# Patient Record
Sex: MALE | Race: WHITE | Employment: OTHER | URBAN - METROPOLITAN AREA
[De-identification: names, ages, dates, MRNs, and addresses within clinical notes are randomized per-mention and may not be internally consistent; named-entity substitution may affect disease eponyms.]

---

## 2017-02-16 ENCOUNTER — DOCUMENTATION ONLY (OUTPATIENT)
Dept: SLEEP MEDICINE | Age: 67
End: 2017-02-16

## 2017-02-16 ENCOUNTER — OFFICE VISIT (OUTPATIENT)
Dept: SLEEP MEDICINE | Age: 67
End: 2017-02-16

## 2017-02-16 VITALS
HEIGHT: 71 IN | HEART RATE: 92 BPM | DIASTOLIC BLOOD PRESSURE: 80 MMHG | SYSTOLIC BLOOD PRESSURE: 124 MMHG | WEIGHT: 315 LBS | OXYGEN SATURATION: 98 % | BODY MASS INDEX: 44.1 KG/M2

## 2017-02-16 DIAGNOSIS — I10 ESSENTIAL HYPERTENSION: ICD-10-CM

## 2017-02-16 DIAGNOSIS — G47.33 OSA (OBSTRUCTIVE SLEEP APNEA): Primary | ICD-10-CM

## 2017-02-16 RX ORDER — CEFUROXIME AXETIL 250 MG/1
TABLET ORAL
Refills: 0 | COMMUNITY
Start: 2016-11-15 | End: 2017-04-25 | Stop reason: ALTCHOICE

## 2017-02-16 RX ORDER — FINASTERIDE 5 MG/1
TABLET, FILM COATED ORAL
Refills: 6 | COMMUNITY
Start: 2017-01-27 | End: 2018-05-02

## 2017-02-16 NOTE — PROGRESS NOTES
217 [facility], [address]  Tel.  [phone]  Fax. 100 [facility] 60  [place], [address] [facility]  Tel.  [phone]  Fax. [phone] [address]   Tel.  [phone]  Fax. [phone]     S>Christiano Sexton is a 73 y.o. male seen for a positive airway pressure follow-up. He reports no problems using the device. He is 100% compliant over the past 30 days. The following problems are identified:    Drowsiness no Problems exhaling no   Snoring no Forget to put on no   Mask Comfortable yes Can't fall asleep no   Dry Mouth no Mask falls off no   Air Leaking no Frequent awakenings no         He admits that his sleep has improved. No Known Allergies    He has a current medication list which includes the following prescription(s): cefuroxime, finasteride, xarelto, tamsulosin, lisinopril-hydrochlorothiazide, fluoxetine, and methylprednisolone. .      He  has a past medical history of Arrhythmia; Depression (4/24/2010); Diabetes ([address] Dyslipidemia, goal LDL below 100 (6/14/2011); HTN (hypertension) (4/24/2010); Impotence (4/24/2010); Morbid obesity ([address] (5/17/2010); VALENTE (obstructive sleep apnea) (4/24/2010); and Restless legs (4/15/2015). Dane Sleepiness Score: 8   and Modified F.O.S.Q. Score Total / 2: 19   which reflect improved sleep quality over therapy time.     O>    Visit Vitals    /80    Pulse 92    Ht 5' 11\" (1.803 m)    Wt 340 lb (154.2 kg)    SpO2 98%    BMI 47.42 kg/m2         General:   Not in acute distress   Eyes:  Anicteric sclerae, no obvious strabismus   Nose:  No obvious nasal septum deviation    Oropharynx:   Class 4 oropharyngeal outlet, thick tongue base, uvula not seen due to low-lying soft palate, narrow tonsilo-pharyngeal pilars   Tonsils:   tonsils are not visualized due to low-lying soft palate   Neck:   midline trachea   Chest/Lungs:  Equal lung expansion, clear on auscultation    CVS: Normal rate, regular rhythm; no JVD   Skin:  Warm to touch; no obvious rashes   Neuro:  No focal deficits ; no obvious tremor    Psych:  Normal affect,  normal countenance;           A>    ICD-10-CM ICD-9-CM    1. VALENTE (obstructive sleep apnea) G47.33 327.23 AMB SUPPLY ORDER   2. BMI 45.0-49.9, adult (HCC) Z68.42 V85.42    3. Essential hypertension I10 401.9      AHI = 65.3. On Bi - Level : See media for download    Compliant:      yes    Therapeutic Response:  Positive    P>    * We have recommended a dedicated weight loss through appropriate diet and an exercise regiment as significant weight reduction has been shown to reduce severity of obstructive sleep apnea. * Follow-up Disposition:  Return in about 1 year (around 2/16/2018), or if symptoms worsen or fail to improve. * He was asked to contact our office for any problems regarding PAP therapy. * Counseling was provided regarding the importance of regular PAP use and on proper sleep hygiene and safe driving. * Re-enforced proper and regular cleaning for the device. Thank you for allowing us to participate in your patient's medical care. Krystal Mahoney MD, FAASM  Diplomate American Board of Sleep Medicine  Diplomate in Sleep Medicine - ABP  Electronically signed.

## 2017-02-17 ENCOUNTER — DOCUMENTATION ONLY (OUTPATIENT)
Dept: SLEEP MEDICINE | Age: 67
End: 2017-02-17

## 2017-02-17 NOTE — PROGRESS NOTES
Patient called to inform us that he was told that [facility] would be out of network for his insurance. So, faxed the order to [facility], provided patient with DME phone number.

## 2017-02-22 ENCOUNTER — OFFICE VISIT (OUTPATIENT)
Dept: CARDIOLOGY CLINIC | Age: 67
End: 2017-02-22

## 2017-02-22 VITALS
OXYGEN SATURATION: 98 % | RESPIRATION RATE: 16 BRPM | BODY MASS INDEX: 44.1 KG/M2 | WEIGHT: 315 LBS | SYSTOLIC BLOOD PRESSURE: 130 MMHG | HEART RATE: 90 BPM | DIASTOLIC BLOOD PRESSURE: 78 MMHG | HEIGHT: 71 IN

## 2017-02-22 DIAGNOSIS — I48.20 CHRONIC ATRIAL FIBRILLATION (HCC): Primary | ICD-10-CM

## 2017-02-22 DIAGNOSIS — I10 ESSENTIAL HYPERTENSION: ICD-10-CM

## 2017-02-22 DIAGNOSIS — E78.5 DYSLIPIDEMIA, GOAL LDL BELOW 100: ICD-10-CM

## 2017-02-22 DIAGNOSIS — E66.01 MORBID OBESITY DUE TO EXCESS CALORIES (HCC): ICD-10-CM

## 2017-02-22 DIAGNOSIS — G47.33 OSA (OBSTRUCTIVE SLEEP APNEA): ICD-10-CM

## 2017-02-22 NOTE — MR AVS SNAPSHOT
Visit Information Date & Time Provider Department Dept. Phone Encounter #  
 2/22/2017  1:00 PM Jaja Light MD [facility] [place] [phone] [id number] Follow-up Instructions Return in about 1 year (around 2/22/2018). Your Appointments 4/25/2017  9:00 AM  
ROUTINE CARE with Leslye Joy MD  
Formerly [facility] [address]) Appt Note: 6 month f/u  
 [place] [address] [facility] [address]  
[address]  
  
    
 9/28/2017  9:00 AM  
Any with Vanessa Mayer MD  
[address] [place] [address] ([address]) Appt Note: yrly cpap follow up [address] [phone]  
[phone]  
  
   
 [address] [place] [phone]  
  
    
 11/7/2017  9:15 AM  
PHYSICAL with Leslye Joy MD  
Formerly [facility] [address]) Appt Note: [address] [facility] [address]  
[address]  
  
    
 2/16/2018 11:00 AM  
Any with Vanessa Mayer MD  
[address] [place] [address] ([address]) Appt Note: yrly follow up, modem pt  
 [address] [phone]  
[phone] Upcoming Health Maintenance Date Due Hepatitis C Screening 1950 DTaP/Tdap/Td series (1 - Tdap) 8/12/1972 ZOSTER VACCINE AGE 60> 8/12/2011 EYE EXAM RETINAL OR DILATED Q1 6/19/2014 FOOT EXAM Q1 4/15/2016 HEMOGLOBIN A1C Q6M 4/15/2016 MICROALBUMIN Q1 4/15/2016 GLAUCOMA SCREENING Q2Y 8/12/2016 Pneumococcal 65+ Low/Medium Risk (1 of 2 - PCV13) 8/12/2016 MEDICARE YEARLY EXAM 8/12/2016 LIPID PANEL Q1 10/15/2016 COLONOSCOPY 10/6/2017 Allergies as of 2/22/2017  Review Complete On: 2/22/2017 By: Jaja Light MD  
 No Known Allergies Current Immunizations  Reviewed on 10/25/2016 Name Date Influenza High Dose Vaccine PF 10/25/2016 Influenza Vaccine (Quad) PF 10/15/2015, 9/25/2014 Pneumococcal Polysaccharide (PPSV-23) 1/13/2014 Not reviewed this visit You Were Diagnosed With   
  
 Codes Comments Chronic atrial fibrillation (HCC)    -  Primary ICD-10-CM: I86.5 ICD-9-CM: 427.31 Dyslipidemia, goal LDL below 100     ICD-10-CM: E78.5 ICD-9-CM: 272.4 Morbid obesity due to excess calories (HCC)     ICD-10-CM: E66.01 
ICD-9-CM: 278.01 Essential hypertension     ICD-10-CM: I10 
ICD-9-CM: 401.9 VALENTE (obstructive sleep apnea)     ICD-10-CM: G47.33 
ICD-9-CM: 327.23 Vitals BP  
  
  
  
  
  
 130/78 (BP 1 Location: Left arm, BP Patient Position: Sitting) Vitals History BMI and BSA Data Body Mass Index Body Surface Area  
 48.12 kg/m 2 2.8 m 2 Preferred Pharmacy Pharmacy Name Phone [id number] Bonita Blackmon, [address] [place] [phone] Your Updated Medication List  
  
   
This list is accurate as of: 2/22/17  1:24 PM.  Always use your most recent med list.  
  
  
  
  
 cefUROXime 250 mg tablet Commonly known as:  CEFTIN  
TAKE ONE TABLET BY MOUTH TWO TIMES A DAY. finasteride 5 mg tablet Commonly known as:  PROSCAR  
TAKE 1 TABLET BY MOUTH DAILY FLUoxetine 20 mg capsule Commonly known as:  PROzac Take 1 capsule by mouth  daily  
  
 lisinopril-hydroCHLOROthiazide 20-25 mg per tablet Commonly known as:  Rocio Galarza Take 1 tablet by mouth  daily  
  
 methylPREDNISolone 4 mg tablet Commonly known as:  Bosderrick Pippins As directed  
  
 rivaroxaban 20 mg Tab tablet Commonly known as:  [place] TAKE 1 TABLET BY MOUTH IN THE MORNING WITH BREAKFAST  
  
 tamsulosin 0.4 mg capsule Commonly known as:  FLOMAX TAKE 1 CAPSULE BY MOUTH EVERY EVENING Follow-up Instructions Return in about 1 year (around 2/22/2018). Introducing [facility]! Dear Amna Coleman: Thank you for requesting a Revert account. Our records indicate that you already have an active Revert account. You can access your account anytime at [email or web address]. [email or web address] Did you know that you can access your hospital and ER discharge instructions at any time in Revert? You can also review all of your test results from your hospital stay or ER visit. Additional Information If you have questions, please visit the Frequently Asked Questions section of the Revert website at [email or web address] Remember, Revert is NOT to be used for urgent needs. For medical emergencies, dial 911. Now available from your iPhone and Android! Please provide this summary of care documentation to your next provider. Your primary care clinician is listed as Gabino Jackson. If you have any questions after today's visit, please call [phone].

## 2017-02-22 NOTE — PROGRESS NOTES
HISTORY OF PRESENT ILLNESS  Ronold Buerger is a 73 y.o. male     SUMMARY:   Problem List  Date Reviewed: 2/22/2017          Codes Class Noted    Urinary retention due to benign prostatic hyperplasia ICD-10-CM: N28.89, R33.8  ICD-9-CM: 600.01, 788.29  10/25/2016        Diabetes mellitus type 2, controlled ([facility] ICD-10-CM: E11.9  ICD-9-CM: 250.00  10/15/2015        Restless legs ICD-10-CM: G25.81  ICD-9-CM: 333.94  4/15/2015        VALENTE (obstructive sleep apnea) ICD-10-CM: G47.33  ICD-9-CM: 327.23  6/19/2012    Overview Signed 6/19/2012 10:57 AM by Michael Toro MD     Bi-Level: 19/15 cmH2O. Dyslipidemia, goal LDL below 100 ICD-10-CM: E78.5  ICD-9-CM: 272.4  6/14/2011        A-fib ([facility] ICD-10-CM: I48.91  ICD-9-CM: 427.31  2/7/2011    Overview Addendum 2/9/2011  3:42 PM by Pieter Ballard NP     Assymptomatic, noted on ekg 2/11; found when had sleep study. Saw cardiologist 2/8/11.              Diabetes ([facility] ICD-10-CM: E11.9  ICD-9-CM: 250.00  1/28/2011    Overview Addendum 2/9/2011  3:37 PM by Pieter Ballard NP     Recent diagnosis; just started diabetes education  HgA1C 6.5%             Morbid obesity ([facility] ICD-10-CM: E66.01  ICD-9-CM: 278.01  5/17/2010    Overview Signed 2/9/2011  3:35 PM by Pieter Ballard NP     OBESE 20 + YEARS; HIGH WEIGHT 362 LBS PAST MONTH; LOW WEIGHT 190 LBS 30 YEARS AGO             VALENTE (obstructive sleep apnea) ICD-10-CM: G47.33  ICD-9-CM: 327.23  4/24/2010        Impotence ICD-10-CM: N52.9  ICD-9-CM: 607.84  4/24/2010        HTN (hypertension) ICD-10-CM: I10  ICD-9-CM: 401.9  4/24/2010    Overview Signed 4/24/2010  9:31 AM by Stevan Farley     borderline             Depression ICD-10-CM: F32.9  ICD-9-CM: 683  4/24/2010              Current Outpatient Prescriptions on File Prior to Visit   Medication Sig    rivaroxaban (XARELTO) 20 mg tab tablet TAKE 1 TABLET BY MOUTH IN THE MORNING WITH BREAKFAST    cefUROXime (CEFTIN) 250 mg tablet TAKE ONE TABLET BY MOUTH TWO TIMES A DAY.  finasteride (PROSCAR) 5 mg tablet TAKE 1 TABLET BY MOUTH DAILY    tamsulosin (FLOMAX) 0.4 mg capsule TAKE 1 CAPSULE BY MOUTH EVERY EVENING    methylPREDNISolone (MEDROL DOSEPACK) 4 mg tablet As directed    lisinopril-hydrochlorothiazide (PRINZIDE, ZESTORETIC) 20-25 mg per tablet Take 1 tablet by mouth  daily    FLUoxetine (PROZAC) 20 mg capsule Take 1 capsule by mouth  daily     No current facility-administered medications on file prior to visit. CARDIOLOGY STUDIES TO DATE:  02/10 echocardiogram showed lvh but was otherwise normal.    02/10 stress cardiolyte had mild EKG changes without chest pain and a questionable fixed inferior defect with an ejection fraction of 58%      Chief Complaint   Patient presents with    Irregular Heart Beat     HPI :  Mr. Abdirahman Rogers is doing well. He is active and walking some, though not exercising as much as he should. In spite of this he has lost a few pounds since we last met. No problem with his medications, and he is faithfully wearing his CPAP. He wants to know if he can take Viagra from a cardiac perspective. CARDIAC ROS:   negative for chest pain, dyspnea, palpitations, syncope, orthopnea, paroxysmal nocturnal dyspnea, exertional chest pressure/discomfort, claudication, lower extremity edema    Family History   Problem Relation Age of Onset    Cancer Father     Cancer Paternal Grandfather     Diabetes Sister     Diabetes Brother        Past Medical History:   Diagnosis Date    Arrhythmia     Depression 4/24/2010    Diabetes ([address]     Dyslipidemia, goal LDL below 100 6/14/2011    HTN (hypertension) 4/24/2010    Impotence 4/24/2010    Morbid obesity ([address] 5/17/2010    VALENTE (obstructive sleep apnea) 4/24/2010    Restless legs 4/15/2015       GENERAL ROS:  A comprehensive review of systems was negative except for that written in the HPI.     Visit Vitals    /78 (BP 1 Location: Left arm, BP Patient Position: Sitting)    Pulse 90    Resp 16    Ht 5' 11\" (1.803 m)    Wt 345 lb (156.5 kg)    SpO2 98%    BMI 48.12 kg/m2       Wt Readings from Last 3 Encounters:   02/22/17 345 lb (156.5 kg)   02/16/17 340 lb (154.2 kg)   10/25/16 334 lb (151.5 kg)            BP Readings from Last 3 Encounters:   02/22/17 130/78   02/16/17 124/80   10/25/16 130/88       PHYSICAL EXAM  General appearance: alert, cooperative, no distress, appears stated age  Neck: supple, symmetrical, trachea midline, no adenopathy, no carotid bruit and no JVD  Lungs: clear to auscultation bilaterally  Heart: irregularly irregular rhythm, S1, S2 normal, no S3 or S4  Extremities: extremities normal, atraumatic, no cyanosis or edema    Lab Results   Component Value Date/Time    Cholesterol, total 167 10/15/2015 11:58 AM    Cholesterol, total 168 09/25/2014 11:09 AM    Cholesterol, total 159 01/14/2014 08:54 AM    Cholesterol, total 153 03/20/2013 09:07 AM    Cholesterol, total 152 01/24/2011 02:28 AM    HDL Cholesterol 32 10/15/2015 11:58 AM    HDL Cholesterol 33 09/25/2014 11:09 AM    HDL Cholesterol 30 01/14/2014 08:54 AM    HDL Cholesterol 32 03/20/2013 09:07 AM    HDL Cholesterol 20 01/24/2011 02:28 AM    LDL, calculated 89 10/15/2015 11:58 AM    LDL, calculated 92 09/25/2014 11:09 AM    LDL, calculated 82 01/14/2014 08:54 AM    LDL, calculated 88 03/20/2013 09:07 AM    LDL, calculated  01/24/2011 02:28 AM      Comment:      Triglyceride result indicated is too high for an accurate LDL  cholesterol estimation. Triglyceride 228 10/15/2015 11:58 AM    Triglyceride 214 09/25/2014 11:09 AM    Triglyceride 237 01/14/2014 08:54 AM    Triglyceride 163 03/20/2013 09:07 AM    Triglyceride 562 01/24/2011 02:28 AM     ASSESSMENT  Mr. Makayla Sanchez is stable, asymptomatic and well-compensated from a cardiac standpoint on a good medical regimen and needs no cardiac testing at this time. I see no cardiac reason that he cannot take Viagra.       current treatment plan is effective, no change in therapy  lab results and schedule of future lab studies reviewed with patient  reviewed diet, exercise and weight control    Encounter Diagnoses   Name Primary?  Chronic atrial fibrillation (HCC) Yes    Dyslipidemia, goal LDL below 100     Morbid obesity due to excess calories (HCC)     Essential hypertension     VALENTE (obstructive sleep apnea)      No orders of the defined types were placed in this encounter. Follow-up Disposition:  Return in about 1 year (around 2/22/2018).     Seth Green MD  2/22/2017

## 2017-02-27 ENCOUNTER — PATIENT MESSAGE (OUTPATIENT)
Dept: INTERNAL MEDICINE CLINIC | Age: 67
End: 2017-02-27

## 2017-02-27 RX ORDER — SILDENAFIL 100 MG/1
100 TABLET, FILM COATED ORAL AS NEEDED
Qty: 36 TAB | Refills: 1 | Status: SHIPPED | OUTPATIENT
Start: 2017-02-27 | End: 2017-08-17

## 2017-03-30 ENCOUNTER — TELEPHONE (OUTPATIENT)
Dept: SLEEP MEDICINE | Age: 67
End: 2017-03-30

## 2017-03-30 DIAGNOSIS — G47.33 OSA (OBSTRUCTIVE SLEEP APNEA): Primary | ICD-10-CM

## 2017-03-30 NOTE — TELEPHONE ENCOUNTER
Orders Placed This Encounter    AMB SUPPLY ORDER     Diagnosis: Sleep Apnea ICD-10 Code (G47.30); ICD-9 Code (780.57). Positive Airway Pressure Therapy: Duration of need: 99 months. ResMed VPAP Auto (VAuto Mode):   Maximum IPAP: 25 cmH2O; Minimum EPAP: 9 cmH2O; PS: 6 cmH2O. Ramp Time: 30 Minutes. CPAP mask -  As fitted during titration OR patient preference, headgear, tubing, and filter;  heated humidifier; wireless modem. Remote monitoring enrollment. Charles Higginbotham MD, FAASM; NPI: [id number]  [facility]  Electronically signed.  Date: 03-30-17

## 2017-03-30 NOTE — TELEPHONE ENCOUNTER
Patient needs an order for new machine sent to [facility]. Patient has to turn in machine with [facility] due to his insurance no longer participating with [facility].

## 2017-03-31 ENCOUNTER — DOCUMENTATION ONLY (OUTPATIENT)
Dept: SLEEP MEDICINE | Age: 67
End: 2017-03-31

## 2017-04-25 ENCOUNTER — OFFICE VISIT (OUTPATIENT)
Dept: INTERNAL MEDICINE CLINIC | Age: 67
End: 2017-04-25

## 2017-04-25 VITALS
RESPIRATION RATE: 18 BRPM | BODY MASS INDEX: 44.1 KG/M2 | HEART RATE: 60 BPM | DIASTOLIC BLOOD PRESSURE: 78 MMHG | OXYGEN SATURATION: 98 % | WEIGHT: 315 LBS | SYSTOLIC BLOOD PRESSURE: 126 MMHG | HEIGHT: 71 IN

## 2017-04-25 DIAGNOSIS — Z23 ENCOUNTER FOR IMMUNIZATION: ICD-10-CM

## 2017-04-25 DIAGNOSIS — Z12.11 COLON CANCER SCREENING: Primary | ICD-10-CM

## 2017-04-25 DIAGNOSIS — F34.1 DYSTHYMIA: ICD-10-CM

## 2017-04-25 DIAGNOSIS — N52.01 ERECTILE DYSFUNCTION DUE TO ARTERIAL INSUFFICIENCY: ICD-10-CM

## 2017-04-25 DIAGNOSIS — E11.9 TYPE 2 DIABETES MELLITUS WITHOUT COMPLICATION, WITHOUT LONG-TERM CURRENT USE OF INSULIN (HCC): ICD-10-CM

## 2017-04-25 DIAGNOSIS — I48.20 CHRONIC ATRIAL FIBRILLATION (HCC): ICD-10-CM

## 2017-04-25 DIAGNOSIS — Z00.00 ROUTINE GENERAL MEDICAL EXAMINATION AT A HEALTH CARE FACILITY: ICD-10-CM

## 2017-04-25 DIAGNOSIS — E66.01 MORBID OBESITY DUE TO EXCESS CALORIES (HCC): ICD-10-CM

## 2017-04-25 DIAGNOSIS — E78.5 DYSLIPIDEMIA, GOAL LDL BELOW 100: ICD-10-CM

## 2017-04-25 DIAGNOSIS — Z13.39 SCREENING FOR ALCOHOLISM: ICD-10-CM

## 2017-04-25 PROBLEM — N40.1 BENIGN NON-NODULAR PROSTATIC HYPERPLASIA WITH LOWER URINARY TRACT SYMPTOMS: Status: ACTIVE | Noted: 2017-04-25

## 2017-04-25 RX ORDER — FLUOXETINE HYDROCHLORIDE 20 MG/1
CAPSULE ORAL
Qty: 90 CAP | Refills: 0
Start: 2017-04-25 | End: 2017-06-26

## 2017-04-25 NOTE — PROGRESS NOTES
This is an Initial Medicare Annual Wellness Exam (AWV) (Performed 12 months after IPPE or effective date of Medicare Part B enrollment, Once in a lifetime)    I have reviewed the patient's medical history in detail and updated the computerized patient record. History     Past Medical History:   Diagnosis Date    Arrhythmia     Depression 4/24/2010    [facility]     Dyslipidemia, goal LDL below 100 6/14/2011    HTN (hypertension) 4/24/2010    Impotence 4/24/2010    Morbid obesity ([facility] 5/17/2010    VALENTE (obstructive sleep apnea) 4/24/2010    Restless legs 4/15/2015      Past Surgical History:   Procedure Laterality Date    ENDOSCOPY, COLON, DIAGNOSTIC  2007    HX ORTHOPAEDIC      bilat knees  left hip     Current Outpatient Prescriptions   Medication Sig Dispense Refill    FLUoxetine (PROZAC) 20 mg capsule Every other day for 4 week then stop 90 Cap 0    rivaroxaban (XARELTO) 20 mg tab tablet TAKE 1 TABLET BY MOUTH IN THE MORNING WITH BREAKFAST 90 Tab 1    sildenafil citrate (VIAGRA) 100 mg tablet Take 1 Tab by mouth as needed.  36 Tab 1    lisinopril-hydrochlorothiazide (PRINZIDE, ZESTORETIC) 20-25 mg per tablet Take 1 tablet by mouth  daily 90 Tab 1    finasteride (PROSCAR) 5 mg tablet TAKE 1 TABLET BY MOUTH DAILY  6    tamsulosin (FLOMAX) 0.4 mg capsule TAKE 1 CAPSULE BY MOUTH EVERY EVENING  6     No Known Allergies  Family History   Problem Relation Age of Onset    Cancer Father     Cancer Paternal Grandfather     Diabetes Sister     Diabetes Brother      Social History   Substance Use Topics    Smoking status: Former Smoker     Quit date: 5/17/1990    Smokeless tobacco: Never Used    Alcohol use 1.0 oz/week     2 Standard drinks or equivalent per week      Comment: 2-3 drinks per week     Patient Active Problem List   Diagnosis Code    VALENTE (obstructive sleep apnea) G47.33    Erectile dysfunction due to arterial insufficiency N52.01    HTN (hypertension) I10    Depression F32.9  Morbid obesity (HCC) E66.01    Diabetes ([address] E11.9    A-fib ([facility]) I48.91    Dyslipidemia, goal LDL below 100 E78.5    VALENTE (obstructive sleep apnea) G47.33    Restless legs G25.81    Diabetes mellitus type 2, controlled ([facility]) E11.9    Urinary retention due to benign prostatic hyperplasia N40.1, R33.8    Benign non-nodular prostatic hyperplasia with lower urinary tract symptoms N40.1     Patient Active Problem List    Diagnosis    Benign non-nodular prostatic hyperplasia with lower urinary tract symptoms     Retention had catheter 3 months      Urinary retention due to benign prostatic hyperplasia    Diabetes mellitus type 2, controlled ([facility])    Restless legs    VALENTE (obstructive sleep apnea)     Bi-Level: 19/15 cmH2O.  Dyslipidemia, goal LDL below 100    A-fib ([address]     Assymptomatic, noted on ekg 2/11; found when had sleep study. Saw cardiologist 2/8/11.  Diabetes ([address]     Recent diagnosis; just started diabetes education  HgA1C 6.5%      Morbid obesity ([facility])     OBESE 20 + YEARS; HIGH WEIGHT 362 LBS PAST MONTH; LOW WEIGHT 190 LBS 30 YEARS AGO      VALENTE (obstructive sleep apnea)    Erectile dysfunction due to arterial insufficiency    HTN (hypertension)     borderline      Depression     Retention resolved  Not checking glucose or BP  Ed worse  ?? proscar causing  Mood great wants to try off ssri    Cardiovascular ROS: no chest pain or dyspnea on exertion  Neurological ROS: no TIA or stroke symptoms  General ROS: negative for - chills, fatigue, fever, malaise, night sweats or sleep disturbance  Endocrine ROS: negative for - hair pattern changes, hot flashes, malaise/lethargy, palpitations, polydipsia/polyuria, temperature intolerance or unexpected weight changes      Depression Risk Factor Screening:   No flowsheet data found. Alcohol Risk Factor Screening: On any occasion during the past 3 months, have you had more than 4 drinks containing alcohol?   No    Do you average more than 14 drinks per week? No    Functional Ability and Level of Safety:     Hearing Loss   none    Activities of Daily Living   Self-care. Requires assistance with: no ADLs    Fall Risk     Fall Risk Assessment, last 12 mths 10/3/2016   Able to walk? Yes   Fall in past 12 months? No     Abuse Screen   Patient is not abused    Review of Systems   Pertinent items are noted in HPI. Physical Examination     No exam data present    Evaluation of Cognitive Function:  Mood/affect:  happy  Appearance: age appropriate and well dressed  Family member/caregiver input: no    Visit Vitals    /78    Pulse 60    Resp 18    Ht 5' 11\" (1.803 m)    Wt 342 lb (155.1 kg)    SpO2 98%    BMI 47.7 kg/m2     General appearance: alert, cooperative, no distress, appears stated age  Neck: supple, symmetrical, trachea midline, no adenopathy, thyroid: not enlarged, symmetric, no tenderness/mass/nodules, no carotid bruit and no JVD  Lungs: clear to auscultation bilaterally  Heart: irregularly irregular rhythm  Abdomen: soft, non-tender. Bowel sounds normal. No masses,  no organomegaly  Extremities: extremities normal, atraumatic, no cyanosis or edema    Patient Care Team:  Ayana Lang MD as PCP - Erica Syed MD as Physician (Sleep Medicine)  Jamal Cotto MD (Cardiology)    Advice/Referrals/Counseling   Education and counseling provided:  Are appropriate based on today's review and evaluation  End-of-Life planning (with patient's consent)  Pneumococcal Vaccine  Influenza Vaccine  Prostate cancer screening tests (PSA, covered annually)  Colorectal cancer screening tests      Assessment/Plan   1. Routine general medical examination at a health care facility  As above    2. Screening for alcoholism  none    3. Encounter for immunization    - pneumococcal 13 angel conj dip (PREVNAR 13, PF,) 0.5 mL syrg injection; 0.5 mL by IntraMUSCular route once for 1 dose.   Dispense: 0.5 mL; Refill: 0  - varicella zoster vacine live (ZOSTAVAX) 19,400 unit/0.65 mL susr injection; 1 Vial by SubCUTAneous route once for 1 dose. Dispense: 0.65 mL; Refill: 0    4. Colon cancer screening  Not up to date  - REFERRAL TO GASTROENTEROLOGY    5. Type 2 diabetes mellitus without complication, without long-term current use of insulin (HCC)  Lab Results   Component Value Date/Time    Hemoglobin A1c 6.3 10/15/2015 11:58 AM    Hemoglobin A1c (POC) 5.7 01/13/2014 02:18 PM     Review diet exercise  - CBC WITH AUTOMATED DIFF  - LIPID PANEL  - METABOLIC PANEL, COMPREHENSIVE  - MAGNESIUM  - HEMOGLOBIN A1C W/O EAG  - MICROALBUMIN, UR, RAND W/ MICROALBUMIN/CREA RATIO    6. Dysthymia  Can wean prozac    7. Dyslipidemia, goal LDL below 100  stable    8. Erectile dysfunction due to arterial insufficiency  Try   viagra    9. Chronic atrial fibrillation (HCC)  On anticoagulant    10. Morbid obesity due to excess calories ([address]  Diet reviewed    Wt Readings from Last 3 Encounters:   04/25/17 342 lb (155.1 kg)   02/22/17 345 lb (156.5 kg)   02/16/17 340 lb (154.2 kg)       .

## 2017-04-25 NOTE — MR AVS SNAPSHOT
Visit Information Date & Time Provider Department Dept. Phone Encounter #  
 4/25/2017  9:00 AM Lashonda Elizondo MD Formerly [facility] [phone] [id number] Your Appointments 9/28/2017  9:00 AM  
Any with Deirdre March MD  
[address] [facility] ([address]) Appt Note: yrly cpap follow up [address] [phone]  
[phone]  
  
   
 [address] [place] [id number]  
  
    
 11/7/2017  9:15 AM  
PHYSICAL with Lashonda Elizondo MD  
[facility]ly [facility] [address]) Appt Note: [address] [place] [address] [address] [facility] [address]  
[phone]  
  
   
 [address]  
  
    
 2/16/2018 11:00 AM  
Any with Deirdre March MD  
[address] [place] [address] ([address]) Appt Note: yrly follow up, modem pt  
 [address] [phone]  
[phone]  
  
    
 2/21/2018  1:40 PM  
ESTABLISHED PATIENT with Silva Perez MD  
[facility] ([address]) Appt Note: one year follow up  
 [address] [facility] [address]  
[address] [address] Upcoming Health Maintenance Date Due Hepatitis C Screening 1950 DTaP/Tdap/Td series (1 - Tdap) 8/12/1972 ZOSTER VACCINE AGE 60> 8/12/2011 EYE EXAM RETINAL OR DILATED Q1 6/19/2014 FOOT EXAM Q1 4/15/2016 HEMOGLOBIN A1C Q6M 4/15/2016 MICROALBUMIN Q1 4/15/2016 GLAUCOMA SCREENING Q2Y 8/12/2016 Pneumococcal 65+ Low/Medium Risk (1 of 2 - PCV13) 8/12/2016 MEDICARE YEARLY EXAM 8/12/2016 LIPID PANEL Q1 10/15/2016 COLONOSCOPY 10/6/2017 Allergies as of 4/25/2017  Review Complete On: 4/25/2017 By: Lashonda Elizondo MD  
 No Known Allergies Current Immunizations  Reviewed on 10/25/2016 Name Date Influenza High Dose Vaccine PF 10/25/2016 Influenza Vaccine (Quad) PF 10/15/2015, 9/25/2014 Pneumococcal Polysaccharide (PPSV-23) 1/13/2014 Not reviewed this visit You Were Diagnosed With   
  
 Codes Comments Colon cancer screening    -  Primary ICD-10-CM: Z12.11 ICD-9-CM: V76.51 Routine general medical examination at a health care facility     ICD-10-CM: Z00.00 ICD-9-CM: V70.0 Screening for alcoholism     ICD-10-CM: Z13.89 ICD-9-CM: V79.1 Encounter for immunization     ICD-10-CM: H36 ICD-9-CM: V03.89 Type 2 diabetes mellitus without complication, without long-term current use of insulin (HCC)     ICD-10-CM: E11.9 ICD-9-CM: 250.00 Dysthymia     ICD-10-CM: F34.1 ICD-9-CM: 300.4 Dyslipidemia, goal LDL below 100     ICD-10-CM: E78.5 ICD-9-CM: 272.4 Erectile dysfunction due to arterial insufficiency     ICD-10-CM: N52.01 
ICD-9-CM: 607.84 Vitals BP Pulse Resp Height(growth percentile) Weight(growth percentile) SpO2  
 126/78 60 18 5' 11\" (1.803 m) 342 lb (155.1 kg) 98% BMI Smoking Status 47.7 kg/m2 Former Smoker Vitals History BMI and BSA Data Body Mass Index Body Surface Area 47.7 kg/m 2 2.79 m 2 Preferred Pharmacy Pharmacy Name Phone 305 [facility], [address] [place] [address] [address] [facility] Your Updated Medication List  
  
   
This list is accurate as of: 4/25/17  9:39 AM.  Always use your most recent med list.  
  
  
  
  
 finasteride 5 mg tablet Commonly known as:  PROSCAR  
TAKE 1 TABLET BY MOUTH DAILY FLUoxetine 20 mg capsule Commonly known as:  PROzac Every other day for 4 week then stop  
  
 lisinopril-hydroCHLOROthiazide 20-25 mg per tablet Commonly known as:  Lauraine Lass Take 1 tablet by mouth  daily  
  
 pneumococcal 13 angel conj dip 0.5 mL Syrg injection Commonly known as:  PREVNAR 13 (PF)  
 0.5 mL by IntraMUSCular route once for 1 dose. rivaroxaban 20 mg Tab tablet Commonly known as:  Veda Darter TAKE 1 TABLET BY MOUTH IN THE MORNING WITH BREAKFAST  
  
 sildenafil citrate 100 mg tablet Commonly known as:  VIAGRA Take 1 Tab by mouth as needed. tamsulosin 0.4 mg capsule Commonly known as:  FLOMAX TAKE 1 CAPSULE BY MOUTH EVERY EVENING  
  
 varicella zoster vacine live 19,400 unit/0.65 mL Susr injection Commonly known as:  ZOSTAVAX  
1 Vial by SubCUTAneous route once for 1 dose. Prescriptions Printed Refills  
 pneumococcal 13 angel conj dip (PREVNAR 13, PF,) 0.5 mL syrg injection 0 Si.5 mL by IntraMUSCular route once for 1 dose. Class: Print Route: IntraMUSCular  
 varicella zoster vacine live (ZOSTAVAX) 19,400 unit/0.65 mL susr injection 0 Si Vial by SubCUTAneous route once for 1 dose. Class: Print Route: SubCUTAneous We Performed the Following CBC WITH AUTOMATED DIFF [78903 CPT(R)] HEMOGLOBIN A1C W/O EAG [08658 CPT(R)] LIPID PANEL [27028 CPT(R)] MAGNESIUM [id number] CPT(R)] METABOLIC PANEL, COMPREHENSIVE [77642 CPT(R)] MICROALBUMIN, UR, RAND W/ MICROALBUMIN/CREA RATIO X576373 CPT(R)] REFERRAL TO GASTROENTEROLOGY [[id number]] Referral Information Referral ID Referred By Referred To  
  
 [id number] JUAN LUIS, [address] [facility]   
   [address] [address], [address] [facility] Visits Status Start Date End Date 1 New Request 17 If your referral has a status of pending review or denied, additional information will be sent to support the outcome of this decision. Introducing \[facility] [place] [facility]\"" & HEALTH SERVICES! Dear Yariel Cost: Thank you for requesting a Loyalis account. Our records indicate that you already have an active Loyalis account. You can access your account anytime at [email or web address] Did you know that you can access your hospital and ER discharge instructions at any time in Inquirly? You can also review all of your test results from your hospital stay or ER visit. Additional Information If you have questions, please visit the Frequently Asked Questions section of the Inquirly website at [email or web address] Remember, Inquirly is NOT to be used for urgent needs. For medical emergencies, dial 911. Now available from your iPhone and Android! Please provide this summary of care documentation to your next provider. Your primary care clinician is listed as Sohail Blair. If you have any questions after today's visit, please call [phone].

## 2017-04-26 LAB
ALBUMIN SERPL-MCNC: 4.4 G/DL (ref 3.6–4.8)
ALBUMIN/CREAT UR: <6.8 MG/G CREAT (ref 0–30)
ALBUMIN/GLOB SERPL: 1.6 {RATIO} (ref 1.2–2.2)
ALP SERPL-CCNC: 77 IU/L (ref 39–117)
ALT SERPL-CCNC: 20 IU/L (ref 0–44)
AST SERPL-CCNC: 16 IU/L (ref 0–40)
BASOPHILS # BLD AUTO: 0 X10E3/UL (ref 0–0.2)
BASOPHILS NFR BLD AUTO: 0 %
BILIRUB SERPL-MCNC: 0.6 MG/DL (ref 0–1.2)
BUN SERPL-MCNC: 15 MG/DL (ref 8–27)
BUN/CREAT SERPL: 20 (ref 10–24)
CALCIUM SERPL-MCNC: 9.4 MG/DL (ref 8.6–10.2)
CHLORIDE SERPL-SCNC: 97 MMOL/L (ref 96–106)
CHOLEST SERPL-MCNC: 168 MG/DL (ref 100–199)
CO2 SERPL-SCNC: 23 MMOL/L (ref 18–29)
CREAT SERPL-MCNC: 0.75 MG/DL (ref 0.76–1.27)
CREAT UR-MCNC: 43.8 MG/DL
EOSINOPHIL # BLD AUTO: 0.4 X10E3/UL (ref 0–0.4)
EOSINOPHIL NFR BLD AUTO: 5 %
ERYTHROCYTE [DISTWIDTH] IN BLOOD BY AUTOMATED COUNT: 13.8 % (ref 12.3–15.4)
GLOBULIN SER CALC-MCNC: 2.8 G/DL (ref 1.5–4.5)
GLUCOSE SERPL-MCNC: 84 MG/DL (ref 65–99)
HBA1C MFR BLD: 6.3 % (ref 4.8–5.6)
HCT VFR BLD AUTO: 45.2 % (ref 37.5–51)
HDLC SERPL-MCNC: 37 MG/DL
HGB BLD-MCNC: 14.7 G/DL (ref 12.6–17.7)
IMM GRANULOCYTES # BLD: 0 X10E3/UL (ref 0–0.1)
IMM GRANULOCYTES NFR BLD: 0 %
INTERPRETATION, 910389: NORMAL
LDLC SERPL CALC-MCNC: 92 MG/DL (ref 0–99)
LYMPHOCYTES # BLD AUTO: 1.9 X10E3/UL (ref 0.7–3.1)
LYMPHOCYTES NFR BLD AUTO: 27 %
Lab: NORMAL
MAGNESIUM SERPL-MCNC: 1.9 MG/DL (ref 1.6–2.3)
MCH RBC QN AUTO: 29.4 PG (ref 26.6–33)
MCHC RBC AUTO-ENTMCNC: 32.5 G/DL (ref 31.5–35.7)
MCV RBC AUTO: 90 FL (ref 79–97)
MICROALBUMIN UR-MCNC: <3 UG/ML
MONOCYTES # BLD AUTO: 0.7 X10E3/UL (ref 0.1–0.9)
MONOCYTES NFR BLD AUTO: 10 %
NEUTROPHILS # BLD AUTO: 4.1 X10E3/UL (ref 1.4–7)
NEUTROPHILS NFR BLD AUTO: 58 %
PLATELET # BLD AUTO: 229 X10E3/UL (ref 150–379)
POTASSIUM SERPL-SCNC: 4.5 MMOL/L (ref 3.5–5.2)
PROT SERPL-MCNC: 7.2 G/DL (ref 6–8.5)
RBC # BLD AUTO: 5 X10E6/UL (ref 4.14–5.8)
SODIUM SERPL-SCNC: 137 MMOL/L (ref 134–144)
TRIGL SERPL-MCNC: 193 MG/DL (ref 0–149)
VLDLC SERPL CALC-MCNC: 39 MG/DL (ref 5–40)
WBC # BLD AUTO: 7.1 X10E3/UL (ref 3.4–10.8)

## 2017-06-07 RX ORDER — LISINOPRIL AND HYDROCHLOROTHIAZIDE 20; 25 MG/1; MG/1
TABLET ORAL
Qty: 90 TAB | Refills: 1 | Status: SHIPPED | OUTPATIENT
Start: 2017-06-07 | End: 2018-03-01 | Stop reason: SDUPTHER

## 2017-06-08 ENCOUNTER — TELEPHONE (OUTPATIENT)
Dept: INTERNAL MEDICINE CLINIC | Age: 67
End: 2017-06-08

## 2017-06-08 NOTE — TELEPHONE ENCOUNTER
Pt states has colonscopy scheduled with [facility] and need notes fax to [phone].  Best contact number [phone].              From answering service

## 2017-06-16 ENCOUNTER — TELEPHONE (OUTPATIENT)
Dept: INTERNAL MEDICINE CLINIC | Age: 67
End: 2017-06-16

## 2017-06-16 NOTE — TELEPHONE ENCOUNTER
Writer left voicemail for patient informing him to stop blood thinner 4 days prior to procedure. Writer faxed information to Dr Ayala Age office to fax number provided.

## 2017-06-16 NOTE — TELEPHONE ENCOUNTER
Tim Grove from Dr. Avelino Colvin office called and would like info on when patient can stop blood thinner to have a colonoscopy done, she would like it faxed to [phone]    Call back #[phone]

## 2017-06-21 ENCOUNTER — TELEPHONE (OUTPATIENT)
Dept: INTERNAL MEDICINE CLINIC | Age: 67
End: 2017-06-21

## 2017-06-21 NOTE — TELEPHONE ENCOUNTER
Dora Grimm says they have not received clearance instructions for patient colonscopy.  .Please fax to [phone]

## 2017-06-27 ENCOUNTER — ANESTHESIA EVENT (OUTPATIENT)
Dept: ENDOSCOPY | Age: 67
End: 2017-06-27
Payer: MEDICARE

## 2017-06-27 ENCOUNTER — HOSPITAL ENCOUNTER (OUTPATIENT)
Age: 67
Setting detail: OUTPATIENT SURGERY
Discharge: HOME | End: 2017-06-27
Attending: INTERNAL MEDICINE | Admitting: INTERNAL MEDICINE
Payer: MEDICARE

## 2017-06-27 ENCOUNTER — ANESTHESIA (OUTPATIENT)
Dept: ENDOSCOPY | Age: 67
End: 2017-06-27
Payer: MEDICARE

## 2017-06-27 VITALS
SYSTOLIC BLOOD PRESSURE: 142 MMHG | OXYGEN SATURATION: 97 % | HEART RATE: 87 BPM | BODY MASS INDEX: 44.1 KG/M2 | DIASTOLIC BLOOD PRESSURE: 87 MMHG | TEMPERATURE: 97.3 F | RESPIRATION RATE: 13 BRPM | WEIGHT: 315 LBS | HEIGHT: 71 IN

## 2017-06-27 LAB
GLUCOSE BLD STRIP.AUTO-MCNC: 122 MG/DL (ref 65–100)
GLUCOSE BLD STRIP.AUTO-MCNC: 122 MG/DL (ref 65–100)
SERVICE CMNT-IMP: ABNORMAL
SERVICE CMNT-IMP: ABNORMAL

## 2017-06-27 PROCEDURE — 76060000031 HC ANESTHESIA FIRST 0.5 HR: Performed by: INTERNAL MEDICINE

## 2017-06-27 PROCEDURE — 74011250636 HC RX REV CODE- 250/636

## 2017-06-27 PROCEDURE — 77030009426 HC FCPS BIOP ENDOSC BSC -B: Performed by: INTERNAL MEDICINE

## 2017-06-27 PROCEDURE — 88305 TISSUE EXAM BY PATHOLOGIST: CPT | Performed by: INTERNAL MEDICINE

## 2017-06-27 PROCEDURE — 77030027957 HC TBNG IRR ENDOGTR BUSS -B: Performed by: INTERNAL MEDICINE

## 2017-06-27 PROCEDURE — 82962 GLUCOSE BLOOD TEST: CPT

## 2017-06-27 PROCEDURE — 74011000250 HC RX REV CODE- 250

## 2017-06-27 PROCEDURE — 76040000019: Performed by: INTERNAL MEDICINE

## 2017-06-27 RX ORDER — SODIUM CHLORIDE 0.9 % (FLUSH) 0.9 %
5-10 SYRINGE (ML) INJECTION EVERY 8 HOURS
Status: DISCONTINUED | OUTPATIENT
Start: 2017-06-27 | End: 2017-06-27 | Stop reason: HOSPADM

## 2017-06-27 RX ORDER — MIDAZOLAM HYDROCHLORIDE 1 MG/ML
.25-5 INJECTION, SOLUTION INTRAMUSCULAR; INTRAVENOUS
Status: DISCONTINUED | OUTPATIENT
Start: 2017-06-27 | End: 2017-06-27 | Stop reason: HOSPADM

## 2017-06-27 RX ORDER — SODIUM CHLORIDE 0.9 % (FLUSH) 0.9 %
5-10 SYRINGE (ML) INJECTION AS NEEDED
Status: DISCONTINUED | OUTPATIENT
Start: 2017-06-27 | End: 2017-06-27 | Stop reason: HOSPADM

## 2017-06-27 RX ORDER — EPINEPHRINE 0.1 MG/ML
1 INJECTION INTRACARDIAC; INTRAVENOUS
Status: DISCONTINUED | OUTPATIENT
Start: 2017-06-27 | End: 2017-06-27 | Stop reason: HOSPADM

## 2017-06-27 RX ORDER — SODIUM CHLORIDE 9 MG/ML
INJECTION, SOLUTION INTRAVENOUS
Status: DISCONTINUED | OUTPATIENT
Start: 2017-06-27 | End: 2017-06-27 | Stop reason: HOSPADM

## 2017-06-27 RX ORDER — SODIUM CHLORIDE 9 MG/ML
150 INJECTION, SOLUTION INTRAVENOUS CONTINUOUS
Status: DISCONTINUED | OUTPATIENT
Start: 2017-06-27 | End: 2017-06-27 | Stop reason: HOSPADM

## 2017-06-27 RX ORDER — ATROPINE SULFATE 0.1 MG/ML
0.5 INJECTION INTRAVENOUS
Status: DISCONTINUED | OUTPATIENT
Start: 2017-06-27 | End: 2017-06-27 | Stop reason: HOSPADM

## 2017-06-27 RX ORDER — LIDOCAINE HYDROCHLORIDE 20 MG/ML
INJECTION, SOLUTION EPIDURAL; INFILTRATION; INTRACAUDAL; PERINEURAL AS NEEDED
Status: DISCONTINUED | OUTPATIENT
Start: 2017-06-27 | End: 2017-06-27 | Stop reason: HOSPADM

## 2017-06-27 RX ORDER — PROPOFOL 10 MG/ML
INJECTION, EMULSION INTRAVENOUS AS NEEDED
Status: DISCONTINUED | OUTPATIENT
Start: 2017-06-27 | End: 2017-06-27 | Stop reason: HOSPADM

## 2017-06-27 RX ORDER — DEXTROMETHORPHAN/PSEUDOEPHED 2.5-7.5/.8
1.2 DROPS ORAL
Status: DISCONTINUED | OUTPATIENT
Start: 2017-06-27 | End: 2017-06-27 | Stop reason: HOSPADM

## 2017-06-27 RX ADMIN — PROPOFOL 30 MG: 10 INJECTION, EMULSION INTRAVENOUS at 09:35

## 2017-06-27 RX ADMIN — PROPOFOL 50 MG: 10 INJECTION, EMULSION INTRAVENOUS at 09:26

## 2017-06-27 RX ADMIN — PROPOFOL 50 MG: 10 INJECTION, EMULSION INTRAVENOUS at 09:24

## 2017-06-27 RX ADMIN — LIDOCAINE HYDROCHLORIDE 60 MG: 20 INJECTION, SOLUTION EPIDURAL; INFILTRATION; INTRACAUDAL; PERINEURAL at 09:23

## 2017-06-27 RX ADMIN — PROPOFOL 30 MG: 10 INJECTION, EMULSION INTRAVENOUS at 09:33

## 2017-06-27 RX ADMIN — SODIUM CHLORIDE: 9 INJECTION, SOLUTION INTRAVENOUS at 09:21

## 2017-06-27 RX ADMIN — PROPOFOL 30 MG: 10 INJECTION, EMULSION INTRAVENOUS at 09:31

## 2017-06-27 RX ADMIN — PROPOFOL 100 MG: 10 INJECTION, EMULSION INTRAVENOUS at 09:23

## 2017-06-27 RX ADMIN — PROPOFOL 30 MG: 10 INJECTION, EMULSION INTRAVENOUS at 09:28

## 2017-06-27 NOTE — ROUTINE PROCESS
Gera Brown  1950  [id number]    Situation:  Verbal report received from: RN Sandra Evangelista  Procedure: Procedure(s):  COLONOSCOPY  ENDOSCOPIC POLYPECTOMY    Background:    Preoperative diagnosis: SCREENING   Postoperative diagnosis: 1.- Colon Polyp  2.- Diverticulosis  3.- Internal Hemorrhoids    :  Dr. Jared Ribera  Assistant(s): Endoscopy Technician-1: Ryley Trujillo  Endoscopy RN-1: Tasha Ortiz RN    Specimens:   ID Type Source Tests Collected by Time Destination   1 : Rectal Polyp Preservative   Cole Batres MD 6/27/2017 0448 Pathology     H. Pylori  no    Assessment:  Intra-procedure medications       Anesthesia gave intra-procedure sedation and medications, see anesthesia flow sheet yes    Intravenous fluids:  250  NS @ KVO     Vital signs stable yes    Abdominal assessment: round and soft yes    Recommendation:  Discharge patient per MD order yes.   Return to floor no  Family or Friend : friend  Permission to share finding with family or friend yes

## 2017-06-27 NOTE — H&P
[address], [address] [place] [address]          Pre-procedure History and Physical       NAME:  Vy Castillo   :   1950   MRN:   [id number]     CHIEF COMPLAINT/HPI: See procedure note    PMH:  Past Medical History:   Diagnosis Date    Arrhythmia     atrial fib    Depression 2010    Diabetes ([facility] [address]     diet control for pre-diabetes    Dyslipidemia, goal LDL below 100 2011    HTN (hypertension) 2010    Impotence 2010    Morbid obesity ([facility] [address] 2010    VALENTE (obstructive sleep apnea) 2010    CPAP    Restless legs 4/15/2015       PSH:  Past Surgical History:   Procedure Laterality Date    ENDOSCOPY, COLON, DIAGNOSTIC      HX APPENDECTOMY      HX ORTHOPAEDIC      bilat TKR     HX ORTHOPAEDIC      left THR       Allergies:  No Known Allergies    Home Medications:  Prior to Admission Medications   Prescriptions Last Dose Informant Patient Reported? Taking?   finasteride (PROSCAR) 5 mg tablet 2017 at Unknown time  Yes Yes   Sig: TAKE 1 TABLET BY MOUTH DAILY   lisinopril-hydroCHLOROthiazide (PRINZIDE, ZESTORETIC) 20-25 mg per tablet 2017 at Unknown time  No Yes   Sig: Take 1 tablet by mouth  daily   rivaroxaban (XARELTO) 20 mg tab tablet 2017 at Unknown time  No No   Sig: TAKE 1 TABLET BY MOUTH IN THE MORNING WITH BREAKFAST   sildenafil citrate (VIAGRA) 100 mg tablet 2017 at Unknown time  No Yes   Sig: Take 1 Tab by mouth as needed.    tamsulosin (FLOMAX) 0.4 mg capsule 2017 at Unknown time  Yes Yes   Sig: TAKE 1 CAPSULE BY MOUTH EVERY EVENING      Facility-Administered Medications: None       Hospital Medications:  Current Facility-Administered Medications   Medication Dose Route Frequency    0.9% sodium chloride infusion  150 mL/hr IntraVENous CONTINUOUS    sodium chloride (NS) flush 5-10 mL  5-10 mL IntraVENous Q8H    sodium chloride (NS) flush 5-10 mL  5-10 mL IntraVENous PRN    midazolam (VERSED) injection 0.25-5 mg  0.25-5 mg IntraVENous Multiple    simethicone (MYLICON) 22KQ/8.4XN oral drops 80 mg  1.2 mL Oral Multiple    atropine injection 0.5 mg  0.5 mg IntraVENous ONCE PRN    EPINEPHrine (ADRENALIN) 0.1 mg/mL syringe 1 mg  1 mg Endoscopically ONCE PRN       Family History:  Family History   Problem Relation Age of Onset    Cancer Father      leukemia    Cancer Paternal Grandfather     Diabetes Sister     Diabetes Brother     Cancer Maternal Aunt     Cancer Maternal Uncle        Social History:  Social History   Substance Use Topics    Smoking status: Former Smoker     Quit date: 5/17/1990    Smokeless tobacco: Never Used    Alcohol use 1.0 oz/week     2 Standard drinks or equivalent per week      Comment: 2-3 drinks per week         PHYSICAL EXAM PRIOR TO SEDATION:  General: Alert, in no acute distress    Lungs:            CTA bilaterally  Heart:  Normal S1, S2    Abdomen: Soft, Non distended, Non tender. Normoactive bowel sounds. Assessment:   Stable for sedation administration.   Date of last colonoscopy: 10 yrs, Polyps  No    Plan:   · Endoscopic procedure with sedation     Signed By: Sissy Garcia MD     6/27/2017  9:21 AM

## 2017-06-27 NOTE — ANESTHESIA PREPROCEDURE EVALUATION
Anesthetic History   No history of anesthetic complications            Review of Systems / Medical History  Patient summary reviewed, nursing notes reviewed and pertinent labs reviewed    Pulmonary  Within defined limits                 Neuro/Psych   Within defined limits           Cardiovascular    Hypertension: well controlled                   GI/Hepatic/Renal  Within defined limits              Endo/Other    Diabetes: well controlled, type 2    Morbid obesity     Other Findings              Physical Exam    Airway  Mallampati: II  TM Distance: > 6 cm  Neck ROM: normal range of motion   Mouth opening: Normal     Cardiovascular  Regular rate and rhythm,  S1 and S2 normal,  no murmur, click, rub, or gallop             Dental  No notable dental hx       Pulmonary  Breath sounds clear to auscultation               Abdominal  GI exam deferred       Other Findings            Anesthetic Plan    ASA: 3  Anesthesia type: MAC          Induction: Intravenous  Anesthetic plan and risks discussed with: Patient

## 2017-06-27 NOTE — IP AVS SNAPSHOT
[address] 
[phone] Patient: Robin Bermudez MRN: [id number] UWT:5/81/7378 You are allergic to the following No active allergies Recent Documentation Height Weight BMI Smoking Status 1.803 m 149.2 kg 45.89 kg/m2 Former Smoker Emergency Contacts Name Discharge Info Relation Home Work Mobile Aminah Baptiste CAREGIVER [3] Spouse [3] [phone] About your hospitalization You were admitted on:  June 27, 2017 You last received care in the:  [facility] ENDOSCOPY You were discharged on:  June 27, 2017 Unit phone number:  [phone] Why you were hospitalized Your primary diagnosis was:  Not on File Providers Seen During Your Hospitalizations Provider Role Specialty Primary office phone Hammad Weber MD Attending Provider Gastroenterology [phone] Your Primary Care Physician (PCP) Primary Care Physician Office Phone Office Fax silvano [place] [phone] Follow-up Information None Your Appointments Monday July 24, 2017  9:15 AM EDT PHYSICAL with Sergio Antonio MD  
[facility] [address] [facility]  
[phone] Current Discharge Medication List  
  
CONTINUE these medications which have NOT CHANGED Dose & Instructions Dispensing Information Comments Morning Noon Evening Bedtime  
 finasteride 5 mg tablet Commonly known as:  PROSCAR Your last dose was: Your next dose is: TAKE 1 TABLET BY MOUTH DAILY Refills:  6  
     
   
   
   
  
 lisinopril-hydroCHLOROthiazide 20-25 mg per tablet Commonly known as:  Wonda Cone Your last dose was: Your next dose is: Take 1 tablet by mouth  daily Quantity:  90 Tab Refills:  1  
     
   
   
   
  
 rivaroxaban 20 mg Tab tablet Commonly known as:  Gracia Kalani Your last dose was: Your next dose is: TAKE 1 TABLET BY MOUTH IN THE MORNING WITH BREAKFAST Quantity:  90 Tab Refills:  1  
     
   
   
   
  
 sildenafil citrate 100 mg tablet Commonly known as:  VIAGRA Your last dose was: Your next dose is:    
   
   
 Dose:  100 mg Take 1 Tab by mouth as needed. Quantity:  36 Tab Refills:  1  
     
   
   
   
  
 tamsulosin 0.4 mg capsule Commonly known as:  FLOMAX Your last dose was: Your next dose is: TAKE 1 CAPSULE BY MOUTH EVERY EVENING Refills:  6 Discharge Instructions [address] Nancy Trivedi. Teena Yao M.D. 
[address] [facility], [facility] [address] 
[phone] COLONOSCOPY DISCHARGE INSTRUCTIONS Luzmaria Reyna 
[id number] 1950 DISCOMFORT: 
Redness at IV site- apply warm compress to area; if redness or soreness persist- contact your physician There may be a slight amount of blood passed from the rectum Gaseous discomfort- walking, belching will help relieve any discomfort You may not operate a vehicle for 12 hours You may not engage in an occupation involving machinery or appliances for the  rest of today You may not drink alcoholic beverages for at least 12 hours Avoid making any critical decisions for at least 24 hours DIET: 
 You may resume your normal diet, but some patients find that heavy or large  meals may lead to indigestion or vomiting. I suggest a light meal as first food  intake. ACTIVITY: 
You may resume your normal daily activities. It is recommended that you spend the remainder of the day resting - avoid any strenuous activity. CALL BIA Dumont ANY SIGN OF: Increasing pain, nausea, vomiting Abdominal distension (swelling) Significant bleeding (oral or rectal) Fever Pain in chest area Shortness of breath Additional Instructions: 
 Call Dr. Ca Zhao if any questions or problems at [phone] You should receive the biopsy results by phone or mail within 3 weeks, if not, call  my office for the results Should have a repeat colonoscopy in 5-10 years based on pathology. Colon with one small polyp removed, diverticulosis, and internal hemorrhoids. Resume Xarelto tomorrow. Discharge Orders None Introducing [facility] & HEALTH SERVICES! Dear Elder Divers: Thank you for requesting a Overtone account. Our records indicate that you already have an active Overtone account. You can access your account anytime at [email or web address] Did you know that you can access your hospital and ER discharge instructions at any time in Overtone? You can also review all of your test results from your hospital stay or ER visit. Additional Information If you have questions, please visit the Frequently Asked Questions section of the Overtone website at [email or web address] Remember, Overtone is NOT to be used for urgent needs. For medical emergencies, dial 911. Now available from your iPhone and Android! General Information Please provide this summary of care documentation to your next provider. Patient Signature:  ____________________________________________________________ Date:  ____________________________________________________________  
  
Beatrice Burn Provider Signature:  ____________________________________________________________ Date:  ____________________________________________________________

## 2017-06-27 NOTE — ANESTHESIA POSTPROCEDURE EVALUATION
Post-Anesthesia Evaluation and Assessment    Patient: Felicitas Kruse MRN: [id number]  SSN: [id number]    YOB: 1950  Age: 73 y.o. Sex: male       Cardiovascular Function/Vital Signs  Visit Vitals    /87    Pulse 87    Temp 36.3 °C (97.3 °F)    Resp 13    Ht 5' 11\" (1.803 m)    Wt 149.2 kg (329 lb)    SpO2 97%    BMI 45.89 kg/m2       Patient is status post MAC anesthesia for Procedure(s):  COLONOSCOPY  ENDOSCOPIC POLYPECTOMY. Nausea/Vomiting: None    Postoperative hydration reviewed and adequate. Pain:  Pain Scale 1: Visual (06/27/17 1005)  Pain Intensity 1: 0 (06/27/17 1005)   Managed    Neurological Status: At baseline    Mental Status and Level of Consciousness: Arousable    Pulmonary Status:   O2 Device: Room air (06/27/17 1005)   Adequate oxygenation and airway patent    Complications related to anesthesia: None    Post-anesthesia assessment completed.  No concerns    Signed By: Eligio Woody MD     June 27, 2017

## 2017-06-27 NOTE — PROGRESS NOTES

## 2017-06-27 NOTE — PROCEDURES
[facility] [address] [facility] 05, 085 [facility]  [phone]               Colonoscopy Procedure Note    NAME: Noa Leigh  :  1950  MRN:  [id number]    Indications:   Screening colonoscopy     : Zeeshan Foote MD    Referring Provider:  Dylon Sanchez MD    Medicines:  MAC anesthesia      Procedure Details:  After informed consent was obtained with all risks and benefits of the procedure explained and preprocedure exam completed, the patient was placed in the left lateral decubitus position. Universal protocol for patient identification was performed and documented in the nursing notes. Throughout the procedure, the patient's blood pressure was monitored at least every five minutes; pulse, and oxygen saturations were monitored continuously. All vital signs were documented in the nursing notes. A digital rectal exam was performed and was normal.  The Olympus videocolonoscope  was inserted in the rectum and carefully advanced to the cecum, which was identified by the ileocecal valve and appendiceal orifice. The colonoscope was slowly withdrawn with careful evaluation between folds. Retroflexion in the rectum was performed; findings and interventions are described below. Procedure start time, extent reached time/cecum time, and procedure end time are documented in the nursing notes. The quality of preparation was good.        Findings:   Rectum: Grade moderate internal hemorrhoid(s); diminutive polyp s/p jumbo cold forceps polypectomy  Sigmoid:     - Diverticulosis  Descending Colon: normal  Transverse Colon: normal  Ascending Colon: normal  Cecum: normal    Interventions:    1 complete polypectomy were performed using cold biopsy forceps and the polyps were  retrieved    Specimens:     ID Type Source Tests Collected by Time Destination   1 : Rectal Polyp Preservative   Zeeshan Foote MD 2017 0836 Pathology       EBL: None.    Complications:   No immediate complications     Impression:  -See post-procedure diagnoses. Recommendations:   -If adenoma is present, repeat colonoscopy in 5 years. If < 10 years, reason:  above average risk patient    -Resume Xarelto tomorrow    Signed by:  Marv Peterson MD          6/27/2017  9:41 AM

## 2017-06-27 NOTE — DISCHARGE INSTRUCTIONS
[facility] [address] Ayala M.D.  [address]  [phone]          COLONOSCOPY 901 [facility]  [id number]  1950    DISCOMFORT:  Redness at IV site- apply warm compress to area; if redness or soreness persist- contact your physician  There may be a slight amount of blood passed from the rectum  Gaseous discomfort- walking, belching will help relieve any discomfort  You may not operate a vehicle for 12 hours  You may not engage in an occupation involving machinery or appliances for the  rest of today  You may not drink alcoholic beverages for at least 12 hours  Avoid making any critical decisions for at least 24 hours    DIET:   You may resume your normal diet, but some patients find that heavy or large  meals may lead to indigestion or vomiting. I suggest a light meal as first food  intake. ACTIVITY:  You may resume your normal daily activities. It is recommended that you spend the remainder of the day resting - avoid any strenuous activity. CALL M.DGriffin IF ANY SIGN OF:   Increasing pain, nausea, vomiting  Abdominal distension (swelling)  Significant bleeding (oral or rectal)  Fever   Pain in chest area  Shortness of breath    Additional Instructions:   Call Dr. Tutu Ayala if any questions or problems at [phone]   You should receive the biopsy results by phone or mail within 3 weeks, if not, call  my office for the results      Should have a repeat colonoscopy in 5-10 years based on pathology. Colon with one small polyp removed, diverticulosis, and internal hemorrhoids. Resume Xarelto tomorrow.

## 2017-08-17 ENCOUNTER — HOSPITAL ENCOUNTER (EMERGENCY)
Age: 67
Discharge: HOME | End: 2017-08-17
Attending: EMERGENCY MEDICINE | Admitting: EMERGENCY MEDICINE
Payer: MEDICARE

## 2017-08-17 ENCOUNTER — APPOINTMENT (OUTPATIENT)
Dept: CT IMAGING | Age: 67
End: 2017-08-17
Attending: EMERGENCY MEDICINE
Payer: MEDICARE

## 2017-08-17 VITALS
OXYGEN SATURATION: 93 % | HEART RATE: 76 BPM | RESPIRATION RATE: 17 BRPM | DIASTOLIC BLOOD PRESSURE: 85 MMHG | HEIGHT: 72 IN | TEMPERATURE: 98 F | BODY MASS INDEX: 42.66 KG/M2 | WEIGHT: 315 LBS | SYSTOLIC BLOOD PRESSURE: 142 MMHG

## 2017-08-17 DIAGNOSIS — T14.90XA TRAUMA: Primary | ICD-10-CM

## 2017-08-17 LAB
ALBUMIN SERPL-MCNC: 4 G/DL (ref 3.5–5)
ALBUMIN/GLOB SERPL: 1 {RATIO} (ref 1.1–2.2)
ALP SERPL-CCNC: 79 U/L (ref 45–117)
ALT SERPL-CCNC: 43 U/L (ref 12–78)
ANION GAP SERPL CALC-SCNC: 9 MMOL/L (ref 5–15)
APTT PPP: 30.2 SEC (ref 22.1–32.5)
AST SERPL-CCNC: 39 U/L (ref 15–37)
ATRIAL RATE: 326 BPM
BASOPHILS # BLD: 0 K/UL (ref 0–0.1)
BASOPHILS NFR BLD: 0 % (ref 0–1)
BILIRUB SERPL-MCNC: 0.7 MG/DL (ref 0.2–1)
BUN SERPL-MCNC: 20 MG/DL (ref 6–20)
BUN/CREAT SERPL: 18 (ref 12–20)
CALCIUM SERPL-MCNC: 9.5 MG/DL (ref 8.5–10.1)
CALCULATED R AXIS, ECG10: 29 DEGREES
CALCULATED T AXIS, ECG11: -17 DEGREES
CHLORIDE SERPL-SCNC: 99 MMOL/L (ref 97–108)
CO2 SERPL-SCNC: 26 MMOL/L (ref 21–32)
CREAT SERPL-MCNC: 1.09 MG/DL (ref 0.7–1.3)
DIAGNOSIS, 93000: NORMAL
EOSINOPHIL # BLD: 0.2 K/UL (ref 0–0.4)
EOSINOPHIL NFR BLD: 1 % (ref 0–7)
ERYTHROCYTE [DISTWIDTH] IN BLOOD BY AUTOMATED COUNT: 13.4 % (ref 11.5–14.5)
GLOBULIN SER CALC-MCNC: 4.1 G/DL (ref 2–4)
GLUCOSE SERPL-MCNC: 165 MG/DL (ref 65–100)
HCT VFR BLD AUTO: 43.7 % (ref 36.6–50.3)
HGB BLD-MCNC: 15 G/DL (ref 12.1–17)
INR PPP: 1.2 (ref 0.9–1.1)
LYMPHOCYTES # BLD: 1.8 K/UL (ref 0.8–3.5)
LYMPHOCYTES NFR BLD: 12 % (ref 12–49)
MCH RBC QN AUTO: 30.2 PG (ref 26–34)
MCHC RBC AUTO-ENTMCNC: 34.3 G/DL (ref 30–36.5)
MCV RBC AUTO: 88.1 FL (ref 80–99)
MONOCYTES # BLD: 0.7 K/UL (ref 0–1)
MONOCYTES NFR BLD: 5 % (ref 5–13)
NEUTS SEG # BLD: 12.2 K/UL (ref 1.8–8)
NEUTS SEG NFR BLD: 82 % (ref 32–75)
PLATELET # BLD AUTO: 230 K/UL (ref 150–400)
POTASSIUM SERPL-SCNC: 3.8 MMOL/L (ref 3.5–5.1)
PROT SERPL-MCNC: 8.1 G/DL (ref 6.4–8.2)
PROTHROMBIN TIME: 12.3 SEC (ref 9–11.1)
Q-T INTERVAL, ECG07: 372 MS
QRS DURATION, ECG06: 82 MS
QTC CALCULATION (BEZET), ECG08: 409 MS
RBC # BLD AUTO: 4.96 M/UL (ref 4.1–5.7)
SODIUM SERPL-SCNC: 134 MMOL/L (ref 136–145)
THERAPEUTIC RANGE,PTTT: NORMAL SECS (ref 58–77)
VENTRICULAR RATE, ECG03: 73 BPM
WBC # BLD AUTO: 15 K/UL (ref 4.1–11.1)

## 2017-08-17 PROCEDURE — 96374 THER/PROPH/DIAG INJ IV PUSH: CPT

## 2017-08-17 PROCEDURE — 85730 THROMBOPLASTIN TIME PARTIAL: CPT | Performed by: EMERGENCY MEDICINE

## 2017-08-17 PROCEDURE — 90715 TDAP VACCINE 7 YRS/> IM: CPT | Performed by: EMERGENCY MEDICINE

## 2017-08-17 PROCEDURE — 96376 TX/PRO/DX INJ SAME DRUG ADON: CPT

## 2017-08-17 PROCEDURE — 70450 CT HEAD/BRAIN W/O DYE: CPT

## 2017-08-17 PROCEDURE — 85025 COMPLETE CBC W/AUTO DIFF WBC: CPT | Performed by: EMERGENCY MEDICINE

## 2017-08-17 PROCEDURE — 80053 COMPREHEN METABOLIC PANEL: CPT | Performed by: EMERGENCY MEDICINE

## 2017-08-17 PROCEDURE — 93005 ELECTROCARDIOGRAM TRACING: CPT

## 2017-08-17 PROCEDURE — 36415 COLL VENOUS BLD VENIPUNCTURE: CPT | Performed by: EMERGENCY MEDICINE

## 2017-08-17 PROCEDURE — 90471 IMMUNIZATION ADMIN: CPT

## 2017-08-17 PROCEDURE — 85610 PROTHROMBIN TIME: CPT | Performed by: EMERGENCY MEDICINE

## 2017-08-17 PROCEDURE — 74011250636 HC RX REV CODE- 250/636: Performed by: EMERGENCY MEDICINE

## 2017-08-17 PROCEDURE — 99285 EMERGENCY DEPT VISIT HI MDM: CPT

## 2017-08-17 PROCEDURE — 71275 CT ANGIOGRAPHY CHEST: CPT

## 2017-08-17 PROCEDURE — 72125 CT NECK SPINE W/O DYE: CPT

## 2017-08-17 PROCEDURE — 74011636320 HC RX REV CODE- 636/320

## 2017-08-17 PROCEDURE — 74174 CTA ABD&PLVS W/CONTRAST: CPT

## 2017-08-17 RX ORDER — SODIUM CHLORIDE 0.9 % (FLUSH) 0.9 %
10 SYRINGE (ML) INJECTION
Status: DISCONTINUED | OUTPATIENT
Start: 2017-08-17 | End: 2017-08-17 | Stop reason: HOSPADM

## 2017-08-17 RX ORDER — CYCLOBENZAPRINE HCL 10 MG
10 TABLET ORAL
Qty: 21 TAB | Refills: 0 | Status: SHIPPED | OUTPATIENT
Start: 2017-08-17 | End: 2017-08-24

## 2017-08-17 RX ORDER — FENTANYL CITRATE 50 UG/ML
100 INJECTION, SOLUTION INTRAMUSCULAR; INTRAVENOUS ONCE
Status: COMPLETED | OUTPATIENT
Start: 2017-08-17 | End: 2017-08-17

## 2017-08-17 RX ORDER — FENTANYL CITRATE 50 UG/ML
50 INJECTION, SOLUTION INTRAMUSCULAR; INTRAVENOUS ONCE
Status: COMPLETED | OUTPATIENT
Start: 2017-08-17 | End: 2017-08-17

## 2017-08-17 RX ADMIN — TETANUS TOXOID, REDUCED DIPHTHERIA TOXOID AND ACELLULAR PERTUSSIS VACCINE, ADSORBED 0.5 ML: 5; 2.5; 8; 8; 2.5 SUSPENSION INTRAMUSCULAR at 14:13

## 2017-08-17 RX ADMIN — FENTANYL CITRATE 50 MCG: 50 INJECTION, SOLUTION INTRAMUSCULAR; INTRAVENOUS at 13:29

## 2017-08-17 RX ADMIN — FENTANYL CITRATE 100 MCG: 50 INJECTION, SOLUTION INTRAMUSCULAR; INTRAVENOUS at 14:13

## 2017-08-17 NOTE — ED PROVIDER NOTES
HPI Comments: 78 y.o. male with past medical history significant for HTN, depression, a-fib, VALENTE, and morbid obesity who presents from home with chief complaint of L-shoulder pain. Pt states he was on his zero turn riding lawnmower and drove too close to the edge of a hill. Pt states he rolled down the hill and the lawnmower rolled on top of him about 2.5 hours ago (1030). Pt states he took a shower and then came to the ED. Pt reports having associated L-shoulder pain and L-sided mid back pain. Pt states he is able to walk but with some difficulty. Pt has a history of a-fib and he regularly takes Xarelto. Pt has a history of bilateral knee replacements. Pt states he is unsure when he received his last tetanus shot. Pt denies having SOB. There are no other acute medical concerns at this time. Social hx: No tobacco, Yes alcohol (occasional)    PCP: Rufino Diego MD    Note written by Panfilo White. Nehemiah Kang, as dictated by Heike Somers MD 1:10 PM    The history is provided by the patient.         Past Medical History:   Diagnosis Date    Arrhythmia     atrial fib    Depression 4/24/2010    Diabetes ([address]     diet control for pre-diabetes    Dyslipidemia, goal LDL below 100 6/14/2011    HTN (hypertension) 4/24/2010    Impotence 4/24/2010    Morbid obesity ([address] 5/17/2010    VALENTE (obstructive sleep apnea) 4/24/2010    CPAP    Restless legs 4/15/2015       Past Surgical History:   Procedure Laterality Date    COLONOSCOPY N/A 6/27/2017    COLONOSCOPY performed by Preeti Abreu MD at [facility]. [facility], [address], DIAGNOSTIC  2007    HX APPENDECTOMY      HX ORTHOPAEDIC  2000    bilat TKR     HX ORTHOPAEDIC  2010    left THR         Family History:   Problem Relation Age of Onset    Cancer Father      leukemia    Cancer Paternal Grandfather     Diabetes Sister     Diabetes Brother     Cancer Maternal Aunt     Cancer Maternal Uncle        Social History     Social History    Marital status: OTHER     Spouse name: N/A    Number of children: N/A    Years of education: N/A     Occupational History    Not on file. Social History Main Topics    Smoking status: Former Smoker     Quit date: 5/17/1990    Smokeless tobacco: Never Used    Alcohol use 1.0 oz/week     2 Standard drinks or equivalent per week      Comment: 2-3 drinks per week    Drug use: No    Sexual activity: Not Currently     Other Topics Concern    Not on file     Social History Narrative         ALLERGIES: Review of patient's allergies indicates no known allergies. Review of Systems   Constitutional: Negative for activity change, chills and fever. HENT: Negative for nosebleeds, sore throat, trouble swallowing and voice change. Eyes: Negative for visual disturbance. Respiratory: Negative for shortness of breath. Cardiovascular: Negative for chest pain and palpitations. Gastrointestinal: Negative for abdominal pain, constipation, diarrhea and nausea. Genitourinary: Negative for difficulty urinating, dysuria, hematuria and urgency. Musculoskeletal: Positive for arthralgias (L-shoulder) and back pain (L-sided). Negative for neck pain and neck stiffness. Skin: Negative for color change. Allergic/Immunologic: Negative for immunocompromised state. Neurological: Negative for dizziness, seizures, syncope, weakness, light-headedness, numbness and headaches. Psychiatric/Behavioral: Negative for behavioral problems, confusion, hallucinations, self-injury and suicidal ideas. All other systems reviewed and are negative. Vitals:    08/17/17 1254   BP: 134/86   Pulse: 73   Resp: 20   Temp: 97.7 °F (36.5 °C)   SpO2: 99%   Weight: 153.5 kg (338 lb 6 oz)   Height: 6' (1.829 m)            Physical Exam   Constitutional: He is oriented to person, place, and time. He appears well-developed and well-nourished. No distress. HENT:   Head: Normocephalic and atraumatic.    Eyes: Pupils are equal, round, and reactive to light. Neck: Normal range of motion. Neck supple. Cardiovascular: Normal rate and normal heart sounds. An irregularly irregular rhythm present. Exam reveals no gallop and no friction rub. No murmur heard. Pulmonary/Chest: Effort normal and breath sounds normal. No respiratory distress. He has no wheezes. Abdominal: Soft. Bowel sounds are normal. He exhibits no distension. There is no tenderness. There is no rebound and no guarding. Musculoskeletal: Normal range of motion. L-sided paraspinal tenderness to upper lumbar/low thoracic region. L-scapular tenderness to palpation. Neurological: He is alert and oriented to person, place, and time. Skin: Skin is warm. No rash noted. He is not diaphoretic. Small superficial 3 cm laceration to anterior aspect of R-ankle. Psychiatric: He has a normal mood and affect. His behavior is normal. Judgment and thought content normal.   Nursing note and vitals reviewed. Note written by Adia Saleem. Carrie Prince, as dictated by Marcelo Kelly MD 1:10 PM       [facility]  ED Course   This is a 52-year-old male past medical history hypertension, hyperlipidemia, diabetes, A. fib, on anticoagulation, presenting with complaints of back pain, following a traumatic accident. He states approximately 3 hours prior to arrival, he rolled his running lawnmower downhill. He endorses being trapped under the lumbar for a short time. He states he was ambulatory on scene, took a shower, changed his clothes, and had a friend drive him to the emergency department. Upon arrival, the patient is awake, alert, with complaints of left paraspinal, and left scapular pain, no C-spine tenderness to palpation, primary exam significant for right ankle laceration. No other obvious trauma on inspection, abdomen soft nontender, clear breath sounds auscultation.   Despite relatively benign physical exam, we'll obtain CT of the head, C-spine, chest abdomen and pelvis, and sitting of anticoagulation for occult bleed, and make a disposition based on the patient's diagnostics and response to therapy. Procedures    ED EKG interpretation:  Rhythm: atrial fibrillation; and irregular. Rate (approx.): 73 bpm; No ST/T wave changes. Note written by Julissa Naik, as dictated by Ophelia Edwards MD 1:16 PM    PROGRESS NOTE:  3:55 PM  Negative imaging and Pt's pain has improved. Plan to discharge home.

## 2017-08-17 NOTE — PROGRESS NOTES
Admission Medication Reconciliation:    Information obtained from: patient, RxQuery, chart review    Significant PMH/Disease States:   Past Medical History:   Diagnosis Date    Arrhythmia     atrial fib    Depression 4/24/2010    [facility]     diet control for pre-diabetes    Dyslipidemia, goal LDL below 100 6/14/2011    HTN (hypertension) 4/24/2010    Impotence 4/24/2010    Morbid obesity ([facility] 5/17/2010    VALENTE (obstructive sleep apnea) 4/24/2010    CPAP    Restless legs 4/15/2015       Chief Complaint for this Admission:    Chief Complaint   Patient presents with    Multiple Trauma        Allergies:  Review of patient's allergies indicates no known allergies. Prior to Admission Medications:   Prior to Admission Medications   Prescriptions Last Dose Informant Patient Reported? Taking?   finasteride (PROSCAR) 5 mg tablet 8/17/2017 at AM  Yes Yes   Sig: TAKE 1 TABLET BY MOUTH DAILY   lisinopril-hydroCHLOROthiazide (PRINZIDE, ZESTORETIC) 20-25 mg per tablet 8/17/2017 at AM  No Yes   Sig: Take 1 tablet by mouth  daily   rivaroxaban (XARELTO) 20 mg tab tablet 8/17/2017 at AM  No Yes   Sig: TAKE 1 TABLET BY MOUTH IN THE MORNING WITH BREAKFAST   tamsulosin (FLOMAX) 0.4 mg capsule 8/17/2017 at AM  Yes Yes   Sig: TAKE 1 CAPSULE BY MOUTH EVERY MORNING      Facility-Administered Medications: None         Comments/Recommendations: All medications/allergies have been reviewed and updated; last medication administration times reviewed and recorded. Patient is a good historian and appears to be compliant with medications. Changes made to Prior to Admission (PTA) Medication List:   ?   Medications Added:   - None   ? Medications Changed:   - None   ? Medications Removed:   - sildenafil 100 mg PRN    Thank you for allowing pharmacy to participate in the coordination of this patient's care. If you have any other questions, please contact the medication reconciliation pharmacist at [phone].        Viktor Latif Hector HUTTON

## 2017-08-17 NOTE — DISCHARGE INSTRUCTIONS
We hope that we have addressed all of your medical concerns. The examination and treatment you received in the Emergency Department were for an emergent problem and were not intended as complete care. It is important that you follow up with your healthcare provider(s) for ongoing care. If your symptoms worsen or do not improve as expected, and you are unable to reach your usual health care provider(s), you should return to the Emergency Department. Today's healthcare is undergoing tremendous change, and patient satisfaction surveys are one of the many tools to assess the quality of medical care. You may receive a survey from the [employer] regarding your experience in the Emergency Department. I hope that your experience has been completely positive, particularly the medical care that I provided. As such, please participate in the survey; anything less than excellent does not meet my expectations or intentions. [facility] and [facility] participate in nationally recognized quality of care measures. If your blood pressure is greater than 120/80, as reported below, we urge that you seek medical care to address the potential of high blood pressure, commonly known as hypertension. Hypertension can be hereditary or can be caused by certain medical conditions, pain, stress, or \"white coat syndrome. \"       Please make an appointment with your health care provider(s) for follow up of your Emergency Department visit.        VITALS:   Patient Vitals for the past 8 hrs:   Temp Pulse Resp BP SpO2   08/17/17 1545 - 84 20 128/74 96 %   08/17/17 1530 - 80 17 126/71 95 %   08/17/17 1515 - 78 17 - 95 %   08/17/17 1430 - 78 17 119/66 92 %   08/17/17 1400 - 74 17 99/52 95 %   08/17/17 1345 - 73 16 112/68 94 %   08/17/17 1330 - 78 19 109/62 94 %   08/17/17 1315 - 74 18 106/64 96 %   08/17/17 1254 97.7 °F (36.5 °C) 73 20 134/86 99 %          Thank you for allowing us to provide you with medical care today. We realize that you have many choices for your emergency care needs. Please choose us in the future for any continued health care needs. Jonathan Hankins [address] [facility].   Office: [phone]            Recent Results (from the past 24 hour(s))   CBC WITH AUTOMATED DIFF    Collection Time: 08/17/17  1:08 PM   Result Value Ref Range    WBC 15.0 (H) 4.1 - 11.1 K/uL    RBC 4.96 4.10 - 5.70 M/uL    HGB 15.0 12.1 - 17.0 g/dL    HCT 43.7 36.6 - 50.3 %    MCV 88.1 80.0 - 99.0 FL    MCH 30.2 26.0 - 34.0 PG    MCHC 34.3 30.0 - 36.5 g/dL    RDW 13.4 11.5 - 14.5 %    PLATELET 086 286 - 811 K/uL    NEUTROPHILS 82 (H) 32 - 75 %    LYMPHOCYTES 12 12 - 49 %    MONOCYTES 5 5 - 13 %    EOSINOPHILS 1 0 - 7 %    BASOPHILS 0 0 - 1 %    ABS. NEUTROPHILS 12.2 (H) 1.8 - 8.0 K/UL    ABS. LYMPHOCYTES 1.8 0.8 - 3.5 K/UL    ABS. MONOCYTES 0.7 0.0 - 1.0 K/UL    ABS. EOSINOPHILS 0.2 0.0 - 0.4 K/UL    ABS. BASOPHILS 0.0 0.0 - 0.1 K/UL   METABOLIC PANEL, COMPREHENSIVE    Collection Time: 08/17/17  1:08 PM   Result Value Ref Range    Sodium 134 (L) 136 - 145 mmol/L    Potassium 3.8 3.5 - 5.1 mmol/L    Chloride 99 97 - 108 mmol/L    CO2 26 21 - 32 mmol/L    Anion gap 9 5 - 15 mmol/L    Glucose 165 (H) 65 - 100 mg/dL    BUN 20 6 - 20 MG/DL    Creatinine 1.09 0.70 - 1.30 MG/DL    BUN/Creatinine ratio 18 12 - 20      GFR est AA >60 >60 ml/min/1.73m2    GFR est non-AA >60 >60 ml/min/1.73m2    Calcium 9.5 8.5 - 10.1 MG/DL    Bilirubin, total 0.7 0.2 - 1.0 MG/DL    ALT (SGPT) 43 12 - 78 U/L    AST (SGOT) 39 (H) 15 - 37 U/L    Alk.  phosphatase 79 45 - 117 U/L    Protein, total 8.1 6.4 - 8.2 g/dL    Albumin 4.0 3.5 - 5.0 g/dL    Globulin 4.1 (H) 2.0 - 4.0 g/dL    A-G Ratio 1.0 (L) 1.1 - 2.2     PROTHROMBIN TIME + INR    Collection Time: 08/17/17  1:08 PM   Result Value Ref Range    INR 1.2 (H) 0.9 - 1.1      Prothrombin time 12.3 (H) 9.0 - 11.1 sec PTT    Collection Time: 08/17/17  1:08 PM   Result Value Ref Range    aPTT 30.2 22.1 - 32.5 sec    aPTT, therapeutic range     58.0 - 77.0 SECS   EKG, 12 LEAD, INITIAL    Collection Time: 08/17/17  1:09 PM   Result Value Ref Range    Ventricular Rate 73 BPM    Atrial Rate 326 BPM    QRS Duration 82 ms    Q-T Interval 372 ms    QTC Calculation (Bezet) 409 ms    Calculated R Axis 29 degrees    Calculated T Axis -17 degrees    Diagnosis       Atrial fibrillation  When compared with ECG of 12-JAN-2005 10:57,  Previous ECG has undetermined rhythm, needs review  Inverted T waves have replaced nonspecific T wave abnormality in Inferior   leads         Ct Head Wo Cont    Result Date: 8/17/2017  EXAM:  CT HEAD WITHOUT CONTRAST INDICATION: Trauma after mowing grass on a 0 to a lawnmower which rolled down a hill and on top of him. . COMPARISON: 5/20/2012. CONTRAST: None. TECHNIQUE: Unenhanced CT of the head was performed using 5 mm images. Brain and bone windows were generated. Sagittal and coronal reformations were generated. CT dose reduction was achieved through use of a standardized protocol tailored for this examination and automatic exposure control for dose modulation. CT dose reduction was achieved through use of a standardized protocol tailored for this examination and automatic exposure control for dose modulation. FINDINGS: The ventricles and sulci are normal in size, shape and configuration and midline. There is no significant white matter disease. There is no intracranial hemorrhage. There is no extra-axial collection, mass, mass effect or midline shift. The basilar cisterns are open. No acute infarct is identified. The bone windows demonstrate no abnormalities. The visualized portions of the paranasal sinuses and mastoid air cells are clear. IMPRESSION: Normal unenhanced CT examination of the head. Cta Chest W Or W Wo Cont    Result Date: 8/17/2017  Clinical indication: Trauma.  CT of the chest abdomen and pelvis obtained after intravenous injection 100 cc of Isovue-370, review of raw data and MIP reconstructions, comparison to thousand 15. CT dose reduction was achieved through the use of a standardized protocol tailored for this examination and automatic exposure control for dose modulation . Marshall Diehl Thoracic and abdominal aorta major thoracic and abdominal vessels appear unremarkable. There is no shift or pneumothorax. No pulmonary emboli. No parenchymal mass pericardial or pleural effusion. No definite acute fracture seen. Liver and spleen are normal in size with hepatic steatosis. Kidneys show no obstruction. Calcification of the origin of the renal artery without definite stenosis seen. Pancreas and gallbladder appear unremarkable. There is no free air or free fluid. Spine does not show any acute findings or. There is minimal stenosis at the origin of the SMA. There is sigmoid diverticulosis. The bladder is midline and unfilled. impression: No acute findings. Ct Spine Cerv Wo Cont    Result Date: 8/17/2017  EXAM:  CT CERVICAL SPINE WITHOUT CONTRAST INDICATION: Neck pain after trauma while riding on a zero turn  lawnmower which rolled down a hill and over him. . COMPARISON: None. TECHNIQUE: Multislice helical CT of the cervical spine was performed in the axial plane and sagittal and coronal reformations were generated. CT dose reduction was achieved through use of a standardized protocol tailored for this examination and automatic exposure control for dose modulation. Adaptive statistical iterative reconstruction (ASIR) was utilized for this examination. FINDINGS: The alignment of the cervical spine is normal. There are degenerative changes at C1-C2 articulation and degenerative disc changes at C4-C5, C5-C6 and C6-C7 with anterior osteophytes. The vertebral body heights and disc spaces are preserved. There is no fracture or subluxation.  The prevertebral soft tissues are normal. The odontoid process is intact. The visualized portions of the lung apices are clear. IMPRESSION: Mild cervical spondylosis. No fracture or acute abnormality. Cta Abdomen Pelv W Cont    Result Date: 8/17/2017  Clinical indication: Trauma. CT of the chest abdomen and pelvis obtained after intravenous injection 100 cc of Isovue-370, review of raw data and MIP reconstructions, comparison to thousand 15. CT dose reduction was achieved through the use of a standardized protocol tailored for this examination and automatic exposure control for dose modulation . Lavada Saucer Thoracic and abdominal aorta major thoracic and abdominal vessels appear unremarkable. There is no shift or pneumothorax. No pulmonary emboli. No parenchymal mass pericardial or pleural effusion. No definite acute fracture seen. Liver and spleen are normal in size with hepatic steatosis. Kidneys show no obstruction. Calcification of the origin of the renal artery without definite stenosis seen. Pancreas and gallbladder appear unremarkable. There is no free air or free fluid. Spine does not show any acute findings or. There is minimal stenosis at the origin of the SMA. There is sigmoid diverticulosis. The bladder is midline and unfilled. impression: No acute findings.

## 2017-08-17 NOTE — ED TRIAGE NOTES
Pt states that he was mowing his grass on a zero turn lawnmower and he and the mower rolled down a hill and the mower rolled over onto him. Pt states that his left shoulder and back are hurting. Pt states that he did not hit his head and his neck has no pain, pt states that the mower was on his torso. Pt is on blood thinners.

## 2017-08-18 RX ORDER — RIVAROXABAN 20 MG/1
TABLET, FILM COATED ORAL
Qty: 90 TAB | Refills: 1 | Status: SHIPPED | OUTPATIENT
Start: 2017-08-18 | End: 2018-02-09 | Stop reason: SDUPTHER

## 2017-10-19 ENCOUNTER — OFFICE VISIT (OUTPATIENT)
Dept: INTERNAL MEDICINE CLINIC | Age: 67
End: 2017-10-19

## 2017-10-19 VITALS
DIASTOLIC BLOOD PRESSURE: 76 MMHG | OXYGEN SATURATION: 98 % | BODY MASS INDEX: 42.66 KG/M2 | HEART RATE: 88 BPM | RESPIRATION RATE: 16 BRPM | WEIGHT: 315 LBS | SYSTOLIC BLOOD PRESSURE: 126 MMHG | HEIGHT: 72 IN

## 2017-10-19 DIAGNOSIS — Z11.59 ENCOUNTER FOR HEPATITIS C SCREENING TEST FOR LOW RISK PATIENT: ICD-10-CM

## 2017-10-19 DIAGNOSIS — E11.9 CONTROLLED TYPE 2 DIABETES MELLITUS WITHOUT COMPLICATION, WITHOUT LONG-TERM CURRENT USE OF INSULIN (HCC): ICD-10-CM

## 2017-10-19 DIAGNOSIS — Z23 ENCOUNTER FOR IMMUNIZATION: Primary | ICD-10-CM

## 2017-10-19 DIAGNOSIS — E66.01 MORBID OBESITY (HCC): ICD-10-CM

## 2017-10-19 NOTE — PROGRESS NOTES
Coordination of Care Questions    1. Have you been to the ER, urgent care clinic since your last visit? No       Hospitalized since your last visit? No    2. Have you seen or consulted any other health care providers outside of the [address] [place] [address] since your last visit? Include any pap smears or colon screening.  No

## 2017-10-19 NOTE — PROGRESS NOTES
Chief Complaint   Patient presents with    Follow-up     6 mon      Brent Oleary is a 78 y.o. male with the following Problems and Medications. Patient Active Problem List   Diagnosis Code    VALENTE (obstructive sleep apnea) G47.33    Erectile dysfunction due to arterial insufficiency N52.01    HTN (hypertension) I10    Depression F32.9    Morbid obesity (HCC) E66.01    Diabetes ([facility] E11.9    A-fib ([facility] I48.91    Dyslipidemia, goal LDL below 100 E78.5    VALENTE (obstructive sleep apnea) G47.33    Restless legs G25.81    Diabetes mellitus type 2, controlled (HCC) E11.9    Urinary retention due to benign prostatic hyperplasia N40.1, R33.8    Benign non-nodular prostatic hyperplasia with lower urinary tract symptoms N40.1     Current Outpatient Prescriptions   Medication Sig Dispense Refill    pneumococcal 13 angel conj dip (PREVNAR-13) 0.5 mL syrg injection 0.5 mL by IntraMUSCular route once for 1 dose. 0.5 mL 0    XARELTO 20 mg tab tablet TAKE 1 TABLET BY MOUTH IN THE MORNING WITH BREAKFAST 90 Tab 1    lisinopril-hydroCHLOROthiazide (PRINZIDE, ZESTORETIC) 20-25 mg per tablet Take 1 tablet by mouth  daily 90 Tab 1    finasteride (PROSCAR) 5 mg tablet TAKE 1 TABLET BY MOUTH DAILY  6    tamsulosin (FLOMAX) 0.4 mg capsule TAKE 1 CAPSULE BY MOUTH EVERY MORNING  6     viagra not helpful any longer  Will; ask  about  Prostaglandin injec tions        Cardiovascular ROS: no chest pain or dyspnea on exertion  Neurological ROS: no TIA or stroke symptoms  General ROS: negative for - chills, fatigue, fever, malaise, night sweats or sleep disturbance  Endocrine ROS: negative for - hair pattern changes, hot flashes, malaise/lethargy, palpitations, polydipsia/polyuria, temperature intolerance or unexpected weight changes  no apparent distress  .   Vitals:    10/19/17 1429   BP: 126/76   Pulse: 88   Resp: 16   SpO2: 98%   Weight: 334 lb (151.5 kg)   Height: 6' (1.829 m)     irreg irreg rhythm controlled rate  Chest clear  No edema      Diagnoses and all orders for this visit:    1. Encounter for immunization  -     Influenza virus vaccine (QUADRIVALENT PRES FREE SYRINGE) IM ([id number])    2. Controlled type 2 diabetes mellitus without complication, without long-term current use of insulin (HCC)  -     METABOLIC PANEL, COMPREHENSIVE  -     CBC WITH AUTOMATED DIFF  -     HEMOGLOBIN A1C W/O EAG  -     MICROALBUMIN, UR, RAND W/ MICROALBUMIN/CREA RATIO    3. Morbid obesity ([facility]    4. Encounter for hepatitis C screening test for low risk patient  -     HEPATITIS C AB    Other orders  -     pneumococcal 13 angel conj dip (PREVNAR-13) 0.5 mL syrg injection; 0.5 mL by IntraMUSCular route once for 1 dose.

## 2017-10-20 LAB
ALBUMIN SERPL-MCNC: 4 G/DL (ref 3.6–4.8)
ALBUMIN/CREAT UR: ABNORMAL MG/G CREAT (ref 0–30)
ALBUMIN/GLOB SERPL: 1.3 {RATIO} (ref 1.2–2.2)
ALP SERPL-CCNC: 104 IU/L (ref 39–117)
ALT SERPL-CCNC: 24 IU/L (ref 0–44)
AST SERPL-CCNC: 22 IU/L (ref 0–40)
BASOPHILS # BLD AUTO: 0 X10E3/UL (ref 0–0.2)
BASOPHILS NFR BLD AUTO: 0 %
BILIRUB SERPL-MCNC: 0.5 MG/DL (ref 0–1.2)
BUN SERPL-MCNC: 13 MG/DL (ref 8–27)
BUN/CREAT SERPL: 16 (ref 10–24)
CALCIUM SERPL-MCNC: 9.6 MG/DL (ref 8.6–10.2)
CHLORIDE SERPL-SCNC: 97 MMOL/L (ref 96–106)
CO2 SERPL-SCNC: 24 MMOL/L (ref 18–29)
CREAT SERPL-MCNC: 0.8 MG/DL (ref 0.76–1.27)
CREAT UR-MCNC: 75.4 MG/DL
EOSINOPHIL # BLD AUTO: 0.3 X10E3/UL (ref 0–0.4)
EOSINOPHIL NFR BLD AUTO: 4 %
ERYTHROCYTE [DISTWIDTH] IN BLOOD BY AUTOMATED COUNT: 14 % (ref 12.3–15.4)
GFR SERPLBLD CREATININE-BSD FMLA CKD-EPI: 108 ML/MIN/1.73
GFR SERPLBLD CREATININE-BSD FMLA CKD-EPI: 93 ML/MIN/1.73
GLOBULIN SER CALC-MCNC: 3 G/DL (ref 1.5–4.5)
GLUCOSE SERPL-MCNC: 101 MG/DL (ref 65–99)
HBA1C MFR BLD: 6 % (ref 4.8–5.6)
HCT VFR BLD AUTO: 41 % (ref 37.5–51)
HCV AB S/CO SERPL IA: <0.1 S/CO RATIO (ref 0–0.9)
HGB BLD-MCNC: 13.9 G/DL (ref 12.6–17.7)
IMM GRANULOCYTES # BLD: 0 X10E3/UL (ref 0–0.1)
IMM GRANULOCYTES NFR BLD: 0 %
LYMPHOCYTES # BLD AUTO: 2 X10E3/UL (ref 0.7–3.1)
LYMPHOCYTES NFR BLD AUTO: 26 %
MCH RBC QN AUTO: 29.8 PG (ref 26.6–33)
MCHC RBC AUTO-ENTMCNC: 33.9 G/DL (ref 31.5–35.7)
MCV RBC AUTO: 88 FL (ref 79–97)
MICROALBUMIN UR-MCNC: <3 UG/ML
MONOCYTES # BLD AUTO: 0.8 X10E3/UL (ref 0.1–0.9)
MONOCYTES NFR BLD AUTO: 11 %
NEUTROPHILS # BLD AUTO: 4.6 X10E3/UL (ref 1.4–7)
NEUTROPHILS NFR BLD AUTO: 59 %
PLATELET # BLD AUTO: 223 X10E3/UL (ref 150–379)
POTASSIUM SERPL-SCNC: 4.2 MMOL/L (ref 3.5–5.2)
PROT SERPL-MCNC: 7 G/DL (ref 6–8.5)
RBC # BLD AUTO: 4.66 X10E6/UL (ref 4.14–5.8)
SODIUM SERPL-SCNC: 138 MMOL/L (ref 134–144)
WBC # BLD AUTO: 7.7 X10E3/UL (ref 3.4–10.8)

## 2017-12-21 ENCOUNTER — TELEPHONE (OUTPATIENT)
Dept: CARDIOLOGY CLINIC | Age: 67
End: 2017-12-21

## 2017-12-21 NOTE — TELEPHONE ENCOUNTER
Dr Elina Escalera,    Mr Duncan Gunderson is having a urolift(prostate). Not scheduled yet. Dr Elina Escalera needs cardiac clearance and clearance to stop the xarelto 20mg for 2-3 days prior to the procedure and 2-3 days after the procedure. How many days prior and after?       Thanks Klaudia Jones

## 2017-12-21 NOTE — TELEPHONE ENCOUNTER
Needs a release for a medical procedure with his urology office (Dr. Tobi anderson [place] [phone])  Phone [phone]  SK

## 2017-12-28 NOTE — TELEPHONE ENCOUNTER
Gayla Avery  Male, 78 y.o., 1950  Weight:   334 lb (151.5 kg)  Phone:   H:[phone]  PCP:   Manoj Rausch MD  MRN:   [id number]  MyChart: Active  Next Appt (With Me)  None  Next Appt (Any Provider)  02/16/2018    Surgical Clearance            Hosea Hughes MD   You Yesterday (7:51 AM)                 Ok for procedure, hold xarelto for 2 days prior to day of procedure, resume 2 days after procedure if bleeding risk acceptable post surgery (Routing comment)                         You routed conversation to Hosea Hughes MD 7 days ago                       You 7 days ago                 Dr Dixie Ram,     Mr Jose Wade is having a urolift(prostate). Not scheduled yet. Dr Dixie Ram needs cardiac clearance and clearance to stop the xarelto 20mg for 2-3 days prior to the procedure and 2-3 days after the procedure. How many days prior and after?       [facility]                  Documentation                          You  Rakan Kitchen. Desiree 7 days ago                        Tiarra Rosales routed conversation to Dl Gavin RN 7 days ago                       Rakan Kitchen.  Desiree [phone]  Tiarra Rosales 7 days ago                       Tiarra Rosales 7 days ago                 Needs a release for a medical procedure with his urology office (Dr. Padmini Romo [phone])  Phone [phone]  SK                   Documentation

## 2018-01-03 ENCOUNTER — TELEPHONE (OUTPATIENT)
Dept: CARDIOLOGY CLINIC | Age: 68
End: 2018-01-03

## 2018-01-03 NOTE — TELEPHONE ENCOUNTER
Pt called and said that his Urologist did not receive the fax and gave a different fax number than what was mentioned in the previous note. Please fax the clearance to [phone] and attention to Dr. Tacos Watson. Thanks!   Shorty Beckford

## 2018-02-16 ENCOUNTER — OFFICE VISIT (OUTPATIENT)
Dept: SLEEP MEDICINE | Age: 68
End: 2018-02-16

## 2018-02-16 VITALS
HEIGHT: 72 IN | HEART RATE: 89 BPM | BODY MASS INDEX: 42.66 KG/M2 | SYSTOLIC BLOOD PRESSURE: 106 MMHG | OXYGEN SATURATION: 95 % | WEIGHT: 315 LBS | DIASTOLIC BLOOD PRESSURE: 72 MMHG

## 2018-02-16 DIAGNOSIS — I10 ESSENTIAL HYPERTENSION: ICD-10-CM

## 2018-02-16 DIAGNOSIS — I48.20 CHRONIC ATRIAL FIBRILLATION (HCC): ICD-10-CM

## 2018-02-16 DIAGNOSIS — Z86.59 H/O: DEPRESSION: ICD-10-CM

## 2018-02-16 DIAGNOSIS — G47.33 OSA (OBSTRUCTIVE SLEEP APNEA): Primary | ICD-10-CM

## 2018-02-16 RX ORDER — CYCLOBENZAPRINE HCL 10 MG
TABLET ORAL
COMMUNITY
Start: 2017-08-17 | End: 2018-05-02 | Stop reason: ALTCHOICE

## 2018-02-16 NOTE — PROGRESS NOTES
[address] [facility], [address]  Tel.  [phone]  Fax. 100 [facility] 60  [place], [address] [facility]  Tel.  [phone]  Fax. [phone] 46 [facility] Criselda Mustafa   Tel.  [phone]  Fax. [phone]     S>Christiano Osullivan is a 78 y.o. male seen for a positive airway pressure follow-up. He reports no problems using the device. He is 99% compliant over the past 90 days. The following problems are identified:    Drowsiness no Problems exhaling no   Snoring no Forget to put on no   Mask Comfortable yes Can't fall asleep no   Dry Mouth no Mask falls off no   Air Leaking no Frequent awakenings no         He admits that his sleep has improved. No Known Allergies    He has a current medication list which includes the following prescription(s): cyclobenzaprine, rivaroxaban, lisinopril-hydrochlorothiazide, finasteride, and tamsulosin. Senia Todd He  has a past medical history of Arrhythmia; Depression (4/24/2010); Diabetes ([facility] [address] Dyslipidemia, goal LDL below 100 (6/14/2011); HTN (hypertension) (4/24/2010); Impotence (4/24/2010); Morbid obesity ([address] (5/17/2010); VALENTE (obstructive sleep apnea) (4/24/2010); and Restless legs (4/15/2015). [place] Sleepiness Score: 1   and Modified F.O.S.Q. Score Total / 2: 19.5   which reflect improved sleep quality over therapy time.     O>    Visit Vitals    /72    Pulse 89    Ht 6' (1.829 m)    Wt 326 lb (147.9 kg)    SpO2 95%    BMI 44.21 kg/m2         General:   Not in acute distress   Eyes:  Anicteric sclerae, no obvious strabismus   Nose:  No obvious nasal septum deviation    Oropharynx:   Class 4 oropharyngeal outlet, thick tongue base, uvula not seen due to low-lying soft palate, narrow tonsilo-pharyngeal pilars   Tonsils:   tonsils are not visualized due to low-lying soft palate   Neck:   midline trachea   Chest/Lungs:  Equal lung expansion, clear on auscultation    CVS:  Irregular rhythm; no JVD Skin:  Warm to touch; no obvious rashes   Neuro:  No focal deficits ; no obvious tremor    Psych:  Normal affect,  normal countenance;           A>    ICD-10-CM ICD-9-CM    1. VALENTE (obstructive sleep apnea) G47.33 327.23 AMB SUPPLY ORDER   2. Chronic atrial fibrillation (HCC) I48.2 427.31    3. Essential hypertension I10 401.9    4. H/O: depression Z86.59 V11.8      AHI = 65.3. On Bi - Level : See media for download    Compliant:      yes    Therapeutic Response:  Positive    P>    * We have recommended a dedicated weight loss through appropriate diet and an exercise regiment as significant weight reduction has been shown to reduce severity of obstructive sleep apnea. * Follow-up Disposition:  Return in about 1 year (around 2/16/2019), or if symptoms worsen or fail to improve. * He was asked to contact our office for any problems regarding PAP therapy. * Counseling was provided regarding the importance of regular PAP use and on proper sleep hygiene and safe driving. * Re-enforced proper and regular cleaning for the device. Thank you for allowing us to participate in your patient's medical care. Fabricio Hines MD, FAASM  Electronically signed.  02/16/18

## 2018-02-16 NOTE — PATIENT INSTRUCTIONS
[address] [place]. [place], [address]  Tel.  [phone]  Fax. 100 [facility] 60  [place], 200 S [facility]  Tel.  [phone]  Fax. [phone] [facility] Criselda Mustafa  Tel.  [phone]  Fax. [phone]     Learning About CPAP for Sleep Apnea  What is CPAP? CPAP is a small machine that you use at home every night while you sleep. It increases air pressure in your throat to keep your airway open. When you have sleep apnea, this can help you sleep better so you feel much better. CPAP stands for \"continuous positive airway pressure. \"  The CPAP machine will have one of the following:  · A mask that covers your nose and mouth  · Prongs that fit into your nose  · A mask that covers your nose only, the most common type. This type is called NCPAP. The N stands for \"nasal.\"  Why is it done? CPAP is usually the best treatment for obstructive sleep apnea. It is the first treatment choice and the most widely used. Your doctor may suggest CPAP if you have:  · Moderate to severe sleep apnea. · Sleep apnea and coronary artery disease (CAD) or heart failure. How does it help? · CPAP can help you have more normal sleep, so you feel less sleepy and more alert during the daytime. · CPAP may help keep heart failure or other heart problems from getting worse. · NCPAP may help lower your blood pressure. · If you use CPAP, your bed partner may also sleep better because you are not snoring or restless. What are the side effects? Some people who use CPAP have:  · A dry or stuffy nose and a sore throat. · Irritated skin on the face. · Sore eyes. · Bloating. If you have any of these problems, work with your doctor to fix them. Here are some things you can try:  · Be sure the mask or nasal prongs fit well. · See if your doctor can adjust the pressure of your CPAP. · If your nose is dry, try a humidifier.   · If your nose is runny or stuffy, try decongestant medicine or a steroid nasal spray. If these things do not help, you might try a different type of machine. Some machines have air pressure that adjusts on its own. Others have air pressures that are different when you breathe in than when you breathe out. This may reduce discomfort caused by too much pressure in your nose. Where can you learn more? Go to deltaDNA.be  Enter Adriano Hove in the search box to learn more about \"Learning About CPAP for Sleep Apnea. \"   © 2185-5674 Healthwise, Incorporated. Care instructions adapted under license by Carmelita Fothergill (which disclaims liability or warranty for this information). This care instruction is for use with your licensed healthcare professional. If you have questions about a medical condition or this instruction, always ask your healthcare professional. [address] any warranty or liability for your use of this information. Content Version: 7.7.00679; Last Revised: January 11, 2010  PROPER SLEEP HYGIENE    What to avoid  · Do not have drinks with caffeine, such as coffee or black tea, for 8 hours before bed. · Do not smoke or use other types of tobacco near bedtime. Nicotine is a stimulant and can keep you awake. · Avoid drinking alcohol late in the evening, because it can cause you to wake in the middle of the night. · Do not eat a big meal close to bedtime. If you are hungry, eat a light snack. · Do not drink a lot of water close to bedtime, because the need to urinate may wake you up during the night. · Do not read or watch TV in bed. Use the bed only for sleeping and sexual activity. What to try  · Go to bed at the same time every night, and wake up at the same time every morning. Do not take naps during the day. · Keep your bedroom quiet, dark, and cool. · Get regular exercise, but not within 3 to 4 hours of your bedtime. .  · Sleep on a comfortable pillow and mattress.   · If watching the clock makes you anxious, turn it facing away from you so you cannot see the time. · If you worry when you lie down, start a worry book. Well before bedtime, write down your worries, and then set the book and your concerns aside. · Try meditation or other relaxation techniques before you go to bed. · If you cannot fall asleep, get up and go to another room until you feel sleepy. Do something relaxing. Repeat your bedtime routine before you go to bed again. · Make your house quiet and calm about an hour before bedtime. Turn down the lights, turn off the TV, log off the computer, and turn down the volume on music. This can help you relax after a busy day. Drowsy Driving: The [facility] 54 cites drowsiness as a causing factor in more than 530,358 police reported crashes annually, resulting in 76,000 injuries and 1,500 deaths. Other surveys suggest 55% of people polled have driven while drowsy in the past year, 23% had fallen asleep but not crashed, 3% crashed, and 2% had and accident due to drowsy driving. Who is at risk? Young Drivers: One study of drowsy driving accidents states that 55% of the drivers were under 25 years. Of those, 75% were male. Shift Workers and Travelers: People who work overnight or travel across time zones frequently are at higher risk of experiencing Circadian Rhythm Disorders. They are trying to work and function when their body is programed to sleep. Sleep Deprived: Lack of sleep has a serious impact on your ability to pay attention or focus on a task. Consistently getting less than the average of 8 hours your body needs creates partial or cumulative sleep deprivation. Untreated Sleep Disorders: Sleep Apnea, Narcolepsy, R.L.S., and other sleep disorders (untreated) prevent a person from getting enough restful sleep. This leads to excessive daytime sleepiness and increases the risk for drowsy driving accidents by up to 7 times.   Medications / Alcohol: Even over the counter medications can cause drowsiness. Medications that impair a drivers attention should have a warning label. Alcohol naturally makes you sleepy and on its own can cause accidents. Combined with excessive drowsiness its effects are amplified. Signs of Drowsy Driving:   * You don't remember driving the last few miles   * You may drift out of your natalee   * You are unable to focus and your thoughts wander   * You may yawn more often than normal   * You have difficulty keeping your eyes open / nodding off   * Missing traffic signs, speeding, or tailgating  Prevention-   Good sleep hygiene, lifestyle and behavioral choices have the most impact on drowsy driving. There is no substitute for sleep and the average person requires 8 hours nightly. If you find yourself driving drowsy, stop and sleep. Consider the sleep hygiene tips provided during your visit as well. Medication Refill Policy: Refills for all medications require 1 week advance notice. Please have your pharmacy fax a refill request. We are unable to fax, or call in \"controled substance\" medications and you will need to pick these prescriptions up from our office. Linguee Activation    Thank you for requesting access to Linguee. Please follow the instructions below to securely access and download your online medical record. Linguee allows you to send messages to your doctor, view your test results, renew your prescriptions, schedule appointments, and more. How Do I Sign Up? 1. In your internet browser, go to [email or web address]. 2. Click on the First Time User? Click Here link in the Sign In box. You will see the New Member Sign Up page. 3. Enter your Linguee Access Code exactly as it appears below. You will not need to use this code after youve completed the sign-up process. If you do not sign up before the expiration date, you must request a new code. Linguee Access Code:  Activation code not generated  Current Yuantiku Status: Active (This is the date your Yuantiku access code will )    4. Enter the last four digits of your Social Security Number (xxxx) and Date of Birth (mm/dd/yyyy) as indicated and click Submit. You will be taken to the next sign-up page. 5. Create a Prosperity Catalystt ID. This will be your Prosperity Catalystt login ID and cannot be changed, so think of one that is secure and easy to remember. 6. Create a Yuantiku password. You can change your password at any time. 7. Enter your Password Reset Question and Answer. This can be used at a later time if you forget your password. 8. Enter your e-mail address. You will receive e-mail notification when new information is available in [address] 9. Click Sign Up. You can now view and download portions of your medical record. 10. Click the Download Summary menu link to download a portable copy of your medical information. Additional Information    If you have questions, please call [phone]. Remember, Yuantiku is NOT to be used for urgent needs. For medical emergencies, dial 911.

## 2018-02-21 ENCOUNTER — DOCUMENTATION ONLY (OUTPATIENT)
Dept: SLEEP MEDICINE | Age: 68
End: 2018-02-21

## 2018-02-26 ENCOUNTER — OFFICE VISIT (OUTPATIENT)
Dept: CARDIOLOGY CLINIC | Age: 68
End: 2018-02-26

## 2018-02-26 VITALS
HEART RATE: 64 BPM | DIASTOLIC BLOOD PRESSURE: 80 MMHG | BODY MASS INDEX: 42.66 KG/M2 | HEIGHT: 72 IN | OXYGEN SATURATION: 97 % | WEIGHT: 315 LBS | RESPIRATION RATE: 20 BRPM | SYSTOLIC BLOOD PRESSURE: 120 MMHG

## 2018-02-26 DIAGNOSIS — I10 ESSENTIAL HYPERTENSION: ICD-10-CM

## 2018-02-26 DIAGNOSIS — I48.20 CHRONIC ATRIAL FIBRILLATION (HCC): Primary | ICD-10-CM

## 2018-02-26 DIAGNOSIS — E78.5 DYSLIPIDEMIA, GOAL LDL BELOW 100: ICD-10-CM

## 2018-02-26 RX ORDER — IBUPROFEN 200 MG
600 TABLET ORAL
COMMUNITY
End: 2018-05-02

## 2018-02-26 NOTE — MR AVS SNAPSHOT
[address] [facility] [address] 
[phone] Patient: Izabela Eubanks MRN:  [id number] Visit Information Date & Time Provider Department Dept. Phone Encounter #  
 2/26/2018 11:40 AM Gopi Camacho MD [facility] Andressa Mari [phone] [id number] Follow-up Instructions Return in about 1 year (around 2/26/2019). Your Appointments 4/19/2018  9:00 AM  
ROUTINE CARE with Kari Colon MD  
[facility]) Appt Note: [address] [facility]  
[phone]  
  
   
 [address]  
  
    
 2/15/2019 11:20 AM  
Any with Shawn Shipley MD  
[address] [place] ([facility]) Appt Note: yearly follow up [address] [facility] [address] [facility]  
  
   
 [address] [id number] Upcoming Health Maintenance Date Due ZOSTER VACCINE AGE 60> 6/12/2011 EYE EXAM RETINAL OR DILATED Q1 6/19/2014 FOOT EXAM Q1 4/15/2016 GLAUCOMA SCREENING Q2Y 8/12/2016 HEMOGLOBIN A1C Q6M 4/19/2018 LIPID PANEL Q1 4/25/2018 MEDICARE YEARLY EXAM 4/26/2018 MICROALBUMIN Q1 10/19/2018 Pneumococcal 65+ Low/Medium Risk (2 of 2 - PPSV23) 1/13/2019 COLONOSCOPY 6/27/2022 DTaP/Tdap/Td series (2 - Td) 8/17/2027 Allergies as of 2/26/2018  Review Complete On: 2/26/2018 By: Gopi Camacho MD  
 No Known Allergies Current Immunizations  Reviewed on 11/2/2017 Name Date Influenza High Dose Vaccine PF 10/25/2016 Influenza Vaccine (Quad) PF 10/19/2017, 10/15/2015, 9/25/2014 Pneumococcal Conjugate (PCV-13) 10/30/2017 Pneumococcal Polysaccharide (PPSV-23) 1/13/2014 Tdap 8/17/2017  2:13 PM  
  
 Not reviewed this visit You Were Diagnosed With   
  
 Codes Comments Chronic atrial fibrillation (HCC)    -  Primary ICD-10-CM: A40.9 ICD-9-CM: 427.31 Dyslipidemia, goal LDL below 100     ICD-10-CM: E78.5 ICD-9-CM: 272.4 Essential hypertension     ICD-10-CM: I10 
ICD-9-CM: 401.9 Vitals BP Pulse Resp Height(growth percentile) Weight(growth percentile) SpO2  
 120/80 (BP 1 Location: Left arm, BP Patient Position: Sitting) 64 20 6' (1.829 m) 329 lb 3.2 oz (149.3 kg) 97% BMI Smoking Status 44.65 kg/m2 Former Smoker BMI and BSA Data Body Mass Index Body Surface Area 44.65 kg/m 2 2.75 m 2 Preferred Pharmacy Pharmacy Name Phone 100 [facility] [phone] Your Updated Medication List  
  
   
This list is accurate as of 2/26/18 12:13 PM.  Always use your most recent med list.  
  
  
  
  
 cyclobenzaprine 10 mg tablet Commonly known as:  FLEXERIL  
TAKE 1 TABLET BY MOUTH THREE TIMES DAILY AS NEEDED FOR MUSCLE SPASMS FOR UP TO 7 DAYS  
  
 finasteride 5 mg tablet Commonly known as:  PROSCAR  
TAKE 1 TABLET BY MOUTH DAILY  
  
 ibuprofen 200 mg tablet Commonly known as:  MOTRIN Take 600 mg by mouth every six (6) hours as needed for Pain. lisinopril-hydroCHLOROthiazide 20-25 mg per tablet Commonly known as:  Kiersten Saint Take 1 tablet by mouth  daily OTHER  
2 tsp prn for marni horse. rivaroxaban 20 mg Tab tablet Commonly known as:  Estrada Gens TAKE 1 TABLET BY MOUTH IN THE MORNING WITH BREAKFAST  
  
 tamsulosin 0.4 mg capsule Commonly known as:  FLOMAX TAKE 1 CAPSULE BY MOUTH EVERY MORNING Prescriptions Sent to Pharmacy Refills  
 rivaroxaban (XARELTO) 20 mg tab tablet 2 Sig: TAKE 1 TABLET BY MOUTH IN THE MORNING WITH BREAKFAST Class: Normal  
 Pharmacy: [address], [address] [place] [address] #: [phone] Follow-up Instructions Return in about 1 year (around 2/26/2019). Introducing [facility]! Dear Bucky Leonard: Thank you for requesting a TapClicks account. Our records indicate that you already have an active TapClicks account. You can access your account anytime at [email or web address] [email or web address] Did you know that you can access your hospital and ER discharge instructions at any time in TapClicks? You can also review all of your test results from your hospital stay or ER visit. Additional Information If you have questions, please visit the Frequently Asked Questions section of the TapClicks website at [email or web address] Remember, TapClicks is NOT to be used for urgent needs. For medical emergencies, dial 911. Now available from your iPhone and Android! Please provide this summary of care documentation to your next provider. Your primary care clinician is listed as Michaela Oliveira. If you have any questions after today's visit, please call [phone].

## 2018-02-26 NOTE — PROGRESS NOTES
HISTORY OF PRESENT ILLNESS  Criss Field is a 78 y.o. male     SUMMARY:   Problem List  Date Reviewed: 2/26/2018          Codes Class Noted    Benign non-nodular prostatic hyperplasia with lower urinary tract symptoms ICD-10-CM: N40.1  ICD-9-CM: 600.91  4/25/2017    Overview Signed 4/25/2017  9:10 AM by Romi Chahal MD     Retention had catheter 3 months             Urinary retention due to benign prostatic hyperplasia ICD-10-CM: N40.1, R33.8  ICD-9-CM: 600.91, 788.20  10/25/2016        Diabetes mellitus type 2, controlled ([email or web address] ICD-10-CM: E11.9  ICD-9-CM: 250.00  10/15/2015        Restless legs ICD-10-CM: G25.81  ICD-9-CM: 333.94  4/15/2015        VALENTE (obstructive sleep apnea) ICD-10-CM: G47.33  ICD-9-CM: 327.23  6/19/2012    Overview Signed 6/19/2012 10:57 AM by Johanna White MD     Bi-Level: 19/15 cmH2O. Dyslipidemia, goal LDL below 100 ICD-10-CM: E78.5  ICD-9-CM: 272.4  6/14/2011        A-fib ([facility] [[email or web address] ICD-10-CM: I48.91  ICD-9-CM: 427.31  2/7/2011    Overview Addendum 2/9/2011  3:42 PM by Cliff Poe NP     Assymptomatic, noted on ekg 2/11; found when had sleep study. Saw cardiologist 2/8/11.              Diabetes ([facility] [[email or web address] ICD-10-CM: E11.9  ICD-9-CM: 250.00  1/28/2011    Overview Addendum 2/9/2011  3:37 PM by Cliff Poe NP     Recent diagnosis; just started diabetes education  HgA1C 6.5%             Morbid obesity ([email or web address] ICD-10-CM: E66.01  ICD-9-CM: 278.01  5/17/2010    Overview Signed 2/9/2011  3:35 PM by Cliff Poe NP     OBESE 20 + YEARS; HIGH WEIGHT 362 LBS PAST MONTH; LOW WEIGHT 190 LBS 30 YEARS AGO             VALENTE (obstructive sleep apnea) ICD-10-CM: G47.33  ICD-9-CM: 327.23  4/24/2010        Erectile dysfunction due to arterial insufficiency ICD-10-CM: N52.01  ICD-9-CM: 607.84  4/24/2010        HTN (hypertension) ICD-10-CM: I10  ICD-9-CM: 401.9  4/24/2010    Overview Signed 4/24/2010  9:31 AM by Patrick Paez     borderline             Depression ICD-10-CM: F32.9  ICD-9-CM: 311  4/24/2010              Current Outpatient Prescriptions on File Prior to Visit   Medication Sig    cyclobenzaprine (FLEXERIL) 10 mg tablet TAKE 1 TABLET BY MOUTH THREE TIMES DAILY AS NEEDED FOR MUSCLE SPASMS FOR UP TO 7 DAYS    rivaroxaban (XARELTO) 20 mg tab tablet TAKE 1 TABLET BY MOUTH IN THE MORNING WITH BREAKFAST    lisinopril-hydroCHLOROthiazide (PRINZIDE, ZESTORETIC) 20-25 mg per tablet Take 1 tablet by mouth  daily    finasteride (PROSCAR) 5 mg tablet TAKE 1 TABLET BY MOUTH DAILY    tamsulosin (FLOMAX) 0.4 mg capsule TAKE 1 CAPSULE BY MOUTH EVERY MORNING     No current facility-administered medications on file prior to visit. CARDIOLOGY STUDIES TO DATE:  02/10 echocardiogram showed lvh but was otherwise normal.    02/10 stress cardiolyte had mild EKG changes without chest pain and a questionable fixed inferior defect with an ejection fraction of 58%        Chief Complaint   Patient presents with    Hypertension     yearly follow up. Denies chest pain/swelling. Occasional shortness of breath. Rare dizziness.  Irregular Heart Beat     HPI :  Mr. Haleigh Ferreira is doing well. He is going to have a UroLift procedure to help with his prostate symptoms. No worrisome cardiac symptoms and no problems with his medications. He has still not lost any significant amount of weight.         CARDIAC ROS:   negative for chest pain, dyspnea, palpitations, syncope, orthopnea, paroxysmal nocturnal dyspnea, exertional chest pressure/discomfort, claudication, lower extremity edema    Family History   Problem Relation Age of Onset    Cancer Father      leukemia    Cancer Paternal Grandfather     Diabetes Sister     Diabetes Brother     Cancer Maternal Aunt     Cancer Maternal Uncle        Past Medical History:   Diagnosis Date    Arrhythmia     atrial fib    Depression 4/24/2010    Diabetes ([facility] [address]     diet control for pre-diabetes    Dyslipidemia, goal LDL below 100 6/14/2011    HTN (hypertension) 4/24/2010    Impotence 4/24/2010    Morbid obesity ([address] 5/17/2010    AVLENTE (obstructive sleep apnea) 4/24/2010    CPAP    Restless legs 4/15/2015       GENERAL ROS:  A comprehensive review of systems was negative except for that written in the HPI.     Visit Vitals    /80 (BP 1 Location: Left arm, BP Patient Position: Sitting)    Pulse 64  Comment: irregular    Resp 20    Ht 6' (1.829 m)    Wt 329 lb 3.2 oz (149.3 kg)    SpO2 97%    BMI 44.65 kg/m2       Wt Readings from Last 3 Encounters:   02/26/18 329 lb 3.2 oz (149.3 kg)   02/16/18 326 lb (147.9 kg)   10/19/17 334 lb (151.5 kg)            BP Readings from Last 3 Encounters:   02/26/18 120/80   02/16/18 106/72   10/19/17 126/76       PHYSICAL EXAM  General appearance: alert, cooperative, no distress, appears stated age  Neck: supple, symmetrical, trachea midline, no adenopathy, no carotid bruit and no JVD  Lungs: clear to auscultation bilaterally  Heart: irregularly irregular rhythm, S1, S2 normal, no S3 or S4  Extremities: extremities normal, atraumatic, no cyanosis or edema    Lab Results   Component Value Date/Time    Cholesterol, total 168 04/25/2017 09:47 AM    Cholesterol, total 167 10/15/2015 11:58 AM    Cholesterol, total 168 09/25/2014 11:09 AM    Cholesterol, total 159 01/14/2014 08:54 AM    Cholesterol, total 153 03/20/2013 09:07 AM    HDL Cholesterol 37 (L) 04/25/2017 09:47 AM    HDL Cholesterol 32 (L) 10/15/2015 11:58 AM    HDL Cholesterol 33 (L) 09/25/2014 11:09 AM    HDL Cholesterol 30 (L) 01/14/2014 08:54 AM    HDL Cholesterol 32 (L) 03/20/2013 09:07 AM    LDL, calculated 92 04/25/2017 09:47 AM    LDL, calculated 89 10/15/2015 11:58 AM    LDL, calculated 92 09/25/2014 11:09 AM    LDL, calculated 82 01/14/2014 08:54 AM    LDL, calculated 88 03/20/2013 09:07 AM    Triglyceride 193 (H) 04/25/2017 09:47 AM    Triglyceride 228 (H) 10/15/2015 11:58 AM    Triglyceride 214 (H) 09/25/2014 11:09 AM    Triglyceride 237 (H) 01/14/2014 08:54 AM    Triglyceride 163 (H) 03/20/2013 09:07 AM     ASSESSMENT  Mr. Emily Stovall is stable and asymptomatic at this point, controlled atrial fibrillation and should be fine for surgery without special precautions or further cardiac testing. current treatment plan is effective, no change in therapy  lab results and schedule of future lab studies reviewed with patient  reviewed diet, exercise and weight control    Encounter Diagnoses   Name Primary?  Paroxysmal atrial fibrillation (HCC) Yes    Dyslipidemia, goal LDL below 100     Essential hypertension      Orders Placed This Encounter    ibuprofen (MOTRIN) 200 mg tablet    OTHER       Follow-up Disposition:  Return in about 1 year (around 2/26/2019).     David Puentes MD  2/26/2018

## 2018-03-01 RX ORDER — LISINOPRIL AND HYDROCHLOROTHIAZIDE 20; 25 MG/1; MG/1
TABLET ORAL
Qty: 90 TAB | Refills: 1 | Status: SHIPPED | OUTPATIENT
Start: 2018-03-01 | End: 2018-10-06 | Stop reason: SDUPTHER

## 2018-03-05 ENCOUNTER — TELEPHONE (OUTPATIENT)
Dept: CARDIOLOGY CLINIC | Age: 68
End: 2018-03-05

## 2018-03-05 DIAGNOSIS — I48.20 CHRONIC ATRIAL FIBRILLATION (HCC): ICD-10-CM

## 2018-03-05 DIAGNOSIS — E78.5 DYSLIPIDEMIA, GOAL LDL BELOW 100: ICD-10-CM

## 2018-03-05 DIAGNOSIS — I10 ESSENTIAL HYPERTENSION: ICD-10-CM

## 2018-03-05 NOTE — TELEPHONE ENCOUNTER
Requested Prescriptions     Signed Prescriptions Disp Refills    rivaroxaban (XARELTO) 20 mg tab tablet 90 Tab 2     Sig: TAKE 1 TABLET BY MOUTH IN THE MORNING WITH BREAKFAST     Authorizing Provider: Verenice Miles     Ordering User: Crissy Shows    Per Dr. Allie Diallo verbal orders

## 2018-03-05 NOTE — TELEPHONE ENCOUNTER
Pt calling because he is out of his Xerelto 20 mg and would like to know if he can have a prescription sent to the pharmacy or if he can  samples until [facility] send his refill. Please give him a call back @ [phone]. Thanks!   Meghna Mai

## 2018-03-05 NOTE — TELEPHONE ENCOUNTER
Requested Prescriptions     Signed Prescriptions Disp Refills    rivaroxaban (XARELTO) 20 mg tab tablet 14 Tab 0     Sig: TAKE 1 TABLET BY MOUTH IN THE MORNING WITH BREAKFAST     Authorizing Provider: Tyesha Walker     Ordering User: Janet Alvares    Per Dr. Yevgeniy Cole verbal orders       Called patient. Verified patient's identity with two identifiers. Notified patient 14 days worth of samples being left for him to . Patient verbalizes understanding and denies further questions or concerns.

## 2018-04-27 NOTE — PATIENT INSTRUCTIONS
[address] [facility] [place]. [place], [address]  Tel.  [phone]  Fax. [address] [facility] 60  [place], [address] [facility]  Tel.  [phone]  Fax. [phone] [address]  Tel.  [phone]  Fax. [phone]     Learning About CPAP for Sleep Apnea  What is CPAP? CPAP is a small machine that you use at home every night while you sleep. It increases air pressure in your throat to keep your airway open. When you have sleep apnea, this can help you sleep better so you feel much better. CPAP stands for \"continuous positive airway pressure. \"  The CPAP machine will have one of the following:  · A mask that covers your nose and mouth  · Prongs that fit into your nose  · A mask that covers your nose only, the most common type. This type is called NCPAP. The N stands for \"nasal.\"  Why is it done? CPAP is usually the best treatment for obstructive sleep apnea. It is the first treatment choice and the most widely used. Your doctor may suggest CPAP if you have:  · Moderate to severe sleep apnea. · Sleep apnea and coronary artery disease (CAD) or heart failure. How does it help? · CPAP can help you have more normal sleep, so you feel less sleepy and more alert during the daytime. · CPAP may help keep heart failure or other heart problems from getting worse. · NCPAP may help lower your blood pressure. · If you use CPAP, your bed partner may also sleep better because you are not snoring or restless. What are the side effects? Some people who use CPAP have:  · A dry or stuffy nose and a sore throat. · Irritated skin on the face. · Sore eyes. · Bloating. If you have any of these problems, work with your doctor to fix them. Here are some things you can try:  · Be sure the mask or nasal prongs fit well. · See if your doctor can adjust the pressure of your CPAP. · If your nose is dry, try a humidifier.   · If your nose is runny or stuffy, try decongestant medicine or a steroid nasal spray. If these things do not help, you might try a different type of machine. Some machines have air pressure that adjusts on its own. Others have air pressures that are different when you breathe in than when you breathe out. This may reduce discomfort caused by too much pressure in your nose. Where can you learn more? Go to [employer]be  Enter Elizabeth Tate in the search box to learn more about \"Learning About CPAP for Sleep Apnea. \"   © 6878-2713 Healthwise, Incorporated. Care instructions adapted under license by [employer] (which disclaims liability or warranty for this information). This care instruction is for use with your licensed healthcare professional. If you have questions about a medical condition or this instruction, always ask your healthcare professional. [address] any warranty or liability for your use of this information. Content Version: 3.1.91173; Last Revised: January 11, 2010  PROPER SLEEP HYGIENE    What to avoid  · Do not have drinks with caffeine, such as coffee or black tea, for 8 hours before bed. · Do not smoke or use other types of tobacco near bedtime. Nicotine is a stimulant and can keep you awake. · Avoid drinking alcohol late in the evening, because it can cause you to wake in the middle of the night. · Do not eat a big meal close to bedtime. If you are hungry, eat a light snack. · Do not drink a lot of water close to bedtime, because the need to urinate may wake you up during the night. · Do not read or watch TV in bed. Use the bed only for sleeping and sexual activity. What to try  · Go to bed at the same time every night, and wake up at the same time every morning. Do not take naps during the day. · Keep your bedroom quiet, dark, and cool. · Get regular exercise, but not within 3 to 4 hours of your bedtime. .  · Sleep on a comfortable pillow and mattress.   · If watching the clock makes you anxious, turn it facing away from you so you cannot see the time. · If you worry when you lie down, start a worry book. Well before bedtime, write down your worries, and then set the book and your concerns aside. · Try meditation or other relaxation techniques before you go to bed. · If you cannot fall asleep, get up and go to another room until you feel sleepy. Do something relaxing. Repeat your bedtime routine before you go to bed again. · Make your house quiet and calm about an hour before bedtime. Turn down the lights, turn off the TV, log off the computer, and turn down the volume on music. This can help you relax after a busy day. Drowsy Driving: The [employer] cites drowsiness as a causing factor in more than 233,997 police reported crashes annually, resulting in 76,000 injuries and 1,500 deaths. Other surveys suggest 55% of people polled have driven while drowsy in the past year, 23% had fallen asleep but not crashed, 3% crashed, and 2% had and accident due to drowsy driving. Who is at risk? Young Drivers: One study of drowsy driving accidents states that 55% of the drivers were under 25 years. Of those, 75% were male. Shift Workers and Travelers: People who work overnight or travel across time zones frequently are at higher risk of experiencing Circadian Rhythm Disorders. They are trying to work and function when their body is programed to sleep. Sleep Deprived: Lack of sleep has a serious impact on your ability to pay attention or focus on a task. Consistently getting less than the average of 8 hours your body needs creates partial or cumulative sleep deprivation. Untreated Sleep Disorders: Sleep Apnea, Narcolepsy, R.L.S., and other sleep disorders (untreated) prevent a person from getting enough restful sleep. This leads to excessive daytime sleepiness and increases the risk for drowsy driving accidents by up to 7 times.   Medications / Alcohol: Even over the counter medications can cause drowsiness. Medications that impair a drivers attention should have a warning label. Alcohol naturally makes you sleepy and on its own can cause accidents. Combined with excessive drowsiness its effects are amplified. Signs of Drowsy Driving:   * You don't remember driving the last few miles   * You may drift out of your natalee   * You are unable to focus and your thoughts wander   * You may yawn more often than normal   * You have difficulty keeping your eyes open / nodding off   * Missing traffic signs, speeding, or tailgating  Prevention-   Good sleep hygiene, lifestyle and behavioral choices have the most impact on drowsy driving. There is no substitute for sleep and the average person requires 8 hours nightly. If you find yourself driving drowsy, stop and sleep. Consider the sleep hygiene tips provided during your visit as well. Medication Refill Policy: Refills for all medications require 1 week advance notice. Please have your pharmacy fax a refill request. We are unable to fax, or call in \"controled substance\" medications and you will need to pick these prescriptions up from our office. [employer] Activation    Thank you for requesting access to [employer]. Please follow the instructions below to securely access and download your online medical record. [employer] allows you to send messages to your doctor, view your test results, renew your prescriptions, schedule appointments, and more. How Do I Sign Up? 1. In your internet browser, go to [email or web address] 2. Click on the First Time User? Click Here link in the Sign In box. You will see the New Member Sign Up page. 3. Enter your [employer] Access Code exactly as it appears below. You will not need to use this code after youve completed the sign-up process. If you do not sign up before the expiration date, you must request a new code. [employer] Access Code:  Activation code not generated  Current ReaMetrix Status: Active (This is the date your ReaMet[employer] access code will )    4. Enter the last four digits of your Social Security Number (xxxx) and Date of Birth (mm/dd/yyyy) as indicated and click Submit. You will be taken to the next sign-up page. 5. Create a Stor[employer] ID. This will be your Re[employer]Metrix login ID and cannot be changed, so think of one that is secure and easy to remember. 6. Create a Re[employer] password. You can change your password at any time. 7. Enter your Password Reset Question and Answer. This can be used at a later time if you forget your password. 8. Enter your e-mail address. You will receive e-mail notification when new information is available in [address]. 9. Click Sign Up. You can now view and download portions of your medical record. 10. Click the Download Summary menu link to download a portable copy of your medical information. Additional Information    If you have questions, please call [phone]. Remember, Re[employer] is NOT to be used for urgent needs. For medical emergencies, dial 911. (3) no apparent problem

## 2018-05-02 ENCOUNTER — OFFICE VISIT (OUTPATIENT)
Dept: INTERNAL MEDICINE CLINIC | Age: 68
End: 2018-05-02

## 2018-05-02 VITALS
WEIGHT: 315 LBS | RESPIRATION RATE: 16 BRPM | HEART RATE: 92 BPM | BODY MASS INDEX: 42.66 KG/M2 | OXYGEN SATURATION: 97 % | HEIGHT: 72 IN | SYSTOLIC BLOOD PRESSURE: 118 MMHG | TEMPERATURE: 98.8 F | DIASTOLIC BLOOD PRESSURE: 80 MMHG

## 2018-05-02 DIAGNOSIS — I48.20 CHRONIC ATRIAL FIBRILLATION (HCC): ICD-10-CM

## 2018-05-02 DIAGNOSIS — E11.9 DIABETES MELLITUS TYPE 2, DIET-CONTROLLED (HCC): ICD-10-CM

## 2018-05-02 DIAGNOSIS — I10 ESSENTIAL HYPERTENSION: ICD-10-CM

## 2018-05-02 DIAGNOSIS — I48.0 PAROXYSMAL ATRIAL FIBRILLATION (HCC): Primary | ICD-10-CM

## 2018-05-02 DIAGNOSIS — Z00.00 MEDICARE ANNUAL WELLNESS VISIT, SUBSEQUENT: ICD-10-CM

## 2018-05-02 DIAGNOSIS — Z71.89 ACP (ADVANCE CARE PLANNING): ICD-10-CM

## 2018-05-02 NOTE — MR AVS SNAPSHOT
[address] [place] [address] [address] 
[phone] Patient: Jerrilyn Essex MRN:  [id number] Visit Information Date & Time Provider Department Dept. Phone Encounter #  
 5/2/2018  3:30 PM Roxy Mejía, [address] [phone] [id number] Your Appointments 11/5/2018 11:15 AM  
ROUTINE CARE with Roxy Mejía MD  
[facility] Appt Note: R F/U  
 [place] [address]  
[phone]  
  
   
 [address]  
  
    
 2/15/2019 11:20 AM  
Any with Les Cherry MD  
[address] [facility] [place] ([facility]) Appt Note: yearly follow up [address] [id number]  
[phone]  
  
   
 [address] [facility] [address] [place] [address]  
  
    
 2/28/2019 11:40 AM  
ESTABLISHED PATIENT with Jarrod Yanes MD  
[facility] Appt Note: 1 yr f/u per Dr. Berta Levine [address]  [address] [place] [address]  
[address] [address] Upcoming Health Maintenance Date Due ZOSTER VACCINE AGE 60> 6/12/2011 EYE EXAM RETINAL OR DILATED Q1 6/19/2014 FOOT EXAM Q1 4/15/2016 GLAUCOMA SCREENING Q2Y 8/12/2016 HEMOGLOBIN A1C Q6M 4/19/2018 LIPID PANEL Q1 4/25/2018 MEDICARE YEARLY EXAM 4/26/2018 Influenza Age 5 to Adult 8/1/2018 MICROALBUMIN Q1 10/19/2018 Pneumococcal 65+ Low/Medium Risk (2 of 2 - PPSV23) 1/13/2019 COLONOSCOPY 6/27/2022 DTaP/Tdap/Td series (2 - Td) 8/17/2027 Allergies as of 5/2/2018  Review Complete On: 5/2/2018 By: Hugo Patterson LPN No Known Allergies Current Immunizations  Reviewed on 5/2/2018 Name Date Influenza High Dose Vaccine PF 10/25/2016 Influenza Vaccine (Quad) PF 10/19/2017, 10/15/2015, 2014 Pneumococcal Conjugate (PCV-13) 10/30/2017 Pneumococcal Polysaccharide (PPSV-23) 2014 Tdap 2017  2:13 PM  
  
 Reviewed by Silvino Thomas MD on 2018 at  3:44 PM  
You Were Diagnosed With   
  
 Codes Comments Paroxysmal atrial fibrillation (HCC)    -  Primary ICD-10-CM: I48.0 ICD-9-CM: 427.31 Diabetes mellitus type 2, diet-controlled ([facility] [address] [address]     ICD-10-CM: E11.9 ICD-9-CM: 250.00 Medicare annual wellness visit, subsequent     ICD-10-CM: Z00.00 ICD-9-CM: V70.0 Vitals BP Pulse Temp Resp Height(growth percentile) Weight(growth percentile) 118/80 92 98.8 °F (37.1 °C) (Oral) 16 6' (1.829 m) 324 lb (147 kg) SpO2 BMI Smoking Status 97% 43.94 kg/m2 Former Smoker Vitals History BMI and BSA Data Body Mass Index Body Surface Area 43.94 kg/m 2 2.73 m 2 Preferred Pharmacy Pharmacy Name Phone [address] [facility], [address] [place] [address] [address] [facility] [address] Your Updated Medication List  
  
   
This list is accurate as of 18  3:55 PM.  Always use your most recent med list.  
  
  
  
  
 lisinopril-hydroCHLOROthiazide 20-25 mg per tablet Commonly known as:  PRINZIDE, ZESTORETIC  
TAKE 1 TABLET BY MOUTH  DAILY  
  
 rivaroxaban 20 mg Tab tablet Commonly known as:  Felicie Haney TAKE 1 TABLET BY MOUTH IN THE MORNING WITH BREAKFAST  
  
 varicella-zoster recombinant (PF) 50 mcg/0.5 mL Susr injection Commonly known as:  SHINGRIX (PF)  
0.5 mL by IntraMUSCular route once for 1 dose. Indications: PREVENTION OF HERPES ZOSTER Prescriptions Printed Refills  
 varicella-zoster recombinant, PF, (SHINGRIX, PF,) 50 mcg/0.5 mL susr injection 1 Si.5 mL by IntraMUSCular route once for 1 dose. Indications: PREVENTION OF HERPES ZOSTER Class: Print Route: IntraMUSCular We Performed the Following CBC WITH AUTOMATED DIFF [61042 CPT(R)] HEMOGLOBIN A1C W/O EAG [45551 CPT(R)] LIPID PANEL [81778 CPT(R)] METABOLIC PANEL, COMPREHENSIVE [74819 CPT(R)] MICROALBUMIN, UR, RAND W/ MICROALB/CREAT RATIO [id number] CPT(R)] Patient Instructions Medicare Wellness Visit, Male The best way to live healthy is to have a healthy lifestyle by eating a well-balanced diet, exercising regularly, limiting alcohol and stopping smoking. Regular physical exams and screening tests are another way to keep healthy. Preventive exams provided by your health care provider can find health problems before they become diseases or illnesses. Preventive services including immunizations, screening tests, monitoring and exams can help you take care of your own health. All people over age 72 should have a pneumovax  and and a prevnar shot to prevent pneumonia. These are once in a lifetime unless you and your provider decide differently. All people over 65 should have a yearly flu shot and a tetanus vaccine every 10 years. Screening for diabetes mellitus with a blood sugar test should be done every year. Glaucoma is a disease of the eye due to increased ocular pressure that can lead to blindness and it should be done every year by an eye professional. 
 
Cardiovascular screening tests that check for elevated lipids (fatty part of blood) which can lead to heart disease and strokes should be done every 5 years. Colorectal screening that evaluates for blood or polyps in your colon should be done yearly as a stool test or every five years as a flexible sigmoidoscope or every 10 years as a colonoscopy up to age 76. Men up to age 76 may need a screening blood test for prostate cancer at certain intervals, depending on their personal and family history. This decision is between the patient and his provider.  
 
If you have been a smoker or had family history of abdominal aortic aneurysms, you and your provider may decide to schedule an ultrasound test of your aorta. Hepatitis C screening is also recommended for anyone born between [birth year] through [address]. A shingles vaccine is also recommended once in a lifetime after age 61. Your Medicare Wellness Exam is recommended annually. Here is a list of your current Health Maintenance items with a due date: 
Health Maintenance Due Topic Date Due  Shingles Vaccine  06/12/2011 [facility] Eye Exam  06/19/2014 [facility] Diabetic Foot Care  04/15/2016  Glaucoma Screening   08/12/2016  Hemoglobin A1C    04/19/2018  Cholesterol Test   04/25/2018 [facility] Annual Well Visit  04/26/2018 Introducing [facility]! Dear Helen Sotelo: Thank you for requesting a [employer] account. Our records indicate that you already have an active [employer] account. You can access your account anytime at [email or web address] Did you know that you can access your hospital and ER discharge instructions at any time in [employer]? You can also review all of your test results from your hospital stay or ER visit. Additional Information If you have questions, please visit the Frequently Asked Questions section of the [employer] website at [email or web address] Remember, [employer] is NOT to be used for urgent needs. For medical emergencies, dial 911. Now available from your iPhone and Android! Please provide this summary of care documentation to your next provider. Your primary care clinician is listed as Kojo Moscoso. If you have any questions after today's visit, please call [phone].

## 2018-05-02 NOTE — PROGRESS NOTES
`  Chief Complaint   Patient presents with    Diabetes    Hypertension     Past Surgical History:   Procedure Laterality Date    COLONOSCOPY N/A 6/27/2017    COLONOSCOPY performed by Cesar Ferrera MD at [facility]. [address], COLON, DIAGNOSTIC  2007    HX APPENDECTOMY      HX ORTHOPAEDIC  2000    bilat TKR     HX ORTHOPAEDIC  2010    left THR    HX UROLOGICAL  03/2018    urolift     Penile implant next week           This is the Subsequent Medicare Annual Wellness Exam, performed 12 months or more after the Initial AWV or the last Subsequent AWV    I have reviewed the patient's medical history in detail and updated the computerized patient record. History     Past Medical History:   Diagnosis Date    Arrhythmia     atrial fib    Depression 4/24/2010    Diabetes ([facility]     diet control for pre-diabetes    Dyslipidemia, goal LDL below 100 6/14/2011    HTN (hypertension) 4/24/2010    Impotence 4/24/2010    Morbid obesity ([facility] 5/17/2010    VALENTE (obstructive sleep apnea) 4/24/2010    CPAP    Restless legs 4/15/2015      Past Surgical History:   Procedure Laterality Date    COLONOSCOPY N/A 6/27/2017    COLONOSCOPY performed by Cesar Ferrera MD at [facility]. [address], COLON, DIAGNOSTIC  2007    HX APPENDECTOMY      HX ORTHOPAEDIC  2000    bilat TKR     HX ORTHOPAEDIC  2010    left THR    HX UROLOGICAL  03/2018    urolift     Current Outpatient Prescriptions   Medication Sig Dispense Refill    varicella-zoster recombinant, PF, (SHINGRIX, PF,) 50 mcg/0.5 mL susr injection 0.5 mL by IntraMUSCular route once for 1 dose.  Indications: PREVENTION OF HERPES ZOSTER 0.5 mL 1    rivaroxaban (XARELTO) 20 mg tab tablet TAKE 1 TABLET BY MOUTH IN THE MORNING WITH BREAKFAST 14 Tab 0    lisinopril-hydroCHLOROthiazide (PRINZIDE, ZESTORETIC) 20-25 mg per tablet TAKE 1 TABLET BY MOUTH  DAILY 90 Tab 1     No Known Allergies  Family History   Problem Relation Age of Onset    Cancer Father leukemia    Cancer Paternal Grandfather     Diabetes Sister     Diabetes Brother     Cancer Maternal Aunt     Cancer Maternal Uncle      Social History   Substance Use Topics    Smoking status: Former Smoker     Quit date: 5/17/1990    Smokeless tobacco: Never Used    Alcohol use 1.0 oz/week     2 Standard drinks or equivalent per week      Comment: 2-3 drinks per week     Patient Active Problem List   Diagnosis Code    VALENTE (obstructive sleep apnea) G47.33    Erectile dysfunction due to arterial insufficiency N52.01    HTN (hypertension) I10    Depression F32.9    Morbid obesity (HCC) E66.01    Diabetes ([facility] E11.9    A-fib ([facility] I48.91    Dyslipidemia, goal LDL below 100 E78.5    VALENTE (obstructive sleep apnea) G47.33    Restless legs G25.81    Diabetes mellitus type 2, controlled (HCC) E11.9    Urinary retention due to benign prostatic hyperplasia N40.1, R33.8    Benign non-nodular prostatic hyperplasia with lower urinary tract symptoms N40.1    Diabetes mellitus type 2, diet-controlled ([facility]) E11.9       Depression Risk Factor Screening:   No flowsheet data found. Alcohol Risk Factor Screening: You do not drink alcohol or very rarely. Functional Ability and Level of Safety:   Hearing Loss  Hearing is good. Activities of Daily Living  The home contains: no safety equipment. Patient does total self care    Fall Risk  Fall Risk Assessment, last 12 mths 5/2/2018   Able to walk? Yes   Fall in past 12 months?  No       Abuse Screen  Patient is not abused    Cognitive Screening   Evaluation of Cognitive Function:  Has your family/caregiver stated any concerns about your memory: no  Normal    Patient Care Team   Patient Care Team:  Lebron Callas, MD as PCP - Bria Saldana MD as Physician (Sleep Medicine)  Justin Servin MD (Cardiology)    Assessment/Plan   Education and counseling provided:  Are appropriate based on today's review and evaluation    Diagnoses and all orders for this visit:    1. Paroxysmal atrial fibrillation (HCC)    2. Diabetes mellitus type 2, diet-controlled ([facility]  -     CBC WITH AUTOMATED DIFF  -     LIPID PANEL  -     METABOLIC PANEL, COMPREHENSIVE  -     MICROALBUMIN, UR, RAND W/ MICROALB/CREAT RATIO  -     HEMOGLOBIN A1C W/O EAG    3. Medicare annual wellness visit, subsequent    Other orders  -     varicella-zoster recombinant, PF, (SHINGRIX, PF,) 50 mcg/0.5 mL susr injection; 0.5 mL by IntraMUSCular route once for 1 dose. Indications: PREVENTION OF HERPES ZOSTER        Health Maintenance Due   Topic Date Due    ZOSTER VACCINE AGE 60>  06/12/2011    EYE EXAM RETINAL OR DILATED Q1  06/19/2014    FOOT EXAM Q1  04/15/2016    GLAUCOMA SCREENING Q2Y  08/12/2016    HEMOGLOBIN A1C Q6M  04/19/2018    LIPID PANEL Q1  04/25/2018    MEDICARE YEARLY EXAM  04/26/2018     Hypertension ROS: taking medications as instructed, no medication side effects noted, no TIA's, no chest pain on exertion, no dyspnea on exertion, no swelling of ankles. New concerns: had lift procedure for prostate doing great for penile implant next month. Hypertension Exam: BP noted to be well controlled today in office, CVS exam  - irregularly irregular rhythm with rate 70s. Lab review: no lab studies available for review at time of visit. Assessment:  Hypertension stable, improved. Plan: current treatment plan is effective, no change in therapy  lab results and schedule of future lab studies reviewed with patient  repeat labs ordered prior to next appointment. Diabetic ROS - diabetic diet compliance: compliant most of the time, home glucose monitoring: is performed sporadically, fasting values range 110. New concerns: weight loss. Diabetic exam: chest clear. Lab review: orders written for new lab studies as appropriate; see orders. Assessment: Diabetes Mellitus: stable. Plan: See orders for this visit as documented in the electronic medical record.  Diabetic issues reviewed with him: diabetic diet discussed in detail, written exchange diet given. 1. Paroxysmal atrial fibrillation (HCC)  sytable    2. Diabetes mellitus type 2, diet-controlled ([facility]  Hopefully controlled  - CBC WITH AUTOMATED DIFF  - LIPID PANEL  - METABOLIC PANEL, COMPREHENSIVE  - MICROALBUMIN, UR, RAND W/ MICROALB/CREAT RATIO  - HEMOGLOBIN A1C W/O EAG    3. Medicare annual wellness visit, subsequent  Weight loss desired    4. Chronic atrial fibrillation (HCC)  stable    5. Essential hypertension  controlled    6. ACP (advance care planning)  Advance Care Planning (ACP) Provider Conversation Snapshot    Date of ACP Conversation: 05/02/18  Persons included in Conversation:  patient  Length of ACP Conversation in minutes:  <16 minutes (Non-Billable)    Authorized Decision Maker (if patient is incapable of making informed decisions): This person is:   Healthcare Agent/Medical Power of  under Advance Directive          For Patients with Decision Making Capacity: \"In these circumstances, what matters most to you? \"  Care focused more on comfort or quality of life.     On file acp

## 2018-05-02 NOTE — PATIENT INSTRUCTIONS

## 2018-05-02 NOTE — PROGRESS NOTES
Coordination of Care Questions    1. Have you been to the ER, urgent care clinic since your last visit? No       Hospitalized since your last visit? No    2. Have you seen or consulted any other health care providers outside of the [address] [place] [address] since your last visit? Include any pap smears or colon screening.  No

## 2018-05-03 LAB
ALBUMIN SERPL-MCNC: 4.2 G/DL (ref 3.6–4.8)
ALBUMIN/CREAT UR: 3.9 MG/G CREAT (ref 0–30)
ALBUMIN/GLOB SERPL: 1.4 {RATIO} (ref 1.2–2.2)
ALP SERPL-CCNC: 93 IU/L (ref 39–117)
ALT SERPL-CCNC: 20 IU/L (ref 0–44)
AST SERPL-CCNC: 20 IU/L (ref 0–40)
BASOPHILS # BLD AUTO: 0 X10E3/UL (ref 0–0.2)
BASOPHILS NFR BLD AUTO: 0 %
BILIRUB SERPL-MCNC: 0.3 MG/DL (ref 0–1.2)
BUN SERPL-MCNC: 18 MG/DL (ref 8–27)
BUN/CREAT SERPL: 20 (ref 10–24)
CALCIUM SERPL-MCNC: 9.7 MG/DL (ref 8.6–10.2)
CHLORIDE SERPL-SCNC: 102 MMOL/L (ref 96–106)
CHOLEST SERPL-MCNC: 173 MG/DL (ref 100–199)
CO2 SERPL-SCNC: 21 MMOL/L (ref 18–29)
CREAT SERPL-MCNC: 0.89 MG/DL (ref 0.76–1.27)
CREAT UR-MCNC: 146.1 MG/DL
EOSINOPHIL # BLD AUTO: 0.3 X10E3/UL (ref 0–0.4)
EOSINOPHIL NFR BLD AUTO: 3 %
ERYTHROCYTE [DISTWIDTH] IN BLOOD BY AUTOMATED COUNT: 14 % (ref 12.3–15.4)
GFR SERPLBLD CREATININE-BSD FMLA CKD-EPI: 103 ML/MIN/1.73
GFR SERPLBLD CREATININE-BSD FMLA CKD-EPI: 89 ML/MIN/1.73
GLOBULIN SER CALC-MCNC: 3.1 G/DL (ref 1.5–4.5)
GLUCOSE SERPL-MCNC: 106 MG/DL (ref 65–99)
HBA1C MFR BLD: 6.2 % (ref 4.8–5.6)
HCT VFR BLD AUTO: 43.5 % (ref 37.5–51)
HDLC SERPL-MCNC: 37 MG/DL
HGB BLD-MCNC: 14.7 G/DL (ref 13–17.7)
IMM GRANULOCYTES # BLD: 0.1 X10E3/UL (ref 0–0.1)
IMM GRANULOCYTES NFR BLD: 1 %
INTERPRETATION, 910389: NORMAL
LDLC SERPL CALC-MCNC: 71 MG/DL (ref 0–99)
LYMPHOCYTES # BLD AUTO: 2.5 X10E3/UL (ref 0.7–3.1)
LYMPHOCYTES NFR BLD AUTO: 27 %
Lab: NORMAL
MCH RBC QN AUTO: 29.2 PG (ref 26.6–33)
MCHC RBC AUTO-ENTMCNC: 33.8 G/DL (ref 31.5–35.7)
MCV RBC AUTO: 86 FL (ref 79–97)
MICROALBUMIN UR-MCNC: 5.7 UG/ML
MONOCYTES # BLD AUTO: 0.6 X10E3/UL (ref 0.1–0.9)
MONOCYTES NFR BLD AUTO: 6 %
NEUTROPHILS # BLD AUTO: 5.8 X10E3/UL (ref 1.4–7)
NEUTROPHILS NFR BLD AUTO: 63 %
PLATELET # BLD AUTO: 258 X10E3/UL (ref 150–379)
POTASSIUM SERPL-SCNC: 4.1 MMOL/L (ref 3.5–5.2)
PROT SERPL-MCNC: 7.3 G/DL (ref 6–8.5)
RBC # BLD AUTO: 5.04 X10E6/UL (ref 4.14–5.8)
SODIUM SERPL-SCNC: 140 MMOL/L (ref 134–144)
TRIGL SERPL-MCNC: 325 MG/DL (ref 0–149)
VLDLC SERPL CALC-MCNC: 65 MG/DL (ref 5–40)
WBC # BLD AUTO: 9.3 X10E3/UL (ref 3.4–10.8)

## 2018-05-04 ENCOUNTER — TELEPHONE (OUTPATIENT)
Dept: CARDIOLOGY CLINIC | Age: 68
End: 2018-05-04

## 2018-05-04 NOTE — TELEPHONE ENCOUNTER
Christiane Mireles called back. I told her Dr. Maryse Contreras last note included clearing him for UroLift procedure. Christiane Mireles said that procedure has been done. Now they need a clearance saying patient is okay for a penile prosthesis procedure. She said we can fax it Monday. Fax # [phone].

## 2018-05-04 NOTE — TELEPHONE ENCOUNTER
Left message on Kayla's voicemail stating I was returning call from Dr. Kamryn Pearce office. Dr. Gem Hernandez did mention this in his last office note and patient is okay for surgery.

## 2018-05-04 NOTE — TELEPHONE ENCOUNTER
[phone],,,HEAVEN from [facility] called to confirm that Dr. Antionette Garcia authorize them going thru with Penile Prosphesis Procedure and is aware. Please advise.   Thanks

## 2018-09-27 ENCOUNTER — DOCUMENTATION ONLY (OUTPATIENT)
Dept: FAMILY MEDICINE CLINIC | Age: 68
End: 2018-09-27

## 2018-10-07 RX ORDER — LISINOPRIL AND HYDROCHLOROTHIAZIDE 20; 25 MG/1; MG/1
TABLET ORAL
Qty: 90 TAB | Refills: 0 | Status: SHIPPED | OUTPATIENT
Start: 2018-10-07 | End: 2018-12-26 | Stop reason: SDUPTHER

## 2018-11-05 ENCOUNTER — OFFICE VISIT (OUTPATIENT)
Dept: INTERNAL MEDICINE CLINIC | Age: 68
End: 2018-11-05

## 2018-11-05 VITALS
HEART RATE: 75 BPM | RESPIRATION RATE: 20 BRPM | BODY MASS INDEX: 42.66 KG/M2 | HEIGHT: 72 IN | OXYGEN SATURATION: 97 % | SYSTOLIC BLOOD PRESSURE: 126 MMHG | WEIGHT: 315 LBS | DIASTOLIC BLOOD PRESSURE: 72 MMHG | TEMPERATURE: 97.8 F

## 2018-11-05 DIAGNOSIS — E11.9 TYPE 2 DIABETES MELLITUS WITHOUT COMPLICATION, WITHOUT LONG-TERM CURRENT USE OF INSULIN (HCC): ICD-10-CM

## 2018-11-05 DIAGNOSIS — G47.33 OSA (OBSTRUCTIVE SLEEP APNEA): ICD-10-CM

## 2018-11-05 DIAGNOSIS — E11.9 DIABETES MELLITUS TYPE 2, DIET-CONTROLLED (HCC): ICD-10-CM

## 2018-11-05 DIAGNOSIS — I10 ESSENTIAL HYPERTENSION: Primary | ICD-10-CM

## 2018-11-05 NOTE — PROGRESS NOTES
`  Chief Complaint   Patient presents with    Diabetes     no concerns    Hypertension     no concerns     Past Surgical History:   Procedure Laterality Date    ENDOSCOPY, COLON, DIAGNOSTIC      HX APPENDECTOMY      HX ORTHOPAEDIC      bilat TKR     HX ORTHOPAEDIC      left THR    HX UROLOGICAL  2018    urolift     Penile implant  Done not all that good  Misses meds  No diet   takes xarelto with food most of the time  Had flu shot        No Known Allergies  Family History   Problem Relation Age of Onset    Cancer Father         leukemia    Cancer Paternal Grandfather     Diabetes Sister     Diabetes Brother     Cancer Maternal Aunt     Cancer Maternal Uncle      Social History     Tobacco Use    Smoking status: Former Smoker     Last attempt to quit: 1990     Years since quittin.4    Smokeless tobacco: Never Used   Substance Use Topics    Alcohol use: Yes     Alcohol/week: 1.0 oz     Types: 2 Standard drinks or equivalent per week     Comment: 2-3 drinks per week     Patient Active Problem List   Diagnosis Code    VALENTE (obstructive sleep apnea) G47.33    Erectile dysfunction due to arterial insufficiency N52.01    HTN (hypertension) I10    Depression F32.9    Morbid obesity (HCC) E66.01    Diabetes ([facility] [address] E11.9    A-fib ([facility] [facility] I48.91    Dyslipidemia, goal LDL below 100 E78.5    VALENTE (obstructive sleep apnea) G47.33    Restless legs G25.81    Diabetes mellitus type 2, controlled ([address] E11.9    Urinary retention due to benign prostatic hyperplasia N40.1, R33.8    Benign non-nodular prostatic hyperplasia with lower urinary tract symptoms N40.1    Diabetes mellitus type 2, diet-controlled (HCC) E11.9       Depression Risk Factor Screening:     PHQ over the last two weeks 2018   Little interest or pleasure in doing things Not at all   Feeling down, depressed, irritable, or hopeless Not at all   Total Score PHQ 2 0     Alcohol Risk Factor Screening:    You do not drink alcohol or very rarely. Functional Ability and Level of Safety:   Hearing Loss  Hearing is good. Activities of Daily Living  The home contains: no safety equipment. Patient does total self care    Fall Risk  Fall Risk Assessment, last 12 mths 11/5/2018   Able to walk? Yes   Fall in past 12 months? No       Abuse Screen  Patient is not abused    Cognitive Screening   Evaluation of Cognitive Function:  Has your family/caregiver stated any concerns about your memory: no  Normal    Assessment/Plan   Education and counseling provided:  Are appropriate based on today's review and evaluation        Health Maintenance Due   Topic Date Due    Shingrix Vaccine Age 50> (1 of 2) 08/12/2001    EYE EXAM RETINAL OR DILATED Q1  06/19/2014    FOOT EXAM Q1  04/15/2016    GLAUCOMA SCREENING Q2Y  08/12/2016    HEMOGLOBIN A1C Q6M  11/02/2018     Hypertension ROS: taking medications as instructed, no medication side effects noted, no TIA's, no chest pain on exertion, no dyspnea on exertion, no swelling of ankles. New concerns: had lift procedure for prostate doing great   Off the proscar  Hypertension Exam: BP noted to be well controlled today in office,    irreg irreg rhythm controlled rate    Vitals:    11/05/18 1105 11/05/18 1127   BP: 126/80 126/72   Pulse: 75    Resp: 20    Temp: 97.8 °F (36.6 °C)    TempSrc: Oral    SpO2: 97%    Weight: 333 lb (151 kg)    Height: 6' (1.829 m)       CVS exam  - irregularly irregular rhythm with rate 70s. Lab review: no lab studies available for review at time of visit. Assessment:  Hypertension stable, improved. Plan: current treatment plan is effective, no change in therapy  lab results and schedule of future lab studies reviewed with patient  repeat labs ordered prior to next appointment. Diabetic ROS - diabetic diet compliance: compliant most of the time, home glucose monitoring: is performed sporadically, fasting values range 110. New concerns: weight loss. discussed  Diabetic exam: chest clear. Lab review: orders written for new lab studies as appropriate; see orders. Assessment: Diabetes Mellitus: stable. Plan: See orders for this visit as documented in the electronic medical record. Diabetic issues reviewed with him: diabetic diet discussed in detail, written exchange diet given. 1. Paroxysmal atrial fibrillation (HCC)  stable    2. Diabetes mellitus type 2, diet-controlled ([facility] [address]  Hopefully controlled  - CBC WITH AUTOMATED DIFF  - LIPID PANEL  - METABOLIC PANEL, COMPREHENSIVE  - MICROALBUMIN, UR, RAND W/ MICROALB/CREAT RATIO  - HEMOGLOBIN A1C W/O EAG    3. Medicare annual wellness visit, subsequent  Weight loss desired    4. Chronic atrial fibrillation (HCC)  stable    5.  Essential hypertension  controlled

## 2019-01-17 ENCOUNTER — OFFICE VISIT (OUTPATIENT)
Dept: CARDIOLOGY CLINIC | Age: 69
End: 2019-01-17

## 2019-01-17 VITALS
HEIGHT: 72 IN | BODY MASS INDEX: 42.66 KG/M2 | RESPIRATION RATE: 16 BRPM | SYSTOLIC BLOOD PRESSURE: 108 MMHG | OXYGEN SATURATION: 97 % | HEART RATE: 83 BPM | WEIGHT: 315 LBS | DIASTOLIC BLOOD PRESSURE: 74 MMHG

## 2019-01-17 DIAGNOSIS — I48.20 CHRONIC ATRIAL FIBRILLATION (HCC): ICD-10-CM

## 2019-01-17 DIAGNOSIS — I10 ESSENTIAL HYPERTENSION: Primary | ICD-10-CM

## 2019-01-17 DIAGNOSIS — E11.9 CONTROLLED TYPE 2 DIABETES MELLITUS WITHOUT COMPLICATION, WITHOUT LONG-TERM CURRENT USE OF INSULIN (HCC): ICD-10-CM

## 2019-01-17 DIAGNOSIS — G47.33 OSA (OBSTRUCTIVE SLEEP APNEA): ICD-10-CM

## 2019-01-17 NOTE — PROGRESS NOTES
HISTORY OF PRESENT ILLNESS  Michelle Peterson is a 80 y.o. male     SUMMARY:   Problem List  Date Reviewed: 1/16/2019          Codes Class Noted    Diabetes mellitus type 2, diet-controlled ([facility] ICD-10-CM: E11.9  ICD-9-CM: 250.00  5/2/2018        Benign non-nodular prostatic hyperplasia with lower urinary tract symptoms ICD-10-CM: N40.1  ICD-9-CM: 600.91  4/25/2017    Overview Signed 4/25/2017  9:10 AM by Baron Ap MD     Retention had catheter 3 months             Urinary retention due to benign prostatic hyperplasia ICD-10-CM: N40.1, R33.8  ICD-9-CM: 600.91, 788.20  10/25/2016        Diabetes mellitus type 2, controlled ([facility] [address] ICD-10-CM: E11.9  ICD-9-CM: 250.00  10/15/2015        Restless legs ICD-10-CM: G25.81  ICD-9-CM: 333.94  4/15/2015        VALENTE (obstructive sleep apnea) ICD-10-CM: A95.87  ICD-9-CM: 327.23  6/19/2012    Overview Signed 6/19/2012 10:57 AM by Mariel Quiroz MD     Bi-Level: 19/15 cmH2O. Dyslipidemia, goal LDL below 100 ICD-10-CM: E78.5  ICD-9-CM: 272.4  6/14/2011        A-fib ([facility] [address] ICD-10-CM: I48.91  ICD-9-CM: 427.31  2/7/2011    Overview Addendum 2/9/2011  3:42 PM by Alexa Villegas NP     Assymptomatic, noted on ekg 2/11; found when had sleep study. Saw cardiologist 2/8/11.              Diabetes ([facility] [address] ICD-10-CM: E11.9  ICD-9-CM: 250.00  1/28/2011    Overview Addendum 2/9/2011  3:37 PM by Alexa Villegas NP     Recent diagnosis; just started diabetes education  HgA1C 6.5%             Morbid obesity ([facility] ICD-10-CM: E66.01  ICD-9-CM: 278.01  5/17/2010    Overview Signed 2/9/2011  3:35 PM by Alexa Villegas NP     OBESE 20 + YEARS; HIGH WEIGHT 362 LBS PAST MONTH; LOW WEIGHT 190 LBS 30 YEARS AGO             VALENTE (obstructive sleep apnea) ICD-10-CM: G47.33  ICD-9-CM: 327.23  4/24/2010        Erectile dysfunction due to arterial insufficiency ICD-10-CM: N52.01  ICD-9-CM: 607.84  4/24/2010        HTN (hypertension) ICD-10-CM: I10  ICD-9-CM: 401.9  4/24/2010    Overview Signed 4/24/2010  9:31 AM by Howard Cox     borderline             Depression ICD-10-CM: F32.9  ICD-9-CM: 717  4/24/2010              Current Outpatient Medications on File Prior to Visit   Medication Sig    lisinopril-hydroCHLOROthiazide (PRINZIDE, ZESTORETIC) 20-25 mg per tablet TAKE 1 TABLET BY MOUTH  DAILY    rivaroxaban (XARELTO) 20 mg tab tablet TAKE 1 TABLET BY MOUTH IN THE MORNING WITH BREAKFAST     No current facility-administered medications on file prior to visit. CARDIOLOGY STUDIES TO DATE:  02/10 echocardiogram showed lvh but was otherwise normal.    02/10 stress cardiolyte had mild EKG changes without chest pain and a questionable fixed inferior defect with an ejection fraction of 58%    Chief Complaint   Patient presents with    Irregular Heart Beat     HPI :  Mr. Delilah Hess is doing well. He is active, but not exercising and has not lost any substantial amount of weight. He is completely asymptomatic from a cardiac standpoint. EKG today shows atrial fibrillation with PVC and controlled ventricular response. He has to have some sort of penile implant surgery in the near future and that is one of the reasons he is in here today.           CARDIAC ROS:   negative for chest pain, dyspnea, palpitations, syncope, orthopnea, paroxysmal nocturnal dyspnea, exertional chest pressure/discomfort, claudication, lower extremity edema    Family History   Problem Relation Age of Onset    Cancer Father         leukemia    Cancer Paternal Grandfather     Diabetes Sister     Diabetes Brother     Cancer Maternal Aunt     Cancer Maternal Uncle        Past Medical History:   Diagnosis Date    Arrhythmia     atrial fib    Depression 4/24/2010    Diabetes ([address]     diet control for pre-diabetes    Dyslipidemia, goal LDL below 100 6/14/2011    HTN (hypertension) 4/24/2010    Impotence 4/24/2010    Morbid obesity ([address] 5/17/2010    VALENTE (obstructive sleep apnea) 4/24/2010    CPAP    Restless legs 4/15/2015       GENERAL ROS:  A comprehensive review of systems was negative except for that written in the HPI.     Visit Vitals  /74 (BP 1 Location: Left arm, BP Patient Position: Sitting)   Pulse 83   Resp 16   Ht 6' (1.829 m)   Wt 319 lb 3.2 oz (144.8 kg)   SpO2 97%   BMI 43.29 kg/m²       Wt Readings from Last 3 Encounters:   01/17/19 319 lb 3.2 oz (144.8 kg)   11/05/18 333 lb (151 kg)   05/02/18 324 lb (147 kg)            BP Readings from Last 3 Encounters:   01/17/19 108/74   11/05/18 126/72   05/02/18 118/80       PHYSICAL EXAM  General appearance: alert, cooperative, no distress, appears stated age  Neurologic: Alert and oriented X 3  Neck: supple, symmetrical, trachea midline, no adenopathy, no carotid bruit and no JVD  Lungs: clear to auscultation bilaterally  Heart: irregularly irregular rhythm, S1, S2 normal, no S3 or S4  Extremities: extremities normal, atraumatic, no cyanosis or edema    Lab Results   Component Value Date/Time    Cholesterol, total 173 05/02/2018 04:09 PM    Cholesterol, total 168 04/25/2017 09:47 AM    Cholesterol, total 167 10/15/2015 11:58 AM    Cholesterol, total 168 09/25/2014 11:09 AM    Cholesterol, total 159 01/14/2014 08:54 AM    HDL Cholesterol 37 (L) 05/02/2018 04:09 PM    HDL Cholesterol 37 (L) 04/25/2017 09:47 AM    HDL Cholesterol 32 (L) 10/15/2015 11:58 AM    HDL Cholesterol 33 (L) 09/25/2014 11:09 AM    HDL Cholesterol 30 (L) 01/14/2014 08:54 AM    LDL, calculated 71 05/02/2018 04:09 PM    LDL, calculated 92 04/25/2017 09:47 AM    LDL, calculated 89 10/15/2015 11:58 AM    LDL, calculated 92 09/25/2014 11:09 AM    LDL, calculated 82 01/14/2014 08:54 AM    Triglyceride 325 (H) 05/02/2018 04:09 PM    Triglyceride 193 (H) 04/25/2017 09:47 AM    Triglyceride 228 (H) 10/15/2015 11:58 AM    Triglyceride 214 (H) 09/25/2014 11:09 AM    Triglyceride 237 (H) 01/14/2014 08:54 AM     ASSESSMENT  Mr. Shilo Bowers is stable and asymptomatic from a cardiac standpoint on a good medical regimen and needs no cardiac testing at this time. He should be fine for urologic surgery without special precautions or further cardiac testing. He will hold his Xarelto for two days prior to the day of the procedure. current treatment plan is effective, no change in therapy  lab results and schedule of future lab studies reviewed with patient  reviewed diet, exercise and weight control    Encounter Diagnoses   Name Primary?  Essential hypertension Yes    Controlled type 2 diabetes mellitus without complication, without long-term current use of insulin (HCC)     Chronic atrial fibrillation (HCC)     VALENTE (obstructive sleep apnea)      Orders Placed This Encounter    AMB POC EKG ROUTINE W/ 12 LEADS, INTER & REP       Follow-up Disposition:  Return in about 6 months (around 7/17/2019).     Hong Hilliard MD  1/17/2019

## 2019-02-25 ENCOUNTER — DOCUMENTATION ONLY (OUTPATIENT)
Dept: FAMILY MEDICINE CLINIC | Age: 69
End: 2019-02-25

## 2019-02-28 ENCOUNTER — HOSPITAL ENCOUNTER (OUTPATIENT)
Dept: PET IMAGING | Age: 69
Discharge: HOME | End: 2019-02-28
Attending: INTERNAL MEDICINE
Payer: MEDICARE

## 2019-02-28 VITALS — BODY MASS INDEX: 44.1 KG/M2 | WEIGHT: 315 LBS | HEIGHT: 71 IN

## 2019-02-28 DIAGNOSIS — R93.89 ABNORMAL CHEST CT: ICD-10-CM

## 2019-02-28 PROCEDURE — A9552 F18 FDG: HCPCS

## 2019-02-28 RX ORDER — SODIUM CHLORIDE 0.9 % (FLUSH) 0.9 %
10 SYRINGE (ML) INJECTION
Status: COMPLETED | OUTPATIENT
Start: 2019-02-28 | End: 2019-02-28

## 2019-02-28 RX ADMIN — Medication 10 ML: at 07:31

## 2019-03-03 DIAGNOSIS — I48.20 CHRONIC ATRIAL FIBRILLATION (HCC): ICD-10-CM

## 2019-03-03 DIAGNOSIS — I10 ESSENTIAL HYPERTENSION: ICD-10-CM

## 2019-03-03 DIAGNOSIS — E78.5 DYSLIPIDEMIA, GOAL LDL BELOW 100: ICD-10-CM

## 2019-03-04 NOTE — TELEPHONE ENCOUNTER
Requested Prescriptions     Signed Prescriptions Disp Refills    rivaroxaban (XARELTO) 20 mg tab tablet 90 Tab 1     Sig: TAKE 1 TABLET BY MOUTH IN  THE MORNING WITH BREAKFAST     Authorizing Provider: Stewart Davis     Ordering User: Nagi Dailey     Verbal order per Dr. Sophie Ramirez.       Future Appointments   Date Time Provider Doc Hernandez   5/7/2019 10:15 AM Kitty Goodwin MD [facility]

## 2019-04-29 ENCOUNTER — HOSPITAL ENCOUNTER (OUTPATIENT)
Dept: CT IMAGING | Age: 69
Discharge: HOME | End: 2019-04-29
Attending: INTERNAL MEDICINE
Payer: MEDICARE

## 2019-04-29 DIAGNOSIS — R93.89 ABNORMAL CHEST CT: ICD-10-CM

## 2019-04-29 PROCEDURE — 71250 CT THORAX DX C-: CPT

## 2019-05-13 ENCOUNTER — OFFICE VISIT (OUTPATIENT)
Dept: INTERNAL MEDICINE CLINIC | Age: 69
End: 2019-05-13

## 2019-05-13 VITALS
SYSTOLIC BLOOD PRESSURE: 120 MMHG | TEMPERATURE: 97.4 F | BODY MASS INDEX: 43.12 KG/M2 | DIASTOLIC BLOOD PRESSURE: 80 MMHG | HEART RATE: 84 BPM | HEIGHT: 71 IN | WEIGHT: 308 LBS | OXYGEN SATURATION: 98 %

## 2019-05-13 DIAGNOSIS — I10 ESSENTIAL HYPERTENSION: Primary | ICD-10-CM

## 2019-05-13 DIAGNOSIS — E11.9 TYPE 2 DIABETES MELLITUS WITHOUT COMPLICATION, WITHOUT LONG-TERM CURRENT USE OF INSULIN (HCC): ICD-10-CM

## 2019-05-13 DIAGNOSIS — E11.9 DIABETES MELLITUS TYPE 2, DIET-CONTROLLED (HCC): ICD-10-CM

## 2019-05-13 NOTE — PROGRESS NOTES
Chief Complaint   Patient presents with    Follow-up     Visit Vitals  /89   Pulse 84   Temp 97.4 °F (36.3 °C) (Oral)   Ht 5' 11\" (1.803 m)   Wt 308 lb (139.7 kg)   SpO2 98%   BMI 42.96 kg/m²     1. Have you been to the ER, urgent care clinic since your last visit? Hospitalized since your last visit? no    2. Have you seen or consulted any other health care providers outside of the [address] [place] [address] since your last visit? Include any pap smears or colon screening.  no

## 2019-05-13 NOTE — PROGRESS NOTES
`  Chief Complaint   Patient presents with    Follow-up     Past Surgical History:   Procedure Laterality Date    COLONOSCOPY N/A 2017    COLONOSCOPY performed by Cesar Wilkes MD at [facility]. [address], COLON, DIAGNOSTIC      HX APPENDECTOMY      HX ORTHOPAEDIC      bilat TKR     HX ORTHOPAEDIC      left THR    HX UROLOGICAL  2018    urolift     Penile implant next week           Past Medical History:   Diagnosis Date    Arrhythmia     atrial fib    Depression 2010    Diabetes ([facility]     diet control for pre-diabetes    Dyslipidemia, goal LDL below 100 2011    HTN (hypertension) 2010    Impotence 2010    Morbid obesity ([address] 2010    VALENTE (obstructive sleep apnea) 2010    CPAP    Restless legs 4/15/2015      Past Surgical History:   Procedure Laterality Date    COLONOSCOPY N/A 2017    COLONOSCOPY performed by Cesar Wilkes MD at [facility]. [address], COLON, DIAGNOSTIC      HX APPENDECTOMY      HX ORTHOPAEDIC      bilat TKR     HX ORTHOPAEDIC      left THR    HX UROLOGICAL  2018    urolift     Current Outpatient Medications   Medication Sig Dispense Refill    rivaroxaban (XARELTO) 20 mg tab tablet TAKE 1 TABLET BY MOUTH IN  THE MORNING WITH BREAKFAST 90 Tab 1    lisinopril-hydroCHLOROthiazide (PRINZIDE, ZESTORETIC) 20-25 mg per tablet TAKE 1 TABLET BY MOUTH  DAILY 90 Tab 1     No Known Allergies  Family History   Problem Relation Age of Onset    Cancer Father         leukemia    Cancer Paternal Grandfather     Diabetes Sister     Diabetes Brother     Cancer Maternal Aunt     Cancer Maternal Uncle      Social History     Tobacco Use    Smoking status: Former Smoker     Last attempt to quit: 1990     Years since quittin.0    Smokeless tobacco: Never Used   Substance Use Topics    Alcohol use:  Yes     Alcohol/week: 1.0 oz     Types: 2 Standard drinks or equivalent per week     Comment: 2-3 drinks per week     Patient Active Problem List   Diagnosis Code    VALENTE (obstructive sleep apnea) G47.33    Erectile dysfunction due to arterial insufficiency N52.01    HTN (hypertension) I10    Depression F32.9    Morbid obesity (HCC) E66.01    Diabetes ([facility] E11.9    A-fib ([facility] I48.91    Dyslipidemia, goal LDL below 100 E78.5    VALENTE (obstructive sleep apnea) G47.33    Restless legs G25.81    Diabetes mellitus type 2, controlled ([facility]) E11.9    Urinary retention due to benign prostatic hyperplasia N40.1, R33.8    Benign non-nodular prostatic hyperplasia with lower urinary tract symptoms N40.1    Diabetes mellitus type 2, diet-controlled ([facility]) E11.9       Depression Risk Factor Screening:     3 most recent PHQ Screens 11/5/2018   Little interest or pleasure in doing things Not at all   Feeling down, depressed, irritable, or hopeless Not at all   Total Score PHQ 2 0     Functional Ability and Level of Safety:   Hearing Loss  Hearing is reduced    Activities of Daily Living  The home contains: no safety equipment. Patient does total self care    Fall Risk  Fall Risk Assessment, last 12 mths 11/5/2018   Able to walk? Yes   Fall in past 12 months?  No       Past Surgical History:   Procedure Laterality Date    COLONOSCOPY N/A 6/27/2017    COLONOSCOPY performed by Carmen Mtz MD at [facility]. [address], COLON, DIAGNOSTIC  2007    HX APPENDECTOMY      HX ORTHOPAEDIC  2000    bilat TKR     HX ORTHOPAEDIC  2010    left THR    HX UROLOGICAL  03/2018    urolift    HX UROLOGICAL  03/2019    prosthesis         Patient Care Team   Patient Care Team:  Concepcion Rodriguez MD as PCP - Анна Campos MD as Physician (Sleep Medicine)  Dino Alicia MD (Cardiology)  Germain Solo MD as Physician (Urology)    Assessment/Plan   Education and counseling provided:  Are appropriate based on today's review and evaluation        Health Maintenance Due   Topic Date Due    Shingrix Vaccine Age 50> (1 of 2) 08/12/2001    EYE EXAM RETINAL OR DILATED  06/19/2015    FOOT EXAM Q1  04/15/2016    GLAUCOMA SCREENING Q2Y  08/12/2016    HEMOGLOBIN A1C Q6M  11/02/2018    Pneumococcal 65+ years (2 of 2 - PPSV23) 01/13/2019    MICROALBUMIN Q1  05/02/2019    LIPID PANEL Q1  05/02/2019    MEDICARE YEARLY EXAM  05/03/2019     Hypertension ROS: taking medications as instructed, no medication side effects noted, no TIA's, no chest pain on exertion, no dyspnea on exertion, no swelling of ankles. New concerns: had lift procedure for prostate doing great   Hypertension Exam: BP noted to be well controlled today in office, CVS exam  - irregularly irregular rhythm with rate 70s. Lab review: no lab studies available for review at time of visit. Assessment:  Hypertension stable, improved. Plan: current treatment plan is effective, no change in therapy  lab results and schedule of future lab studies reviewed with patient  repeat labs ordered prior to next appointment. Diabetic ROS - diabetic diet compliance: compliant most of the time, home glucose monitoring: is performed sporadically, fasting values range 110. New concerns: weight loss. Diabetic exam: chest clear. Lab review: orders written for new lab studies as appropriate; see orders. Assessment: Diabetes Mellitus: stable. Plan: See orders for this visit as documented in the electronic medical record. Diabetic issues reviewed with him: diabetic diet discussed in detail, written exchange diet given.     Vitals:    05/13/19 0810 05/13/19 0820   BP: 138/89 120/80   Pulse: 84    Temp: 97.4 °F (36.3 °C)    TempSrc: Oral    SpO2: 98%    Weight: 308 lb (139.7 kg)    Height: 5' 11\" (1.803 m)      irreg irreg rhythm controlled rate  Wt Readings from Last 3 Encounters:   05/13/19 308 lb (139.7 kg)   02/28/19 315 lb (142.9 kg)   01/17/19 319 lb 3.2 oz (144.8 kg)     Not checking home glucose  Weight loss noted      Diagnoses and all orders for this visit: 1. Essential hypertension  -     CBC WITH AUTOMATED DIFF  -     LIPID PANEL  -     METABOLIC PANEL, COMPREHENSIVE    2. Type 2 diabetes mellitus without complication, without long-term current use of insulin (HCC)  -     HEMOGLOBIN A1C W/O EAG    3.  Diabetes mellitus type 2, diet-controlled (HCC)  -     HEMOGLOBIN A1C W/O EAG      Stable status on multiple high risk medications for multiple comorbidities, will not change any of the present treatment plansle  xarelto has been good med with no bleeding

## 2019-05-14 LAB
ALBUMIN SERPL-MCNC: 4.3 G/DL (ref 3.6–4.8)
ALBUMIN/GLOB SERPL: 1.4 {RATIO} (ref 1.2–2.2)
ALP SERPL-CCNC: 96 IU/L (ref 39–117)
ALT SERPL-CCNC: 15 IU/L (ref 0–44)
AST SERPL-CCNC: 16 IU/L (ref 0–40)
BASOPHILS # BLD AUTO: 0 X10E3/UL (ref 0–0.2)
BASOPHILS NFR BLD AUTO: 1 %
BILIRUB SERPL-MCNC: 0.4 MG/DL (ref 0–1.2)
BUN SERPL-MCNC: 19 MG/DL (ref 8–27)
BUN/CREAT SERPL: 23 (ref 10–24)
CALCIUM SERPL-MCNC: 9.4 MG/DL (ref 8.6–10.2)
CHLORIDE SERPL-SCNC: 103 MMOL/L (ref 96–106)
CHOLEST SERPL-MCNC: 151 MG/DL (ref 100–199)
CO2 SERPL-SCNC: 23 MMOL/L (ref 20–29)
CREAT SERPL-MCNC: 0.84 MG/DL (ref 0.76–1.27)
EOSINOPHIL # BLD AUTO: 0.2 X10E3/UL (ref 0–0.4)
EOSINOPHIL NFR BLD AUTO: 4 %
ERYTHROCYTE [DISTWIDTH] IN BLOOD BY AUTOMATED COUNT: 14 % (ref 12.3–15.4)
GLOBULIN SER CALC-MCNC: 3 G/DL (ref 1.5–4.5)
GLUCOSE SERPL-MCNC: 102 MG/DL (ref 65–99)
HBA1C MFR BLD: 6.1 % (ref 4.8–5.6)
HCT VFR BLD AUTO: 41.1 % (ref 37.5–51)
HDLC SERPL-MCNC: 34 MG/DL
HGB BLD-MCNC: 13.4 G/DL (ref 13–17.7)
IMM GRANULOCYTES # BLD AUTO: 0 X10E3/UL (ref 0–0.1)
IMM GRANULOCYTES NFR BLD AUTO: 0 %
INTERPRETATION, 910389: NORMAL
LDLC SERPL CALC-MCNC: 89 MG/DL (ref 0–99)
LYMPHOCYTES # BLD AUTO: 1.5 X10E3/UL (ref 0.7–3.1)
LYMPHOCYTES NFR BLD AUTO: 24 %
Lab: NORMAL
MCH RBC QN AUTO: 29.1 PG (ref 26.6–33)
MCHC RBC AUTO-ENTMCNC: 32.6 G/DL (ref 31.5–35.7)
MCV RBC AUTO: 89 FL (ref 79–97)
MONOCYTES # BLD AUTO: 0.6 X10E3/UL (ref 0.1–0.9)
MONOCYTES NFR BLD AUTO: 11 %
NEUTROPHILS # BLD AUTO: 3.7 X10E3/UL (ref 1.4–7)
NEUTROPHILS NFR BLD AUTO: 60 %
PLATELET # BLD AUTO: 269 X10E3/UL (ref 150–379)
POTASSIUM SERPL-SCNC: 4.7 MMOL/L (ref 3.5–5.2)
PROT SERPL-MCNC: 7.3 G/DL (ref 6–8.5)
RBC # BLD AUTO: 4.61 X10E6/UL (ref 4.14–5.8)
SODIUM SERPL-SCNC: 139 MMOL/L (ref 134–144)
TRIGL SERPL-MCNC: 140 MG/DL (ref 0–149)
VLDLC SERPL CALC-MCNC: 28 MG/DL (ref 5–40)
WBC # BLD AUTO: 6.1 X10E3/UL (ref 3.4–10.8)

## 2019-06-02 RX ORDER — LISINOPRIL AND HYDROCHLOROTHIAZIDE 20; 25 MG/1; MG/1
TABLET ORAL
Qty: 90 TAB | Refills: 1 | Status: SHIPPED | OUTPATIENT
Start: 2019-06-02 | End: 2019-10-20 | Stop reason: SDUPTHER

## 2019-07-24 ENCOUNTER — OFFICE VISIT (OUTPATIENT)
Dept: INTERNAL MEDICINE CLINIC | Age: 69
End: 2019-07-24

## 2019-07-24 VITALS
BODY MASS INDEX: 43.96 KG/M2 | WEIGHT: 314 LBS | SYSTOLIC BLOOD PRESSURE: 127 MMHG | HEART RATE: 94 BPM | RESPIRATION RATE: 18 BRPM | HEIGHT: 71 IN | OXYGEN SATURATION: 98 % | DIASTOLIC BLOOD PRESSURE: 76 MMHG | TEMPERATURE: 96.1 F

## 2019-07-24 DIAGNOSIS — H62.40 OTITIS EXTERNA IN MYCOSES: Primary | ICD-10-CM

## 2019-07-24 DIAGNOSIS — E66.01 MORBID OBESITY (HCC): ICD-10-CM

## 2019-07-24 DIAGNOSIS — B36.9 OTITIS EXTERNA IN MYCOSES: Primary | ICD-10-CM

## 2019-07-24 RX ORDER — CLOTRIMAZOLE 1 G/ML
SOLUTION TOPICAL
Qty: 30 ML | Refills: 1 | Status: SHIPPED | OUTPATIENT
Start: 2019-07-24 | End: 2021-01-28 | Stop reason: ALTCHOICE

## 2019-07-24 NOTE — PROGRESS NOTES
1. Have you been to the ER, urgent care clinic since your last visit? Hospitalized since your last visit? No    2. Have you seen or consulted any other health care providers outside of the [address] [place] [address] since your last visit? Include any pap smears or colon screening.  No

## 2019-07-24 NOTE — PROGRESS NOTES
Chief Complaint   Patient presents with    Ear Pain     right     Has reduced hearing [place] [facility] [place] noted right abnormal  With  No wax  But white crusty lesions denies pain no uri    Vitals:    07/24/19 1343   BP: 127/76   Pulse: 94   Resp: 18   Temp: 96.1 °F (35.6 °C)   TempSrc: Oral   SpO2: 98%   Weight: 314 lb (142.4 kg)   Height: 5' 11\" (1.803 m)     Ears - left ear normal, right external canal inflamed, white plaque in right canal      Diagnoses and all orders for this visit:    1. Otitis externa in mycoses    2. Morbid obesity ([address]    Other orders  -     clotrimazole (LOTRIMIN) 1 % external solution; 2 drops right ear canal BID for 10 days      .

## 2019-10-23 RX ORDER — LISINOPRIL AND HYDROCHLOROTHIAZIDE 20; 25 MG/1; MG/1
TABLET ORAL
Qty: 90 TAB | Refills: 1 | Status: SHIPPED | OUTPATIENT
Start: 2019-10-23 | End: 2021-01-28 | Stop reason: SDUPTHER

## 2019-11-03 DIAGNOSIS — I48.20 CHRONIC ATRIAL FIBRILLATION (HCC): ICD-10-CM

## 2019-11-03 DIAGNOSIS — I10 ESSENTIAL HYPERTENSION: ICD-10-CM

## 2019-11-03 DIAGNOSIS — E78.5 DYSLIPIDEMIA, GOAL LDL BELOW 100: ICD-10-CM

## 2019-11-04 NOTE — TELEPHONE ENCOUNTER
Requested Prescriptions     Signed Prescriptions Disp Refills    rivaroxaban (XARELTO) 20 mg tab tablet 90 Tab 0     Sig: TAKE 1 TABLET BY MOUTH IN  THE MORNING WITH BREAKFAST.  PATIENT NEEDS TO MAKE AN OFFICE APPOINTMENT BEFORE ANY FURTHER REFILLS ARE GIVEN     Authorizing Provider: Abiola Bailey     Ordering User: Akiko Salmon    Per Dr. Neo Rizvi verbal orders

## 2020-01-05 DIAGNOSIS — I48.20 CHRONIC ATRIAL FIBRILLATION (HCC): ICD-10-CM

## 2020-01-05 DIAGNOSIS — E78.5 DYSLIPIDEMIA, GOAL LDL BELOW 100: ICD-10-CM

## 2020-01-05 DIAGNOSIS — I10 ESSENTIAL HYPERTENSION: ICD-10-CM

## 2020-01-13 ENCOUNTER — OFFICE VISIT (OUTPATIENT)
Dept: INTERNAL MEDICINE CLINIC | Age: 70
End: 2020-01-13

## 2020-01-13 VITALS
SYSTOLIC BLOOD PRESSURE: 128 MMHG | BODY MASS INDEX: 44.1 KG/M2 | OXYGEN SATURATION: 99 % | HEIGHT: 71 IN | DIASTOLIC BLOOD PRESSURE: 82 MMHG | TEMPERATURE: 96.6 F | RESPIRATION RATE: 18 BRPM | WEIGHT: 315 LBS | HEART RATE: 80 BPM

## 2020-01-13 DIAGNOSIS — I48.11 LONGSTANDING PERSISTENT ATRIAL FIBRILLATION (HCC): ICD-10-CM

## 2020-01-13 DIAGNOSIS — I10 ESSENTIAL HYPERTENSION: Primary | ICD-10-CM

## 2020-01-13 DIAGNOSIS — E11.9 TYPE 2 DIABETES MELLITUS WITHOUT COMPLICATION, WITHOUT LONG-TERM CURRENT USE OF INSULIN (HCC): ICD-10-CM

## 2020-01-13 DIAGNOSIS — M10.271 ACUTE DRUG-INDUCED GOUT OF RIGHT FOOT: ICD-10-CM

## 2020-01-13 RX ORDER — COLCHICINE 0.6 MG/1
0.6 TABLET ORAL 2 TIMES DAILY
Qty: 60 TAB | Refills: 0 | Status: SHIPPED | OUTPATIENT
Start: 2020-01-13 | End: 2020-04-14

## 2020-01-13 RX ORDER — COLCHICINE 0.6 MG/1
0.6 TABLET ORAL DAILY
Qty: 60 TAB | Refills: 0 | Status: SHIPPED | OUTPATIENT
Start: 2020-01-13 | End: 2020-01-13

## 2020-01-13 NOTE — PROGRESS NOTES
1. Have you been to the ER, urgent care clinic since your last visit? Hospitalized since your last visit? No    2. Have you seen or consulted any other health care providers outside of the [address] [place] [address] since your last visit? Include any pap smears or colon screening.  No

## 2020-01-14 NOTE — PROGRESS NOTES
Chief Complaint   Patient presents with    Erectile Dysfunction    Hypertension    Irregular Heart Beat    Diabetes    Sleep Apnea    Depression    Cholesterol Problem    Gout     current attack in R foot     Subjective: This is a 77year old gentleman with a-fib, history of obesity, dyslipidemia. He says he has been having attacks of gout for a number of months with painful episodes that are atraumatic, mostly in right foot. Lately he has had pain in his Achilles and pain in the bottom of his foot. He sometimes takes Aleve or Motrin, despite taking Xarelto. He has had no issues related to a-fib. He has had no pain or swelling in his hands or other joints. Physical Examination:  His BP was noted. S1, S2 was irregular. Lungs clear. His right foot was with a little bit of tenderness along the medial edge. There is no MTP tenderness. No uric acid crystals noted and no tophi. Plan:  He has maybe episodes of gout, possibly related to his Lisinopril/HCTZ. Lab work on present meds is in order. A trial of Colchicine 0.6 b.i.d. and then once a day. He will follow up with me in a month. If he is hyperuricemic and having episodes, consider discontinuing the Hydrochlorothiazide. Vitals:    01/13/20 1626   BP: 128/82   Pulse: 80   Resp: 18   Temp: 96.6 °F (35.9 °C)   TempSrc: Oral   SpO2: 99%   Weight: 326 lb (147.9 kg)   Height: 5' 11\" (1.803 m)     1. Essential hypertension  controlled  - CBC WITH AUTOMATED DIFF; Future  - LIPID PANEL; Future  - METABOLIC PANEL, COMPREHENSIVE; Future    2. Longstanding persistent atrial fibrillation      3. Acute drug-induced gout of right foot  possible  - URIC ACID; Future  - colchicine (COLCRYS) 0.6 mg tablet; Take 1 Tab by mouth two (2) times a day. Dispense: 60 Tab; Refill: 0    4.  Type 2 diabetes mellitus without complication, without long-term current use of insulin [facility])  Not checking labs  Lab Results   Component Value Date/Time    Hemoglobin A1c 6.1 (H) 05/13/2019 08:46 AM    Hemoglobin A1c (POC) 5.7 01/13/2014 02:18 PM       - HEMOGLOBIN A1C WITH EAG;  Future

## 2020-02-07 ENCOUNTER — HOSPITAL ENCOUNTER (OUTPATIENT)
Dept: LAB | Age: 70
Discharge: HOME | End: 2020-02-07

## 2020-02-07 LAB
ALBUMIN SERPL-MCNC: 4 G/DL (ref 3.5–5)
ALBUMIN/GLOB SERPL: 1.1 {RATIO} (ref 1.1–2.2)
ALP SERPL-CCNC: 102 U/L (ref 45–117)
ALT SERPL-CCNC: 39 U/L (ref 12–78)
ANION GAP SERPL CALC-SCNC: 6 MMOL/L (ref 5–15)
AST SERPL-CCNC: 24 U/L (ref 15–37)
BASOPHILS # BLD: 0 K/UL (ref 0–0.1)
BASOPHILS NFR BLD: 1 % (ref 0–1)
BILIRUB SERPL-MCNC: 0.6 MG/DL (ref 0.2–1)
BUN SERPL-MCNC: 24 MG/DL (ref 6–20)
BUN/CREAT SERPL: 23 (ref 12–20)
CALCIUM SERPL-MCNC: 9.2 MG/DL (ref 8.5–10.1)
CHLORIDE SERPL-SCNC: 102 MMOL/L (ref 97–108)
CHOLEST SERPL-MCNC: 178 MG/DL
CO2 SERPL-SCNC: 25 MMOL/L (ref 21–32)
CREAT SERPL-MCNC: 1.03 MG/DL (ref 0.7–1.3)
DIFFERENTIAL METHOD BLD: NORMAL
EOSINOPHIL # BLD: 0.3 K/UL (ref 0–0.4)
EOSINOPHIL NFR BLD: 4 % (ref 0–7)
ERYTHROCYTE [DISTWIDTH] IN BLOOD BY AUTOMATED COUNT: 13.1 % (ref 11.5–14.5)
EST. AVERAGE GLUCOSE BLD GHB EST-MCNC: 134 MG/DL
GLOBULIN SER CALC-MCNC: 3.7 G/DL (ref 2–4)
GLUCOSE SERPL-MCNC: 113 MG/DL (ref 65–100)
HBA1C MFR BLD: 6.3 % (ref 4–5.6)
HCT VFR BLD AUTO: 46.2 % (ref 36.6–50.3)
HDLC SERPL-MCNC: 44 MG/DL
HDLC SERPL: 4 {RATIO} (ref 0–5)
HGB BLD-MCNC: 14.9 G/DL (ref 12.1–17)
IMM GRANULOCYTES # BLD AUTO: 0 K/UL (ref 0–0.04)
IMM GRANULOCYTES NFR BLD AUTO: 0 % (ref 0–0.5)
LDLC SERPL CALC-MCNC: 107 MG/DL (ref 0–100)
LIPID PROFILE,FLP: ABNORMAL
LYMPHOCYTES # BLD: 1.9 K/UL (ref 0.8–3.5)
LYMPHOCYTES NFR BLD: 30 % (ref 12–49)
MCH RBC QN AUTO: 28.8 PG (ref 26–34)
MCHC RBC AUTO-ENTMCNC: 32.3 G/DL (ref 30–36.5)
MCV RBC AUTO: 89.2 FL (ref 80–99)
MONOCYTES # BLD: 0.6 K/UL (ref 0–1)
MONOCYTES NFR BLD: 9 % (ref 5–13)
NEUTS SEG # BLD: 3.6 K/UL (ref 1.8–8)
NEUTS SEG NFR BLD: 56 % (ref 32–75)
NRBC # BLD: 0 K/UL (ref 0–0.01)
NRBC BLD-RTO: 0 PER 100 WBC
PLATELET # BLD AUTO: 242 K/UL (ref 150–400)
PMV BLD AUTO: 11.2 FL (ref 8.9–12.9)
POTASSIUM SERPL-SCNC: 4.6 MMOL/L (ref 3.5–5.1)
PROT SERPL-MCNC: 7.7 G/DL (ref 6.4–8.2)
RBC # BLD AUTO: 5.18 M/UL (ref 4.1–5.7)
SODIUM SERPL-SCNC: 133 MMOL/L (ref 136–145)
TRIGL SERPL-MCNC: 135 MG/DL (ref ?–150)
URATE SERPL-MCNC: 8.6 MG/DL (ref 3.5–7.2)
VLDLC SERPL CALC-MCNC: 27 MG/DL
WBC # BLD AUTO: 6.4 K/UL (ref 4.1–11.1)

## 2020-02-24 ENCOUNTER — OFFICE VISIT (OUTPATIENT)
Dept: INTERNAL MEDICINE CLINIC | Age: 70
End: 2020-02-24

## 2020-02-24 VITALS
TEMPERATURE: 96.7 F | RESPIRATION RATE: 18 BRPM | BODY MASS INDEX: 44.1 KG/M2 | DIASTOLIC BLOOD PRESSURE: 78 MMHG | WEIGHT: 315 LBS | SYSTOLIC BLOOD PRESSURE: 131 MMHG | OXYGEN SATURATION: 98 % | HEIGHT: 71 IN | HEART RATE: 85 BPM

## 2020-02-24 DIAGNOSIS — E66.01 MORBID OBESITY (HCC): ICD-10-CM

## 2020-02-24 DIAGNOSIS — Z00.00 MEDICARE ANNUAL WELLNESS VISIT, SUBSEQUENT: ICD-10-CM

## 2020-02-24 DIAGNOSIS — I48.0 PAROXYSMAL ATRIAL FIBRILLATION (HCC): ICD-10-CM

## 2020-02-24 DIAGNOSIS — E79.0 HYPERURICEMIA: Primary | ICD-10-CM

## 2020-02-24 DIAGNOSIS — Z13.39 SCREENING FOR ALCOHOLISM: ICD-10-CM

## 2020-02-24 NOTE — PROGRESS NOTES
This is the Subsequent Medicare Annual Wellness Exam, performed 12 months or more after the Initial AWV or the last Subsequent AWV    I have reviewed the patient's medical history in detail and updated the computerized patient record. History     Patient Active Problem List   Diagnosis Code    VALENTE (obstructive sleep apnea) G47.33    Erectile dysfunction due to arterial insufficiency N52.01    HTN (hypertension) I10    Depression F32.9    Morbid obesity ([address] E66.01    Diabetes ([address] E11.9    A-fib ([address] I48.91    Dyslipidemia, goal LDL below 100 E78.5    VALENTE (obstructive sleep apnea) G47.33    Restless legs G25.81    Diabetes mellitus type 2, controlled ([address] E11.9    Urinary retention due to benign prostatic hyperplasia N40.1, R33.8    Benign non-nodular prostatic hyperplasia with lower urinary tract symptoms N40.1    Diabetes mellitus type 2, diet-controlled ([facility]) E11.9     Past Medical History:   Diagnosis Date    Arrhythmia     atrial fib    Depression 4/24/2010    Diabetes ([address]     diet control for pre-diabetes    Dyslipidemia, goal LDL below 100 6/14/2011    HTN (hypertension) 4/24/2010    Impotence 4/24/2010    Morbid obesity ([address] 5/17/2010    VALENTE (obstructive sleep apnea) 4/24/2010    CPAP    Restless legs 4/15/2015      Past Surgical History:   Procedure Laterality Date    COLONOSCOPY N/A 6/27/2017    COLONOSCOPY performed by Caty Hoskins MD at [address] [place] [address] [place], COLON, DIAGNOSTIC  2007    HX APPENDECTOMY      HX ORTHOPAEDIC  2000    bilat TKR     HX ORTHOPAEDIC  2010    left THR    HX UROLOGICAL  03/2018    urolift    HX UROLOGICAL  03/2019    prosthesis     Current Outpatient Medications   Medication Sig Dispense Refill    rivaroxaban (XARELTO) 20 mg tab tablet TAKE 1 TABLET BY MOUTH IN  THE MORNING WITH BREAKFAST.  90 Tab 0    lisinopril-hydroCHLOROthiazide (PRINZIDE, ZESTORETIC) 20-25 mg per tablet TAKE 1 TABLET BY MOUTH  DAILY 90 Tab 1    clotrimazole (LOTRIMIN) 1 % external solution 2 drops right ear canal BID for 10 days 30 mL 1    colchicine (COLCRYS) 0.6 mg tablet Take 1 Tab by mouth two (2) times a day. 61 Tab 0     No Known Allergies    Family History   Problem Relation Age of Onset    Cancer Father         leukemia    Cancer Paternal Grandfather     Diabetes Sister     Diabetes Brother     Cancer Maternal Aunt     Cancer Maternal Uncle      Social History     Tobacco Use    Smoking status: Former Smoker     Packs/day: 0.50     Years: 5.00     Pack years: 2.50     Last attempt to quit: 1990     Years since quittin.7    Smokeless tobacco: Never Used   Substance Use Topics    Alcohol use: Yes     Alcohol/week: 1.7 standard drinks     Types: 2 Standard drinks or equivalent per week     Comment: 2-3 drinks per week       Depression Risk Factor Screening:     3 most recent PHQ Screens 2019   Little interest or pleasure in doing things Not at all   Feeling down, depressed, irritable, or hopeless Not at all   Total Score PHQ 2 0       Alcohol Risk Factor Screening (MALE > 65): Do you average more 1 drink per night or more than 7 drinks a week: No    In the past three months have you have had more than 4 drinks containing alcohol on one occasion: No      Functional Ability and Level of Safety:   Hearing: The patient wears hearing aids. not always    Activities of Daily Living: The home contains: no safety equipment. Patient does total self care    Ambulation: with no difficulty    Fall Risk:  Fall Risk Assessment, last 12 mths 2019   Able to walk? Yes   Fall in past 12 months?  No       Abuse Screen:  Patient is not abused    Cognitive Screening   Has your family/caregiver stated any concerns about your memory: no  Cognitive Screening: Normal - Verbal Fluency Test    Patient Care Team   Patient Care Team:  Jackie Cox MD as PCP - General  Jackie Cox MD as PCP - [facility] Empaneled Provider  Lewis Rider MD as Physician (Sleep Medicine)  Anibal Cope MD (Cardiology)  Gladys Martinez MD as Physician (Urology)    Assessment/Plan   Education and counseling provided:  Are appropriate based on today's review and evaluation    Diagnoses and all orders for this visit:    1. Medicare annual wellness visit, subsequent    2. Screening for alcoholism  -     NM ANNUAL ALCOHOL SCREEN 15 MIN        Health Maintenance Due   Topic Date Due    Shingrix Vaccine Age 49> (1 of 2) 08/12/2001    Eye Exam Retinal or Dilated  06/19/2015    Foot Exam Q1  04/15/2016    GLAUCOMA SCREENING Q2Y  08/12/2016    AAA Screening 73-69 YO Male Smoking Patients  08/12/2016    Pneumococcal 65+ years (2 of 2 - PPSV23) 01/13/2019    MICROALBUMIN Q1  05/02/2019    Medicare Yearly Exam  05/03/2019     Problem follow up:  Monico Degroot returns for follow up visit regarding gout. he was seen 3 weeks ago in office diagnosed with gout and treated with colcrys. Workup was significant for . Notes, labs, studies, imaging related to this problem during prior visit were available . Since that visit, he has significantly improved. he has been compliant with prescribed treatment. Residual symptoms include: none  New issues associated with this problem include: occasional episodes    Lab Results   Component Value Date/Time    Uric acid 8.6 (H) 02/07/2020 11:59 AM     .    Vitals:    02/24/20 1557   BP: 131/78   Pulse: 85   Resp: 18   Temp: 96.7 °F (35.9 °C)   TempSrc: Oral   SpO2: 98%   Weight: 322 lb (146.1 kg)   Height: 5' 11\" (1.803 m)       1. Medicare annual wellness visit, subsequent      2. Screening for alcoholism  none  - NM ANNUAL ALCOHOL SCREEN 15 MIN    3. Hyperuricemia  Diet  Low fructose added diet      Can stop HCTZ as an alternate or add allopurinol  For now prn colcrys and diet suggested    4. Paroxysmal atrial fibrillation (HCC)  unchanged    5. Morbid obesity ([address]  Body mass index is 44.91 kg/m².   Serious weight loss needed

## 2020-02-24 NOTE — PATIENT INSTRUCTIONS
Medicare Wellness Visit, Male    The best way to live healthy is to have a lifestyle where you eat a well-balanced diet, exercise regularly, limit alcohol use, and quit all forms of tobacco/nicotine, if applicable. Regular preventive services are another way to keep healthy. Preventive services (vaccines, screening tests, monitoring & exams) can help personalize your care plan, which helps you manage your own care. Screening tests can find health problems at the earliest stages, when they are easiest to treat. [facility] follows the current, evidence-based guidelines published by the [facility] Malinda ([facility]) when recommending preventive services for our patients. Because we follow these guidelines, sometimes recommendations change over time as research supports it. (For example, a prostate screening blood test is no longer routinely recommended for men with no symptoms). Of course, you and your doctor may decide to screen more often for some diseases, based on your risk and co-morbidities (chronic disease you are already diagnosed with). Preventive services for you include:  - Medicare offers their members a free annual wellness visit, which is time for you and your primary care provider to discuss and plan for your preventive service needs. Take advantage of this benefit every year!  -All adults over age 72 should receive the recommended pneumonia vaccines. Current USPSTF guidelines recommend a series of two vaccines for the best pneumonia protection.   -All adults should have a flu vaccine yearly and tetanus vaccine every 10 years.  -All adults age 48 and older should receive the shingles vaccines (series of two vaccines).        -All adults age 38-68 who are overweight should have a diabetes screening test once every three years.   -Other screening tests & preventive services for persons with diabetes include: an eye exam to screen for diabetic retinopathy, a kidney function test, a foot exam, and stricter control over your cholesterol.   -Cardiovascular screening for adults with routine risk involves an electrocardiogram (ECG) at intervals determined by the provider.   -Colorectal cancer screening should be done for adults age 54-65 with no increased risk factors for colorectal cancer. There are a number of acceptable methods of screening for this type of cancer. Each test has its own benefits and drawbacks. Discuss with your provider what is most appropriate for you during your annual wellness visit. The different tests include: colonoscopy (considered the best screening method), a fecal occult blood test, a fecal DNA test, and sigmoidoscopy.  -All adults born between [facility] should be screened once for Hepatitis C.  -An Abdominal Aortic Aneurysm (AAA) Screening is recommended for men age 73-68 who has ever smoked in their lifetime.      Here is a list of your current Health Maintenance items (your personalized list of preventive services) with a due date:  Health Maintenance Due   Topic Date Due    Shingles Vaccine (1 of 2) 08/12/2001    Eye Exam  06/19/2015    Diabetic Foot Care  04/15/2016    Glaucoma Screening   08/12/2016    AAA Screening  08/12/2016    Pneumococcal Vaccine (2 of 2 - PPSV23) 01/13/2019    Albumin Urine Test  05/02/2019    Annual Well Visit  05/03/2019

## 2020-02-24 NOTE — PROGRESS NOTES
1. Have you been to the ER, urgent care clinic since your last visit? Hospitalized since your last visit? No    2. Have you seen or consulted any other health care providers outside of the [address] [place] [address] since your last visit? Include any pap smears or colon screening.  No

## 2020-04-14 DIAGNOSIS — M10.271 ACUTE DRUG-INDUCED GOUT OF RIGHT FOOT: ICD-10-CM

## 2020-04-14 RX ORDER — COLCHICINE 0.6 MG/1
TABLET ORAL
Qty: 60 TAB | Refills: 0 | Status: SHIPPED | OUTPATIENT
Start: 2020-04-14 | End: 2021-01-28 | Stop reason: ALTCHOICE

## 2020-04-28 DIAGNOSIS — E78.5 DYSLIPIDEMIA, GOAL LDL BELOW 100: ICD-10-CM

## 2020-04-28 DIAGNOSIS — I10 ESSENTIAL HYPERTENSION: ICD-10-CM

## 2020-04-28 DIAGNOSIS — I48.20 CHRONIC ATRIAL FIBRILLATION (HCC): ICD-10-CM

## 2020-04-28 NOTE — TELEPHONE ENCOUNTER
Requested Prescriptions     Signed Prescriptions Disp Refills    rivaroxaban (Xarelto) 20 mg tab tablet 90 Tab 0     Sig: TAKE 1 TABLET BY MOUTH IN  THE MORNING WITH BREAKFAST.  Please schedule virtual visit or phone call visit appointment prior to future refills     Authorizing Provider: Rico Murray     Ordering User: Erika Penn    Per Dr. Kathy Carmichael verbal orders

## 2020-08-06 DIAGNOSIS — I10 ESSENTIAL HYPERTENSION: ICD-10-CM

## 2020-08-06 DIAGNOSIS — I48.20 CHRONIC ATRIAL FIBRILLATION (HCC): ICD-10-CM

## 2020-08-06 DIAGNOSIS — E78.5 DYSLIPIDEMIA, GOAL LDL BELOW 100: ICD-10-CM

## 2020-08-06 NOTE — TELEPHONE ENCOUNTER
Requested Prescriptions     Signed Prescriptions Disp Refills    rivaroxaban (Xarelto) 20 mg tab tablet 90 Tab 0     Sig: TAKE 1 TABLET BY MOUTH IN  THE MORNING WITH BREAKFAST. NEED TO SCHEDULE VIRTUAL OR OFFICE FOLLOW UP FOR FURTHER REFILLS.      Authorizing Provider: Lo Cedeño     Ordering User: Dave Mandujano    Per Dr. John Velasquez verbal orders

## 2020-08-25 ENCOUNTER — VIRTUAL VISIT (OUTPATIENT)
Dept: INTERNAL MEDICINE CLINIC | Age: 70
End: 2020-08-25
Payer: MEDICARE

## 2020-08-25 DIAGNOSIS — M1A.29X0 DRUG-INDUCED CHRONIC GOUT OF MULTIPLE SITES WITHOUT TOPHUS: Primary | ICD-10-CM

## 2020-08-25 PROCEDURE — 1101F PT FALLS ASSESS-DOCD LE1/YR: CPT | Performed by: INTERNAL MEDICINE

## 2020-08-25 PROCEDURE — 99213 OFFICE O/P EST LOW 20 MIN: CPT | Performed by: INTERNAL MEDICINE

## 2020-08-25 PROCEDURE — G9717 DOC PT DX DEP/BP F/U NT REQ: HCPCS | Performed by: INTERNAL MEDICINE

## 2020-08-25 PROCEDURE — G8428 CUR MEDS NOT DOCUMENT: HCPCS | Performed by: INTERNAL MEDICINE

## 2020-08-25 PROCEDURE — 3017F COLORECTAL CA SCREEN DOC REV: CPT | Performed by: INTERNAL MEDICINE

## 2020-08-25 PROCEDURE — G8756 NO BP MEASURE DOC: HCPCS | Performed by: INTERNAL MEDICINE

## 2020-08-25 RX ORDER — ALLOPURINOL 100 MG/1
100 TABLET ORAL DAILY
Qty: 90 TAB | Refills: 1 | Status: SHIPPED | OUTPATIENT
Start: 2020-08-25 | End: 2021-03-22

## 2020-08-25 NOTE — PROGRESS NOTES
facetime audio visual visit  Problem follow up:  Cash Pollack returns for follow up visit regarding gout. he was seen 3 weeks ago in office diagnosed with gout and treated with colcrys. Workup was significant for . Notes, labs, studies, imaging related to this problem during prior visit were available . Since that visit, he has significantly improved. he has been compliant with prescribed treatment. Residual symptoms include: none  New issues associated with this problem include: occasional episodes at least monthly    Lab Results   Component Value Date/Time    Uric acid 8.6 (H) 02/07/2020 11:59 AM     Patient Active Problem List    Diagnosis    Diabetes mellitus type 2, diet-controlled ([address]    Benign non-nodular prostatic hyperplasia with lower urinary tract symptoms     Retention had catheter 3 months      Urinary retention due to benign prostatic hyperplasia    Diabetes mellitus type 2, controlled ([address]    Restless legs    VALENTE (obstructive sleep apnea)     Bi-Level: 19/15 cmH2O.  Dyslipidemia, goal LDL below 100    A-fib ([address]     Assymptomatic, noted on ekg 2/11; found when had sleep study. Saw cardiologist 2/8/11.  Diabetes ([address]     Recent diagnosis; just started diabetes education  HgA1C 6.5%      Morbid obesity (HCC)     OBESE 20 + YEARS; HIGH WEIGHT 362 LBS PAST MONTH; LOW WEIGHT 190 LBS 30 YEARS AGO      VALENTE (obstructive sleep apnea)    Erectile dysfunction due to arterial insufficiency    HTN (hypertension)     borderline      Depression     BP Readings from Last 3 Encounters:   02/24/20 131/78   01/13/20 128/82   07/24/19 127/76     Diagnoses and all orders for this visit:    1. 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  Take with colcrys 0.6 mg first 3 weeks      Begin allopurinol warn rash  Take with colcrys for dirst 3 weeks  3 month labs

## 2020-08-25 NOTE — PROGRESS NOTES
1. Have you been to the ER, urgent care clinic since your last visit? Hospitalized since your last visit? No    2. Have you seen or consulted any other health care providers outside of the [address] [place] [address] since your last visit? Include any pap smears or colon screening.  No   Chief Complaint   Patient presents with    Hypertension     follow up    Diabetes     follow up

## 2020-11-16 DIAGNOSIS — E78.5 DYSLIPIDEMIA, GOAL LDL BELOW 100: ICD-10-CM

## 2020-11-16 DIAGNOSIS — I10 ESSENTIAL HYPERTENSION: ICD-10-CM

## 2020-11-16 DIAGNOSIS — I48.20 CHRONIC ATRIAL FIBRILLATION (HCC): ICD-10-CM

## 2020-11-16 NOTE — TELEPHONE ENCOUNTER
Requested Prescriptions     Signed Prescriptions Disp Refills    rivaroxaban (Xarelto) 20 mg tab tablet 30 Tab 0     Sig: TAKE 1 TABLET BY MOUTH IN  THE MORNING WITH BREAKFAST.  *NEED TO SCHEDULE VIRTUAL OR OFFICE VISIT FOLLOW UP FOR FURTHER REFILLS*     Authorizing Provider: Ling Gordillo     Ordering User: Santos Camarillo    Per Dr. Leo Patel verbal orders

## 2021-01-28 ENCOUNTER — OFFICE VISIT (OUTPATIENT)
Dept: CARDIOLOGY CLINIC | Age: 71
End: 2021-01-28
Payer: MEDICARE

## 2021-01-28 VITALS
OXYGEN SATURATION: 97 % | WEIGHT: 315 LBS | HEIGHT: 71 IN | RESPIRATION RATE: 18 BRPM | SYSTOLIC BLOOD PRESSURE: 130 MMHG | HEART RATE: 73 BPM | BODY MASS INDEX: 44.1 KG/M2 | DIASTOLIC BLOOD PRESSURE: 72 MMHG

## 2021-01-28 DIAGNOSIS — E78.5 DYSLIPIDEMIA, GOAL LDL BELOW 100: ICD-10-CM

## 2021-01-28 DIAGNOSIS — I10 ESSENTIAL HYPERTENSION: ICD-10-CM

## 2021-01-28 DIAGNOSIS — I48.20 CHRONIC ATRIAL FIBRILLATION (HCC): ICD-10-CM

## 2021-01-28 DIAGNOSIS — G47.33 OSA (OBSTRUCTIVE SLEEP APNEA): Primary | ICD-10-CM

## 2021-01-28 DIAGNOSIS — I48.19 PERSISTENT ATRIAL FIBRILLATION (HCC): ICD-10-CM

## 2021-01-28 PROCEDURE — G8752 SYS BP LESS 140: HCPCS | Performed by: SPECIALIST

## 2021-01-28 PROCEDURE — G8417 CALC BMI ABV UP PARAM F/U: HCPCS | Performed by: SPECIALIST

## 2021-01-28 PROCEDURE — G8427 DOCREV CUR MEDS BY ELIG CLIN: HCPCS | Performed by: SPECIALIST

## 2021-01-28 PROCEDURE — 3017F COLORECTAL CA SCREEN DOC REV: CPT | Performed by: SPECIALIST

## 2021-01-28 PROCEDURE — 99213 OFFICE O/P EST LOW 20 MIN: CPT | Performed by: SPECIALIST

## 2021-01-28 PROCEDURE — G8536 NO DOC ELDER MAL SCRN: HCPCS | Performed by: SPECIALIST

## 2021-01-28 PROCEDURE — G9717 DOC PT DX DEP/BP F/U NT REQ: HCPCS | Performed by: SPECIALIST

## 2021-01-28 PROCEDURE — 1101F PT FALLS ASSESS-DOCD LE1/YR: CPT | Performed by: SPECIALIST

## 2021-01-28 PROCEDURE — G8754 DIAS BP LESS 90: HCPCS | Performed by: SPECIALIST

## 2021-01-28 RX ORDER — LISINOPRIL AND HYDROCHLOROTHIAZIDE 20; 25 MG/1; MG/1
TABLET ORAL
Qty: 90 TAB | Refills: 3 | Status: SHIPPED | OUTPATIENT
Start: 2021-01-28 | End: 2021-05-17 | Stop reason: ALTCHOICE

## 2021-01-28 NOTE — PROGRESS NOTES
HISTORY OF PRESENT ILLNESS  Vanessa Austin is a 72 y.o. male     SUMMARY:   Problem List  Date Reviewed: 1/28/2021          Codes Class Noted    Diabetes mellitus type 2, diet-controlled ([facility].) ICD-10-CM: E11.9  ICD-9-CM: 250.00  5/2/2018        Benign non-nodular prostatic hyperplasia with lower urinary tract symptoms ICD-10-CM: N40.1  ICD-9-CM: 600.91  4/25/2017    Overview Signed 4/25/2017  9:10 AM by Ondina Mccollum MD     Retention had catheter 3 months             Urinary retention due to benign prostatic hyperplasia ICD-10-CM: N40.1, R33.8  ICD-9-CM: 600.91, 788.20  10/25/2016        Diabetes mellitus type 2, controlled ([facility] ICD-10-CM: E11.9  ICD-9-CM: 250.00  10/15/2015        Restless legs ICD-10-CM: G25.81  ICD-9-CM: 333.94  4/15/2015        VALENTE (obstructive sleep apnea) ICD-10-CM: L72.43  ICD-9-CM: 327.23  6/19/2012    Overview Signed 6/19/2012 10:57 AM by Grupo Pak MD     Bi-Level: 19/15 cmH2O. Dyslipidemia, goal LDL below 100 ICD-10-CM: E78.5  ICD-9-CM: 272.4  6/14/2011        A-fib ([facility] ICD-10-CM: I48.91  ICD-9-CM: 427.31  2/7/2011    Overview Addendum 2/9/2011  3:42 PM by Leroy Muñoz NP     Assymptomatic, noted on ekg 2/11; found when had sleep study. Saw cardiologist 2/8/11.              Diabetes ([facility] ICD-10-CM: E11.9  ICD-9-CM: 250.00  1/28/2011    Overview Addendum 2/9/2011  3:37 PM by Leroy Muñoz NP     Recent diagnosis; just started diabetes education  HgA1C 6.5%             Morbid obesity ([facility] ICD-10-CM: E66.01  ICD-9-CM: 278.01  5/17/2010    Overview Signed 2/9/2011  3:35 PM by Leroy Muñoz NP     OBESE 20 + YEARS; HIGH WEIGHT 362 LBS PAST MONTH; LOW WEIGHT 190 LBS 30 YEARS AGO             VALENTE (obstructive sleep apnea) ICD-10-CM: G47.33  ICD-9-CM: 327.23  4/24/2010        Erectile dysfunction due to arterial insufficiency ICD-10-CM: N52.01  ICD-9-CM: 607.84  4/24/2010        HTN (hypertension) ICD-10-CM: I10  ICD-9-CM: 401.9  4/24/2010    Overview Signed 4/24/2010  9:31 AM by Carl Cullen     borderline             Depression ICD-10-CM: F32.9  ICD-9-CM: 009  4/24/2010              Current Outpatient Medications on File Prior to Visit   Medication Sig    rivaroxaban (Xarelto) 20 mg tab tablet TAKE 1 TABLET BY MOUTH IN  THE MORNING WITH BREAKFAST. *NEED TO SCHEDULE VIRTUAL OR OFFICE VISIT FOLLOW UP FOR FURTHER REFILLS*    allopurinoL (ZYLOPRIM) 100 mg tablet Take 1 Tab by mouth daily. Take with colcrys 0.6 mg first 3 weeks    lisinopril-hydroCHLOROthiazide (PRINZIDE, ZESTORETIC) 20-25 mg per tablet TAKE 1 TABLET BY MOUTH  DAILY     No current facility-administered medications on file prior to visit. CARDIOLOGY STUDIES TO DATE:  02/10 echocardiogram showed lvh but was otherwise normal.    02/10 stress cardiolyte had mild EKG changes without chest pain and a questionable fixed inferior defect with an ejection fraction of 58%    Chief Complaint   Patient presents with    Medication Refill     HPI :  He is doing really well. 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 Faithfully wears his CPAP and is unaware of his A. fib. Blood pressure is well controlled. He did not get to do a whole lot of camping this summer because of the pandemic but is hoping the summer is going be better.   CARDIAC ROS:   negative for chest pain, dyspnea, palpitations, syncope, orthopnea, paroxysmal nocturnal dyspnea, exertional chest pressure/discomfort, claudication, lower extremity edema    Family History   Problem Relation Age of Onset    Cancer Father         leukemia    Cancer Paternal Grandfather     Diabetes Sister     Diabetes Brother     Cancer Maternal Aunt     Cancer Maternal Uncle        Past Medical History:   Diagnosis Date    Arrhythmia     atrial fib    Depression 4/24/2010    Diabetes ([facility]     diet control for pre-diabetes    Dyslipidemia, goal LDL below 100 6/14/2011    HTN (hypertension) 4/24/2010    Impotence 4/24/2010    Morbid obesity ([address] 5/17/2010    VALENTE (obstructive sleep apnea) 4/24/2010    CPAP    Restless legs 4/15/2015       GENERAL ROS:  A comprehensive review of systems was negative except for that written in the HPI.     Visit Vitals  /72 (BP 1 Location: Left upper arm, BP Patient Position: Sitting)   Pulse 73   Resp 18   Ht 5' 11\" (1.803 m)   Wt 316 lb (143.3 kg)   SpO2 97%   BMI 44.07 kg/m²       Wt Readings from Last 3 Encounters:   01/28/21 316 lb (143.3 kg)   02/24/20 322 lb (146.1 kg)   01/13/20 326 lb (147.9 kg)            BP Readings from Last 3 Encounters:   01/28/21 130/72   02/24/20 131/78   01/13/20 128/82       PHYSICAL EXAM  General appearance: alert, cooperative, no distress, appears stated age  Neurologic: Alert and oriented X 3  Neck: supple, symmetrical, trachea midline, no adenopathy, no carotid bruit and no JVD  Lungs: clear to auscultation bilaterally  Heart: irregularly irregular rhythm, S1, S2 normal, no S3 or S4  Extremities: extremities normal, atraumatic, no cyanosis or edema    Lab Results   Component Value Date/Time    Cholesterol, total 178 02/07/2020 11:59 AM    Cholesterol, total 151 05/13/2019 08:46 AM    Cholesterol, total 173 05/02/2018 04:09 PM    Cholesterol, total 168 04/25/2017 09:47 AM    Cholesterol, total 167 10/15/2015 11:58 AM    HDL Cholesterol 44 02/07/2020 11:59 AM    HDL Cholesterol 34 (L) 05/13/2019 08:46 AM    HDL Cholesterol 37 (L) 05/02/2018 04:09 PM    HDL Cholesterol 37 (L) 04/25/2017 09:47 AM    HDL Cholesterol 32 (L) 10/15/2015 11:58 AM    LDL, calculated 107 (H) 02/07/2020 11:59 AM    LDL, calculated 89 05/13/2019 08:46 AM    LDL, calculated 71 05/02/2018 04:09 PM    LDL, calculated 92 04/25/2017 09:47 AM    LDL, calculated 89 10/15/2015 11:58 AM    Triglyceride 135 02/07/2020 11:59 AM    Triglyceride 140 05/13/2019 08:46 AM    Triglyceride 325 (H) 05/02/2018 04:09 PM Triglyceride 193 (H) 04/25/2017 09:47 AM    Triglyceride 228 (H) 10/15/2015 11:58 AM    CHOL/HDL Ratio 4.0 02/07/2020 11:59 AM     ASSESSMENT :      He is stable and asymptomatic at this point well compensated on a good medical regimen and needs no cardiac testing at this time  current treatment plan is effective, no change in therapy  lab results and schedule of future lab studies reviewed with patient  reviewed diet, exercise and weight control    Encounter Diagnoses   Name Primary?  VALENTE (obstructive sleep apnea) Yes    Persistent atrial fibrillation ([facility]     Essential hypertension      No orders of the defined types were placed in this encounter. Follow-up and Dispositions    · Return in about 1 year (around 1/28/2022). Rohini Smith MD  1/28/2021  Please note that this dictation was completed with SnapMyAd, the computer voice recognition software. Quite often unanticipated grammatical, syntax, homophones, and other interpretive errors are inadvertently transcribed by the computer software. Please disregard these errors. Please excuse any errors that have escaped final proofreading. Thank you.

## 2021-03-22 DIAGNOSIS — M1A.29X0 DRUG-INDUCED CHRONIC GOUT OF MULTIPLE SITES WITHOUT TOPHUS: ICD-10-CM

## 2021-03-22 RX ORDER — ALLOPURINOL 100 MG/1
100 TABLET ORAL DAILY
Qty: 90 TAB | Refills: 1 | Status: SHIPPED | OUTPATIENT
Start: 2021-03-22 | End: 2021-10-03

## 2021-05-17 ENCOUNTER — VIRTUAL VISIT (OUTPATIENT)
Dept: SLEEP MEDICINE | Age: 71
End: 2021-05-17
Payer: MEDICARE

## 2021-05-17 DIAGNOSIS — Z11.52 ENCOUNTER FOR SCREENING FOR COVID-19: ICD-10-CM

## 2021-05-17 DIAGNOSIS — G47.52 RBD (REM BEHAVIORAL DISORDER): ICD-10-CM

## 2021-05-17 DIAGNOSIS — G47.33 OSA (OBSTRUCTIVE SLEEP APNEA): Primary | ICD-10-CM

## 2021-05-17 DIAGNOSIS — G47.61 PERIODIC LIMB MOVEMENTS OF SLEEP: ICD-10-CM

## 2021-05-17 DIAGNOSIS — G25.81 RLS (RESTLESS LEGS SYNDROME): ICD-10-CM

## 2021-05-17 PROCEDURE — 1101F PT FALLS ASSESS-DOCD LE1/YR: CPT | Performed by: INTERNAL MEDICINE

## 2021-05-17 PROCEDURE — G8756 NO BP MEASURE DOC: HCPCS | Performed by: INTERNAL MEDICINE

## 2021-05-17 PROCEDURE — G8427 DOCREV CUR MEDS BY ELIG CLIN: HCPCS | Performed by: INTERNAL MEDICINE

## 2021-05-17 PROCEDURE — G9717 DOC PT DX DEP/BP F/U NT REQ: HCPCS | Performed by: INTERNAL MEDICINE

## 2021-05-17 PROCEDURE — 3017F COLORECTAL CA SCREEN DOC REV: CPT | Performed by: INTERNAL MEDICINE

## 2021-05-17 PROCEDURE — 99203 OFFICE O/P NEW LOW 30 MIN: CPT | Performed by: INTERNAL MEDICINE

## 2021-05-17 PROCEDURE — G8417 CALC BMI ABV UP PARAM F/U: HCPCS | Performed by: INTERNAL MEDICINE

## 2021-05-17 PROCEDURE — G8536 NO DOC ELDER MAL SCRN: HCPCS | Performed by: INTERNAL MEDICINE

## 2021-05-17 RX ORDER — LISINOPRIL 20 MG/1
20 TABLET ORAL DAILY
COMMUNITY
End: 2021-06-17 | Stop reason: SDUPTHER

## 2021-05-17 RX ORDER — COLCHICINE 0.6 MG/1
0.6 TABLET ORAL DAILY
COMMUNITY
End: 2021-07-05

## 2021-05-17 NOTE — PROGRESS NOTES
[address] [facility]. [place], [address]  Tel.  [phone]  Fax. 100 [facility] 60  [place], [address] [facility]  Tel.  [phone]  Fax. [phone] [address] [address] Criselda Mustafa  Tel.  [phone]  Fax. [phone] ABDI Chamberlain (: 1950) is a 72 y.o. male, established patient, seen for positive airway pressure follow-up and evaluation of the following chief complaint(s):   Sleep Problem (Sleep Disorder Evaluation: VALENTE management)       Zaki Drake was last seen by me on 2018, prior notes reviewed in detail. Polysomnogram (PSG) performed on 2000 showed an AHI of 30.7/hr with a lowest SpO2 of 82%. Repeat Split-Night Testing performed on 2011 indicated an AHI of 30.7/hr with a lowest SpO2 of 89%. Patient was adequately treated on CPAP level of 17 cmH2O. A repeat Split-Night test performed on 2015 was indicative of an AHI of 65.3/hr with a lowest SpO2 of 89%. Patient was adequately treated on CPAP level of 14 cmH2O. He returned for a Bi-Level PAP titration on  and was adequately titrated on 13/06 cmH2O.      ASSESSMENT/PLAN:    ICD-10-CM ICD-9-CM    1. VALENTE (obstructive sleep apnea)  G47.33 327.23 SLEEP LAB (PAP TITRATION)   2. BMI 40.0-44.9, adult ([facility]  Z68.41 V85.41    3. RLS (restless legs syndrome)  G25.81 333.94    4. Periodic limb movements of sleep  G47.61 327.51    5. RBD (REM behavioral disorder)  G47.52 327.42    6. Encounter for screening for COVID-19  Z11.52 V73.89 NOVEL CORONAVIRUS (COVID-19)      NOVEL CORONAVIRUS (COVID-19)       On ResMed:  AirCurve 10 VAuto:      Settings:  Mode - VAuto    Max IPAP: 25 cmH2O    Min EPAP: 9 cmH2O    PS: 6 cmH2O      He is adherent with PAP therapy and PAP continues to benefit patient and remains necessary for control of his sleep apnea. Follow-up and Dispositions    · Return for telephone follow-up after testing is completed.          1. Sleep Apnea -     Orders Placed This Encounter    NOVEL CORONAVIRUS (COVID-19)     Standing Status:   Future     Number of Occurrences:   1     Standing Expiration Date:   8/17/2021     Scheduling Instructions:      1) Due to current limited availability of the COVID-19 PCR test, tests will be prioritized and may not be completed.              2) Order only if the test result will change clinical management or necessary for a return to mission-critical employment decision.              3) Print and instruct patient to adhere to [facility] home isolation program. (Link Above)              4) Set up or refer patient for a monitoring program.              5) Have patient sign up for and leverage MyChart (if not previously done). Order Specific Question:   Status     Answer:   Asymptomatic/Surveillance(e.g. pre-op/pre-procedure/pre-delivery/transfer)     Order Specific Question:   Reason for Test     Answer:   Upcoming elective surgery/procedure/delivery, return to work, or discharge to another facility    SLEEP LAB (PAP TITRATION)     Standing Status:   Future     Standing Expiration Date:   8/17/2021     Scheduling Instructions:      Perform Bi-level Titration. Order Specific Question:   Reason for Exam     Answer:   VALENTE / PLMS / RBD     * Sleep testing ordered to assess optimal pressures, PLMs during sleep and RBD. * Consider evaluation of PLMs, referral to neurology for RBD evaluation and prescription of new PAP device after testing is completed. * He is familiar with the telephone monitoring application, is willing to track therapy and agrees to notify use if AHI is >5 per hour. * Counseling was provided regarding the importance of regular PAP use with emphasis on ensuring sufficient total sleep time, proper sleep hygiene, and safe driving. * Re-enforced proper and regular cleaning for the device. * He was asked to contact our office for any problems regarding PAP therapy.       2. Recommended a dedicated weight loss program through appropriate diet and exercise regimen as significant weight reduction has been shown to reduce severity of obstructive sleep apnea. SUBJECTIVE/OBJECTIVE:    He  is seen today for follow up PAP device and reports no problems using the device. The following concerns identified:    Drowsiness no Problems exhaling no   Snoring no Forget to put on no   Mask Comfortable yes Can't fall asleep no   Dry Mouth yes Mask falls off no   Air Leaking no Frequent awakenings no       He admits that his sleep has worsened on PAP therapy using FF mask and heated tubing. He reports of leg twitches and of acting out his dreams. Review of device download indicated:    Usage Days >= 4 hours 100 % over 30 days  Median Usage  7 hour 58 minutes    EPAP:  Median (avg) 9.0 cmH2O  95th % (avg) 11.2 cmH2O  Max (avg) 13.7 cmH2O    IPAP:  Median (avg) 15 cmH2O  95th % (avg) 17.2 cmH2O  Max (avg) 19.7 cmH2O    Average AHI: 2.8 /hr which reflects improved sleep breathing condition. [place] Sleepiness Score: 6   Modified F.O.S.Q. Score Total / 2: 12       Sleep Review of Systems: notable for Negative difficulty falling asleep; Positive awakenings at night; Negative  early morning headaches; Negative  memory problems; Negative  concentration issues; Negative  chest pain; Negative  shortness of breath;  Negative rashes or itching; Negative heartburn / belching / flatulence; Negative  significant mood issues; Occasional afternoon naps per week. Vitals reported by patient     Patient-Reported Vitals 5/17/2021   Patient-Reported Weight 316 lbs      Calculated BMI 44.07 kg/m2    Physical Exam completed by visual and auditory observation of patient with verbal input from patient.     General:   Alert, oriented, not in acute distress   Eyes:  Anicteric Sclerae; no obvious strabismus   Nose:  No obvious nasal septum deviation    Neck:   Midline trachea, no visible mass   Chest/Lungs:  Respiratory effort normal, no visualized signs of difficulty breathing or respiratory distress   CVS:  No JVD   Extremities:  No obvious rashes noted on face, neck, or hands   Neuro:  No facial asymmetry, no focal deficits; no obvious tremor    Psych:  Normal affect,  normal countenance     Carmen Nguyen is being evaluated by a Virtual Visit (video visit) encounter to address concerns as mentioned above. A caregiver was present when appropriate. Due to this being a TeleHealth encounter (During [facility] public health emergency), evaluation of the following organ systems was limited: Vitals/Constitutional/EENT/Resp/CV/GI//MS/Neuro/Skin/Heme-Lymph-Imm. Pursuant to the emergency declaration under the [address] [place] [address] and the [employer] Act, this Virtual Visit was conducted with patient's (and/or legal guardian's) consent, to reduce the patient's risk of exposure to COVID-19 and provide necessary medical care. Patient identification was verified at the start of the visit: YES using name and date of birth. Patient's phone number [phone] (home)  was confirmed for accuracy. He gives permission for messages regarding results and appointments to be left at that number. Services were provided through a video synchronous discussion virtually to substitute for in-person clinic visit. I was at home while conducting this encounter, patient located at their home or alternate location of their choice. On this date 05/17/2021 I have spent 30 minutes reviewing previous notes, test results and face to face with the patient discussing the diagnosis and importance of compliance with the treatment plan as well as documenting on the day of the visit. An electronic signature was used to authenticate this note. Belinda Jerry MD, FAASM  Electronically signed.  05/17/21

## 2021-05-17 NOTE — PATIENT INSTRUCTIONS
217 [facility]., [place]. [address] [facility], [address] Tel.  [phone] Fax. 100 [facility] 60 [place], [address] [facility] Tel.  [phone] Fax. [phone] [address] Tel.  [phone] Fax. [phone] Learning About CPAP for Sleep Apnea What is CPAP? CPAP is a small machine that you use at home every night while you sleep. It increases air pressure in your throat to keep your airway open. When you have sleep apnea, this can help you sleep better so you feel much better. CPAP stands for \"continuous positive airway pressure. \" The CPAP machine will have one of the following: · A mask that covers your nose and mouth · Prongs that fit into your nose · A mask that covers your nose only, the most common type. This type is called NCPAP. The N stands for \"nasal.\" Why is it done? CPAP is usually the best treatment for obstructive sleep apnea. It is the first treatment choice and the most widely used. Your doctor may suggest CPAP if you have: · Moderate to severe sleep apnea. · Sleep apnea and coronary artery disease (CAD) or heart failure. How does it help? · CPAP can help you have more normal sleep, so you feel less sleepy and more alert during the daytime. · CPAP may help keep heart failure or other heart problems from getting worse. · NCPAP may help lower your blood pressure. · If you use CPAP, your bed partner may also sleep better because you are not snoring or restless. What are the side effects? Some people who use CPAP have: · A dry or stuffy nose and a sore throat. · Irritated skin on the face. · Sore eyes. · Bloating. If you have any of these problems, work with your doctor to fix them. Here are some things you can try: · Be sure the mask or nasal prongs fit well. · See if your doctor can adjust the pressure of your CPAP. · If your nose is dry, try a humidifier.  
· If your nose is runny or stuffy, try decongestant medicine or a steroid nasal spray. If these things do not help, you might try a different type of machine. Some machines have air pressure that adjusts on its own. Others have air pressures that are different when you breathe in than when you breathe out. This may reduce discomfort caused by too much pressure in your nose. Where can you learn more? Go to MobiMagic.be Enter R465 in the search box to learn more about \"Learning About CPAP for Sleep Apnea. \"  
© 3299-2608 Healthwise, Incorporated. Care instructions adapted under license by [facility] [employer] (which disclaims liability or warranty for this information). This care instruction is for use with your licensed healthcare professional. If you have questions about a medical condition or this instruction, always ask your healthcare professional. [address] any warranty or liability for your use of this information. Content Version: 3.3.62415; Last Revised: January 11, 2010 PROPER SLEEP HYGIENE What to avoid · Do not have drinks with caffeine, such as coffee or black tea, for 8 hours before bed. · Do not smoke or use other types of tobacco near bedtime. Nicotine is a stimulant and can keep you awake. · Avoid drinking alcohol late in the evening, because it can cause you to wake in the middle of the night. · Do not eat a big meal close to bedtime. If you are hungry, eat a light snack. · Do not drink a lot of water close to bedtime, because the need to urinate may wake you up during the night. · Do not read or watch TV in bed. Use the bed only for sleeping and sexual activity. What to try · Go to bed at the same time every night, and wake up at the same time every morning. Do not take naps during the day. · Keep your bedroom quiet, dark, and cool. · Get regular exercise, but not within 3 to 4 hours of your bedtime. Tala Montoya · Sleep on a comfortable pillow and mattress.  
· If watching the clock makes you anxious, turn it facing away from you so you cannot see the time. · If you worry when you lie down, start a worry book. Well before bedtime, write down your worries, and then set the book and your concerns aside. · Try meditation or other relaxation techniques before you go to bed. · If you cannot fall asleep, get up and go to another room until you feel sleepy. Do something relaxing. Repeat your bedtime routine before you go to bed again. · Make your house quiet and calm about an hour before bedtime. Turn down the lights, turn off the TV, log off the computer, and turn down the volume on music. This can help you relax after a busy day. Drowsy Driving: The [facility] 54 cites drowsiness as a causing factor in more than 317,530 police reported crashes annually, resulting in 76,000 injuries and 1,500 deaths. Other surveys suggest 55% of people polled have driven while drowsy in the past year, 23% had fallen asleep but not crashed, 3% crashed, and 2% had and accident due to drowsy driving. Who is at risk? Young Drivers: One study of drowsy driving accidents states that 55% of the drivers were under 25 years. Of those, 75% were male. Shift Workers and Travelers: People who work overnight or travel across time zones frequently are at higher risk of experiencing Circadian Rhythm Disorders. They are trying to work and function when their body is programed to sleep. Sleep Deprived: Lack of sleep has a serious impact on your ability to pay attention or focus on a task. Consistently getting less than the average of 8 hours your body needs creates partial or cumulative sleep deprivation. Untreated Sleep Disorders: Sleep Apnea, Narcolepsy, R.L.S., and other sleep disorders (untreated) prevent a person from getting enough restful sleep. This leads to excessive daytime sleepiness and increases the risk for drowsy driving accidents by up to 7 times.  
Medications / Alcohol: Even over the counter medications can cause drowsiness. Medications that impair a drivers attention should have a warning label. Alcohol naturally makes you sleepy and on its own can cause accidents. Combined with excessive drowsiness its effects are amplified. Signs of Drowsy Driving: * You don't remember driving the last few miles * You may drift out of your natalee * You are unable to focus and your thoughts wander * You may yawn more often than normal 
 * You have difficulty keeping your eyes open / nodding off * Missing traffic signs, speeding, or tailgating Prevention-  
Good sleep hygiene, lifestyle and behavioral choices have the most impact on drowsy driving. There is no substitute for sleep and the average person requires 8 hours nightly. If you find yourself driving drowsy, stop and sleep. Consider the sleep hygiene tips provided during your visit as well. Medication Refill Policy: Refills for all medications require 1 week advance notice. Please have your pharmacy fax a refill request. We are unable to fax, or call in \"controled substance\" medications and you will need to pick these prescriptions up from our office. [employer] Activation Thank you for requesting access to [employer]. Please follow the instructions below to securely access and download your online medical record. [employer] allows you to send messages to your doctor, view your test results, renew your prescriptions, schedule appointments, and more. How Do I Sign Up? 1. In your internet browser, go to [email or web address]. 2. Click on the First Time User? Click Here link in the Sign In box. You will see the New Member Sign Up page. 3. Enter your [employer] Access Code exactly as it appears below. You will not need to use this code after youve completed the sign-up process. If you do not sign up before the expiration date, you must request a new code. [employer] Access Code:  Activation code not generated Current [employer] Status: Active (This is the date your [employer] access code will ) 4. Enter the last four digits of your Social Security Number (xxxx) and Date of Birth (mm/dd/yyyy) as indicated and click Submit. You will be taken to the next sign-up page. 5. Create a [employer] ID. This will be your [employer] login ID and cannot be changed, so think of one that is secure and easy to remember. 6. Create a [employer] password. You can change your password at any time. 7. Enter your Password Reset Question and Answer. This can be used at a later time if you forget your password. 8. Enter your e-mail address. You will receive e-mail notification when new information is available in [address] 9. Click Sign Up. You can now view and download portions of your medical record. 10. Click the Download Summary menu link to download a portable copy of your medical information. Additional Information If you have questions, please call [phone]. Remember, [employer] is NOT to be used for urgent needs. For medical emergencies, dial 911.

## 2021-05-27 ENCOUNTER — HOSPITAL ENCOUNTER (OUTPATIENT)
Dept: PREADMISSION TESTING | Age: 71
Discharge: HOME | End: 2021-05-27
Payer: MEDICARE

## 2021-05-27 ENCOUNTER — TRANSCRIBE ORDER (OUTPATIENT)
Dept: REGISTRATION | Age: 71
End: 2021-05-27

## 2021-05-27 DIAGNOSIS — Z01.812 PRE-PROCEDURE LAB EXAM: ICD-10-CM

## 2021-05-27 DIAGNOSIS — Z01.812 PRE-PROCEDURE LAB EXAM: Primary | ICD-10-CM

## 2021-05-27 PROCEDURE — U0003 INFECTIOUS AGENT DETECTION BY NUCLEIC ACID (DNA OR RNA); SEVERE ACUTE RESPIRATORY SYNDROME CORONAVIRUS 2 (SARS-COV-2) (CORONAVIRUS DISEASE [COVID-19]), AMPLIFIED PROBE TECHNIQUE, MAKING USE OF HIGH THROUGHPUT TECHNOLOGIES AS DESCRIBED BY CMS-2020-01-R: HCPCS

## 2021-05-28 LAB — SARS-COV-2, COV2NT: NOT DETECTED

## 2021-06-01 ENCOUNTER — DOCUMENTATION ONLY (OUTPATIENT)
Dept: SLEEP MEDICINE | Age: 71
End: 2021-06-01

## 2021-06-01 ENCOUNTER — HOSPITAL ENCOUNTER (OUTPATIENT)
Dept: SLEEP MEDICINE | Age: 71
Discharge: HOME | End: 2021-06-01
Payer: MEDICARE

## 2021-06-01 VITALS
HEART RATE: 84 BPM | TEMPERATURE: 97.8 F | SYSTOLIC BLOOD PRESSURE: 108 MMHG | DIASTOLIC BLOOD PRESSURE: 63 MMHG | OXYGEN SATURATION: 96 %

## 2021-06-01 DIAGNOSIS — G47.33 OSA (OBSTRUCTIVE SLEEP APNEA): ICD-10-CM

## 2021-06-01 PROCEDURE — 94660 CPAP INITIATION&MGMT: CPT | Performed by: INTERNAL MEDICINE

## 2021-06-01 PROCEDURE — 95811 POLYSOM 6/>YRS CPAP 4/> PARM: CPT | Performed by: INTERNAL MEDICINE

## 2021-06-01 PROCEDURE — 95811 POLYSOM 6/>YRS CPAP 4/> PARM: CPT

## 2021-06-02 NOTE — PROGRESS NOTES
[address] [facility] [place]. [place], [address]  Tel.  [phone]  Fax. 100 [facility] 60  [place], [address] [facility]  Tel.  [phone]  Fax. [phone] [address]  Tel.  [phone]  Fax. [phone]     Sleep Study Technical Notes        PRE-Test:  Miroslava Velasco (: 1950) arrived in the lobby. Patient was greeted, temperature checked (97.8 ) and screening questions asked. The patient was taken to the Sleep Center and taken directly to his/her room. BP (108/63) and SaO2 (96%) were taken. Weight per patient (316lbs). Procedure explained to the patient and questions were answered. The patient expressed understanding of the procedure. Electrodes were applied without incident. The patient was placed in bed and the study was started.  PAP mask acclimation for 5min. Patient tolerated mask. Acquisition Notes:   Lights off: 9:48pm     Respiratory events: Hypopneas, Obstructive and Central apneas noted   ECG:  Possible abnormalites observed   PAP titration: 9/5cm H20 - 21/17cm H2O (Backed down to 19/13cm H2O)   Desensitization Mask(s) Used: ResMed F20 AirTouch Medium FFM   Other comments: The study ordered was a BIPAP Titration. The Pt was started on BIPAP at 9/5cm H2O on a ResMed F20 AirTouch medium full-face mask. It should be noted that the Pt has a very thick beard but is used to a FFM. The Pt was placed in bed and lights were out at 9:48pm. Sleep onset occurred around 10:15pm. During the study stages 1, 2, and REM were noted. The Pt slept supine and on both sides. Moderate snoring was heard. The Pt appeared to show signs of sleep-disordered breathing as hypopneas, osbtructive and central apneas were noted. BIPAP was titrated accordingly to a final resting pressure of 19/13cm H2O with a max pressure of 21/17cm H2O.  The pressure was backed down due to high leak after switching to a [employer] Med Hybrid FFM.    This pressure appeared to be appropriate in elimination of most events and snoring. Lights were on at 5:30am    POST Test:   Patient was awakened. Electrodes were removed. The patient was discharged after answering the Post Questionnaire. Patient stated that he/she was alert and ok to drive.  Equipment and room cleaned per infection control policy.

## 2021-06-08 ENCOUNTER — TELEPHONE (OUTPATIENT)
Dept: SLEEP MEDICINE | Age: 71
End: 2021-06-08

## 2021-06-08 DIAGNOSIS — G47.33 OSA (OBSTRUCTIVE SLEEP APNEA): Primary | ICD-10-CM

## 2021-06-09 NOTE — TELEPHONE ENCOUNTER
Orders Placed This Encounter    AMB SUPPLY ORDER     Diagnosis: Sleep Apnea ICD-10 Code (G47.30); ICD-9 Code (780.57). Positive Airway Pressure Therapy: Duration of need: 99 months. ResMed VPAP Auto (VAuto Mode): Maximum IPAP: 20 cmH2O; Minimum EPAP: 12 cmH2O; PS: 6 cmH2O. Ramp Time: 30 Minutes. CPAP mask -  As fitted during titration OR patient preference, headgear, tubing, and filter;  heated humidifier; wireless modem. Remote monitoring enrollment. Humble Villalobos MD, FAASM; NPI: [id number]  Electronically signed. 06/08/21

## 2021-06-14 ENCOUNTER — OFFICE VISIT (OUTPATIENT)
Dept: INTERNAL MEDICINE CLINIC | Age: 71
End: 2021-06-14
Payer: MEDICARE

## 2021-06-14 VITALS
SYSTOLIC BLOOD PRESSURE: 123 MMHG | DIASTOLIC BLOOD PRESSURE: 75 MMHG | RESPIRATION RATE: 18 BRPM | HEART RATE: 83 BPM | HEIGHT: 71 IN | TEMPERATURE: 98.3 F | WEIGHT: 315 LBS | BODY MASS INDEX: 44.1 KG/M2 | OXYGEN SATURATION: 98 %

## 2021-06-14 DIAGNOSIS — E11.42 DIABETIC POLYNEUROPATHY ASSOCIATED WITH TYPE 2 DIABETES MELLITUS (HCC): ICD-10-CM

## 2021-06-14 DIAGNOSIS — M54.32 SCIATICA OF LEFT SIDE: Primary | ICD-10-CM

## 2021-06-14 PROCEDURE — 3046F HEMOGLOBIN A1C LEVEL >9.0%: CPT | Performed by: INTERNAL MEDICINE

## 2021-06-14 PROCEDURE — G8536 NO DOC ELDER MAL SCRN: HCPCS | Performed by: INTERNAL MEDICINE

## 2021-06-14 PROCEDURE — 3017F COLORECTAL CA SCREEN DOC REV: CPT | Performed by: INTERNAL MEDICINE

## 2021-06-14 PROCEDURE — 99213 OFFICE O/P EST LOW 20 MIN: CPT | Performed by: INTERNAL MEDICINE

## 2021-06-14 PROCEDURE — 1101F PT FALLS ASSESS-DOCD LE1/YR: CPT | Performed by: INTERNAL MEDICINE

## 2021-06-14 PROCEDURE — G9717 DOC PT DX DEP/BP F/U NT REQ: HCPCS | Performed by: INTERNAL MEDICINE

## 2021-06-14 PROCEDURE — G8754 DIAS BP LESS 90: HCPCS | Performed by: INTERNAL MEDICINE

## 2021-06-14 PROCEDURE — G8427 DOCREV CUR MEDS BY ELIG CLIN: HCPCS | Performed by: INTERNAL MEDICINE

## 2021-06-14 PROCEDURE — 2022F DILAT RTA XM EVC RTNOPTHY: CPT | Performed by: INTERNAL MEDICINE

## 2021-06-14 PROCEDURE — G8752 SYS BP LESS 140: HCPCS | Performed by: INTERNAL MEDICINE

## 2021-06-14 PROCEDURE — G8417 CALC BMI ABV UP PARAM F/U: HCPCS | Performed by: INTERNAL MEDICINE

## 2021-06-14 RX ORDER — GABAPENTIN 300 MG/1
300 CAPSULE ORAL
Qty: 90 CAPSULE | Refills: 0 | Status: SHIPPED | OUTPATIENT
Start: 2021-06-14 | End: 2021-06-24

## 2021-06-14 NOTE — PROGRESS NOTES
Identified pt with two pt identifiers(name and ). Chief Complaint   Patient presents with    Leg Pain     worse at night - x6-8wks      Vitals:    21 1545   BP: 123/75   Pulse: 83   Resp: 18   Temp: 98.3 °F (36.8 °C)   TempSrc: Temporal   SpO2: 98%   Weight: 317 lb (143.8 kg)   Height: 5' 11\" (1.803 m)   PainSc:   0 - No pain      Health Maintenance Due   Topic    COVID-19 Vaccine (1)    Shingrix Vaccine Age 50> (1 of 2)    Eye Exam Retinal or Dilated     Foot Exam Q1     AAA Screening 73-69 YO Male Smoking Patients     Pneumococcal 65+ years (2 of 2 - PPSV23)    MICROALBUMIN Q1     A1C test (Diabetic or Prediabetic)     Lipid Screen     Medicare Yearly Exam        Depression Screening:  :     3 most recent PHQ Screens 2019   Little interest or pleasure in doing things Not at all   Feeling down, depressed, irritable, or hopeless Not at all   Total Score PHQ 2 0        Fall Risk Assessment:  :     Fall Risk Assessment, last 12 mths 2021   Able to walk? Yes   Fall in past 12 months? 0   Do you feel unsteady? 0   Are you worried about falling 0       Abuse Screening:  :     Abuse Screening Questionnaire 2020   Do you ever feel afraid of your partner? N   Are you in a relationship with someone who physically or mentally threatens you? N   Is it safe for you to go home? Y        Coordination of Care Questionnaire:  :     1. Have you been to the ER, urgent care clinic since your last visit? Hospitalized since your last visit? No    2. Have you seen or consulted any other health care providers outside of the [address] [place] [address] since your last visit? Include any pap smears or colon screening.   [facility] - Dr. Gerard Shown - lower back disc is missing

## 2021-06-14 NOTE — PROGRESS NOTES
Mr. Kenisha Chaidez is a 72 y.o. white male with mild diabetes, controlled. He went to see his orthopedic doctor a few weeks ago because he was concerned about hip pain. They said that his hip was okay. He has had a hip replacement on the right. His pain was on the left and they thought his pain was coming from his back. He has got L5-S1 degeneration on plain x-rays. He complains of bilateral pain. It is worse at night. He is in the cast down to the feet. The feet have reduced sensation. He is now a diabetic for a number of years. He has got a restless kind of feeling in his legs. His blood pressure was normal.  He had an irregular pulse. Foot exam was done. Patient Active Problem List    Diagnosis    Diabetes mellitus type 2, diet-controlled ([address]    Benign non-nodular prostatic hyperplasia with lower urinary tract symptoms     Retention had catheter 3 months      Urinary retention due to benign prostatic hyperplasia    Diabetes mellitus type 2, controlled ([address]    Restless legs    VALENTE (obstructive sleep apnea)     Bi-Level: 19/15 cmH2O.  Dyslipidemia, goal LDL below 100    A-fib ([address]     Assymptomatic, noted on ekg 2/11; found when had sleep study. Saw cardiologist 2/8/11.       Diabetes ([address]     Recent diagnosis; just started diabetes education  HgA1C 6.5%      Morbid obesity (HCC)     OBESE 20 + YEARS; HIGH WEIGHT 362 LBS PAST MONTH; LOW WEIGHT 190 LBS 30 YEARS AGO      VALENTE (obstructive sleep apnea)    Erectile dysfunction due to arterial insufficiency    HTN (hypertension)     borderline      Depression     Vitals:    06/14/21 1545   BP: 123/75   Pulse: 83   Resp: 18   Temp: 98.3 °F (36.8 °C)   TempSrc: Temporal   SpO2: 98%   Weight: 317 lb (143.8 kg)   Height: 5' 11\" (1.803 m)     irreg irreg rhythm controlled rate  Diabetic foot exam:     Left Foot:   Visual Exam: normal    Pulse DP: 2+ (normal)   Filament test: reduced sensation    Vibratory sensation: absent      Right Foot:   Visual Exam: normal    Pulse DP: 2+ (normal)   Filament test: reduced sensation    Vibratory sensation: absent    1. Sciatica of left side  Not a big issue    2. Diabetic polyneuropathy associated with type 2 diabetes mellitus (HCC)  Add harry see 60 days  - gabapentin (NEURONTIN) 300 mg capsule; Take 1 Capsule by mouth nightly. Max Daily Amount: 300 mg. Dispense: 90 Capsule;  Refill: 0  - HM DIABETES FOOT EXAM

## 2021-06-17 ENCOUNTER — TELEPHONE (OUTPATIENT)
Dept: CARDIOLOGY CLINIC | Age: 71
End: 2021-06-17

## 2021-06-17 DIAGNOSIS — I10 ESSENTIAL HYPERTENSION: Primary | ICD-10-CM

## 2021-06-17 RX ORDER — LISINOPRIL AND HYDROCHLOROTHIAZIDE 20; 25 MG/1; MG/1
1 TABLET ORAL DAILY
Qty: 90 TABLET | Refills: 2 | Status: SHIPPED | OUTPATIENT
Start: 2021-06-17 | End: 2021-06-22 | Stop reason: SDUPTHER

## 2021-06-17 NOTE — TELEPHONE ENCOUNTER
Patient returned the call,   States the information you requested is: Lisinpril hctz, 20-25mg      [facility]/ [phone]            PHONE [phone]

## 2021-06-17 NOTE — TELEPHONE ENCOUNTER
Patient states he tried to refill medication, lisinopril, but it was discontinued by Dr. Nhi Goodson. Patient is requesting a call to discuss further. Patient states he is out of medication.      Pharmacy confirmed    PHONE: [phone]

## 2021-06-17 NOTE — TELEPHONE ENCOUNTER
Requested Prescriptions     Signed Prescriptions Disp Refills    lisinopril-hydroCHLOROthiazide (PRINZIDE, ZESTORETIC) 20-25 mg per tablet 90 Tablet 2     Sig: Take 1 Tablet by mouth daily.      Authorizing Provider: Tania Barrios     Ordering User: Greg Vicente verbal orders

## 2021-06-17 NOTE — TELEPHONE ENCOUNTER
Called patient. Discussed when last seen he was on lisinopril hctz, but now lisinopril without hctz is listed. Patient will double check rx when he gets home and call then I will fill rx. Patient verbalized understanding and denied further questions or concerns.

## 2021-06-17 NOTE — TELEPHONE ENCOUNTER
Patient  reeived notification from [facility] stating that it was too soon to fill the prescription.  Advised 06/21/2021 is the earliest that the rx will be filled    [facility][phone]

## 2021-06-17 NOTE — TELEPHONE ENCOUNTER
Called patient. Advised he call pharmacy and see if there is something they can do. Patient verbalized understanding and denied further questions or concerns.

## 2021-06-22 DIAGNOSIS — I10 ESSENTIAL HYPERTENSION: ICD-10-CM

## 2021-06-22 RX ORDER — LISINOPRIL AND HYDROCHLOROTHIAZIDE 20; 25 MG/1; MG/1
1 TABLET ORAL DAILY
Qty: 90 TABLET | Refills: 2 | Status: SHIPPED | OUTPATIENT
Start: 2021-06-22 | End: 2022-02-21 | Stop reason: SDUPTHER

## 2021-06-22 NOTE — TELEPHONE ENCOUNTER
Requested Prescriptions     Signed Prescriptions Disp Refills    lisinopril-hydroCHLOROthiazide (PRINZIDE, ZESTORETIC) 20-25 mg per tablet 90 Tablet 2     Sig: Take 1 Tablet by mouth daily.      Authorizing Provider: Felipe Payer     Ordering User: Bucky Espinoza    Per Dr. Flaca Lee verbal orders

## 2021-06-24 ENCOUNTER — OFFICE VISIT (OUTPATIENT)
Dept: INTERNAL MEDICINE CLINIC | Age: 71
End: 2021-06-24
Payer: MEDICARE

## 2021-06-24 VITALS
DIASTOLIC BLOOD PRESSURE: 79 MMHG | RESPIRATION RATE: 18 BRPM | WEIGHT: 315 LBS | BODY MASS INDEX: 44.1 KG/M2 | SYSTOLIC BLOOD PRESSURE: 138 MMHG | TEMPERATURE: 98.5 F | OXYGEN SATURATION: 98 % | HEIGHT: 71 IN | HEART RATE: 69 BPM

## 2021-06-24 DIAGNOSIS — E11.42 DIABETIC POLYNEUROPATHY ASSOCIATED WITH TYPE 2 DIABETES MELLITUS (HCC): ICD-10-CM

## 2021-06-24 DIAGNOSIS — E08.00 DIABETES MELLITUS DUE TO UNDERLYING CONDITION WITH HYPEROSMOLARITY WITHOUT COMA, WITHOUT LONG-TERM CURRENT USE OF INSULIN (HCC): Primary | ICD-10-CM

## 2021-06-24 DIAGNOSIS — E66.01 OBESITY, CLASS III, BMI 40-49.9 (MORBID OBESITY) (HCC): ICD-10-CM

## 2021-06-24 LAB — HBA1C MFR BLD HPLC: 6 %

## 2021-06-24 PROCEDURE — G8754 DIAS BP LESS 90: HCPCS | Performed by: INTERNAL MEDICINE

## 2021-06-24 PROCEDURE — 3017F COLORECTAL CA SCREEN DOC REV: CPT | Performed by: INTERNAL MEDICINE

## 2021-06-24 PROCEDURE — G8752 SYS BP LESS 140: HCPCS | Performed by: INTERNAL MEDICINE

## 2021-06-24 PROCEDURE — G8417 CALC BMI ABV UP PARAM F/U: HCPCS | Performed by: INTERNAL MEDICINE

## 2021-06-24 PROCEDURE — 3044F HG A1C LEVEL LT 7.0%: CPT | Performed by: INTERNAL MEDICINE

## 2021-06-24 PROCEDURE — G8427 DOCREV CUR MEDS BY ELIG CLIN: HCPCS | Performed by: INTERNAL MEDICINE

## 2021-06-24 PROCEDURE — G8536 NO DOC ELDER MAL SCRN: HCPCS | Performed by: INTERNAL MEDICINE

## 2021-06-24 PROCEDURE — G9717 DOC PT DX DEP/BP F/U NT REQ: HCPCS | Performed by: INTERNAL MEDICINE

## 2021-06-24 PROCEDURE — 2022F DILAT RTA XM EVC RTNOPTHY: CPT | Performed by: INTERNAL MEDICINE

## 2021-06-24 PROCEDURE — 99213 OFFICE O/P EST LOW 20 MIN: CPT | Performed by: INTERNAL MEDICINE

## 2021-06-24 PROCEDURE — 1101F PT FALLS ASSESS-DOCD LE1/YR: CPT | Performed by: INTERNAL MEDICINE

## 2021-06-24 PROCEDURE — 83036 HEMOGLOBIN GLYCOSYLATED A1C: CPT | Performed by: INTERNAL MEDICINE

## 2021-06-24 RX ORDER — GABAPENTIN 300 MG/1
600 CAPSULE ORAL
Qty: 180 CAPSULE | Refills: 1 | Status: SHIPPED | OUTPATIENT
Start: 2021-06-24 | End: 2021-08-01

## 2021-06-24 NOTE — PROGRESS NOTES
Mr. Jessica Taylor is a 72 y.o. white male with mild diabetes, controlled. He went to see his orthopedic doctor a few weeks ago because he was concerned about hip pain. They said that his hip was okay. He has had a hip replacement on the right. His pain was on the left and they thought his pain was coming from his back. He has got L5-S1 degeneration on plain x-rays. He complains of bilateral pain. It is worse at night. He is in the cast down to the feet. The feet have reduced sensation. He is now a diabetic for a number of years. He has got a restless kind of feeling in his legs. Pain is gone still poor sleep    His blood pressure was normal.  He had an irregular pulse. Foot exam was done. Patient Active Problem List    Diagnosis    Diabetes mellitus type 2, diet-controlled ([address]    Benign non-nodular prostatic hyperplasia with lower urinary tract symptoms     Retention had catheter 3 months      Urinary retention due to benign prostatic hyperplasia    Diabetes mellitus type 2, controlled ([address]    Restless legs    VALENTE (obstructive sleep apnea)     Bi-Level: 19/15 cmH2O.  Dyslipidemia, goal LDL below 100    A-fib ([address]     Assymptomatic, noted on ekg 2/11; found when had sleep study. Saw cardiologist 2/8/11.       Diabetes ([address]     Recent diagnosis; just started diabetes education  HgA1C 6.5%      Morbid obesity (HCC)     OBESE 20 + YEARS; HIGH WEIGHT 362 LBS PAST MONTH; LOW WEIGHT 190 LBS 30 YEARS AGO      VALENTE (obstructive sleep apnea)    Erectile dysfunction due to arterial insufficiency    HTN (hypertension)     borderline      Depression     Vitals:    06/24/21 0831   BP: 138/79   Pulse: 69   Resp: 18   Temp: 98.5 °F (36.9 °C)   TempSrc: Temporal   SpO2: 98%   Weight: 322 lb (146.1 kg)   Height: 5' 11\" (1.803 m)     irreg irreg rhythm controlled rate  Lab Results   Component Value Date/Time    Hemoglobin A1c 6.3 (H) 02/07/2020 12:00 PM    Hemoglobin A1c (POC) 6.0 06/24/2021 08:47 AM 1. Diabetes mellitus due to underlying condition with hyperosmolarity without coma, without long-term current use of insulin ([facility])  Diet controlled  - AMB POC HEMOGLOBIN A1C    2. Diabetic polyneuropathy associated with type 2 diabetes mellitus ([facility])  Try 600 mg to improve sleep  - gabapentin (NEURONTIN) 300 mg capsule; Take 2 Capsules by mouth nightly. Max Daily Amount: 600 mg. Dispense: 180 Capsule; Refill: 1    3.  Obesity, Class III, BMI 40-49.9 (morbid obesity) ([facility])  Diet exercise weight loss

## 2021-06-24 NOTE — PROGRESS NOTES
Lavinia Becerra is a 72 y.o. male  Chief Complaint   Patient presents with    Diabetes     A1C    Follow-up     Health Maintenance Due   Topic Date Due    Shingrix Vaccine Age 49> (1 of 2) Never done    Eye Exam Retinal or Dilated  06/19/2015    AAA Screening 73-67 YO Male Smoking Patients  Never done    Pneumococcal 65+ years (2 of 2 - PPSV23) 01/13/2019    MICROALBUMIN Q1  05/02/2019    A1C test (Diabetic or Prediabetic)  02/07/2021    Lipid Screen  02/07/2021    Medicare Yearly Exam  02/24/2021     Visit Vitals  /79   Pulse 69   Temp 98.5 °F (36.9 °C) (Temporal)   Resp 18   Ht 5' 11\" (1.803 m)   Wt 322 lb (146.1 kg)   SpO2 98%   BMI 44.91 kg/m²     1. Have you been to the ER, urgent care clinic since your last visit? Hospitalized since your last visit? No     2. Have you seen or consulted any other health care providers outside of the [address] [place] [address] since your last visit? Include any pap smears or colon screening.  No

## 2021-07-02 DIAGNOSIS — M10.271: ICD-10-CM

## 2021-07-05 RX ORDER — COLCHICINE 0.6 MG/1
TABLET ORAL
Qty: 60 TABLET | Refills: 1 | Status: SHIPPED | OUTPATIENT
Start: 2021-07-05 | End: 2021-11-14

## 2021-07-22 ENCOUNTER — HOSPITAL ENCOUNTER (EMERGENCY)
Age: 71
Discharge: HOME | End: 2021-07-22
Attending: EMERGENCY MEDICINE
Payer: MEDICARE

## 2021-07-22 VITALS
HEIGHT: 71 IN | OXYGEN SATURATION: 95 % | RESPIRATION RATE: 16 BRPM | HEART RATE: 76 BPM | TEMPERATURE: 98.4 F | WEIGHT: 315 LBS | BODY MASS INDEX: 44.1 KG/M2 | SYSTOLIC BLOOD PRESSURE: 102 MMHG | DIASTOLIC BLOOD PRESSURE: 67 MMHG

## 2021-07-22 DIAGNOSIS — T81.9XXA COMPLICATION OF PROCEDURE, INITIAL ENCOUNTER: Primary | ICD-10-CM

## 2021-07-22 DIAGNOSIS — Z92.29 HISTORY OF ANTICOAGULANT USE: ICD-10-CM

## 2021-07-22 PROCEDURE — 99283 EMERGENCY DEPT VISIT LOW MDM: CPT

## 2021-07-22 PROCEDURE — 74011000250 HC RX REV CODE- 250: Performed by: EMERGENCY MEDICINE

## 2021-07-22 RX ADMIN — WATER 10 ML: 1 INJECTION INTRAMUSCULAR; INTRAVENOUS; SUBCUTANEOUS at 02:22

## 2021-07-22 NOTE — ED PROVIDER NOTES
64-year-old male with a past medical history significant for atrial fibrillation, chronic anticoagulation with Xarelto depression, diabetes, hypercholesterolemia, hypertension, morbid obesity, obstructive sleep apnea and restless legs who presents to the ED for evaluation for continuous bleeding after dental extraction procedure that was performed this morning. The patient states that he did not tell his oral surgeon that he was on anticoagulation. The patient has been bleeding since procedure performed and released from the office this afternoon. He denies any fever chills, headache, neck and back pain, chest pain, shortness of breath, nausea, vomiting, diarrhea, constipation, dysuria, dizziness, extremity weakness or numbness.            Past Medical History:   Diagnosis Date    Arrhythmia     atrial fib    Depression 4/24/2010    Diabetes ([facility]     diet control for pre-diabetes    Dyslipidemia, goal LDL below 100 6/14/2011    HTN (hypertension) 4/24/2010    Impotence 4/24/2010    Morbid obesity ([address] 5/17/2010    VALENTE (obstructive sleep apnea) 4/24/2010    CPAP    Restless legs 4/15/2015       Past Surgical History:   Procedure Laterality Date    COLONOSCOPY N/A 6/27/2017    COLONOSCOPY performed by Rocael Blackwell MD at [facility]. [address], COLON, DIAGNOSTIC  2007    HX APPENDECTOMY      HX ORTHOPAEDIC  2000    bilat TKR     HX ORTHOPAEDIC  2010    left THR    HX UROLOGICAL  03/2018    urolift    HX UROLOGICAL  03/2019    prosthesis         Family History:   Problem Relation Age of Onset    Cancer Father         leukemia    Cancer Paternal Grandfather     Diabetes Sister     Diabetes Brother     Cancer Maternal Aunt     Cancer Maternal Uncle        Social History     Socioeconomic History    Marital status:      Spouse name: Not on file    Number of children: Not on file    Years of education: Not on file    Highest education level: Not on file   Occupational History    Not on file   Tobacco Use    Smoking status: Former Smoker     Packs/day: 0.50     Years: 5.00     Pack years: 2.50     Quit date: 1990     Years since quittin.2    Smokeless tobacco: Never Used   Substance and Sexual Activity    Alcohol use: Yes     Alcohol/week: 1.7 standard drinks     Types: 2 Standard drinks or equivalent per week     Comment: 2-3 drinks per week    Drug use: No    Sexual activity: Not Currently   Other Topics Concern    Not on file   Social History Narrative    Not on file     Social Determinants of Health     Financial Resource Strain:     Difficulty of Paying Living Expenses:    Food Insecurity:     Worried About Running Out of Food in the Last Year:     Ran Out of Food in the Last Year:    Transportation Needs:     Lack of Transportation (Medical):  Lack of Transportation (Non-Medical):    Physical Activity:     Days of Exercise per Week:     Minutes of Exercise per Session:    Stress:     Feeling of Stress :    Social Connections:     Frequency of Communication with Friends and Family:     Frequency of Social Gatherings with Friends and Family:     Attends Church Services:     Active Member of Clubs or Organizations:     Attends Club or Organization Meetings:     Marital Status:    Intimate Partner Violence:     Fear of Current or Ex-Partner:     Emotionally Abused:     Physically Abused:     Sexually Abused: ALLERGIES: Patient has no known allergies. Review of Systems   All other systems reviewed and are negative. Vitals:    21 0241 21 0243 21 0248 21 0300   BP: 138/88   129/73   Pulse:       Resp:       Temp:       SpO2:  96% 95% 95%   Weight:       Height:                Physical Exam  Vitals and nursing note reviewed. Exam conducted with a chaperone present.         CONSTITUTIONAL: Well-appearing; well-nourished; in no apparent distress  HEAD: Normocephalic; atraumatic  EYES: PERRL; EOM intact; conjunctiva and sclera are clear bilaterally. ENT: Poor dentition with blood oozing from the site of the tooth extraction with clots that was removed from the area. Sutures appear intact. No rhinorrhea; normal pharynx with no tonsillar hypertrophy; mucous membranes pink/moist, no erythema, no exudate. NECK: Supple; non-tender; no cervical lymphadenopathy  CARD: Normal S1, S2; no murmurs, rubs, or gallops. Regular rate and rhythm. RESP: Normal respiratory effort; breath sounds clear and equal bilaterally; no wheezes, rhonchi, or rales. ABD: Normal bowel sounds; non-distended; non-tender; no palpable organomegaly, no masses, no bruits. Back Exam: Normal inspection; no vertebral point tenderness, no CVA tenderness. Normal range of motion. EXT: Normal ROM in all four extremities; non-tender to palpation; no swelling or deformity; distal pulses are normal, no edema. SKIN: Warm; dry; no rash. NEURO:Alert and oriented x 3, coherent, DEBI-XII grossly intact, sensory and motor are non-focal.        MDM  Number of Diagnoses or Management Options  Diagnosis management comments: Assessment: Postop bleeding secondary to anticoagulation use    Plan: Education, reassurance, symptomatic treatment/topical anesthetic management with TXA/Surgifoam/ LET/serial exam/ Monitor and Reevaluate.          Amount and/or Complexity of Data Reviewed  Clinical lab tests: ordered and reviewed  Tests in the radiology section of CPT®: ordered and reviewed  Tests in the medicine section of CPT®: reviewed and ordered  Discussion of test results with the performing providers: yes  Decide to obtain previous medical records or to obtain history from someone other than the patient: yes  Obtain history from someone other than the patient: yes  Review and summarize past medical records: yes  Discuss the patient with other providers: yes  Independent visualization of images, tracings, or specimens: yes    Risk of Complications, Morbidity, and/or Mortality  Presenting problems: moderate  Diagnostic procedures: moderate  Management options: moderate    Patient Progress  Patient progress: stable         Procedures    Progress Note:   Pt has been reexamined by Arlene Justin MD. Pt is feeling much better. Symptoms have improved. The patient was observed for 2 hours without any further sequelae. Bleeding has resolved. All available results have been reviewed with pt and any available family. Pt understands sx, dx, and tx in ED. Care plan has been outlined and questions have been answered. Pt is ready to go home. Will send home on postop bleeding and anticoagulation instruction. . Outpatient referral with PCP/  as needed. Written by Arlene Justin MD,8:48 PM    .   .

## 2021-07-22 NOTE — ED TRIAGE NOTES
Pt arrives ambulatory to ED w/ CC bleeding gums. Had 3 teeth pulled from left upper region of mouth and at 4pm reports it won't stop bleeding. Takes xarelto. Actively bleeding when paper towel removed from mouth. Pt given gauze. Gauze currently in pt's mouth with pressure applied. Called dentist earlier and was instructed to put tea bags on gums. Stated he tried that multiple times but it is still bleeding. Denies pain. VSS.

## 2021-07-22 NOTE — ED NOTES
Discharge instructions given to patient by MD and nurse. Pt was given counseling on medication use and verbalizes understanding. Pt ambulated off unit in no signs of distress.

## 2021-07-23 ENCOUNTER — PATIENT OUTREACH (OUTPATIENT)
Dept: CASE MANAGEMENT | Age: 71
End: 2021-07-23

## 2021-07-23 NOTE — PROGRESS NOTES
7/23/2021  9:34 AM    Educated patient about risk for severe COVID-19 due to risk factors according to CDC guidelines. ACM reviewed discharge instructions, medical action plan and red flag symptoms with the patient who verbalized understanding. Discussed COVID vaccination status: Yes; pt reports fully vaccinated. Education provided on COVID-19 vaccination as appropriate. Discussed exposure protocols and quarantine with CDC Guidelines. Patient was given an opportunity to verbalize any questions and concerns and agrees to contact ACM or health care provider for questions related to their healthcare. Patient reports that he attended follow-up with his oral surgeon on 7/22 post-discharge.

## 2021-07-31 DIAGNOSIS — E11.42 DIABETIC POLYNEUROPATHY ASSOCIATED WITH TYPE 2 DIABETES MELLITUS (HCC): ICD-10-CM

## 2021-08-01 RX ORDER — GABAPENTIN 300 MG/1
CAPSULE ORAL
Qty: 90 CAPSULE | Refills: 0 | Status: SHIPPED | OUTPATIENT
Start: 2021-08-01 | End: 2021-08-01 | Stop reason: SDUPTHER

## 2021-08-06 ENCOUNTER — PATIENT OUTREACH (OUTPATIENT)
Dept: CASE MANAGEMENT | Age: 71
End: 2021-08-06

## 2021-08-06 NOTE — PROGRESS NOTES
8/6/2021  11:48 AM    Patient resolved from [address] episode on 8/6/2021. Patient/family has been provided the following resources and education related to COVID-19:                         Signs, symptoms and red flags related to COVID-19            CDC exposure and quarantine guidelines            [facility] exposure contact - [phone]            Contact for their local Department of Health                 Patient currently reports that the following symptoms have improved:  no new symptoms. No further outreach scheduled with this CTN/ACM/LPN/HC/ MA. Episode of Care resolved. Patient has this CTN/ACM/LPN/HC/MA contact information if future needs arise.

## 2021-08-16 ENCOUNTER — TELEPHONE (OUTPATIENT)
Dept: CARDIOLOGY CLINIC | Age: 71
End: 2021-08-16

## 2021-08-16 NOTE — TELEPHONE ENCOUNTER
Called pt. Verified patient's identity with two identifiers. Notified pt of message. Patient verbalized understanding and denied further questions or concerns.

## 2021-08-16 NOTE — TELEPHONE ENCOUNTER
Patient said he is interested in medical cannabis and wants to know if there may be any problems with him taking this while taking Xarelto. Please give a call back.       Phone [phone]

## 2021-08-23 ENCOUNTER — OFFICE VISIT (OUTPATIENT)
Dept: INTERNAL MEDICINE CLINIC | Age: 71
End: 2021-08-23
Payer: MEDICARE

## 2021-08-23 VITALS
DIASTOLIC BLOOD PRESSURE: 75 MMHG | OXYGEN SATURATION: 98 % | HEIGHT: 71 IN | RESPIRATION RATE: 18 BRPM | BODY MASS INDEX: 44.1 KG/M2 | WEIGHT: 315 LBS | TEMPERATURE: 98 F | HEART RATE: 95 BPM | SYSTOLIC BLOOD PRESSURE: 116 MMHG

## 2021-08-23 DIAGNOSIS — Z20.822 EXPOSURE TO COVID-19 VIRUS: Primary | ICD-10-CM

## 2021-08-23 DIAGNOSIS — Z13.39 SCREENING FOR ALCOHOLISM: ICD-10-CM

## 2021-08-23 DIAGNOSIS — Z00.00 MEDICARE ANNUAL WELLNESS VISIT, SUBSEQUENT: ICD-10-CM

## 2021-08-23 DIAGNOSIS — E11.42 DIABETIC POLYNEUROPATHY ASSOCIATED WITH TYPE 2 DIABETES MELLITUS (HCC): ICD-10-CM

## 2021-08-23 PROCEDURE — G8427 DOCREV CUR MEDS BY ELIG CLIN: HCPCS | Performed by: INTERNAL MEDICINE

## 2021-08-23 PROCEDURE — 2022F DILAT RTA XM EVC RTNOPTHY: CPT | Performed by: INTERNAL MEDICINE

## 2021-08-23 PROCEDURE — 3044F HG A1C LEVEL LT 7.0%: CPT | Performed by: INTERNAL MEDICINE

## 2021-08-23 PROCEDURE — 3017F COLORECTAL CA SCREEN DOC REV: CPT | Performed by: INTERNAL MEDICINE

## 2021-08-23 PROCEDURE — 1101F PT FALLS ASSESS-DOCD LE1/YR: CPT | Performed by: INTERNAL MEDICINE

## 2021-08-23 PROCEDURE — G8754 DIAS BP LESS 90: HCPCS | Performed by: INTERNAL MEDICINE

## 2021-08-23 PROCEDURE — G8417 CALC BMI ABV UP PARAM F/U: HCPCS | Performed by: INTERNAL MEDICINE

## 2021-08-23 PROCEDURE — G9717 DOC PT DX DEP/BP F/U NT REQ: HCPCS | Performed by: INTERNAL MEDICINE

## 2021-08-23 PROCEDURE — G0439 PPPS, SUBSEQ VISIT: HCPCS | Performed by: INTERNAL MEDICINE

## 2021-08-23 PROCEDURE — 99213 OFFICE O/P EST LOW 20 MIN: CPT | Performed by: INTERNAL MEDICINE

## 2021-08-23 PROCEDURE — G8752 SYS BP LESS 140: HCPCS | Performed by: INTERNAL MEDICINE

## 2021-08-23 PROCEDURE — G8536 NO DOC ELDER MAL SCRN: HCPCS | Performed by: INTERNAL MEDICINE

## 2021-08-23 RX ORDER — ZOSTER VACCINE RECOMBINANT, ADJUVANTED 50 MCG/0.5
KIT INTRAMUSCULAR
Qty: 0.5 ML | Refills: 1 | Status: SHIPPED | OUTPATIENT
Start: 2021-08-23 | End: 2021-11-14

## 2021-08-23 NOTE — PATIENT INSTRUCTIONS
Medicare Wellness Visit, Male    The best way to live healthy is to have a lifestyle where you eat a well-balanced diet, exercise regularly, limit alcohol use, and quit all forms of tobacco/nicotine, if applicable. Regular preventive services are another way to keep healthy. Preventive services (vaccines, screening tests, monitoring & exams) can help personalize your care plan, which helps you manage your own care. Screening tests can find health problems at the earliest stages, when they are easiest to treat. [facility] follows the current, evidence-based guidelines published by the [facility]ej[facility] Malinda ([facility]) when recommending preventive services for our patients. Because we follow these guidelines, sometimes recommendations change over time as research supports it. (For example, a prostate screening blood test is no longer routinely recommended for men with no symptoms). Of course, you and your doctor may decide to screen more often for some diseases, based on your risk and co-morbidities (chronic disease you are already diagnosed with). Preventive services for you include:  - Medicare offers their members a free annual wellness visit, which is time for you and your primary care provider to discuss and plan for your preventive service needs. Take advantage of this benefit every year!  -All adults over age 72 should receive the recommended pneumonia vaccines. Current USPSTF guidelines recommend a series of two vaccines for the best pneumonia protection.   -All adults should have a flu vaccine yearly and tetanus vaccine every 10 years.  -All adults age 48 and older should receive the shingles vaccines (series of two vaccines).        -All adults age 38-68 who are overweight should have a diabetes screening test once every three years.   -Other screening tests & preventive services for persons with diabetes include: an eye exam to screen for diabetic retinopathy, a kidney function test, a foot exam, and stricter control over your cholesterol.   -Cardiovascular screening for adults with routine risk involves an electrocardiogram (ECG) at intervals determined by the provider.   -Colorectal cancer screening should be done for adults age 54-65 with no increased risk factors for colorectal cancer. There are a number of acceptable methods of screening for this type of cancer. Each test has its own benefits and drawbacks. Discuss with your provider what is most appropriate for you during your annual wellness visit. The different tests include: colonoscopy (considered the best screening method), a fecal occult blood test, a fecal DNA test, and sigmoidoscopy.  -All adults born between [facility] should be screened once for Hepatitis C.  -An Abdominal Aortic Aneurysm (AAA) Screening is recommended for men age 73-68 who has ever smoked in their lifetime.      Here is a list of your current Health Maintenance items (your personalized list of preventive services) with a due date:  Health Maintenance Due   Topic Date Due    Shingles Vaccine (1 of 2) Never done    Eye Exam  06/19/2015    AAA Screening  Never done    Pneumococcal Vaccine (2 of 2 - PPSV23) 01/13/2019    Albumin Urine Test  05/02/2019    Cholesterol Test   02/07/2021

## 2021-08-23 NOTE — PROGRESS NOTES
This is the Subsequent Medicare Annual Wellness Exam, performed 12 months or more after the Initial AWV or the last Subsequent AWV    I have reviewed the patient's medical history in detail and updated the computerized patient record. Assessment/Plan   Education and counseling provided:  Are appropriate based on today's review and evaluation  Influenza Vaccine    1. Exposure to COVID-19 virus  -     NOVEL CORONAVIRUS (COVID-19)  2. Diabetic polyneuropathy associated with type 2 diabetes mellitus (HCC)  -     MICROALBUMIN, UR, RAND W/ MICROALB/CREAT RATIO; Future  -     CBC WITH AUTOMATED DIFF; Future  -     LIPID PANEL; Future  -     METABOLIC PANEL, COMPREHENSIVE; Future  3. Medicare annual wellness visit, subsequent  -     varicella-zoster recombinant, PF, (Shingrix, PF,) 50 mcg/0.5 mL susr injection; 0.5mL by IntraMUSCular route once now and then repeat in 2-6 months, Print, Disp-0.5 mL, R-1  4. Screening for alcoholism  -     MA ANNUAL ALCOHOL SCREEN 15 MIN       Depression Risk Factor Screening     3 most recent PHQ Screens 8/23/2021   Little interest or pleasure in doing things Not at all   Feeling down, depressed, irritable, or hopeless Not at all   Total Score PHQ 2 0       Alcohol Risk Screen    Do you average more than 1 drink per night or more than 7 drinks a week: No    In the past three months have you have had more than 4 drinks containing alcohol on one occasion: No        Functional Ability and Level of Safety    Hearing: The patient wears hearing aids. Activities of Daily Living: The home contains: no safety equipment. Patient does total self care      Ambulation: with no difficulty     Fall Risk:  Fall Risk Assessment, last 12 mths 6/14/2021   Able to walk? Yes   Fall in past 12 months? 0   Do you feel unsteady?  0   Are you worried about falling 0      Abuse Screen:  Patient is not abused       Cognitive Screening    Has your family/caregiver stated any concerns about your memory: no     Cognitive Screening: Normal - Verbal Fluency Test    Health Maintenance Due     Health Maintenance Due   Topic Date Due    Shingrix Vaccine Age 49> (1 of 2) Never done    Eye Exam Retinal or Dilated  06/19/2015    AAA Screening 73-67 YO Male Smoking Patients  Never done    Pneumococcal 65+ years (2 of 2 - PPSV23) 01/13/2019    MICROALBUMIN Q1  05/02/2019    Lipid Screen  02/07/2021       Patient Care Team   Patient Care Team:  Guera Garrett MD as PCP - [facility]  Guera Garrett MD as PCP - [facility] Empaneled Provider  Chuy Todd MD as Physician (Sleep Medicine)  Mary Garrido MD (Cardiology)  Montse Ortiz MD as Physician (Urology)    History     Patient Active Problem List   Diagnosis Code    VALENTE (obstructive sleep apnea) G47.33    Erectile dysfunction due to arterial insufficiency N52.01    HTN (hypertension) I10    Depression F32.9    Morbid obesity ([address] E66.01    Diabetes ([address] E11.9    A-fib ([address] I48.91    Dyslipidemia, goal LDL below 100 E78.5    VALENTE (obstructive sleep apnea) G47.33    Restless legs G25.81    Diabetes mellitus type 2, controlled ([address] E11.9    Urinary retention due to benign prostatic hyperplasia N40.1, R33.8    Benign non-nodular prostatic hyperplasia with lower urinary tract symptoms N40.1    Diabetes mellitus type 2, diet-controlled ([address] E11.9     Past Medical History:   Diagnosis Date    Arrhythmia     atrial fib    Depression 4/24/2010    Diabetes ([address]     diet control for pre-diabetes    Dyslipidemia, goal LDL below 100 6/14/2011    HTN (hypertension) 4/24/2010    Impotence 4/24/2010    Morbid obesity ([address] 5/17/2010    VALENTE (obstructive sleep apnea) 4/24/2010    CPAP    Restless legs 4/15/2015      Past Surgical History:   Procedure Laterality Date    COLONOSCOPY N/A 6/27/2017    COL[facility] ENDOSCOPY    ENDOSCOPY, COLON, DIAGNOSTIC  2007    HX APPENDECTOMY      HX ORTHOPAEDIC     bilat TKR     HX ORTHOPAEDIC      left THR    HX UROLOGICAL  2018    urolift    HX UROLOGICAL  2019    prosthesis     Current Outpatient Medications   Medication Sig Dispense Refill    varicella-zoster recombinant, PF, (Shingrix, PF,) 50 mcg/0.5 mL susr injection 0.5mL by IntraMUSCular route once now and then repeat in 2-6 months 0.5 mL 1    gabapentin (NEURONTIN) 300 mg capsule Take 1 Capsule by mouth two (2) times a day. Max Daily Amount: 600 mg. 180 Capsule 1    colchicine 0.6 mg tablet TAKE 1 TABLET BY MOUTH EVERY DAY 60 Tablet 1    lisinopril-hydroCHLOROthiazide (PRINZIDE, ZESTORETIC) 20-25 mg per tablet Take 1 Tablet by mouth daily. 90 Tablet 2    allopurinoL (ZYLOPRIM) 100 mg tablet TAKE 1 TAB BY MOUTH DAILY. TAKE WITH COLCRYS 0.6 MG FIRST 3 WEEKS 90 Tab 1    rivaroxaban (Xarelto) 20 mg tab tablet TAKE 1 TABLET BY MOUTH IN  THE MORNING WITH BREAKFAST. 80 Tab 3     No Known Allergies    Family History   Problem Relation Age of Onset    Cancer Father         leukemia    Cancer Paternal Grandfather     Diabetes Sister     Diabetes Brother     Cancer Maternal Aunt     Cancer Maternal Uncle      Social History     Tobacco Use    Smoking status: Former Smoker     Packs/day: 0.50     Years: 5.00     Pack years: 2.50     Quit date: 1990     Years since quittin.2    Smokeless tobacco: Never Used   Substance Use Topics    Alcohol use:  Yes     Alcohol/week: 1.7 standard drinks     Types: 2 Standard drinks or equivalent per week     Comment: 2-3 drinks per week     covid exposure at [employer] one of co workers diagnosed covid he was in same building 4 days ago no symptoms  Was wearing a mask in building  Feet better with new dose gabapentin and tolerates well  No gout  Had bleeding from gums due to dental extraction on anticoagulant  No [problem now  Was in ER   He has diabetic neuropathy, doing better, using a slightly higher dose of gabapentin since the last visit. He has a history of sleep apnea. His CPAP has been adjusted. He has a history of AFib on anticoagulants, had some bleeding after dental extractions that has resolved. He has had no side effects otherwise and has had no TIA, stroke, or cardiovascular symptoms. Generally doing okay. Exposure to COVID was minimal to none in the same building. Was not with this person for 15 minutes or more, and also had no close encounters with this person; however, screening is a good idea. He is double vaccinated but it is reasonable to screen a double vaccinated person who has been exposed. meds unchanged  Diamond Pennington MD         Patient Active Problem List    Diagnosis    Diabetes mellitus type 2, diet-controlled ([address]    Benign non-nodular prostatic hyperplasia with lower urinary tract symptoms     Retention had catheter 3 months      Urinary retention due to benign prostatic hyperplasia    Diabetes mellitus type 2, controlled ([address]    Restless legs    VALENTE (obstructive sleep apnea)     Bi-Level: 19/15 cmH2O.  Dyslipidemia, goal LDL below 100    A-fib ([address]     Assymptomatic, noted on ekg 2/11; found when had sleep study. Saw cardiologist 2/8/11.       Diabetes ([address]     Recent diagnosis; just started diabetes education  HgA1C 6.5%      Morbid obesity (HCC)     OBESE 20 + YEARS; HIGH WEIGHT 362 LBS PAST MONTH; LOW WEIGHT 190 LBS 30 YEARS AGO      VALENTE (obstructive sleep apnea)    Erectile dysfunction due to arterial insufficiency    HTN (hypertension)     borderline      Depression     Lab Results   Component Value Date/Time    Hemoglobin A1c 6.3 (H) 02/07/2020 12:00 PM    Hemoglobin A1c (POC) 6.0 06/24/2021 08:47 AM     Wt Readings from Last 3 Encounters:   08/23/21 318 lb (144.2 kg)   07/22/21 316 lb 9.3 oz (143.6 kg)   06/24/21 322 lb (146.1 kg)     Vitals:    08/23/21 1312   BP: 116/75   Pulse: 95   Resp: 18   Temp: 98 °F (36.7 °C)   TempSrc: Temporal   SpO2: 98%   Weight: 318 lb (144.2 kg)   Height: 5' 11\" (1.803 m)     irreg irreg rhythm controlled rate  No neck adenopathy  Chest clear  abd obese non tender  Gait normal      1. Exposure to COVID-19 virus  screen  - NOVEL CORONAVIRUS (COVID-19)    2. Diabetic polyneuropathy associated with type 2 diabetes mellitus (HCC)  Better   ,lasthba    - METABOLIC PANEL, COMPREHENSIVE; Future  - LIPID PANEL; Future  - CBC WITH AUTOMATED DIFF; Future  - MICROALBUMIN, UR, RAND W/ MICROALB/CREAT RATIO; Future    3. Medicare annual wellness visit, subsequent  due  - varicella-zoster recombinant, PF, (Shingrix, PF,) 50 mcg/0.5 mL susr injection; 0.5mL by IntraMUSCular route once now and then repeat in 2-6 months  Dispense: 0.5 mL; Refill: 1    4.  Screening for alcoholism  none  - ME ANNUAL ALCOHOL SCREEN 15 MIN

## 2021-08-23 NOTE — PROGRESS NOTES
Chief Complaint   Patient presents with    Physical     1. Have you been to the ER, urgent care clinic since your last visit? Hospitalized since your last visit? No    2. Have you seen or consulted any other health care providers outside of the [address] [place] [address] since your last visit? Include any pap smears or colon screening.  No

## 2021-08-24 LAB
SARS-COV-2, NAA 2 DAY TAT: NORMAL
SARS-COV-2, NAA: NOT DETECTED

## 2021-09-29 ENCOUNTER — VIRTUAL VISIT (OUTPATIENT)
Dept: SLEEP MEDICINE | Age: 71
End: 2021-09-29

## 2021-09-29 ENCOUNTER — TELEPHONE (OUTPATIENT)
Dept: SLEEP MEDICINE | Age: 71
End: 2021-09-29

## 2021-09-29 NOTE — TELEPHONE ENCOUNTER
Nicole Banks (: 1950) last seen by Dr. Russ Reaves on 2021. In lab sleep test 2000 showed an AHI of 30.7/hr with a lowest SpO2 of 82%. Repeat Split-Night Testing 2011 indicated an AHI of 30.7/hr with a lowest SpO2 of 89%. Patient was adequately treated on CPAP level of 17 cmH2O. A repeat Split-Night test performed on 2015 was indicative of an AHI of 65.3/hr with a lowest SpO2 of 89%. Patient was adequately treated on CPAP level of 14 cmH2O. He returned for a Bi-Level PAP titration on 2015 and was adequately titrated on 13/06 cmH2O. Most recent titration 2021 adequate on BiLevel - pressure change to Max IPAP 20, Min EPAP 12, PS 6      AHI: 65.3(2015). On ResMed:  AirCurve 10 VAuto: Max IPAP 20, Min EPAP 12, PS 6 cmh2O. Set up 2017. Patient was to be seen for new device 21 - not yet eligible for new device based on age of current device. Called patient to discuss pressure change and answer questions. He notes contiued dry mouth, we will increase huimidity to 7 per his request, changes made by provider in [facility] system and sent to [facility]. Device shows: Auto BiLevel pressure: Max IPAP 20, Min EPAP 12, PS 6 cmh2O  95th Percentile Leak: 29.5 L/Min     CMS Compliance % Used Days >= 4 hours: 100. Average daily use of 7:27 hours. Therapy Apnea Index averaged over PAP use: 2.0 /hr which reflects improved sleep breathing condition.     Hanna Roberto NP, [facility]  21

## 2021-10-01 NOTE — PROGRESS NOTES
Appt cancelled, new device not yet received. Manny Whitney NP, [facility]  10/01/21    This encounter was created in error - please disregard.

## 2021-10-03 DIAGNOSIS — M1A.29X0 DRUG-INDUCED CHRONIC GOUT OF MULTIPLE SITES WITHOUT TOPHUS: ICD-10-CM

## 2021-10-03 RX ORDER — ALLOPURINOL 100 MG/1
100 TABLET ORAL DAILY
Qty: 90 TABLET | Refills: 1 | Status: SHIPPED | OUTPATIENT
Start: 2021-10-03 | End: 2022-04-26 | Stop reason: SDUPTHER

## 2021-10-30 DIAGNOSIS — E11.42 DIABETIC POLYNEUROPATHY ASSOCIATED WITH TYPE 2 DIABETES MELLITUS (HCC): ICD-10-CM

## 2021-10-31 ENCOUNTER — PATIENT MESSAGE (OUTPATIENT)
Dept: INTERNAL MEDICINE CLINIC | Age: 71
End: 2021-10-31

## 2021-10-31 DIAGNOSIS — E11.42 DIABETIC POLYNEUROPATHY ASSOCIATED WITH TYPE 2 DIABETES MELLITUS (HCC): ICD-10-CM

## 2021-10-31 RX ORDER — GABAPENTIN 300 MG/1
300 CAPSULE ORAL 2 TIMES DAILY
Qty: 180 CAPSULE | Refills: 1 | Status: SHIPPED | OUTPATIENT
Start: 2021-10-31 | End: 2021-11-01 | Stop reason: SDUPTHER

## 2021-11-01 NOTE — TELEPHONE ENCOUNTER
Future Appointments:  Future Appointments   Date Time Provider Doc Hernandez   1/28/2022  4:00 PM MD TOOTIE Kenney BS AMB   2/21/2022  3:00 PM MD SHANE Zambrano BS AMB   3/24/2022  3:50 PM Shaye Velásquez NP [facility] BS AMB        Last Appointment With Me:  8/23/2021     Requested Prescriptions     Pending Prescriptions Disp Refills    gabapentin (NEURONTIN) 300 mg capsule 180 Capsule 1     Sig: Take 1 Capsule by mouth two (2) times a day. Max Daily Amount: 600 mg.

## 2021-11-01 NOTE — TELEPHONE ENCOUNTER
----- Message from Patience Ontiveros sent at 10/31/2021  7:41 AM EDT -----  Regarding: Update Medical Information  Contact: [phone]  On 10/30/21, I received my second shingles shot, and the Moderna Booster shot at [facility] .

## 2021-11-01 NOTE — TELEPHONE ENCOUNTER
----- Message from Catrachita Patel Box sent at 10/30/2021  9:21 AM EDT -----  Regarding: Prescription Question  Contact: [phone]  I need to get my GABAPENTIN prescription refilled today, if possible. Going out of town tomorrow. [facility] says that there are no more refills. Thanks!

## 2021-11-02 RX ORDER — GABAPENTIN 300 MG/1
300 CAPSULE ORAL 2 TIMES DAILY
Qty: 180 CAPSULE | Refills: 1 | Status: SHIPPED | OUTPATIENT
Start: 2021-11-02 | End: 2022-05-09

## 2021-11-14 ENCOUNTER — PATIENT MESSAGE (OUTPATIENT)
Dept: INTERNAL MEDICINE CLINIC | Age: 71
End: 2021-11-14

## 2021-11-14 ENCOUNTER — APPOINTMENT (OUTPATIENT)
Dept: GENERAL RADIOLOGY | Age: 71
End: 2021-11-14
Attending: EMERGENCY MEDICINE
Payer: MEDICARE

## 2021-11-14 ENCOUNTER — HOSPITAL ENCOUNTER (EMERGENCY)
Age: 71
Discharge: HOME | End: 2021-11-14
Attending: EMERGENCY MEDICINE
Payer: MEDICARE

## 2021-11-14 VITALS
TEMPERATURE: 98.4 F | WEIGHT: 315 LBS | RESPIRATION RATE: 16 BRPM | HEART RATE: 93 BPM | SYSTOLIC BLOOD PRESSURE: 150 MMHG | OXYGEN SATURATION: 98 % | HEIGHT: 69 IN | BODY MASS INDEX: 46.65 KG/M2 | DIASTOLIC BLOOD PRESSURE: 97 MMHG

## 2021-11-14 DIAGNOSIS — M10.9 ACUTE GOUT OF RIGHT ANKLE, UNSPECIFIED CAUSE: Primary | ICD-10-CM

## 2021-11-14 PROCEDURE — 99283 EMERGENCY DEPT VISIT LOW MDM: CPT

## 2021-11-14 PROCEDURE — 74011636637 HC RX REV CODE- 636/637: Performed by: EMERGENCY MEDICINE

## 2021-11-14 PROCEDURE — 73610 X-RAY EXAM OF ANKLE: CPT

## 2021-11-14 RX ORDER — PREDNISONE 20 MG/1
40 TABLET ORAL
Status: COMPLETED | OUTPATIENT
Start: 2021-11-14 | End: 2021-11-14

## 2021-11-14 RX ORDER — PREDNISONE 20 MG/1
40 TABLET ORAL DAILY
Qty: 8 TABLET | Refills: 0 | Status: SHIPPED | OUTPATIENT
Start: 2021-11-14 | End: 2021-11-18

## 2021-11-14 RX ORDER — CANE TIPS
1 EACH MISCELLANEOUS
Qty: 1 EACH | Refills: 0 | Status: SHIPPED | OUTPATIENT
Start: 2021-11-14 | End: 2022-02-10

## 2021-11-14 RX ADMIN — PREDNISONE 40 MG: 20 TABLET ORAL at 16:49

## 2021-11-14 NOTE — ED TRIAGE NOTES
Pt. Complains of right ankle pain 2 days ago and has taken extra allopurinol without relief. Pt. Has been putting ice on it. Hx.  Of gout and states he was on his feet last week for 11 hrs a day

## 2021-11-14 NOTE — ED NOTES
Dr. Sabrina Martin and I have reviewed discharge instructions with the patient. The patient verbalized understanding. Pt.  Taken out to vehicle via wheelchair by [facility]

## 2021-11-14 NOTE — ED PROVIDER NOTES
The history is provided by the patient. Ankle Pain   This is a recurrent problem. The current episode started 2 days ago. The problem has been gradually worsening. The pain is present in the right ankle. Pain scale: 2/10 when not standing on it, 10/10 with ambulation. Associated symptoms include limited range of motion. The symptoms are aggravated by movement, standing and palpation. He has tried rest and cold (extra allopurinol & tyelnol) for the symptoms. The treatment provided no relief. There has been no history of extremity trauma. Family history is significant for gout.         Past Medical History:   Diagnosis Date    Arrhythmia     atrial fib    Depression 4/24/2010    [address] [address] [address]     diet control for pre-diabetes    Dyslipidemia, goal LDL below 100 6/14/2011    Gout     HTN (hypertension) 4/24/2010    Impotence 4/24/2010    Morbid obesity ([facility] [address] 5/17/2010    VALENTE (obstructive sleep apnea) 4/24/2010    CPAP    Restless legs 4/15/2015       Past Surgical History:   Procedure Laterality Date    COLONOSCOPY N/A 6/27/2017    COLONOSCOPY performed by Josafat Uriarte MD at [facility]. [address], COLON, DIAGNOSTIC  2007    HX APPENDECTOMY      HX ORTHOPAEDIC  2000    bilat TKR     HX ORTHOPAEDIC  2010    left THR    HX UROLOGICAL  03/2018    urolift    HX UROLOGICAL  03/2019    prosthesis         Family History:   Problem Relation Age of Onset    Cancer Father         leukemia    Cancer Paternal Grandfather     Diabetes Sister     Diabetes Brother     Cancer Maternal Aunt     Cancer Maternal Uncle        Social History     Socioeconomic History    Marital status:      Spouse name: Not on file    Number of children: Not on file    Years of education: Not on file    Highest education level: Not on file   Occupational History    Not on file   Tobacco Use    Smoking status: Former Smoker     Packs/day: 0.50     Years: 5.00     Pack years: 2.50     Quit date: 5/17/1990 Years since quittin.5    Smokeless tobacco: Never Used   Substance and Sexual Activity    Alcohol use: Yes     Alcohol/week: 1.7 standard drinks     Types: 2 Standard drinks or equivalent per week     Comment: 2-3 drinks per week    Drug use: No    Sexual activity: Not Currently   Other Topics Concern    Not on file   Social History Narrative    Not on file     Social Determinants of Health     Financial Resource Strain:     Difficulty of Paying Living Expenses: Not on file   Food Insecurity:     Worried About Running Out of Food in the Last Year: Not on file    Jayce of Food in the Last Year: Not on file   Transportation Needs:     Lack of Transportation (Medical): Not on file    Lack of Transportation (Non-Medical): Not on file   Physical Activity:     Days of Exercise per Week: Not on file    Minutes of Exercise per Session: Not on file   Stress:     Feeling of Stress : Not on file   Social Connections:     Frequency of Communication with Friends and Family: Not on file    Frequency of Social Gatherings with Friends and Family: Not on file    Attends [employer] Services: Not on file    Active Member of [address] [place] [address] or Organizations: Not on file    Attends Club or Organization Meetings: Not on file    Marital Status: Not on file   Intimate Partner Violence:     Fear of Current or Ex-Partner: Not on file    Emotionally Abused: Not on file    Physically Abused: Not on file    Sexually Abused: Not on file   Housing Stability:     Unable to Pay for Housing in the Last Year: Not on file    Number of [place] in the Last Year: Not on file    Unstable Housing in the Last Year: Not on file         ALLERGIES: Patient has no known allergies. Review of Systems   Constitutional: Negative for chills and fever. Respiratory: Negative for shortness of breath. Cardiovascular: Negative for chest pain. Gastrointestinal: Negative for abdominal pain, constipation, diarrhea and vomiting. Musculoskeletal: Positive for arthralgias, gait problem and joint swelling. Neurological: Negative for dizziness and light-headedness. All other systems reviewed and are negative. Vitals:    11/14/21 1502   BP: (!) 150/97   Pulse: 93   Resp: 16   Temp: 98.4 °F (36.9 °C)   SpO2: 98%   Weight: 143 kg (315 lb 4.1 oz)   Height: 5' 9\" (1.753 m)            Physical Exam  Vitals and nursing note reviewed. Constitutional:       Appearance: He is well-developed. HENT:      Head: Normocephalic and atraumatic. Eyes:      General: No scleral icterus. Cardiovascular:      Rate and Rhythm: Normal rate. Pulmonary:      Effort: Pulmonary effort is normal.   Abdominal:      General: There is no distension. Musculoskeletal:      Cervical back: Normal range of motion. Right ankle: Swelling present. No deformity. Decreased range of motion. Left ankle: Normal.      Right foot: Swelling present. Normal pulse. Skin:     Findings: No erythema or rash. Neurological:      Mental Status: He is alert and oriented to person, place, and time. MDM  Number of Diagnoses or Management Options  Acute gout of right ankle, unspecified cause  Diagnosis management comments: Pt presents with extremity pain and exam consistent with acute gouty arthritis. No evidence of fracture, dislocation, or other significant musculoskeletal injury. No signs or symptoms of septic arthritis. Patient was discharged home with a plan for pain control as well as precautions for returning to the emergency department. No evidence of compartment syndrome on evaluation. Patient will be discharged home to follow-up with primary care provider as instructed in discharge paperwork. Patient and family expressed understanding and agreed with plan.            Procedures

## 2021-11-15 ENCOUNTER — PATIENT OUTREACH (OUTPATIENT)
Dept: CASE MANAGEMENT | Age: 71
End: 2021-11-15

## 2021-11-15 NOTE — PROGRESS NOTES
11/15/2021  1:55 PM    Ambulatory Care Management Note    This patient was received as a referral from Daily assignment. Ambulatory Care Manager outreached to patient today to offer care management services. Introduction to self and role of care manager provided. Patient accepted care management services at this time. Follow up call scheduled at this time. Patient has Ambulatory Care Manager's contact number for for any questions or concerns. Top Challenges reviewed with the provider   - needs to schedule ED follow-up with PCP; SPT ED visit on 11/14/21 for acute gout of right ankle  - needs cane device; resources sent to patient via [employer] by this ACM today. Ambulatory  contacted patient for discussion and case management of:   Summary of patients top problems:   1. Right Ankle Pain       Secondary to acute gout. SPT ED Visit on 11/14/21; discharged with prescription for prednisone 40 mg daily x 4 days and advised to follow-up with PCP. He is taking prednisone as ordered. Patient reports contacting his PCP post-discharge and was advised to schedule follow-up after current gout flare is resolved. 2. Need for Cane        Order for cane device written at ED discharge on 11/14/21; for ankle pain. Patient took order to [facility] post-discharge, however they were unable to bill Medicare, so he did not get the cane yesterday; order remains incomplete because he is unsure where he can get a cane from. [employer], Referrals, and Durable Medical Equipment: Order for [address] [place] written at ED discharge on 11/14/21.     PCP/Specialist follow up:   Future Appointments   Date Time Provider Doc Hernandez   1/28/2022  4:00 PM MD TOOTIE Link BS AMB   2/21/2022  3:00 PM MD SHANE Zambrano BS AMB   3/24/2022  3:50 PM Shaye Velásquez NP [facility] [place] BS AMB          Goals        Chronic Disease      Get a cane (pt-stated)       11/15/2021   Order for cane written at ED discharge on 11/14/21; right ankle pain secondary to acute gout.  Patient is unsure where he can get a cane from that will bill through his Medicare. Information and resources sent to patient via [employer] by this ACM today, per patient request; see MyChart encounter.  Patient will contact this ACM with any additional questions/concerns. ACM will follow-up with patient next week             Patient verbalized understanding of all information discussed. Patient has this Ambulatory Care Manager's contact information for any further questions, concerns, or needs.

## 2021-11-15 NOTE — PROGRESS NOTES
11/15/2021  1:51 PM    Patient outreach by this ACM today to perform initial post-discharge assessment; two patient identifiers verified. Educated patient about risk for severe COVID-19 due to risk factors according to CDC guidelines. ACM reviewed discharge instructions, medical action plan and red flag symptoms with the patient who verbalized understanding. Discussed COVID vaccination status: Yes; pt reports that he received vaccine #1, vaccine #2 and booster dose. Education provided on COVID-19 vaccination as appropriate. Discussed exposure protocols and quarantine with CDC Guidelines. Patient was given an opportunity to verbalize any questions and concerns and agrees to contact ACM or health care provider for questions related to their healthcare. Patient was advised to follow-up with PCP (Dr. Perla Oliver) post-discharge.

## 2021-11-17 RX ORDER — COLCHICINE 0.6 MG/1
0.6 TABLET ORAL DAILY
Qty: 14 TABLET | Refills: 0 | Status: SHIPPED | OUTPATIENT
Start: 2021-11-17 | End: 2021-11-24

## 2021-11-17 NOTE — TELEPHONE ENCOUNTER
From: Harper Walsh  To: Leida Irizarry MD  Sent: 11/14/2021 7:22 PM EST  Subject: Gout question    Today, I went to [facility] to get some relief from a possible gout issue in my right ankle. Please see the notes from the ER. I have been taking 1 ea. Aluprinol tablet each day for at least a year. I am wondering if this med needs to be changed in some way to keep this very problem from happening again. It is very painful!

## 2021-12-15 ENCOUNTER — PATIENT OUTREACH (OUTPATIENT)
Dept: CASE MANAGEMENT | Age: 71
End: 2021-12-15

## 2021-12-15 NOTE — PROGRESS NOTES
12/15/2021  2:58 PM    Patient outreach by this ACM today to perform CCM follow-up and CCM assessments; two patient identifiers verified. Patient is unable to follow-up with ACM at this time. Patient is agreeable to [facility] [place][note 2] outreach in approximately two weeks.

## 2022-01-05 DIAGNOSIS — G47.33 OSA (OBSTRUCTIVE SLEEP APNEA): ICD-10-CM

## 2022-01-05 DIAGNOSIS — I48.19 PERSISTENT ATRIAL FIBRILLATION (HCC): ICD-10-CM

## 2022-01-05 DIAGNOSIS — I48.20 CHRONIC ATRIAL FIBRILLATION (HCC): ICD-10-CM

## 2022-01-05 DIAGNOSIS — I10 ESSENTIAL HYPERTENSION: ICD-10-CM

## 2022-01-05 DIAGNOSIS — E78.5 DYSLIPIDEMIA, GOAL LDL BELOW 100: ICD-10-CM

## 2022-01-06 NOTE — TELEPHONE ENCOUNTER
Requested Prescriptions     Signed Prescriptions Disp Refills    rivaroxaban (Xarelto) 20 mg tab tablet 90 Tablet 3     Sig: TAKE 1 TABLET BY MOUTH IN  THE MORNING WITH BREAKFAST     Authorizing Provider: Carmen Fuentes     Ordering User: Mya Salinas    Per Dr. Serafin Gaytan verbal orders

## 2022-02-10 ENCOUNTER — OFFICE VISIT (OUTPATIENT)
Dept: CARDIOLOGY CLINIC | Age: 72
End: 2022-02-10
Payer: MEDICARE

## 2022-02-10 VITALS
OXYGEN SATURATION: 96 % | SYSTOLIC BLOOD PRESSURE: 90 MMHG | HEIGHT: 69 IN | DIASTOLIC BLOOD PRESSURE: 64 MMHG | WEIGHT: 306 LBS | RESPIRATION RATE: 12 BRPM | BODY MASS INDEX: 45.32 KG/M2 | HEART RATE: 72 BPM

## 2022-02-10 DIAGNOSIS — M79.604 BILATERAL LEG PAIN: ICD-10-CM

## 2022-02-10 DIAGNOSIS — I10 PRIMARY HYPERTENSION: ICD-10-CM

## 2022-02-10 DIAGNOSIS — G47.33 OSA (OBSTRUCTIVE SLEEP APNEA): ICD-10-CM

## 2022-02-10 DIAGNOSIS — I48.20 CHRONIC ATRIAL FIBRILLATION (HCC): Primary | ICD-10-CM

## 2022-02-10 DIAGNOSIS — I10 ESSENTIAL HYPERTENSION: ICD-10-CM

## 2022-02-10 DIAGNOSIS — M79.605 BILATERAL LEG PAIN: ICD-10-CM

## 2022-02-10 DIAGNOSIS — E66.01 MORBID OBESITY (HCC): ICD-10-CM

## 2022-02-10 PROCEDURE — G9717 DOC PT DX DEP/BP F/U NT REQ: HCPCS | Performed by: SPECIALIST

## 2022-02-10 PROCEDURE — G8754 DIAS BP LESS 90: HCPCS | Performed by: SPECIALIST

## 2022-02-10 PROCEDURE — G8752 SYS BP LESS 140: HCPCS | Performed by: SPECIALIST

## 2022-02-10 PROCEDURE — G8417 CALC BMI ABV UP PARAM F/U: HCPCS | Performed by: SPECIALIST

## 2022-02-10 PROCEDURE — 1101F PT FALLS ASSESS-DOCD LE1/YR: CPT | Performed by: SPECIALIST

## 2022-02-10 PROCEDURE — 99214 OFFICE O/P EST MOD 30 MIN: CPT | Performed by: SPECIALIST

## 2022-02-10 PROCEDURE — 3017F COLORECTAL CA SCREEN DOC REV: CPT | Performed by: SPECIALIST

## 2022-02-10 PROCEDURE — G8536 NO DOC ELDER MAL SCRN: HCPCS | Performed by: SPECIALIST

## 2022-02-10 PROCEDURE — G8427 DOCREV CUR MEDS BY ELIG CLIN: HCPCS | Performed by: SPECIALIST

## 2022-02-10 NOTE — PROGRESS NOTES
HISTORY OF PRESENT ILLNESS  Rito Russo is a 80 y.o. male     SUMMARY:   Problem List  Date Reviewed: 2/10/2022          Codes Class Noted    Diabetes mellitus type 2, diet-controlled ([facility] ICD-10-CM: E11.9  ICD-9-CM: 250.00  5/2/2018        Benign non-nodular prostatic hyperplasia with lower urinary tract symptoms ICD-10-CM: N40.1  ICD-9-CM: 600.91  4/25/2017    Overview Signed 4/25/2017  9:10 AM by Lauren Nascimento MD     Retention had catheter 3 months             Urinary retention due to benign prostatic hyperplasia ICD-10-CM: N40.1, R33.8  ICD-9-CM: 600.91, 788.20  10/25/2016        Diabetes mellitus type 2, controlled ([facility] ICD-10-CM: E11.9  ICD-9-CM: 250.00  10/15/2015        Restless legs ICD-10-CM: G25.81  ICD-9-CM: 333.94  4/15/2015        VALENTE (obstructive sleep apnea) ICD-10-CM: L57.46  ICD-9-CM: 327.23  6/19/2012    Overview Signed 6/19/2012 10:57 AM by Marnie Astorga MD     Bi-Level: 19/15 cmH2O. Dyslipidemia, goal LDL below 100 ICD-10-CM: E78.5  ICD-9-CM: 272.4  6/14/2011        A-fib ([facility] ICD-10-CM: I48.91  ICD-9-CM: 427.31  2/7/2011    Overview Addendum 2/9/2011  3:42 PM by Dayna Lee NP     Assymptomatic, noted on ekg 2/11; found when had sleep study. Saw cardiologist 2/8/11.              Diabetes ([facility] ICD-10-CM: E11.9  ICD-9-CM: 250.00  1/28/2011    Overview Addendum 2/9/2011  3:37 PM by Dayna Lee NP     Recent diagnosis; just started diabetes education  HgA1C 6.5%             Morbid obesity ([facility] ICD-10-CM: E66.01  ICD-9-CM: 278.01  5/17/2010    Overview Signed 2/9/2011  3:35 PM by Dayna Lee NP     OBESE 20 + YEARS; HIGH WEIGHT 362 LBS PAST MONTH; LOW WEIGHT 190 LBS 30 YEARS AGO             VALENTE (obstructive sleep apnea) ICD-10-CM: G47.33  ICD-9-CM: 327.23  4/24/2010        Erectile dysfunction due to arterial insufficiency ICD-10-CM: N52.01  ICD-9-CM: 607.84  4/24/2010        HTN (hypertension) ICD-10-CM: I10  ICD-9-CM: 401.9  4/24/2010    Overview Signed 4/24/2010  9:31 AM by Consuelo Vera     borderline             Depression ICD-10-CM: Susie HERNANDEZ  ICD-9-CM: 953  4/24/2010              Current Outpatient Medications on File Prior to Visit   Medication Sig    rivaroxaban (Xarelto) 20 mg tab tablet TAKE 1 TABLET BY MOUTH IN  THE MORNING WITH BREAKFAST    gabapentin (NEURONTIN) 300 mg capsule Take 1 Capsule by mouth two (2) times a day. Max Daily Amount: 600 mg.    allopurinoL (ZYLOPRIM) 100 mg tablet TAKE 1 TAB BY MOUTH DAILY. TAKE WITH COLCRYS 0.6 MG FIRST 3 WEEKS (Patient taking differently: Take 100 mg by mouth daily.)    lisinopril-hydroCHLOROthiazide (PRINZIDE, ZESTORETIC) 20-25 mg per tablet Take 1 Tablet by mouth daily. No current facility-administered medications on file prior to visit. CARDIOLOGY STUDIES TO DATE:  02/10 echocardiogram showed lvh but was otherwise normal.    02/10 stress cardiolyte had mild EKG changes without chest pain and a questionable fixed inferior defect with an ejection fraction of 58%    Chief Complaint   Patient presents with    Follow-up     HPI :  He is doing well from a cardiac perspective with no worrisome symptoms or problems with his medications. Blood pressure is little low but he is asymptomatic. He is active but not exercising but in spite of that he is lost about 9 pounds since we last met. He is complaining of bilateral leg pain at night from the knee down. It sounds like neuropathy.   CARDIAC ROS:   negative for chest pain, dyspnea, palpitations, syncope, orthopnea, paroxysmal nocturnal dyspnea, exertional chest pressure/discomfort, claudication, lower extremity edema    Family History   Problem Relation Age of Onset    Cancer Father         leukemia    Cancer Paternal Grandfather     Diabetes Sister     Diabetes Brother     Cancer Maternal Aunt     Cancer Maternal Uncle        Past Medical History:   Diagnosis Date    Arrhythmia     atrial fib    Depression 4/24/2010    Diabetes ([address] diet control for pre-diabetes    Dyslipidemia, goal LDL below 100 6/14/2011    Gout     HTN (hypertension) 4/24/2010    Impotence 4/24/2010    Morbid obesity ([address] 5/17/2010    VALENTE (obstructive sleep apnea) 4/24/2010    CPAP    Restless legs 4/15/2015       GENERAL ROS:  A comprehensive review of systems was negative except for that written in the HPI.     Visit Vitals  BP 90/64 (BP 1 Location: Left upper arm, BP Patient Position: Sitting, BP Cuff Size: Large adult)   Pulse 72 Comment: irregular   Resp 12   Ht 5' 9\" (1.753 m)   Wt 306 lb (138.8 kg)   SpO2 96%   BMI 45.19 kg/m²       Wt Readings from Last 3 Encounters:   02/10/22 306 lb (138.8 kg)   11/14/21 315 lb 4.1 oz (143 kg)   08/23/21 318 lb (144.2 kg)            BP Readings from Last 3 Encounters:   02/10/22 90/64   11/14/21 (!) 150/97   08/23/21 116/75       PHYSICAL EXAM  General appearance: alert, cooperative, no distress, appears stated age  Neurologic: Alert and oriented X 3  Neck: supple, symmetrical, trachea midline, no adenopathy, no carotid bruit and no JVD  Lungs: clear to auscultation bilaterally  Heart: irregularly irregular rhythm, S1, S2 normal, no S3 or S4  Extremities: extremities normal, atraumatic, no cyanosis or edema    Lab Results   Component Value Date/Time    Cholesterol, total 154 08/23/2021 02:10 PM    Cholesterol, total 178 02/07/2020 11:59 AM    Cholesterol, total 151 05/13/2019 08:46 AM    Cholesterol, total 173 05/02/2018 04:09 PM    Cholesterol, total 168 04/25/2017 09:47 AM    HDL Cholesterol 38 08/23/2021 02:10 PM    HDL Cholesterol 44 02/07/2020 11:59 AM    HDL Cholesterol 34 (L) 05/13/2019 08:46 AM    HDL Cholesterol 37 (L) 05/02/2018 04:09 PM    HDL Cholesterol 37 (L) 04/25/2017 09:47 AM    LDL, calculated 82.2 08/23/2021 02:10 PM    LDL, calculated 107 (H) 02/07/2020 11:59 AM    LDL, calculated 89 05/13/2019 08:46 AM    LDL, calculated 71 05/02/2018 04:09 PM    LDL, calculated 92 04/25/2017 09:47 AM Triglyceride 169 (H) 08/23/2021 02:10 PM    Triglyceride 135 02/07/2020 11:59 AM    Triglyceride 140 05/13/2019 08:46 AM    Triglyceride 325 (H) 05/02/2018 04:09 PM    Triglyceride 193 (H) 04/25/2017 09:47 AM    CHOL/HDL Ratio 4.1 08/23/2021 02:10 PM    CHOL/HDL Ratio 4.0 02/07/2020 11:59 AM     ASSESSMENT :      He is stable and asymptomatic at this point on a reasonable medical regimen. I am going to get lower extremity arterial Dopplers just to make sure there is no evidence of peripheral artery disease but as I said I suspect this is neuropathy. He needs no other cardiac testing at this time. current treatment plan is effective, no change in therapy  lab results and schedule of future lab studies reviewed with patient  reviewed diet, exercise and weight control    Encounter Diagnoses   Name Primary?  Chronic atrial fibrillation (HCC) Yes    Primary hypertension     Essential hypertension     Morbid obesity ([address]     VALENTE (obstructive sleep apnea)      No orders of the defined types were placed in this encounter. Follow-up and Dispositions    · Return in about 1 year (around 2/10/2023). Jairon Reyna MD  2/10/2022  Please note that this dictation was completed with [email or web address], the computer voice recognition software. Quite often unanticipated grammatical, syntax, homophones, and other interpretive errors are inadvertently transcribed by the computer software. Please disregard these errors. Please excuse any errors that have escaped final proofreading. Thank you.

## 2022-02-11 DIAGNOSIS — M79.604 BILATERAL LEG PAIN: Primary | ICD-10-CM

## 2022-02-11 DIAGNOSIS — M79.605 BILATERAL LEG PAIN: Primary | ICD-10-CM

## 2022-02-15 ENCOUNTER — PATIENT OUTREACH (OUTPATIENT)
Dept: CASE MANAGEMENT | Age: 72
End: 2022-02-15

## 2022-02-15 NOTE — PROGRESS NOTES
2/15/2022  2:09 PM    Patient outreach attempt by this ACM today to perform CCM follow-up. Unable to reach patient; lvm requesting a return phone call to this ACM.

## 2022-02-21 ENCOUNTER — OFFICE VISIT (OUTPATIENT)
Dept: INTERNAL MEDICINE CLINIC | Age: 72
End: 2022-02-21
Payer: MEDICARE

## 2022-02-21 VITALS
OXYGEN SATURATION: 97 % | RESPIRATION RATE: 14 BRPM | HEART RATE: 95 BPM | BODY MASS INDEX: 45.62 KG/M2 | TEMPERATURE: 98.4 F | WEIGHT: 308 LBS | DIASTOLIC BLOOD PRESSURE: 68 MMHG | SYSTOLIC BLOOD PRESSURE: 110 MMHG | HEIGHT: 69 IN

## 2022-02-21 DIAGNOSIS — E11.42 DIABETIC POLYNEUROPATHY ASSOCIATED WITH TYPE 2 DIABETES MELLITUS (HCC): ICD-10-CM

## 2022-02-21 DIAGNOSIS — E79.0 HYPERURICEMIA: Primary | ICD-10-CM

## 2022-02-21 DIAGNOSIS — I10 ESSENTIAL HYPERTENSION: ICD-10-CM

## 2022-02-21 PROCEDURE — G8754 DIAS BP LESS 90: HCPCS | Performed by: INTERNAL MEDICINE

## 2022-02-21 PROCEDURE — 1101F PT FALLS ASSESS-DOCD LE1/YR: CPT | Performed by: INTERNAL MEDICINE

## 2022-02-21 PROCEDURE — G8536 NO DOC ELDER MAL SCRN: HCPCS | Performed by: INTERNAL MEDICINE

## 2022-02-21 PROCEDURE — 3046F HEMOGLOBIN A1C LEVEL >9.0%: CPT | Performed by: INTERNAL MEDICINE

## 2022-02-21 PROCEDURE — 99213 OFFICE O/P EST LOW 20 MIN: CPT | Performed by: INTERNAL MEDICINE

## 2022-02-21 PROCEDURE — G9717 DOC PT DX DEP/BP F/U NT REQ: HCPCS | Performed by: INTERNAL MEDICINE

## 2022-02-21 PROCEDURE — G8417 CALC BMI ABV UP PARAM F/U: HCPCS | Performed by: INTERNAL MEDICINE

## 2022-02-21 PROCEDURE — 3017F COLORECTAL CA SCREEN DOC REV: CPT | Performed by: INTERNAL MEDICINE

## 2022-02-21 PROCEDURE — G8752 SYS BP LESS 140: HCPCS | Performed by: INTERNAL MEDICINE

## 2022-02-21 PROCEDURE — G8427 DOCREV CUR MEDS BY ELIG CLIN: HCPCS | Performed by: INTERNAL MEDICINE

## 2022-02-21 PROCEDURE — 2022F DILAT RTA XM EVC RTNOPTHY: CPT | Performed by: INTERNAL MEDICINE

## 2022-02-21 RX ORDER — LISINOPRIL AND HYDROCHLOROTHIAZIDE 20; 25 MG/1; MG/1
0.5 TABLET ORAL DAILY
Qty: 90 TABLET | Refills: 2
Start: 2022-02-21 | End: 2022-04-18

## 2022-02-21 NOTE — PROGRESS NOTES
Prema Allen is a 80 y.o. male    Chief Complaint   Patient presents with    Follow-up     6 mo    Diabetes       Health Maintenance Due   Topic Date Due    Eye Exam Retinal or Dilated  06/19/2015    Pneumococcal 65+ years (2 of 2 - PPSV23) 01/13/2019    Flu Vaccine (1) 09/01/2021    Shingrix Vaccine Age 50> (2 of 2) 12/25/2021       Visit Vitals  BP 98/60 (BP 1 Location: Left upper arm, BP Patient Position: Sitting)   Pulse 95   Temp 98.4 °F (36.9 °C) (Temporal)   Resp 14   Ht 5' 9\" (1.753 m)   Wt 308 lb (139.7 kg)   SpO2 97%   BMI 45.48 kg/m²       3 most recent PHQ Screens 2/21/2022   Little interest or pleasure in doing things Not at all   Feeling down, depressed, irritable, or hopeless Not at all   Total Score PHQ 2 0       Fall Risk Assessment, last 12 mths 2/21/2022   Able to walk? Yes   Fall in past 12 months? 0   Do you feel unsteady? 0   Are you worried about falling 0       Abuse Screening Questionnaire 2/21/2022   Do you ever feel afraid of your partner? N   Are you in a relationship with someone who physically or mentally threatens you? N   Is it safe for you to go home? Y         1. Have you been to the ER, urgent care clinic since your last visit? Hospitalized since your last visit?no    2. Have you seen or consulted any other health care providers outside of the [address] [place] [address] since your last visit? Include any pap smears or colon screening. no

## 2022-02-22 NOTE — PROGRESS NOTES
Mr. Fartun Bustamante is a 72 y.o. white male with mild diabetes, controlled. He went to see his orthopedic doctor a few weeks ago because he was concerned about hip pain. They said that his hip was okay. He has had a hip replacement on the right. His pain was on the left and they thought his pain was coming from his back. He has got L5-S1 degeneration on plain x-rays. He complains of bilateral pain. It is worse at night. He is in the cast down to the feet. The feet have reduced sensation. He is now a diabetic for a number of years. He has got a restless kind of feeling in his legs. Pain is gone still poor sleep  He had some burning pain now better    His blood pressure was low  He had an irregular pulse. Foot exam was done. reduced sensation    Patient Active Problem List    Diagnosis    Diabetes mellitus type 2, diet-controlled ([address]    Benign non-nodular prostatic hyperplasia with lower urinary tract symptoms     Retention had catheter 3 months      Urinary retention due to benign prostatic hyperplasia    Diabetes mellitus type 2, controlled ([address]    Restless legs    VALENTE (obstructive sleep apnea)     Bi-Level: 19/15 cmH2O.  Dyslipidemia, goal LDL below 100    A-fib ([address]     Assymptomatic, noted on ekg 2/11; found when had sleep study. Saw cardiologist 2/8/11.       Diabetes ([address]     Recent diagnosis; just started diabetes education  HgA1C 6.5%      Morbid obesity (HCC)     OBESE 20 + YEARS; HIGH WEIGHT 362 LBS PAST MONTH; LOW WEIGHT 190 LBS 30 YEARS AGO      VALENTE (obstructive sleep apnea)    Erectile dysfunction due to arterial insufficiency    HTN (hypertension)     borderline      Depression     Vitals:    02/21/22 1507 02/21/22 1537   BP: 98/60 110/68   Pulse: 95    Resp: 14    Temp: 98.4 °F (36.9 °C)    TempSrc: Temporal    SpO2: 97%    Weight: 308 lb (139.7 kg)    Height: 5' 9\" (1.753 m)      irreg irreg rhythm controlled rate  Lab Results   Component Value Date/Time    Hemoglobin A1c 6.3 (H) 02/07/2020 12:00 PM    Hemoglobin A1c (POC) 6.0 06/24/2021 08:47 AM       1. Essential hypertension  Try 1/2 dose see 90 days  - lisinopril-hydroCHLOROthiazide (PRINZIDE, ZESTORETIC) 20-25 mg per tablet; Take 0.5 Tablets by mouth daily. Dispense: 90 Tablet; Refill: 2    2. Diabetic polyneuropathy associated with type 2 diabetes mellitus ([facility]  follow  - HEMOGLOBIN A1C WITH EAG; Future  - METABOLIC PANEL, COMPREHENSIVE; Future    3.  Hyperuricemia  Lab Results   Component Value Date/Time    Uric acid 8.6 (H) 02/07/2020 11:59 AM     On allopurinol now

## 2022-03-07 ENCOUNTER — PATIENT OUTREACH (OUTPATIENT)
Dept: CASE MANAGEMENT | Age: 72
End: 2022-03-07

## 2022-03-07 NOTE — PROGRESS NOTES
3/7/2022  2:13 PM    Patient outreach attempt by this ACM today to perform CCM follow-up. Unable to reach patient by telephone. Get-in-touch letter sent to patient via [employer] today.

## 2022-03-14 ENCOUNTER — PATIENT OUTREACH (OUTPATIENT)
Dept: CASE MANAGEMENT | Age: 72
End: 2022-03-14

## 2022-03-14 NOTE — PROGRESS NOTES
3/14/2022   11:55 AM    Patient has graduated from the Complex Case Management  program on 3/14/2022 due to inability to contact/patient disengaged. ACM has been unable to reach patient to provide continued CCM services. Care management goals have been completed and CCM episode resolved at this time due to ACM inability to evaluate goal progress for completion. No further Ambulatory Care Manager follow up scheduled. Goals Addressed                    This Visit's Progress       Chronic Disease      COMPLETED: Get a cane (pt-stated)         3/14/2022  Goal progress is unknown by ACM at this time; ACM has been unable to reach patient to evaluate goal progress. Goal completed and CCM episode resolved at this time due to ACM inability to reach patient to provide continued CCM services. 11/15/2021   Order for cane written at ED discharge on 11/14/21; right ankle pain secondary to acute gout.  Patient is unsure where he can get a cane from that will bill through his Medicare. Information and resources sent to patient via [employer] by this ACM today, per patient request; see MyChart encounter.  Patient will contact this ACM with any additional questions/concerns. ACM will follow-up with patient next week            Patient has Ambulatory Care Manager's contact information for any further questions, concerns, or needs.   Patients upcoming visits:    Future Appointments   Date Time Provider Doc Hernandez   3/24/2022  3:50 PM Abran Aelxandre NP [facility] BS AMB   3/28/2022  3:00 PM DANIELITO DONATO BS AMB   4/18/2022  3:00 PM MD SHANE Rubio AMB   2/15/2023  4:00 PM MD TOOTIE Strong BS AMB

## 2022-03-19 PROBLEM — E11.9 DIABETES MELLITUS TYPE 2, DIET-CONTROLLED (HCC): Status: ACTIVE | Noted: 2018-05-02

## 2022-03-19 PROBLEM — N40.1 BENIGN NON-NODULAR PROSTATIC HYPERPLASIA WITH LOWER URINARY TRACT SYMPTOMS: Status: ACTIVE | Noted: 2017-04-25

## 2022-03-24 ENCOUNTER — TELEPHONE (OUTPATIENT)
Dept: SLEEP MEDICINE | Age: 72
End: 2022-03-24

## 2022-03-26 DIAGNOSIS — M10.271: ICD-10-CM

## 2022-03-26 RX ORDER — COLCHICINE 0.6 MG/1
TABLET ORAL
Qty: 60 TABLET | Refills: 1 | Status: SHIPPED | OUTPATIENT
Start: 2022-03-26 | End: 2022-07-12

## 2022-04-18 ENCOUNTER — OFFICE VISIT (OUTPATIENT)
Dept: INTERNAL MEDICINE CLINIC | Age: 72
End: 2022-04-18
Payer: MEDICARE

## 2022-04-18 VITALS
TEMPERATURE: 98.2 F | SYSTOLIC BLOOD PRESSURE: 142 MMHG | HEIGHT: 69 IN | HEART RATE: 86 BPM | DIASTOLIC BLOOD PRESSURE: 78 MMHG | WEIGHT: 313.6 LBS | BODY MASS INDEX: 46.45 KG/M2 | OXYGEN SATURATION: 99 % | RESPIRATION RATE: 14 BRPM

## 2022-04-18 DIAGNOSIS — I10 ESSENTIAL HYPERTENSION: ICD-10-CM

## 2022-04-18 PROCEDURE — G8754 DIAS BP LESS 90: HCPCS | Performed by: INTERNAL MEDICINE

## 2022-04-18 PROCEDURE — G8427 DOCREV CUR MEDS BY ELIG CLIN: HCPCS | Performed by: INTERNAL MEDICINE

## 2022-04-18 PROCEDURE — G9717 DOC PT DX DEP/BP F/U NT REQ: HCPCS | Performed by: INTERNAL MEDICINE

## 2022-04-18 PROCEDURE — 3017F COLORECTAL CA SCREEN DOC REV: CPT | Performed by: INTERNAL MEDICINE

## 2022-04-18 PROCEDURE — G8753 SYS BP > OR = 140: HCPCS | Performed by: INTERNAL MEDICINE

## 2022-04-18 PROCEDURE — 99213 OFFICE O/P EST LOW 20 MIN: CPT | Performed by: INTERNAL MEDICINE

## 2022-04-18 PROCEDURE — G8417 CALC BMI ABV UP PARAM F/U: HCPCS | Performed by: INTERNAL MEDICINE

## 2022-04-18 PROCEDURE — G8536 NO DOC ELDER MAL SCRN: HCPCS | Performed by: INTERNAL MEDICINE

## 2022-04-18 PROCEDURE — 1101F PT FALLS ASSESS-DOCD LE1/YR: CPT | Performed by: INTERNAL MEDICINE

## 2022-04-18 RX ORDER — LISINOPRIL AND HYDROCHLOROTHIAZIDE 20; 25 MG/1; MG/1
1 TABLET ORAL DAILY
Qty: 90 TABLET | Refills: 2
Start: 2022-04-18 | End: 2022-08-01

## 2022-04-18 NOTE — PROGRESS NOTES
Mr. Francisca Glynn is a 72 y.o. white male with mild diabetes, controlled. He went to see his orthopedic doctor a few weeks ago because he was concerned about hip pain. They said that his hip was okay. He has had a hip replacement on the right. His pain was on the left and they thought his pain was coming from his back. He has got L5-S1 degeneration on plain x-rays. He complains of bilateral pain. It is worse at night. He is in the cast down to the feet. The feet have reduced sensation. He is now a diabetic for a number of years. He has got a restless kind of feeling in his legs. Pain is gone still poor sleep  He had some burning pain now better    We lowereed his BP med to 1/2 last time denies dizziness or pnd    Home BP ( wrist cuff)  Ok on 1/2 does              Patient Active Problem List    Diagnosis    Diabetes mellitus type 2, diet-controlled ([address]    Benign non-nodular prostatic hyperplasia with lower urinary tract symptoms     Retention had catheter 3 months      Urinary retention due to benign prostatic hyperplasia    Diabetes mellitus type 2, controlled ([address]    Restless legs    VALENTE (obstructive sleep apnea)     Bi-Level: 19/15 cmH2O.  Dyslipidemia, goal LDL below 100    A-fib ([address]     Assymptomatic, noted on ekg 2/11; found when had sleep study. Saw cardiologist 2/8/11.       Diabetes ([address]     Recent diagnosis; just started diabetes education  HgA1C 6.5%      Morbid obesity (HCC)     OBESE 20 + YEARS; HIGH WEIGHT 362 LBS PAST MONTH; LOW WEIGHT 190 LBS 30 YEARS AGO      VALENTE (obstructive sleep apnea)    Erectile dysfunction due to arterial insufficiency    HTN (hypertension)     borderline      Depression     Vitals:    04/18/22 1454   BP: (!) 140/84   Pulse: 86   Resp: 14   Temp: 98.2 °F (36.8 °C)   TempSrc: Temporal   SpO2: 99%   Weight: 313 lb 9.6 oz (142.2 kg)   Height: 5' 9\" (1.753 m)     Vitals:    04/18/22 1454 04/18/22 1514   BP: (!) 140/84 (!) 142/78   Pulse: 86    Resp: 14 Temp: 98.2 °F (36.8 °C)    TempSrc: Temporal    SpO2: 99%    Weight: 313 lb 9.6 oz (142.2 kg)    Height: 5' 9\" (1.753 m)        irreg irreg rhythm controlled rate  Lab Results   Component Value Date/Time    Hemoglobin A1c 6.2 (H) 02/21/2022 03:51 PM    Hemoglobin A1c (POC) 6.0 06/24/2021 08:47 AM       1. Essential hypertension  Back to usual dose    2. Diabetic polyneuropathy associated with type 2 diabetes mellitus ([facility]  follow  - HEMOGLOBIN A1C WITH EAG; Future  - METABOLIC PANEL, COMPREHENSIVE; Future  On allopurinol now  Requested Prescriptions     Signed Prescriptions Disp Refills    lisinopril-hydroCHLOROthiazide (PRINZIDE, ZESTORETIC) 20-25 mg per tablet 90 Tablet 2     Sig: Take 1 Tablet by mouth daily.

## 2022-04-18 NOTE — PROGRESS NOTES
Reviewed record in preparation for visit and have obtained necessary documentation. Identified pt with two pt identifiers(name and ). Chief Complaint   Patient presents with    Follow-up     Vitals:    22 1454   BP: (!) 140/84   Pulse: 86   Resp: 14   Temp: 98.2 °F (36.8 °C)   TempSrc: Temporal   SpO2: 99%   Weight: 313 lb 9.6 oz (142.2 kg)   Height: 5' 9\" (1.753 m)        Health Maintenance Due   Topic Date Due    Eye Exam  2015    Pneumococcal Vaccine (3 - PPSV23 or PCV20) 2019    Shingles Vaccine (2 of 2) 2021       Mr. Kela Saint has a reminder for a \"due or due soon\" health maintenance. I have asked that he discuss this further with his primary care provider for follow-up on this health maintenance. Coordination of Care Questionnaire:  :     1) Have you been to an emergency room, urgent care clinic since your last visit? no   Hospitalized since your last visit? no             2) Have you seen or consulted any other health care providers outside of [address] [place] [address] since your last visit? no  (Include any pap smears or colon screenings in this section.)    3. For patients aged 39-70: Has the patient had a colonoscopy / FIT/ Cologuard? No      If the patient is female:  4. For patients aged 41-77: Has the patient had a mammogram within the past 2 years? NA - based on age or sex       11. For patients aged 21-65: Has the patient had a pap smear? NA - based on age or sex      In the event something were to happen to you and you were unable to speak on your behalf, do you have an Advance Directive/ Living Will in place stating your wishes? NO    Do you have an Advance Directive on file? no    6) Are you interested in receiving information on Advance Directives? no    Patient is accompanied by self I have received verbal consent from Donnie Olsen to discuss any/all medical information while they are present in the room.

## 2022-04-26 DIAGNOSIS — M1A.29X0 DRUG-INDUCED CHRONIC GOUT OF MULTIPLE SITES WITHOUT TOPHUS: ICD-10-CM

## 2022-04-26 RX ORDER — ALLOPURINOL 100 MG/1
100 TABLET ORAL DAILY
Qty: 90 TABLET | Refills: 1 | Status: SHIPPED | OUTPATIENT
Start: 2022-04-26 | End: 2022-10-17

## 2022-04-26 NOTE — TELEPHONE ENCOUNTER
Requested Prescriptions     Pending Prescriptions Disp Refills    allopurinoL (ZYLOPRIM) 100 mg tablet 90 Tablet 1     Sig: Take 1 Tablet by mouth daily.  Take with colcrys 0.6 mg first 3 weeks

## 2022-05-08 DIAGNOSIS — E11.42 DIABETIC POLYNEUROPATHY ASSOCIATED WITH TYPE 2 DIABETES MELLITUS (HCC): ICD-10-CM

## 2022-05-09 RX ORDER — GABAPENTIN 300 MG/1
CAPSULE ORAL
Qty: 180 CAPSULE | Refills: 3 | Status: SHIPPED | OUTPATIENT
Start: 2022-05-09 | End: 2022-05-09 | Stop reason: SDUPTHER

## 2022-06-13 ENCOUNTER — TELEPHONE (OUTPATIENT)
Dept: INTERNAL MEDICINE CLINIC | Age: 72
End: 2022-06-13

## 2022-06-14 ENCOUNTER — OFFICE VISIT (OUTPATIENT)
Dept: INTERNAL MEDICINE CLINIC | Age: 72
End: 2022-06-14
Payer: MEDICARE

## 2022-06-14 VITALS
WEIGHT: 296 LBS | OXYGEN SATURATION: 98 % | HEART RATE: 101 BPM | RESPIRATION RATE: 18 BRPM | SYSTOLIC BLOOD PRESSURE: 126 MMHG | DIASTOLIC BLOOD PRESSURE: 85 MMHG | TEMPERATURE: 98.3 F | BODY MASS INDEX: 43.84 KG/M2 | HEIGHT: 69 IN

## 2022-06-14 DIAGNOSIS — G47.09 OTHER INSOMNIA: ICD-10-CM

## 2022-06-14 DIAGNOSIS — E11.42 DIABETIC POLYNEUROPATHY ASSOCIATED WITH TYPE 2 DIABETES MELLITUS (HCC): ICD-10-CM

## 2022-06-14 DIAGNOSIS — R10.84 GENERALIZED ABDOMINAL PAIN: ICD-10-CM

## 2022-06-14 DIAGNOSIS — E86.0 DEHYDRATION: ICD-10-CM

## 2022-06-14 DIAGNOSIS — R05.9 COUGH: ICD-10-CM

## 2022-06-14 DIAGNOSIS — R10.84 GENERALIZED ABDOMINAL PAIN: Primary | ICD-10-CM

## 2022-06-14 PROCEDURE — G8754 DIAS BP LESS 90: HCPCS | Performed by: INTERNAL MEDICINE

## 2022-06-14 PROCEDURE — G8536 NO DOC ELDER MAL SCRN: HCPCS | Performed by: INTERNAL MEDICINE

## 2022-06-14 PROCEDURE — G9717 DOC PT DX DEP/BP F/U NT REQ: HCPCS | Performed by: INTERNAL MEDICINE

## 2022-06-14 PROCEDURE — 3044F HG A1C LEVEL LT 7.0%: CPT | Performed by: INTERNAL MEDICINE

## 2022-06-14 PROCEDURE — G8427 DOCREV CUR MEDS BY ELIG CLIN: HCPCS | Performed by: INTERNAL MEDICINE

## 2022-06-14 PROCEDURE — G8417 CALC BMI ABV UP PARAM F/U: HCPCS | Performed by: INTERNAL MEDICINE

## 2022-06-14 PROCEDURE — 1101F PT FALLS ASSESS-DOCD LE1/YR: CPT | Performed by: INTERNAL MEDICINE

## 2022-06-14 PROCEDURE — 2022F DILAT RTA XM EVC RTNOPTHY: CPT | Performed by: INTERNAL MEDICINE

## 2022-06-14 PROCEDURE — G8752 SYS BP LESS 140: HCPCS | Performed by: INTERNAL MEDICINE

## 2022-06-14 PROCEDURE — 3017F COLORECTAL CA SCREEN DOC REV: CPT | Performed by: INTERNAL MEDICINE

## 2022-06-14 PROCEDURE — 99213 OFFICE O/P EST LOW 20 MIN: CPT | Performed by: INTERNAL MEDICINE

## 2022-06-14 RX ORDER — GABAPENTIN 300 MG/1
900 CAPSULE ORAL
Qty: 270 CAPSULE | Refills: 0 | Status: ON HOLD | OUTPATIENT
Start: 2022-06-14 | End: 2022-09-28 | Stop reason: SDUPTHER

## 2022-06-14 RX ORDER — GABAPENTIN 300 MG/1
900 CAPSULE ORAL
Qty: 270 CAPSULE | Refills: 0 | Status: SHIPPED | OUTPATIENT
Start: 2022-06-14 | End: 2022-06-14 | Stop reason: SDUPTHER

## 2022-06-14 NOTE — PROGRESS NOTES
Ewa Littlejohn is a 80 y.o. male    Chief Complaint   Patient presents with    Follow-up     insomnia x 5 days; melatonin and gabapentin not effective per pt for sleep    Cough     x 3 days       Visit Vitals  /85   Pulse (!) 101   Temp 98.3 °F (36.8 °C) (Temporal)   Resp 18   Ht 5' 9\" (1.753 m)   Wt 296 lb (134.3 kg)   SpO2 98%   BMI 43.71 kg/m²       3 most recent PHQ Screens 6/14/2022   Little interest or pleasure in doing things Not at all   Feeling down, depressed, irritable, or hopeless Not at all   Total Score PHQ 2 0   Trouble falling or staying asleep, or sleeping too much -   Feeling tired or having little energy -   Poor appetite, weight loss, or overeating -   Feeling bad about yourself - or that you are a failure or have let yourself or your family down -   Trouble concentrating on things such as school, work, reading, or watching TV -   Moving or speaking so slowly that other people could have noticed; or the opposite being so fidgety that others notice -   Thoughts of being better off dead, or hurting yourself in some way -   PHQ 9 Score -   How difficult have these problems made it for you to do your work, take care of your home and get along with others -       Fall Risk Assessment, last 12 mths 6/14/2022   Able to walk? Yes   Fall in past 12 months? 0   Do you feel unsteady? -   Are you worried about falling -       Abuse Screening Questionnaire 4/18/2022   Do you ever feel afraid of your partner? N   Are you in a relationship with someone who physically or mentally threatens you? N   Is it safe for you to go home? Y       1. Have you been to the ER, urgent care clinic since your last visit? Hospitalized since your last visit? No     2. Have you seen or consulted any other health care providers outside of the [address] [place] [address] since your last visit? Include any pap smears or colon screening.  No

## 2022-06-14 NOTE — PROGRESS NOTES
A 80 y.o. male. He is here because he has not been feeling well for five days. He said he has had a lot of insomnia. No headache. He has had a slight cough, no fever. No productive sputum, minimal sinus drainage, little shortness of breath and he has just had malaise. He says he has not been able to sleep for five nights. Not because of the cough, just insomnia. He has got obstructive sleep apnea and uses a CPAP. He was at Dr. Ancelmo Kumar, his regular sleep doctor. He has got an appointment with Dr. Ancelmo Kumar, but it is not until July. He takes 600 mg of gabapentin at night. Sometimes it helps him sleep. It used to help him. He tried melatonin last night which did not help. He is not sure what dose of melatonin. He lives in [place], but he has had his summer trailer which I am not sure where it is, but he says it is a couple of hour drive. He came in to town last night and came to see me today. He had a couple of episodes of vomiting and he says his urine has been a little dark. Exam, he was thin. He had lost some weight. Blood pressure was low normal 120 to 115/75. His pulse was irregular. Lungs were pretty clear. His oxygen saturation was okay. His abdomen was soft. He was nonfocal.  He could ambulate. Vitals:    06/14/22 0947   BP: 126/85   Pulse: (!) 101   Resp: 18   Temp: 98.3 °F (36.8 °C)   TempSrc: Temporal   SpO2: 98%   Weight: 296 lb (134.3 kg)   Height: 5' 9\" (1.753 m)     1. Generalized abdominal pain  covid test  Light diet  - METABOLIC PANEL, COMPREHENSIVE; Future  - CBC WITH AUTOMATED DIFF; Future  - AMYLASE; Future  - LIPASE; Future  - NOVEL CORONAVIRUS (COVID-19)    2. Diabetic polyneuropathy associated with type 2 diabetes mellitus (HCC)  Try higher dose  - gabapentin (NEURONTIN) 300 mg capsule; Take 3 Capsules by mouth nightly. Max Daily Amount: 900 mg. Dispense: 270 Capsule; Refill: 0    3. Other insomnia  covid test see sleep lab  - NOVEL CORONAVIRUS (COVID-19)    4. Dehydration  Push water and gatorade    5. Cough  ?   Cause rule out covid      SpO2 Readings from Last 3 Encounters:   06/14/22 98%   04/18/22 99%   02/21/22 97%     Stay home isolate   mask

## 2022-06-15 LAB
ALBUMIN SERPL-MCNC: 3.3 G/DL (ref 3.5–5)
ALBUMIN/GLOB SERPL: 0.8 {RATIO} (ref 1.1–2.2)
ALP SERPL-CCNC: 78 U/L (ref 45–117)
ALT SERPL-CCNC: 45 U/L (ref 12–78)
AMYLASE SERPL-CCNC: 26 U/L (ref 25–115)
ANION GAP SERPL CALC-SCNC: 8 MMOL/L (ref 5–15)
AST SERPL-CCNC: 51 U/L (ref 15–37)
BASOPHILS # BLD: 0.1 K/UL (ref 0–0.1)
BASOPHILS NFR BLD: 1 % (ref 0–1)
BILIRUB SERPL-MCNC: 1.3 MG/DL (ref 0.2–1)
BUN SERPL-MCNC: 29 MG/DL (ref 6–20)
BUN/CREAT SERPL: 26 (ref 12–20)
CALCIUM SERPL-MCNC: 9.3 MG/DL (ref 8.5–10.1)
CHLORIDE SERPL-SCNC: 90 MMOL/L (ref 97–108)
CO2 SERPL-SCNC: 29 MMOL/L (ref 21–32)
CREAT SERPL-MCNC: 1.11 MG/DL (ref 0.7–1.3)
DIFFERENTIAL METHOD BLD: ABNORMAL
EOSINOPHIL # BLD: 0 K/UL (ref 0–0.4)
EOSINOPHIL NFR BLD: 0 % (ref 0–7)
ERYTHROCYTE [DISTWIDTH] IN BLOOD BY AUTOMATED COUNT: 13.6 % (ref 11.5–14.5)
GLOBULIN SER CALC-MCNC: 4.4 G/DL (ref 2–4)
GLUCOSE SERPL-MCNC: 134 MG/DL (ref 65–100)
HCT VFR BLD AUTO: 42.5 % (ref 36.6–50.3)
HGB BLD-MCNC: 14 G/DL (ref 12.1–17)
IMM GRANULOCYTES # BLD AUTO: 0.1 K/UL (ref 0–0.04)
IMM GRANULOCYTES NFR BLD AUTO: 1 % (ref 0–0.5)
LIPASE SERPL-CCNC: 170 U/L (ref 73–393)
LYMPHOCYTES # BLD: 0.8 K/UL (ref 0.8–3.5)
LYMPHOCYTES NFR BLD: 8 % (ref 12–49)
MCH RBC QN AUTO: 28.5 PG (ref 26–34)
MCHC RBC AUTO-ENTMCNC: 32.9 G/DL (ref 30–36.5)
MCV RBC AUTO: 86.6 FL (ref 80–99)
MONOCYTES # BLD: 1.1 K/UL (ref 0–1)
MONOCYTES NFR BLD: 11 % (ref 5–13)
NEUTS SEG # BLD: 8.7 K/UL (ref 1.8–8)
NEUTS SEG NFR BLD: 79 % (ref 32–75)
NRBC # BLD: 0 K/UL (ref 0–0.01)
NRBC BLD-RTO: 0 PER 100 WBC
PLATELET # BLD AUTO: 220 K/UL (ref 150–400)
PMV BLD AUTO: 11.4 FL (ref 8.9–12.9)
POTASSIUM SERPL-SCNC: 4.4 MMOL/L (ref 3.5–5.1)
PROT SERPL-MCNC: 7.7 G/DL (ref 6.4–8.2)
RBC # BLD AUTO: 4.91 M/UL (ref 4.1–5.7)
SARS-COV-2, NAA 2 DAY TAT: NORMAL
SARS-COV-2, NAA: NOT DETECTED
SODIUM SERPL-SCNC: 127 MMOL/L (ref 136–145)
SPECIMEN STATUS REPORT, ROLRST: NORMAL
WBC # BLD AUTO: 10.8 K/UL (ref 4.1–11.1)

## 2022-07-12 DIAGNOSIS — M10.271: ICD-10-CM

## 2022-07-12 RX ORDER — COLCHICINE 0.6 MG/1
TABLET ORAL
Qty: 90 TABLET | Refills: 1 | Status: SHIPPED | OUTPATIENT
Start: 2022-07-12 | End: 2022-09-28

## 2022-07-28 ENCOUNTER — OFFICE VISIT (OUTPATIENT)
Dept: SLEEP MEDICINE | Age: 72
End: 2022-07-28
Payer: MEDICARE

## 2022-07-28 ENCOUNTER — DOCUMENTATION ONLY (OUTPATIENT)
Dept: SLEEP MEDICINE | Age: 72
End: 2022-07-28

## 2022-07-28 VITALS
HEART RATE: 104 BPM | HEIGHT: 69 IN | TEMPERATURE: 98.6 F | OXYGEN SATURATION: 97 % | DIASTOLIC BLOOD PRESSURE: 74 MMHG | SYSTOLIC BLOOD PRESSURE: 136 MMHG | BODY MASS INDEX: 42.95 KG/M2 | WEIGHT: 290 LBS

## 2022-07-28 DIAGNOSIS — I48.20 CHRONIC ATRIAL FIBRILLATION (HCC): ICD-10-CM

## 2022-07-28 DIAGNOSIS — G47.33 OSA (OBSTRUCTIVE SLEEP APNEA): Primary | ICD-10-CM

## 2022-07-28 DIAGNOSIS — I10 PRIMARY HYPERTENSION: ICD-10-CM

## 2022-07-28 PROCEDURE — G8536 NO DOC ELDER MAL SCRN: HCPCS | Performed by: NURSE PRACTITIONER

## 2022-07-28 PROCEDURE — G8427 DOCREV CUR MEDS BY ELIG CLIN: HCPCS | Performed by: NURSE PRACTITIONER

## 2022-07-28 PROCEDURE — 99213 OFFICE O/P EST LOW 20 MIN: CPT | Performed by: NURSE PRACTITIONER

## 2022-07-28 PROCEDURE — 1123F ACP DISCUSS/DSCN MKR DOCD: CPT | Performed by: NURSE PRACTITIONER

## 2022-07-28 PROCEDURE — 1101F PT FALLS ASSESS-DOCD LE1/YR: CPT | Performed by: NURSE PRACTITIONER

## 2022-07-28 PROCEDURE — G8754 DIAS BP LESS 90: HCPCS | Performed by: NURSE PRACTITIONER

## 2022-07-28 PROCEDURE — G9717 DOC PT DX DEP/BP F/U NT REQ: HCPCS | Performed by: NURSE PRACTITIONER

## 2022-07-28 PROCEDURE — G8417 CALC BMI ABV UP PARAM F/U: HCPCS | Performed by: NURSE PRACTITIONER

## 2022-07-28 PROCEDURE — 3017F COLORECTAL CA SCREEN DOC REV: CPT | Performed by: NURSE PRACTITIONER

## 2022-07-28 PROCEDURE — G8752 SYS BP LESS 140: HCPCS | Performed by: NURSE PRACTITIONER

## 2022-07-28 NOTE — PATIENT INSTRUCTIONS
[address] [facility] [place]. [place], [address]  Tel.  [phone]  Fax. 100 [facility] 60  [place], [address] [facility]  Tel.  [phone]  Fax. [phone] [address]  Tel.  [phone]  Fax. [phone]     Learning About CPAP for Sleep Apnea  What is CPAP? CPAP is a small machine that you use at home every night while you sleep. It increases air pressure in your throat to keep your airway open. When you have sleep apnea, this can help you sleep better so you feel much better. CPAP stands for \"continuous positive airway pressure. \"  The CPAP machine will have one of the following:  A mask that covers your nose and mouth  Prongs that fit into your nose  A mask that covers your nose only, the most common type. This type is called NCPAP. The N stands for \"nasal.\"  Why is it done? CPAP is usually the best treatment for obstructive sleep apnea. It is the first treatment choice and the most widely used. Your doctor may suggest CPAP if you have: Moderate to severe sleep apnea. Sleep apnea and coronary artery disease (CAD) or heart failure. How does it help? CPAP can help you have more normal sleep, so you feel less sleepy and more alert during the daytime. CPAP may help keep heart failure or other heart problems from getting worse. NCPAP may help lower your blood pressure. If you use CPAP, your bed partner may also sleep better because you are not snoring or restless. What are the side effects? Some people who use CPAP have:  A dry or stuffy nose and a sore throat. Irritated skin on the face. Sore eyes. Bloating. If you have any of these problems, work with your doctor to fix them. Here are some things you can try:  Be sure the mask or nasal prongs fit well. See if your doctor can adjust the pressure of your CPAP. If your nose is dry, try a humidifier.   If your nose is runny or stuffy, try decongestant medicine or a steroid nasal spray. If these things do not help, you might try a different type of machine. Some machines have air pressure that adjusts on its own. Others have air pressures that are different when you breathe in than when you breathe out. This may reduce discomfort caused by too much pressure in your nose. Where can you learn more? Go to Kuddle.be  Enter [place] ([employer]) in the search box to learn more about \"Learning About CPAP for Sleep Apnea. \"   © 4678-9530 Healthwise, Incorporated. Care instructions adapted under license by Anita Kelly (which disclaims liability or warranty for this information). This care instruction is for use with your licensed healthcare professional. If you have questions about a medical condition or this instruction, always ask your healthcare professional. [address] any warranty or liability for your use of this information. Content Version: 4.6.53102; Last Revised: January 11, 2010  PROPER SLEEP HYGIENE    What to avoid  Do not have drinks with caffeine, such as coffee or black tea, for 8 hours before bed. Do not smoke or use other types of tobacco near bedtime. Nicotine is a stimulant and can keep you awake. Avoid drinking alcohol late in the evening, because it can cause you to wake in the middle of the night. Do not eat a big meal close to bedtime. If you are hungry, eat a light snack. Do not drink a lot of water close to bedtime, because the need to urinate may wake you up during the night. Do not read or watch TV in bed. Use the bed only for sleeping and sexual activity. What to try  Go to bed at the same time every night, and wake up at the same time every morning. Do not take naps during the day. Keep your bedroom quiet, dark, and cool. Get regular exercise, but not within 3 to 4 hours of your bedtime. .  Sleep on a comfortable pillow and mattress.   If watching the clock makes you anxious, turn it facing away from you so you cannot see the time. If you worry when you lie down, start a worry book. Well before bedtime, write down your worries, and then set the book and your concerns aside. Try meditation or other relaxation techniques before you go to bed. If you cannot fall asleep, get up and go to another room until you feel sleepy. Do something relaxing. Repeat your bedtime routine before you go to bed again. Make your house quiet and calm about an hour before bedtime. Turn down the lights, turn off the TV, log off the computer, and turn down the volume on music. This can help you relax after a busy day. Drowsy Driving: The [employer] cites drowsiness as a causing factor in more than 539,183 police reported crashes annually, resulting in 76,000 injuries and 1,500 deaths. Other surveys suggest 55% of people polled have driven while drowsy in the past year, 23% had fallen asleep but not crashed, 3% crashed, and 2% had and accident due to drowsy driving. Who is at risk? Young Drivers: One study of drowsy driving accidents states that 55% of the drivers were under 25 years. Of those, 75% were male. Shift Workers and Travelers: People who work overnight or travel across time zones frequently are at higher risk of experiencing Circadian Rhythm Disorders. They are trying to work and function when their body is programed to sleep. Sleep Deprived: Lack of sleep has a serious impact on your ability to pay attention or focus on a task. Consistently getting less than the average of 8 hours your body needs creates partial or cumulative sleep deprivation. Untreated Sleep Disorders: Sleep Apnea, Narcolepsy, R.L.S., and other sleep disorders (untreated) prevent a person from getting enough restful sleep. This leads to excessive daytime sleepiness and increases the risk for drowsy driving accidents by up to 7 times.   Medications / Alcohol: Even over the counter medications can cause drowsiness. Medications that impair a drivers attention should have a warning label. Alcohol naturally makes you sleepy and on its own can cause accidents. Combined with excessive drowsiness its effects are amplified. Signs of Drowsy Driving:   * You don't remember driving the last few miles   * You may drift out of your natalee   * You are unable to focus and your thoughts wander   * You may yawn more often than normal   * You have difficulty keeping your eyes open / nodding off   * Missing traffic signs, speeding, or tailgating  Prevention-   Good sleep hygiene, lifestyle and behavioral choices have the most impact on drowsy driving. There is no substitute for sleep and the average person requires 8 hours nightly. If you find yourself driving drowsy, stop and sleep. Consider the sleep hygiene tips provided during your visit as well. Medication Refill Policy: Refills for all medications require 1 week advance notice. Please have your pharmacy fax a refill request. We are unable to fax, or call in \"controled substance\" medications and you will need to pick these prescriptions up from our office. enavu Activation    Thank you for requesting access to enavu. Please follow the instructions below to securely access and download your online medical record. enavu allows you to send messages to your doctor, view your test results, renew your prescriptions, schedule appointments, and more. How Do I Sign Up? In your internet browser, go to [email or web address]. Click on the First Time User? Click Here link in the Sign In box. You will see the New Member Sign Up page. Enter your enavu Access Code exactly as it appears below. You will not need to use this code after youve completed the sign-up process. If you do not sign up before the expiration date, you must request a new code. enavu Access Code:  Activation code not generated  Current enavu Status: Active (This is the date your [email or web address] access code will )    Enter the last four digits of your Social Security Number (xxxx) and Date of Birth (mm/dd/yyyy) as indicated and click Submit. You will be taken to the next sign-up page. Create a [email or web address] ID. This will be your [email or web address] login ID and cannot be changed, so think of one that is secure and easy to remember. Create a [email or web address] password. You can change your password at any time. Enter your Password Reset Question and Answer. This can be used at a later time if you forget your password. Enter your e-mail address. You will receive e-mail notification when new information is available in [address] Click Sign Up. You can now view and download portions of your medical record. Click the Directworks link to download a portable copy of your medical information. Additional Information    If you have questions, please call [phone]. Remember, [email or web address] is NOT to be used for urgent needs. For medical emergencies, dial 911.

## 2022-07-28 NOTE — PROGRESS NOTES
217 [facility] [place]. [place], [address]   Tel.  [phone]   Fax. 100 [facility] 60   [place], [address] [facility]   Tel.  [phone]   Fax. [phone] [address] [facility] [address]   Tel.  [phone]   Fax. [phone] [facility] [address] (: 1950) is a 80 y.o. male, established patient, seen for positive airway pressure follow-up and evaluation. He was last seen by Dr. Kay Tierney on 2021, prior notes and sleep tests reviewed in detail. In lab sleep test 2000 showed an AHI of 30.7/hr with a lowest SpO2 of 82%. Repeat Split-Night Testing 2011 indicated an AHI of 30.7/hr with a lowest SpO2 of 89%. Patient was adequately treated on CPAP level of 17 cmH2O. A repeat Split-Night test performed on 2015 was indicative of an AHI of 65.3/hr with a lowest SpO2 of 89%. Patient was adequately treated on CPAP level of 14 cmH2O. He returned for a Bi-Level PAP titration on 2015 and was adequately titrated on 13/06 cmH2O. Most recent titration 2021 adequate on BiLevel - pressure change to Max IPAP 20, Min EPAP 12, PS 6. New device ordered. ASSESSMENT/PLAN:    ICD-10-CM ICD-9-CM    1. VALENTE (obstructive sleep apnea)  G47.33 327.23 AMB SUPPLY ORDER      2. Primary hypertension  I10 401.9       3. Chronic atrial fibrillation (HCC)  I48.20 427.31       4. Adult BMI 40.0-44.9 kg/sq m (HCC)  Z68.41 V85.41           AHI: 65.3(2015). On ResMed:  AirCurve 10 VAuto: Max IPAP 20, Min EPAP 12, PS 6 cmh2O. Set up 2017. He is adherent with PAP therapy and PAP continues to benefit patient and remains necessary for control of his sleep apnea. His device is eligible for replacement based on age, he would like a new resmed device. Follow-up and Dispositions    Return in about 4 months (around 2022) for first adherence on new device.          Sleep Apnea -    Continue on current pressures    * New APAP device needed, current device has exceeded its life expectancy. Orders Placed This Encounter    AMB SUPPLY ORDER     Diagnosis: Obstructive Sleep Apnea G47.33    New PAP device needed, current device has exceeded its life expectancy and is eligible for replacement    Positive Airway Pressure Therapy: Duration of need: 99 months.  ResMed VPAP Auto (VAuto Mode): Maximum IPAP: 20 cmH2O; Minimum EPAP: 12 cmH2O; PS: 6 cmH2O. Ramp Time: 30 Minutes.  Heated Humidifier  Jacquie Jonh Access     Full Face Mask 1 every 3 months.  Full Face Mask Cushion 1 per month.  Headgear 1 every 6 months.  Tubing with heating element 1 every 3 months.  Filter(s) Disposable 2 per month.  Filter(s) Non-Disposable 1 every 6 months. .   [facility] for Humidifier (Replace) 1 every 6 months. JAMARCUS Hawkins; NPI: [id number]    Electronically signed. Date:- 07/28/22     * Counseling was provided regarding the importance of regular PAP use with emphasis on ensuring sufficient total sleep time, proper sleep hygiene, and safe driving. * Re-enforced proper and regular cleaning for the device. * He was asked to contact our office for any problems regarding PAP therapy. 2. Hypertension -  continue on his current regimen, he will continue to monitor his BP and follow up with his PMD for reevaluation/adjustment of medications if warranted. I have reviewed the relationship between hypertension as it relates to sleep-disordered breathing. 3.  Atrial Fibrilation - continue on his current regimen. I have reviewed the relationship between heart arrhythmias and sleep disordered breathing. 4.  Recommended a dedicated weight loss program through appropriate diet and exercise regimen as significant weight reduction has been shown to reduce severity of obstructive sleep apnea.      SUBJECTIVE/OBJECTIVE:    He  is seen today for follow up on PAP device and reports no problems using the device. The following concerns identified:    Drowsiness no Problems exhaling no   Snoring no Forget to put on no   Mask Comfortable yes Can't fall asleep no   Dry Mouth no Mask falls off no   Air Leaking no Frequent awakenings no       He admits that his sleep has improved on PAP therapy using full face mask and heated tubing. He uses his devices nightly, [place] S9 [place] and Almanza Burns 10 is here at home. He would like a replacement device based on age. Rylie Luna told him they could not replace unless broken - but device is now over 11years old. Review of device download indicated:  Auto BiLevel pressure: Max IPAP 20, Min EPAP 12, PS 6 cmH2O; Max Pressure: IPAP 19.7, EPAP 13.7 cmH2O;  95th percentile Pressure: IPAP 19.2, EPAP 13.2 cmH2O   95th Percentile Leak: 72.1 L/Min  % Used Days >= 4 hours: 70. Avg hours used:  7:09. Therapy Apnea Index averaged over PAP use: 5.8 /hr which reflects improved sleep breathing condition. [place] Sleepiness Score: (P) 10 and Modified F.O.S.Q. Score Total / 2: (P) 16 which reflects improved sleep quality over therapy time. Sleep Review of Systems: notable for Negative difficulty falling asleep; Positive awakenings at night; Negative early morning headaches; Negative memory problems; Negative concentration issues;  Negative chest pain; Negative shortness of breath; Negative significant joint pain at night; Negative significant muscle pain at night; Negative rashes or itching; Negative heartburn; Negative significant mood issues; 0 afternoon naps per week      Visit Vitals  /74 (BP 1 Location: Left upper arm, BP Patient Position: Sitting, BP Cuff Size: Large adult)   Pulse (!) 104   Temp 98.6 °F (37 °C) (Temporal)   Ht 5' 9\" (1.753 m)   Wt 290 lb (131.5 kg)   SpO2 97%   BMI 42.83 kg/m²          General:   Alert, oriented, not in acute distress   Eyes:  Anicteric Sclerae; no obvious strabismus   Nose:  No obvious nasal septum deviation    Neck: Midline trachea   Chest/Lungs:  Symmetrical lung expansion, clear lung fields on auscultation    CVS:  Normal rate, regular rhythm,  no JVD   Extremities:  No obvious rashes, no edema    Neuro:  No focal deficits; No obvious tremor    Psych:  Normal affect,  normal countenance     Patient's phone number [phone] (cell) was reviewed and confirmed for accuracy. He gives permission for messages regarding results and appointments to be left at that number. An electronic signature was used to authenticate this note.     -- Aida Qiu NP, [facility]  07/28/22

## 2022-07-31 DIAGNOSIS — I10 ESSENTIAL HYPERTENSION: ICD-10-CM

## 2022-08-01 RX ORDER — LISINOPRIL AND HYDROCHLOROTHIAZIDE 20; 25 MG/1; MG/1
TABLET ORAL
Qty: 90 TABLET | Refills: 2 | Status: SHIPPED | OUTPATIENT
Start: 2022-08-01 | End: 2022-09-28

## 2022-08-01 NOTE — TELEPHONE ENCOUNTER
Requested Prescriptions     Signed Prescriptions Disp Refills    lisinopril-hydroCHLOROthiazide (PRINZIDE, ZESTORETIC) 20-25 mg per tablet 90 Tablet 2     Sig: TAKE 1 TABLET BY MOUTH EVERY DAY     Authorizing Provider: Kannan Mathews     Ordering User: Gemini Levin    Per Dr. Alpa Carpio verbal orders

## 2022-09-09 ENCOUNTER — TELEPHONE (OUTPATIENT)
Dept: PHARMACY | Age: 72
End: 2022-09-09

## 2022-09-09 NOTE — TELEPHONE ENCOUNTER
[facility]ARM[facility]: ADHERENCE REVIEW  Identified care gap per [employer] fills at [facility]: ACE/ARB adherence    Last Visit: 2022      ASSESSMENT  ACE/ARB ADHERENCE    Insurance Records claims through 2022 ( [place] = NA%;  Yousif Morrow =  77%; Potential Fail Date: 2022 ):   Lisinopril/hctz last filled on 2022 for 90 day supply. Next refill due: 2022-PAST DUE 47 days    New rx sent to [facility] 2022    BP Readings from Last 3 Encounters:   22 136/74   22 126/85   22 (!) 142/78     Estimated Creatinine Clearance: 82 mL/min (by C-G formula based on SCr of 1.11 mg/dL).       PLAN  The following are interventions that have been identified:  - Patient overdue refilling Lisinopril/HCTZ and active on home medication list    Fluidinfot message sent to patient      Future Appointments   Date Time Provider Doc Hernandez   10/19/2022  2:45 PM MD SHANE Rios BS AMB   2022  4:00 PM Anastasiya Shearer NP [facility] BS AMB   2/15/2023  4:00 PM Deric Gao MD [address] [place] [address], PharmD, [address] [facility], toll free: [phone] Option 2   For Pharmacy [address] [facility] in place: No  Recommendation Provided To: Pharmacy: 1  Intervention Detail: Adherence Monitorin  Gap Closed?: No  Intervention Accepted By: Pharmacy: 1  Time Spent (min): 15

## 2022-09-14 ENCOUNTER — APPOINTMENT (OUTPATIENT)
Dept: CT IMAGING | Age: 72
DRG: 064 | End: 2022-09-14
Attending: EMERGENCY MEDICINE
Payer: MEDICARE

## 2022-09-14 ENCOUNTER — HOSPITAL ENCOUNTER (INPATIENT)
Age: 72
LOS: 14 days | Discharge: NURSING, REHAB OR LONG-TERM CARE | DRG: 064 | End: 2022-09-28
Attending: EMERGENCY MEDICINE | Admitting: STUDENT IN AN ORGANIZED HEALTH CARE EDUCATION/TRAINING PROGRAM
Payer: MEDICARE

## 2022-09-14 ENCOUNTER — APPOINTMENT (OUTPATIENT)
Dept: GENERAL RADIOLOGY | Age: 72
DRG: 064 | End: 2022-09-14
Attending: EMERGENCY MEDICINE
Payer: MEDICARE

## 2022-09-14 DIAGNOSIS — G81.90 HEMIPLEGIA, UNSPECIFIED ETIOLOGY, UNSPECIFIED HEMIPLEGIA TYPE, UNSPECIFIED LATERALITY (HCC): ICD-10-CM

## 2022-09-14 DIAGNOSIS — E78.5 DYSLIPIDEMIA, GOAL LDL BELOW 100: ICD-10-CM

## 2022-09-14 DIAGNOSIS — Z79.01 ANTICOAGULATED: ICD-10-CM

## 2022-09-14 DIAGNOSIS — I48.91 ATRIAL FIBRILLATION, UNSPECIFIED TYPE (HCC): ICD-10-CM

## 2022-09-14 DIAGNOSIS — I63.9 CEREBROVASCULAR ACCIDENT (CVA), UNSPECIFIED MECHANISM (HCC): Primary | ICD-10-CM

## 2022-09-14 DIAGNOSIS — I10 PRIMARY HYPERTENSION: ICD-10-CM

## 2022-09-14 DIAGNOSIS — G47.33 OSA (OBSTRUCTIVE SLEEP APNEA): ICD-10-CM

## 2022-09-14 DIAGNOSIS — E11.42 DIABETIC POLYNEUROPATHY ASSOCIATED WITH TYPE 2 DIABETES MELLITUS (HCC): ICD-10-CM

## 2022-09-14 LAB
ALBUMIN SERPL-MCNC: 3.8 G/DL (ref 3.5–5)
ALBUMIN/GLOB SERPL: 0.8 {RATIO} (ref 1.1–2.2)
ALP SERPL-CCNC: 74 U/L (ref 45–117)
ALT SERPL-CCNC: 54 U/L (ref 12–78)
AMMONIA PLAS-SCNC: 11 UMOL/L
ANION GAP SERPL CALC-SCNC: 9 MMOL/L (ref 5–15)
AST SERPL-CCNC: 110 U/L (ref 15–37)
BASE DEFICIT BLD-SCNC: 1.1 MMOL/L
BASOPHILS # BLD: 0 K/UL (ref 0–0.1)
BASOPHILS NFR BLD: 0 % (ref 0–1)
BILIRUB SERPL-MCNC: 2.5 MG/DL (ref 0.2–1)
BUN SERPL-MCNC: 48 MG/DL (ref 6–20)
BUN/CREAT SERPL: 40 (ref 12–20)
CA-I BLD-MCNC: 1.14 MMOL/L (ref 1.12–1.32)
CALCIUM SERPL-MCNC: 9.6 MG/DL (ref 8.5–10.1)
CHLORIDE BLD-SCNC: 106 MMOL/L (ref 100–108)
CHLORIDE SERPL-SCNC: 105 MMOL/L (ref 97–108)
CO2 BLD-SCNC: 22 MMOL/L (ref 19–24)
CO2 SERPL-SCNC: 20 MMOL/L (ref 21–32)
COMMENT, HOLDF: NORMAL
CREAT SERPL-MCNC: 1.21 MG/DL (ref 0.7–1.3)
CREAT UR-MCNC: 1.1 MG/DL (ref 0.6–1.3)
DIFFERENTIAL METHOD BLD: ABNORMAL
EOSINOPHIL # BLD: 0 K/UL (ref 0–0.4)
EOSINOPHIL NFR BLD: 0 % (ref 0–7)
ERYTHROCYTE [DISTWIDTH] IN BLOOD BY AUTOMATED COUNT: 13.6 % (ref 11.5–14.5)
ETHANOL SERPL-MCNC: <10 MG/DL
GLOBULIN SER CALC-MCNC: 4.6 G/DL (ref 2–4)
GLUCOSE BLD STRIP.AUTO-MCNC: 162 MG/DL (ref 65–117)
GLUCOSE BLD STRIP.AUTO-MCNC: 169 MG/DL (ref 74–106)
GLUCOSE SERPL-MCNC: 163 MG/DL (ref 65–100)
HCO3 BLDA-SCNC: 22 MMOL/L
HCT VFR BLD AUTO: 48.3 % (ref 36.6–50.3)
HGB BLD-MCNC: 16.5 G/DL (ref 12.1–17)
IMM GRANULOCYTES # BLD AUTO: 0 K/UL
IMM GRANULOCYTES NFR BLD AUTO: 0 %
INR PPP: 1.1 (ref 0.9–1.1)
LACTATE BLD-SCNC: 1.72 MMOL/L (ref 0.4–2)
LACTATE SERPL-SCNC: 2.3 MMOL/L (ref 0.4–2)
LYMPHOCYTES # BLD: 1.3 K/UL (ref 0.8–3.5)
LYMPHOCYTES NFR BLD: 7 % (ref 12–49)
MCH RBC QN AUTO: 29.7 PG (ref 26–34)
MCHC RBC AUTO-ENTMCNC: 34.2 G/DL (ref 30–36.5)
MCV RBC AUTO: 86.9 FL (ref 80–99)
MONOCYTES # BLD: 0.9 K/UL (ref 0–1)
MONOCYTES NFR BLD: 5 % (ref 5–13)
NEUTS BAND NFR BLD MANUAL: 5 % (ref 0–6)
NEUTS SEG # BLD: 15.9 K/UL (ref 1.8–8)
NEUTS SEG NFR BLD: 83 % (ref 32–75)
NRBC # BLD: 0 K/UL (ref 0–0.01)
NRBC BLD-RTO: 0 PER 100 WBC
PCO2 BLDV: 32.5 MMHG (ref 41–51)
PH BLDV: 7.44 [PH] (ref 7.32–7.42)
PLATELET # BLD AUTO: 299 K/UL (ref 150–400)
PMV BLD AUTO: 11 FL (ref 8.9–12.9)
PO2 BLDV: 55 MMHG (ref 25–40)
POTASSIUM BLD-SCNC: 3.5 MMOL/L (ref 3.5–5.5)
POTASSIUM SERPL-SCNC: 3.6 MMOL/L (ref 3.5–5.1)
PROT SERPL-MCNC: 8.4 G/DL (ref 6.4–8.2)
PROTHROMBIN TIME: 11.9 SEC (ref 9–11.1)
RBC # BLD AUTO: 5.56 M/UL (ref 4.1–5.7)
RBC MORPH BLD: ABNORMAL
SAMPLES BEING HELD,HOLD: NORMAL
SERVICE CMNT-IMP: ABNORMAL
SODIUM BLD-SCNC: 141 MMOL/L (ref 136–145)
SODIUM SERPL-SCNC: 134 MMOL/L (ref 136–145)
SPECIMEN SITE: ABNORMAL
TROPONIN-HIGH SENSITIVITY: 198 NG/L (ref 0–76)
WBC # BLD AUTO: 18.1 K/UL (ref 4.1–11.1)
WBC MORPH BLD: ABNORMAL

## 2022-09-14 PROCEDURE — APPNB180 APP NON BILLABLE TIME > 60 MINS: Performed by: NURSE PRACTITIONER

## 2022-09-14 PROCEDURE — 70450 CT HEAD/BRAIN W/O DYE: CPT

## 2022-09-14 PROCEDURE — 36415 COLL VENOUS BLD VENIPUNCTURE: CPT

## 2022-09-14 PROCEDURE — 80053 COMPREHEN METABOLIC PANEL: CPT

## 2022-09-14 PROCEDURE — 71045 X-RAY EXAM CHEST 1 VIEW: CPT

## 2022-09-14 PROCEDURE — 74011000636 HC RX REV CODE- 636: Performed by: RADIOLOGY

## 2022-09-14 PROCEDURE — 82947 ASSAY GLUCOSE BLOOD QUANT: CPT

## 2022-09-14 PROCEDURE — 82140 ASSAY OF AMMONIA: CPT

## 2022-09-14 PROCEDURE — 84484 ASSAY OF TROPONIN QUANT: CPT

## 2022-09-14 PROCEDURE — 82077 ASSAY SPEC XCP UR&BREATH IA: CPT

## 2022-09-14 PROCEDURE — 82962 GLUCOSE BLOOD TEST: CPT

## 2022-09-14 PROCEDURE — 72125 CT NECK SPINE W/O DYE: CPT

## 2022-09-14 PROCEDURE — 4A03X5D MEASUREMENT OF ARTERIAL FLOW, INTRACRANIAL, EXTERNAL APPROACH: ICD-10-PCS | Performed by: INTERNAL MEDICINE

## 2022-09-14 PROCEDURE — 83605 ASSAY OF LACTIC ACID: CPT

## 2022-09-14 PROCEDURE — 94762 N-INVAS EAR/PLS OXIMTRY CONT: CPT

## 2022-09-14 PROCEDURE — 85025 COMPLETE CBC W/AUTO DIFF WBC: CPT

## 2022-09-14 PROCEDURE — 85610 PROTHROMBIN TIME: CPT

## 2022-09-14 PROCEDURE — 96374 THER/PROPH/DIAG INJ IV PUSH: CPT

## 2022-09-14 PROCEDURE — 82550 ASSAY OF CK (CPK): CPT

## 2022-09-14 PROCEDURE — 0042T CT CODE NEURO PERF W CBF: CPT

## 2022-09-14 PROCEDURE — 99285 EMERGENCY DEPT VISIT HI MDM: CPT

## 2022-09-14 PROCEDURE — 74011250636 HC RX REV CODE- 250/636: Performed by: EMERGENCY MEDICINE

## 2022-09-14 PROCEDURE — 70496 CT ANGIOGRAPHY HEAD: CPT

## 2022-09-14 PROCEDURE — 65610000006 HC RM INTENSIVE CARE

## 2022-09-14 PROCEDURE — 74177 CT ABD & PELVIS W/CONTRAST: CPT

## 2022-09-14 PROCEDURE — 71260 CT THORAX DX C+: CPT

## 2022-09-14 RX ORDER — LABETALOL HYDROCHLORIDE 5 MG/ML
5 INJECTION, SOLUTION INTRAVENOUS EVERY 6 HOURS
Status: DISCONTINUED | OUTPATIENT
Start: 2022-09-15 | End: 2022-09-15

## 2022-09-14 RX ORDER — MORPHINE SULFATE 4 MG/ML
4 INJECTION INTRAVENOUS
Status: COMPLETED | OUTPATIENT
Start: 2022-09-14 | End: 2022-09-14

## 2022-09-14 RX ORDER — SODIUM CHLORIDE 9 MG/ML
100 INJECTION, SOLUTION INTRAVENOUS CONTINUOUS
Status: DISPENSED | OUTPATIENT
Start: 2022-09-14 | End: 2022-09-15

## 2022-09-14 RX ORDER — ACETAMINOPHEN 325 MG/1
650 TABLET ORAL
Status: DISCONTINUED | OUTPATIENT
Start: 2022-09-14 | End: 2022-09-28 | Stop reason: HOSPADM

## 2022-09-14 RX ORDER — ATORVASTATIN CALCIUM 40 MG/1
80 TABLET, FILM COATED ORAL
Status: DISCONTINUED | OUTPATIENT
Start: 2022-09-15 | End: 2022-09-28 | Stop reason: HOSPADM

## 2022-09-14 RX ORDER — DOCUSATE SODIUM 100 MG/1
100 CAPSULE, LIQUID FILLED ORAL 2 TIMES DAILY
Status: DISCONTINUED | OUTPATIENT
Start: 2022-09-15 | End: 2022-09-28 | Stop reason: HOSPADM

## 2022-09-14 RX ORDER — ACETAMINOPHEN 650 MG/1
650 SUPPOSITORY RECTAL
Status: DISCONTINUED | OUTPATIENT
Start: 2022-09-14 | End: 2022-09-28 | Stop reason: HOSPADM

## 2022-09-14 RX ADMIN — MORPHINE SULFATE 4 MG: 4 INJECTION INTRAVENOUS at 23:27

## 2022-09-14 RX ADMIN — SODIUM CHLORIDE 1000 ML: 9 INJECTION, SOLUTION INTRAVENOUS at 23:28

## 2022-09-14 RX ADMIN — IOPAMIDOL 40 ML: 755 INJECTION, SOLUTION INTRAVENOUS at 22:24

## 2022-09-14 RX ADMIN — IOPAMIDOL 100 ML: 755 INJECTION, SOLUTION INTRAVENOUS at 22:23

## 2022-09-15 ENCOUNTER — APPOINTMENT (OUTPATIENT)
Dept: MRI IMAGING | Age: 72
DRG: 064 | End: 2022-09-15
Attending: NURSE PRACTITIONER
Payer: MEDICARE

## 2022-09-15 ENCOUNTER — APPOINTMENT (OUTPATIENT)
Dept: NON INVASIVE DIAGNOSTICS | Age: 72
DRG: 064 | End: 2022-09-15
Attending: NURSE PRACTITIONER
Payer: MEDICARE

## 2022-09-15 ENCOUNTER — APPOINTMENT (OUTPATIENT)
Dept: GENERAL RADIOLOGY | Age: 72
DRG: 064 | End: 2022-09-15
Attending: NURSE PRACTITIONER
Payer: MEDICARE

## 2022-09-15 LAB
ANION GAP SERPL CALC-SCNC: 6 MMOL/L (ref 5–15)
BUN SERPL-MCNC: 46 MG/DL (ref 6–20)
BUN/CREAT SERPL: 46 (ref 12–20)
CALCIUM SERPL-MCNC: 9.3 MG/DL (ref 8.5–10.1)
CHLORIDE SERPL-SCNC: 108 MMOL/L (ref 97–108)
CHOLEST SERPL-MCNC: 146 MG/DL
CK SERPL-CCNC: 1103 U/L (ref 39–308)
CK SERPL-CCNC: 1396 U/L (ref 39–308)
CK SERPL-CCNC: 1790 U/L (ref 39–308)
CK SERPL-CCNC: 2799 U/L (ref 39–308)
CK SERPL-CCNC: 3573 U/L (ref 39–308)
CO2 SERPL-SCNC: 25 MMOL/L (ref 21–32)
CREAT SERPL-MCNC: 0.99 MG/DL (ref 0.7–1.3)
ECHO AV MEAN GRADIENT: 11 MMHG
ECHO AV MEAN VELOCITY: 1.6 M/S
ECHO AV PEAK GRADIENT: 17 MMHG
ECHO AV PEAK GRADIENT: 19 MMHG
ECHO AV PEAK VELOCITY: 2 M/S
ECHO AV PEAK VELOCITY: 2.2 M/S
ECHO AV VTI: 30.1 CM
ECHO EST RA PRESSURE: 10 MMHG
ECHO LVOT AV VTI INDEX: 0.55
ECHO LVOT MEAN GRADIENT: 2 MMHG
ECHO LVOT PEAK GRADIENT: 3 MMHG
ECHO LVOT PEAK VELOCITY: 0.9 M/S
ECHO LVOT VTI: 16.5 CM
ECHO MV E VELOCITY: 0.9 M/S
ECHO PV MAX VELOCITY: 1.1 M/S
ECHO PV PEAK GRADIENT: 5 MMHG
ECHO RIGHT VENTRICULAR SYSTOLIC PRESSURE (RVSP): 37 MMHG
ECHO RV TAPSE: 1.3 CM (ref 1.7–?)
ECHO TV REGURGITANT MAX VELOCITY: 2.62 M/S
ECHO TV REGURGITANT PEAK GRADIENT: 27 MMHG
ERYTHROCYTE [DISTWIDTH] IN BLOOD BY AUTOMATED COUNT: 13.7 % (ref 11.5–14.5)
EST. AVERAGE GLUCOSE BLD GHB EST-MCNC: 120 MG/DL
GLUCOSE BLD STRIP.AUTO-MCNC: 137 MG/DL (ref 65–117)
GLUCOSE BLD STRIP.AUTO-MCNC: 137 MG/DL (ref 65–117)
GLUCOSE BLD STRIP.AUTO-MCNC: 160 MG/DL (ref 65–117)
GLUCOSE SERPL-MCNC: 166 MG/DL (ref 65–100)
HBA1C MFR BLD: 5.8 % (ref 4–5.6)
HCT VFR BLD AUTO: 44.4 % (ref 36.6–50.3)
HDLC SERPL-MCNC: 53 MG/DL
HDLC SERPL: 2.8 {RATIO} (ref 0–5)
HGB BLD-MCNC: 15.2 G/DL (ref 12.1–17)
LACTATE SERPL-SCNC: 1.6 MMOL/L (ref 0.4–2)
LDLC SERPL CALC-MCNC: 65 MG/DL (ref 0–100)
MAGNESIUM SERPL-MCNC: 2.2 MG/DL (ref 1.6–2.4)
MCH RBC QN AUTO: 29.8 PG (ref 26–34)
MCHC RBC AUTO-ENTMCNC: 34.2 G/DL (ref 30–36.5)
MCV RBC AUTO: 87.1 FL (ref 80–99)
NRBC # BLD: 0 K/UL (ref 0–0.01)
NRBC BLD-RTO: 0 PER 100 WBC
PLATELET # BLD AUTO: 285 K/UL (ref 150–400)
PMV BLD AUTO: 10.8 FL (ref 8.9–12.9)
POTASSIUM SERPL-SCNC: 3.3 MMOL/L (ref 3.5–5.1)
RBC # BLD AUTO: 5.1 M/UL (ref 4.1–5.7)
SERVICE CMNT-IMP: ABNORMAL
SODIUM SERPL-SCNC: 139 MMOL/L (ref 136–145)
TRIGL SERPL-MCNC: 140 MG/DL (ref ?–150)
TROPONIN-HIGH SENSITIVITY: 173 NG/L (ref 0–76)
VLDLC SERPL CALC-MCNC: 28 MG/DL
WBC # BLD AUTO: 16 K/UL (ref 4.1–11.1)

## 2022-09-15 PROCEDURE — APPSS180 APP SPLIT SHARED TIME > 60 MINUTES: Performed by: NURSE PRACTITIONER

## 2022-09-15 PROCEDURE — 73070 X-RAY EXAM OF ELBOW: CPT

## 2022-09-15 PROCEDURE — 74011250636 HC RX REV CODE- 250/636: Performed by: NURSE PRACTITIONER

## 2022-09-15 PROCEDURE — 74011250637 HC RX REV CODE- 250/637: Performed by: ANESTHESIOLOGY

## 2022-09-15 PROCEDURE — 85027 COMPLETE CBC AUTOMATED: CPT

## 2022-09-15 PROCEDURE — 74011250637 HC RX REV CODE- 250/637: Performed by: NURSE PRACTITIONER

## 2022-09-15 PROCEDURE — 74011250636 HC RX REV CODE- 250/636: Performed by: ANESTHESIOLOGY

## 2022-09-15 PROCEDURE — 80061 LIPID PANEL: CPT

## 2022-09-15 PROCEDURE — 70551 MRI BRAIN STEM W/O DYE: CPT

## 2022-09-15 PROCEDURE — 36591 DRAW BLOOD OFF VENOUS DEVICE: CPT

## 2022-09-15 PROCEDURE — 74011000250 HC RX REV CODE- 250: Performed by: STUDENT IN AN ORGANIZED HEALTH CARE EDUCATION/TRAINING PROGRAM

## 2022-09-15 PROCEDURE — 77010033678 HC OXYGEN DAILY

## 2022-09-15 PROCEDURE — 82550 ASSAY OF CK (CPK): CPT

## 2022-09-15 PROCEDURE — 65660000001 HC RM ICU INTERMED STEPDOWN

## 2022-09-15 PROCEDURE — 97162 PT EVAL MOD COMPLEX 30 MIN: CPT

## 2022-09-15 PROCEDURE — 97112 NEUROMUSCULAR REEDUCATION: CPT

## 2022-09-15 PROCEDURE — 99223 1ST HOSP IP/OBS HIGH 75: CPT | Performed by: PSYCHIATRY & NEUROLOGY

## 2022-09-15 PROCEDURE — 82962 GLUCOSE BLOOD TEST: CPT

## 2022-09-15 PROCEDURE — 83036 HEMOGLOBIN GLYCOSYLATED A1C: CPT

## 2022-09-15 PROCEDURE — 74011000250 HC RX REV CODE- 250: Performed by: ANESTHESIOLOGY

## 2022-09-15 PROCEDURE — 92610 EVALUATE SWALLOWING FUNCTION: CPT | Performed by: SPEECH-LANGUAGE PATHOLOGIST

## 2022-09-15 PROCEDURE — 80048 BASIC METABOLIC PNL TOTAL CA: CPT

## 2022-09-15 PROCEDURE — C8929 TTE W OR WO FOL WCON,DOPPLER: HCPCS

## 2022-09-15 PROCEDURE — 83735 ASSAY OF MAGNESIUM: CPT

## 2022-09-15 PROCEDURE — 97166 OT EVAL MOD COMPLEX 45 MIN: CPT

## 2022-09-15 PROCEDURE — 84484 ASSAY OF TROPONIN QUANT: CPT

## 2022-09-15 PROCEDURE — 74011250636 HC RX REV CODE- 250/636: Performed by: STUDENT IN AN ORGANIZED HEALTH CARE EDUCATION/TRAINING PROGRAM

## 2022-09-15 PROCEDURE — 74011000250 HC RX REV CODE- 250: Performed by: NURSE PRACTITIONER

## 2022-09-15 PROCEDURE — 83605 ASSAY OF LACTIC ACID: CPT

## 2022-09-15 RX ORDER — MAGNESIUM SULFATE 100 %
4 CRYSTALS MISCELLANEOUS AS NEEDED
Status: DISCONTINUED | OUTPATIENT
Start: 2022-09-15 | End: 2022-09-28 | Stop reason: HOSPADM

## 2022-09-15 RX ORDER — LABETALOL HYDROCHLORIDE 5 MG/ML
5 INJECTION, SOLUTION INTRAVENOUS
Status: DISCONTINUED | OUTPATIENT
Start: 2022-09-15 | End: 2022-09-28 | Stop reason: HOSPADM

## 2022-09-15 RX ORDER — METOPROLOL TARTRATE 5 MG/5ML
5 INJECTION INTRAVENOUS ONCE
Status: COMPLETED | OUTPATIENT
Start: 2022-09-15 | End: 2022-09-15

## 2022-09-15 RX ORDER — POTASSIUM CHLORIDE 7.45 MG/ML
10 INJECTION INTRAVENOUS
Status: COMPLETED | OUTPATIENT
Start: 2022-09-15 | End: 2022-09-15

## 2022-09-15 RX ORDER — HYDROMORPHONE HYDROCHLORIDE 1 MG/ML
0.2 INJECTION, SOLUTION INTRAMUSCULAR; INTRAVENOUS; SUBCUTANEOUS
Status: DISCONTINUED | OUTPATIENT
Start: 2022-09-15 | End: 2022-09-28 | Stop reason: HOSPADM

## 2022-09-15 RX ORDER — INSULIN LISPRO 100 [IU]/ML
INJECTION, SOLUTION INTRAVENOUS; SUBCUTANEOUS EVERY 6 HOURS
Status: DISCONTINUED | OUTPATIENT
Start: 2022-09-15 | End: 2022-09-28 | Stop reason: HOSPADM

## 2022-09-15 RX ORDER — METOPROLOL TARTRATE 5 MG/5ML
2.5 INJECTION INTRAVENOUS ONCE
Status: COMPLETED | OUTPATIENT
Start: 2022-09-15 | End: 2022-09-15

## 2022-09-15 RX ORDER — METOPROLOL TARTRATE 25 MG/1
12.5 TABLET, FILM COATED ORAL 2 TIMES DAILY
Status: DISCONTINUED | OUTPATIENT
Start: 2022-09-15 | End: 2022-09-19

## 2022-09-15 RX ORDER — BALSAM PERU/CASTOR OIL
OINTMENT (GRAM) TOPICAL 2 TIMES DAILY
Status: DISCONTINUED | OUTPATIENT
Start: 2022-09-15 | End: 2022-09-28 | Stop reason: HOSPADM

## 2022-09-15 RX ORDER — METOPROLOL TARTRATE 25 MG/1
12.5 TABLET, FILM COATED ORAL 2 TIMES DAILY
Status: DISCONTINUED | OUTPATIENT
Start: 2022-09-15 | End: 2022-09-15

## 2022-09-15 RX ADMIN — ATORVASTATIN CALCIUM 80 MG: 40 TABLET, FILM COATED ORAL at 21:14

## 2022-09-15 RX ADMIN — METOPROLOL TARTRATE 12.5 MG: 25 TABLET, FILM COATED ORAL at 15:28

## 2022-09-15 RX ADMIN — PERFLUTREN 1.5 ML: 6.52 INJECTION, SUSPENSION INTRAVENOUS at 14:17

## 2022-09-15 RX ADMIN — FAMOTIDINE 20 MG: 10 INJECTION INTRAVENOUS at 01:05

## 2022-09-15 RX ADMIN — POTASSIUM CHLORIDE 10 MEQ: 7.46 INJECTION, SOLUTION INTRAVENOUS at 14:45

## 2022-09-15 RX ADMIN — POTASSIUM CHLORIDE 10 MEQ: 7.46 INJECTION, SOLUTION INTRAVENOUS at 15:41

## 2022-09-15 RX ADMIN — POTASSIUM CHLORIDE 10 MEQ: 7.46 INJECTION, SOLUTION INTRAVENOUS at 13:46

## 2022-09-15 RX ADMIN — FAMOTIDINE 20 MG: 10 INJECTION INTRAVENOUS at 08:13

## 2022-09-15 RX ADMIN — METOPROLOL TARTRATE 5 MG: 1 INJECTION, SOLUTION INTRAVENOUS at 01:06

## 2022-09-15 RX ADMIN — SODIUM CHLORIDE 100 ML/HR: 9 INJECTION, SOLUTION INTRAVENOUS at 15:44

## 2022-09-15 RX ADMIN — Medication: at 17:01

## 2022-09-15 RX ADMIN — METOPROLOL TARTRATE 2.5 MG: 5 INJECTION, SOLUTION INTRAVENOUS at 16:46

## 2022-09-15 RX ADMIN — FAMOTIDINE 20 MG: 10 INJECTION INTRAVENOUS at 21:12

## 2022-09-15 RX ADMIN — POTASSIUM CHLORIDE 10 MEQ: 7.46 INJECTION, SOLUTION INTRAVENOUS at 11:46

## 2022-09-15 RX ADMIN — Medication: at 11:41

## 2022-09-15 RX ADMIN — DOCUSATE SODIUM 100 MG: 100 CAPSULE, LIQUID FILLED ORAL at 17:01

## 2022-09-15 RX ADMIN — SODIUM CHLORIDE 75 ML/HR: 9 INJECTION, SOLUTION INTRAVENOUS at 01:06

## 2022-09-15 NOTE — PROGRESS NOTES
[facility]      Name: Christal Mcclain   : 1950   MRN: [id number]   Date: 9/15/2022        Subjective:     Reason for ICU Admission: This is  a 70 y/o M with a Pmhx of depression, Afib on Xeralto, HLD, HTN VALENTE on CPAP, Gout and morbid obesity who was found outside by a friend after his sister could not get in touch with him for 24 hours. He was brought into ER and underwent a STROKE work up with findings of a left MCA territory temporal lobe infarct without hemorrhage. CTA showed severe stenosis/ near occlusive thrombus of the distal left M1. Other imaging including a CT C spine was negative for acute findings. Labs were reviewed and unremarkable. Sister was at bedside and able to provide some limited medical history as well as details as to LKW ( > 24 hrs) and functional baseline status as alert, oriented x4, independent and mental independence. He is being admitted to ICU for ongoing care. Past Medical History:      has a past medical history of Arrhythmia, Depression (2010), Diabetes ([facility] [address] Dyslipidemia, goal LDL below 100 (2011), Gout, HTN (hypertension) (2010), Impotence (2010), Morbid obesity ([address] (2010), VALENTE (obstructive sleep apnea) (2010), and Restless legs (4/15/2015). Past Surgical History:      has a past surgical history that includes endoscopy, colon, diagnostic (); hx orthopaedic (); hx orthopaedic (); hx appendectomy; colonoscopy (N/A, 2017); hx urological (2018); and hx urological (2019). Home Medications:     Prior to Admission medications    Medication Sig Start Date End Date Taking? Authorizing Provider   lisinopril-hydroCHLOROthiazide (PRINZIDE, ZESTORETIC) 20-25 mg per tablet TAKE 1 TABLET BY MOUTH EVERY DAY 22   Vinny Rodgers MD   colchicine 0.6 mg tablet TAKE 1 TABLET BY MOUTH EVERY DAY 22   Lilliana Flowers MD   gabapentin (NEURONTIN) 300 mg capsule Take 3 Capsules by mouth nightly.  Max Daily Amount: 900 mg. 22   Esperanza Carrillo MD   allopurinoL (ZYLOPRIM) 100 mg tablet Take 1 Tablet by mouth daily. 22   Esperanza Carrillo MD   rivaroxaban (Xarelto) 20 mg tab tablet TAKE 1 TABLET BY MOUTH IN  THE MORNING WITH BREAKFAST 22   Jose G Waddell MD       Allergies/Social/Family History:     No Known Allergies   Social History     Tobacco Use    Smoking status: Former     Packs/day: 0.50     Years: 5.00     Pack years: 2.50     Types: Cigarettes     Quit date: 1990     Years since quittin.3    Smokeless tobacco: Never   Substance Use Topics    Alcohol use: Yes     Alcohol/week: 1.7 standard drinks     Types: 2 Standard drinks or equivalent per week     Comment: 2-3 drinks per week      Family History   Problem Relation Age of Onset    Cancer Father         leukemia    Cancer Paternal Grandfather     Diabetes Sister     Diabetes Brother     Cancer Maternal Aunt     Cancer Maternal Uncle        Review of Systems:     Review of systems not obtained due to patient factors. Objective:   Vital Signs:  Visit Vitals  BP (!) 150/99 (BP 1 Location: Right upper arm, BP Patient Position: At rest)   Pulse (!) 123   Temp 98.9 °F (37.2 °C)   Resp 19   Ht 6' 3\" (1.905 m)   Wt 128.4 kg (283 lb 1.1 oz)   SpO2 95%   BMI 35.38 kg/m²    O2 Flow Rate (L/min): 2 l/min O2 Device: None (Room air) Temp (24hrs), Av.4 °F (36.9 °C), Min:97.7 °F (36.5 °C), Max:98.9 °F (37.2 °C)           Intake/Output:     Intake/Output Summary (Last 24 hours) at 9/15/2022 0839  Last data filed at 9/15/2022 0800  Gross per 24 hour   Intake 517.5 ml   Output 300 ml   Net 217.5 ml       Physical Exam:    General:  Awake,alert, interactive No acute distress. Eyes:  Sclera anicteric. Pupils equal, round, reactive to light. Mouth/Throat: Oropharynx clear, mouth dry, teeth dry   Neck: Supple. Lungs:   Clear to auscultation bilaterally, good effort.     Cardiovascular:  Tachy Irregular rate and rhythm, no murmur, click, rub, or gallop. Abdomen:   Soft, non-tender, bowel sounds normal, non-distended. Extremities: No cyanosis or edema. Skin: No acute rash or lesions. Large ecchymosis to right abd flank, right elbow swollen, abrasions to right foot toes anteriorly, right elbow, right shin anteriorly. Lymph Nodes: Cervical and supraclavicular normal.   Musculoskeletal:  No swelling or deformity. 5/5 left UE/LLE, 1/5 RUE/ 2/5 RLE   Lines/Devices:  Intact, no erythema, drainage, or tenderness. Psychiatric: Awake, alert, difficult to assess orientation 2/2 expressive aphasia, answers yes/no and able to speak some works, follows simple commands as able. LABS AND  DATA: Personally reviewed  Recent Labs     09/15/22  0240 09/14/22  2210   WBC 16.0* 18.1*   HGB 15.2 16.5   HCT 44.4 48.3    299       Recent Labs     09/15/22  0240 09/14/22  2210    134*   K 3.3* 3.6    105   CO2 25 20*   BUN 46* 48*   CREA 0.99 1.21   * 163*   CA 9.3 9.6   MG 2.2  --        Recent Labs     09/14/22 2210   AP 74   TP 8.4*   ALB 3.8   GLOB 4.6*       Recent Labs     09/14/22 2210   INR 1.1   PTP 11.9*        No results for input(s): PHI, PCO2I, PO2I, FIO2I in the last 72 hours. Recent Labs     09/15/22  0240 09/14/22  2210   CPK 2,799* 3,573*         MEDS: Reviewed    Chest X-Ray:  CXR Results  (Last 48 hours)                 09/14/22 2253  XR CHEST PORT Final result    Impression:  No acute findings. Hypoinflation. Narrative:  EXAM: XR CHEST PORT       INDICATION: CVA       COMPARISON: CT 9/14/2022, CT thorax 4/29/2019. FINDINGS: A portable AP radiograph of the chest was obtained at 22:52 hours. The   patient is on a cardiac monitor. The lungs are clear for degree of hypoplasia. The cardiac and mediastinal contours and pulmonary vascularity are normal.  The   chest wall structures and visualized upper abdomen show no acute findings with   incidental note of degenerative spine and shoulder changes. CT Results  (Last 48 hours)                 09/14/22 2347  CT HEAD WO CONT Final result    Impression:  Left MCA territory left temporal lobe infarct is stable appearance   without interval hemorrhagic transformation. Right frontoparietal scalp   collection unchanged. Narrative:  EXAM: CT HEAD WO CONT       INDICATION: hemorrhagic conversion? COMPARISON: CT dose prior to this exam.       CONTRAST: None. TECHNIQUE: Unenhanced CT of the head was performed using 5 mm images. Brain and   bone windows were generated. Coronal and sagittal reformats. CT dose reduction   was achieved through use of a standardized protocol tailored for this   examination and automatic exposure control for dose modulation. FINDINGS:   Left anterolateral temporal lobe hypodensity with loss of gray-white   differentiation is redemonstrated with no evident interval hemorrhage or other   significant interval change with localized mass effect and effacement of the   anterior horn of the right lateral ventricle. The ventricles and sulci are   otherwise normal in size, shape and configuration. There is no significant white   matter disease. There is no new intracranial hemorrhage, extra-axial collection,   or mass effect. The basilar cisterns are open. The bone windows demonstrate no abnormalities. The visualized portions of the   paranasal sinuses and mastoid air cells are clear. There is a soft tissue   collection overlying the right frontoparietal skull, not significant change. 09/14/22 2227  CT CHEST W CONT Final result    Impression:      1. Minimal subcutaneous edema right chest wall.  No acute abnormality of the   chest abdomen or pelvis           Narrative:  INDICATION: Trauma with right-sided bruising       COMPARISON: 4/29/2019       TECHNIQUE:  Following the uneventful intravenous administration of 100 cc   Isovue-370, 5 mm axial images were obtained through the chest, abdomen, and pelvis. Oral contrast was not administered. Sagittal and coronal reformats were   performed. CT dose reduction was achieved through use of a standardized protocol   tailored for this examination and automatic exposure control for dose   modulation. FINDINGS:       THYROID: No nodule. MEDIASTINUM: No mass or lymphadenopathy. PAVEL: No mass or lymphadenopathy. THORACIC AORTA: No dissection or aneurysm. There are vascular calcifications   MAIN PULMONARY ARTERY: Normal in caliber. TRACHEA/BRONCHI: Patent. ESOPHAGUS: No wall thickening or dilatation. HEART: Normal in size. There are coronary artery calcifications   PLEURA: No effusion or pneumothorax. LUNGS: Right basilar atelectasis   LIVER: No mass or biliary dilatation. GALLBLADDER: Unremarkable. SPLEEN: No mass. PANCREAS: No mass or ductal dilatation. ADRENALS: Unremarkable. KIDNEYS: Bilateral renal cysts. No hydronephrosis. Contrast within the   collecting systems makes evaluation for stone difficult   STOMACH: Unremarkable. SMALL BOWEL: No dilatation or wall thickening. COLON: Left-sided diverticulosis without evidence of acute diverticulitis   APPENDIX: Not identified   PERITONEUM: No ascites or pneumoperitoneum. RETROPERITONEUM: There are vascular calcifications   REPRODUCTIVE ORGANS: Enlarged   URINARY BLADDER: No mass or calculus. BONES: Left hip is been replaced. Mild degenerative changes of the right hip. There are degenerative changes of the lumbar spine. A displaced fracture is not   identified   ADDITIONAL COMMENTS: Minimal subcutaneous edema right chest wall           09/14/22 2227  CT ABD PELV W CONT Final result    Impression:      1. Minimal subcutaneous edema right chest wall.  No acute abnormality of the   chest abdomen or pelvis           Narrative:  INDICATION: Trauma with right-sided bruising       COMPARISON: 4/29/2019       TECHNIQUE:  Following the uneventful intravenous administration of 100 cc Isovue-370, 5 mm axial images were obtained through the chest, abdomen, and   pelvis. Oral contrast was not administered. Sagittal and coronal reformats were   performed. CT dose reduction was achieved through use of a standardized protocol   tailored for this examination and automatic exposure control for dose   modulation. FINDINGS:       THYROID: No nodule. MEDIASTINUM: No mass or lymphadenopathy. PAVEL: No mass or lymphadenopathy. THORACIC AORTA: No dissection or aneurysm. There are vascular calcifications   MAIN PULMONARY ARTERY: Normal in caliber. TRACHEA/BRONCHI: Patent. ESOPHAGUS: No wall thickening or dilatation. HEART: Normal in size. There are coronary artery calcifications   PLEURA: No effusion or pneumothorax. LUNGS: Right basilar atelectasis   LIVER: No mass or biliary dilatation. GALLBLADDER: Unremarkable. SPLEEN: No mass. PANCREAS: No mass or ductal dilatation. ADRENALS: Unremarkable. KIDNEYS: Bilateral renal cysts. No hydronephrosis. Contrast within the   collecting systems makes evaluation for stone difficult   STOMACH: Unremarkable. SMALL BOWEL: No dilatation or wall thickening. COLON: Left-sided diverticulosis without evidence of acute diverticulitis   APPENDIX: Not identified   PERITONEUM: No ascites or pneumoperitoneum. RETROPERITONEUM: There are vascular calcifications   REPRODUCTIVE ORGANS: Enlarged   URINARY BLADDER: No mass or calculus. BONES: Left hip is been replaced. Mild degenerative changes of the right hip. There are degenerative changes of the lumbar spine. A displaced fracture is not   identified   ADDITIONAL COMMENTS: Minimal subcutaneous edema right chest wall           09/14/22 2225  CTA CODE NEURO HEAD AND NECK W CONT Final result    Impression:  Severe stenosis/near occlusive thrombus of the distal left MCA M1. Large area of   subacute infarction in the left MCA territory.  Small area of reversible ischemia   in the left temporal lobe anteriorly and inferiorly measures approximately 30   mL. There is no superimposed hemorrhage, midline shift or mass effect   There is no  aneurysm or dissection identified. The findings were called to Dr. Heath Renteria on 9/14/2022 at 10:48 PM by Dr. Jazz Ledbetter. [phone]           . Narrative:  Clinical history: stroke   INDICATION:   stroke       COMPARISON:  9/14/2022   CONTRAST: 100ml Isovue 370   TECHNIQUE:     CT dose reduction was achieved through use of a standardized protocol tailored   for this examination and automatic exposure control for dose modulation. Following the uneventful administration of Isovue-370 contrast material, axial   CT angiography of the head and neck was performed. Coronal and sagittal   reconstructions were obtained. 3D MAXIMAL INTENSITY PROJECTION reconstructed imaging of the cranial vasculature   in both the coronal and sagittal plane was performed. CTA perfusion imaging was also obtained. Reconstructions were created with color   coding of axial time to peak, axial blood volume, axial blood flow, axial mean   transit time and axial T Max. The study was analyzed by the Rasheeda W Kyra Chan. Algorithm   FINDINGS:   There is loss of gray-white differentiation in the left MCA territory with   subacute infarct in the left temporal lobe. .  There is no significant   superimposed hemorrhage, midline shift or mass effect. No evidence of acute   intracranial hemorrhage. . The paranasal sinuses are clear. CTA NECK:    Common origin of the innominate and left common carotid arteries. Left vertebral   artery is dominant. There is atherosclerotic change the origin of the ICAs on   the right. There is mild aortic atherosclerotic change. There is no pulmonary   mass or nodule. There is  less than 20%stenosis in the right internal carotid artery utilizing   NASCET criteria. There is  less than 20%stenosis in the left internal carotid artery utilizing   NASCET criteria.    CTA HEAD:       Short segment occlusion/near occlusive thrombus of the distal left MCA M1. The left vertebral artery is dominant. Right vertebral artery likely terminates   at. Severe multifocal stenoses of the right vertebral artery. There is a   diminutive basilar artery. The basilar artery and its branches are normal. M1 M2   junction occlusion/near occlusion on the left is best appreciated on sagittal   images. Mild multifocal M2 segment stenoses. . Patient was positioned with the   right hypoplastic. There is bilateral persistent fetal circulation to the   posterior cerebral arteries. .   CT PERFUSION:       Area of reversible ischemia is suggested in the left temporal lobe. 30 mL of   perfusion/left lobe mismatch. .   R CBF<30% :5   Tmax >6s: 35 mL       09/14/22 2214  CT CODE NEURO PERF W CBF Final result    Impression:  Severe stenosis/near occlusive thrombus of the distal left MCA M1. Large area of   subacute infarction in the left MCA territory. Small area of reversible ischemia   in the left temporal lobe anteriorly and inferiorly measures approximately 30   mL. There is no superimposed hemorrhage, midline shift or mass effect   There is no  aneurysm or dissection identified. The findings were called to Dr. Guero Lawrence on 9/14/2022 at 10:48 PM by Dr. Roscoe Valdez. [phone]           . Narrative:  Clinical history: stroke   INDICATION:   stroke       COMPARISON:  9/14/2022   CONTRAST: 100ml Isovue 370   TECHNIQUE:     CT dose reduction was achieved through use of a standardized protocol tailored   for this examination and automatic exposure control for dose modulation. Following the uneventful administration of Isovue-370 contrast material, axial   CT angiography of the head and neck was performed. Coronal and sagittal   reconstructions were obtained. 3D MAXIMAL INTENSITY PROJECTION reconstructed imaging of the cranial vasculature   in both the coronal and sagittal plane was performed.         CTA perfusion imaging was also obtained. Reconstructions were created with color   coding of axial time to peak, axial blood volume, axial blood flow, axial mean   transit time and axial T Max. The study was analyzed by the Rasheeda Chan. Algorithm   FINDINGS:   There is loss of gray-white differentiation in the left MCA territory with   subacute infarct in the left temporal lobe. .  There is no significant   superimposed hemorrhage, midline shift or mass effect. No evidence of acute   intracranial hemorrhage. . The paranasal sinuses are clear. CTA NECK:    Common origin of the innominate and left common carotid arteries. Left vertebral   artery is dominant. There is atherosclerotic change the origin of the ICAs on   the right. There is mild aortic atherosclerotic change. There is no pulmonary   mass or nodule. There is  less than 20%stenosis in the right internal carotid artery utilizing   NASCET criteria. There is  less than 20%stenosis in the left internal carotid artery utilizing   NASCET criteria. CTA HEAD:       Short segment occlusion/near occlusive thrombus of the distal left MCA M1. The left vertebral artery is dominant. Right vertebral artery likely terminates   at. Severe multifocal stenoses of the right vertebral artery. There is a   diminutive basilar artery. The basilar artery and its branches are normal. M1 M2   junction occlusion/near occlusion on the left is best appreciated on sagittal   images. Mild multifocal M2 segment stenoses. . Patient was positioned with the   right hypoplastic. There is bilateral persistent fetal circulation to the   posterior cerebral arteries. .   CT PERFUSION:       Area of reversible ischemia is suggested in the left temporal lobe. 30 mL of   perfusion/left lobe mismatch. .   R CBF<30% :5   Tmax >6s: 35 mL       09/14/22 0330  CT SPINE CERV WO CONT Final result    Impression:  No acute fracture or subluxation. Narrative:  INDICATION: trauma, neck pain. Exam: Noncontrast CT of the cervical spine is performed with 2.5 mm collimation. Sagittal and coronal reformatted images were also performed. CT dose reduction was achieved through use of a standardized protocol tailored   for this examination and automatic exposure control for dose modulation. FINDINGS: There is no acute fracture or subluxation. The prevertebral soft   tissues are within normal limits. Bones are diffusely demineralized. Remaining   visualized soft tissues are normal. Multilevel cervical degenerative changes are   noted. 09/14/22 2154  CT CODE NEURO HEAD WO CONTRAST Final result    Impression:  1. Large acute left MCA territory infarct   2. Scalp thickening along the right. An acute intracranial hemorrhage is not   identified               Narrative:  EXAM: CT CODE NEURO HEAD WO CONTRAST       INDICATION: Code Stroke       COMPARISON: 8/17/2017. CONTRAST: None. TECHNIQUE: Unenhanced CT of the head was performed using 5 mm images. Brain and   bone windows were generated. Coronal and sagittal reformats. CT dose reduction   was achieved through use of a standardized protocol tailored for this   examination and automatic exposure control for dose modulation. FINDINGS:   The ventricles and sulci are normal in size, shape and configuration. . There is   no significant white matter disease. There is no intracranial hemorrhage,   extra-axial collection, or mass effect. The basilar cisterns are open. Acute   left MCA territory infarct involving the anterior left temporal lobe and   adjacent frontal lobe. . Loss of the gray-white differentiation in the left basal   ganglia. The bone windows demonstrate no abnormalities. The visualized portions of the   paranasal sinuses and mastoid air cells are clear. There is thickening of the   right-sided scalp.                   ECHO:  pending    Multidisciplinary Rounds Completed:  Pending    ABCDEF Bundle/Checklist Completed: Yes    Active Problem List:     Problem List  Date Reviewed: 7/28/2022            Codes Class    Ischemic cerebrovascular accident (CVA) ([facility] ICD-10-CM: I63.9  ICD-9-CM: 434.91         Diabetes mellitus type 2, diet-controlled ([facility] ICD-10-CM: E11.9  ICD-9-CM: 250.00         Benign non-nodular prostatic hyperplasia with lower urinary tract symptoms ICD-10-CM: N40.1  ICD-9-CM: 600.91     Overview Signed 4/25/2017  9:10 AM by Russ Obrien MD     Retention had catheter 3 months             Urinary retention due to benign prostatic hyperplasia ICD-10-CM: N40.1, R33.8  ICD-9-CM: 600.91, 788.20         Diabetes mellitus type 2, controlled ([facility] ICD-10-CM: E11.9  ICD-9-CM: 250.00         Restless legs ICD-10-CM: G25.81  ICD-9-CM: 333.94         VALENTE (obstructive sleep apnea) ICD-10-CM: G47.33  ICD-9-CM: 327.23     Overview Signed 6/19/2012 10:57 AM by Misha Grissom MD     Bi-Level: 19/15 cmH2O. Dyslipidemia, goal LDL below 100 ICD-10-CM: E78.5  ICD-9-CM: 272.4         A-fib (HCC) ICD-10-CM: I48.91  ICD-9-CM: 427.31     Overview Addendum 2/9/2011  3:42 PM by Yoel Paz NP     Assymptomatic, noted on ekg 2/11; found when had sleep study. Saw cardiologist 2/8/11.              Diabetes ([facility] ICD-10-CM: E11.9  ICD-9-CM: 250.00     Overview Addendum 2/9/2011  3:37 PM by Yoel Paz NP     Recent diagnosis; just started diabetes education  HgA1C 6.5%             Morbid obesity ([facility] ICD-10-CM: E66.01  ICD-9-CM: 278.01     Overview Signed 2/9/2011  3:35 PM by Yoel Paz NP     OBESE 20 + YEARS; HIGH WEIGHT 362 LBS PAST MONTH; LOW WEIGHT 190 LBS 30 YEARS AGO             VALENTE (obstructive sleep apnea) ICD-10-CM: G47.33  ICD-9-CM: 327.23         Erectile dysfunction due to arterial insufficiency ICD-10-CM: N52.01  ICD-9-CM: 607.84         HTN (hypertension) ICD-10-CM: I10  ICD-9-CM: 401.9     Overview Signed 4/24/2010  9:31 AM by Shivam garcia Depression ICD-10-CM: F31. A  ICD-9-CM: 942          ICU Assessment/ Comprehensive Plan of Care:        NEURO  1. Left MCA territory CVA, unknown LKW  -NIHSS 19 on arrival  -CTH/CTP/CTP done on arrival  -MRI Brain pending  -SBP goal < 180/90  -Neuro checks Q1hr  -Consult to neurology  -STAT Head CT for any neuro changes  -OT/PT/ST eval for [address]250. Hx of HTN, HLD  Elevated troponins, possible NSTEMI T2  -Cardiac monitoring  -Metoprolol 5mg IV x1  -NGT w Metop PO to keep BP < 180  -Hold Xeralto until cleared by neurology  -Trend troponins until downtrending  -ECHO pending    RESPIRATORY  No acute concerns, Hx of VALENTE w/ CPAP at hs  Tobacco dependence, MIKE ppy hx  -Supplemental oxygen PRN for sats > 92%    RENAL  Mild DOMINICK, likely 2/2 dehydration. Creat/BUN   -Trend BMP, urine output  IVFs: NS @ 75ml/hr    GASTROINTESTINAL  1. Hyperbilirubinemia, 2.5 on arrival 48/1.21  -Trend CMP  -NPO  -ST eval  -If no improvement in abd labs, check RUQ US    HEMATOLOGIC  No acute concerns  -Trend CBC  -Coags stable    ID  1. No acute concerns for infection  -Trend CBC, fevers  -No antibiotics indicated at present  -LA 2.3, repeat until <2. IVF bolus x 1    ENDOCRINE  Hyperglycemia with Hx of DM  -SSI coverage  -Check HgbA1c    MSK  Rhabdomyolysis  -IVF bolus x 1 liter then MIVF  -Trend CK daily, initial CK 3,573  Increase fluids and trend CKs    2.  GOUT  -Continue allopurinol    F - Feeding:  NPO  A - Analgesia: acetaminophen, hydromorphone  S - Sedation: GOAL RASS 0  T - DVT Prophylaxis: SCD's or Sequential Compression Device  H - Head of Bed: > 30 Degrees  U - Ulcer Prophylaxis: famotidine  G - Glycemic Control: NA  S - Spontaneous Breathing Trial: NA  B - Bowel Regimen: docusate  I - Indwelling Catheter:              T/L/D  Tubes: None  Lines: Peripheral IV  Drains: None  D - De-escalation of Antibiotics:  NA      DISPOSITION/COMMUNICATION  Discussed Plan of Care/Code Status: Full Code-Discussed with patient and sister at bedside. Has a son that will be decision maker, arriving from [facility] in ICU    CRITICAL CARE CONSULTANT NOTE  I had a face to face encounter with the patient, reviewed and interpreted patient data including clinical events, labs, images, vital signs, I/O's, and examined patient. I have discussed the case and the plan and management of the patient's care with the consulting services, the bedside nurses and the respiratory therapist.      NOTE OF PERSONAL INVOLVEMENT IN CARE   This patient has a high probability of imminent, clinically significant deterioration, which requires the highest level of preparedness to intervene urgently. I participated in the decision-making and personally managed or directed the management of the following life and organ supporting interventions that required my frequent assessment to treat or prevent imminent deterioration. I personally spent 45 minutes of critical care time. This is time spent at this critically ill patient's bedside actively involved in patient care as well as the coordination of care and discussions with the patient's family. This does not include any procedural time which has been billed separately.     Laurence Gibson MD  Intensivist  9/15/2022

## 2022-09-15 NOTE — PROGRESS NOTES
2368: TRANSFER - IN REPORT:    Verbal report received from ED RN(name) on Fredis Nieto  being received from ED(unit) for routine progression of care      Report consisted of patients Situation, Background, Assessment and   Recommendations(SBAR). Information from the following report(s) SBAR, Kardex, Intake/Output, MAR, Recent Results, Cardiac Rhythm Afib, and Alarm Parameters  was reviewed with the receiving nurse. Opportunity for questions and clarification was provided. Assessment completed upon patients arrival to unit and care assumed. Primary Nurse Susu Beckett RN and [address], RN performed a dual skin assessment on this patient Impairment noted- see wound doc flow sheet  Pepe score is 11. Patient arrived to ICU at this time and care was taken over. Patient arrived with various bruises and abrasions to the right side of the body consistent will a fall and being down a significant amount of time. Bruising noted from R side to head to R elbow and arm, R side of abdomen, R hip, R leg, R knee, R elbow, and R toes. Patient apprears to be sunburnt on a large portion of the L side of body. Wound care consult placed. RN will continue to monitor neuro status with Q1 hour neuro checks and will update family as needed. 36: Spoke with Kadi Dunn in [address] [place] [address] Dr. Radiologist has cleared patient for scan without having to complete screening form. Patient taken to MRI. 0730: Bedside shift change report given to Mc Limon RN (oncoming nurse) by Bronwyn Coley RN (offgoing nurse). Report included the following information SBAR, Kardex, Intake/Output, MAR, Recent Results, Cardiac Rhythm Afib, Alarm Parameters , and Dual Neuro Assessment. Restraint necessity, order, and documentation have been reviewed and validated.

## 2022-09-15 NOTE — PROGRESS NOTES
0730: Bedside and Verbal shift change report given to JAMEY Cross RN (oncoming nurse) by Italo Foster RN (offgoing nurse). Report included the following information SBAR, Kardex, Intake/Output, MAR, Recent Results, Med Rec Status, Cardiac Rhythm Afib, Alarm Parameters , and Dual Neuro Assessment. 1115: ICU multidisciplinary rounds lead by Dr. Viji Andujar (Intensivist): The following were reviewed and discussed: current labs,  recent imaging, lines/drains, review of body systems, nutrition, cultures, mobility, length of ICU stay. The plan of care for the day is as follows: increase fluid rate, check q4h CKs, continue q1h neuros, replace potassium (written note by RN)     1525: Patient in afib sustained in the upper 130s. Per Dr. Viji Andujar, give patient's PO metoprolol early. 1640: Afib rate in the 120s continues. Order received from Dr. Viji Andujar for IV metoprolol. Restraint necessity, order, and documentation have been reviewed and validated. Shift Summary: Uneventful shift. Patient continued on q2h neurological checks. Patient aphasic with decreased command following on the left. Minimal to no command following in the RUE. Patient's son updated twice throughout the shift. 1930: Bedside and Verbal shift change report given to LILLY Foster (oncoming nurse) by JAMEY Cross RN (offgoing nurse). Report included the following information SBAR, Kardex, Intake/Output, MAR, Recent Results, Med Rec Status, Cardiac Rhythm Atrial Fibrilation, Alarm Parameters , and Dual Neuro Assessment.

## 2022-09-15 NOTE — ED PROVIDER NOTES
69-year-old male presents via EMS after being found on the ground behind the shed at his home. Patient was found by neighbors and called for rescue. They arrived to find the patient awake but unresponsive. EMS reports that his last known well was approximately 24 hours ago. No other history available at this time. On review of medical record, he has a history of atrial fibrillation on Xarelto, VALENTE on CPAP, obesity, gout, hypertension, diabetes. Past Medical History:   Diagnosis Date    Arrhythmia     atrial fib    Depression 2010    Diabetes ([facility]     diet control for pre-diabetes    Dyslipidemia, goal LDL below 100 2011    Gout     HTN (hypertension) 2010    Impotence 2010    Morbid obesity ([facility] 2010    VALENTE (obstructive sleep apnea) 2010    CPAP    Restless legs 4/15/2015       Past Surgical History:   Procedure Laterality Date    COLONOSCOPY N/A 2017    COLONOSCOPY performed by Dylan Monroy MD at [place] [address], COLON, DIAGNOSTIC      HX APPENDECTOMY      HX ORTHOPAEDIC      bilat TKR     HX ORTHOPAEDIC      left THR    HX UROLOGICAL  2018    urolift    HX UROLOGICAL  2019    prosthesis         Family History:   Problem Relation Age of Onset    Cancer Father         leukemia    Cancer Paternal Grandfather     Diabetes Sister     Diabetes Brother     Cancer Maternal Aunt     Cancer Maternal Uncle        Social History     Socioeconomic History    Marital status:      Spouse name: Not on file    Number of children: Not on file    Years of education: Not on file    Highest education level: Not on file   Occupational History    Not on file   Tobacco Use    Smoking status: Former     Packs/day: 0.50     Years: 5.00     Pack years: 2.50     Types: Cigarettes     Quit date: 1990     Years since quittin.3    Smokeless tobacco: Never   Substance and Sexual Activity    Alcohol use:  Yes     Alcohol/week: 1.7 standard drinks Types: 2 Standard drinks or equivalent per week     Comment: 2-3 drinks per week    Drug use: No    Sexual activity: Not Currently   Other Topics Concern    Not on file   Social History Narrative    Not on file     Social Determinants of Health     Financial Resource Strain: Not on file   Food Insecurity: Not on file   Transportation Needs: Not on file   Physical Activity: Not on file   Stress: Not on file   Social Connections: Not on file   Intimate Partner Violence: Not on file   Housing Stability: Not on file         ALLERGIES: Patient has no known allergies. Review of Systems   Unable to perform ROS: Patient unresponsive     Vitals:    09/14/22 2230   BP: (!) 144/93   Pulse: (!) 116   Resp: 24   Temp: 98.7 °F (37.1 °C)   SpO2: 97%   Weight: 104 kg (229 lb 4.5 oz)   Height: 6' 3\" (1.905 m)            Physical Exam  Vitals and nursing note reviewed. Constitutional:       General: He is not in acute distress. Appearance: He is well-developed. He is not ill-appearing. HENT:      Head: Normocephalic and atraumatic. Eyes:      Pupils: Pupils are equal, round, and reactive to light. Cardiovascular:      Rate and Rhythm: Normal rate. Pulmonary:      Effort: Pulmonary effort is normal. No respiratory distress. Abdominal:      General: There is no distension. Musculoskeletal:         General: Normal range of motion. Cervical back: Normal range of motion and neck supple. Skin:     General: Skin is warm and dry. Neurological:      Mental Status: He is alert and oriented to person, place, and time. MDM  Number of Diagnoses or Management Options  Anticoagulated  Cerebrovascular accident (CVA), unspecified mechanism ([address]  Diagnosis management comments: Assessment: Patient presents from home unresponsive with an unknown downtime. On arrival he was found to have large ecchymosis up and down his right flank raising concern for possible traumatic injury.   Patient on Xarelto according to his electronic medical record. Orders were placed for head CT and CT C-spine and chest abdomen pelvis with contrast.  Radiology informed me that they would not proceed with IV contrast until after obtaining a GFR in this patient. Point-of-care Chem-8 was ordered to expedite renal function assessment. Amount and/or Complexity of Data Reviewed  Clinical lab tests: reviewed  Tests in the radiology section of CPT®: reviewed      ED Course as of 09/14/22 2318   Wed Sep 14, 2022   2213 Noncontrast head CT showed evidence of a large left-sided MCA infarct with edema. We will proceed with a CTA head and neck along with perfusion studies. [JM]      ED Course User Index  [JM] Ines Carty MD     Perfect Serve Consult for Admission  11:18 PM    ED Room Number: ER05/05  Patient Name and age:  Rossana Ozuna 71 y.o.  male  Working Diagnosis:   1. Cerebrovascular accident (CVA), unspecified mechanism ([address]    2. Anticoagulated        COVID-19 Suspicion:  no  Sepsis present:  no  Reassessment needed: no  Code Status:  Full Code  Readmission: no  Isolation Requirements:  no  Recommended Level of Care:  ICU  Department: [facility] Adult ED - [phone]  Admitting Provider:     Other:  large L MCA infarct. On xarelto. Unknown down time. May have fallen off ladder. R sided flank ecchymoisis. Trauma CT scans unremarkable. Total critical care time spent exclusive of procedures:  35 minutes.     Procedures

## 2022-09-15 NOTE — PROGRESS NOTES
Problem: Self Care Deficits Care Plan (Adult)  Goal: *Acute Goals and Plan of Care (Insert Text)  Description: FUNCTIONAL STATUS PRIOR TO ADMISSION: Patient with communication deficits limiting gathering home environment/prior level of function information. Per chart, patient lives by himself and was climbing a ladder when event happened, presumed independent prior level of function. HOME SUPPORT: The patient lived alone with sister and brother-in-law for family support. Sister had called on the 13th and became concerned when no response, called a neighbor who found the patient. Would benefit from confirming prior level of function and home environment information with family when able. Occupational Therapy Goals  Initiated 9/15/2022   1. Patient will perform self-feeding with minimal assistance/contact guard assist within 7 day(s). 2.  Patient will perform grooming in sitting with moderate assist within 7 day(s). 3.  Patient will perform lower body dressing with maximal assistance within 7 day(s). 4.  Patient will perform toilet transfers to [facility] with maximal assistance within 7 day(s). 5.  Patient will perform all aspects of toileting with maximal assistance within 7 day(s). 6.  Patient will participate in upper extremity therapeutic exercise/activities with minimal assistance/contact guard assist within 7 day(s). 7.  Patient will participate in R [address] within 7 days.       Outcome: Not Met   OCCUPATIONAL THERAPY EVALUATION  Patient: Dipak Garces (79 y.o. male)  Date: 9/15/2022  Primary Diagnosis: Ischemic cerebrovascular accident (CVA) ([facility] [I63.9]       Precautions: Fall (SBP< 180/90)    ASSESSMENT  Based on the objective data described below, the patient presents with inattention to left visual field, intact tracking, R sided strength and range of motion deficits, unable to assess sensation, patient responding yes inappropriately to all yes/no questions, communication deficits (defer to SLP), decreased command following, and requiring increased assist for all self care and functional mobility/transfers. Patient living alone and was found on 9/14 by a neighor, Zohra Hartley in morning on 9/13 when last talked to sister. Patient on ground near ladder, presumed fall from ladder and MRI shows moderate to large acute left MCA territory infarction, with associated susceptibility artifact predominantly in the basal ganglia/corona radiata consistent with blood product/petechial hemorrhage. Patient with restraint on left upper extremity, removed during assessment but replaced prior to therapy exit. Patient received semi supine in bed and agreeable to working with therapy, although does respond yes to all yes/no questions throughout session. Patient able to state first name only when asking orientation questions and unable to answer yes/no appropriately or when given multiple choice options. Patient does follow simple commands intermittently and participated in range of motion and strength testing of bilateral upper extremities while supine in bed, patient following commands with grossly 4/5 strength on left upper extremity. When testing right upper extremity, 1/5 noted in shoulder and elbow flexion, no extension or active wrist/hand, although later in session noted elbow extension and active resistance that did not appear to be tone. Difficult to determine true strength in right upper extremity due to decreased command following and fatigue with extended upper extremity assessment. Patient with R sided deficits but difficulty attending to left visual field throughout session, would briefly look left of midline but unable to maintain with cueing, was able to track left of midline with no difficulty so does not appear to be true left neglect, increased multi modal cueing for brief attention to left side and patient with difficulty locating objects on left.  Patient performed bed mobility with increased participation with rolling to right as able to use left to pull over. Patient is unable to report prior level of function but was on a ladder and living alone, likely independent for ADLs/IADLs prior to admission. Patient is well below inferred functional baseline following admission for L MCA infarct. Patient would benefit from skilled OT services during admission to improve independence with self care and functional mobility/transfers. Recommend discharge to [facility] at this time. Current Level of Function Impacting Discharge (ADLs/self-care): max to total A for mobility/transfers, max to total A for all self care tasks    Functional Outcome Measure: The patient scored 0/100 on the Barthel Index outcome measure which is indicative of total dependence in basic self care tasks. Other factors to consider for discharge: prior level of function presumed independent, social supports, lives alone, patient poor historian     Patient will benefit from skilled therapy intervention to address the above noted impairments. PLAN :  Recommendations and Planned Interventions: self care training, functional mobility training, therapeutic exercise, balance training, visual/perceptual training, therapeutic activities, cognitive retraining, endurance activities, neuromuscular re-education, patient education, home safety training, and family training/education    Frequency/Duration: Patient will be followed by occupational therapy 5 times a week to address goals. Recommendation for discharge: (in order for the patient to meet his/her long term goals)  Therapy 3 hours per day 5-7 days per week    This discharge recommendation:  Has not yet been discussed the attending provider and/or case management    IF patient discharges home will need the following DME: TBD pending progress       SUBJECTIVE:   Patient stated Yes, that's fine.  patient responding yes to all yes/no questions throughout session    OBJECTIVE DATA SUMMARY: HISTORY:   Past Medical History:   Diagnosis Date    Arrhythmia     atrial fib    Depression 4/24/2010    Diabetes ([facility]     diet control for pre-diabetes    Dyslipidemia, goal LDL below 100 6/14/2011    Gout     HTN (hypertension) 4/24/2010    Impotence 4/24/2010    Morbid obesity ([facility] [address] 5/17/2010    VALENTE (obstructive sleep apnea) 4/24/2010    CPAP    Restless legs 4/15/2015     Past Surgical History:   Procedure Laterality Date    COLONOSCOPY N/A 6/27/2017    COLONOSCOPY performed by Elvi Lundy MD at [facility], DIAGNOSTIC  2007    HX APPENDECTOMY      HX ORTHOPAEDIC  2000    bilat TKR     HX ORTHOPAEDIC  2010    left THR    HX UROLOGICAL  03/2018    urolift    HX UROLOGICAL  03/2019    prosthesis     Expanded or extensive additional review of patient history:     Home Situation  Home Environment: Private residence  Living Alone: Yes  Support Systems: Friend/Neighbor    Hand dominance: pt unable to report, states yes when asked either side    EXAMINATION OF PERFORMANCE DEFICITS:  Cognitive/Behavioral Status:  Neurologic State: Alert  Orientation Level: Disoriented to time;Disoriented to situation;Disoriented to place (able to state first name only)  Cognition: Decreased command following;Decreased attention/concentration  Perception: Cues to attend left visual field  Perseveration: Perseverates during mobility  Safety/Judgement: Decreased awareness of environment;Decreased awareness of need for assistance;Decreased awareness of need for safety;Decreased insight into deficits    Skin: bruising along right side of body from fall/time on ground    Edema: right upper extremity swelling, propped on pillow at end of session     Hearing: Auditory  Auditory Impairment: Hard of hearing, bilateral    Vision/Perceptual:    Tracking: Able to track stimulus in all quadrants w/o difficulty              Visual Fields: Difficulty detecting stimulus  in left lateral quadrant; Difficulty detecting stimulus in left lower quadrant; Difficulty detecting stimulus in left upper quadrant  Diplopia:  (unable to report)              Range of Motion:  AROM: Grossly decreased, non-functional (RUE weaker than RLE)  PROM: Within functional limits                      Strength:  Strength: Grossly decreased, non-functional (RUE weaker than RLE)   Left upper extremity grossly 4/5  Right upper extremity 1/5 for shoulder and elbow flexion during formal assessment, 0/5 for elbow extension and wrist/hand  Later in session pt demonstrating 2+/5 for elbow extension but not to command    Coordination:  Coordination: Grossly decreased, non-functional            Tone & Sensation:  Tone: Abnormal  Sensation:  (unable to test due to expressive aphasia)                      Balance:       Functional Mobility and Transfers for ADLs:  Bed Mobility:  Rolling: Maximum assistance (to roll to R)  Scooting: Total assistance    Transfers:       ADL Assessment:  Feeding: Maximum assistance (infer from functional reach)    Oral Facial Hygiene/Grooming: Maximum assistance (infer from functional reach and decreased command following)    Bathing: Maximum assistance (infer from functional reach and LE access)    Type of Bath: Chlorhexidine (CHG); Basin/Soap/Water;Full    Upper Body Dressing: Maximum assistance (infer from functional reach and R sided deficits)    Lower Body Dressing: Total assistance    Toileting: Total assistance (infer bed level)                ADL Intervention and task modifications:                 Type of Bath: Chlorhexidine (CHG); Basin/Soap/Water;Full              Lower Body Dressing Assistance  Socks:  Total assistance (dependent)  Leg Crossed Method Used: No  Position Performed: Supine         Cognitive Retraining  Safety/Judgement: Decreased awareness of environment;Decreased awareness of need for assistance;Decreased awareness of need for safety;Decreased insight into deficits     Functional Measure:    Barthel Index:  Bathin  Bladder: 0  Bowels: 0  Groomin  Dressin  Feedin  Mobility: 0  Stairs: 0  Toilet Use: 0  Transfer (Bed to Chair and Back): 0  Total: 0/100      The Barthel ADL Index: Guidelines  1. The index should be used as a record of what a patient does, not as a record of what a patient could do. 2. The main aim is to establish degree of independence from any help, physical or verbal, however minor and for whatever reason. 3. The need for supervision renders the patient not independent. 4. A patient's performance should be established using the best available evidence. Asking the patient, friends/relatives and nurses are the usual sources, but direct observation and common sense are also important. However direct testing is not needed. 5. Usually the patient's performance over the preceding 24-48 hours is important, but occasionally longer periods will be relevant. 6. Middle categories imply that the patient supplies over 50 per cent of the effort. 7. Use of aids to be independent is allowed. Score Interpretation (from [address] [place] [address])    Independent   60-79 Minimally independent   40-59 Partially dependent   20-39 Very dependent   <20 Totally dependent     -Kristen Mosley., Barthel, DGriffinW. (1965). Functional evaluation: the Barthel Index. [address] [facility] ([address] (1997). The Barthel activities of daily living index: self-reporting versus actual performance in the old (> or = 75 years). Journal of [address] [place] [address] 45(7), [facility], J.ANAM.F, Prosper Edmondson., Crystal Tadeo. (1999). Measuring the change in disability after inpatient rehabilitation; comparison of the responsiveness of the Barthel Index and Functional Lares Measure. Journal of Neurology, Neurosurgery, and Psychiatry, 66(4), 730-746.   -Marty Verma, N.J.A, AMY Gamino, & Iona Jamil MGriffinA. (2004) Assessment of post-stroke quality of life in cost-effectiveness studies: The usefulness of the Barthel Index and the EuroQoL-5D. Quality of Life Research, 13, 427-67       Fugl-Campbell Assessment of Motor Recovery after Stroke:   Unable to participate due to expressive and receptive aphasia (per MD note), limited command following     Occupational Therapy Evaluation Charge Determination   History Examination Decision-Making   MEDIUM Complexity : Expanded review of history including physical, cognitive and psychosocial  history  HIGH Complexity : 5 or more performance deficits relating to physical, cognitive , or psychosocial skils that result in activity limitations and / or participation restrictions MEDIUM Complexity : Patient may present with comorbidities that affect occupational performnce. Miniml to moderate modification of tasks or assistance (eg, physical or verbal ) with assesment(s) is necessary to enable patient to complete evaluation       Based on the above components, the patient evaluation is determined to be of the following complexity level: MEDIUM  Pain Rating:  Unable to report, significant bruising on right side of body, no pain behaviors noted    Activity Tolerance:   Poor and requires rest breaks    After treatment patient left in no apparent distress:    Supine in bed, Call bell within reach, Side rails x 3, and Restraints    COMMUNICATION/EDUCATION:   The patients plan of care was discussed with: Physical therapist and Registered nurse. Patient was educated regarding his deficit(s) of right sided weakness as this relates to his diagnosis of L MCA. He demonstrated Guarded understanding as evidenced by communication deficits. Patient is unable to participate in goal setting and plan of care. This patients plan of care is appropriate for delegation to [facility].     Thank you for this referral.  Kian Griffin, OTR/L  Time Calculation: 18 mins

## 2022-09-15 NOTE — H&P
[facility]    ICU TEAM H & P Note    Name: Brendan Herrera   : 1950   MRN: [id number]   Date: 2022        Subjective:     Reason for ICU Admission: This is  a 72 y/o M with a Pmhx of depression, Afib on Xeralto, HLD, HTN VALENTE on CPAP, Gout and morbid obesity who was found outside by a friend after his sister could not get in touch with him for 24 hours. He was brought into ER and underwent a STROKE work up with findings of a left MCA territory temporal lobe infarct without hemorrhage. CTA showed severe stenosis/ near occlusive thrombus of the distal left M1. Other imaging including a CT C spine was negative for acute findings. Labs were reviewed and unremarkable. Sister was at bedside and able to provide some limited medical history as well as details as to LKW ( > 24 hrs) and functional baseline status as alert, oriented x4, independent and mental independence. He is being admitted to ICU for ongoing care. Past Medical History:      has a past medical history of Arrhythmia, Depression (2010), Diabetes ([address] Dyslipidemia, goal LDL below 100 (2011), Gout, HTN (hypertension) (2010), Impotence (2010), Morbid obesity ([address] (2010), VALENTE (obstructive sleep apnea) (2010), and Restless legs (4/15/2015). Past Surgical History:      has a past surgical history that includes endoscopy, colon, diagnostic (); hx orthopaedic (); hx orthopaedic (); hx appendectomy; colonoscopy (N/A, 2017); hx urological (2018); and hx urological (2019). Home Medications:     Prior to Admission medications    Medication Sig Start Date End Date Taking?  Authorizing Provider   lisinopril-hydroCHLOROthiazide (PRINZIDE, ZESTORETIC) 20-25 mg per tablet TAKE 1 TABLET BY MOUTH EVERY DAY 22   Carolyn Barajas MD   colchicine 0.6 mg tablet TAKE 1 TABLET BY MOUTH EVERY DAY 22   Prabhakar Sheehan MD   gabapentin (NEURONTIN) 300 mg capsule Take 3 Capsules by mouth nightly. Max Daily Amount: 900 mg. 22   Ant Whitfield MD   allopurinoL (ZYLOPRIM) 100 mg tablet Take 1 Tablet by mouth daily. 22   Ant Whitfield MD   rivaroxaban (Xarelto) 20 mg tab tablet TAKE 1 TABLET BY MOUTH IN  THE MORNING WITH BREAKFAST 22   Alex Pizarro MD       Allergies/Social/Family History:     No Known Allergies   Social History     Tobacco Use    Smoking status: Former     Packs/day: 0.50     Years: 5.00     Pack years: 2.50     Types: Cigarettes     Quit date: 1990     Years since quittin.3    Smokeless tobacco: Never   Substance Use Topics    Alcohol use: Yes     Alcohol/week: 1.7 standard drinks     Types: 2 Standard drinks or equivalent per week     Comment: 2-3 drinks per week      Family History   Problem Relation Age of Onset    Cancer Father         leukemia    Cancer Paternal Grandfather     Diabetes Sister     Diabetes Brother     Cancer Maternal Aunt     Cancer Maternal Uncle        Review of Systems:     Review of systems not obtained due to patient factors. Objective:   Vital Signs:  Visit Vitals  BP (!) 144/93   Pulse (!) 116   Temp 98.7 °F (37.1 °C)   Resp 24   Ht 6' 3\" (1.905 m)   Wt 104 kg (229 lb 4.5 oz)   SpO2 97%   BMI 28.66 kg/m²      O2 Device: Non-rebreather mask Temp (24hrs), Av.7 °F (37.1 °C), Min:98.7 °F (37.1 °C), Max:98.7 °F (37.1 °C)           Intake/Output:   No intake or output data in the 24 hours ending 22 7066    Physical Exam:    General:  Awake,alert, interactive No acute distress. Eyes:  Sclera anicteric. Pupils equal, round, reactive to light. Mouth/Throat: Oropharynx clear, mouth dry, teeth dry   Neck: Supple. Lungs:   Clear to auscultation bilaterally, good effort. Cardiovascular:  Tachy Irregular rate and rhythm, no murmur, click, rub, or gallop. Abdomen:   Soft, non-tender, bowel sounds normal, non-distended. Extremities: No cyanosis or edema. Skin: No acute rash or lesions.  Large ecchymosis to right abd flank, right elbow swollen, abrasions to right foot toes anteriorly, right elbow, right shin anteriorly. Lymph Nodes: Cervical and supraclavicular normal.   Musculoskeletal:  No swelling or deformity. 5/5 left UE/LLE, 1/5 RUE/ 2/5 RLE   Lines/Devices:  Intact, no erythema, drainage, or tenderness. Psychiatric: Awake, alert, difficult to assess orientation 2/2 expressive aphasia, answers yes/no and able to speak some works, follows simple commands as able. LABS AND  DATA: Personally reviewed  Recent Labs     09/14/22 2210   WBC 18.1*   HGB 16.5   HCT 48.3        Recent Labs     09/14/22 2210   *   K 3.6      CO2 20*   BUN 48*   CREA 1.21   *   CA 9.6     Recent Labs     09/14/22 2210   AP 74   TP 8.4*   ALB 3.8   GLOB 4.6*     Recent Labs     09/14/22 2210   INR 1.1   PTP 11.9*      No results for input(s): PHI, PCO2I, PO2I, FIO2I in the last 72 hours. No results for input(s): CPK, CKMB, TROIQ, BNPP in the last 72 hours. MEDS: Reviewed    Chest X-Ray:  CXR Results  (Last 48 hours)                 09/14/22 2253  XR CHEST PORT Final result    Impression:  No acute findings. Hypoinflation. Narrative:  EXAM: XR CHEST PORT       INDICATION: CVA       COMPARISON: CT 9/14/2022, CT thorax 4/29/2019. FINDINGS: A portable AP radiograph of the chest was obtained at 22:52 hours. The   patient is on a cardiac monitor. The lungs are clear for degree of hypoplasia. The cardiac and mediastinal contours and pulmonary vascularity are normal.  The   chest wall structures and visualized upper abdomen show no acute findings with   incidental note of degenerative spine and shoulder changes. CT Results  (Last 48 hours)                 09/14/22 2227  CT CHEST W CONT Final result    Impression:      1. Minimal subcutaneous edema right chest wall.  No acute abnormality of the   chest abdomen or pelvis           Narrative:  INDICATION: Trauma with right-sided bruising       COMPARISON: 4/29/2019       TECHNIQUE:  Following the uneventful intravenous administration of 100 cc   Isovue-370, 5 mm axial images were obtained through the chest, abdomen, and   pelvis. Oral contrast was not administered. Sagittal and coronal reformats were   performed. CT dose reduction was achieved through use of a standardized protocol   tailored for this examination and automatic exposure control for dose   modulation. FINDINGS:       THYROID: No nodule. MEDIASTINUM: No mass or lymphadenopathy. PAVEL: No mass or lymphadenopathy. THORACIC AORTA: No dissection or aneurysm. There are vascular calcifications   MAIN PULMONARY ARTERY: Normal in caliber. TRACHEA/BRONCHI: Patent. ESOPHAGUS: No wall thickening or dilatation. HEART: Normal in size. There are coronary artery calcifications   PLEURA: No effusion or pneumothorax. LUNGS: Right basilar atelectasis   LIVER: No mass or biliary dilatation. GALLBLADDER: Unremarkable. SPLEEN: No mass. PANCREAS: No mass or ductal dilatation. ADRENALS: Unremarkable. KIDNEYS: Bilateral renal cysts. No hydronephrosis. Contrast within the   collecting systems makes evaluation for stone difficult   STOMACH: Unremarkable. SMALL BOWEL: No dilatation or wall thickening. COLON: Left-sided diverticulosis without evidence of acute diverticulitis   APPENDIX: Not identified   PERITONEUM: No ascites or pneumoperitoneum. RETROPERITONEUM: There are vascular calcifications   REPRODUCTIVE ORGANS: Enlarged   URINARY BLADDER: No mass or calculus. BONES: Left hip is been replaced. Mild degenerative changes of the right hip. There are degenerative changes of the lumbar spine. A displaced fracture is not   identified   ADDITIONAL COMMENTS: Minimal subcutaneous edema right chest wall           09/14/22 2226  CT ABD PELV W CONT Final result    Impression:      1. Minimal subcutaneous edema right chest wall.  No acute abnormality of the chest abdomen or pelvis           Narrative:  INDICATION: Trauma with right-sided bruising       COMPARISON: 4/29/2019       TECHNIQUE:  Following the uneventful intravenous administration of 100 cc   Isovue-370, 5 mm axial images were obtained through the chest, abdomen, and   pelvis. Oral contrast was not administered. Sagittal and coronal reformats were   performed. CT dose reduction was achieved through use of a standardized protocol   tailored for this examination and automatic exposure control for dose   modulation. FINDINGS:       THYROID: No nodule. MEDIASTINUM: No mass or lymphadenopathy. PAVEL: No mass or lymphadenopathy. THORACIC AORTA: No dissection or aneurysm. There are vascular calcifications   MAIN PULMONARY ARTERY: Normal in caliber. TRACHEA/BRONCHI: Patent. ESOPHAGUS: No wall thickening or dilatation. HEART: Normal in size. There are coronary artery calcifications   PLEURA: No effusion or pneumothorax. LUNGS: Right basilar atelectasis   LIVER: No mass or biliary dilatation. GALLBLADDER: Unremarkable. SPLEEN: No mass. PANCREAS: No mass or ductal dilatation. ADRENALS: Unremarkable. KIDNEYS: Bilateral renal cysts. No hydronephrosis. Contrast within the   collecting systems makes evaluation for stone difficult   STOMACH: Unremarkable. SMALL BOWEL: No dilatation or wall thickening. COLON: Left-sided diverticulosis without evidence of acute diverticulitis   APPENDIX: Not identified   PERITONEUM: No ascites or pneumoperitoneum. RETROPERITONEUM: There are vascular calcifications   REPRODUCTIVE ORGANS: Enlarged   URINARY BLADDER: No mass or calculus. BONES: Left hip is been replaced. Mild degenerative changes of the right hip. There are degenerative changes of the lumbar spine.  A displaced fracture is not   identified   ADDITIONAL COMMENTS: Minimal subcutaneous edema right chest wall           09/14/22 8825  CTA CODE NEURO HEAD AND NECK W CONT Final result Impression:  Severe stenosis/near occlusive thrombus of the distal left MCA M1. Large area of   subacute infarction in the left MCA territory. Small area of reversible ischemia   in the left temporal lobe anteriorly and inferiorly measures approximately 30   mL. There is no superimposed hemorrhage, midline shift or mass effect   There is no  aneurysm or dissection identified. The findings were called to Dr. Imelda Urrutia on 9/14/2022 at 10:48 PM by Dr. Christian Sahu. [phone]           . Narrative:  Clinical history: stroke   INDICATION:   stroke       COMPARISON:  9/14/2022   CONTRAST: 100ml Isovue 370   TECHNIQUE:     CT dose reduction was achieved through use of a standardized protocol tailored   for this examination and automatic exposure control for dose modulation. Following the uneventful administration of Isovue-370 contrast material, axial   CT angiography of the head and neck was performed. Coronal and sagittal   reconstructions were obtained. 3D MAXIMAL INTENSITY PROJECTION reconstructed imaging of the cranial vasculature   in both the coronal and sagittal plane was performed. CTA perfusion imaging was also obtained. Reconstructions were created with color   coding of axial time to peak, axial blood volume, axial blood flow, axial mean   transit time and axial T Max. The study was analyzed by the Rasheeda Chan. Algorithm   FINDINGS:   There is loss of gray-white differentiation in the left MCA territory with   subacute infarct in the left temporal lobe. .  There is no significant   superimposed hemorrhage, midline shift or mass effect. No evidence of acute   intracranial hemorrhage. . The paranasal sinuses are clear. CTA NECK:    Common origin of the innominate and left common carotid arteries. Left vertebral   artery is dominant. There is atherosclerotic change the origin of the ICAs on   the right. There is mild aortic atherosclerotic change. There is no pulmonary   mass or nodule. There is  less than 20%stenosis in the right internal carotid artery utilizing   NASCET criteria. There is  less than 20%stenosis in the left internal carotid artery utilizing   NASCET criteria. CTA HEAD:       Short segment occlusion/near occlusive thrombus of the distal left MCA M1. The left vertebral artery is dominant. Right vertebral artery likely terminates   at. Severe multifocal stenoses of the right vertebral artery. There is a   diminutive basilar artery. The basilar artery and its branches are normal. M1 M2   junction occlusion/near occlusion on the left is best appreciated on sagittal   images. Mild multifocal M2 segment stenoses. . Patient was positioned with the   right hypoplastic. There is bilateral persistent fetal circulation to the   posterior cerebral arteries. .   CT PERFUSION:       Area of reversible ischemia is suggested in the left temporal lobe. 30 mL of   perfusion/left lobe mismatch. .   R CBF<30% :5   Tmax >6s: 35 mL       09/14/22 2219  CT CODE NEURO PERF W CBF Final result    Impression:  Severe stenosis/near occlusive thrombus of the distal left MCA M1. Large area of   subacute infarction in the left MCA territory. Small area of reversible ischemia   in the left temporal lobe anteriorly and inferiorly measures approximately 30   mL. There is no superimposed hemorrhage, midline shift or mass effect   There is no  aneurysm or dissection identified. The findings were called to Dr. Angie Alcantara on 9/14/2022 at 10:48 PM by Dr. Mya Pickard. [phone]           . Narrative:  Clinical history: stroke   INDICATION:   stroke       COMPARISON:  9/14/2022   CONTRAST: 100ml Isovue 370   TECHNIQUE:     CT dose reduction was achieved through use of a standardized protocol tailored   for this examination and automatic exposure control for dose modulation. Following the uneventful administration of Isovue-370 contrast material, axial   CT angiography of the head and neck was performed. Coronal and sagittal   reconstructions were obtained. 3D MAXIMAL INTENSITY PROJECTION reconstructed imaging of the cranial vasculature   in both the coronal and sagittal plane was performed. CTA perfusion imaging was also obtained. Reconstructions were created with color   coding of axial time to peak, axial blood volume, axial blood flow, axial mean   transit time and axial T Max. The study was analyzed by the Rasheeda Chan. Algorithm   FINDINGS:   There is loss of gray-white differentiation in the left MCA territory with   subacute infarct in the left temporal lobe. .  There is no significant   superimposed hemorrhage, midline shift or mass effect. No evidence of acute   intracranial hemorrhage. . The paranasal sinuses are clear. CTA NECK:    Common origin of the innominate and left common carotid arteries. Left vertebral   artery is dominant. There is atherosclerotic change the origin of the ICAs on   the right. There is mild aortic atherosclerotic change. There is no pulmonary   mass or nodule. There is  less than 20%stenosis in the right internal carotid artery utilizing   NASCET criteria. There is  less than 20%stenosis in the left internal carotid artery utilizing   NASCET criteria. CTA HEAD:       Short segment occlusion/near occlusive thrombus of the distal left MCA M1. The left vertebral artery is dominant. Right vertebral artery likely terminates   at. Severe multifocal stenoses of the right vertebral artery. There is a   diminutive basilar artery. The basilar artery and its branches are normal. M1 M2   junction occlusion/near occlusion on the left is best appreciated on sagittal   images. Mild multifocal M2 segment stenoses. . Patient was positioned with the   right hypoplastic. There is bilateral persistent fetal circulation to the   posterior cerebral arteries. .   CT PERFUSION:       Area of reversible ischemia is suggested in the left temporal lobe.  30 mL of   perfusion/left lobe mismatch. .   R CBF<30% :5   Tmax >6s: 35 mL       09/14/22 2159  CT SPINE CERV WO CONT Final result    Impression:  No acute fracture or subluxation. Narrative:  INDICATION: trauma, neck pain. Exam: Noncontrast CT of the cervical spine is performed with 2.5 mm collimation. Sagittal and coronal reformatted images were also performed. CT dose reduction was achieved through use of a standardized protocol tailored   for this examination and automatic exposure control for dose modulation. FINDINGS: There is no acute fracture or subluxation. The prevertebral soft   tissues are within normal limits. Bones are diffusely demineralized. Remaining   visualized soft tissues are normal. Multilevel cervical degenerative changes are   noted. 09/14/22 2154  CT CODE NEURO HEAD WO CONTRAST Final result    Impression:  1. Large acute left MCA territory infarct   2. Scalp thickening along the right. An acute intracranial hemorrhage is not   identified               Narrative:  EXAM: CT CODE NEURO HEAD WO CONTRAST       INDICATION: Code Stroke       COMPARISON: 8/17/2017. CONTRAST: None. TECHNIQUE: Unenhanced CT of the head was performed using 5 mm images. Brain and   bone windows were generated. Coronal and sagittal reformats. CT dose reduction   was achieved through use of a standardized protocol tailored for this   examination and automatic exposure control for dose modulation. FINDINGS:   The ventricles and sulci are normal in size, shape and configuration. . There is   no significant white matter disease. There is no intracranial hemorrhage,   extra-axial collection, or mass effect. The basilar cisterns are open. Acute   left MCA territory infarct involving the anterior left temporal lobe and   adjacent frontal lobe. . Loss of the gray-white differentiation in the left basal   ganglia. The bone windows demonstrate no abnormalities.  The visualized portions of the paranasal sinuses and mastoid air cells are clear. There is thickening of the   right-sided scalp. ECHO:  pending    Multidisciplinary Rounds Completed:  Pending    ABCDEF Bundle/Checklist Completed: Yes    Active Problem List:     Problem List  Date Reviewed: 7/28/2022            Codes Class    Ischemic cerebrovascular accident (CVA) ([facility] ICD-10-CM: I63.9  ICD-9-CM: 434.91         Diabetes mellitus type 2, diet-controlled ([facility] ICD-10-CM: E11.9  ICD-9-CM: 250.00         Benign non-nodular prostatic hyperplasia with lower urinary tract symptoms ICD-10-CM: N40.1  ICD-9-CM: 600.91     Overview Signed 4/25/2017  9:10 AM by Dante Chatterjee MD     Retention had catheter 3 months             Urinary retention due to benign prostatic hyperplasia ICD-10-CM: N40.1, R33.8  ICD-9-CM: 600.91, 788.20         Diabetes mellitus type 2, controlled ([facility] ICD-10-CM: E11.9  ICD-9-CM: 250.00         Restless legs ICD-10-CM: G25.81  ICD-9-CM: 333.94         VALENTE (obstructive sleep apnea) ICD-10-CM: G47.33  ICD-9-CM: 327.23     Overview Signed 6/19/2012 10:57 AM by Salvador Pineda MD     Bi-Level: 19/15 cmH2O. Dyslipidemia, goal LDL below 100 ICD-10-CM: E78.5  ICD-9-CM: 272.4         A-fib (HCC) ICD-10-CM: I48.91  ICD-9-CM: 427.31     Overview Addendum 2/9/2011  3:42 PM by Evelin Boucher NP     Assymptomatic, noted on ekg 2/11; found when had sleep study. Saw cardiologist 2/8/11.              Diabetes ([facility] ICD-10-CM: E11.9  ICD-9-CM: 250.00     Overview Addendum 2/9/2011  3:37 PM by Evelin Boucher NP     Recent diagnosis; just started diabetes education  HgA1C 6.5%             Morbid obesity ([facility] ICD-10-CM: E66.01  ICD-9-CM: 278.01     Overview Signed 2/9/2011  3:35 PM by Evelin Boucher NP     OBESE 20 + YEARS; HIGH WEIGHT 362 LBS PAST MONTH; LOW WEIGHT 190 LBS 30 YEARS AGO             VALENTE (obstructive sleep apnea) ICD-10-CM: G47.33  ICD-9-CM: 327.23         Erectile dysfunction due to arterial insufficiency ICD-10-CM: N52.01  ICD-9-CM: 607.84         HTN (hypertension) ICD-10-CM: I10  ICD-9-CM: 401.9     Overview Signed 4/24/2010  9:31 AM by Johanna Guzman     borderline             Depression ICD-10-CM: [email or web address] A  ICD-9-CM: 910          ICU Assessment/ Comprehensive Plan of Care:        NEURO  1. Left MCA territory CVA, unknown LKW  -NIHSS 19 on arrival  -CTH/CTP/CTP done on arrival  -MRI Brain pending  -SBP goal < 180/90  -Neuro checks Q1hr  -Consult to neurology  -STAT Head CT for any neuro changes  -OT/PT/ST eval for DC planning  -Hold AC/AP until follow up imaging completed  -Check Lipid profile, HgBA1c x1  -Check ECHO  -atorvastatin 80mg po qHS     CARDIAC  Afib/RVR. Hx of HTN, HLD  Elevated troponins, possible NSTEMI T2  -Cardiac monitoring  -Metoprolol 5mg IV x1  -Allow permissive HTN  -Hold Xeralto until cleared by neurology  -Resume lisinopril/HCTZ once cleared for PO intake  -Trend troponins until downtrending  -Consider Cards consult  -ECHO pending    RESPIRATORY  No acute concerns, Hx of VALENTE w/ CPAP at hs  Tobacco dependence, MIKE ppy hx  -Supplemental oxygen PRN for sats > 92%  -Smoking cessation information  -Consider Nicotine patch for WD symptoms    RENAL  Mild DOMINICK, likely 2/2 dehydration. Creat/BUN   -Trend BMP, urine output  IVFs: NS @ 75ml/hr    GASTROINTESTINAL  1. Hyperbilirubinemia, 2.5 on arrival 48/1.21  -Trend CMP  -NPO  -ST eval  -If no improvement in abd labs, check RUQ US    HEMATOLOGIC  No acute concerns  -Trend CBC  -Coags stable    ID  1. No acute concerns for infection  -Trend CBC, fevers  -No antibiotics indicated at present  -LA 2.3, repeat until <2. IVF bolus x 1    ENDOCRINE  Hyperglycemia with Hx of DM  -SSI coverage  -Check HgbA1c    MSK  Rhabdomyolysis  -IVF bolus x 1 liter then MIVF  -Trend CK daily, initial CK 3,573    2.  GOUT  -Continue allopurinol    F - Feeding:  NPO  A - Analgesia: acetaminophen, hydromorphone  S - Sedation: GOAL RASS 0  T - DVT Prophylaxis: SCD's or Sequential Compression Device  H - Head of Bed: > 30 Degrees  U - Ulcer Prophylaxis: famotidine  G - Glycemic Control: NA  S - Spontaneous Breathing Trial: NA  B - Bowel Regimen: docusate  I - Indwelling Catheter:              T/L/D  Tubes: None  Lines: Peripheral IV  Drains: None  D - De-escalation of Antibiotics:  NA      DISPOSITION/COMMUNICATION  Discussed Plan of Care/Code Status: Full Code-Discussed with patient and sister at bedside. Has a son that will be decision maker, arriving from [facility] in ICU    CRITICAL CARE CONSULTANT NOTE  I had a face to face encounter with the patient, reviewed and interpreted patient data including clinical events, labs, images, vital signs, I/O's, and examined patient. I have discussed the case and the plan and management of the patient's care with the consulting services, the bedside nurses and the respiratory therapist.      NOTE OF PERSONAL INVOLVEMENT IN CARE   This patient has a high probability of imminent, clinically significant deterioration, which requires the highest level of preparedness to intervene urgently. I participated in the decision-making and personally managed or directed the management of the following life and organ supporting interventions that required my frequent assessment to treat or prevent imminent deterioration. I personally spent 65 minutes of critical care time. This is time spent at this critically ill patient's bedside actively involved in patient care as well as the coordination of care and discussions with the patient's family. This does not include any procedural time which has been billed separately.     Meri Pierson [facility]  Intensivist Nurse Practitioner  [facility] Critical Care  9/14/2022

## 2022-09-15 NOTE — PROGRESS NOTES
1930: Bedside shift change report given to Raynald Dance, RN (oncoming nurse) by Shanna Garcia RN (offgoing nurse). Report included the following information SBAR, Kardex, Intake/Output, MAR, Recent Results, Cardiac Rhythm Afib, Alarm Parameters , and Dual Neuro Assessment. 2242: TRANSFER - OUT REPORT:    Verbal report given to Fadumo Rider RN(name) on [facility]  being transferred to NSTU(unit) for routine progression of care       Report consisted of patients Situation, Background, Assessment and   Recommendations(SBAR). Information from the following report(s) SBAR, Kardex, Intake/Output, MAR, Recent Results, Cardiac Rhythm Afib, and Alarm Parameters  was reviewed with the receiving nurse. Lines:   Peripheral IV 09/14/22 Right Wrist (Active)   Site Assessment Clean, dry, & intact 09/15/22 2000   Phlebitis Assessment 0 09/15/22 2000   Infiltration Assessment 0 09/15/22 2000   Dressing Status Clean, dry, & intact 09/15/22 2000   Dressing Type Transparent;Tape 09/15/22 2000   Hub Color/Line Status Green;Capped 09/15/22 2000   Action Taken Open ports on tubing capped 09/15/22 2000   Alcohol Cap Used Yes 09/15/22 2000       Peripheral IV 09/14/22 Left Antecubital (Active)   Site Assessment Clean, dry, & intact 09/15/22 2000   Phlebitis Assessment 0 09/15/22 2000   Infiltration Assessment 0 09/15/22 2000   Dressing Status Clean, dry, & intact 09/15/22 2000   Dressing Type Transparent;Tape 09/15/22 2000   Hub Color/Line Status Green; Infusing 09/15/22 2000   Action Taken Open ports on tubing capped 09/15/22 2000   Alcohol Cap Used Yes 09/15/22 2000        Opportunity for questions and clarification was provided. Patient transported with:   Monitor  Patient-specific medications from Pharmacy  Registered Nurse    Restraint necessity, order, and documentation have been reviewed and validated.

## 2022-09-15 NOTE — PROGRESS NOTES
Problem: Mobility Impaired (Adult and Pediatric)  Goal: *Acute Goals and Plan of Care (Insert Text)  Description:   FUNCTIONAL STATUS PRIOR TO ADMISSION: Patient was independent and active without use of DME.    HOME SUPPORT PRIOR TO ADMISSION: The patient lived alone with no local support. Physical Therapy Goals  Initiated 9/15/2022  1. Patient will move from supine to sit and sit to supine , scoot up and down, and roll side to side in bed with moderate assistance  within 7 day(s). 2.  Patient will transfer from bed to chair and chair to bed with moderate assistance  using the least restrictive device within 7 day(s). 3.  Patient will perform sit to stand with moderate assistance  within 7 day(s). 4.  Patient will ambulate with moderate assistance  for 25 feet with the least restrictive device within 7 day(s). 5.  Patient will improve Bowles Balance score by 7 points within 7 days. Outcome: Progressing Towards Goal     PHYSICAL THERAPY EVALUATION- NEURO POPULATION  Patient: Nimisha Bruce (80 y.o. male)  Date: 9/15/2022  Primary Diagnosis: Ischemic cerebrovascular accident (CVA) ([facility] [address] [I63.9]       Precautions:   Fall (SBP< 180/90)      ASSESSMENT  Based on the objective data described below, the patient presents with expressive aphasia, possible receptive aphasia, delayed command following, decreased attention, decreased attention to L, unable to assess sensation, R sided hemiparesis, reduced mobility, reduced orientation, reduced activity tolerance. This is a 71year old male who was found on the ground behind a shed in his yard. Last known well on the morning of 9/13. MRI shows moderate to large acute left MCA territory infarction, with associated susceptibility artifact predominantly in the basal ganglia/corona radiata consistent with blood product/petechial hemorrhage. Patient with restraint on left upper extremity, removed during assessment but replaced prior to therapy exit.  Noted to follow commands and is alert at time of evaluation. Patient noted to have R sided weakness, grossly 2-/5. Follows most commands for MMT but some difficulty. LLE also weak, 3+/5 grossly. Patient able to initiate rolling to R with use of LUE and L knee flexion. Requiring max A x 2. Total A x 2 for scooting in bed. Able to track equally in all planes but difficulty attending to L visual field even with cues. Anticipate further progress going forward. High PLOF, living independently prior to admission. Working towards tolerating 3 hours of therapy per day. Current Level of Function Impacting Discharge (mobility/balance): max A x 2 for rolling, total A x 2 for scooting    Functional Outcome Measure: The patient scored Total: 0/56 on the Bowles Balance Assessment which is indicative of high fall risk. Other factors to consider for discharge: high PLOF, unable to provide history     Patient will benefit from skilled therapy intervention to address the above noted impairments. PLAN :  Recommendations and Planned Interventions: bed mobility training, transfer training, gait training, therapeutic exercises, neuromuscular re-education, patient and family training/education, and therapeutic activities      Frequency/Duration: Patient will be followed by physical therapy:  5 times a week to address goals. Recommendation for discharge: (in order for the patient to meet his/her long term goals)  Therapy 3 hours per day 5-7 days per week    This discharge recommendation:  Has been made in collaboration with the attending provider and/or case management    IF patient discharges home will need the following DME: to be determined (TBD)         SUBJECTIVE:   Patient stated Gearrdo Rios.     OBJECTIVE DATA SUMMARY:   HISTORY:    Past Medical History:   Diagnosis Date    Arrhythmia     atrial fib    Depression 4/24/2010    Diabetes ([facility] [address]     diet control for pre-diabetes    Dyslipidemia, goal LDL below 100 6/14/2011    Gout     HTN (hypertension) 4/24/2010    Impotence 4/24/2010    Morbid obesity ([address] 5/17/2010    VALENTE (obstructive sleep apnea) 4/24/2010    CPAP    Restless legs 4/15/2015     Past Surgical History:   Procedure Laterality Date    COLONOSCOPY N/A 6/27/2017    COLONOSCOPY performed by Ruthie Jensen MD at [address] [place] [address], [address] [place] [address], DIAGNOSTIC  2007    HX APPENDECTOMY      HX ORTHOPAEDIC  2000    bilat TKR     HX ORTHOPAEDIC  2010    left THR    HX UROLOGICAL  03/2018    urolift    HX UROLOGICAL  03/2019    prosthesis       Personal factors and/or comorbidities impacting plan of care: aphasic, extensive deficits, unable to provide history    Home Situation  Home Environment: Private residence  Living Alone: Yes  Support Systems: Friend/Neighbor    EXAMINATION/PRESENTATION/DECISION MAKING:   Critical Behavior:  Neurologic State: Alert  Orientation Level: Disoriented to time, Disoriented to situation, Disoriented to place (able to state first name only)  Cognition: Decreased command following, Decreased attention/concentration  Safety/Judgement: Decreased awareness of environment, Decreased awareness of need for assistance, Decreased awareness of need for safety, Decreased insight into deficits  Hearing: Auditory  Auditory Impairment: Hard of hearing, bilateral  Skin:  bruising/abrasions to RLE    Range Of Motion:  AROM: Grossly decreased, non-functional (RUE weaker than RLE)   RLE 2-/5 grossly  LLE 3+/5        PROM: Within functional limits           Strength:    Strength: Grossly decreased, non-functional (RUE weaker than RLE)        Tone & Sensation:   Tone: Abnormal     Sensation:  (unable to test due to expressive aphasia)     Coordination:  Coordination: Grossly decreased, non-functional  Vision:   Tracking: Able to track stimulus in all quadrants w/o difficulty  Visual Fields: Difficulty detecting stimulus  in left lateral quadrant; Difficulty detecting stimulus in left lower quadrant; Difficulty detecting stimulus in left upper quadrant  Diplopia:  (unable to report)  Functional Mobility:  Bed Mobility:  Rolling: Maximum assistance (to roll to R)        Scooting: Total assistance  Transfers:     Unable    Balance:   Unable to assess            Functional Measure  Bowles Balance Test:    Sitting to Standin  Standing Unsupported: 0  Sitting with Back Unsupported: 0  Standing to Sittin  Transfers: 0  Standing Unsupported with Eyes Closed: 0  Standing Unsupported with Feet Together: 0  Reach Forward with Outstretched Arm: 0   Object: 0  Turn to Look Over Shoulders: 0  Turn 360 Degrees: 0  Alternate Foot on Step/Stool: 0  Standing Unsupported One Foot in Front: 0  Stand on One Le  Total: 0/56         56=Maximum possible score;   0-20=High fall risk  21-40=Moderate fall risk   41-56=Low fall risk        Physical Therapy Evaluation Charge Determination   History Examination Presentation Decision-Making   MEDIUM  Complexity : 1-2 comorbidities / personal factors will impact the outcome/ POC  LOW Complexity : 1-2 Standardized tests and measures addressing body structure, function, activity limitation and / or participation in recreation  MEDIUM Complexity : Evolving with changing characteristics  MEDIUM Complexity : FOTO score of 26-74      Based on the above components, the patient evaluation is determined to be of the following complexity level: MEDIUM    Pain Rating:  No pain reported     Activity Tolerance:   Fair and Poor      After treatment patient left in no apparent distress:   Supine in bed, Heels elevated for pressure relief, Call bell within reach, Side rails x 3, and Restraints    COMMUNICATION/EDUCATION:   The patients plan of care was discussed with: Occupational therapist and Registered nurse. Patient was educated regarding his deficit(s) of R hemiparesis, aphasia as this relates to his diagnosis of CVA.   He demonstrated poor understanding as evidenced by difficulty with verbal communication. Patient and/or family was verbally educated on the BE FAST acronym for signs/symptoms of CVA and TIA. Fall prevention education was provided and the patient/caregiver indicated understanding. and Patient/family have participated as able in goal setting and plan of care.     Thank you for this referral.  Demi Stone, PT   Time Calculation: 18 mins

## 2022-09-15 NOTE — PROGRESS NOTES
Problem: Dysphagia (Adult)  Goal: *Acute Goals and Plan of Care (Insert Text)  Description: Speech therapy goals  Initiated 9/15/2022   1. Patient will tolerate meds in applesauce without s/s of aspiration within 7 days   2. Patient will participate in re-assessment of swallow function within 7 days   Outcome: Progressing Towards Goal     SPEECH LANGUAGE PATHOLOGY BEDSIDE SWALLOW EVALUATION  Patient: Kacey Pride (14 y.o. male)  Date: 9/15/2022  Primary Diagnosis: Ischemic cerebrovascular accident (CVA) ([facility] [address] [I63.9]       Precautions: aspiration   Fall (SBP< 180/90)    ASSESSMENT :  Based on the objective data described below, the patient presents with suspected at least moderate oropharyngeal dysphagia characterized by delayed manipulation and propulsion, suspected reduced bolus control with thin liquids, suspected delayed swallow initiation and suspected reduced hyolaryngeal elevation/excursion. Tolerated purees without s/s of aspiration, however, immediate violent cough with thin liquids with tachycardia to the 160's sustained after cough. Risks for aspiration are increased given size of infarct and patient is not yet ready for initiation of diet. Also with receptive and expressive language deficits, though formal evaluation unable to be finished due to drowsiness after swallowing evaluation. Attempted to educate family, however, they had limited understanding of aphasia, reporting patient is \"determined\". They will benefit from continued education after formal evaluation and pending continued deficits/progress. Patient will benefit from skilled intervention to address the above impairments. Patients rehabilitation potential is considered to be Fair     PLAN :  Recommendations and Planned Interventions:  --recommend NPO except for medications crushed in puree - ok to finish the container of puree after medications as long as tolerating without s/s of aspiration.    --ok for ice chips for comfort after oral care  --will follow for re-assessment of swallow and consideration of instrumental assessment pending improvements  Frequency/Duration: Patient will be followed by speech-language pathology 4 times a week to address goals. Discharge Recommendations: To Be Determined     SUBJECTIVE:   Patient stated yes. To both Tiny Bartlett and Tiffanie Fagan as his name. OBJECTIVE:     Past Medical History:   Diagnosis Date    Arrhythmia     atrial fib    Depression 4/24/2010    Diabetes ([facility] [address]     diet control for pre-diabetes    Dyslipidemia, goal LDL below 100 6/14/2011    Gout     HTN (hypertension) 4/24/2010    Impotence 4/24/2010    Morbid obesity ([facility] [address] 5/17/2010    VALENTE (obstructive sleep apnea) 4/24/2010    CPAP    Restless legs 4/15/2015     Past Surgical History:   Procedure Laterality Date    COLONOSCOPY N/A 6/27/2017    COLONOSCOPY performed by Rosy Wolff MD at [facility], DIAGNOSTIC  2007    HX APPENDECTOMY      HX ORTHOPAEDIC  2000    bilat TKR     HX ORTHOPAEDIC  2010    left THR    HX UROLOGICAL  03/2018    urolift    HX UROLOGICAL  03/2019    prosthesis     Prior Level of Function/Home Situation:   Home Situation  Home Environment: Private residence  Living Alone: Yes  Support Systems: Friend/Neighbor  Diet prior to admission: regular  Current Diet:  NPO    Cognitive and Communication Status:  Neurologic State: Alert  Orientation Level: Unable to verbalize  Cognition: Decreased command following, Decreased attention/concentration  Perception: Cues to attend to right side of body  Perseveration: Perseverates during mobility  Safety/Judgement: Decreased awareness of environment, Decreased awareness of need for assistance, Decreased awareness of need for safety, Decreased insight into deficits  Oral Assessment:     P.O. Trials:  Patient Position: upright in bed  Vocal quality prior to P.O.: Low volume  Consistency Presented: Ice chips; Thin liquid;Puree  How Presented: SLP-fed/presented;Spoon;Straw     Bolus Acceptance: No impairment  Bolus Formation/Control: Impaired  Type of Impairment: Delayed  Propulsion: Delayed (# of seconds)  Oral Residue: None  Initiation of Swallow: Delayed (# of seconds) (suspected)  Laryngeal Elevation: Decreased (suspected)  Aspiration Signs/Symptoms: Strong cough (tachycardia with sip of water/cough)  Pharyngeal Phase Characteristics: Audible swallow; Poor endurance; Suspected pharyngeal residue  Effective Modifications: None          Oral Phase Severity: Moderate  Pharyngeal Phase Severity : Moderate (suspected)    NOMS:   The NOMS functional outcome measure was used to quantify this patient's level of swallowing impairment. Based on the NOMS, the patient was determined to be at level 2 for swallow function       NOMS Swallowing Levels:  Level 1 (CN): NPO  Level 2 (CM): NPO but takes consistency in therapy  Level 3 (CL): Takes less than 50% of nutrition p.o. and continues with nonoral feedings; and/or safe with mod cues; and/or max diet restriction  Level 4 (CK): Safe swallow but needs mod cues; and/or mod diet restriction; and/or still requires some nonoral feeding/supplements  Level 5 (CJ): Safe swallow with min diet restriction; and/or needs min cues  Level 6 (CI): Independent with p.o.; rare cues; usually self cues; may need to avoid some foods or needs extra time  Level 7 ([address] [place] [address]): Independent for all p.o.  JADA. (2003). National Outcomes Measurement System (NOMS): Adult Speech-Language Pathology User's Guide. Pain:  Pain Scale 1: Adult Nonverbal Pain Scale  Pain Intensity 1: 0       After treatment:   Patient left in no apparent distress in bed, Call bell within reach, Nursing notified, and Caregiver / family present    COMMUNICATION/EDUCATION:   Patient was educated regarding his deficit(s) of dysphagia as this relates to his diagnosis of CVA. He demonstrated Guarded understanding as evidenced by limited response to education.     The patient's plan of care including recommendations, planned interventions, and recommended diet changes were discussed with: Registered nurse and Physician. Patient/family have participated as able in goal setting and plan of care. Patient/family agree to work toward stated goals and plan of care. Thank you for this referral.  Mojgan Gross M.CD.  CCC-SLP   Time Calculation: 26 mins

## 2022-09-15 NOTE — PROGRESS NOTES
Orders received, chart reviewed and patient evaluated by physical therapy. Pending progression with skilled acute physical therapy, recommend:  Therapy 3 hours per day 5-7 days per week    Recommend with nursing PROM/AAROM to bilateral LE, rolling in bed/OOB to chair 3x/day as he can tolerate sitting balance in future days with two assist and  mechanical lift . Thank you for completing as able in order to maintain patient strength, endurance and independence. Full evaluation to follow.       Thank you for your consideration,    Don Verdin, PT, DPT

## 2022-09-15 NOTE — PROGRESS NOTES
Neurocritical Care Code Stroke Documentation      Symptoms: ?Joyce Call off of roof/ladder. Has aphasia, right-sided weakness, trauma on right chest, right arm and right hip with severe bruising   Baseline mRS:   0   Last Known Well: Unknown 24 hours   Medical hx: Past Medical History:   Diagnosis Date    Arrhythmia     atrial fib    Depression 4/24/2010    Diabetes ([facility]     diet control for pre-diabetes    Dyslipidemia, goal LDL below 100 6/14/2011    Gout     HTN (hypertension) 4/24/2010    Impotence 4/24/2010    Morbid obesity ([facility] [address] 5/17/2010    VALENTE (obstructive sleep apnea) 4/24/2010    CPAP    Restless legs 4/15/2015      Anticoagulation: Xarelto   VAN:   Positive   NIHSS:   1a-LOC:0    1b-Month/Age:2    1c-Open/Close Hand:2    2-Best Gaze:0    3-Visual Fields:0    4-Facial Palsy:0    5a-Left Arm:0    5b-Right Arm:4    6a-Left Leg:3    6b-Right Leg:3    7-Limb Ataxia:0    8-Sensory:2    9-Best Language:2    10-Dysarthria:1    11-Extinction/Inattention:0  TOTAL SCORE:19   Imaging:   CT-Infarct in left MCA territory with mass effect on left frontal horn . Scalp thickening on the right    CTA - Severe stenosis/ near occlusive thrombus of the left distal MCA M1. Large area of subacute infarct in the left MCA territory. CTP- Small area of reversible ishemia in the left temporal lobe anteriorly and inferiorly 30 cc mismatch volume  Repeat CTH-23:47 shows Left MCA territory left temporal lobe infarct is stable appearance  without interval hemorrhagic transformation. Right frontoparietal scalp  collection unchanged. Plan:   TNK Candidate: NO    Mechanical thrombectomy Candidate: NO  CVA workup/MRI/Close monitoring in ICU     *Perform dysphagia screening prior to any PO intake*    Discussed imaging and exam with: Dr. Radha Limon, ER attending, Dr. Aki Arredondo, Dr. Denia Martinez Teleneurology    Dr. Cintia Aponte reports there may be a partially occlusive thrombus in the distal M1, but it is questionable.  Nevertheless the patient is not a candidate for endovascular intervention due to the large area of completed infarct. Also, concern for hemorrhage  with mass effect on left frontal horn with heterogeneous density in those tissues. Arrival time: 09:52pm  Time spent: Greater than 60 minutes.      Tiffani Gayle NP  Neurocritical Care Nurse Practitioner  [phone]

## 2022-09-15 NOTE — CONSULTS
Neurology Consult  Audrey Forresterr, LAUREEN    Patient: Irish Lyman MRN: [id number]  SSN: [id number]    YOB: 1950  Age: 71 y.o. Sex: male      Chief Complaint: Fall from roof or ladder, right-sided weakness, aphasia    Subjective:      Irish Lyman is a 71 y.o. M with a past medical history of atrial fibrillation on Xarelto, VALENTE on CPAP, dyslipidemia, HTN, depression, morbid obesity, ED and restless legs who presented to the ER at [facility] on the evening of 09/14/22 via EMS with receptive and expressive aphasia, right-sided weakness and extensive traumatic bruising on his right side. A Code Stroke was called and a stat CT head was obtained which showed large acute left MCA territory infarct. Scalp thickening along the right. CTA head/neck was then obtained which showed severe stenosis/near occlusive thrombus of the left distal MCA M1. Large area of subacute infarct in the left MCA territory. CTP showed small area of reversible ishemia in the left temporal lobe anteriorly and inferiorly with 30 cc mismatch volume. Dr. Rosa Curry with Neurointerventional Surgery reviewed the images and discussed findings with Dr. Femi Santos with Teleneurology and myself. Dr. Rosa Curry felt the M1 occlusion was questionable but nevertheless the patient had a large area of completed infarct with mass effect on the left frontal horn and heterogeneous density in those tissues concerning for possible hemorrhage versus petechial hemorrhage. Repeat CTH was completed which showed no change and still no hemorrhage per Radiology. NIHSS was 20. Patient was NOT a candidate for TNK or endovascular intervention. No fractures noted on any of the imaging. Patient admitted to the ICU for close monitoring. Patient's sister and brother-in-law were at bedside and stated that the last time they spoke with the patient was the morning of the September 13th. They had tried to call him the evening of the 13th and he did not .  They had called back the evening of the  and still no answer. They called another one of his friends who went to [address]. [place] to check on him and found him down in his back yard beside the shed with a ladder and tools nearby. It was thought he may have fallen from roof or ladder due to extensive bruising to his right side. Past Medical History:   Diagnosis Date    Arrhythmia     atrial fib    Depression 2010    Diabetes ([facility] [address]     diet control for pre-diabetes    Dyslipidemia, goal LDL below 100 2011    Gout     HTN (hypertension) 2010    Impotence 2010    Morbid obesity ([address] 2010    VALENTE (obstructive sleep apnea) 2010    CPAP    Restless legs 4/15/2015     Family History   Problem Relation Age of Onset    Cancer Father         leukemia    Cancer Paternal Grandfather     Diabetes Sister     Diabetes Brother     Cancer Maternal Aunt     Cancer Maternal Uncle      Social History     Tobacco Use    Smoking status: Former     Packs/day: 0.50     Years: 5.00     Pack years: 2.50     Types: Cigarettes     Quit date: 1990     Years since quittin.3    Smokeless tobacco: Never   Substance Use Topics    Alcohol use: Yes     Alcohol/week: 1.7 standard drinks     Types: 2 Standard drinks or equivalent per week     Comment: 2-3 drinks per week      Prior to Admission Medications   Prescriptions Last Dose Informant Patient Reported? Taking?   allopurinoL (ZYLOPRIM) 100 mg tablet   No No   Sig: Take 1 Tablet by mouth daily. colchicine 0.6 mg tablet   No No   Sig: TAKE 1 TABLET BY MOUTH EVERY DAY   gabapentin (NEURONTIN) 300 mg capsule   No No   Sig: Take 3 Capsules by mouth nightly.  Max Daily Amount: 900 mg.   lisinopril-hydroCHLOROthiazide (PRINZIDE, ZESTORETIC) 20-25 mg per tablet   No No   Sig: TAKE 1 TABLET BY MOUTH EVERY DAY   rivaroxaban (Xarelto) 20 mg tab tablet   No No   Sig: TAKE 1 TABLET BY MOUTH IN  THE MORNING WITH BREAKFAST      Facility-Administered Medications: None       No Known Allergies    Review of Systems:  Review of systems not obtained due to patient factors. Aphasic      Objective:     Vitals:    09/14/22 2230   BP: (!) 144/93   Pulse: (!) 116   Resp: 24   Temp: 98.7 °F (37.1 °C)   SpO2: 97%   Weight: 104 kg (229 lb 4.5 oz)   Height: 6' 3\" (1.905 m)      Physical Exam:  GENERAL: Calm, NAD  SKIN: Warm, dry, color appropriate for ethnicity. Disheveled. Swelling noted to right scalp, bruising and abrasion to right elbow, severe ecchymosis to right chest, abdomen and right hip. Neurologic Exam:  Mental Status:  Alert and confused. Minimal command following       Language:    Mostly nonsensical speech. Occasionally answering appropriately    Cranial Nerves:   Pupils 2 mm, equal, round and reactive to light. Visual fields full to confrontation. Blinks to threat and did count fingers on left and right. Midline gaze. EOMI. Right facial droop noted on smile     Mild dysarthria. Motor:    No drift in left arm. Right arm flaccid with no antigravity and no spontaneous movements. Will not lift or hold up either leg antigravity but will bend left knee slightly. Minimal spontaneous movements in legs. No involuntary movements. Sensation:    Withdraws from noxious stimuli in left upper and bilateral lower extremities. Reflexes: Toes upgoing. Coordination & Gait: Unable to perform due to inability to follow commands.      Labs:  Lab Results   Component Value Date/Time    WBC 18.1 (H) 09/14/2022 10:10 PM    Hemoglobin (POC) 5.7 06/14/2011 03:00 PM    HGB 16.5 09/14/2022 10:10 PM    HCT 48.3 09/14/2022 10:10 PM    PLATELET 853 93/48/6299 10:10 PM    MCV 86.9 09/14/2022 10:10 PM      Lab Results   Component Value Date/Time    Sodium 134 (L) 09/14/2022 10:10 PM    Potassium 3.6 09/14/2022 10:10 PM    Chloride 105 09/14/2022 10:10 PM    CO2 20 (L) 09/14/2022 10:10 PM    Anion gap 9 09/14/2022 10:10 PM    Glucose 163 (H) 09/14/2022 10:10 PM    BUN 48 (H) 09/14/2022 10:10 PM    Creatinine 1.21 09/14/2022 10:10 PM    BUN/Creatinine ratio 40 (H) 09/14/2022 10:10 PM    GFR est AA >60 09/14/2022 10:10 PM    GFR est non-AA 59 (L) 09/14/2022 10:10 PM    Calcium 9.6 09/14/2022 10:10 PM     Lab Results   Component Value Date/Time    CK 3,573 (H) 09/14/2022 10:10 PM       Imaging:  CT Results (maximum last 3): Results from [place] encounter on 09/14/22    CT HEAD WO CONT    Narrative  EXAM: CT HEAD WO CONT    INDICATION: hemorrhagic conversion? COMPARISON: CT dose prior to this exam.    CONTRAST: None. TECHNIQUE: Unenhanced CT of the head was performed using 5 mm images. Brain and  bone windows were generated. Coronal and sagittal reformats. CT dose reduction  was achieved through use of a standardized protocol tailored for this  examination and automatic exposure control for dose modulation. FINDINGS:  Left anterolateral temporal lobe hypodensity with loss of gray-white  differentiation is redemonstrated with no evident interval hemorrhage or other  significant interval change with localized mass effect and effacement of the  anterior horn of the right lateral ventricle. The ventricles and sulci are  otherwise normal in size, shape and configuration. There is no significant white  matter disease. There is no new intracranial hemorrhage, extra-axial collection,  or mass effect. The basilar cisterns are open. The bone windows demonstrate no abnormalities. The visualized portions of the  paranasal sinuses and mastoid air cells are clear. There is a soft tissue  collection overlying the right frontoparietal skull, not significant change. Impression  Left MCA territory left temporal lobe infarct is stable appearance  without interval hemorrhagic transformation. Right frontoparietal scalp  collection unchanged.       CT CODE NEURO PERF W CBF    Narrative  Clinical history: stroke  INDICATION:   stroke    COMPARISON:  9/14/2022  CONTRAST: 100ml Isovue 370  TECHNIQUE:  CT dose reduction was achieved through use of a standardized protocol tailored  for this examination and automatic exposure control for dose modulation. Following the uneventful administration of Isovue-370 contrast material, axial  CT angiography of the head and neck was performed. Coronal and sagittal  reconstructions were obtained. 3D MAXIMAL INTENSITY PROJECTION reconstructed imaging of the cranial vasculature  in both the coronal and sagittal plane was performed. CTA perfusion imaging was also obtained. Reconstructions were created with color  coding of axial time to peak, axial blood volume, axial blood flow, axial mean  transit time and axial T Max. The study was analyzed by the Rasheeda Chan. Algorithm  FINDINGS:  There is loss of gray-white differentiation in the left MCA territory with  subacute infarct in the left temporal lobe. .  There is no significant  superimposed hemorrhage, midline shift or mass effect. No evidence of acute  intracranial hemorrhage. . The paranasal sinuses are clear. CTA NECK:  Common origin of the innominate and left common carotid arteries. Left vertebral  artery is dominant. There is atherosclerotic change the origin of the ICAs on  the right. There is mild aortic atherosclerotic change. There is no pulmonary  mass or nodule. There is  less than 20%stenosis in the right internal carotid artery utilizing  NASCET criteria. There is  less than 20%stenosis in the left internal carotid artery utilizing  NASCET criteria. CTA HEAD:    Short segment occlusion/near occlusive thrombus of the distal left MCA M1. The left vertebral artery is dominant. Right vertebral artery likely terminates  at. Severe multifocal stenoses of the right vertebral artery. There is a  diminutive basilar artery. The basilar artery and its branches are normal. M1 M2  junction occlusion/near occlusion on the left is best appreciated on sagittal  images. Mild multifocal M2 segment stenoses. Kaykay Puentes Patient was positioned with the  right hypoplastic. There is bilateral persistent fetal circulation to the  posterior cerebral arteries. .  CT PERFUSION:    Area of reversible ischemia is suggested in the left temporal lobe. 30 mL of  perfusion/left lobe mismatch. .  R CBF<30% :5  Tmax >6s: 35 mL    Impression  Severe stenosis/near occlusive thrombus of the distal left MCA M1. Large area of  subacute infarction in the left MCA territory. Small area of reversible ischemia  in the left temporal lobe anteriorly and inferiorly measures approximately 30  mL. There is no superimposed hemorrhage, midline shift or mass effect  There is no  aneurysm or dissection identified. The findings were called to Dr. Blanca Pollack on 9/14/2022 at 10:48 PM by Dr. Shaquille Ford. [phone]      . CTA CODE NEURO HEAD AND NECK W CONT    Narrative  Clinical history: stroke  INDICATION:   stroke    COMPARISON:  9/14/2022  CONTRAST: 100ml Isovue 370  TECHNIQUE:  CT dose reduction was achieved through use of a standardized protocol tailored  for this examination and automatic exposure control for dose modulation. Following the uneventful administration of Isovue-370 contrast material, axial  CT angiography of the head and neck was performed. Coronal and sagittal  reconstructions were obtained. 3D MAXIMAL INTENSITY PROJECTION reconstructed imaging of the cranial vasculature  in both the coronal and sagittal plane was performed. CTA perfusion imaging was also obtained. Reconstructions were created with color  coding of axial time to peak, axial blood volume, axial blood flow, axial mean  transit time and axial T Max. The study was analyzed by the Rasheeda Chan. Algorithm  FINDINGS:  There is loss of gray-white differentiation in the left MCA territory with  subacute infarct in the left temporal lobe. .  There is no significant  superimposed hemorrhage, midline shift or mass effect. No evidence of acute  intracranial hemorrhage. . The paranasal sinuses are clear.  CTA NECK:  Common origin of the innominate and left common carotid arteries. Left vertebral  artery is dominant. There is atherosclerotic change the origin of the ICAs on  the right. There is mild aortic atherosclerotic change. There is no pulmonary  mass or nodule. There is  less than 20%stenosis in the right internal carotid artery utilizing  NASCET criteria. There is  less than 20%stenosis in the left internal carotid artery utilizing  NASCET criteria. CTA HEAD:    Short segment occlusion/near occlusive thrombus of the distal left MCA M1. The left vertebral artery is dominant. Right vertebral artery likely terminates  at. Severe multifocal stenoses of the right vertebral artery. There is a  diminutive basilar artery. The basilar artery and its branches are normal. M1 M2  junction occlusion/near occlusion on the left is best appreciated on sagittal  images. Mild multifocal M2 segment stenoses. . Patient was positioned with the  right hypoplastic. There is bilateral persistent fetal circulation to the  posterior cerebral arteries. .  CT PERFUSION:    Area of reversible ischemia is suggested in the left temporal lobe. 30 mL of  perfusion/left lobe mismatch. .  R CBF<30% :5  Tmax >6s: 35 mL    Impression  Severe stenosis/near occlusive thrombus of the distal left MCA M1. Large area of  subacute infarction in the left MCA territory. Small area of reversible ischemia  in the left temporal lobe anteriorly and inferiorly measures approximately 30  mL. There is no superimposed hemorrhage, midline shift or mass effect  There is no  aneurysm or dissection identified. The findings were called to Dr. Imelda Urrutia on 9/14/2022 at 10:48 PM by Dr. Christian Sahu. [phone]      . Assessment:     Hospital Problems  Date Reviewed: 7/28/2022            Codes Class Noted POA    Ischemic cerebrovascular accident (CVA) [facility]) ICD-10-CM: I63.9  ICD-9-CM: 434.91  9/14/2022 Unknown         Plan:   1.) Acute ischemic CVA.  Large left MCA territory infarct. Not a candidate for TNK or endovascular intervention. Patient with history of atrial fibrillation on Xalrelto. Patient with traumatic fall and multiple areas of ecchymosis but no fractures seen. - Hold ASA/anticoagulants for now. - NIHSS of 19 on exam              - q1 neuro checks              - A1C and lipid panel pending, LDL goal <70              - MRI brain ordered              - ECHO ordered              - PT/OT/SLP evals              - Stroke education              - SBP goal 140-180, allow permissive HTN in the setting of acute CVA    I have discussed the diagnosis and the intended plan as seen in the above orders with Intensivist team, Dr. Valentina Vallejo, Dr. Clyda Frankel, Dr. Cj Mabry, the patient and the primary RN. Patient's sister and brother-in-law updated on current plan of care and are in agreement. All questions were answered. Thank you for this consult and participating in the care of this patient. Signed By: Sarah Green NP     September 15, 2022       Neurology Staff Addendum:  I have personally seen and examined the patient. I have personally reviewed the chart and images. Elements of my examination included history of present illness, review of systems, review of past medical and surgical history, review of medications, and physical and neurological examination. I have personally reviewed the findings and impressions with the nurse practitioner and am in agreement with their note with changes below. Pt is a 78yo ambidextrous male with HTN, HLD, VALENTE on CPAP, obesity, former smoker, h/o Afib, not taking his Xarelto, who was admitted 9/14/22 after being found down in his yard by a friend who went to check on him because his family could not reach him since the morning of 9/13/22. Patient was noted to have aphasia and right-sided weakness with extensive bruising on his right side.  CTH with large left MCA infarct, CTA H/N occlusion or near occlusive thrombus in the distal left M1, severe multifocal stenoses of right vertebral artery, diminutive basilar artery, mild multifocal M2 stenosis. CTP with only a small area of reversible ischemia in the left temporal lobe. MRI brain with large left MCA infarct with hemorrhagic conversion (NOT PETECHIAL hemorrhage.)  It was unclear if the patient had fallen off of a ladder, CTCS was negative, CT C/A/P unremarkable. Hemoglobin A1c 5.8, LDL 65. Patient is able to answer some questions appropriately, and reports not taking his Xarelto, denies falling off a ladder, denies smoking, alcohol, or drugs. Exam -BMI 35, alert, appropriate, speech-no dysarthria, language-patient uses nonsensical words, occasionally gets out the correct word is able to answer with one-word typically, able to follow commands, right facial weakness, no ptosis, EOMI, pupils equally round and reactive tongue midline palate elevated symmetrically, sensation intact, right upper extremity flaccid with minimal movement in the right hand, 5/5 HF, minimal PF, left arm and leg 5/5, intact to PP throughout, FTN intact, reflexes sym toes down on left, up on right with ?TFR. Pt is a 78yo male with HTN, HLD, VALENTE on CPAP, h/o smoking, and Afib, non-complaint with Xarelto (though would try to verify this again given his aphasia), presenting with aphasia and right F/A/L weakness, found to have a large left MCA infarct with hemorrhagic conversion, and occluding thrombus in left M1 with other areas of IC stenoses. Presumed cardioembolic event, cannot fully exclude vessel to vessel thrombus due to stenosis.    -Echo with bubble study pending  -EKG pending  -Hold APT/OAC due to large stroke and hemorrhagic conversion, for 2 weeks  -LDL is at goal <70  -Treatment of pre-diabetes per primary team   -PT/OT/ST/Rehab      The duration of this visit was 55 minutes spent on interview, examination, review of records/labs/imaging, counseling, explanation of diagnosis, planning of further management, documentation and coordination of care.     Shantal Eduardo MD  [facility]

## 2022-09-15 NOTE — PROGRESS NOTES
Physician Progress Note      PATIENTLazarus Points  CSN #:                  [id number]  :                       1950  ADMIT DATE:       2022 9:48 PM  [address] [place] DATE:  RESPONDING  PROVIDER #:        Zeyad Gibbons MD          QUERY TEXT:    Dear Attending,    Pt admitted with CVA. Pt noted to have MRI finding or documentation of slight mass effect. If clinically significant, please document in progress notes and discharge summary if you are evaluating/treating any of the following: The medical record reflects the following:  Risk Factors: CVA, pmhx A-Fib on Xarelto,  HTN, and morbid obesity  Clinical Indicators: per MRI \"There is associated susceptibility artifact predominantly within the basal  ganglia and corona radiata portions of the infarction, with slight mass effect. \"  Treatment: Neurology consulted, Hold ASA/anticoagulants, q1 neuro checks,  MRI , ECHO, Stroke education, allow permissive HTN in the setting of acute CVA, PT/OT    Please call if you have any questions or need assistance. I can also be reached via [employer]. Thank you,  Remington Gomez, [facility]  O: [phone]  Options provided:  -- Cerebral edema  -- Brain compression  -- Cerebral edema and Brain compression  -- Traumatic brain compression  -- Other - I will add my own diagnosis  -- Disagree - Not applicable / Not valid  -- Disagree - Clinically unable to determine / Unknown  -- Refer to Clinical Documentation Reviewer    PROVIDER RESPONSE TEXT:    This patient has cerebral edema and brain compression. Query created by:  Slick Tai on 9/15/2022 3:44 PM      Electronically signed by:  Zeyad Gibbons MD 9/15/2022 6:40 PM

## 2022-09-15 NOTE — PROGRESS NOTES
Reviewed chart for transitions of care, and discussed in rounds. CM spoke with donte Nguyen  to explain role and offer support. Patient admitted with CVA and dysphagia. Confirmed demographics. Baseline:   ADLs/IDALS:Independent  Previous Home Health:Unsure  Previous SNF/IPR:None  ER Contact:     Patient lives in a single  level house with 3 steps to enter. Patient is independent with ADLs/IDALs   [facility] [address]. Patient has no equipment needs, son is unsure about HH, patient had a knee replacement years ago. Transportation BLS  Reason for Admission:  CVA                     RUR Score:  14%                Plan for utilizing home health:    No      PCP: First and Last name:  Klaudia Nguyen MD     Name of Practice:    Are you a current patient: Yes/No: Yes   Approximate date of last visit: 06/22   Can you participate in a virtual visit with your PCP: Unsure                    Current Advanced Directive/Advance Care Plan: Full Code      Healthcare Decision Maker:   Click here to complete [address] including selection of the [address] Relationship (ie \"Primary\")         Donte Nguyen [phone]                    Transition of Care Plan:            Care manager spoke with patient's son [place] to introduce self and explain role. Per [place] he is flying in from [place] and will be in [facility] [address] [facility]. Per [place] patient lives independently in his own home and drives himself. CM will meet with patient's son tomorrow to discuss transitions of care as therapy is recommending inpatient rehab. Confirmed demographic information. Lori Shankar RN,Care Management  Care Management Interventions  PCP Verified by CM:  Yes (Dr Jolanta Diaz )  Last Visit to PCP: 06/14/22  MyChart Signup: Yes  Discharge Durable Medical Equipment: No  Physical Therapy Consult: Yes  Occupational Therapy Consult: Yes  Speech Therapy Consult: Yes  Support Systems: Child(alexandru) (Son [place] [phone])

## 2022-09-15 NOTE — PROGRESS NOTES
[address] [facility] Adult  Hospitalist Group  History and Physical    Date of Service:  9/15/2022  Primary Care Provider: Lilliana Flowers MD  Source of information: Chart review    Chief Complaint: Altered mental status and Trauma (Pt brought to ed via ems after having been found on ground by a neighbor. Unknown amount of downtime. Upon arrival pt is altered, unable to communicate, speech is slurred. Massive bruising to r chest and r hip. Ems reports he was found down near his shed and \"maybe he feel off\" pt is currently flaccid on the r side)      History of Presenting Illness:   Christal Mcclain is a 71 y.o. male who presents with with code stroke. Patient was found down in his yard when the friend checked on him as his family cannot reach him on the morning of 9/13/2022. Patient presented with aphasia and right-sided weakness. Code stroke was called in the ER, initial work-up with CT head shows large acute left MCA territory infarct, CTA of the head and neck shows severe stenosis, near occlusive thrombus of the distal left MCA M1. This was followed by MRI of the brain which shows moderate to large acute left MCA territory infarct with findings associated with petechial hemorrhage. Patient with known history of atrial fibrillation. Neuro evaluating the patient. Patient was initially admitted to ICU and today patient was transferred out of ICU to assume care under the hospitalist service. The patient denies any headache, blurry vision, sore throat, trouble swallowing, trouble with speech, chest pain, SOB, cough, fever, chills, N/V/D, abd pain, urinary symptoms, constipation, recent travels, sick contacts, focal or generalized neurological symptoms, falls, injuries, rashes, contact with COVID-19 diagnosed patients, hematemesis, melena, hemoptysis, hematuria, rashes, denies starting any new medications and denies any other concerns or problems besides as mentioned above.          REVIEW OF SYSTEMS:  Review of system cannot be obtained due to aphasia. Past Medical History:   Diagnosis Date    Arrhythmia     atrial fib    Depression 4/24/2010    Diabetes ([facility] [address]     diet control for pre-diabetes    Dyslipidemia, goal LDL below 100 6/14/2011    Gout     HTN (hypertension) 4/24/2010    Impotence 4/24/2010    Morbid obesity ([facility] [address] 5/17/2010    VALENTE (obstructive sleep apnea) 4/24/2010    CPAP    Restless legs 4/15/2015      Past Surgical History:   Procedure Laterality Date    COLONOSCOPY N/A 6/27/2017    COLONOSCOPY performed by Micaela Peoples MD at [facility], [address], DIAGNOSTIC  2007    HX APPENDECTOMY      HX ORTHOPAEDIC  2000    bilat TKR     HX ORTHOPAEDIC  2010    left THR    HX UROLOGICAL  03/2018    urolift    HX UROLOGICAL  03/2019    prosthesis     Prior to Admission medications    Medication Sig Start Date End Date Taking? Authorizing Provider   lisinopril-hydroCHLOROthiazide (PRINZIDE, ZESTORETIC) 20-25 mg per tablet TAKE 1 TABLET BY MOUTH EVERY DAY 8/1/22   Saeid Macias MD   colchicine 0.6 mg tablet TAKE 1 TABLET BY MOUTH EVERY DAY 7/12/22   Blank Clay MD   gabapentin (NEURONTIN) 300 mg capsule Take 3 Capsules by mouth nightly. Max Daily Amount: 900 mg. 6/14/22   Blank Clay MD   allopurinoL (ZYLOPRIM) 100 mg tablet Take 1 Tablet by mouth daily. 4/26/22   Blank Clay MD   rivaroxaban (Xarelto) 20 mg tab tablet TAKE 1 TABLET BY MOUTH IN  THE MORNING WITH BREAKFAST 1/6/22   Saeid Macias MD     No Known Allergies   Family History   Problem Relation Age of Onset    Cancer Father         leukemia    Cancer Paternal Grandfather     Diabetes Sister     Diabetes Brother     Cancer Maternal Aunt     Cancer Maternal Uncle       Social History:  reports that he quit smoking about 32 years ago. His smoking use included cigarettes. He has a 2.50 pack-year smoking history.  He has never used smokeless tobacco. He reports current alcohol use of about 1.7 standard drinks per week. He reports that he does not use drugs. Family and social history were personally reviewed, all pertinent and relevant details are outlined as above. Objective:   Visit Vitals  BP (!) 146/81   Pulse (!) 103   Temp 98.4 °F (36.9 °C)   Resp 23   Ht 6' 3\" (1.905 m)   Wt 128.4 kg (283 lb)   SpO2 97%   BMI 35.37 kg/m²    O2 Flow Rate (L/min): 2 l/min O2 Device: None (Room air)    PHYSICAL EXAM:   General: Alert x oriented x 3, awake, no acute distress, resting in bed, pleasant male, appears to be stated age  HEENT: PEERL, EOMI, moist mucus membranes  Neck: Supple, no JVD, no meningeal signs  Chest: Clear to auscultation bilaterally   CVS: RRR, S1 S2 heard, no murmurs/rubs/gallops  Abd: Soft, non-tender, non-distended, +bowel sounds   Ext: No clubbing, no cyanosis, no edema  Neuro/Psych: Aphasic with right-sided weakness  Cap refill: Brisk, less than 3 seconds  Pulses: 2+, symmetric in all extremities  Skin: Warm, dry, without rashes or lesions    Data Review: All diagnostic labs and studies have been reviewed. Abnormal Labs Reviewed   LACTIC ACID - Abnormal; Notable for the following components:       Result Value    Lactic acid 2.3 (*)     All other components within normal limits   CBC WITH AUTOMATED DIFF - Abnormal; Notable for the following components:    WBC 18.1 (*)     NEUTROPHILS 83 (*)     LYMPHOCYTES 7 (*)     ABS.  NEUTROPHILS 15.9 (*)     All other components within normal limits   METABOLIC PANEL, COMPREHENSIVE - Abnormal; Notable for the following components:    Sodium 134 (*)     CO2 20 (*)     Glucose 163 (*)     BUN 48 (*)     BUN/Creatinine ratio 40 (*)     GFR est non-AA 59 (*)     Bilirubin, total 2.5 (*)     AST (SGOT) 110 (*)     Protein, total 8.4 (*)     Globulin 4.6 (*)     A-G Ratio 0.8 (*)     All other components within normal limits   PROTHROMBIN TIME + INR - Abnormal; Notable for the following components:    Prothrombin time 11.9 (*)     All other components within normal limits   TROPONIN-HIGH SENSITIVITY - Abnormal; Notable for the following components:    Troponin-High Sensitivity 198 (*)     All other components within normal limits   CK - Abnormal; Notable for the following components:    CK 3,573 (*)     All other components within normal limits   HEMOGLOBIN A1C WITH EAG - Abnormal; Notable for the following components:    Hemoglobin A1c 5.8 (*)     All other components within normal limits   CBC W/O DIFF - Abnormal; Notable for the following components:    WBC 16.0 (*)     All other components within normal limits   METABOLIC PANEL, BASIC - Abnormal; Notable for the following components:    Potassium 3.3 (*)     Glucose 166 (*)     BUN 46 (*)     BUN/Creatinine ratio 46 (*)     All other components within normal limits   TROPONIN-HIGH SENSITIVITY - Abnormal; Notable for the following components:    Troponin-High Sensitivity 173 (*)     All other components within normal limits   CK - Abnormal; Notable for the following components:    CK 2,799 (*)     All other components within normal limits   CK - Abnormal; Notable for the following components:    CK 1,790 (*)     All other components within normal limits   CK - Abnormal; Notable for the following components:    CK 1,396 (*)     All other components within normal limits   GLUCOSE, POC - Abnormal; Notable for the following components:    Glucose (POC) 162 (*)     All other components within normal limits   BLOOD GAS,CHEM8,LACTIC ACID POC - Abnormal; Notable for the following components:    Glucose,  (*)     pH, venous (POC) 7.44 (*)     pCO2, venous (POC) 32.5 (*)     pO2, venous (POC) 55 (*)     All other components within normal limits   GLUCOSE, POC - Abnormal; Notable for the following components:    Glucose (POC) 160 (*)     All other components within normal limits   GLUCOSE, POC - Abnormal; Notable for the following components:    Glucose (POC) 137 (*)     All other components within normal limits   GLUCOSE, POC - Abnormal; Notable for the following components:    Glucose (POC) 137 (*)     All other components within normal limits       All Micro Results       None            IMAGING:   XR ELBOW RT AP/LAT   Final Result   No fracture. Soft tissue swelling. MRI BRAIN WO CONT   Final Result   Moderate to large acute left MCA territory infarction, with associated   susceptibility artifact predominantly in the basal ganglia/corona radiata   consistent with blood product/petechial hemorrhage. Overall appearance unchanged   compared to recent CT, with no hydrocephalus or midline shift. CT HEAD WO CONT   Final Result   Left MCA territory left temporal lobe infarct is stable appearance   without interval hemorrhagic transformation. Right frontoparietal scalp   collection unchanged. XR CHEST PORT   Final Result   No acute findings. Hypoinflation. CT CHEST W CONT   Final Result      1. Minimal subcutaneous edema right chest wall. No acute abnormality of the   chest abdomen or pelvis         CT ABD PELV W CONT   Final Result      1. Minimal subcutaneous edema right chest wall. No acute abnormality of the   chest abdomen or pelvis         CTA CODE NEURO HEAD AND NECK W CONT   Final Result   Severe stenosis/near occlusive thrombus of the distal left MCA M1. Large area of   subacute infarction in the left MCA territory. Small area of reversible ischemia   in the left temporal lobe anteriorly and inferiorly measures approximately 30   mL. There is no superimposed hemorrhage, midline shift or mass effect   There is no  aneurysm or dissection identified. The findings were called to Dr. Akiko Corral on 9/14/2022 at 10:48 PM by Dr. Funmilayo Angeles. [phone]         . CT CODE NEURO PERF W CBF   Final Result   Severe stenosis/near occlusive thrombus of the distal left MCA M1. Large area of   subacute infarction in the left MCA territory.  Small area of reversible ischemia   in the left temporal lobe anteriorly and inferiorly measures approximately 30   mL. There is no superimposed hemorrhage, midline shift or mass effect   There is no  aneurysm or dissection identified. The findings were called to Dr. Eileen Arnold on 9/14/2022 at 10:48 PM by Dr. Allan Landry. [phone]         . CT SPINE CERV WO CONT   Final Result   No acute fracture or subluxation. CT CODE NEURO HEAD WO CONTRAST   Final Result   1. Large acute left MCA territory infarct   2. Scalp thickening along the right. An acute intracranial hemorrhage is not   identified                 ECG/ECHO:    Results for orders placed or performed during the hospital encounter of 08/17/17   EKG, 12 LEAD, INITIAL   Result Value Ref Range    Ventricular Rate 73 BPM    Atrial Rate 326 BPM    QRS Duration 82 ms    Q-T Interval 372 ms    QTC Calculation (Bezet) 409 ms    Calculated R Axis 29 degrees    Calculated T Axis -17 degrees    Diagnosis       Atrial fibrillation  When compared with ECG of 12-JAN-2005 10:57,  Previous ECG has undetermined rhythm, needs review  Inverted T waves have replaced nonspecific T wave abnormality in Inferior   leads  Confirmed by Sissy Horner MD, Alexa Hutchinson ([id number]) on 8/17/2017 4:38:10 PM          Assessment:   Given the patient's current clinical presentation, there is a high level of concern for decompensation if discharged from the emergency department. Complex decision making was performed, which includes reviewing the patient's available past medical records, laboratory results, and imaging studies.     Active Problems:    Ischemic cerebrovascular accident (CVA) ([address] (9/14/2022)      Plan:     Acute CVA  -CT head shows large acute left MCA territory infarct, CTA of the head and neck shows severe stenosis, near occlusive thrombus of the distal left MCA M1  -MRI of the brain which shows moderate to large acute left MCA territory infarct with findings associated with petechial hemorrhage  -Echo with bubble study shows EF of 50 to 55%, bubble study is not adequate to rule out shunt  -Neurology following, recommending holding antiplatelet and anticoagulation due to large CVA with hemorrhagic conversion  -Patient continuing on statin, PT OT to evaluate    Paroxysmal atrial fibrillation  -Patient noncompliant with anticoagulation per records, continue metoprolol  -Currently holding anticoagulation due to large CVA with hemorrhagic conversion  -Continue monitor on telemetry    Rhabdomyolysis  -This is likely due to prolonged immobilization after his CVA  -Continue IV fluid, CK trending down    Elevated troponin  -Consult cardiology in a.m., less likely ACS    Prerenal azotemia  -Continue IV fluid and monitor    Dyslipidemia  -Continue statin, LDL at goal      DIET: DIET NPO   ISOLATION PRECAUTIONS: There are currently no Active Isolations  CODE STATUS: Full Code   DVT PROPHYLAXIS: SCDs  FUNCTIONAL STATUS PRIOR TO HOSPITALIZATION: Fully active and ambulatory; able to carry on all self-care without restriction. Ambulatory status/function: By self   EARLY MOBILITY ASSESSMENT: Recommend an assessment from physical therapy and/or occupational therapy  ANTICIPATED DISCHARGE: 24-48 hours. ANTICIPATED DISPOSITION: IPR  EMERGENCY CONTACT/SURROGATE DECISION MAKER:     CRITICAL CARE WAS PERFORMED FOR THIS ENCOUNTER: NO.      Signed By: Dixie Dove MD     September 15, 2022         Please note that this dictation may have been completed with Dragon, the computer voice recognition software. Quite often unanticipated grammatical, syntax, homophones, and other interpretive errors are inadvertently transcribed by the computer software. Please disregard these errors. Please excuse any errors that have escaped final proofreading.

## 2022-09-15 NOTE — WOUND CARE
Wound Care Note:     New consult placed by nurse request for skin wounds, prolonged downtime    Chart shows:  Admitted for ischemic cerebrovascular accident  Past Medical History:   Diagnosis Date    Arrhythmia     atrial fib    Depression 4/24/2010    Diabetes ([facility] [address]     diet control for pre-diabetes    Dyslipidemia, goal LDL below 100 6/14/2011    Gout     HTN (hypertension) 4/24/2010    Impotence 4/24/2010    Morbid obesity ([facility] [address] 5/17/2010    VALENTE (obstructive sleep apnea) 4/24/2010    CPAP    Restless legs 4/15/2015     WBC = 16.0 on 9/15/22  Admitted from home    Assessment:   Patient is alert, speech is difficult to understand, occasionally understand a word, incontinent with moderate assistance needed in repositioning. Bed: In Touch [place]  Patient has a condom catheter in place. Diet: NPO   Patient reported no pain. Left heels and left buttocks skin intact and without erythema. 1. POA patient with extensive ecchymosis and abrasions to right side of body and posterior head. Posterior Head 7 cm x 12 cm, white base, small serous drainage  Right flank/hip 42 cm x 38 cm ecchymosis, small serous drainage in fold  Right trochanter 12 cm x 32 cm, small abrasion with light hyperpigmentation  Right lateral lower leg 28 cm x  14 cm, abrasion, superficial crusting  Right lateral ankle/foot 14 cm x 14 cm, abrasion, superficial crusting  Right elbow/lower arm 19 cm x 11 cm, abrasion, superficial crusting, small serous  Right back 33 cm x 38 cm, abrasion and hyperpigmentation, erythema  Right foot abrasions to all toes, 2nd and 5th toes with greater abrasions, crusted  Right heel 2 cm x 1.5 cm, slightly blanchable erythema, distal end open    It appears most of these injuries resulted from a fall, normally takes 3 days for DTI's to evolve.   Believe these will also have some pressure component as the left side has no injury and length of time down is unknown. Will apply Venelex ointment to all areas and continue to monitor. Spoke with Dr. Thien Crenshaw, wound care orders obtained. Patient repositioned supine side. Heels offloaded on pillows. Recommendations:    Turn from left to back only. Posterior head, right back, right flank, right hip, right lateral lower leg, right ankle/foot, right toes, bilateral heels and sacrum- Every 12 hours liberally apply Venelex ointment. Skin Care & Pressure Prevention:  Minimize layers of linen/pads under patient to optimize support surface. Turn/reposition approximately every 2 hours and offload heels.   Manage incontinence / promote continence   Nourishing Skin Cream to dry skin, minimize use of briefs when able    Discussed above plan with patient & Shaista Rendon RN    Transition of Care: Plan to follow as needed while admitted to hospital.    LIZ UriosteguiN, RN, [facility]  Certified Wound and Ostomy Nurse  office [phone]  Best way to contact me is through [address] [place] [address]

## 2022-09-16 LAB
BASOPHILS # BLD: 0 K/UL (ref 0–0.1)
BASOPHILS NFR BLD: 0 % (ref 0–1)
CK SERPL-CCNC: 785 U/L (ref 39–308)
CK SERPL-CCNC: 865 U/L (ref 39–308)
DIFFERENTIAL METHOD BLD: ABNORMAL
EOSINOPHIL # BLD: 0 K/UL (ref 0–0.4)
EOSINOPHIL NFR BLD: 0 % (ref 0–7)
ERYTHROCYTE [DISTWIDTH] IN BLOOD BY AUTOMATED COUNT: 14 % (ref 11.5–14.5)
GLUCOSE BLD STRIP.AUTO-MCNC: 124 MG/DL (ref 65–117)
GLUCOSE BLD STRIP.AUTO-MCNC: 128 MG/DL (ref 65–117)
GLUCOSE BLD STRIP.AUTO-MCNC: 134 MG/DL (ref 65–117)
GLUCOSE BLD STRIP.AUTO-MCNC: 151 MG/DL (ref 65–117)
HCT VFR BLD AUTO: 40.6 % (ref 36.6–50.3)
HGB BLD-MCNC: 13.3 G/DL (ref 12.1–17)
IMM GRANULOCYTES # BLD AUTO: 0 K/UL (ref 0–0.04)
IMM GRANULOCYTES NFR BLD AUTO: 0 % (ref 0–0.5)
LYMPHOCYTES # BLD: 1.3 K/UL (ref 0.8–3.5)
LYMPHOCYTES NFR BLD: 11 % (ref 12–49)
MAGNESIUM SERPL-MCNC: 2.4 MG/DL (ref 1.6–2.4)
MCH RBC QN AUTO: 29.2 PG (ref 26–34)
MCHC RBC AUTO-ENTMCNC: 32.8 G/DL (ref 30–36.5)
MCV RBC AUTO: 89 FL (ref 80–99)
MONOCYTES # BLD: 1.3 K/UL (ref 0–1)
MONOCYTES NFR BLD: 11 % (ref 5–13)
NEUTS SEG # BLD: 9.3 K/UL (ref 1.8–8)
NEUTS SEG NFR BLD: 78 % (ref 32–75)
NRBC # BLD: 0 K/UL (ref 0–0.01)
NRBC BLD-RTO: 0 PER 100 WBC
PLATELET # BLD AUTO: 231 K/UL (ref 150–400)
RBC # BLD AUTO: 4.56 M/UL (ref 4.1–5.7)
RBC MORPH BLD: ABNORMAL
SERVICE CMNT-IMP: ABNORMAL
WBC # BLD AUTO: 11.9 K/UL (ref 4.1–11.1)

## 2022-09-16 PROCEDURE — 83735 ASSAY OF MAGNESIUM: CPT

## 2022-09-16 PROCEDURE — 74011636637 HC RX REV CODE- 636/637: Performed by: ANESTHESIOLOGY

## 2022-09-16 PROCEDURE — 97530 THERAPEUTIC ACTIVITIES: CPT

## 2022-09-16 PROCEDURE — 97112 NEUROMUSCULAR REEDUCATION: CPT

## 2022-09-16 PROCEDURE — 92526 ORAL FUNCTION THERAPY: CPT | Performed by: SPEECH-LANGUAGE PATHOLOGIST

## 2022-09-16 PROCEDURE — 74011250637 HC RX REV CODE- 250/637: Performed by: ANESTHESIOLOGY

## 2022-09-16 PROCEDURE — 99233 SBSQ HOSP IP/OBS HIGH 50: CPT | Performed by: PSYCHIATRY & NEUROLOGY

## 2022-09-16 PROCEDURE — 85025 COMPLETE CBC W/AUTO DIFF WBC: CPT

## 2022-09-16 PROCEDURE — 36415 COLL VENOUS BLD VENIPUNCTURE: CPT

## 2022-09-16 PROCEDURE — 74011250636 HC RX REV CODE- 250/636: Performed by: FAMILY MEDICINE

## 2022-09-16 PROCEDURE — 74011000250 HC RX REV CODE- 250: Performed by: NURSE PRACTITIONER

## 2022-09-16 PROCEDURE — 97535 SELF CARE MNGMENT TRAINING: CPT

## 2022-09-16 PROCEDURE — 82962 GLUCOSE BLOOD TEST: CPT

## 2022-09-16 PROCEDURE — 65660000001 HC RM ICU INTERMED STEPDOWN

## 2022-09-16 PROCEDURE — 74011250636 HC RX REV CODE- 250/636: Performed by: NURSE PRACTITIONER

## 2022-09-16 PROCEDURE — 82550 ASSAY OF CK (CPK): CPT

## 2022-09-16 PROCEDURE — 92612 ENDOSCOPY SWALLOW (FEES) VID: CPT | Performed by: SPEECH-LANGUAGE PATHOLOGIST

## 2022-09-16 PROCEDURE — 74011250637 HC RX REV CODE- 250/637: Performed by: NURSE PRACTITIONER

## 2022-09-16 PROCEDURE — 92523 SPEECH SOUND LANG COMPREHEN: CPT

## 2022-09-16 RX ORDER — SODIUM CHLORIDE 9 MG/ML
75 INJECTION, SOLUTION INTRAVENOUS CONTINUOUS
Status: DISCONTINUED | OUTPATIENT
Start: 2022-09-16 | End: 2022-09-18

## 2022-09-16 RX ADMIN — FAMOTIDINE 20 MG: 10 INJECTION INTRAVENOUS at 10:03

## 2022-09-16 RX ADMIN — FAMOTIDINE 20 MG: 10 INJECTION INTRAVENOUS at 21:54

## 2022-09-16 RX ADMIN — METOPROLOL TARTRATE 12.5 MG: 25 TABLET, FILM COATED ORAL at 09:14

## 2022-09-16 RX ADMIN — Medication 2 UNITS: at 18:00

## 2022-09-16 RX ADMIN — Medication: at 17:29

## 2022-09-16 RX ADMIN — SODIUM CHLORIDE 75 ML/HR: 9 INJECTION, SOLUTION INTRAVENOUS at 14:38

## 2022-09-16 RX ADMIN — ATORVASTATIN CALCIUM 80 MG: 40 TABLET, FILM COATED ORAL at 21:54

## 2022-09-16 RX ADMIN — METOPROLOL TARTRATE 12.5 MG: 25 TABLET, FILM COATED ORAL at 17:29

## 2022-09-16 RX ADMIN — Medication: at 10:04

## 2022-09-16 NOTE — PROGRESS NOTES
Transition of Care Plan  RUR- Med 15%  DISPOSITION: IPR - [facility] - insurance auth to be started Monday. F/U with PCP/Specialist    Transport: [facility]    Emergency contact: Son Jarad Myers [phone]    CECILIA barriers to discharge: insurance auth    CM spoke with patient's son, Paul Danielson, regarding IPR choice - he will be here at noon today. CM plan to meet with patient's son at bedside to discuss choice. Transition of Care Plan: CM met with patient's son, Paul Danielson and sister, Sasha Molina at bedside to discuss IPR choice. They prefer [facility], referral sent in [address] [place] [address],[facility]. The Plan for Transition of Care is related to the following treatment goals: IPR    The Patient and/or patient representative Jarad Myers was provided with a choice of provider and agrees  with the discharge plan. Yes [x] No []    A Freedom of choice list was provided with basic dialogue that supports the patient's individualized plan of care/goals and shares the quality data associated with the providers. Yes [x] No []    3:54pm: [facility] is able to accept patient once off restraints and medically stable. They will likely start auth on Monday. Gordon Forde, M.S.W.

## 2022-09-16 NOTE — PROGRESS NOTES
[address] [facility] Adult  Hospitalist Group                                                                                          Hospitalist Progress Note  Vincent Alejandro MD  Answering service: [phone] OR 36 from in house phone        Date of Service:  2022  NAME:  Harper Walsh  :  1950  MRN:  [id number]      Admission Summary:     Patient presents with acute CVA, CT head and follow-up MRI shows large acute left MCA territory infarct. Patient with some expressive aphasia and right-sided weakness. Neuro following. Initially was admitted to ICU and transfer out of ICU 9/15/2022. Interval history / Subjective:     Patient has no acute complaint. Not much change from yesterday.      Assessment & Plan:     Acute CVA  -CT head shows large acute left MCA territory infarct, CTA of the head and neck shows severe stenosis, near occlusive thrombus of the distal left MCA M1  -MRI of the brain which shows moderate to large acute left MCA territory infarct with findings associated with petechial hemorrhage  -Echo with bubble study shows EF of 50 to 55%, bubble study is not adequate to rule out shunt  -Neurology following, recommending holding antiplatelet and anticoagulation due to large CVA with hemorrhagic conversion  -Patient continuing on statin, PT OT to evaluate     Paroxysmal atrial fibrillation with RVR  -Patient noncompliant with anticoagulation per records, continue metoprolol  -Currently holding anticoagulation due to large CVA with hemorrhagic conversion  -Continue monitor on telemetry, cardiology to evaluate     Rhabdomyolysis  -This is likely due to prolonged immobilization after his CVA  -Continue IV fluid, CK trending down     Elevated troponin  -Consult cardiology, less likely ACS     Prerenal azotemia  -Continue IV fluid and monitor     Dyslipidemia  -Continue statin, LDL at goal      Code status: Full  Prophylaxis: SCDs  Care Plan discussed with: Patient  Anticipated Disposition: 24 to 48 hours     Hospital Problems  Date Reviewed: 7/28/2022            Codes Class Noted POA    Ischemic cerebrovascular accident (CVA) [facility]) ICD-10-CM: I63.9  ICD-9-CM: 434.91  9/14/2022 Unknown             Review of Systems:   A comprehensive review of systems was negative except for that written in the HPI. Vital Signs:    Last 24hrs VS reviewed since prior progress note. Most recent are:  Visit Vitals  /81   Pulse 100   Temp 97.6 °F (36.4 °C)   Resp 26   Ht 6' 3\" (1.905 m)   Wt 128.4 kg (283 lb)   SpO2 97%   BMI 35.37 kg/m²         Intake/Output Summary (Last 24 hours) at 9/16/2022 6997  Last data filed at 9/15/2022 2000  Gross per 24 hour   Intake 1508.33 ml   Output 890 ml   Net 618.33 ml        Physical Examination:     I had a face to face encounter with this patient and independently examined them on 9/16/2022 as outlined below:          Constitutional:  No acute distress, cooperative, pleasant    ENT:  Oral mucosa moist, oropharynx benign. Resp:  CTA bilaterally. No wheezing/rhonchi/rales. No accessory muscle use. CV:  Regular rhythm, normal rate, no murmurs, gallops, rubs    GI:  Soft, non distended, non tender. normoactive bowel sounds, no hepatosplenomegaly     Musculoskeletal:  No edema, warm, 2+ pulses throughout    Neurologic:  Moves all extremities.   Awake and alert            Data Review:    Review and/or order of clinical lab test      Labs:     Recent Labs     09/16/22  0034 09/15/22  0240   WBC 11.9* 16.0*   HGB 13.3 15.2   HCT 40.6 44.4    285     Recent Labs     09/16/22  0034 09/15/22  0240 09/14/22 2210   NA  --  139 134*   K  --  3.3* 3.6   CL  --  108 105   CO2  --  25 20*   BUN  --  46* 48*   CREA  --  0.99 1.21   GLU  --  166* 163*   CA  --  9.3 9.6   MG 2.4 2.2  --      Recent Labs     09/14/22 2210   ALT 54   AP 74   TBILI 2.5*   TP 8.4*   ALB 3.8   GLOB 4.6*     Recent Labs     09/14/22  2210   INR 1.1   PTP 11.9*      No results for input(s): FE, TIBC, PSAT, FERR in the last 72 hours. No results found for: FOL, RBCF   No results for input(s): PH, PCO2, PO2 in the last 72 hours.   Recent Labs     09/16/22  0612 09/16/22  0034 09/15/22  1952   * 865* 1,103*     Lab Results   Component Value Date/Time    Cholesterol, total 146 09/15/2022 02:40 AM    HDL Cholesterol 53 09/15/2022 02:40 AM    LDL, calculated 65 09/15/2022 02:40 AM    Triglyceride 140 09/15/2022 02:40 AM    CHOL/HDL Ratio 2.8 09/15/2022 02:40 AM     Lab Results   Component Value Date/Time    Glucose (POC) 124 (H) 09/16/2022 06:12 AM    Glucose (POC) 134 (H) 09/16/2022 12:42 AM    Glucose (POC) 137 (H) 09/15/2022 04:53 PM    Glucose (POC) 137 (H) 09/15/2022 11:39 AM    Glucose (POC) 160 (H) 09/15/2022 06:31 AM     Lab Results   Component Value Date/Time    Color YELLOW/STRAW 10/04/2016 06:03 PM    Appearance CLEAR 10/04/2016 06:03 PM    Specific gravity 1.016 10/04/2016 06:03 PM    pH (UA) 5.5 10/04/2016 06:03 PM    Protein NEGATIVE  10/04/2016 06:03 PM    Glucose NEGATIVE  10/04/2016 06:03 PM    Ketone TRACE (A) 10/04/2016 06:03 PM    Bilirubin NEGATIVE  10/04/2016 06:03 PM    Urobilinogen 0.2 10/04/2016 06:03 PM    Nitrites NEGATIVE  10/04/2016 06:03 PM    Leukocyte Esterase NEGATIVE  10/04/2016 06:03 PM    Epithelial cells FEW 10/04/2016 06:03 PM    Bacteria NEGATIVE  10/04/2016 06:03 PM    WBC 0-4 10/04/2016 06:03 PM    RBC 10-20 10/04/2016 06:03 PM         Medications Reviewed:     Current Facility-Administered Medications   Medication Dose Route Frequency    HYDROmorphone (DILAUDID) injection 0.2 mg  0.2 mg IntraVENous Q3H PRN    labetaloL (NORMODYNE;TRANDATE) injection 5 mg  5 mg IntraVENous Q6H PRN    balsam peru-castor oiL (VENELEX) ointment   Topical BID    glucose chewable tablet 16 g  4 Tablet Oral PRN    glucagon (GLUCAGEN) injection 1 mg  1 mg IntraMUSCular PRN    dextrose 10 % infusion 0-250 mL  0-250 mL IntraVENous PRN    insulin lispro (HUMALOG) injection SubCUTAneous Q6H    metoprolol tartrate (LOPRESSOR) tablet 12.5 mg  12.5 mg Oral BID    acetaminophen (TYLENOL) tablet 650 mg  650 mg Oral Q4H PRN    Or    acetaminophen (TYLENOL) solution 650 mg  650 mg Per NG tube Q4H PRN    Or    acetaminophen (TYLENOL) suppository 650 mg  650 mg Rectal Q4H PRN    atorvastatin (LIPITOR) tablet 80 mg  80 mg Oral QHS    docusate sodium (COLACE) capsule 100 mg  100 mg Oral BID    famotidine (PF) (PEPCID) 20 mg in 0.9% sodium chloride 10 mL injection  20 mg IntraVENous Q12H     ______________________________________________________________________  EXPECTED LENGTH OF STAY: 2d 0h  ACTUAL LENGTH OF STAY:          2                 Demetrio Moreno MD

## 2022-09-16 NOTE — PROGRESS NOTES
Problem: Dysphagia (Adult)  Goal: *Acute Goals and Plan of Care (Insert Text)  Description: Speech therapy goals  Initiated 9/16/2022   1. Patient will tolerate pureed snacks without s/s of aspiration or adverse effects within 7 days   2. Patient will participate in repeat FEES to determine safety to initiate PO diet within 7 days   Initiated 9/15/2022   1. Patient will tolerate meds in applesauce without s/s of aspiration within 7 days MET 9/16/2022   2. Patient will participate in re-assessment of swallow function within 7 days MET 9/16/2022   Outcome: Progressing Towards Goal     Speech Language Pathology  Flexible Endoscopic Evaluation of Swallowing-FEES  Patient: Omega Ellis (62 y.o. male)  Date: 9/16/2022  Primary Diagnosis: Ischemic cerebrovascular accident (CVA) ([facility] [address] [I63.9]       Precautions: aspiration, fall  Fall (SBP< 180/90)    ASSESSMENT :  Based on the objective data described below, the patient presents with mod/severe pharyngeal dysphagia characterized by marked delay in swallow initiation with pooling in the pyriforms for over 2 minutes with ice chips prior to initiation without recognition (asking for more without initiating swallow yet). For all other consistencies, there was delayed initiation at the pyriforms ranging from mild to severe. Patient with reduced laryngeal closure and reduced pharyngeal squeeze as evidenced by reduced white out phase of the swallow and diffuse mild/moderate pharyngeal residue. Reduced sensation with rare additional swallow elicited in an attempt to clear residue. There was yessica aspiration with thin liquids that occurred before the swallow secondary to delayed swallow initiation with spillage into the airway before swallow initiation. This elicited a weak cough that was not effective in clearing aspiration.   There was silent aspiration with mild and moderately thick liquids that occurred inconsistently presumed during the swallow secondary to reduced laryngeal closure. Additional penetration from pyriform residue that fell through the interarytenoid space and contacted the vocal cords with mild and moderately thick liquids. There was no penetration/aspiration with purees, however, risks are increased secondary to delayed swallow initiation, pharyngeal residue, and poor awareness of deficits. Patient with poor awareness of dysphagia, constantly asking for more to drink immediately after aspiration. His recovery of swallow function will be dependent upon his degree of neuro-recovery. Patient will benefit from skilled intervention to address the above impairments. Patient's rehabilitation potential is considered to be Fair     Images taken from FEES:    Aspiration with moderately thick liquids on left tracheal wall       Aspiration mildly thick     Aspiration thins                  PLAN :  Recommendations and Planned Interventions:  --recommend pureed snacks at meal times, no more than 1-2 items at a time. Ok to give meds crushed in purees. --no liquids of any consistency  --recommend [place] for alternative route of nutrition and hydration   --will follow for repeat FEES next week as clinical progress is noted. Frequency/Duration: Patient will be followed by speech-language pathology 3 times a week to address goals. Discharge Recommendations: Inpatient Rehab     SUBJECTIVE:   Patient stated hand me that cup up there.  Immediately after educating on aspiration    OBJECTIVE:     Past Medical History:   Diagnosis Date    Arrhythmia     atrial fib    Depression 4/24/2010    Diabetes ([address]     diet control for pre-diabetes    Dyslipidemia, goal LDL below 100 6/14/2011    Gout     HTN (hypertension) 4/24/2010    Impotence 4/24/2010    Morbid obesity ([address] 5/17/2010    VALENTE (obstructive sleep apnea) 4/24/2010    CPAP    Restless legs 4/15/2015     Past Surgical History:   Procedure Laterality Date    COLONOSCOPY N/A 6/27/2017    COLONOSCOPY performed by Deborah Fajardo MD at [facility] ENDOSCOPY    ENDOSCOPY, COLON, DIAGNOSTIC  2007    HX APPENDECTOMY      HX ORTHOPAEDIC  2000    bilat TKR     HX ORTHOPAEDIC  2010    left THR    HX UROLOGICAL  03/2018    urolift    HX UROLOGICAL  03/2019    prosthesis     Prior Level of Function/Home Situation:   Home Situation  Home Environment: Private residence  Living Alone: Yes  Support Systems: Child(alexandru) (Son Corrina [place] [phone])  Patient Expects to be Discharged to[de-identified] Rehab hospital/unit acute  Diet prior to admission: regular  Current Diet:   NPO      Cognitive and Communication Status:  Neurologic State: Alert, Confused  Orientation Level: Oriented to person, Disoriented to place, Disoriented to situation, Disoriented to time  Cognition: Decreased attention/concentration  Perception: Cues to attend to right side of body  Perseveration: Perseverates during mobility  Safety/Judgement: Decreased awareness of environment, Decreased awareness of need for assistance, Decreased awareness of need for safety, Decreased insight into deficits    History/indication for procedure: L MCA CVA with hemiparesis  Lidocaine used: No  Nostril used: right  Scope Used: Ambu disposable scope  Feeding Tube Present in Nare: No  Adverse Reaction: No  Respiratory status: stable on room air         Part I:  Anatomy:       General Comments: WFL     Palate:   WFL   Base of tongue:   WFL   Valleculae:   WFL   Epiglottis:   WFL   Arytenoids:   WFL   False vocal folds:   WFL   Vocal folds:   Impaired: bilateral small protuberances on medial vocal cords, did not impact closure  Pyriform sinus:   WFL         Part II:  Laryngeal Function:    Adduction:   Full   Abduction:   Full   Arytenoid movement:   Symmetric Vocal quality:   WFL         Part III:  Secretions:    [place] Secretion Rating (0-7): 0     Location:  0 - Nil significant pooled secretions in pyriform fossae or laryngeal vestibule Amount:   0 - Nil significant pooled secretions in pyriform fossae (0-20%) Response*:  0 - Secretions in pyriform fossae or laryngeal vestibule effectively cleared   Comments (e.g., quality/texture/color): trace/mild secretions prior to PO that cleared with PO trials      *Normal airway responses in the pharynx or laryngeal vestibule may include spontaneous coughing, throat clearing, and/or swallowing        Part IV:  Swallow Trials:    Subjective comments regarding oral phase of swallow: slow, effortful, reduced bolus formation/control     Comments regarding esophageal phase of swallow: WFL     Consistency: Ice chips, Thin liquid, Mildly thick liquid, and Moderately thick liquid  Position of Bolus Pre-Swallow: Pyriform sinuses  [place] Pharyngeal Residue Severity Rating Scale: Valleculae residue: 3 - mild; 5-25%, epiglottic ligament visible and Pyriform sinus residue: 4 - moderate; 25-50%, up wall to half full  Spontaneously Cleared: No  Penetration: Yes  Aspiration: Yes  Response: No response and Cough ineffective (cough x1)  [place] Penetration-Aspiration Scale: 8 - Material enters the airway, passes below the vocal folds, and no effort is made to eject    Consistency: Puree  Position of Bolus Pre-Swallow: Pyriform sinuses  [place] Pharyngeal Residue Severity Rating Scale: Valleculae residue: 3 - mild; 5-25%, epiglottic ligament visible and Pyriform sinus residue: 3 - mild; 5-25%, up wall to quarter full  Spontaneously Cleared: No  Penetration: No  Aspiration: No  Response: n/a  [place] Penetration-Aspiration Scale: 1 - Material does not enter the airway      Dysphagia Severity Rating:   Oral phase: Moderate  Pharyngeal phase: Moderate to Severe      NOMS:   The NOMS functional outcome measure was used to quantify this patient's level of swallowing impairment.   Based on the NOMS, the patient was determined to be at level 3 for swallow function         NOMS Swallowing Levels:  Level 1 (CN): NPO  Level 2 (CM): NPO but takes consistency in therapy  Level 3 (CL): Takes less than 50% of nutrition p.o. and continues with nonoral feedings; and/or safe with mod cues; and/or max diet restriction  Level 4 (CK): Safe swallow but needs mod cues; and/or mod diet restriction; and/or still requires some nonoral feeding/supplements  Level 5 (CJ): Safe swallow with min diet restriction; and/or needs min cues  Level 6 (CI): Independent with p.o.; rare cues; usually self cues; may need to avoid some foods or needs extra time  Level 7 ([address] [place] [address]): Independent for all p.o.  JADA. (2003). National Outcomes Measurement System (NOMS): Adult Speech-Language Pathology User's Guide. Pain:  Pain Scale 1: Numeric (0 - 10)  Pain Intensity 1: 0       COMMUNICATION/EDUCATION:   Patient was educated regarding His deficit(s) of dysphagia as this relates to His diagnosis of CVA. He demonstrated poor understanding as evidenced by poor retention of information immediately after provided . The patient's plan of care including findings from FEES, recommendations, planned interventions, and recommended diet changes were discussed with: Registered nurse. Patient/family have participated as able in goal setting and plan of care. Patient/family agree to work toward stated goals and plan of care. Thank you for this referral.  Bryant Valdez M.CD.  CCC-SLP   Time Calculation: 35 mins

## 2022-09-16 NOTE — PROGRESS NOTES
Problem: Self Care Deficits Care Plan (Adult)  Goal: *Acute Goals and Plan of Care (Insert Text)  Description: FUNCTIONAL STATUS PRIOR TO ADMISSION: Patient with communication deficits limiting gathering home environment/prior level of function information. Per chart, patient lives by himself and was climbing a ladder when event happened, presumed independent prior level of function. HOME SUPPORT: The patient lived alone with sister and brother-in-law for family support. Sister had called on the 13th and became concerned when no response, called a neighbor who found the patient. Would benefit from confirming prior level of function and home environment information with family when able. Occupational Therapy Goals  Initiated 9/15/2022   1. Patient will perform self-feeding with minimal assistance/contact guard assist within 7 day(s). 2.  Patient will perform grooming in sitting with moderate assist within 7 day(s). 3.  Patient will perform lower body dressing with maximal assistance within 7 day(s). 4.  Patient will perform toilet transfers to [facility] with maximal assistance within 7 day(s). 5.  Patient will perform all aspects of toileting with maximal assistance within 7 day(s). 6.  Patient will participate in upper extremity therapeutic exercise/activities with minimal assistance/contact guard assist within 7 day(s). 7.  Patient will participate in R [address] within 7 days. Outcome: Progressing Towards Goal   OCCUPATIONAL THERAPY TREATMENT  Patient: Kacey Pride (77 y.o. male)  Date: 9/16/2022  Diagnosis: Ischemic cerebrovascular accident (CVA) ([facility] [address] [I63.9] <principal problem not specified>      Precautions: Fall (SBP< 180/90)  Chart, occupational therapy assessment, plan of care, and goals were reviewed. ASSESSMENT  Patient continues with skilled OT services and is progressing towards goals.  Patient presents this session with noted 1 finger subluxation on right upper extremity, decreased right upper extremity active range of motion (1/5 for elbow flexion only this session), poor command following, continued communication deficits, increased orientation this session, and impaired sitting balance. Patient's sister and brother in law entered room during session and report that patient does live alone and is independent for all ADLs/IADLs including keeping chickens on property and home maintenance. Patient received semi supine and agreeable to working with therapy, increased verbalizations this session, does continue to answer yes to all yes/no questions but able to state name, birthday, and that he is in the hospital. Patient assisted to transfer to edge of bed, able to maintain fair sitting balance with occasional physical assistance and multi modal cueing to maintain. Patient with decreased overall active movement of right upper extremity, 1/5 for elbow flexion, all other noted right upper extremity range 0/5. Patient able to attend to stimuli in left visual field this date with noted right sided inattention, difficulty attending to right side of body in sitting, improved attention when return to supine with HOB raised. Patient provided with wash cloth for simple grooming task and able to participate with left upper extremity to complete, would struggle with any bimanual tasks at this time. Overall patient with improved participation this date but remains well below functional baseline independence and is a high fall risk. Remains in left wrist restraint at this time and replaced prior to therapy exit. Patient would benefit from skilled OT services during admission to improve independence with self care and functional mobility/transfers. Recommend discharge to [facility] at this time to maximize NM return.      Current Level of Function Impacting Discharge (ADLs): mod to max A x2 for mobility/transfers, setup simple one-handed grooming, total A lower extremity dressing    Other factors to consider for discharge: prior level of function independent, family support, severity of deficits, L MCA         PLAN :  Patient continues to benefit from skilled intervention to address the above impairments. Continue treatment per established plan of care to address goals. Recommend with staff: bed in modified chair position at mealtimes even if not eating, bedpan for toileting if able to communicate needs    Recommend next OT session: continue POC    Recommendation for discharge: (in order for the patient to meet his/her long term goals)  Therapy 3 hours per day 5-7 days per week    This discharge recommendation:  Has been made in collaboration with the attending provider and/or case management    IF patient discharges home will need the following DME: TBD pending progress       SUBJECTIVE:   Patient stated son of herbert when asked by TERESE if he remembers what the patient used to call the TERESE    OBJECTIVE DATA SUMMARY:   Cognitive/Behavioral Status:  Neurologic State: Alert;Confused  Orientation Level: Oriented to person;Oriented to place; Disoriented to time;Disoriented to situation (oriented to type of place)  Cognition: Decreased attention/concentration;Decreased command following; Impaired decision making;Poor safety awareness             Functional Mobility and Transfers for ADLs:  Bed Mobility:  Rolling: Moderate assistance;Maximum assistance;Assist x2  Supine to Sit: Moderate assistance;Assist x2  Sit to Supine: Moderate assistance;Maximum assistance;Assist x2  Scooting: Maximum assistance;Assist x2    Transfers:             Balance:  Sitting: Impaired; With support  Sitting - Static: Fair (occasional)  Sitting - Dynamic: Poor (constant support)    ADL Intervention:  Feeding  Feeding Assistance:  (pt NPO)    Grooming  Position Performed: Other (comment) (HOB raised, using LUE)  Washing Face: Set-up                        Lower Body Dressing Assistance  Socks:  Total assistance (dependent)  Leg Crossed Method Used: No  Position Performed: Supine              Neuro Re-Education:   Weight bearing through right upper extremity with physical assist to maintain hand on edge of bed when sitting, attempts active assist range of motion of right upper extremity but noted trace elbow flexion only   Patient's right upper extremity supported on pillow at end of session for approximation of joint due to R shoulder subluxation       Therapeutic Exercises:   Passive range of motion of R shoulder to 90 degrees x 3, noted grimace with activity    Pain:  Grimace with mobility and range of motion at edge of bed and in supine     Activity Tolerance:   Fair and requires rest breaks    After treatment patient left in no apparent distress:   Supine in bed, Call bell within reach, Bed / chair alarm activated, Caregiver / family present, Side rails x 3, and Restraints    COMMUNICATION/COLLABORATION:   The patients plan of care was discussed with: Physical therapist and Registered nurse. Patient was educated regarding His deficit(s) of right sided weakness and inattention as this relates to His diagnosis of L MCA. He demonstrated Guarded understanding as evidenced by communication deficits (receptive and expressive aphasia per SLP). Patient unable to participate in BE FAST education due to aphasia, difficulty following complex or abstract ideas at this time.     Shannan Finn OTR/L  Time Calculation: 31 mins

## 2022-09-16 NOTE — PROGRESS NOTES
Problem: Non-Violent Restraints  Goal: Removal from restraints as soon as assessed to be safe  Outcome: Progressing Towards Goal  Goal: No harm/injury to patient while restraints in use  Outcome: Progressing Towards Goal  Goal: Patient's dignity will be maintained  Outcome: Progressing Towards Goal  Goal: Patient Interventions  Outcome: Progressing Towards Goal     Problem: Pressure Injury - Risk of  Goal: *Prevention of pressure injury  Description: Document Pepe Scale and appropriate interventions in the flowsheet. Outcome: Progressing Towards Goal  Note: Pressure Injury Interventions:  Sensory Interventions: Assess changes in LOC, Discuss PT/OT consult with provider    Moisture Interventions: Absorbent underpads, Check for incontinence Q2 hours and as needed    Activity Interventions: Increase time out of bed, Pressure redistribution bed/mattress(bed type), PT/OT evaluation    Mobility Interventions: HOB 30 degrees or less, Pressure redistribution bed/mattress (bed type), PT/OT evaluation    Nutrition Interventions: Document food/fluid/supplement intake    Friction and Shear Interventions: HOB 30 degrees or less                Problem: Patient Education: Go to Patient Education Activity  Goal: Patient/Family Education  Outcome: Progressing Towards Goal     Problem: Patient Education: Go to Patient Education Activity  Goal: Patient/Family Education  Outcome: Progressing Towards Goal     Problem: Patient Education: Go to Patient Education Activity  Goal: Patient/Family Education  Outcome: Progressing Towards Goal     Problem: Falls - Risk of  Goal: *Absence of Falls  Description: Document Kasi Fall Risk and appropriate interventions in the flowsheet.   Outcome: Progressing Towards Goal  Note: Fall Risk Interventions:       Mentation Interventions: Bed/chair exit alarm, Door open when patient unattended    Medication Interventions: Bed/chair exit alarm, Patient to call before getting OOB    Elimination Interventions: Bed/chair exit alarm, Call light in reach, Patient to call for help with toileting needs    History of Falls Interventions: Bed/chair exit alarm, Door open when patient unattended

## 2022-09-16 NOTE — PROGRESS NOTES
Brief note, full report to follow. Swallowing re-assessment completed. Recommend FEES to determine safety for diet initiation. Will complete later today. Jonathan Sims M.CD.  CCC-SLP

## 2022-09-16 NOTE — PROGRESS NOTES
Neurology Progress Note     NAME: Kacey Pride   :  1950   MRN:  [id number]   DATE:  2022    Assessment:     Active Problems:    Ischemic cerebrovascular accident (CVA) ([facility] [address] (2022)      Pt is a 80yo male with HTN, HLD, VALENTE on CPAP, h/o smoking, and Afib reportedly on Xarelto at home, presenting with aphasia and right F/A/L weakness, found to have a large left MCA infarct with hemorrhagic conversion, and occluding thrombus in left M1 with other areas of IC stenoses. TTE EF 50-55%, neg bubble study. HgbA1C 5.8. LDL 65. Exam -BMI 35, alert, appropriate, speech-no dysarthria, language-patient uses nonsensical words, occasionally gets out the correct word is able to answer with one-word typically, oriented to person , sister, able to follow commands, right facial weakness, no ptosis, EOMI,  right upper extremity flaccid with minimal movement in the right hand, right LE 4+/5 HF, minimal PF, left arm and leg 5/5. Presumed cardioembolic event, cannot fully exclude vessel to vessel thrombus due to stenosis. Plan:   -Pt's sister will see if she can find his Xarelto bottle at home and bring this with her tomorrow. If taking Xarelto, then failed therapy, consider switch to Eliqius in that case.   -Hold APT/OAC for two weeks due to large stroke and hemorrhagic conversion  -LDL is at goal <70  -Treatment of pre-diabetes per primary team   -Treat HTN  -PT/OT/ST/Rehab    Subjective:    Pt is seen with his sister at the bedside. She is not certain if he takes his medications or not.   Today he tells me is taking his Xarelto (yesterday said he was not.)    Objective:   Chart reviewed since last seen    Current Facility-Administered Medications   Medication Dose Route Frequency    0.9% sodium chloride infusion  75 mL/hr IntraVENous CONTINUOUS    HYDROmorphone (DILAUDID) injection 0.2 mg  0.2 mg IntraVENous Q3H PRN    labetaloL (NORMODYNE;TRANDATE) injection 5 mg  5 mg IntraVENous Q6H PRN    balsam peru-castor oiL (VENELEX) ointment   Topical BID    glucose chewable tablet 16 g  4 Tablet Oral PRN    glucagon (GLUCAGEN) injection 1 mg  1 mg IntraMUSCular PRN    dextrose 10 % infusion 0-250 mL  0-250 mL IntraVENous PRN    insulin lispro (HUMALOG) injection   SubCUTAneous Q6H    metoprolol tartrate (LOPRESSOR) tablet 12.5 mg  12.5 mg Oral BID    acetaminophen (TYLENOL) tablet 650 mg  650 mg Oral Q4H PRN    Or    acetaminophen (TYLENOL) solution 650 mg  650 mg Per NG tube Q4H PRN    Or    acetaminophen (TYLENOL) suppository 650 mg  650 mg Rectal Q4H PRN    atorvastatin (LIPITOR) tablet 80 mg  80 mg Oral QHS    docusate sodium (COLACE) capsule 100 mg  100 mg Oral BID    famotidine (PF) (PEPCID) 20 mg in 0.9% sodium chloride 10 mL injection  20 mg IntraVENous Q12H       Visit Vitals  BP (!) 143/76   Pulse (!) 111 Comment: RVR   Temp 97.6 °F (36.4 °C)   Resp 16   Ht 6' 3\" (1.905 m)   Wt 283 lb (128.4 kg)   SpO2 98%   BMI 35.37 kg/m²     Temp (24hrs), Av.2 °F (36.8 °C), Min:97.6 °F (36.4 °C), Max:99.4 °F (37.4 °C)      701 - 1900  In: -   Out: 500 [Urine:500]  1901 -  0700  In: 5.8 [I.V.:.8]  Out: 6823 [Urine:1190]      Physical Exam:  General: Well developed well nourished patient in no apparent distress. Cardiac: Irregularly irregular with 's  Extremities: 2+ Radial pulses, no cyanosis or edema, multiple abrasion on right leg    Neurological Exam:  Mental Status: Alert, Oriented to person, sister. Speech and language -nonsensical words, able to follow commands, able to answer with one word at times. Cranial Nerves:   EOMI, no nystagmus, no ptosis. . Facial movement is asymmetric on right. Hearing is intact   Motor:  5/5 strength on left, no movement in right UE to command, flaccid, 4+/5 right HF, minimal DF. No PD.  No tremors Reflexes:      Sensory:      Gait:     Cerebellar:           Lab Review   Recent Results (from the past 24 hour(s))   GLUCOSE, POC    Collection Time: 09/15/22  4:53 PM   Result Value Ref Range    Glucose (POC) 137 (H) 65 - 117 mg/dL    Performed by [facility]    CK    Collection Time: 09/15/22  7:52 PM   Result Value Ref Range    CK 1,103 (H) 39 - 308 U/L   MAGNESIUM    Collection Time: 09/16/22 12:34 AM   Result Value Ref Range    Magnesium 2.4 1.6 - 2.4 mg/dL   CK    Collection Time: 09/16/22 12:34 AM   Result Value Ref Range     (H) 39 - 308 U/L   CBC WITH AUTOMATED DIFF    Collection Time: 09/16/22 12:34 AM   Result Value Ref Range    WBC 11.9 (H) 4.1 - 11.1 K/uL    RBC 4.56 4.10 - 5.70 M/uL    HGB 13.3 12.1 - 17.0 g/dL    HCT 40.6 36.6 - 50.3 %    MCV 89.0 80.0 - 99.0 FL    MCH 29.2 26.0 - 34.0 PG    MCHC 32.8 30.0 - 36.5 g/dL    RDW 14.0 11.5 - 14.5 %    PLATELET 858 208 - 687 K/uL    NRBC 0.0 0  WBC    ABSOLUTE NRBC 0.00 0.00 - 0.01 K/uL    NEUTROPHILS 78 (H) 32 - 75 %    LYMPHOCYTES 11 (L) 12 - 49 %    MONOCYTES 11 5 - 13 %    EOSINOPHILS 0 0 - 7 %    BASOPHILS 0 0 - 1 %    IMMATURE GRANULOCYTES 0 0.0 - 0.5 %    ABS. NEUTROPHILS 9.3 (H) 1.8 - 8.0 K/UL    ABS. LYMPHOCYTES 1.3 0.8 - 3.5 K/UL    ABS. MONOCYTES 1.3 (H) 0.0 - 1.0 K/UL    ABS. EOSINOPHILS 0.0 0.0 - 0.4 K/UL    ABS. BASOPHILS 0.0 0.0 - 0.1 K/UL    ABS. IMM.  GRANS. 0.0 0.00 - 0.04 K/UL    DF SMEAR SCANNED      RBC COMMENTS NORMOCYTIC, NORMOCHROMIC     GLUCOSE, POC    Collection Time: 09/16/22 12:42 AM   Result Value Ref Range    Glucose (POC) 134 (H) 65 - 117 mg/dL    Performed by Wood benavides RN    CK    Collection Time: 09/16/22  6:12 AM   Result Value Ref Range     (H) 39 - 308 U/L   GLUCOSE, POC    Collection Time: 09/16/22  6:12 AM   Result Value Ref Range    Glucose (POC) 124 (H) 65 - 117 mg/dL    Performed by Wood benavides RN    GLUCOSE, POC    Collection Time: 09/16/22 12:00 PM   Result Value Ref Range    Glucose (POC) 128 (H) 65 - 117 mg/dL    Performed by Pratima Lawson        Additional comments:  I have reviewed the patient's new clinical lab test results. I have personally reviewed the patient's radiographs. MRI Results (most recent):  Results from [place] encounter on 09/14/22    MRI BRAIN WO CONT    Narrative  INDICATION:   follow up ischemic cva    EXAMINATION:  MRI BRAIN WO CONTRAST    COMPARISON:  CT September 14, 2022    TECHNIQUE:  Multiplanar multisequence acquisition without contrast of the brain. FINDINGS:    There is a moderate to large area of acute infarction in the left middle  cerebral artery territory, involving the left basal ganglia and corona radiata,  left anterior and mid temporal lobe, left insula and left frontal operculum. There is associated susceptibility artifact predominantly within the basal  ganglia and corona radiata portions of the infarction, with slight mass effect  upon the left lateral ventricle similar to recent CT. There is no hydrocephalus  or midline shift. Major intracranial vascular flow-voids are patent. Subcutaneous edema throughout the scalp right greater than left. .    Impression  Moderate to large acute left MCA territory infarction, with associated  susceptibility artifact predominantly in the basal ganglia/corona radiata  consistent with blood product/petechial hemorrhage. Overall appearance unchanged  compared to recent CT, with no hydrocephalus or midline shift. CT Results (most recent):  Results from Hospital Encounter encounter on 09/14/22    CT HEAD WO CONT    Narrative  EXAM: CT HEAD WO CONT    INDICATION: hemorrhagic conversion? COMPARISON: CT dose prior to this exam.    CONTRAST: None. TECHNIQUE: Unenhanced CT of the head was performed using 5 mm images. Brain and  bone windows were generated. Coronal and sagittal reformats.  CT dose reduction  was achieved through use of a standardized protocol tailored for this  examination and automatic exposure control for dose modulation. FINDINGS:  Left anterolateral temporal lobe hypodensity with loss of gray-white  differentiation is redemonstrated with no evident interval hemorrhage or other  significant interval change with localized mass effect and effacement of the  anterior horn of the right lateral ventricle. The ventricles and sulci are  otherwise normal in size, shape and configuration. There is no significant white  matter disease. There is no new intracranial hemorrhage, extra-axial collection,  or mass effect. The basilar cisterns are open. The bone windows demonstrate no abnormalities. The visualized portions of the  paranasal sinuses and mastoid air cells are clear. There is a soft tissue  collection overlying the right frontoparietal skull, not significant change. Impression  Left MCA territory left temporal lobe infarct is stable appearance  without interval hemorrhagic transformation. Right frontoparietal scalp  collection unchanged. Care Plan discussed with:  Patient x   Family x   RN    Care Manager    Consultant/Specialist:       Signed: Jody Bucio MD

## 2022-09-16 NOTE — PROGRESS NOTES
Problem: Communication Impaired (Adult)  Goal: *Acute Goals and Plan of Care (Insert Text)  Note:   Patient will improve verbal expression for naming ADL objects, word level sentence completion tasks, automatic sequences, and responsive naming tasks with 80% accuracy given repetition within 7 days. Patient will improve auditory comprehension for simple yes/no questions and 1 step commands with 80% accuracy given repetition within 7 days. Patient will improve auditory comprehension for ID of objects given a field of 2-3 with 80% accuracy given repetition within 7 days. SPEECH LANGUAGE PATHOLOGY EVALUATION  Patient: Blessing Mcdonald (52 y.o. male)  Date: 9/16/2022  Primary Diagnosis: Ischemic cerebrovascular accident (CVA) [facility]) [I63.9]       Precautions: aspiration  Fall (SBP< 180/90)    ASSESSMENT :  Based on the objective data described below, the patient presents with severe global aphasia. Patient seen for language evaluation upright in bed. Patient unable to state name and conversational speech mostly jargon, paraphasias, and neologisms leading to significantly decreased overall intelligibility. Patient with R facial droop leading to blended word boundaries and imprecise consonant production with decreased vocal volume. Assessed language with the [facility] Test and patient scored 14/50 on the auditory comprehension subtests and 13/50 on the verbal expression subtests with a combined score of 27/100. Normal cut off for the MAST is 92/100. Significant deficits noted with naming objects, automatic sequences, repetition, spelling and word fluency in conversation. Patient could read phrases out loud but unable to complete directions he was reading indicating lack of reading comprehension. Patient also with deficit for yes/no responses, identifying objects, and following directions. Patient lacks error awareness with any and all deficits.   He frequently demonstrated instances of losing focused attention and required redirection from therapist.  Severe deficits noted with language and patient is a non-functional communicator at this time. Patient will benefit from skilled intervention to address the above impairments. Patients rehabilitation potential is considered to be Fair     PLAN :  Recommendations and Planned Interventions:  --allow patient extra time to respond  --simple commands  --repeat instructions as needed  Frequency/Duration: Patient will be followed by speech-language pathology 4 times a week to address goals. Discharge Recommendations: Inpatient Rehab     SUBJECTIVE:   Patient stated thumb.     OBJECTIVE:     Past Medical History:   Diagnosis Date    Arrhythmia     atrial fib    Depression 4/24/2010    Diabetes ([facility] [address]     diet control for pre-diabetes    Dyslipidemia, goal LDL below 100 6/14/2011    Gout     HTN (hypertension) 4/24/2010    Impotence 4/24/2010    Morbid obesity ([facility] [address] 5/17/2010    VALENTE (obstructive sleep apnea) 4/24/2010    CPAP    Restless legs 4/15/2015     Past Surgical History:   Procedure Laterality Date    COLONOSCOPY N/A 6/27/2017    COLONOSCOPY performed by Yuly Linder MD at [place] [address], [address] [place] [address], [facility]  2007    HX APPENDECTOMY      HX ORTHOPAEDIC  2000    bilat TKR     HX ORTHOPAEDIC  2010    left THR    HX UROLOGICAL  03/2018    urolift    HX UROLOGICAL  03/2019    prosthesis     Prior Level of Function/Home Situation: independent  Home Situation  Home Environment: Private residence  Living Alone: Yes  Support Systems: Child(alexandru) (Donte Peralta [phone])  Patient Expects to be Discharged to[de-identified] Rehab hospital/unit acute  Mental Status:  Neurologic State: Alert, Confused  Orientation Level: Disoriented to person, Disoriented to place, Disoriented to situation, Disoriented to time  Cognition: Decreased command following, Decreased attention/concentration  Perception: Cues to attend to right side of body  Perseveration: Perseverates during mobility  Safety/Judgement: Decreased awareness of environment, Decreased awareness of need for assistance, Decreased awareness of need for safety, Decreased insight into deficits  Motor Speech:  Oral-Motor Structure/Motor Speech  Dysarthric Characteristics: Blended word boundaries; Imprecise  Intelligibility: Impaired  Conversation Intelligibility (%): 60 %  Overall Impairment Severity: Moderate  Language Comprehension and Expression:  Auditory Comprehension  Hearing Aid: None  Verbal Expression  Initiation: Impaired (%)  Automatic Speech Task: Impaired (comment)  Repetition: Impaired  Word Repetition (%): 0 %  Phrase Repetition (%): 0 %  Sentence Repetition (%): 0 %  Naming: Impaired  Confrontation (%): 40 %  Sentence Completion: Impaired  Word level (%): 0 %  Sentence Formulation: Impaired (%)  Conversation: Non-fluent  Speech Characteristics: Jargon; Neologisms;Paraphasias;Perseveration; Word retrieval  Interfering Components: Attention; Environmental distractions; Impaired thought organization; Impulsive;Limited error awareness  Overall Impairment: Severe         NOMS:  The NOMS functional outcome measure was used to quantify this patient's level of expressive language impairment. Based on the NOMS, the patient was determined to be at level 2 for spoken language expression. [facility] Spoken Language Expression:  Level 1 (CN): Verbalizations not meaningful to anyone. Level 2 (CM): Few attempts accurate/appropriate. Max cues to elicit automatic/imitative words/phrases. Level 3 (CL): Communication partner responsible for communication; Mod cues for words/phrases meaningful in context  Level 4 (CK): Initiate during simple, routine activities w/familiar partner. Mod cues to produce simple sentences  Level 5 (Junius Simran): Initiates communication with familiar and unfamiliar partners. Min cues for complex sentences. Level 6 (CI): Communicates for most activities.  Some limitations still present for vocational/social activities. Rarely cued for complex info  Level 7 [facility]): Independent communication. JADA. (2003). National Outcomes Measurement System (NOMS): Adult Speech-Language Pathology User's Guide. The NOMS functional outcome measure was used to quantify this patient's level of receptive language impairment. Based on the NOMS, the patient was determined to be at level 2 for spoken language comprehension. NOMS Spoken Language Comprehension:  Level 1 (CN): Unable to follow directions or respond to simple yes/no  Level 2 (CM): Can follow directions & respond to simple y/n in context with max cues. Level 3 (CL): Responds to simple y/n; follows simple commands w/mod cues out of context  Level 4 (CK): Responds to y/n; occasionally follows simple directions without cues. Mod cues for complex info. Understands limited conversation about routine activities with familiar communication partner  Level 5 (Ankush Cords): Understands communication in structured conversation with familiar and unfamiliar partners. Min cues for complex information. Occasionally initiates use of compensatory strategies  Level 6 (CI): Limitations still apparent in voc/social situations. Rarely cued for complex info. Level 7 (CH): Independently participates in vocational, avocational, and social situations. JADA. (2003). National Outcomes Measurement System (NOMS): Adult Speech-Language Pathology User's Guide. Pain:  Pain Scale 1: Numeric (0 - 10)  Pain Intensity 1: 0       After treatment:   Patient left in no apparent distress in bed, Call bell within reach, and Nursing notified    COMMUNICATION/EDUCATION:   Patient was educated regarding his deficit(s) of aphasia as this relates to his diagnosis of CVA. He demonstrated Guarded understanding as evidenced by difficulty understanding information secondary to severity of language deficits.     The patient's plan of care including recommendations, planned interventions, and recommended diet changes were discussed with: Registered nurse. Patient/family have participated as able in goal setting and plan of care.     Thank you for this referral.  Margarito Saldivar, SLP  Time Calculation: 17 mins

## 2022-09-16 NOTE — PROGRESS NOTES
Problem: Dysphagia (Adult)  Goal: *Acute Goals and Plan of Care (Insert Text)  Description: Speech therapy goals  Initiated 9/16/2022   1. Patient will tolerate pureed snacks without s/s of aspiration or adverse effects within 7 days   2. Patient will participate in repeat FEES to determine safety to initiate PO diet within 7 days   Initiated 9/15/2022   1. Patient will tolerate meds in applesauce without s/s of aspiration within 7 days MET 9/16/2022   2. Patient will participate in re-assessment of swallow function within 7 days MET 9/16/2022   Outcome: Progressing Towards Goal    SPEECH LANGUAGE PATHOLOGY DYSPHAGIA TREATMENT  Patient: Guera Pennington (58 y.o. male)  Date: 9/16/2022  Diagnosis: [facility] accident [facility] [I63.9] <principal problem not specified>      Precautions:  Fall (SBP< 180/90)    ASSESSMENT:  A swallow treatment was conducted today to re-evaluate pt's oropharyngeal swallow function and determine appropriateness for objective imaging s/p acute L MCA infarct. Pt remains at elevated risk for prandial aspiration given significant acute neuro infarct and post-prandial aspiration given hx of VALENTE and type II diabetes. Trials of ice chips, thin liquids, and puree were conducted at bedside with no overt difficulty or s/s aspiration noted. However, given elevated risk of aspiration, continued dense hemiparesis of right arm, and dubious bedside presentation during swallow evaluation yesterday, recommend objective imaging of the swallow to determine safest diet recommendation. PLAN:  Recommendations and Planned Interventions:  --Continue NPO except meds crushed in puree as tolerated   --Objective imaging of the swallow  --Good oral care  --General aspiration precautions    Patient continues to benefit from skilled intervention to address the above impairments. Continue treatment per established plan of care. Discharge Recommendations:   To Be Determined     SUBJECTIVE:   Patient stated that's good after a sip of water. OBJECTIVE:   Cognitive and Communication Status:  Neurologic State: Alert, Confused  Orientation Level: Disoriented to person, Disoriented to place, Disoriented to situation, Disoriented to time  Cognition: Decreased command following, Decreased attention/concentration  Perception: Cues to attend to right side of body  Perseveration: Perseverates during mobility  Safety/Judgement: Decreased awareness of environment, Decreased awareness of need for assistance, Decreased awareness of need for safety, Decreased insight into deficits  Dysphagia Treatment:  Oral Assessment:  Oral Assessment  Labial: No impairment  Dentition: Intact; Natural  Oral Hygiene: oral mucosa moist and free of secretions  Lingual: No impairment  Velum: Unable to visualize  Mandible: No impairment  P.O. Trials:  Patient Position: upright in bed  Vocal quality prior to P.O.: Low volume  Consistency Presented: Ice chips; Thin liquid;Puree  How Presented: SLP-fed/presented;Straw;Spoon     Bolus Acceptance: No impairment  Bolus Formation/Control: Impaired  Type of Impairment: Delayed (Inconsistent delay)  Propulsion: Delayed (# of seconds)  Oral Residue: None  Initiation of Swallow: Delayed (# of seconds) (suspected, inconsistent)  Laryngeal Elevation: Decreased  Aspiration Signs/Symptoms: None  Pharyngeal Phase Characteristics:  (No observed difficulty)  Effective Modifications: None      Pain:  Pain Scale 1: Numeric (0 - 10)  Pain Intensity 1: 0       After treatment:   Patient left in no apparent distress in bed, Call bell within reach, Nursing notified, and Caregiver / family present    COMMUNICATION/EDUCATION:   The patient's plan of care including recommendations, planned interventions, and recommended diet changes were discussed with: Registered nurse.      Dorie Gresham., SLP Student   Time Calculation: 17 mins

## 2022-09-16 NOTE — PROGRESS NOTES
Bedside shift change report given to Ronni Hill (oncoming nurse) by Corinne Kail RN (offgoing nurse). Report included the following information SBAR, Kardex, ED Summary, OR Summary, Procedure Summary, Intake/Output, MAR, Cardiac Rhythm A fib, Alarm Parameters , and Dual Neuro Assessment.

## 2022-09-16 NOTE — PROGRESS NOTES
Problem: Mobility Impaired (Adult and Pediatric)  Goal: *Acute Goals and Plan of Care (Insert Text)  Description:   FUNCTIONAL STATUS PRIOR TO ADMISSION: Patient was independent and active without use of DME.    HOME SUPPORT PRIOR TO ADMISSION: The patient lived alone with no local support. Physical Therapy Goals  Initiated 9/15/2022  1. Patient will move from supine to sit and sit to supine , scoot up and down, and roll side to side in bed with moderate assistance  within 7 day(s). 2.  Patient will transfer from bed to chair and chair to bed with moderate assistance  using the least restrictive device within 7 day(s). 3.  Patient will perform sit to stand with moderate assistance  within 7 day(s). 4.  Patient will ambulate with moderate assistance  for 25 feet with the least restrictive device within 7 day(s). 5.  Patient will improve Bowles Balance score by 7 points within 7 days. Outcome: Progressing Towards Goal     PHYSICAL THERAPY TREATMENT  Patient: Herrera Woody (32 y.o. male)  Date: 9/16/2022  Diagnosis: Ischemic cerebrovascular accident (CVA) ([facility] [address] [I63.9] <principal problem not specified>      Precautions: Fall (SBP< 180/90)  Chart, physical therapy assessment, plan of care and goals were reviewed. ASSESSMENT  Patient continues with skilled PT services and is progressing towards goals. Patient received in bed with L wrist restraint intact. Vitals obtained and within parameters. Patient able to converse verbally better than previous session. Continues to demonstrate aphasia. Patient demonstrates R sided inattention today. When sitting up noted to have R lateral lean, able to correct with physical assist and cues for 10 second intervals. Patient unable to actively move RLE at all during sitting EOB. No active movement of RUE in sitting either. Each time RUE placed beside patient to promote joint approximation, noted to have retrograde LOB.  Once returning to bed, patient able to actively engage in [facility] of RLE. Completed neuro yelena exercises. Two family members at bedside and very encouraging to patient. Placed L wrist back in soft restraint. All needs met. Attempt to educate family on current deficits, provided ed to them on HEP that was written on white board to ensure movement of RLE over the weekend. Recommend nursing get patient EOB with assist of two 2-3 times per day to maintain current strength/function. Patient remains well below baseline as compared to PLOF, continue to recommend IPR at discharge. Current Level of Function Impacting Discharge (mobility/balance): mod to max A x 2 for rolling, mod A x 2 for supine to sit, mod to max A x 2 for sit to supine, max A x 2 for scooting in bed. Other factors to consider for discharge: high PLOF, strong family support         PLAN :  Patient continues to benefit from skilled intervention to address the above impairments. Continue treatment per established plan of care. to address goals. Recommendation for discharge: (in order for the patient to meet his/her long term goals)  Therapy 3 hours per day 5-7 days per week    This discharge recommendation:  Has been made in collaboration with the attending provider and/or case management    IF patient discharges home will need the following DME: to be determined (TBD)       SUBJECTIVE:   Patient stated It's the 16th.  When asked what year it was. OBJECTIVE DATA SUMMARY:   Critical Behavior:  Neurologic State: Alert, Confused  Orientation Level: Oriented to person, Disoriented to place, Disoriented to situation, Disoriented to time  Cognition: Decreased attention/concentration, Decreased command following  Safety/Judgement: Decreased awareness of environment, Decreased awareness of need for assistance, Decreased awareness of need for safety, Decreased insight into deficits  Functional Mobility Training:  Bed Mobility:  Rolling:  Moderate assistance;Maximum assistance;Assist x2  Supine to Sit: Moderate assistance;Assist x2  Sit to Supine: Moderate assistance;Maximum assistance;Assist x2  Scooting: Maximum assistance;Assist x2        Transfers:     NT     Balance:  Sitting: Impaired; With support  Sitting - Static: Fair (occasional)  Sitting - Dynamic: Poor (constant support)    Therapeutic Exercises:   Supine AAROM R heel slides, R ankle pumps, R hip flexion, R hip abduction/adduction    Pain Rating:  Discomfort to abrasions on R foot    Activity Tolerance:   Fair, SpO2 stable on RA, requires rest breaks, and observed SOB with activity    After treatment patient left in no apparent distress:   Supine in bed, Call bell within reach, Bed / chair alarm activated, Caregiver / family present, Side rails x 3, and Restraints to LUE    COMMUNICATION/COLLABORATION:   The patients plan of care was discussed with: Occupational therapist, Speech therapist, Registered nurse, and Case management. Patient was educated regarding His deficit(s) of R hemiparesis, R inattention,  as this relates to His diagnosis of CVA. He demonstrated poor understanding as evidenced by lacking insight into deficits and expressive/receptive aphasia. Patient and/or family was verbally educated on the BE FAST acronym for signs/symptoms of CVA and TIA. BE FAST was written on patient's communication board  for visual education and reinforcement. All questions answered with patient indicating fair/poor understanding.      Negar Vera PT   Time Calculation: 32 mins

## 2022-09-16 NOTE — PROGRESS NOTES
Pt Afib w/RVR up to 130's non sustaining, no acute events    Per NP okay to stop doing Q4 CK checks after 4 am

## 2022-09-17 LAB
ANION GAP SERPL CALC-SCNC: 6 MMOL/L (ref 5–15)
BASOPHILS # BLD: 0 K/UL (ref 0–0.1)
BASOPHILS NFR BLD: 0 % (ref 0–1)
BUN SERPL-MCNC: 37 MG/DL (ref 6–20)
BUN/CREAT SERPL: 51 (ref 12–20)
CALCIUM SERPL-MCNC: 9.1 MG/DL (ref 8.5–10.1)
CHLORIDE SERPL-SCNC: 116 MMOL/L (ref 97–108)
CK SERPL-CCNC: 699 U/L (ref 39–308)
CK SERPL-CCNC: 715 U/L (ref 39–308)
CO2 SERPL-SCNC: 26 MMOL/L (ref 21–32)
CREAT SERPL-MCNC: 0.72 MG/DL (ref 0.7–1.3)
DIFFERENTIAL METHOD BLD: ABNORMAL
EOSINOPHIL # BLD: 0 K/UL (ref 0–0.4)
EOSINOPHIL NFR BLD: 0 % (ref 0–7)
ERYTHROCYTE [DISTWIDTH] IN BLOOD BY AUTOMATED COUNT: 14.1 % (ref 11.5–14.5)
GLUCOSE BLD STRIP.AUTO-MCNC: 112 MG/DL (ref 65–117)
GLUCOSE SERPL-MCNC: 139 MG/DL (ref 65–100)
HCT VFR BLD AUTO: 42 % (ref 36.6–50.3)
HGB BLD-MCNC: 13.2 G/DL (ref 12.1–17)
IMM GRANULOCYTES # BLD AUTO: 0.1 K/UL (ref 0–0.04)
IMM GRANULOCYTES NFR BLD AUTO: 1 % (ref 0–0.5)
LYMPHOCYTES # BLD: 1.4 K/UL (ref 0.8–3.5)
LYMPHOCYTES NFR BLD: 15 % (ref 12–49)
MAGNESIUM SERPL-MCNC: 2.5 MG/DL (ref 1.6–2.4)
MCH RBC QN AUTO: 28.6 PG (ref 26–34)
MCHC RBC AUTO-ENTMCNC: 31.4 G/DL (ref 30–36.5)
MCV RBC AUTO: 91.1 FL (ref 80–99)
MONOCYTES # BLD: 1.2 K/UL (ref 0–1)
MONOCYTES NFR BLD: 12 % (ref 5–13)
NEUTS SEG # BLD: 6.7 K/UL (ref 1.8–8)
NEUTS SEG NFR BLD: 72 % (ref 32–75)
NRBC # BLD: 0 K/UL (ref 0–0.01)
NRBC BLD-RTO: 0 PER 100 WBC
PLATELET # BLD AUTO: 245 K/UL (ref 150–400)
PMV BLD AUTO: 10.8 FL (ref 8.9–12.9)
POTASSIUM SERPL-SCNC: 3.7 MMOL/L (ref 3.5–5.1)
RBC # BLD AUTO: 4.61 M/UL (ref 4.1–5.7)
SERVICE CMNT-IMP: NORMAL
SODIUM SERPL-SCNC: 148 MMOL/L (ref 136–145)
WBC # BLD AUTO: 9.3 K/UL (ref 4.1–11.1)

## 2022-09-17 PROCEDURE — 82962 GLUCOSE BLOOD TEST: CPT

## 2022-09-17 PROCEDURE — 85025 COMPLETE CBC W/AUTO DIFF WBC: CPT

## 2022-09-17 PROCEDURE — 94760 N-INVAS EAR/PLS OXIMETRY 1: CPT

## 2022-09-17 PROCEDURE — 83735 ASSAY OF MAGNESIUM: CPT

## 2022-09-17 PROCEDURE — 74011250636 HC RX REV CODE- 250/636: Performed by: NURSE PRACTITIONER

## 2022-09-17 PROCEDURE — 74011000250 HC RX REV CODE- 250: Performed by: NURSE PRACTITIONER

## 2022-09-17 PROCEDURE — 65660000001 HC RM ICU INTERMED STEPDOWN

## 2022-09-17 PROCEDURE — 74011250637 HC RX REV CODE- 250/637: Performed by: NURSE PRACTITIONER

## 2022-09-17 PROCEDURE — 80048 BASIC METABOLIC PNL TOTAL CA: CPT

## 2022-09-17 PROCEDURE — 74011250637 HC RX REV CODE- 250/637: Performed by: ANESTHESIOLOGY

## 2022-09-17 RX ADMIN — ATORVASTATIN CALCIUM 80 MG: 40 TABLET, FILM COATED ORAL at 21:42

## 2022-09-17 RX ADMIN — Medication: at 09:00

## 2022-09-17 RX ADMIN — FAMOTIDINE 20 MG: 10 INJECTION INTRAVENOUS at 08:00

## 2022-09-17 RX ADMIN — DOCUSATE SODIUM 100 MG: 100 CAPSULE, LIQUID FILLED ORAL at 17:45

## 2022-09-17 RX ADMIN — DOCUSATE SODIUM 100 MG: 100 CAPSULE, LIQUID FILLED ORAL at 08:00

## 2022-09-17 RX ADMIN — METOPROLOL TARTRATE 12.5 MG: 25 TABLET, FILM COATED ORAL at 08:00

## 2022-09-17 RX ADMIN — METOPROLOL TARTRATE 12.5 MG: 25 TABLET, FILM COATED ORAL at 17:45

## 2022-09-17 NOTE — PROGRESS NOTES
[address] [facility] Adult  Hospitalist Group                                                                                          Hospitalist Progress Note  Klever Yu MD  Answering service: [phone] from in house phone        Date of Service:  2022  NAME:  Michelle Bowman  :  1950  MRN:  [id number]      Admission Summary:     Patient presents with acute CVA, CT head and follow-up MRI shows large acute left MCA territory infarct. Patient with some expressive aphasia and right-sided weakness. Neuro following. Initially was admitted to ICU and transfer out of ICU 9/15/2022. Interval history / Subjective:     Patient has no acute complaint. Not much change from yesterday. Assessment & Plan:     Acute CVA  -CT head shows large acute left MCA territory infarct, CTA of the head and neck shows severe stenosis, near occlusive thrombus of the distal left MCA M1  -MRI of the brain which shows moderate to large acute left MCA territory infarct with findings associated with petechial hemorrhage  -Echo with bubble study shows EF of 50 to 55%, bubble study is not adequate to rule out shunt  -Neurology following, recommending holding antiplatelet and anticoagulation for 2 weeks due to large CVA with hemorrhagic conversion  -Patient continuing on statin  -Speech therapy following for aphasia and dysphagia, s/p FEES  and started on p.o.      Paroxysmal atrial fibrillation with RVR  -Patient noncompliant with anticoagulation per records, continue metoprolol  -Currently holding anticoagulation due to large CVA with hemorrhagic conversion  -Continue monitor on telemetry     Rhabdomyolysis  -This is likely due to prolonged immobilization after his CVA  -Continue IV fluid, CK trending down     Elevated troponin  -Likely demand ischemia     Prerenal azotemia  -Continue IV fluid and monitor     Dyslipidemia  -Continue statin, LDL at goal      Code status: Full  Prophylaxis: SCDs  Care Plan discussed with: Patient  Anticipated Disposition: 24 to 48 hours     Hospital Problems  Date Reviewed: 7/28/2022            Codes Class Noted POA    Ischemic cerebrovascular accident (CVA) [facility]) ICD-10-CM: I63.9  ICD-9-CM: 434.91  9/14/2022 Unknown             Review of Systems:   A comprehensive review of systems was negative except for that written in the HPI. Vital Signs:    Last 24hrs VS reviewed since prior progress note. Most recent are:  Visit Vitals  BP (!) 167/94 (BP 1 Location: Left upper arm, BP Patient Position: Supine)   Pulse (!) 101   Temp 98.1 °F (36.7 °C)   Resp 14   Ht 6' 3\" (1.905 m)   Wt 128.4 kg (283 lb)   SpO2 98%   BMI 35.37 kg/m²         Intake/Output Summary (Last 24 hours) at 9/17/2022 1142  Last data filed at 9/17/2022 0532  Gross per 24 hour   Intake --   Output 1175 ml   Net -1175 ml        Physical Examination:     I had a face to face encounter with this patient and independently examined them on 9/17/2022 as outlined below:          Constitutional:  No acute distress, cooperative, pleasant    ENT:  Oral mucosa moist, oropharynx benign. Resp:  CTA bilaterally. No wheezing/rhonchi/rales. No accessory muscle use. CV:  Regular rhythm, normal rate, no murmurs, gallops, rubs    GI:  Soft, non distended, non tender. normoactive bowel sounds, no hepatosplenomegaly     Musculoskeletal:  No edema, warm, 2+ pulses throughout    Neurologic:  Moves all extremities.   Awake and alert            Data Review:    Review and/or order of clinical lab test      Labs:     Recent Labs     09/17/22 0428 09/16/22  0034   WBC 9.3 11.9*   HGB 13.2 13.3   HCT 42.0 40.6    231     Recent Labs     09/17/22 0428 09/16/22  0034 09/15/22  0240 09/14/22  2210   *  --  139 134*   K 3.7  --  3.3* 3.6   *  --  108 105   CO2 26  --  25 20*   BUN 37*  --  46* 48*   CREA 0.72  --  0.99 1.21   *  --  166* 163*   CA 9.1  --  9.3 9.6   MG 2.5* 2.4 2.2  --      Recent Labs 09/14/22 2210   ALT 54   AP 74   TBILI 2.5*   TP 8.4*   ALB 3.8   GLOB 4.6*     Recent Labs     09/14/22 2210   INR 1.1   PTP 11.9*      No results for input(s): FE, TIBC, PSAT, FERR in the last 72 hours. No results found for: FOL, RBCF   No results for input(s): PH, PCO2, PO2 in the last 72 hours.   Recent Labs     09/17/22 0428 09/16/22  0612 09/16/22 0428   * 785* 699*     Lab Results   Component Value Date/Time    Cholesterol, total 146 09/15/2022 02:40 AM    HDL Cholesterol 53 09/15/2022 02:40 AM    LDL, calculated 65 09/15/2022 02:40 AM    Triglyceride 140 09/15/2022 02:40 AM    CHOL/HDL Ratio 2.8 09/15/2022 02:40 AM     Lab Results   Component Value Date/Time    Glucose (POC) 151 (H) 09/16/2022 05:16 PM    Glucose (POC) 128 (H) 09/16/2022 12:00 PM    Glucose (POC) 124 (H) 09/16/2022 06:12 AM    Glucose (POC) 134 (H) 09/16/2022 12:42 AM    Glucose (POC) 137 (H) 09/15/2022 04:53 PM     Lab Results   Component Value Date/Time    Color YELLOW/STRAW 10/04/2016 06:03 PM    Appearance CLEAR 10/04/2016 06:03 PM    Specific gravity 1.016 10/04/2016 06:03 PM    pH (UA) 5.5 10/04/2016 06:03 PM    Protein NEGATIVE  10/04/2016 06:03 PM    Glucose NEGATIVE  10/04/2016 06:03 PM    Ketone TRACE (A) 10/04/2016 06:03 PM    Bilirubin NEGATIVE  10/04/2016 06:03 PM    Urobilinogen 0.2 10/04/2016 06:03 PM    Nitrites NEGATIVE  10/04/2016 06:03 PM    Leukocyte Esterase NEGATIVE  10/04/2016 06:03 PM    Epithelial cells FEW 10/04/2016 06:03 PM    Bacteria NEGATIVE  10/04/2016 06:03 PM    WBC 0-4 10/04/2016 06:03 PM    RBC 10-20 10/04/2016 06:03 PM         Medications Reviewed:     Current Facility-Administered Medications   Medication Dose Route Frequency    0.9% sodium chloride infusion  75 mL/hr IntraVENous CONTINUOUS    HYDROmorphone (DILAUDID) injection 0.2 mg  0.2 mg IntraVENous Q3H PRN    labetaloL (NORMODYNE;TRANDATE) injection 5 mg  5 mg IntraVENous Q6H PRN    balsam peru-castor oiL (VENELEX) ointment   Topical BID    glucose chewable tablet 16 g  4 Tablet Oral PRN    glucagon (GLUCAGEN) injection 1 mg  1 mg IntraMUSCular PRN    dextrose 10 % infusion 0-250 mL  0-250 mL IntraVENous PRN    insulin lispro (HUMALOG) injection   SubCUTAneous Q6H    metoprolol tartrate (LOPRESSOR) tablet 12.5 mg  12.5 mg Oral BID    acetaminophen (TYLENOL) tablet 650 mg  650 mg Oral Q4H PRN    Or    acetaminophen (TYLENOL) solution 650 mg  650 mg Per NG tube Q4H PRN    Or    acetaminophen (TYLENOL) suppository 650 mg  650 mg Rectal Q4H PRN    atorvastatin (LIPITOR) tablet 80 mg  80 mg Oral QHS    docusate sodium (COLACE) capsule 100 mg  100 mg Oral BID    famotidine (PF) (PEPCID) 20 mg in 0.9% sodium chloride 10 mL injection  20 mg IntraVENous Q12H     ______________________________________________________________________  EXPECTED LENGTH OF STAY: 4d 9h  ACTUAL LENGTH OF STAY:          3                 Lynsey Torres MD

## 2022-09-18 LAB
ANION GAP SERPL CALC-SCNC: 5 MMOL/L (ref 5–15)
BUN SERPL-MCNC: 36 MG/DL (ref 6–20)
BUN/CREAT SERPL: 53 (ref 12–20)
CALCIUM SERPL-MCNC: 8.7 MG/DL (ref 8.5–10.1)
CHLORIDE SERPL-SCNC: 121 MMOL/L (ref 97–108)
CO2 SERPL-SCNC: 26 MMOL/L (ref 21–32)
CREAT SERPL-MCNC: 0.68 MG/DL (ref 0.7–1.3)
GLUCOSE BLD STRIP.AUTO-MCNC: 124 MG/DL (ref 65–117)
GLUCOSE SERPL-MCNC: 133 MG/DL (ref 65–100)
MAGNESIUM SERPL-MCNC: 2.5 MG/DL (ref 1.6–2.4)
POTASSIUM SERPL-SCNC: 4.1 MMOL/L (ref 3.5–5.1)
SERVICE CMNT-IMP: ABNORMAL
SODIUM SERPL-SCNC: 152 MMOL/L (ref 136–145)

## 2022-09-18 PROCEDURE — 74011000250 HC RX REV CODE- 250: Performed by: FAMILY MEDICINE

## 2022-09-18 PROCEDURE — 65660000001 HC RM ICU INTERMED STEPDOWN

## 2022-09-18 PROCEDURE — 74011250637 HC RX REV CODE- 250/637: Performed by: ANESTHESIOLOGY

## 2022-09-18 PROCEDURE — 74011250637 HC RX REV CODE- 250/637: Performed by: NURSE PRACTITIONER

## 2022-09-18 PROCEDURE — 36415 COLL VENOUS BLD VENIPUNCTURE: CPT

## 2022-09-18 PROCEDURE — 80048 BASIC METABOLIC PNL TOTAL CA: CPT

## 2022-09-18 PROCEDURE — 74011250636 HC RX REV CODE- 250/636: Performed by: NURSE PRACTITIONER

## 2022-09-18 PROCEDURE — 74011000250 HC RX REV CODE- 250: Performed by: NURSE PRACTITIONER

## 2022-09-18 PROCEDURE — 83735 ASSAY OF MAGNESIUM: CPT

## 2022-09-18 PROCEDURE — 82962 GLUCOSE BLOOD TEST: CPT

## 2022-09-18 RX ORDER — DEXTROSE MONOHYDRATE AND SODIUM CHLORIDE 5; .45 G/100ML; G/100ML
75 INJECTION, SOLUTION INTRAVENOUS CONTINUOUS
Status: DISCONTINUED | OUTPATIENT
Start: 2022-09-18 | End: 2022-09-20

## 2022-09-18 RX ADMIN — DOCUSATE SODIUM 100 MG: 100 CAPSULE, LIQUID FILLED ORAL at 08:06

## 2022-09-18 RX ADMIN — DOCUSATE SODIUM 100 MG: 100 CAPSULE, LIQUID FILLED ORAL at 17:19

## 2022-09-18 RX ADMIN — FAMOTIDINE 20 MG: 10 INJECTION INTRAVENOUS at 08:05

## 2022-09-18 RX ADMIN — Medication: at 09:00

## 2022-09-18 RX ADMIN — ATORVASTATIN CALCIUM 80 MG: 40 TABLET, FILM COATED ORAL at 22:01

## 2022-09-18 RX ADMIN — Medication: at 17:19

## 2022-09-18 RX ADMIN — LABETALOL HYDROCHLORIDE 5 MG: 5 INJECTION INTRAVENOUS at 17:24

## 2022-09-18 RX ADMIN — FAMOTIDINE 20 MG: 10 INJECTION INTRAVENOUS at 21:57

## 2022-09-18 RX ADMIN — METOPROLOL TARTRATE 12.5 MG: 25 TABLET, FILM COATED ORAL at 08:05

## 2022-09-18 RX ADMIN — METOPROLOL TARTRATE 12.5 MG: 25 TABLET, FILM COATED ORAL at 17:20

## 2022-09-18 RX ADMIN — DEXTROSE AND SODIUM CHLORIDE 75 ML/HR: 5; 450 INJECTION, SOLUTION INTRAVENOUS at 10:00

## 2022-09-18 NOTE — PROGRESS NOTES
[address] [facility] Adult  Hospitalist Group                                                                                          Hospitalist Progress Note  Klaudia Villalobos MD  Answering service: [phone] OR [phone] from in house phone        Date of Service:  2022  NAME:  Nimisha Bruce  :  1950  MRN:  [id number]      Admission Summary:     Patient presents with acute CVA, CT head and follow-up MRI shows large acute left MCA territory infarct. Patient with some expressive aphasia and right-sided weakness. Neuro following. Initially was admitted to ICU and transfer out of ICU 9/15/2022. Interval history / Subjective:     Patient has no acute complaint. Overall improving. Assessment & Plan:     Acute CVA  -CT head shows large acute left MCA territory infarct, CTA of the head and neck shows severe stenosis, near occlusive thrombus of the distal left MCA M1  -MRI of the brain which shows moderate to large acute left MCA territory infarct with findings associated with petechial hemorrhage  -Echo with bubble study shows EF of 50 to 55%, bubble study is not adequate to rule out shunt  -Neurology following, recommending holding antiplatelet and anticoagulation for 2 weeks due to large CVA with hemorrhagic conversion  -Patient continuing on statin  -Speech therapy following for aphasia and dysphagia, s/p FEES  and started on p.o.     Hypernatremia  -Change IV fluid to hypotonic  -Monitor sodium level     Paroxysmal atrial fibrillation with RVR  -Patient noncompliant with anticoagulation per records, continue metoprolol  -Currently holding anticoagulation due to large CVA with hemorrhagic conversion  -Continue monitor on telemetry     Rhabdomyolysis  -This is likely due to prolonged immobilization after his CVA  -Continue IV fluid, CK trending down     Elevated troponin  -Likely demand ischemia     Prerenal azotemia  -Continue IV fluid and monitor     Dyslipidemia  -Continue statin, LDL at goal      Code status: Full  Prophylaxis: SCDs  Care Plan discussed with: Patient  Anticipated Disposition: 24 to 48 hours     Hospital Problems  Date Reviewed: 7/28/2022            Codes Class Noted POA    Ischemic cerebrovascular accident (CVA) [facility]) ICD-10-CM: I63.9  ICD-9-CM: 434.91  9/14/2022 Unknown             Review of Systems:   A comprehensive review of systems was negative except for that written in the HPI. Vital Signs:    Last 24hrs VS reviewed since prior progress note. Most recent are:  Visit Vitals  BP (!) 140/82   Pulse 84   Temp 99 °F (37.2 °C)   Resp 20   Ht 6' 3\" (1.905 m)   Wt 128.4 kg (283 lb)   SpO2 96%   BMI 35.37 kg/m²       No intake or output data in the 24 hours ending 09/18/22 [id number]       Physical Examination:     I had a face to face encounter with this patient and independently examined them on 9/18/2022 as outlined below:          Constitutional:  No acute distress, cooperative, pleasant    ENT:  Oral mucosa moist, oropharynx benign. Resp:  CTA bilaterally. No wheezing/rhonchi/rales. No accessory muscle use. CV:  Regular rhythm, normal rate, no murmurs, gallops, rubs    GI:  Soft, non distended, non tender. normoactive bowel sounds, no hepatosplenomegaly     Musculoskeletal:  No edema, warm, 2+ pulses throughout    Neurologic:  Moves all extremities. Awake and alert            Data Review:    Review and/or order of clinical lab test      Labs:     Recent Labs     09/17/22 0428 09/16/22  0034   WBC 9.3 11.9*   HGB 13.2 13.3   HCT 42.0 40.6    231     Recent Labs     09/18/22  0520 09/17/22  0428 09/16/22  0034   * 148*  --    K 4.1 3.7  --    * 116*  --    CO2 26 26  --    BUN 36* 37*  --    CREA 0.68* 0.72  --    * 139*  --    CA 8.7 9.1  --    MG 2.5* 2.5* 2.4     No results for input(s): ALT, AP, TBIL, TBILI, TP, ALB, GLOB, GGT, AML, LPSE in the last 72 hours.     No lab exists for component: SGOT, GPT, AMYP, HLPSE    No results for input(s): INR, PTP, APTT, INREXT, INREXT in the last 72 hours. No results for input(s): FE, TIBC, PSAT, FERR in the last 72 hours. No results found for: FOL, RBCF   No results for input(s): PH, PCO2, PO2 in the last 72 hours.   Recent Labs     09/17/22  0428 09/16/22  0612 09/16/22  0428   * 785* 699*     Lab Results   Component Value Date/Time    Cholesterol, total 146 09/15/2022 02:40 AM    HDL Cholesterol 53 09/15/2022 02:40 AM    LDL, calculated 65 09/15/2022 02:40 AM    Triglyceride 140 09/15/2022 02:40 AM    CHOL/HDL Ratio 2.8 09/15/2022 02:40 AM     Lab Results   Component Value Date/Time    Glucose (POC) 112 09/17/2022 04:37 PM    Glucose (POC) 151 (H) 09/16/2022 05:16 PM    Glucose (POC) 128 (H) 09/16/2022 12:00 PM    Glucose (POC) 124 (H) 09/16/2022 06:12 AM    Glucose (POC) 134 (H) 09/16/2022 12:42 AM     Lab Results   Component Value Date/Time    Color YELLOW/STRAW 10/04/2016 06:03 PM    Appearance CLEAR 10/04/2016 06:03 PM    Specific gravity 1.016 10/04/2016 06:03 PM    pH (UA) 5.5 10/04/2016 06:03 PM    Protein NEGATIVE  10/04/2016 06:03 PM    Glucose NEGATIVE  10/04/2016 06:03 PM    Ketone TRACE (A) 10/04/2016 06:03 PM    Bilirubin NEGATIVE  10/04/2016 06:03 PM    Urobilinogen 0.2 10/04/2016 06:03 PM    Nitrites NEGATIVE  10/04/2016 06:03 PM    Leukocyte Esterase NEGATIVE  10/04/2016 06:03 PM    Epithelial cells FEW 10/04/2016 06:03 PM    Bacteria NEGATIVE  10/04/2016 06:03 PM    WBC 0-4 10/04/2016 06:03 PM    RBC 10-20 10/04/2016 06:03 PM         Medications Reviewed:     Current Facility-Administered Medications   Medication Dose Route Frequency    dextrose 5 % - 0.45% NaCl infusion  75 mL/hr IntraVENous CONTINUOUS    HYDROmorphone (DILAUDID) injection 0.2 mg  0.2 mg IntraVENous Q3H PRN    labetaloL (NORMODYNE;TRANDATE) injection 5 mg  5 mg IntraVENous Q6H PRN    balsam peru-castor oiL (VENELEX) ointment   Topical BID    glucose chewable tablet 16 g  4 Tablet Oral PRN    glucagon (GLUCAGEN) injection 1 mg  1 mg IntraMUSCular PRN    dextrose 10 % infusion 0-250 mL  0-250 mL IntraVENous PRN    insulin lispro (HUMALOG) injection   SubCUTAneous Q6H    metoprolol tartrate (LOPRESSOR) tablet 12.5 mg  12.5 mg Oral BID    acetaminophen (TYLENOL) tablet 650 mg  650 mg Oral Q4H PRN    Or    acetaminophen (TYLENOL) solution 650 mg  650 mg Per NG tube Q4H PRN    Or    acetaminophen (TYLENOL) suppository 650 mg  650 mg Rectal Q4H PRN    atorvastatin (LIPITOR) tablet 80 mg  80 mg Oral QHS    docusate sodium (COLACE) capsule 100 mg  100 mg Oral BID    famotidine (PF) (PEPCID) 20 mg in 0.9% sodium chloride 10 mL injection  20 mg IntraVENous Q12H     ______________________________________________________________________  EXPECTED LENGTH OF STAY: 4d 9h  ACTUAL LENGTH OF STAY:          4                 Patricia Mcdonnell MD

## 2022-09-19 ENCOUNTER — APPOINTMENT (OUTPATIENT)
Dept: GENERAL RADIOLOGY | Age: 72
DRG: 064 | End: 2022-09-19
Attending: FAMILY MEDICINE
Payer: MEDICARE

## 2022-09-19 LAB
ANION GAP BLD CALC-SCNC: 17 MMOL/L (ref 10–20)
ANION GAP SERPL CALC-SCNC: 7 MMOL/L (ref 5–15)
ANION GAP SERPL CALC-SCNC: 8 MMOL/L (ref 5–15)
ATRIAL RATE: 133 BPM
BASOPHILS # BLD: 0 K/UL (ref 0–0.1)
BASOPHILS NFR BLD: 0 % (ref 0–1)
BUN SERPL-MCNC: 58 MG/DL (ref 6–20)
BUN SERPL-MCNC: 61 MG/DL (ref 6–20)
BUN/CREAT SERPL: 22 (ref 12–20)
BUN/CREAT SERPL: 23 (ref 12–20)
CA-I BLD-MCNC: 1.13 MMOL/L (ref 1.12–1.32)
CALCIUM SERPL-MCNC: 8.7 MG/DL (ref 8.5–10.1)
CALCIUM SERPL-MCNC: 8.8 MG/DL (ref 8.5–10.1)
CALCULATED R AXIS, ECG10: 31 DEGREES
CALCULATED T AXIS, ECG11: -102 DEGREES
CHLORIDE BLD-SCNC: 122 MMOL/L (ref 100–108)
CHLORIDE SERPL-SCNC: 124 MMOL/L (ref 97–108)
CHLORIDE SERPL-SCNC: 125 MMOL/L (ref 97–108)
CO2 BLD-SCNC: 20 MMOL/L (ref 19–24)
CO2 SERPL-SCNC: 20 MMOL/L (ref 21–32)
CO2 SERPL-SCNC: 23 MMOL/L (ref 21–32)
COMMENT, HOLDF: NORMAL
COMMENT, HOLDF: NORMAL
CREAT SERPL-MCNC: 2.53 MG/DL (ref 0.7–1.3)
CREAT SERPL-MCNC: 2.8 MG/DL (ref 0.7–1.3)
CREAT UR-MCNC: 2.7 MG/DL (ref 0.6–1.3)
DIAGNOSIS, 93000: NORMAL
DIFFERENTIAL METHOD BLD: ABNORMAL
EOSINOPHIL # BLD: 0 K/UL (ref 0–0.4)
EOSINOPHIL NFR BLD: 0 % (ref 0–7)
ERYTHROCYTE [DISTWIDTH] IN BLOOD BY AUTOMATED COUNT: 14.4 % (ref 11.5–14.5)
GLUCOSE BLD STRIP.AUTO-MCNC: 139 MG/DL (ref 65–117)
GLUCOSE BLD STRIP.AUTO-MCNC: 141 MG/DL (ref 65–117)
GLUCOSE BLD STRIP.AUTO-MCNC: 145 MG/DL (ref 65–117)
GLUCOSE BLD STRIP.AUTO-MCNC: 149 MG/DL (ref 65–117)
GLUCOSE BLD STRIP.AUTO-MCNC: 152 MG/DL (ref 65–117)
GLUCOSE BLD STRIP.AUTO-MCNC: 165 MG/DL (ref 74–106)
GLUCOSE SERPL-MCNC: 155 MG/DL (ref 65–100)
GLUCOSE SERPL-MCNC: 170 MG/DL (ref 65–100)
HCT VFR BLD AUTO: 48.1 % (ref 36.6–50.3)
HGB BLD-MCNC: 15 G/DL (ref 12.1–17)
IMM GRANULOCYTES # BLD AUTO: 0.2 K/UL (ref 0–0.04)
IMM GRANULOCYTES NFR BLD AUTO: 1 % (ref 0–0.5)
LACTATE BLD-SCNC: 1.7 MMOL/L (ref 0.4–2)
LYMPHOCYTES # BLD: 1.6 K/UL (ref 0.8–3.5)
LYMPHOCYTES NFR BLD: 9 % (ref 12–49)
MAGNESIUM SERPL-MCNC: 2.4 MG/DL (ref 1.6–2.4)
MAGNESIUM SERPL-MCNC: 2.6 MG/DL (ref 1.6–2.4)
MCH RBC QN AUTO: 28.8 PG (ref 26–34)
MCHC RBC AUTO-ENTMCNC: 31.2 G/DL (ref 30–36.5)
MCV RBC AUTO: 92.5 FL (ref 80–99)
MONOCYTES # BLD: 2.1 K/UL (ref 0–1)
MONOCYTES NFR BLD: 12 % (ref 5–13)
NEUTS SEG # BLD: 13.9 K/UL (ref 1.8–8)
NEUTS SEG NFR BLD: 78 % (ref 32–75)
NRBC # BLD: 0 K/UL (ref 0–0.01)
NRBC BLD-RTO: 0 PER 100 WBC
PLATELET # BLD AUTO: 257 K/UL (ref 150–400)
PMV BLD AUTO: 10.3 FL (ref 8.9–12.9)
POTASSIUM BLD-SCNC: 4.1 MMOL/L (ref 3.5–5.5)
POTASSIUM SERPL-SCNC: 4.1 MMOL/L (ref 3.5–5.1)
POTASSIUM SERPL-SCNC: 4.3 MMOL/L (ref 3.5–5.1)
Q-T INTERVAL, ECG07: 312 MS
QRS DURATION, ECG06: 86 MS
QTC CALCULATION (BEZET), ECG08: 446 MS
RBC # BLD AUTO: 5.2 M/UL (ref 4.1–5.7)
RBC MORPH BLD: ABNORMAL
SAMPLES BEING HELD,HOLD: NORMAL
SAMPLES BEING HELD,HOLD: NORMAL
SERVICE CMNT-IMP: ABNORMAL
SODIUM BLD-SCNC: 158 MMOL/L (ref 136–145)
SODIUM SERPL-SCNC: 153 MMOL/L (ref 136–145)
SODIUM SERPL-SCNC: 154 MMOL/L (ref 136–145)
VENTRICULAR RATE, ECG03: 123 BPM
WBC # BLD AUTO: 17.8 K/UL (ref 4.1–11.1)

## 2022-09-19 PROCEDURE — 83605 ASSAY OF LACTIC ACID: CPT

## 2022-09-19 PROCEDURE — 71045 X-RAY EXAM CHEST 1 VIEW: CPT

## 2022-09-19 PROCEDURE — 74011000258 HC RX REV CODE- 258: Performed by: FAMILY MEDICINE

## 2022-09-19 PROCEDURE — 74011000250 HC RX REV CODE- 250: Performed by: NURSE PRACTITIONER

## 2022-09-19 PROCEDURE — 65660000001 HC RM ICU INTERMED STEPDOWN

## 2022-09-19 PROCEDURE — 92507 TX SP LANG VOICE COMM INDIV: CPT

## 2022-09-19 PROCEDURE — 82962 GLUCOSE BLOOD TEST: CPT

## 2022-09-19 PROCEDURE — 99233 SBSQ HOSP IP/OBS HIGH 50: CPT | Performed by: NURSE PRACTITIONER

## 2022-09-19 PROCEDURE — 80047 BASIC METABLC PNL IONIZED CA: CPT

## 2022-09-19 PROCEDURE — 74011250636 HC RX REV CODE- 250/636: Performed by: NURSE PRACTITIONER

## 2022-09-19 PROCEDURE — 83735 ASSAY OF MAGNESIUM: CPT

## 2022-09-19 PROCEDURE — 92526 ORAL FUNCTION THERAPY: CPT

## 2022-09-19 PROCEDURE — 74011636637 HC RX REV CODE- 636/637: Performed by: ANESTHESIOLOGY

## 2022-09-19 PROCEDURE — 74011000250 HC RX REV CODE- 250: Performed by: FAMILY MEDICINE

## 2022-09-19 PROCEDURE — 80048 BASIC METABOLIC PNL TOTAL CA: CPT

## 2022-09-19 PROCEDURE — 74011250637 HC RX REV CODE- 250/637: Performed by: ANESTHESIOLOGY

## 2022-09-19 PROCEDURE — 36415 COLL VENOUS BLD VENIPUNCTURE: CPT

## 2022-09-19 PROCEDURE — 85025 COMPLETE CBC W/AUTO DIFF WBC: CPT

## 2022-09-19 PROCEDURE — 74011250637 HC RX REV CODE- 250/637: Performed by: NURSE PRACTITIONER

## 2022-09-19 PROCEDURE — 51798 US URINE CAPACITY MEASURE: CPT

## 2022-09-19 PROCEDURE — 93005 ELECTROCARDIOGRAM TRACING: CPT

## 2022-09-19 PROCEDURE — 99221 1ST HOSP IP/OBS SF/LOW 40: CPT | Performed by: SPECIALIST

## 2022-09-19 RX ORDER — METOPROLOL TARTRATE 5 MG/5ML
5 INJECTION INTRAVENOUS ONCE
Status: COMPLETED | OUTPATIENT
Start: 2022-09-19 | End: 2022-09-19

## 2022-09-19 RX ORDER — METOPROLOL TARTRATE 25 MG/1
25 TABLET, FILM COATED ORAL EVERY 8 HOURS
Status: DISCONTINUED | OUTPATIENT
Start: 2022-09-19 | End: 2022-09-19

## 2022-09-19 RX ORDER — METOPROLOL TARTRATE 5 MG/5ML
2.5 INJECTION INTRAVENOUS ONCE
Status: COMPLETED | OUTPATIENT
Start: 2022-09-19 | End: 2022-09-19

## 2022-09-19 RX ORDER — METOPROLOL TARTRATE 5 MG/5ML
5 INJECTION INTRAVENOUS EVERY 6 HOURS
Status: DISCONTINUED | OUTPATIENT
Start: 2022-09-19 | End: 2022-09-21

## 2022-09-19 RX ORDER — FAMOTIDINE 20 MG/1
20 TABLET, FILM COATED ORAL EVERY 12 HOURS
Status: DISCONTINUED | OUTPATIENT
Start: 2022-09-19 | End: 2022-09-20

## 2022-09-19 RX ADMIN — FAMOTIDINE 20 MG: 10 INJECTION INTRAVENOUS at 09:16

## 2022-09-19 RX ADMIN — Medication 2 UNITS: at 18:29

## 2022-09-19 RX ADMIN — DOCUSATE SODIUM 100 MG: 100 CAPSULE, LIQUID FILLED ORAL at 18:27

## 2022-09-19 RX ADMIN — DILTIAZEM HYDROCHLORIDE 5 MG/HR: 5 INJECTION INTRAVENOUS at 09:47

## 2022-09-19 RX ADMIN — DOCUSATE SODIUM 100 MG: 100 CAPSULE, LIQUID FILLED ORAL at 09:16

## 2022-09-19 RX ADMIN — METOPROLOL TARTRATE 5 MG: 5 INJECTION INTRAVENOUS at 14:44

## 2022-09-19 RX ADMIN — Medication 2 UNITS: at 12:20

## 2022-09-19 RX ADMIN — ATORVASTATIN CALCIUM 80 MG: 40 TABLET, FILM COATED ORAL at 22:04

## 2022-09-19 RX ADMIN — METOPROLOL TARTRATE 5 MG: 5 INJECTION, SOLUTION INTRAVENOUS at 23:39

## 2022-09-19 RX ADMIN — LABETALOL HYDROCHLORIDE 5 MG: 5 INJECTION INTRAVENOUS at 06:52

## 2022-09-19 RX ADMIN — METOPROLOL TARTRATE 2.5 MG: 5 INJECTION, SOLUTION INTRAVENOUS at 04:11

## 2022-09-19 RX ADMIN — Medication: at 09:16

## 2022-09-19 RX ADMIN — ACETAMINOPHEN 650 MG: 325 TABLET, FILM COATED ORAL at 13:43

## 2022-09-19 RX ADMIN — FAMOTIDINE 20 MG: 20 TABLET, FILM COATED ORAL at 22:04

## 2022-09-19 RX ADMIN — METOPROLOL TARTRATE 5 MG: 5 INJECTION, SOLUTION INTRAVENOUS at 18:27

## 2022-09-19 RX ADMIN — Medication: at 18:29

## 2022-09-19 RX ADMIN — METOPROLOL TARTRATE 12.5 MG: 25 TABLET, FILM COATED ORAL at 09:15

## 2022-09-19 RX ADMIN — METOPROLOL TARTRATE 2.5 MG: 5 INJECTION, SOLUTION INTRAVENOUS at 05:26

## 2022-09-19 RX ADMIN — LABETALOL HYDROCHLORIDE 5 MG: 5 INJECTION INTRAVENOUS at 12:21

## 2022-09-19 RX ADMIN — METOPROLOL TARTRATE 25 MG: 25 TABLET, FILM COATED ORAL at 13:39

## 2022-09-19 RX ADMIN — DEXTROSE AND SODIUM CHLORIDE 75 ML/HR: 5; 450 INJECTION, SOLUTION INTRAVENOUS at 00:15

## 2022-09-19 RX ADMIN — Medication 2 UNITS: at 05:38

## 2022-09-19 NOTE — PROGRESS NOTES
Problem: Non-Violent Restraints  Goal: Removal from restraints as soon as assessed to be safe  Outcome: Progressing Towards Goal  Goal: No harm/injury to patient while restraints in use  Outcome: Progressing Towards Goal  Goal: Patient's dignity will be maintained  Outcome: Progressing Towards Goal  Goal: Patient Interventions  Outcome: Progressing Towards Goal     Problem: Pressure Injury - Risk of  Goal: *Prevention of pressure injury  Description: Document Pepe Scale and appropriate interventions in the flowsheet. Outcome: Progressing Towards Goal  Note: Pressure Injury Interventions:  Sensory Interventions: Assess changes in LOC    Moisture Interventions: Absorbent underpads    Activity Interventions: PT/OT evaluation    Mobility Interventions: Pressure redistribution bed/mattress (bed type)    Nutrition Interventions: Document food/fluid/supplement intake    Friction and Shear Interventions: Apply protective barrier, creams and emollients                Problem: Patient Education: Go to Patient Education Activity  Goal: Patient/Family Education  Outcome: Progressing Towards Goal     Problem: Patient Education: Go to Patient Education Activity  Goal: Patient/Family Education  Outcome: Progressing Towards Goal     Problem: Patient Education: Go to Patient Education Activity  Goal: Patient/Family Education  Outcome: Progressing Towards Goal     Problem: Patient Education: Go to Patient Education Activity  Goal: Patient/Family Education  Outcome: Progressing Towards Goal     Problem: Falls - Risk of  Goal: *Absence of Falls  Description: Document Kasi Fall Risk and appropriate interventions in the flowsheet.   Outcome: Progressing Towards Goal  Note: Fall Risk Interventions:       Mentation Interventions: Adequate sleep, hydration, pain control    Medication Interventions: Patient to call before getting OOB    Elimination Interventions: Bed/chair exit alarm    History of Falls Interventions: Bed/chair exit alarm         Problem: Patient Education: Go to Patient Education Activity  Goal: Patient/Family Education  Outcome: Progressing Towards Goal     Problem: Patient Education: Go to Patient Education Activity  Goal: Patient/Family Education  Outcome: Progressing Towards Goal

## 2022-09-19 NOTE — PROGRESS NOTES
Bedside and Verbal shift change report given to Jomar Justin 15 (oncoming nurse) by Martin Molina (offgoing nurse). Report included the following information SBAR, Kardex, ED Summary, Procedure Summary, Intake/Output, Cardiac Rhythm SB/ ST RVR , Alarm Parameters , and Dual Neuro Assessment.

## 2022-09-19 NOTE — PROGRESS NOTES
Occupational Therapy  09/19/22     Patient currently on OT caseload. OT tx attempted at 8:56 AM however HR up as high as 170 while supine in bed, /109, and RR into upper 30s all at rest. Session aborted and discussed with nursing who reports MD ordering cardizem drip, will follow up when patient medically appropriate for participation.      Thank you,  Edgar Montoya, OTR/L

## 2022-09-19 NOTE — PROGRESS NOTES
Pt's HR was sustaining in 140-150s, elevated BP, and tachypnic. Pt asymptomatic. Rapid response called. Pt received a one time dose of metoprolol. CXR ordered and labs sent off. Will continue to monitor pt.

## 2022-09-19 NOTE — PROGRESS NOTES
Clinical Pharmacy Note: IV to PO Automatic Conversion  Please note: Amanda Felton medication(s) (famotidine) has/have been changed from IV to PO based on the following critiera:    Patient is tolerating oral medications  Patient is tolerating a diet more advanced than clear liquids  Patient is not requiring vasopressors    This IV to PO conversion is based on the [employer] approved automatic conversion policy for eligible patients. Please call with questions.

## 2022-09-19 NOTE — WOUND CARE
Wound Care Note:     Re-consulted by nurse request for applied Venelex and added foam pads with bleeding. Chart shows:  Admitted for ischemic cerebrovascular accident  Past Medical History:   Diagnosis Date    Arrhythmia     atrial fib    Depression 4/24/2010    Diabetes ([facility] [address]     diet control for pre-diabetes    Dyslipidemia, goal LDL below 100 6/14/2011    Gout     HTN (hypertension) 4/24/2010    Impotence 4/24/2010    Morbid obesity ([facility] [address] 5/17/2010    VALENTE (obstructive sleep apnea) 4/24/2010    CPAP    Restless legs 4/15/2015     WBC = 9.3 on 9/17/22  Admitted from home    Assessment:   Patient is alert and communicating (uses hand gestures and yes and no answers), incontinent with moderate assistance needed in repositioning. Bed: [place]  Patient wearing briefs for incontinence    Diet: Adult diet dysphagia- pureed; No fluids on juli  Patient reports no pain. Bilateral heels skin intact and without erythema. 1. POA extensive ecchymosis is improving to right side of body; most areas appear to be abrasions     Posterior head is now lightly crusted, no drainage- Venelex applied  Right flank/hip ecchymosis appears smaller, no drainage noted- Venelex applied  Right trochanter with small abrasion and erythema, michael-wound non-blanchable erythema  Right lateral lower leg/foot/ankle with crusted areas- Venelex applied  Right elbow is pressure related, will continue to monitor- Venelex and Xeroform gauze applied, small sero/sang drainage  Right back appears to be abrasion, small sero/sang drainage- Venelex and foam applied  Right heel with small crusted area distally and light hyperpigmentation proximally- Venelex ointment applied. Patient moves around in bed causing back to bleed, ok to apply foam dressings to protect. Right elbow with sero/sang drainage, Venelex ointment, Xeroform guaze and small sacral border applied.     Spoke with Dr. Nannette Pedersen, wound care orders obtained. Patient repositioned supine. Heels offloaded on pillows. Recommendations:    Continue with current wound care except for right elbow. Turn from left to back only. Posterior head, right back, right flank, right hip, right lateral lower leg, right ankle/foot, right toes, bilateral heels and sacrum- Every 12 hours liberally apply Venelex ointment. Right elbow- Daily cleanse, liberally apply Venelex, cover with Xeroform gauze folded in half and cover with small sacral border. Skin Care & Pressure Prevention:  Minimize layers of linen/pads under patient to optimize support surface. Turn/reposition approximately every 2 hours and offload heels.   Manage incontinence / promote continence   Nourishing Skin Cream to dry skin, minimize use of briefs when able    Discussed above plan with patient & Francisco Ferreira RN    Transition of Care: Plan to follow as needed while admitted to hospital.    Nicolas Fontenot, LIZN, RN, [employer]  Certified Wound and Ostomy Nurse  office [phone]  Best way to contact me is through [employer]

## 2022-09-19 NOTE — PROGRESS NOTES
Physical Therapy  09/19/22     Patient currently on PT caseload. PT tx attempted at 8:56 AM however HR up as high as 170 while supine in bed, /109, and RR into upper 30s all at rest. Session aborted and discussed with nursing who reports MD ordering cardizem drip, will follow up when patient medically appropriate for participation. Addie Blair, PT, DPT    1341:  Attempted PT session. Patient continues with elevated HR into the 150s at rest and BP continues to be elevated. Will defer and follow up at later time pending medical stability.

## 2022-09-19 NOTE — PROGRESS NOTES
[facility] Adult  Hospitalist Group                                                                                          Hospitalist Progress Note  Vincent Alejandro MD  Answering service: [phone] OR [phone] from in house phone        Date of Service:  2022  NAME:  Harper Walsh  :  1950  MRN:  [id number]      Admission Summary:     Patient presents with acute CVA, CT head and follow-up MRI shows large acute left MCA territory infarct. Patient with some expressive aphasia and right-sided weakness. Neuro following. Initially was admitted to ICU and transfer out of ICU 9/15/2022. Interval history / Subjective:     Patient has no acute complaint. Noted A. fib with RVR this AM.     Assessment & Plan:     Acute CVA  -CT head shows large acute left MCA territory infarct, CTA of the head and neck shows severe stenosis, near occlusive thrombus of the distal left MCA M1  -MRI of the brain which shows moderate to large acute left MCA territory infarct with findings associated with petechial hemorrhage  -Echo with bubble study shows EF of 50 to 55%, bubble study is not adequate to rule out shunt  -Neurology following, recommending holding antiplatelet and anticoagulation for 2 weeks due to large CVA with hemorrhagic conversion  -Patient continuing on statin  -Speech therapy following for aphasia and dysphagia, s/p FEES  and started on p.o.     Hypernatremia  -Change IV fluid to hypotonic  -Monitor sodium level     Paroxysmal atrial fibrillation with RVR  -Patient's heart rate previously been controlled with low-dose metoprolol  -Noted RVR this a.m., has received IV metoprolol and labetalol without much improvement and started on diltiazem drip this a.m.  -Now cardiology has seen the patient, diltiazem drip was discontinued and metoprolol dose was increased  -Currently holding anticoagulation due to large CVA with hemorrhagic conversion  -Patient was previously taking Xarelto compliantly and therefore may indicate failure of Xarelto, plan to change to Eliquis when patient is cleared to take oral anticoagulation     Rhabdomyolysis  -This is likely due to prolonged immobilization after his CVA  -Continue IV fluid, CK trending down     Elevated troponin  -Likely demand ischemia     Prerenal azotemia  -Continue IV fluid and monitor     Dyslipidemia  -Continue statin, LDL at goal      Code status: Full  Prophylaxis: SCDs  Care Plan discussed with: Patient  Anticipated Disposition: 24 to 48 hours    CRITICAL CARE ATTESTATION:  I had a face to face encounter with the patient, reviewed and interpreted patient data including clinical events, labs, images, vital signs, I/O's, and examined patient. I have discussed the case and the plan and management of the patient's care with the consulting services, the bedside nurses and necessary ancillary providers. NOTE OF PERSONAL INVOLVEMENT IN CARE   This patient has a high probability of imminent, clinically significant deterioration, which requires the highest level of preparedness to intervene urgently. I participated in the decision-making and personally managed or directed the management of the following life and organ supporting interventions that required my frequent assessment to treat or prevent imminent deterioration. I personally spent 45 minutes of critical care time. This is time spent at this critically ill patient's bedside actively involved in patient care as well as the coordination of care and discussions with the patient's family. This does not include any procedural time which has been billed separately. Hospital Problems  Date Reviewed: 7/28/2022            Codes Class Noted POA    Ischemic cerebrovascular accident (CVA) [facility]) ICD-10-CM: I63.9  ICD-9-CM: 434.91  9/14/2022 Unknown             Review of Systems:   A comprehensive review of systems was negative except for that written in the HPI.        Vital Signs:    Last 24hrs VS reviewed since prior progress note. Most recent are:  Visit Vitals  BP (!) 174/92   Pulse (!) 140   Temp 99.7 °F (37.6 °C)   Resp 27   Ht 6' 3\" (1.905 m)   Wt 128.4 kg (283 lb)   SpO2 91%   BMI 35.37 kg/m²       No intake or output data in the 24 hours ending 09/19/22 1405       Physical Examination:     I had a face to face encounter with this patient and independently examined them on 9/19/2022 as outlined below:          Constitutional:  No acute distress, cooperative, pleasant    ENT:  Oral mucosa moist, oropharynx benign. Resp:  CTA bilaterally. No wheezing/rhonchi/rales. No accessory muscle use. CV:  Regular rhythm, normal rate, no murmurs, gallops, rubs    GI:  Soft, non distended, non tender. normoactive bowel sounds, no hepatosplenomegaly     Musculoskeletal:  No edema, warm, 2+ pulses throughout    Neurologic:  Moves all extremities. Awake and alert            Data Review:    Review and/or order of clinical lab test      Labs:     Recent Labs     09/17/22 0428   WBC 9.3   HGB 13.2   HCT 42.0        Recent Labs     09/19/22  0101 09/18/22  0520 09/17/22  0428   NA  --  152* 148*   K  --  4.1 3.7   CL  --  121* 116*   CO2  --  26 26   BUN  --  36* 37*   CREA  --  0.68* 0.72   GLU  --  133* 139*   CA  --  8.7 9.1   MG 2.4 2.5* 2.5*     No results for input(s): ALT, AP, TBIL, TBILI, TP, ALB, GLOB, GGT, AML, LPSE in the last 72 hours. No lab exists for component: SGOT, GPT, AMYP, HLPSE    No results for input(s): INR, PTP, APTT, INREXT, INREXT in the last 72 hours. No results for input(s): FE, TIBC, PSAT, FERR in the last 72 hours. No results found for: FOL, RBCF   No results for input(s): PH, PCO2, PO2 in the last 72 hours.   Recent Labs     09/17/22 0428   *     Lab Results   Component Value Date/Time    Cholesterol, total 146 09/15/2022 02:40 AM    HDL Cholesterol 53 09/15/2022 02:40 AM    LDL, calculated 65 09/15/2022 02:40 AM    Triglyceride 140 09/15/2022 02:40 AM CHOL/HDL Ratio 2.8 09/15/2022 02:40 AM     Lab Results   Component Value Date/Time    Glucose (POC) 141 (H) 09/19/2022 12:07 PM    Glucose (POC) 145 (H) 09/19/2022 05:30 AM    Glucose (POC) 152 (H) 09/19/2022 12:22 AM    Glucose (POC) 124 (H) 09/18/2022 11:39 AM    Glucose (POC) 112 09/17/2022 04:37 PM     Lab Results   Component Value Date/Time    Color YELLOW/STRAW 10/04/2016 06:03 PM    Appearance CLEAR 10/04/2016 06:03 PM    Specific gravity 1.016 10/04/2016 06:03 PM    pH (UA) 5.5 10/04/2016 06:03 PM    Protein NEGATIVE  10/04/2016 06:03 PM    Glucose NEGATIVE  10/04/2016 06:03 PM    Ketone TRACE (A) 10/04/2016 06:03 PM    Bilirubin NEGATIVE  10/04/2016 06:03 PM    Urobilinogen 0.2 10/04/2016 06:03 PM    Nitrites NEGATIVE  10/04/2016 06:03 PM    Leukocyte Esterase NEGATIVE  10/04/2016 06:03 PM    Epithelial cells FEW 10/04/2016 06:03 PM    Bacteria NEGATIVE  10/04/2016 06:03 PM    WBC 0-4 10/04/2016 06:03 PM    RBC 10-20 10/04/2016 06:03 PM         Medications Reviewed:     Current Facility-Administered Medications   Medication Dose Route Frequency    metoprolol tartrate (LOPRESSOR) tablet 25 mg  25 mg Oral Q8H    dextrose 5 % - 0.45% NaCl infusion  75 mL/hr IntraVENous CONTINUOUS    HYDROmorphone (DILAUDID) injection 0.2 mg  0.2 mg IntraVENous Q3H PRN    labetaloL (NORMODYNE;TRANDATE) injection 5 mg  5 mg IntraVENous Q6H PRN    balsam peru-castor oiL (VENELEX) ointment   Topical BID    glucose chewable tablet 16 g  4 Tablet Oral PRN    glucagon (GLUCAGEN) injection 1 mg  1 mg IntraMUSCular PRN    dextrose 10 % infusion 0-250 mL  0-250 mL IntraVENous PRN    insulin lispro (HUMALOG) injection   SubCUTAneous Q6H    acetaminophen (TYLENOL) tablet 650 mg  650 mg Oral Q4H PRN    Or    acetaminophen (TYLENOL) solution 650 mg  650 mg Per NG tube Q4H PRN    Or    acetaminophen (TYLENOL) suppository 650 mg  650 mg Rectal Q4H PRN    atorvastatin (LIPITOR) tablet 80 mg  80 mg Oral QHS    docusate sodium (COLACE) capsule 100 mg  100 mg Oral BID    famotidine (PF) (PEPCID) 20 mg in 0.9% sodium chloride 10 mL injection  20 mg IntraVENous Q12H     ______________________________________________________________________  EXPECTED LENGTH OF STAY: 4d 9h  ACTUAL LENGTH OF STAY:          5                 Vargas Calderón MD

## 2022-09-19 NOTE — PROGRESS NOTES
Overnight patient had Afib/ST with RVR, EKG confirmed. Treated with 2 doses of Metoprolol 2.5 mg and IV push Labetalol PRN. Monitor for HR and BP trends in day shift.

## 2022-09-19 NOTE — CONSULTS
[facility]  Cardiology Care Note                  [x]Initial visit     []Established visit     Patient Name: Moris Apodaca  Primary Cardiologist: Rgoelio Raman MD  Consulting Cardiologist: Suzan Sotelo MD     Reason for initial visit: atrial fibrillation with RVR    HPI:   Patient is a 71year old male with a hx of chronic afib on Xarelto, DM, VALENTE on CPAP, HLD, and gout. 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 He reports that he has been faithfully taking Xarelto, not missing the medication, though this is under investigation. Sister is supposed to bring in Xarelto bottle. He is currently off APT/OAC d/t hemorrhagic conversion with plans to start in 2 weeks per neurology. He has been rate controlled on Metoprolol until overnight with HR up to 140s on telemetry. He was given IV Labetalol and Metoprolol without much improvement. He was started on a Diltiazem gtt. SUBJECTIVE:  His speech is somewhat garbled, but able to communicate simple answers. He denies currently having CP, rapid heart rate sensation, or SOB. He is reporting today that he has faithfully taken Xarelto. Assessment and Plan   Patient seen on the day of progress note and examined  and agree with Advance Practice Provider (KAMLA, NP,PA)  assessment and plans. HR not yet controlled  Increase metoprolol   Try to take off iv cardizem for now  No oac for 2 weeks  Atrial fibrillation with RVR: hx of chronic afib, not requiring rate controlling medications PTA. Since admission pt has been rate controlled on Metoprolol 12.5mg bid. He became tachycardic overnight, also noticing tachypnea. He is post IV Metoprolol and Labetalol. Diltiazem gtt was started. Still fairly tachycardic despite Diltiazem. BP trending high.  Change Metoprolol to 25mg q8hrs starting now. Stop Diltiazem. Echo has been completed with EF 50-55%, bubble study not adequate to r/o shunt. Holding APT/OAC for 2 weeks. ? Failure of Xarelto vs vessel to vessel thrombus d/t stenosis. Pt reporting that he has been faithfully taking med. At this stage when cleared to start would switch to Eliquis 5mg bid. Acute CVA: per neurology found to have a large left MCA infarct with hemorrhagic conversion, and occluding thrombus in left M1 with other areas of IC stenoses. Taking Lipitor, see above for APT/OAC management. Echo with inadequate bubble study. HTN: trending up to 170s/100s. See changes above. HLD: on statin               ____________________________________________________________    Cardiac testing  09/14/22    ECHO ADULT COMPLETE 09/15/2022 9/15/2022    Interpretation Summary  Formatting of this result is different from the original.      Left Ventricle: Normal left ventricular systolic function with a visually estimated EF of 50 - 55%. Left ventricle size is normal. Normal wall thickness. Right Ventricle: Right ventricle is mildly dilated. Normal wall thickness. Mildly reduced systolic function. Left Atrium: Left atrium is mildly dilated. Technical qualifiers: Echo study was technically difficult with poor endocardial visualization and technically difficult due to patient's body habitus. Contrast used: Definity. Bubble study is not adequate to rule out shunt    Signed by: Arley Brand MD on 9/15/2022  4:12 PM                Most recent HS troponins:  No results for input(s): TROPHS in the last 72 hours.     No lab exists for component:  CKMB  ECG: AF,       Review of Systems    [x]All other systems reviewed and all negative except as written in HPI    [] Patient unable to provide secondary to condition         Past Medical History:   Diagnosis Date    Arrhythmia     atrial fib    Depression 4/24/2010    Diabetes ([facility] [address]     diet control for pre-diabetes    Dyslipidemia, goal LDL below 100 6/14/2011    Gout     HTN (hypertension) 4/24/2010    Impotence 4/24/2010    Morbid obesity ([address] 5/17/2010    VALENTE (obstructive sleep apnea) 4/24/2010    CPAP    Restless legs 4/15/2015     Past Surgical History:   Procedure Laterality Date    COLONOSCOPY N/A 6/27/2017    COLONOSCOPY performed by Ananth Roland MD at [facility], [address], DIAGNOSTIC  2007    HX APPENDECTOMY      HX ORTHOPAEDIC  2000    bilat TKR     HX ORTHOPAEDIC  2010    left THR    HX UROLOGICAL  03/2018    urolift    HX UROLOGICAL  03/2019    prosthesis     Social Hx:  reports that he quit smoking about 32 years ago. His smoking use included cigarettes. He has a 2.50 pack-year smoking history. He has never used smokeless tobacco. He reports current alcohol use of about 1.7 standard drinks per week. He reports that he does not use drugs. Family Hx: family history includes Cancer in his father, maternal aunt, maternal uncle, and paternal grandfather; Diabetes in his brother and sister. No Known Allergies       OBJECTIVE:  Wt Readings from Last 3 Encounters:   09/15/22 283 lb (128.4 kg)   07/28/22 290 lb (131.5 kg)   06/14/22 296 lb (134.3 kg)     No intake or output data in the 24 hours ending 09/19/22 1131      Physical Exam    Vitals:   Vitals:    09/19/22 0927 09/19/22 0930 09/19/22 1000 09/19/22 1001   BP: (!) 176/102 (!) 176/102 (!) 173/105    Pulse: (!) 147 (!) 136 (!) 147 (!) 139   Resp: (!) 33 (!) 32 (!) 31    Temp: 98 °F (36.7 °C)      SpO2: 91%      Weight:       Height:         Telemetry: AFIB    General:    Alert, cooperative, no distress, appears stated age. Neck:   Supple, no carotid bruit and no JVD. Back:     Symmetric    Lungs:     clear to auscultation bilaterally. Heart[de-identified]    irregular rate and rhythm, S1, S2 normal, no murmur, click, rub or gallop. Abdomen:     Soft, non-tender. Bowel sounds normal.    Extremities:   Extremities normal, atraumatic, no cyanosis or edema.    Vascular:   Pulses - sudheer UE/LE equal   Skin:   Skin color normal. No rashes or lesions on visible areas   Neurologic:   Alert, Moves left extremities. Speech somewhat garbled       Data Review:     Radiology:   XR Results (most recent):  Results from Hospital Encounter encounter on 09/14/22    XR ELBOW RT AP/LAT    Narrative  EXAM: XR ELBOW RT AP/LAT    INDICATION: swollen warm. COMPARISON: None. FINDINGS: Two views of the right elbow are obtained with portable technique. There is no apparent fracture, dislocation or effusion. There is mild  osteoarthritis. Soft tissue swelling is present without soft tissue gas or  foreign body. Impression  No fracture. Soft tissue swelling. CT Results (most recent):  Results from Hospital Encounter encounter on 09/14/22    CT HEAD WO CONT    Narrative  EXAM: CT HEAD WO CONT    INDICATION: hemorrhagic conversion? COMPARISON: CT dose prior to this exam.    CONTRAST: None. TECHNIQUE: Unenhanced CT of the head was performed using 5 mm images. Brain and  bone windows were generated. Coronal and sagittal reformats. CT dose reduction  was achieved through use of a standardized protocol tailored for this  examination and automatic exposure control for dose modulation. FINDINGS:  Left anterolateral temporal lobe hypodensity with loss of gray-white  differentiation is redemonstrated with no evident interval hemorrhage or other  significant interval change with localized mass effect and effacement of the  anterior horn of the right lateral ventricle. The ventricles and sulci are  otherwise normal in size, shape and configuration. There is no significant white  matter disease. There is no new intracranial hemorrhage, extra-axial collection,  or mass effect. The basilar cisterns are open. The bone windows demonstrate no abnormalities. The visualized portions of the  paranasal sinuses and mastoid air cells are clear.  There is a soft tissue  collection overlying the right frontoparietal skull, not significant change. Impression  Left MCA territory left temporal lobe infarct is stable appearance  without interval hemorrhagic transformation. Right frontoparietal scalp  collection unchanged. MRI Results (most recent):  Results from [place] encounter on 09/14/22    MRI BRAIN WO CONT    Narrative  INDICATION:   follow up ischemic cva    EXAMINATION:  MRI BRAIN WO CONTRAST    COMPARISON:  CT September 14, 2022    TECHNIQUE:  Multiplanar multisequence acquisition without contrast of the brain. FINDINGS:    There is a moderate to large area of acute infarction in the left middle  cerebral artery territory, involving the left basal ganglia and corona radiata,  left anterior and mid temporal lobe, left insula and left frontal operculum. There is associated susceptibility artifact predominantly within the basal  ganglia and corona radiata portions of the infarction, with slight mass effect  upon the left lateral ventricle similar to recent CT. There is no hydrocephalus  or midline shift. Major intracranial vascular flow-voids are patent. Subcutaneous edema throughout the scalp right greater than left. .    Impression  Moderate to large acute left MCA territory infarction, with associated  susceptibility artifact predominantly in the basal ganglia/corona radiata  consistent with blood product/petechial hemorrhage. Overall appearance unchanged  compared to recent CT, with no hydrocephalus or midline shift. Recent Labs     09/17/22  0428   *     Recent Labs     09/18/22  0520 09/17/22  0428   * 148*   K 4.1 3.7   * 116*   CO2 26 26   BUN 36* 37*   CREA 0.68* 0.72   * 139*   CA 8.7 9.1     Recent Labs     09/17/22  0428   WBC 9.3   HGB 13.2   HCT 42.0        No results for input(s): PTP, INR, AP, INREXT, INREXT in the last 72 hours. No lab exists for component: PTTP, GPT, SGOT  No results for input(s): CHOL, LDLC in the last 72 hours.     No lab exists for component: TGL, HDLC,  HBA1C  No results for input(s): CRP, TSH, TSHEXT, TSHEXT in the last 72 hours.     No lab exists for component: ESR        Current meds:    Current Facility-Administered Medications:     metoprolol tartrate (LOPRESSOR) tablet 25 mg, 25 mg, Oral, Q8H, Khari CALLAHAN NP    dextrose 5 % - 0.45% NaCl infusion, 75 mL/hr, IntraVENous, CONTINUOUS, Otf Tom MD, Last Rate: 75 mL/hr at 09/19/22 0015, 75 mL/hr at 09/19/22 0015    HYDROmorphone (DILAUDID) injection 0.2 mg, 0.2 mg, IntraVENous, Q3H PRN, Rosana Hutchinson ACNP    labetaloL (NORMODYNE;TRANDATE) injection 5 mg, 5 mg, IntraVENous, Q6H PRN, Rosana Hutchinson, ACNP, 5 mg at 09/19/22 0040    balsam peru-castor oiL (VENELEX) ointment, , Topical, BID, Barbee Lombard, MD, Given at 09/19/22 0916    glucose chewable tablet 16 g, 4 Tablet, Oral, PRN, Barbee Lombard, MD    glucagon (GLUCAGEN) injection 1 mg, 1 mg, IntraMUSCular, PRN, Barbee Lombard, MD    dextrose 10 % infusion 0-250 mL, 0-250 mL, IntraVENous, PRN, Barbee Lombard, MD    insulin lispro (HUMALOG) injection, , SubCUTAneous, Q6H, Barbee Lombard, MD, 2 Units at 09/19/22 0538    acetaminophen (TYLENOL) tablet 650 mg, 650 mg, Oral, Q4H PRN **OR** acetaminophen (TYLENOL) solution 650 mg, 650 mg, Per NG tube, Q4H PRN **OR** acetaminophen (TYLENOL) suppository 650 mg, 650 mg, Rectal, Q4H PRN, Rosana Hutchinson ACNP    atorvastatin (LIPITOR) tablet 80 mg, 80 mg, Oral, QHS, Rosana Hutchinson, ACNP, 80 mg at 09/18/22 2201    docusate sodium (COLACE) capsule 100 mg, 100 mg, Oral, BID, Rosana Hutchinson, ACNP, 100 mg at 09/19/22 0916    famotidine (PF) (PEPCID) 20 mg in 0.9% sodium chloride 10 mL injection, 20 mg, IntraVENous, Q12H, Rosana Hutchinson ACNP, 20 mg at 09/19/22 Yousif Kearns MD  [facility], [address] [address],[address]  [address]  [phone]      CC:Viji Villalba MD

## 2022-09-19 NOTE — PROGRESS NOTES
Bedside and Verbal shift change report given to [facility],[address] [address] (oncoming nurse) by Vern Arnold RN (offgoing nurse). Report included the following information SBAR, Kardex, Intake/Output, MAR, Recent Results, and Med Rec Status.

## 2022-09-19 NOTE — PROGRESS NOTES
RRT call for afib with RVR. HR up to 160-170s. Pt denied symptoms though noted to be tachypneic. New IV was started and he was given IV Metoprolol 5mg x1. HR trending down to 110s-120s. O2 sats trending 91-92% on RA. BP 150s/100s. Mentation seems to be stable/baseline. Labs drawn and CXR ordered. Change his po Metoprolol to IV q6hrs to perhaps improve HR management. Cont with prn Labetolol. Continue to monitor. ? What is the underlying  for the tachycardia in someone who was previously rate controlled without medication.

## 2022-09-19 NOTE — PROGRESS NOTES
Transition of Care Plan  RUR- Low 14%  DISPOSITION: IPR - [facility] - needs insurance auth - pending PT/OT notes  F/U with PCP/Specialist    Transport: [facility]    Emergency contact: Son Claudette Colander [phone]    CM barriers to discharge: insurance auth    [facility] planning to start insurance auth for IPR once updated PT/OT notes are available. Therapy attempted to see this morning but patient's HR in 170's and unable to complete session. [facility]) REYNOLD Castaneda.

## 2022-09-19 NOTE — PROGRESS NOTES
Problem: Pressure Injury - Risk of  Goal: *Prevention of pressure injury  Description: Document Pepe Scale and appropriate interventions in the flowsheet. Outcome: Progressing Towards Goal  Note: Pressure Injury Interventions:  Sensory Interventions: Assess changes in LOC, Discuss PT/OT consult with provider, Keep linens dry and wrinkle-free, Float heels, Minimize linen layers, Pad between skin to skin    Moisture Interventions: Absorbent underpads, Limit adult briefs, Minimize layers    Activity Interventions: Pressure redistribution bed/mattress(bed type), PT/OT evaluation    Mobility Interventions: HOB 30 degrees or less, Pressure redistribution bed/mattress (bed type), PT/OT evaluation    Nutrition Interventions: Document food/fluid/supplement intake    Friction and Shear Interventions: HOB 30 degrees or less, Minimize layers                Problem: Patient Education: Go to Patient Education Activity  Goal: Patient/Family Education  Outcome: Progressing Towards Goal     Problem: Patient Education: Go to Patient Education Activity  Goal: Patient/Family Education  Outcome: Progressing Towards Goal     Problem: Patient Education: Go to Patient Education Activity  Goal: Patient/Family Education  Outcome: Progressing Towards Goal     Problem: Patient Education: Go to Patient Education Activity  Goal: Patient/Family Education  Outcome: Progressing Towards Goal     Problem: Patient Education: Go to Patient Education Activity  Goal: Patient/Family Education  Outcome: Progressing Towards Goal     Problem: Falls - Risk of  Goal: *Absence of Falls  Description: Document Kasi Fall Risk and appropriate interventions in the flowsheet.   Outcome: Progressing Towards Goal  Note: Fall Risk Interventions:       Mentation Interventions: Bed/chair exit alarm, Adequate sleep, hydration, pain control, Door open when patient unattended, Evaluate medications/consider consulting pharmacy, Increase mobility, More frequent rounding, Reorient patient    Medication Interventions: Assess postural VS orthostatic hypotension, Bed/chair exit alarm, Evaluate medications/consider consulting pharmacy, Patient to call before getting OOB, Teach patient to arise slowly    Elimination Interventions: Bed/chair exit alarm, Call light in reach, Patient to call for help with toileting needs, Toilet paper/wipes in reach, Toileting schedule/hourly rounds    History of Falls Interventions: Bed/chair exit alarm, Consult care management for discharge planning, Door open when patient unattended, Evaluate medications/consider consulting pharmacy         Problem: Patient Education: Go to Patient Education Activity  Goal: Patient/Family Education  Outcome: Progressing Towards Goal     Problem: Patient Education: Go to Patient Education Activity  Goal: Patient/Family Education  Outcome: Progressing Towards Goal     Problem: TIA/CVA Stroke: Day 2 Until Discharge  Goal: Off Pathway (Use only if patient is Off Pathway)  Outcome: Progressing Towards Goal  Goal: Activity/Safety  Outcome: Progressing Towards Goal  Goal: Diagnostic Test/Procedures  Outcome: Progressing Towards Goal  Goal: Nutrition/Diet  Outcome: Progressing Towards Goal  Goal: Discharge Planning  Outcome: Progressing Towards Goal  Goal: Medications  Outcome: Progressing Towards Goal  Goal: Respiratory  Outcome: Progressing Towards Goal  Goal: Treatments/Interventions/Procedures  Outcome: Progressing Towards Goal  Goal: Psychosocial  Outcome: Progressing Towards Goal  Goal: *Verbalizes anxiety and depression are reduced or absent  Outcome: Progressing Towards Goal  Goal: *Absence of aspiration  Outcome: Progressing Towards Goal  Goal: *Absence of deep venous thrombosis signs and symptoms(Stroke Metric)  Outcome: Progressing Towards Goal  Goal: *Optimal pain control at patient's stated goal  Outcome: Progressing Towards Goal  Goal: *Tolerating diet  Outcome: Progressing Towards Goal  Goal: *Ability to perform ADLs and demonstrates progressive mobility and function  Outcome: Progressing Towards Goal  Goal: *Stroke education continued(Stroke Metric)  Outcome: Progressing Towards Goal

## 2022-09-19 NOTE — PROGRESS NOTES
Problem: Dysphagia (Adult)  Goal: *Acute Goals and Plan of Care (Insert Text)  Description: Speech therapy goals  Initiated 9/16/2022   1. Patient will tolerate pureed snacks without s/s of aspiration or adverse effects within 7 days   2. Patient will participate in repeat FEES to determine safety to initiate PO diet within 7 days   Initiated 9/15/2022   1. Patient will tolerate meds in applesauce without s/s of aspiration within 7 days MET 9/16/2022   2. Patient will participate in re-assessment of swallow function within 7 days MET 9/16/2022   Outcome: Progressing Towards Goal     Problem: Communication Impaired (Adult)  Goal: *Acute Goals and Plan of Care (Insert Text)  Outcome: Progressing Towards Goal     SPEECH [address] AND SPEECH TREATMENT  Patient: Omega Ellis (1 y.o. male)  Date: 9/19/2022  Diagnosis: Ischemic cerebrovascular accident (CVA) ([facility] [address] [I63.9] <principal problem not specified>      Precautions: Aspriation; Fall (SBP< 180/90)    ASSESSMENT:  Pt sitting upright in bed and agreed to treatment. Pt with increased heart rate from 140-150 and increased RR in the 30's when arrived without any s/s and continued throughout tx. RN and MD aware and working on it. RN also states he is tolerating pureed snacks and medication crushed in puree. Pt continues to present with severe expressive and receptive aphasia despite mild improvements today. His speech is characterized by non-fluent jargon with intermittent functional rote phrases 'help me get up'. Pt completed 1 step directions with 20% +perseveration, yes/no questions with 0%, naming 40% +perseveration, responsive naming 20%, able to count to 1-10 with SLP initiation and self corrections noted, unable to state LILY.     Pt observed with applesauce and ice chips with functional bolus acceptance, delayed a/p transit with mild bolus holding prior to the initiation of the swallow, delayed swallow and delayed cough noted with ice chips only. Recommend continuing with current diet and consider repeat imaging to determine safest diet. Pt with FEES 9/16 and silent aspiration and will require imaging prior to upgrading. Pt is at an increased risk for aspiration and should adhere to strict aspiration and reflux precautions. Recommend a Dobbhoff for alternative route of nutrition and hydration. PLAN:  Recommendations and Planned Interventions:  --pureed snacks, ice chips  --no liquids of any consistency  --recommend Dobbhoff for alternative route of nutrition and hydration   --upright for all PO intake  --remain upright for at least 30 minutes after PO  --repeat imaging  --continue language goals    Patient continues to benefit from skilled intervention to address the above impairments. Continue treatment per established plan of care. Discharge Recommendations: To Be Determined     SUBJECTIVE:   Patient stated (jargon). OBJECTIVE:   Cognitive and Communication Status:  Neurologic State: Alert  Orientation Level: Oriented to person, Oriented to place, Disoriented to situation, Disoriented to time  Cognition: Decreased attention/concentration  Perception: Cues to attend to right side of body  Perseveration: Perseverates during mobility  Safety/Judgement: Decreased awareness of environment, Decreased awareness of need for assistance, Decreased awareness of need for safety, Decreased insight into deficits    Dysphagia Treatment and Interventions:  Oral Assessment:  Oral Assessment  Labial: No impairment  Dentition: Natural  Oral Hygiene: xerostomia - clear  Lingual: No impairment  P.O. Trials:  Patient Position: upright in bed  Vocal quality prior to P.O.: Low volume  Consistency Presented: Puree; Ice chips  How Presented: Spoon;SLP-fed/presented     Bolus Acceptance: No impairment  Bolus Formation/Control: Impaired  Type of Impairment: Delayed  Propulsion: Delayed (# of seconds) (bolus holding noted prior to intiation of swallow)  Oral Residue: None  Initiation of Swallow: Delayed (# of seconds)  Delayed cough after ice chips      Speech Treatment and Interventions: Motor Speech:  Conversation Intelligibility (%): 50 %  Intelligibility: Impaired  Conversation Intelligibility (%): 50 %  Speech Characteristics: Jargon; Neologisms;Paraphasias  Dysarthric Characteristics: Blended word boundaries; Imprecise  Interfering Components: Attention;Limited error awareness  Language Comprehension and Expression:  Auditory Comprehension   Auditory Impairment: Yes  Response to Basic Yes/No Questions (%): 0 %  One-Step Basic Commands (%): 20 %  Verbal Expression  Verbal Expression  Initiation: Impaired (%)  Automatic Speech Task: Impaired (comment)  Repetition: Impaired  Word Repetition (%): 20 %  Naming: Impaired  Confrontation (%): 40 %  Responsive (%): 20 %  Conversation: Non-fluent  Speech Characteristics: Jargon; Neologisms;Paraphasias  Interfering Components: Attention;Limited error awareness  Overall Impairment: Severe    Pain:  Pain Scale 1: Numeric (0 - 10)  Pain Intensity 1: 0       After treatment:   Patient left in no apparent distress in bed, Call bell within reach, Nursing notified, and Caregiver / family present    COMMUNICATION/EDUCATION:     The patient's plan of care including recommendations, planned interventions, and recommended diet changes were discussed with: Registered nurse.      Lovely Anguiano, SLP  Time Calculation: 19 mins

## 2022-09-20 ENCOUNTER — APPOINTMENT (OUTPATIENT)
Dept: ULTRASOUND IMAGING | Age: 72
DRG: 064 | End: 2022-09-20
Attending: INTERNAL MEDICINE
Payer: MEDICARE

## 2022-09-20 LAB
ANION GAP SERPL CALC-SCNC: 9 MMOL/L (ref 5–15)
APPEARANCE UR: ABNORMAL
BACTERIA URNS QL MICRO: ABNORMAL /HPF
BASOPHILS # BLD: 0 K/UL (ref 0–0.1)
BASOPHILS NFR BLD: 0 % (ref 0–1)
BILIRUB UR QL: NEGATIVE
BUN SERPL-MCNC: 75 MG/DL (ref 6–20)
BUN/CREAT SERPL: 20 (ref 12–20)
CALCIUM SERPL-MCNC: 8.8 MG/DL (ref 8.5–10.1)
CHLORIDE SERPL-SCNC: 125 MMOL/L (ref 97–108)
CO2 SERPL-SCNC: 20 MMOL/L (ref 21–32)
COLOR UR: ABNORMAL
CREAT SERPL-MCNC: 3.73 MG/DL (ref 0.7–1.3)
DIFFERENTIAL METHOD BLD: ABNORMAL
EOSINOPHIL # BLD: 0.1 K/UL (ref 0–0.4)
EOSINOPHIL #/AREA URNS HPF: NEGATIVE /[HPF]
EOSINOPHIL NFR BLD: 0 % (ref 0–7)
EPITH CASTS URNS QL MICRO: ABNORMAL /LPF
ERYTHROCYTE [DISTWIDTH] IN BLOOD BY AUTOMATED COUNT: 14.6 % (ref 11.5–14.5)
GLUCOSE BLD STRIP.AUTO-MCNC: 129 MG/DL (ref 65–117)
GLUCOSE BLD STRIP.AUTO-MCNC: 141 MG/DL (ref 65–117)
GLUCOSE BLD STRIP.AUTO-MCNC: 157 MG/DL (ref 65–117)
GLUCOSE SERPL-MCNC: 166 MG/DL (ref 65–100)
GLUCOSE UR STRIP.AUTO-MCNC: NEGATIVE MG/DL
HCT VFR BLD AUTO: 46.4 % (ref 36.6–50.3)
HGB BLD-MCNC: 14.8 G/DL (ref 12.1–17)
HGB UR QL STRIP: ABNORMAL
HYALINE CASTS URNS QL MICRO: ABNORMAL /LPF (ref 0–5)
IMM GRANULOCYTES # BLD AUTO: 0.2 K/UL (ref 0–0.04)
IMM GRANULOCYTES NFR BLD AUTO: 1 % (ref 0–0.5)
KETONES UR QL STRIP.AUTO: NEGATIVE MG/DL
LEUKOCYTE ESTERASE UR QL STRIP.AUTO: ABNORMAL
LYMPHOCYTES # BLD: 1.6 K/UL (ref 0.8–3.5)
LYMPHOCYTES NFR BLD: 10 % (ref 12–49)
MAGNESIUM SERPL-MCNC: 2.8 MG/DL (ref 1.6–2.4)
MCH RBC QN AUTO: 29.2 PG (ref 26–34)
MCHC RBC AUTO-ENTMCNC: 31.9 G/DL (ref 30–36.5)
MCV RBC AUTO: 91.5 FL (ref 80–99)
MONOCYTES # BLD: 1.8 K/UL (ref 0–1)
MONOCYTES NFR BLD: 11 % (ref 5–13)
NEUTS SEG # BLD: 12.8 K/UL (ref 1.8–8)
NEUTS SEG NFR BLD: 78 % (ref 32–75)
NITRITE UR QL STRIP.AUTO: NEGATIVE
NRBC # BLD: 0 K/UL (ref 0–0.01)
NRBC BLD-RTO: 0 PER 100 WBC
PH UR STRIP: 8 [PH] (ref 5–8)
PLATELET # BLD AUTO: 212 K/UL (ref 150–400)
PMV BLD AUTO: 10.5 FL (ref 8.9–12.9)
POTASSIUM SERPL-SCNC: 4.6 MMOL/L (ref 3.5–5.1)
PROT UR STRIP-MCNC: ABNORMAL MG/DL
RBC # BLD AUTO: 5.07 M/UL (ref 4.1–5.7)
RBC #/AREA URNS HPF: ABNORMAL /HPF (ref 0–5)
SERVICE CMNT-IMP: ABNORMAL
SODIUM SERPL-SCNC: 154 MMOL/L (ref 136–145)
SP GR UR REFRACTOMETRY: 1.02 (ref 1–1.03)
UR CULT HOLD, URHOLD: NORMAL
UROBILINOGEN UR QL STRIP.AUTO: 1 EU/DL (ref 0.2–1)
WBC # BLD AUTO: 16.4 K/UL (ref 4.1–11.1)
WBC URNS QL MICRO: ABNORMAL /HPF (ref 0–4)

## 2022-09-20 PROCEDURE — 97112 NEUROMUSCULAR REEDUCATION: CPT

## 2022-09-20 PROCEDURE — 87205 SMEAR GRAM STAIN: CPT

## 2022-09-20 PROCEDURE — 65660000001 HC RM ICU INTERMED STEPDOWN

## 2022-09-20 PROCEDURE — 85025 COMPLETE CBC W/AUTO DIFF WBC: CPT

## 2022-09-20 PROCEDURE — 92526 ORAL FUNCTION THERAPY: CPT | Performed by: SPEECH-LANGUAGE PATHOLOGIST

## 2022-09-20 PROCEDURE — 80048 BASIC METABOLIC PNL TOTAL CA: CPT

## 2022-09-20 PROCEDURE — 81001 URINALYSIS AUTO W/SCOPE: CPT

## 2022-09-20 PROCEDURE — 74011250636 HC RX REV CODE- 250/636: Performed by: INTERNAL MEDICINE

## 2022-09-20 PROCEDURE — 74011000250 HC RX REV CODE- 250: Performed by: NURSE PRACTITIONER

## 2022-09-20 PROCEDURE — 74011000258 HC RX REV CODE- 258: Performed by: NURSE PRACTITIONER

## 2022-09-20 PROCEDURE — 97112 NEUROMUSCULAR REEDUCATION: CPT | Performed by: PHYSICAL THERAPIST

## 2022-09-20 PROCEDURE — 83735 ASSAY OF MAGNESIUM: CPT

## 2022-09-20 PROCEDURE — 76770 US EXAM ABDO BACK WALL COMP: CPT

## 2022-09-20 PROCEDURE — 74011636637 HC RX REV CODE- 636/637: Performed by: ANESTHESIOLOGY

## 2022-09-20 PROCEDURE — 99233 SBSQ HOSP IP/OBS HIGH 50: CPT | Performed by: SPECIALIST

## 2022-09-20 PROCEDURE — 74011250637 HC RX REV CODE- 250/637: Performed by: NURSE PRACTITIONER

## 2022-09-20 PROCEDURE — 92507 TX SP LANG VOICE COMM INDIV: CPT | Performed by: SPEECH-LANGUAGE PATHOLOGIST

## 2022-09-20 PROCEDURE — 97530 THERAPEUTIC ACTIVITIES: CPT | Performed by: PHYSICAL THERAPIST

## 2022-09-20 PROCEDURE — 97530 THERAPEUTIC ACTIVITIES: CPT

## 2022-09-20 PROCEDURE — 82962 GLUCOSE BLOOD TEST: CPT

## 2022-09-20 PROCEDURE — 51798 US URINE CAPACITY MEASURE: CPT

## 2022-09-20 PROCEDURE — 36415 COLL VENOUS BLD VENIPUNCTURE: CPT

## 2022-09-20 RX ORDER — FAMOTIDINE 20 MG/1
20 TABLET, FILM COATED ORAL DAILY
Status: DISCONTINUED | OUTPATIENT
Start: 2022-09-21 | End: 2022-09-23 | Stop reason: SDUPTHER

## 2022-09-20 RX ORDER — SODIUM CHLORIDE 9 MG/ML
100 INJECTION, SOLUTION INTRAVENOUS CONTINUOUS
Status: DISCONTINUED | OUTPATIENT
Start: 2022-09-20 | End: 2022-09-21

## 2022-09-20 RX ORDER — DILTIAZEM HYDROCHLORIDE 5 MG/ML
5 INJECTION INTRAVENOUS ONCE
Status: COMPLETED | OUTPATIENT
Start: 2022-09-20 | End: 2022-09-20

## 2022-09-20 RX ADMIN — Medication 2 UNITS: at 06:18

## 2022-09-20 RX ADMIN — METOPROLOL TARTRATE 5 MG: 5 INJECTION, SOLUTION INTRAVENOUS at 17:58

## 2022-09-20 RX ADMIN — Medication: at 10:22

## 2022-09-20 RX ADMIN — Medication: at 17:14

## 2022-09-20 RX ADMIN — SODIUM CHLORIDE 100 ML/HR: 9 INJECTION, SOLUTION INTRAVENOUS at 10:25

## 2022-09-20 RX ADMIN — DOCUSATE SODIUM 100 MG: 100 CAPSULE, LIQUID FILLED ORAL at 18:00

## 2022-09-20 RX ADMIN — SODIUM CHLORIDE 100 ML/HR: 9 INJECTION, SOLUTION INTRAVENOUS at 22:24

## 2022-09-20 RX ADMIN — Medication 2 UNITS: at 12:50

## 2022-09-20 RX ADMIN — METOPROLOL TARTRATE 5 MG: 5 INJECTION, SOLUTION INTRAVENOUS at 12:50

## 2022-09-20 RX ADMIN — FAMOTIDINE 20 MG: 20 TABLET, FILM COATED ORAL at 10:22

## 2022-09-20 RX ADMIN — DILTIAZEM HYDROCHLORIDE 15 MG/HR: 5 INJECTION, SOLUTION INTRAVENOUS at 17:15

## 2022-09-20 RX ADMIN — METOPROLOL TARTRATE 5 MG: 5 INJECTION, SOLUTION INTRAVENOUS at 06:10

## 2022-09-20 RX ADMIN — DILTIAZEM HYDROCHLORIDE 5 MG: 5 INJECTION, SOLUTION INTRAVENOUS at 03:38

## 2022-09-20 RX ADMIN — DOCUSATE SODIUM 100 MG: 100 CAPSULE, LIQUID FILLED ORAL at 10:22

## 2022-09-20 RX ADMIN — DILTIAZEM HYDROCHLORIDE 2.5 MG/HR: 5 INJECTION, SOLUTION INTRAVENOUS at 03:49

## 2022-09-20 RX ADMIN — ATORVASTATIN CALCIUM 80 MG: 40 TABLET, FILM COATED ORAL at 22:23

## 2022-09-20 NOTE — PROGRESS NOTES
[facility]  Cardiology Care Note                  []Initial visit     [x]Established visit     Patient Name: Patsy Doan  Primary Cardiologist: Linden Calles MD  Consulting Cardiologist: Tyler Pennington MD     Reason for initial visit: atrial fibrillation with RVR    HPI:   Patient is a 71year old male with a hx of chronic afib on Xarelto, DM, VALENTE on CPAP, HLD, and gout. 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 He reports that he has been faithfully taking Xarelto, not missing the medication, though this is under investigation. Sister is supposed to bring in Xarelto bottle. He is currently off APT/OAC d/t hemorrhagic conversion with plans to start in 2 weeks per neurology. He has been rate controlled on Metoprolol until overnight with HR up to 140s on telemetry. He was given IV Labetalol and Metoprolol without much improvement. He was started on a Diltiazem gtt. SUBJECTIVE:  Denies SOB, CP or palpitations. Speech still a bit garbled, but able to give simple answers. Assessment and Plan     Atrial fibrillation with RVR: Since admission pt was started on Metoprolol 12.5mg bid. He became tachycardic requiring Diltiazem and IV BB. Yesterday we stopped Dilt gtt, changed to scheduled IV Metoprolol 5mg q6hrs, continued prn Labetalol. He had a RRT call yest afternoon, requiring additional IV BB with improvement in HR down to 100-110 range. CXR showed low lung volumes. WBC 17.8, Creatinine 2.80. IV Metoprolol was continued. Overnight last night he remained tachycardic up to 130s, Diltiazem gtt was started. Around 6am he converted to SR. Having some ST. Holding APT/OAC for 2 weeks. ? Failure of Xarelto vs vessel to vessel thrombus d/t stenosis. Pt reporting that he has been faithfully taking med.  At this stage when cleared to start would switch to Eliquis 5mg bid. Acute CVA: per neurology found to have a large left MCA infarct with hemorrhagic conversion, and occluding thrombus in left M1 with other areas of IC stenoses. Taking Lipitor, see above for APT/OAC management. Echo with inadequate bubble study. HTN: BP now trending 129-149/76-97. HLD: on statin  Patient seen on the day of progress note and examined  and agree with Advance Practice Provider (KAMLA, NP,PA)  assessment and plans. 1.  Atrial fibrillation: He remains in atrial fibrillation with rapid ventricular response. Now on IV Lopressor and IV Cardizem. I would avoid any further increase to avoid excessive drop in blood pressure. IV digoxin not an option at this time given the rising creatinine. Ejection fraction by echo a few days ago low normal at 50 to 55%. Rapid ventricular response the atrial fibrillation is of concern in regards to other potential underlying factors. WBCs is trending down. Chest and abdominal CT noted. Troponin has been essentially normal normal and \"flat\" last appointment was early this morning. Once again the rapid ventricular response and his atrial fibrillation is a potential signal of underlying other issues? Sepsis? Renal dysfunction related to? Embolic shower? Nephrology consulted at this time. Oral anticoagulation on hold on account of potential for hemorrhagic conversion. Apparent compliant with Xarelto in the past.  It is may be an issue with Xarelto therefore eventually will need Eliquis.             ____________________________________________________________    Cardiac testing  09/14/22    ECHO ADULT COMPLETE 09/15/2022 9/15/2022    Interpretation Summary  Formatting of this result is different from the original.      Left Ventricle: Normal left ventricular systolic function with a visually estimated EF of 50 - 55%. Left ventricle size is normal. Normal wall thickness.     Right Ventricle: Right ventricle is mildly dilated. Normal wall thickness. Mildly reduced systolic function. Left Atrium: Left atrium is mildly dilated. Technical qualifiers: Echo study was technically difficult with poor endocardial visualization and technically difficult due to patient's body habitus. Contrast used: Definity. Bubble study is not adequate to rule out shunt    Signed by: Lizzie Garza MD on 9/15/2022  4:12 PM                Most recent HS troponins:  No results for input(s): TROPHS in the last 72 hours. No lab exists for component:  CKMB      Review of Systems    [x]All other systems reviewed and all negative except as written in HPI    [] Patient unable to provide secondary to condition         Past Medical History:   Diagnosis Date    Arrhythmia     atrial fib    Depression 4/24/2010    Diabetes ([facility] [address]     diet control for pre-diabetes    Dyslipidemia, goal LDL below 100 6/14/2011    Gout     HTN (hypertension) 4/24/2010    Impotence 4/24/2010    Morbid obesity ([address] 5/17/2010    VALENTE (obstructive sleep apnea) 4/24/2010    CPAP    Restless legs 4/15/2015     Past Surgical History:   Procedure Laterality Date    COLONOSCOPY N/A 6/27/2017    COLONOSCOPY performed by Deborah Fajardo MD at [facility], [address] [facility]  2007    HX APPENDECTOMY      HX ORTHOPAEDIC  2000    bilat TKR     HX ORTHOPAEDIC  2010    left THR    HX UROLOGICAL  03/2018    urolift    HX UROLOGICAL  03/2019    prosthesis     Social Hx:  reports that he quit smoking about 32 years ago. His smoking use included cigarettes. He has a 2.50 pack-year smoking history. He has never used smokeless tobacco. He reports current alcohol use of about 1.7 standard drinks per week. He reports that he does not use drugs. Family Hx: family history includes Cancer in his father, maternal aunt, maternal uncle, and paternal grandfather; Diabetes in his brother and sister.   No Known Allergies       OBJECTIVE:  Wt Readings from Last 3 Encounters:   09/20/22 284 lb 13.4 oz (129.2 kg)   07/28/22 290 lb (131.5 kg)   06/14/22 296 lb (134.3 kg)     No intake or output data in the 24 hours ending 09/20/22 1018      Physical Exam    Vitals:   Vitals:    09/20/22 0346 09/20/22 0555 09/20/22 0610 09/20/22 1000   BP:  (!) 145/95 138/88 129/88   Pulse: (!) 142 (!) 135 (!) 137 (!) 111   Resp:  (!) 31  29   Temp:  97.8 °F (36.6 °C)  98.2 °F (36.8 °C)   SpO2:  93%  93%   Weight:       Height:             General:    Alert, cooperative, no distress, appears stated age. Neck:   Supple, no carotid bruit and no JVD. Back:     Symmetric    Lungs:     diminished to auscultation bilaterally. Heart[de-identified]    regular rate and rhythm, S1, S2 normal, no murmur, click, rub or gallop. Abdomen:     Soft, non-tender. Bowel sounds normal.    Extremities:   Extremities normal, atraumatic, no cyanosis or edema. Vascular:   Pulses - sudheer UE/LE equal   Skin:   Skin color normal. Abrasions right leg   Neurologic:   Alert, Moves left extremities. Speech somewhat garbled       Data Review:     Radiology:   XR Results (most recent):  Results from Hospital Encounter encounter on 09/14/22    XR CHEST PORT    Narrative  Indication: Shortness of breath    Comparison: 9/14/2022    Portable exam of the chest obtained at 1531 demonstrates normal heart size. Low  lung volumes again demonstrated without focal infiltrate. The osseous structures  are unremarkable. Impression  Low lung volumes without focal infiltrate. CT Results (most recent):  Results from Hospital Encounter encounter on 09/14/22    CT HEAD WO CONT    Narrative  EXAM: CT HEAD WO CONT    INDICATION: hemorrhagic conversion? COMPARISON: CT dose prior to this exam.    CONTRAST: None. TECHNIQUE: Unenhanced CT of the head was performed using 5 mm images. Brain and  bone windows were generated. Coronal and sagittal reformats.  CT dose reduction  was achieved through use of a standardized protocol tailored for this  examination and automatic exposure control for dose modulation. FINDINGS:  Left anterolateral temporal lobe hypodensity with loss of gray-white  differentiation is redemonstrated with no evident interval hemorrhage or other  significant interval change with localized mass effect and effacement of the  anterior horn of the right lateral ventricle. The ventricles and sulci are  otherwise normal in size, shape and configuration. There is no significant white  matter disease. There is no new intracranial hemorrhage, extra-axial collection,  or mass effect. The basilar cisterns are open. The bone windows demonstrate no abnormalities. The visualized portions of the  paranasal sinuses and mastoid air cells are clear. There is a soft tissue  collection overlying the right frontoparietal skull, not significant change. Impression  Left MCA territory left temporal lobe infarct is stable appearance  without interval hemorrhagic transformation. Right frontoparietal scalp  collection unchanged. MRI Results (most recent):  Results from [place] encounter on 09/14/22    MRI BRAIN WO CONT    Narrative  INDICATION:   follow up ischemic cva    EXAMINATION:  MRI BRAIN WO CONTRAST    COMPARISON:  CT September 14, 2022    TECHNIQUE:  Multiplanar multisequence acquisition without contrast of the brain. FINDINGS:    There is a moderate to large area of acute infarction in the left middle  cerebral artery territory, involving the left basal ganglia and corona radiata,  left anterior and mid temporal lobe, left insula and left frontal operculum. There is associated susceptibility artifact predominantly within the basal  ganglia and corona radiata portions of the infarction, with slight mass effect  upon the left lateral ventricle similar to recent CT. There is no hydrocephalus  or midline shift. Major intracranial vascular flow-voids are patent.   Subcutaneous edema throughout the scalp right greater than left..    Impression  Moderate to large acute left MCA territory infarction, with associated  susceptibility artifact predominantly in the basal ganglia/corona radiata  consistent with blood product/petechial hemorrhage. Overall appearance unchanged  compared to recent CT, with no hydrocephalus or midline shift. No results for input(s): CPK, TROIQ in the last 72 hours. No lab exists for component: CKQMB, CPKMB, BMPP    Recent Labs     09/20/22  0346 09/19/22  1639   * 153*   K 4.6 4.3   * 125*   CO2 20* 20*   BUN 75* 61*   CREA 3.73* 2.80*   * 170*   CA 8.8 8.8     Recent Labs     09/20/22  0346 09/19/22  1639   WBC 16.4* 17.8*   HGB 14.8 15.0   HCT 46.4 48.1    257     No results for input(s): PTP, INR, AP, INREXT, INREXT in the last 72 hours. No lab exists for component: PTTP, GPT, SGOT  No results for input(s): CHOL, LDLC in the last 72 hours. No lab exists for component: TGL, HDLC,  HBA1C  No results for input(s): CRP, TSH, TSHEXT, TSHEXT in the last 72 hours.     No lab exists for component: ESR        Current meds:    Current Facility-Administered Medications:     dilTIAZem (CARDIZEM) 125 mg in dextrose 5% 125 mL infusion, 0-15 mg/hr, IntraVENous, TITRATE, Jimena Dietrich NP, Last Rate: 10 mL/hr at 09/20/22 0609, 10 mg/hr at 09/20/22 0609    0.9% sodium chloride infusion, 100 mL/hr, IntraVENous, CONTINUOUS, Tyler Gallardo MD    famotidine (PEPCID) tablet 20 mg, 20 mg, Oral, Q12H, Charis Single, ACNP, 20 mg at 09/19/22 2204    metoprolol (LOPRESSOR) injection 5 mg, 5 mg, IntraVENous, Q6H, Josi CALLAHAN NP, 5 mg at 09/20/22 0610    HYDROmorphone (DILAUDID) injection 0.2 mg, 0.2 mg, IntraVENous, Q3H PRN, Cahris Single, ACNP    labetaloL (NORMODYNE;TRANDATE) injection 5 mg, 5 mg, IntraVENous, Q6H PRN, Charis Single, ACNP, 5 mg at 09/19/22 1221    balsam peru-castor oiL (VENELEX) ointment, , Topical, BID, Kannan Addison MD, Given at 09/19/22 1829    glucose chewable tablet 16 g, 4 Tablet, Oral, PRN, Fabiana Esparza MD    glucagon (GLUCAGEN) injection 1 mg, 1 mg, IntraMUSCular, PRN, Fabiana Esparza MD    dextrose 10 % infusion 0-250 mL, 0-250 mL, IntraVENous, PRN, Fabiana Esparza MD    insulin lispro (HUMALOG) injection, , SubCUTAneous, Q6H, Fabiana Esparza MD, 2 Units at 09/20/22 1727    acetaminophen (TYLENOL) tablet 650 mg, 650 mg, Oral, Q4H PRN, 650 mg at 09/19/22 1343 **OR** acetaminophen (TYLENOL) solution 650 mg, 650 mg, Per NG tube, Q4H PRN **OR** acetaminophen (TYLENOL) suppository 650 mg, 650 mg, Rectal, Q4H PRN, [facility], ACNP    atorvastatin (LIPITOR) tablet 80 mg, 80 mg, Oral, QHS, [facility], ACNP, 80 mg at 09/19/22 2204    docusate sodium (COLACE) capsule 100 mg, 100 mg, Oral, BID, [facility], ACNP, 100 mg at 09/19/22 Zhang Betts MD  [facility], [address] [address] [place] [address], [address] [place] [address]  [phone]      CC:Kamilah Villalba MD

## 2022-09-20 NOTE — CONSULTS
NEPHROLOGY CONSULT NOTE     Patient: Dipak Garces MRN: [id number]  PCP: Yohana Marquez MD   :     1950  Age:   71 y.o. Sex:  male      Referring physician: Andra Holliday MD  Reason for consultation: 71 y.o. male with Ischemic cerebrovascular accident (CVA) [facility]) [Q31.0] complicated by DOMINICK   Admission Date: 2022  9:48 PM  LOS: 6 days      ASSESSMENT and PLAN :   DOMINICK:  - multifactorial: volume depletion from poor po intake, ATN from contrast on , obstruction vs atheroembolic from recent afib  - check bladder scan and renal U/S, UA, urine eos and complements  - d/c lisinopril  - change IVF to NS  - daily labs and I/Os  - may need vazquez placed if residual is > 250cc on bladder scan    Hypernatremia:  - 2/2 lack of po free water intake  - IVF as above  - may need NG or DHT to start FW flushes    Acute CVA:  - L MCA territory infarct  - per neurology    Afib w/ RVR:  - on dilt drip    Mild Rhabdo:  - CPK improving after IVF    DM2  Obesity  VALENTE     Active Problems / Assessment AAActive  : Active Problems:    Ischemic cerebrovascular accident (CVA) ([address] (2022)         Subjective:   HPI: Dipak Garces is a 71 y.o.  male who has been admitted to the hospital after being found down and was found to have a CVA, transferred to [facility] on . He had CT code neuro and CTA on . He was found to have a L MCA infarct, sever stenosis of left M1. He also had a CT C/A/P w/ contrast at that time. He has a history of afib, HTN, DM2, gout, obesity, VALENTE. He is not eating or drinking much. BPs have been stable. Na rising, Cr up from baseline of 1 to 3.7 today. Na 154. He had mild Rhabdo on admission, CPK abour 3.5K, now improving. He is also in afib with RVR and on dilt drip. No UOP reported, CT from  showed no hydro. He is unable to provide any hx otherwise.     Past Medical Hx:   Past Medical History:   Diagnosis Date    Arrhythmia     atrial fib    Depression 2010    Diabetes ([address]     diet control for pre-diabetes    Dyslipidemia, goal LDL below 100 6/14/2011    Gout     HTN (hypertension) 4/24/2010    Impotence 4/24/2010    Morbid obesity ([address] 5/17/2010    VALENTE (obstructive sleep apnea) 4/24/2010    CPAP    Restless legs 4/15/2015        Past Surgical Hx:     Past Surgical History:   Procedure Laterality Date    COLONOSCOPY N/A 6/27/2017    COLONOSCOPY performed by Deborah Fajardo MD at [address] [facility] [address], [address], DIAGNOSTIC  2007    HX APPENDECTOMY      HX ORTHOPAEDIC  2000    bilat TKR     HX ORTHOPAEDIC  2010    left THR    HX UROLOGICAL  03/2018    urolift    HX UROLOGICAL  03/2019    prosthesis       Medications:  Prior to Admission medications    Medication Sig Start Date End Date Taking? Authorizing Provider   lisinopril-hydroCHLOROthiazide (PRINZIDE, ZESTORETIC) 20-25 mg per tablet TAKE 1 TABLET BY MOUTH EVERY DAY 8/1/22   Tejinder Botello MD   colchicine 0.6 mg tablet TAKE 1 TABLET BY MOUTH EVERY DAY 7/12/22   Royanne Klinefelter, MD   gabapentin (NEURONTIN) 300 mg capsule Take 3 Capsules by mouth nightly. Max Daily Amount: 900 mg. 6/14/22   Royanne Klinefelter, MD   allopurinoL (ZYLOPRIM) 100 mg tablet Take 1 Tablet by mouth daily. 4/26/22   Royanne Klinefelter, MD   rivaroxaban (Xarelto) 20 mg tab tablet TAKE 1 TABLET BY MOUTH IN  THE MORNING WITH BREAKFAST 1/6/22   Kalyn Montes III, MD       No Known Allergies    Social Hx:  reports that he quit smoking about 32 years ago. His smoking use included cigarettes. He has a 2.50 pack-year smoking history. He has never used smokeless tobacco. He reports current alcohol use of about 1.7 standard drinks per week. He reports that he does not use drugs.      Family History   Problem Relation Age of Onset    Cancer Father         leukemia    Cancer Paternal Grandfather     Diabetes Sister     Diabetes Brother     Cancer Maternal Aunt     Cancer Maternal Uncle        Review of Systems:  Unable to obtain due to patient condition     Objective:    Vitals:    Vitals:    09/20/22 0346 09/20/22 0555 09/20/22 0610 09/20/22 1000   BP:  (!) 145/95 138/88 129/88   Pulse: (!) 142 (!) 135 (!) 137 (!) 111   Resp:  (!) 31  29   Temp:  97.8 °F (36.6 °C)  98.2 °F (36.8 °C)   SpO2:  93%  93%   Weight:       Height:         I&O's:  No intake/output data recorded. Visit Vitals  /88 (BP 1 Location: Left upper arm, BP Patient Position: At rest)   Pulse (!) 111   Temp 98.2 °F (36.8 °C)   Resp 29   Ht 6' 3\" (1.905 m)   Wt 129.2 kg (284 lb 13.4 oz)   SpO2 93%   BMI 35.60 kg/m²       Physical Exam:  General:Awake, nonverbal  HEENT: Eyes are PERRL. EOMI  Lungs : Clears to auscultation Bilaterally, Normal respiratory effort  CVS: RRR, S1 S2 normal, No rub,  no LE edema  Abdomen: Soft, Non tender, + BS  Extremities: No cyanosis, No clubbing  Skin: No rash or lesions.   : no vazquez  Neurologic: aphasic    Laboratory Results:    Lab Results   Component Value Date    BUN 75 (H) 09/20/2022     (H) 09/20/2022    K 4.6 09/20/2022     (H) 09/20/2022    CO2 20 (L) 09/20/2022       Lab Results   Component Value Date    BUN 75 (H) 09/20/2022    BUN 61 (H) 09/19/2022    BUN 58 (H) 09/18/2022    BUN 36 (H) 09/18/2022    BUN 37 (H) 09/17/2022    K 4.6 09/20/2022    K 4.3 09/19/2022    K 4.1 09/18/2022    K 4.1 09/18/2022    K 3.7 09/17/2022       Lab Results   Component Value Date    WBC 16.4 (H) 09/20/2022    RBC 5.07 09/20/2022    HGB 14.8 09/20/2022    HCT 46.4 09/20/2022    MCV 91.5 09/20/2022    MCH 29.2 09/20/2022    RDW 14.6 (H) 09/20/2022     09/20/2022       No results found for: PTH, PHOS    Urine dipstick:   Lab Results   Component Value Date/Time    Color YELLOW/STRAW 10/04/2016 06:03 PM    Appearance CLEAR 10/04/2016 06:03 PM    Specific gravity 1.016 10/04/2016 06:03 PM    pH (UA) 5.5 10/04/2016 06:03 PM    Protein NEGATIVE  10/04/2016 06:03 PM    Glucose NEGATIVE  10/04/2016 06:03 PM    Ketone TRACE (A) 10/04/2016 06:03 PM Bilirubin NEGATIVE  10/04/2016 06:03 PM    Urobilinogen 0.2 10/04/2016 06:03 PM    Nitrites NEGATIVE  10/04/2016 06:03 PM    Leukocyte Esterase NEGATIVE  10/04/2016 06:03 PM    Epithelial cells FEW 10/04/2016 06:03 PM    Bacteria NEGATIVE  10/04/2016 06:03 PM    WBC 0-4 10/04/2016 06:03 PM    RBC 10-20 10/04/2016 06:03 PM               Thank you for allowing us to participate in the care of this patient. We will follow patient. Please dont hesitate to call with any questions    Tyler Lance MD  9/20/2022    [facility] Neph[facility] THE [address] [place]  Phone - [phone]   Fax - [phone]  [email or web address]

## 2022-09-20 NOTE — PROGRESS NOTES
Problem: Mobility Impaired (Adult and Pediatric)  Goal: *Acute Goals and Plan of Care (Insert Text)  Description:   FUNCTIONAL STATUS PRIOR TO ADMISSION: Patient was independent and active without use of DME.    HOME SUPPORT PRIOR TO ADMISSION: The patient lived alone with no local support. Physical Therapy Goals  Initiated 9/15/2022  1. Patient will move from supine to sit and sit to supine , scoot up and down, and roll side to side in bed with moderate assistance  within 7 day(s). 2.  Patient will transfer from bed to chair and chair to bed with moderate assistance  using the least restrictive device within 7 day(s). 3.  Patient will perform sit to stand with moderate assistance  within 7 day(s). 4.  Patient will ambulate with moderate assistance  for 25 feet with the least restrictive device within 7 day(s). 5.  Patient will improve Bowles Balance score by 7 points within 7 days. Outcome: Progressing Towards Goal   PHYSICAL THERAPY TREATMENT  Patient: Nimisha Bruce (35 y.o. male)  Date: 9/20/2022  Diagnosis: Ischemic cerebrovascular accident (CVA) ([facility] [I63.9] <principal problem not specified>      Precautions: Fall (SBP< 180/90)  Chart, physical therapy assessment, plan of care and goals were reviewed. ASSESSMENT  Patient continues with skilled PT services and is progressing towards goals. Patient currently limited by R hemiparesis, impaired balance, pain, inability to ambulate and decreased activity tolerance. Patient with improves communication today and answered most yes/no questions appropriately. Supine to sit with maxA x 2 but patient able to assist with rail and help with righting trunk. Able to sit at midline with CGA and worked on reaching out of base support. Sit to stand with maxA x 2 with pull up on recliner and extensive blocking to the R knee and for weight shift as he has R lean. HR as high as 170 bpm during session.   He continues well below his independent functional baseline and would benefit from IPR to progress patient to more independent level of function. Other factors to consider for discharge: at risk for falls, below baseline         PLAN :  Patient continues to benefit from skilled intervention to address the above impairments. Continue treatment per established plan of care. to address goals. Recommendation for discharge: (in order for the patient to meet his/her long term goals)  Therapy 3 hours per day 5-7 days per week        IF patient discharges home will need the following DME: to be determined (TBD)       SUBJECTIVE:   Patient stated my shld hurts.     OBJECTIVE DATA SUMMARY:   Critical Behavior:  Neurologic State: Alert  Orientation Level: Unable to verbalize  Cognition: Decreased command following  Safety/Judgement: Decreased awareness of environment, Decreased awareness of need for assistance, Decreased awareness of need for safety, Decreased insight into deficits  Functional Mobility Training:  Bed Mobility:  Rolling:  Moderate assistance  Supine to Sit: Maximum assistance;Assist x2 (patient does assist with trunk righting)  Sit to Supine: Maximum assistance;Assist x2  Scooting: Maximum assistance (in sitting, totalA in supine)        Transfers:  Sit to Stand: Maximum assistance;Assist x2  Stand to Sit: Maximum assistance;Assist x2                             Balance:  Sitting: Impaired  Sitting - Static: Fair (occasional)  Sitting - Dynamic: Fair (occasional)  Standing: Impaired  Standing - Static: Constant support;Poor (pull to stand via recliner and blocking R knee)  Standing - Dynamic :  (not tested)  Ambulation/Gait Training:          Pain Rating:  Reports pain but does not rate    Activity Tolerance:   Fair and requires rest breaks    After treatment patient left in no apparent distress:   Supine in bed, Call bell within reach, Bed / chair alarm activated, Caregiver / family present, and Side rails x 3    COMMUNICATION/COLLABORATION:   The patients plan of care was discussed with: Physical therapist, Occupational therapist, and Registered nurse.      Lauryn Morales, PT, DPT   Time Calculation: 31 mins

## 2022-09-20 NOTE — PROGRESS NOTES
Problem: Dysphagia (Adult)  Goal: *Acute Goals and Plan of Care (Insert Text)  Description: Speech therapy goals  Initiated 9/16/2022   1. Patient will tolerate pureed snacks without s/s of aspiration or adverse effects within 7 days   2. Patient will participate in repeat FEES to determine safety to initiate PO diet within 7 days   Initiated 9/15/2022   1. Patient will tolerate meds in applesauce without s/s of aspiration within 7 days MET 9/16/2022   2. Patient will participate in re-assessment of swallow function within 7 days MET 9/16/2022 9/20/2022 1204 by Melissa Lagos, SLP  Outcome: Progressing Towards Goal  9/20/2022 1203 by Melissa Lagos, SLP  Outcome: Progressing Towards Goal     Problem: Communication Impaired (Adult)  Goal: *Acute Goals and Plan of Care (Insert Text)  Description: Problem: Communication Impaired (Adult)  Goal: *Acute Goals and Plan of Care (Insert Text)  Note:   1. Patient will improve verbal expression for naming ADL objects, word level sentence completion tasks, automatic sequences, and responsive naming tasks with 80% accuracy given repetition within 7 days. 2. Patient will improve auditory comprehension for simple yes/no questions and 1 step commands with 80% accuracy given repetition within 7 days. 3. Patient will improve auditory comprehension for ID of objects given a field of 2-3 with 80% accuracy given repetition within 7 days. Outcome: Progressing Towards Goal      SPEECH LANGUAGE PATHOLOGY DYSPHAGIA AND SPEECH TREATMENT  Patient: Brooklynn Garza (14 y.o. male)  Date: 9/20/2022  Diagnosis: Ischemic cerebrovascular accident (CVA) ([facility] [I63.9] <principal problem not specified>      Precautions:  Fall (SBP< 180/90)    ASSESSMENT:  Patient seen in follow up, recent FEES showed silent aspiration of liquids. SLP recommended consideration of NG for fluids. Patient tolerating purees well, cough with small amount of thin today.       PLAN:  Recommendations and Planned Interventions:  --Puree snacks, no liquids  --Oral care  --Continue SLP tx, educated family on giving forced choices or carrier phrase to help with communication. --Not yet ready for repeat imaging today in face recent FEES and few overall changes, but we can time this in accordance with discharge if patient needs a method of liquids for discharge. Patient continues to benefit from skilled intervention to address the above impairments. Continue treatment per established plan of care. Discharge Recommendations:  Inpatient Rehab     SUBJECTIVE:   Patient stated [facility] are [facility]?  very clearly to family when they arrived    OBJECTIVE:   Cognitive and Communication Status:  Neurologic State: Alert  Orientation Level: Unable to verbalize  Cognition: Decreased command following  Perception: Cues to attend right visual field  Perseveration: Perseverates during conversation  Safety/Judgement: Decreased awareness of environment, Decreased awareness of need for assistance, Decreased awareness of need for safety, Decreased insight into deficits    Dysphagia Treatment and Interventions:  Oral Assessment:  Oral Assessment  Labial: Decreased rate;Right droop  Dentition: Natural  Lingual: No impairment (extends to R when opens mouth for bites)  P.O. Trials:  Patient Position: upright in bed but head pressed back against pillow  Vocal quality prior to P.O.:    Consistency Presented: Puree; Thin liquid; Ice chips  How Presented: Spoon     Bolus Acceptance: No impairment  Bolus Formation/Control: No impairment (no impairment but limited items given)        Oral Residue: None        Aspiration Signs/Symptoms:  (cough after 2 very small water sips from spoon.  Could be an improvment indicating less SILENT aspiration, but difficult to determine this.)  Pharyngeal Phase Characteristics: Effortful swallow                                                                                                                      Speech Treatment and Interventions: Motor Speech:  Conversation Intelligibility (%):  (50)  Intelligibility: Impaired           Conversation Intelligibility (%):  (50)     Speech Characteristics: Molly Clary; Neologisms;Paraphasias;Perseveration     Dysarthric Characteristics: Blended word boundaries; Imprecise  Interfering Components: Attention  Language Comprehension and Expression:     Verbal Expression  Verbal Expression  Initiation: Impaired (%)  Automatic Speech Task: Impaired (comment)  Automatic speech task cueing type: Verbal;Visual;Tactile;Sentence completion cue;Repetition;Imitation  Automatic speech task cueing amount: Moderate  Repetition: Impaired  Word Repetition (%): 50 % (used ROSSI)  Confrontation (%):  (able to name 1/2 family members)  Sentence Completion: Impaired  Word level (%):  (25)  Sentence Formulation: Impaired (%)  Conversation: Non-fluent  Speech Characteristics: Molly Clary; Neologisms;Paraphasias;Perseveration  Interfering Components: Attention  Overall Impairment: Severe  Neuro-Linguistics:                                                  Voice:   Low pitch, volume WNL       Response & Tolerance to Activities:         Pain:  Pain Scale 1: Numeric (0 - 10)  Pain Intensity 1: 0       After treatment:   Patient left in no apparent distress in bed, Call bell within reach, and Caregiver / family present    COMMUNICATION/EDUCATION:     The patient's plan of care including recommendations, planned interventions, and recommended diet changes were discussed with: Registered nurse.        LEOLA Mata  Time Calculation: 20 mins

## 2022-09-20 NOTE — PROGRESS NOTES
Problem: Pressure Injury - Risk of  Goal: *Prevention of pressure injury  Description: Document Pepe Scale and appropriate interventions in the flowsheet. Outcome: Progressing Towards Goal  Note: Pressure Injury Interventions:  Sensory Interventions: Assess changes in LOC, Discuss PT/OT consult with provider, Keep linens dry and wrinkle-free, Minimize linen layers, Pressure redistribution bed/mattress (bed type)    Moisture Interventions: Absorbent underpads, Check for incontinence Q2 hours and as needed, Limit adult briefs, Minimize layers    Activity Interventions: Increase time out of bed, Pressure redistribution bed/mattress(bed type), PT/OT evaluation    Mobility Interventions: Float heels, HOB 30 degrees or less, Pressure redistribution bed/mattress (bed type), PT/OT evaluation    Nutrition Interventions: Document food/fluid/supplement intake    Friction and Shear Interventions: HOB 30 degrees or less, Minimize layers                Problem: Falls - Risk of  Goal: *Absence of Falls  Description: Document Kasi Fall Risk and appropriate interventions in the flowsheet.   Outcome: Progressing Towards Goal  Note: Fall Risk Interventions:       Mentation Interventions: Adequate sleep, hydration, pain control, Bed/chair exit alarm, Door open when patient unattended, Evaluate medications/consider consulting pharmacy, More frequent rounding, Reorient patient, Room close to nurse's station, Toileting rounds, Update white board    Medication Interventions: Bed/chair exit alarm, Evaluate medications/consider consulting pharmacy, Patient to call before getting OOB, Teach patient to arise slowly    Elimination Interventions: Bed/chair exit alarm, Call light in reach, Patient to call for help with toileting needs, Toileting schedule/hourly rounds    History of Falls Interventions: Bed/chair exit alarm, Consult care management for discharge planning, Evaluate medications/consider consulting pharmacy         Problem: TIA/CVA Stroke: Day 2 Until Discharge  Goal: Off Pathway (Use only if patient is Off Pathway)  Outcome: Progressing Towards Goal  Goal: Activity/Safety  Outcome: Progressing Towards Goal  Goal: Diagnostic Test/Procedures  Outcome: Progressing Towards Goal  Goal: Nutrition/Diet  Outcome: Progressing Towards Goal  Goal: Discharge Planning  Outcome: Progressing Towards Goal  Goal: Medications  Outcome: Progressing Towards Goal  Goal: Respiratory  Outcome: Progressing Towards Goal  Goal: Treatments/Interventions/Procedures  Outcome: Progressing Towards Goal  Goal: Psychosocial  Outcome: Progressing Towards Goal  Goal: *Verbalizes anxiety and depression are reduced or absent  Outcome: Progressing Towards Goal  Goal: *Absence of aspiration  Outcome: Progressing Towards Goal  Goal: *Absence of deep venous thrombosis signs and symptoms(Stroke Metric)  Outcome: Progressing Towards Goal  Goal: *Optimal pain control at patient's stated goal  Outcome: Progressing Towards Goal  Goal: *Tolerating diet  Outcome: Progressing Towards Goal  Goal: *Ability to perform ADLs and demonstrates progressive mobility and function  Outcome: Progressing Towards Goal  Goal: *Stroke education continued(Stroke Metric)  Outcome: Progressing Towards Goal

## 2022-09-20 NOTE — PROGRESS NOTES
[address] [facility] Adult  Hospitalist Group                                                                                          Hospitalist Progress Note  Frances Nevarez MD  Answering service: [phone] from in house phone        Date of Service:  2022  NAME:  Graham Henson  :  1950  MRN:  [id number]      Admission Summary:     Patient presents with acute CVA, CT head and follow-up MRI shows large acute left MCA territory infarct. Patient with some expressive aphasia and right-sided weakness. Neuro following. Initially was admitted to ICU and transfer out of ICU 9/15/2022. Interval history / Subjective:     Patient has no acute complaint. Noted A. fib with RVR this AM.     Assessment & Plan:     Acute CVA  -CT head shows large acute left MCA territory infarct, CTA of the head and neck shows severe stenosis, near occlusive thrombus of the distal left MCA M1  -MRI of the brain which shows moderate to large acute left MCA territory infarct with findings associated with petechial hemorrhage  -Echo with bubble study shows EF of 50 to 55%, bubble study is not adequate to rule out shunt  -Neurology following, recommending holding antiplatelet and anticoagulation for 2 weeks due to large CVA with hemorrhagic conversion  -Patient continuing on statin  -Speech therapy following for aphasia and dysphagia, s/p FEES  and started on p.o.     Hypernatremia  -Change IV fluid to hypotonic  -Monitor sodium level     Paroxysmal atrial fibrillation with RVR  -On IV metoprolol and Cardizem  -Currently holding anticoagulation due to large CVA with hemorrhagic conversion  -Patient was previously taking Xarelto compliantly and therefore may indicate failure of Xarelto, plan to change to Eliquis when patient is cleared to take oral anticoagulation    DOMINICK  -Multiple etiologies including volume depletion, ATN from contrast, rule out obstruction, check renal ultrasound pending  -Nephrology evaluating the patient     Rhabdomyolysis  -This is likely due to prolonged immobilization after his CVA  -Continue IV fluid, CK trending down     Elevated troponin  -Likely demand ischemia     Prerenal azotemia  -Continue IV fluid and monitor     Dyslipidemia  -Continue statin, LDL at goal      Code status: Full  Prophylaxis: SCDs  Care Plan discussed with: Patient  Anticipated Disposition: 24 to 48 hours    CRITICAL CARE ATTESTATION:  I had a face to face encounter with the patient, reviewed and interpreted patient data including clinical events, labs, images, vital signs, I/O's, and examined patient. I have discussed the case and the plan and management of the patient's care with the consulting services, the bedside nurses and necessary ancillary providers. NOTE OF PERSONAL INVOLVEMENT IN CARE   This patient has a high probability of imminent, clinically significant deterioration, which requires the highest level of preparedness to intervene urgently. I participated in the decision-making and personally managed or directed the management of the following life and organ supporting interventions that required my frequent assessment to treat or prevent imminent deterioration. I personally spent 45 minutes of critical care time. This is time spent at this critically ill patient's bedside actively involved in patient care as well as the coordination of care and discussions with the patient's family. This does not include any procedural time which has been billed separately. Hospital Problems  Date Reviewed: 7/28/2022            Codes Class Noted POA    Ischemic cerebrovascular accident (CVA) [facility]) ICD-10-CM: I63.9  ICD-9-CM: 434.91  9/14/2022 Unknown             Review of Systems:   A comprehensive review of systems was negative except for that written in the HPI. Vital Signs:    Last 24hrs VS reviewed since prior progress note.  Most recent are:  Visit Vitals  /73 (BP 1 Location: Left upper arm, BP Patient Position: At rest)   Pulse 97   Temp 97.8 °F (36.6 °C)   Resp 28   Ht 6' 3\" (1.905 m)   Wt 129.2 kg (284 lb 13.4 oz)   SpO2 94%   BMI 35.60 kg/m²         Intake/Output Summary (Last 24 hours) at 9/20/2022 1510  Last data filed at 9/20/2022 1200  Gross per 24 hour   Intake 366.16 ml   Output --   Net 366.16 ml          Physical Examination:     I had a face to face encounter with this patient and independently examined them on 9/20/2022 as outlined below:          Constitutional:  No acute distress, cooperative, pleasant    ENT:  Oral mucosa moist, oropharynx benign. Resp:  CTA bilaterally. No wheezing/rhonchi/rales. No accessory muscle use. CV:  Regular rhythm, normal rate, no murmurs, gallops, rubs    GI:  Soft, non distended, non tender. normoactive bowel sounds, no hepatosplenomegaly     Musculoskeletal:  No edema, warm, 2+ pulses throughout    Neurologic:  Moves all extremities. Awake and alert            Data Review:    Review and/or order of clinical lab test      Labs:     Recent Labs     09/20/22  0346 09/19/22  1639   WBC 16.4* 17.8*   HGB 14.8 15.0   HCT 46.4 48.1    257     Recent Labs     09/20/22  0346 09/19/22  1639 09/19/22  0101 09/18/22  1445   * 153*  --  154*   K 4.6 4.3  --  4.1   * 125*  --  124*   CO2 20* 20*  --  23   BUN 75* 61*  --  58*   CREA 3.73* 2.80*  --  2.53*   * 170*  --  155*   CA 8.8 8.8  --  8.7   MG 2.8*  --  2.4 2.6*     No results for input(s): ALT, AP, TBIL, TBILI, TP, ALB, GLOB, GGT, AML, LPSE in the last 72 hours. No lab exists for component: SGOT, GPT, AMYP, HLPSE    No results for input(s): INR, PTP, APTT, INREXT, INREXT in the last 72 hours. No results for input(s): FE, TIBC, PSAT, FERR in the last 72 hours. No results found for: FOL, RBCF   No results for input(s): PH, PCO2, PO2 in the last 72 hours. No results for input(s): CPK, CKNDX, TROIQ in the last 72 hours.     No lab exists for component: CPKMB    Lab Results Component Value Date/Time    Cholesterol, total 146 09/15/2022 02:40 AM    HDL Cholesterol 53 09/15/2022 02:40 AM    LDL, calculated 65 09/15/2022 02:40 AM    Triglyceride 140 09/15/2022 02:40 AM    CHOL/HDL Ratio 2.8 09/15/2022 02:40 AM     Lab Results   Component Value Date/Time    Glucose (POC) 141 (H) 09/20/2022 11:51 AM    Glucose (POC) 157 (H) 09/20/2022 06:15 AM    Glucose (POC) 139 (H) 09/19/2022 11:44 PM    Glucose (POC) 149 (H) 09/19/2022 05:03 PM    Glucose (POC) 141 (H) 09/19/2022 12:07 PM    Glucose,  (H) 09/19/2022 02:49 PM     Lab Results   Component Value Date/Time    Color YELLOW/STRAW 10/04/2016 06:03 PM    Appearance CLEAR 10/04/2016 06:03 PM    Specific gravity 1.016 10/04/2016 06:03 PM    pH (UA) 5.5 10/04/2016 06:03 PM    Protein NEGATIVE  10/04/2016 06:03 PM    Glucose NEGATIVE  10/04/2016 06:03 PM    Ketone TRACE (A) 10/04/2016 06:03 PM    Bilirubin NEGATIVE  10/04/2016 06:03 PM    Urobilinogen 0.2 10/04/2016 06:03 PM    Nitrites NEGATIVE  10/04/2016 06:03 PM    Leukocyte Esterase NEGATIVE  10/04/2016 06:03 PM    Epithelial cells FEW 10/04/2016 06:03 PM    Bacteria NEGATIVE  10/04/2016 06:03 PM    WBC 0-4 10/04/2016 06:03 PM    RBC 10-20 10/04/2016 06:03 PM         Medications Reviewed:     Current Facility-Administered Medications   Medication Dose Route Frequency    dilTIAZem (CARDIZEM) 125 mg in dextrose 5% 125 mL infusion  0-15 mg/hr IntraVENous TITRATE    0.9% sodium chloride infusion  100 mL/hr IntraVENous CONTINUOUS    [START ON 9/21/2022] famotidine (PEPCID) tablet 20 mg  20 mg Oral DAILY    metoprolol (LOPRESSOR) injection 5 mg  5 mg IntraVENous Q6H    HYDROmorphone (DILAUDID) injection 0.2 mg  0.2 mg IntraVENous Q3H PRN    labetaloL (NORMODYNE;TRANDATE) injection 5 mg  5 mg IntraVENous Q6H PRN    balsam peru-castor oiL (VENELEX) ointment   Topical BID    glucose chewable tablet 16 g  4 Tablet Oral PRN    glucagon (GLUCAGEN) injection 1 mg  1 mg IntraMUSCular PRN    dextrose 10 % infusion 0-250 mL  0-250 mL IntraVENous PRN    insulin lispro (HUMALOG) injection   SubCUTAneous Q6H    acetaminophen (TYLENOL) tablet 650 mg  650 mg Oral Q4H PRN    Or    acetaminophen (TYLENOL) solution 650 mg  650 mg Per NG tube Q4H PRN    Or    acetaminophen (TYLENOL) suppository 650 mg  650 mg Rectal Q4H PRN    atorvastatin (LIPITOR) tablet 80 mg  80 mg Oral QHS    docusate sodium (COLACE) capsule 100 mg  100 mg Oral BID     ______________________________________________________________________  EXPECTED LENGTH OF STAY: 4d 9h  ACTUAL LENGTH OF STAY:          6                 Saul Caldera MD

## 2022-09-20 NOTE — PROGRESS NOTES
Problem: Self Care Deficits Care Plan (Adult)  Goal: *Acute Goals and Plan of Care (Insert Text)  Description: FUNCTIONAL STATUS PRIOR TO ADMISSION: Patient with communication deficits limiting gathering home environment/prior level of function information. Per chart, patient lives by himself and was climbing a ladder when event happened, presumed independent prior level of function. HOME SUPPORT: The patient lived alone with sister and brother-in-law for family support. Sister had called on the 13th and became concerned when no response, called a neighbor who found the patient. Would benefit from confirming prior level of function and home environment information with family when able. Occupational Therapy Goals  Initiated 9/15/2022   1. Patient will perform self-feeding with minimal assistance/contact guard assist within 7 day(s). 2.  Patient will perform grooming in sitting with moderate assist within 7 day(s). 3.  Patient will perform lower body dressing with maximal assistance within 7 day(s). 4.  Patient will perform toilet transfers to [facility] with maximal assistance within 7 day(s). 5.  Patient will perform all aspects of toileting with maximal assistance within 7 day(s). 6.  Patient will participate in upper extremity therapeutic exercise/activities with minimal assistance/contact guard assist within 7 day(s). 7.  Patient will participate in R [address] within 7 days. Outcome: Progressing Towards Goal   OCCUPATIONAL THERAPY TREATMENT  Patient: Yonatan Rasheed (89 y.o. male)  Date: 9/20/2022  Diagnosis: Ischemic cerebrovascular accident (CVA) ([facility] [address] [I63.9] <principal problem not specified>      Precautions: Fall (SBP< 180/90)  Chart, occupational therapy assessment, plan of care, and goals were reviewed. ASSESSMENT  Patient continues with skilled OT services and is progressing towards goals.  Patient presents this session with increased accuracy for yes/no questions, improved sitting balance, R shoulder muscle activation otherwise right upper extremity flaccid, poor standing balance with right sided lean, oriented to self and type of place, generally confused, easily distracted, but following simple commands. Patient received semi supine in bed and agreeable to working with therapy, patient's family initially present but left during session due to patient being easily distracted. Patient transferred to edge of bed with max A x2 but once sitting edge of bed noted significant improvement in overall sitting balance with mostly close SBA to min A to correct balance with multi modal cueing. Patient completing dynamic sitting balance task with close SBA to CGA for sitting balance to reach outside base of support with left upper extremity. Patient weight bearing through right upper extremity throughout task with R hand positioned on bed, occasional assist to reposition but overall able to maintain on edge of bed in static sitting. Patient came down to weight bear through R elbow on bed with max A to push back up into sitting, able to complete onto left elbow with min A to come back into sitting. Patient then completed sit -> stand from edge of bed with recliner turned backwards so patient could pull up on bar, max A x2 to complete and able to stand with right sided lean but difficulty coming to full upright position. Noted in standing that patient's brief was soiled. Returned to supine in bed and complete rolling for total A toileting and brief/chux exchanged for clean set. Overall patient with improvement toward goals this day but remains limited by elevated HR, spiked up to 170 with standing so further out of bed mobility not completed this date. Patient would benefit from skilled OT services during admission to improve independence with self care and functional mobility/transfers.  Recommend discharge to [facility] at this time as patient with acute CVA and independent/living alone prior to admission. Current Level of Function Impacting Discharge (ADLs): mod to max A x2 for all mobility/transfers, total A lower extremity dressing and bed level toileting    Other factors to consider for discharge: prior level of function independent, living alone, supportive family but no one local         PLAN :  Patient continues to benefit from skilled intervention to address the above impairments. Continue treatment per established plan of care to address goals. Recommend with staff: bed in modified chair position for meals, bedpan for toileting if able to communicate needs    Recommend next OT session: continue POC    Recommendation for discharge: (in order for the patient to meet his/her long term goals)  Therapy 3 hours per day 5-7 days per week    This discharge recommendation:  Has been made in collaboration with the attending provider and/or case management    IF patient discharges home will need the following DME: TBD, not safe for DC home alone at this time       SUBJECTIVE:   Patient stated I'm standing.     OBJECTIVE DATA SUMMARY:   Cognitive/Behavioral Status:  Neurologic State: Alert  Orientation Level: Oriented to place; Disoriented to place; Disoriented to time;Disoriented to situation  Cognition: Decreased command following;Decreased attention/concentration;Poor safety awareness  Perception: Cues to attend to right side of body  Perseveration: Perseverates during conversation       Functional Mobility and Transfers for ADLs:  Bed Mobility:  Rolling:  Moderate assistance  Supine to Sit: Maximum assistance;Assist x2 (patient does assist with trunk righting)  Sit to Supine: Maximum assistance;Assist x2  Scooting: Maximum assistance (in sitting, totalA in supine)    Transfers:  Sit to Stand: Maximum assistance;Assist x2          Balance:  Sitting: Impaired  Sitting - Static: Fair (occasional)  Sitting - Dynamic: Fair (occasional)  Standing: Impaired  Standing - Static: Constant support;Poor (pull to stand via recliner and blocking R knee)  Standing - Dynamic :  (not tested)    ADL Intervention:                 Type of Bath: Partial              Lower Body Dressing Assistance  Socks: Total assistance (dependent)  Leg Crossed Method Used: No  Position Performed: Supine    Toileting  Toileting Assistance: Total assistance(dependent) (bed level)  Bowel Hygiene: Total assistance (dependent)  Clothing Management: Total assistance (dependent)         Neuro Re-Education:   Weight bearing through right upper extremity while sitting at edge of bed with assist to maintain   Orientation to midline in sitting and standing, heavy physical assist in standing due to significant right sided lean       Therapeutic Exercises: Active assist range of motion for shoulder flexion x2 at edge of bed, limited muscle activation in shoulder; passive range of motion x5 for elbow and wrist, hypotonic at this time    Pain:  None reported    Activity Tolerance:   Fair, requires rest breaks, and HR elevated with activity    After treatment patient left in no apparent distress:   Supine in bed, Call bell within reach, Bed / chair alarm activated, Caregiver / family present, Side rails x 3, and bed in modified chair position and patient eating    COMMUNICATION/COLLABORATION:   The patients plan of care was discussed with: Physical therapist and Registered nurse. Patient was educated regarding His deficit(s) of right sided weakness and impaired balance as this relates to His diagnosis of CVA. He demonstrated Guarded understanding as evidenced by communication deficits.     Tremaine Page OTR/L  Time Calculation: 31 mins

## 2022-09-20 NOTE — PROGRESS NOTES
Renal Dosing/Monitoring  Medication: famotidine   Current regimen:  20mg every 12 hr  Recent Labs     09/20/22  0346 09/19/22  1639 09/18/22  1445   CREA 3.73* 2.80* 2.53*   BUN 75* 61* 58*     Estimated CrCl:  25-30 ml/min  Plan: Change to 20mg daily for CrCl <50 per protocol

## 2022-09-21 LAB
AMORPH CRY URNS QL MICRO: ABNORMAL
ANION GAP SERPL CALC-SCNC: 6 MMOL/L (ref 5–15)
APPEARANCE UR: ABNORMAL
BACTERIA URNS QL MICRO: ABNORMAL /HPF
BASOPHILS # BLD: 0 K/UL (ref 0–0.1)
BASOPHILS NFR BLD: 0 % (ref 0–1)
BILIRUB UR QL: NEGATIVE
BUN SERPL-MCNC: 89 MG/DL (ref 6–20)
BUN/CREAT SERPL: 26 (ref 12–20)
CALCIUM SERPL-MCNC: 8.7 MG/DL (ref 8.5–10.1)
CHLORIDE SERPL-SCNC: 129 MMOL/L (ref 97–108)
CO2 SERPL-SCNC: 23 MMOL/L (ref 21–32)
COLOR UR: ABNORMAL
CREAT SERPL-MCNC: 3.39 MG/DL (ref 0.7–1.3)
DIFFERENTIAL METHOD BLD: ABNORMAL
EOSINOPHIL # BLD: 0.4 K/UL (ref 0–0.4)
EOSINOPHIL NFR BLD: 3 % (ref 0–7)
EPITH CASTS URNS QL MICRO: ABNORMAL /LPF
ERYTHROCYTE [DISTWIDTH] IN BLOOD BY AUTOMATED COUNT: 14.6 % (ref 11.5–14.5)
GLUCOSE BLD STRIP.AUTO-MCNC: 107 MG/DL (ref 65–117)
GLUCOSE BLD STRIP.AUTO-MCNC: 124 MG/DL (ref 65–117)
GLUCOSE BLD STRIP.AUTO-MCNC: 152 MG/DL (ref 65–117)
GLUCOSE BLD STRIP.AUTO-MCNC: 161 MG/DL (ref 65–117)
GLUCOSE SERPL-MCNC: 153 MG/DL (ref 65–100)
GLUCOSE UR STRIP.AUTO-MCNC: NEGATIVE MG/DL
GRAN CASTS URNS QL MICRO: ABNORMAL /LPF
HCT VFR BLD AUTO: 43.8 % (ref 36.6–50.3)
HGB BLD-MCNC: 13.5 G/DL (ref 12.1–17)
HGB UR QL STRIP: ABNORMAL
IMM GRANULOCYTES # BLD AUTO: 0.2 K/UL (ref 0–0.04)
IMM GRANULOCYTES NFR BLD AUTO: 1 % (ref 0–0.5)
KETONES UR QL STRIP.AUTO: NEGATIVE MG/DL
LEUKOCYTE ESTERASE UR QL STRIP.AUTO: ABNORMAL
LYMPHOCYTES # BLD: 1.4 K/UL (ref 0.8–3.5)
LYMPHOCYTES NFR BLD: 11 % (ref 12–49)
MCH RBC QN AUTO: 29.2 PG (ref 26–34)
MCHC RBC AUTO-ENTMCNC: 30.8 G/DL (ref 30–36.5)
MCV RBC AUTO: 94.8 FL (ref 80–99)
MONOCYTES # BLD: 1.1 K/UL (ref 0–1)
MONOCYTES NFR BLD: 9 % (ref 5–13)
NEUTS SEG # BLD: 9.3 K/UL (ref 1.8–8)
NEUTS SEG NFR BLD: 76 % (ref 32–75)
NITRITE UR QL STRIP.AUTO: NEGATIVE
NRBC # BLD: 0 K/UL (ref 0–0.01)
NRBC BLD-RTO: 0 PER 100 WBC
PH UR STRIP: 5 [PH] (ref 5–8)
PHOSPHATE SERPL-MCNC: 5.1 MG/DL (ref 2.6–4.7)
PLATELET # BLD AUTO: 147 K/UL (ref 150–400)
PMV BLD AUTO: 11 FL (ref 8.9–12.9)
POTASSIUM SERPL-SCNC: 4.4 MMOL/L (ref 3.5–5.1)
PROT UR STRIP-MCNC: ABNORMAL MG/DL
RBC # BLD AUTO: 4.62 M/UL (ref 4.1–5.7)
RBC #/AREA URNS HPF: ABNORMAL /HPF (ref 0–5)
SERVICE CMNT-IMP: ABNORMAL
SERVICE CMNT-IMP: NORMAL
SODIUM SERPL-SCNC: 158 MMOL/L (ref 136–145)
SP GR UR REFRACTOMETRY: 1.01 (ref 1–1.03)
UR CULT HOLD, URHOLD: NORMAL
UROBILINOGEN UR QL STRIP.AUTO: 0.2 EU/DL (ref 0.2–1)
WBC # BLD AUTO: 12.3 K/UL (ref 4.1–11.1)
WBC URNS QL MICRO: ABNORMAL /HPF (ref 0–4)

## 2022-09-21 PROCEDURE — 36415 COLL VENOUS BLD VENIPUNCTURE: CPT

## 2022-09-21 PROCEDURE — 92526 ORAL FUNCTION THERAPY: CPT

## 2022-09-21 PROCEDURE — 74011250637 HC RX REV CODE- 250/637: Performed by: NURSE PRACTITIONER

## 2022-09-21 PROCEDURE — 87086 URINE CULTURE/COLONY COUNT: CPT

## 2022-09-21 PROCEDURE — 97112 NEUROMUSCULAR REEDUCATION: CPT

## 2022-09-21 PROCEDURE — 85025 COMPLETE CBC W/AUTO DIFF WBC: CPT

## 2022-09-21 PROCEDURE — 92507 TX SP LANG VOICE COMM INDIV: CPT

## 2022-09-21 PROCEDURE — 65660000001 HC RM ICU INTERMED STEPDOWN

## 2022-09-21 PROCEDURE — 74011000250 HC RX REV CODE- 250: Performed by: NURSE PRACTITIONER

## 2022-09-21 PROCEDURE — 97530 THERAPEUTIC ACTIVITIES: CPT

## 2022-09-21 PROCEDURE — 74011250637 HC RX REV CODE- 250/637: Performed by: ANESTHESIOLOGY

## 2022-09-21 PROCEDURE — 87077 CULTURE AEROBIC IDENTIFY: CPT

## 2022-09-21 PROCEDURE — 97535 SELF CARE MNGMENT TRAINING: CPT

## 2022-09-21 PROCEDURE — 74011000250 HC RX REV CODE- 250: Performed by: FAMILY MEDICINE

## 2022-09-21 PROCEDURE — 80048 BASIC METABOLIC PNL TOTAL CA: CPT

## 2022-09-21 PROCEDURE — 74011250637 HC RX REV CODE- 250/637: Performed by: FAMILY MEDICINE

## 2022-09-21 PROCEDURE — 82962 GLUCOSE BLOOD TEST: CPT

## 2022-09-21 PROCEDURE — 99232 SBSQ HOSP IP/OBS MODERATE 35: CPT | Performed by: SPECIALIST

## 2022-09-21 PROCEDURE — 74011636637 HC RX REV CODE- 636/637: Performed by: ANESTHESIOLOGY

## 2022-09-21 PROCEDURE — 74011250636 HC RX REV CODE- 250/636: Performed by: FAMILY MEDICINE

## 2022-09-21 PROCEDURE — 74011000258 HC RX REV CODE- 258: Performed by: NURSE PRACTITIONER

## 2022-09-21 PROCEDURE — 87186 SC STD MICRODIL/AGAR DIL: CPT

## 2022-09-21 PROCEDURE — 81001 URINALYSIS AUTO W/SCOPE: CPT

## 2022-09-21 PROCEDURE — 84100 ASSAY OF PHOSPHORUS: CPT

## 2022-09-21 RX ORDER — SODIUM CHLORIDE 450 MG/100ML
100 INJECTION, SOLUTION INTRAVENOUS CONTINUOUS
Status: DISCONTINUED | OUTPATIENT
Start: 2022-09-21 | End: 2022-09-22

## 2022-09-21 RX ORDER — METOPROLOL TARTRATE 5 MG/5ML
7.5 INJECTION INTRAVENOUS EVERY 6 HOURS
Status: DISCONTINUED | OUTPATIENT
Start: 2022-09-21 | End: 2022-09-22

## 2022-09-21 RX ADMIN — METOPROLOL TARTRATE 7.5 MG: 5 INJECTION, SOLUTION INTRAVENOUS at 17:05

## 2022-09-21 RX ADMIN — Medication 2 UNITS: at 17:05

## 2022-09-21 RX ADMIN — SODIUM CHLORIDE, PRESERVATIVE FREE 1 G: 5 INJECTION INTRAVENOUS at 10:29

## 2022-09-21 RX ADMIN — ATORVASTATIN CALCIUM 80 MG: 40 TABLET, FILM COATED ORAL at 22:20

## 2022-09-21 RX ADMIN — DOCUSATE SODIUM 100 MG: 100 CAPSULE, LIQUID FILLED ORAL at 09:28

## 2022-09-21 RX ADMIN — Medication: at 17:05

## 2022-09-21 RX ADMIN — DILTIAZEM HYDROCHLORIDE 15 MG/HR: 5 INJECTION, SOLUTION INTRAVENOUS at 18:45

## 2022-09-21 RX ADMIN — FAMOTIDINE 20 MG: 20 TABLET, FILM COATED ORAL at 09:28

## 2022-09-21 RX ADMIN — METOPROLOL TARTRATE 7.5 MG: 5 INJECTION, SOLUTION INTRAVENOUS at 12:23

## 2022-09-21 RX ADMIN — METOPROLOL TARTRATE 5 MG: 5 INJECTION, SOLUTION INTRAVENOUS at 00:00

## 2022-09-21 RX ADMIN — METOPROLOL TARTRATE 5 MG: 5 INJECTION, SOLUTION INTRAVENOUS at 05:11

## 2022-09-21 RX ADMIN — Medication: at 09:30

## 2022-09-21 RX ADMIN — SODIUM CHLORIDE 100 ML/HR: 4.5 INJECTION, SOLUTION INTRAVENOUS at 21:15

## 2022-09-21 RX ADMIN — DILTIAZEM HYDROCHLORIDE 15 MG/HR: 5 INJECTION, SOLUTION INTRAVENOUS at 09:30

## 2022-09-21 RX ADMIN — DOCUSATE SODIUM 100 MG: 100 CAPSULE, LIQUID FILLED ORAL at 17:05

## 2022-09-21 RX ADMIN — SODIUM CHLORIDE 100 ML/HR: 4.5 INJECTION, SOLUTION INTRAVENOUS at 09:28

## 2022-09-21 RX ADMIN — DILTIAZEM HYDROCHLORIDE 15 MG/HR: 5 INJECTION, SOLUTION INTRAVENOUS at 00:00

## 2022-09-21 NOTE — PROGRESS NOTES
Nephrology Progress Note  Noam Andrade  Date of Admission : 9/14/2022    CC:  Follow up for DOMINICK       Assessment and Plan     DOMINICK:  - multifactorial: volume depletion from poor po intake + urinary retention  - US showing L hydro  - UA suggestive of UTI  - continue IVF - change to 1/2 NS     Probable UTI:  - cx pending, not on abx at this time    L hydro:  - would repeat U/S in a few days     Hypernatremia:  - 2/2 lack of po free water intake  - hypotonic fluids as above until po intake improves     Acute CVA:  - L MCA territory infarct  - per neurology     Afib w/ RVR:  - on dilt drip     Mild Rhabdo:  - CPK improving after IVF     DM2  Obesity  VALENTE       Interval History:  Seen and examined. Nonverbal, resting in bed. O2 stable, Cr better. UOP increased after vazquez placement. Unable to obtain ROS. Current Medications: all current  Medications have been eviewed in EPIC  Review of Systems: Pertinent items are noted in HPI. Objective:  Vitals:    Vitals:    09/20/22 2200 09/21/22 0200 09/21/22 0400 09/21/22 0600   BP: (!) 123/94      Pulse: (!) 120 83 89 86   Resp: 24      Temp: 98.3 °F (36.8 °C)      SpO2: 94%      Weight:       Height:         Intake and Output:  No intake/output data recorded. 09/19 1901 - 09/21 0700  In: 366.2 [I.V.:366.2]  Out: 8047 [Urine:1550]    Physical Examination:    General: NAD,aphasic  Neck:  Supple, no mass  Resp:  Lungs CTA B/L, no wheezing , normal respiratory effort  CV:  RRR,  no murmur or rub, no LE edema  GI:  Soft, NT, + Bowel sounds, no hepatosplenomegaly  Neurologic:  Aphasic  Psych:             unable to assess  Skin:  No Rash  :  Vazquez in place    []    High complexity decision making was performed  []    Patient is at high-risk of decompensation with multiple organ involvement    Lab Data Personally Reviewed: I have reviewed all the pertinent labs, microbiology data and radiology studies during assessment.     Recent Labs     09/21/22  0516 09/20/22  0932 09/19/22  1639 09/19/22  0101 09/18/22  1445   * 154* 153*  --  154*   K 4.4 4.6 4.3  --  4.1   * 125* 125*  --  124*   CO2 23 20* 20*  --  23   * 166* 170*  --  155*   BUN 89* 75* 61*  --  58*   CREA 3.39* 3.73* 2.80*  --  2.53*   CA 8.7 8.8 8.8  --  8.7   MG  --  2.8*  --  2.4 2.6*     Recent Labs     09/21/22  0516 09/20/22  0346 09/19/22  1639   WBC 12.3* 16.4* 17.8*   HGB 13.5 14.8 15.0   HCT 43.8 46.4 48.1   * 212 257     No results found for: SDES  No results found for: CULT  Recent Results (from the past 24 hour(s))   GLUCOSE, POC    Collection Time: 09/20/22 11:51 AM   Result Value Ref Range    Glucose (POC) 141 (H) 65 - 117 mg/dL    Performed by Ariane Keane  PCT    URINALYSIS W/ RFLX MICROSCOPIC    Collection Time: 09/20/22  4:26 PM   Result Value Ref Range    Color YELLOW/STRAW      Appearance CLOUDY (A) CLEAR      Specific gravity 1.020 1.003 - 1.030      pH (UA) 8.0 5.0 - 8.0      Protein TRACE (A) NEG mg/dL    Glucose Negative NEG mg/dL    Ketone Negative NEG mg/dL    Bilirubin Negative NEG      Blood LARGE (A) NEG      Urobilinogen 1.0 0.2 - 1.0 EU/dL    Nitrites Negative NEG      Leukocyte Esterase MODERATE (A) NEG      WBC 20-50 0 - 4 /hpf    RBC  0 - 5 /hpf    Epithelial cells FEW FEW /lpf    Bacteria 4+ (A) NEG /hpf    Hyaline cast 2-5 0 - 5 /lpf   EOSINOPHILS, URINE    Collection Time: 09/20/22  4:26 PM   Result Value Ref Range    Eosinophils,urine Negative     URINE CULTURE HOLD SAMPLE    Collection Time: 09/20/22  4:26 PM    Specimen: Serum   Result Value Ref Range    Urine culture hold        Urine on hold in Microbiology dept for 2 days. If unpreserved urine is submitted, it cannot be used for addtional testing after 24 hours, recollection will be required.    GLUCOSE, POC    Collection Time: 09/20/22  5:22 PM   Result Value Ref Range    Glucose (POC) 129 (H) 65 - 117 mg/dL    Performed by [address] [place] [address] Drive, POC    Collection Time: 09/21/22 12:34 AM Result Value Ref Range    Glucose (POC) 152 (H) 65 - 117 mg/dL    Performed by Rowdy De Borgia    METABOLIC PANEL, BASIC    Collection Time: 09/21/22  5:16 AM   Result Value Ref Range    Sodium 158 (H) 136 - 145 mmol/L    Potassium 4.4 3.5 - 5.1 mmol/L    Chloride 129 (H) 97 - 108 mmol/L    CO2 23 21 - 32 mmol/L    Anion gap 6 5 - 15 mmol/L    Glucose 153 (H) 65 - 100 mg/dL    BUN 89 (H) 6 - 20 MG/DL    Creatinine 3.39 (H) 0.70 - 1.30 MG/DL    BUN/Creatinine ratio 26 (H) 12 - 20      GFR est AA 22 (L) >60 ml/min/1.73m2    GFR est non-AA 18 (L) >60 ml/min/1.73m2    Calcium 8.7 8.5 - 10.1 MG/DL   CBC WITH AUTOMATED DIFF    Collection Time: 09/21/22  5:16 AM   Result Value Ref Range    WBC 12.3 (H) 4.1 - 11.1 K/uL    RBC 4.62 4.10 - 5.70 M/uL    HGB 13.5 12.1 - 17.0 g/dL    HCT 43.8 36.6 - 50.3 %    MCV 94.8 80.0 - 99.0 FL    MCH 29.2 26.0 - 34.0 PG    MCHC 30.8 30.0 - 36.5 g/dL    RDW 14.6 (H) 11.5 - 14.5 %    PLATELET 620 (L) 299 - 400 K/uL    MPV 11.0 8.9 - 12.9 FL    NRBC 0.0 0  WBC    ABSOLUTE NRBC 0.00 0.00 - 0.01 K/uL    NEUTROPHILS 76 (H) 32 - 75 %    LYMPHOCYTES 11 (L) 12 - 49 %    MONOCYTES 9 5 - 13 %    EOSINOPHILS 3 0 - 7 %    BASOPHILS 0 0 - 1 %    IMMATURE GRANULOCYTES 1 (H) 0.0 - 0.5 %    ABS. NEUTROPHILS 9.3 (H) 1.8 - 8.0 K/UL    ABS. LYMPHOCYTES 1.4 0.8 - 3.5 K/UL    ABS. MONOCYTES 1.1 (H) 0.0 - 1.0 K/UL    ABS. EOSINOPHILS 0.4 0.0 - 0.4 K/UL    ABS. BASOPHILS 0.0 0.0 - 0.1 K/UL    ABS. IMM. GRANS. 0.2 (H) 0.00 - 0.04 K/UL    DF AUTOMATED     GLUCOSE, POC    Collection Time: 09/21/22  5:59 AM   Result Value Ref Range    Glucose (POC) 124 (H) 65 - 117 mg/dL    Performed by Latricia Up MD  [address]  Phone - [phone]   Fax - [phone]  [email or web address]

## 2022-09-21 NOTE — PROGRESS NOTES
Transition of Care Plan  RUR- Med 16%  DISPOSITION: IPR - [facility] - needs insurance auth - pending medical stability  F/U with PCP/Specialist    Transport: [facility]    Emergency contact: Son Sloan Marker [phone]    CM barriers to discharge: insurance auth    CM spoke with patient's son, Guerda Tuttle, to provide update on discharge planning. CM has not yet requested for insurance auth to be started at [facility], patient is still on Cardizem drip. [email or web address].

## 2022-09-21 NOTE — PROGRESS NOTES
Bedside and Verbal shift change report given to Caprice Parker (oncoming nurse) by Elise [address] (offgoing nurse). Report included the following information SBAR, Kardex, and Recent Results.

## 2022-09-21 NOTE — PROGRESS NOTES
Problem: Mobility Impaired (Adult and Pediatric)  Goal: *Acute Goals and Plan of Care (Insert Text)  Description:   FUNCTIONAL STATUS PRIOR TO ADMISSION: Patient was independent and active without use of DME.    HOME SUPPORT PRIOR TO ADMISSION: The patient lived alone with no local support. Physical Therapy Goals  Initiated 9/15/2022  1. Patient will move from supine to sit and sit to supine , scoot up and down, and roll side to side in bed with moderate assistance  within 7 day(s). 2.  Patient will transfer from bed to chair and chair to bed with moderate assistance  using the least restrictive device within 7 day(s). 3.  Patient will perform sit to stand with moderate assistance  within 7 day(s). 4.  Patient will ambulate with moderate assistance  for 25 feet with the least restrictive device within 7 day(s). 5.  Patient will improve Bowles Balance score by 7 points within 7 days. Outcome: Progressing Towards Goal    PHYSICAL THERAPY TREATMENT  Patient: Michelle Bowman (59 y.o. male)  Date: 9/21/2022  Diagnosis: Ischemic cerebrovascular accident ([facility]) ([facility] [I63.9] <principal problem not specified>      Precautions: Fall (SBP< 180/90)  Chart, physical therapy assessment, plan of care and goals were reviewed. ASSESSMENT  Patient continues with skilled PT services and is progressing towards goals however remains most limited by dense R hemiparesis, mild R inattention, impaired balance, aphasia, decreased cardiopulmonary tolerance to activity, and c/o pain leading to increased falls risk and dependency from baseline level. Received pt supine in bed on room air, SpO2 in the mid 90s HR ranging 100s to briefly 160s with activity. Completed supine bridging with NM facilitation in prep for standing activity. Facilitated supine>sit with max A x2 and one step cues to increase indep with task. In sitting worked on attaining/maintaining midline postural control, down on R elbow>midline, and R LE AAROM/PROM. Progressed to completing sit<>stands x2 reps (pull to stand) with overall max A x2 but able to offer better initiation this date and with less R sided lean. Assisted back to bed at end of session, NAD. Continue to recommend discharge to IPR once medically cleared to maximize NM recovery and indep. Current Level of Function Impacting Discharge (mobility/balance): max A x2; R HP    Other factors to consider for discharge: high falls risk; progressing; below baseline          PLAN :  Patient continues to benefit from skilled intervention to address the above impairments. Continue treatment per established plan of care. to address goals. Recommendation for discharge: (in order for the patient to meet his/her long term goals)  Therapy 3 hours per day 5-7 days per week    This discharge recommendation:  A follow-up discussion with the attending provider and/or case management is planned    IF patient discharges home will need the following DME: to be determined (TBD)       SUBJECTIVE:   Patient stated Tj Many.     OBJECTIVE DATA SUMMARY:   Critical Behavior:  Neurologic State: Alert, Confused  Orientation Level: Oriented to person, Disoriented to time, Disoriented to situation, Disoriented to place  Cognition: Decreased attention/concentration, Follows commands  Safety/Judgement: Decreased awareness of environment, Decreased awareness of need for assistance, Decreased awareness of need for safety, Decreased insight into deficits  Functional Mobility Training:  Bed Mobility:     Supine to Sit: Assist x2;Maximum assistance  Sit to Supine: Assist x2;Maximum assistance  Scooting: Maximum assistance     Transfers:  Sit to Stand: Assist x2;Maximum assistance  Stand to Sit: Assist x2;Maximum assistance     Balance:  Sitting: Impaired  Sitting - Static: Fair (occasional)  Sitting - Dynamic: Fair (occasional)  Standing: Impaired;Pull to stand; With support  Standing - Static: Constant support;Poor     Activity Tolerance: Good, SpO2 stable on RA, and requires rest breaks    After treatment patient left in no apparent distress:   Supine in bed, Call bell within reach, Bed / chair alarm activated, Caregiver / family present, and Side rails x 3    COMMUNICATION/COLLABORATION:   The patients plan of care was discussed with: Occupational therapist and Registered nurse. Patient was educated regarding His deficit(s) of R HP as this relates to His diagnosis of CVA. He demonstrated Good understanding as evidenced by nodding. Patient and/or family was verbally educated on the BE FAST acronym for signs/symptoms of CVA and TIA. BE FAST was written on patient's communication board  for visual education and reinforcement. All questions answered with patient indicating good understanding.      Paulette Radford, PT, DPT   Time Calculation: 43 mins

## 2022-09-21 NOTE — PROGRESS NOTES
[facility]  Cardiology Care Note                  []Initial visit     [x]Established visit     Patient Name: Alex Everett  Primary Cardiologist: Donna Hall MD  Consulting Cardiologist: Ca Levi MD     Reason for initial visit: atrial fibrillation with RVR    HPI:   Patient is a 71year old male with a hx of chronic afib on Xarelto, DM, VALENTE on CPAP, HLD, and gout. 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 He reports that he has been faithfully taking Xarelto, not missing the medication, though this is under investigation. Sister is supposed to bring in Xarelto bottle. He is currently off APT/OAC d/t hemorrhagic conversion with plans to start in 2 weeks per neurology. He has been rate controlled on Metoprolol until overnight with HR up to 140s on telemetry. He was given IV Labetalol and Metoprolol without much improvement. He was started on a Diltiazem gtt. SUBJECTIVE:  Patient seemed a bit more confused this am. Still able to answer simple questions. Drowsy. Assessment and Plan     Atrial fibrillation with RVR: converted back to afib, but with better HR control overnight on Diltiazem gtt at 15mg/hr and Metoprolol 5mg IV q6hrs. BP trending 120-130s/70-90s. Unsure of preferred BP with recent CVA/hemorrhagic conversion. Holding APT/OAC for 2 weeks, switch to Eliquis 5mg bid when able to start medication. Acute CVA: per neurology found to have a large left MCA infarct with hemorrhagic conversion, and occluding thrombus in left M1 with other areas of IC stenoses. Taking Lipitor, see above for APT/OAC management. Echo with inadequate bubble study.      HTN: see above     HLD: on statin  Patient seen on the day of progress note and examined  and agree with Advance Practice Provider (KAMLA, NP,PA)  assessment and plans. He remains in atrial fibrillation the heart rate is better controlled between 80 and 100 at this time. For now given also the underlying UTI and renal dysfunction continue with IV medications. If he remains controlled we may change to p.o. both Cardizem and Lopressor tomorrow. No oral anticoagulation at this point to avoid hemorrhagic conversion likely start weeks. There is a consideration about Xarelto non efficacy (apparently patient compliant with Xarelto) and therefore Eliquis should be considered           ____________________________________________________________    Cardiac testing  09/14/22    ECHO ADULT COMPLETE 09/15/2022 9/15/2022    Interpretation Summary  Formatting of this result is different from the original.      Left Ventricle: Normal left ventricular systolic function with a visually estimated EF of 50 - 55%. Left ventricle size is normal. Normal wall thickness. Right Ventricle: Right ventricle is mildly dilated. Normal wall thickness. Mildly reduced systolic function. Left Atrium: Left atrium is mildly dilated. Technical qualifiers: Echo study was technically difficult with poor endocardial visualization and technically difficult due to patient's body habitus. Contrast used: Definity. Bubble study is not adequate to rule out shunt    Signed by: Abel Bolivar MD on 9/15/2022  4:12 PM                Most recent HS troponins:  No results for input(s): TROPHS in the last 72 hours.     No lab exists for component:  CKMB      Review of Systems    [x]All other systems reviewed and all negative except as written in HPI    [] Patient unable to provide secondary to condition         Past Medical History:   Diagnosis Date    Arrhythmia     atrial fib    Depression 4/24/2010    Diabetes ([address]     diet control for pre-diabetes    Dyslipidemia, goal LDL below 100 6/14/2011    Gout     HTN (hypertension) 4/24/2010    Impotence 4/24/2010    Morbid obesity ([address] 5/17/2010    VALENTE (obstructive sleep apnea) 4/24/2010    CPAP    Restless legs 4/15/2015     Past Surgical History:   Procedure Laterality Date    COLONOSCOPY N/A 6/27/2017    COLONOSCOPY performed by Meño Mathis MD at [facility]    ENDOSCOPY, COLON, DIAGNOSTIC  2007    HX APPENDECTOMY      HX ORTHOPAEDIC  2000    bilat TKR     HX ORTHOPAEDIC  2010    left THR    HX UROLOGICAL  03/2018    urolift    HX UROLOGICAL  03/2019    prosthesis     Social Hx:  reports that he quit smoking about 32 years ago. His smoking use included cigarettes. He has a 2.50 pack-year smoking history. He has never used smokeless tobacco. He reports current alcohol use of about 1.7 standard drinks per week. He reports that he does not use drugs. Family Hx: family history includes Cancer in his father, maternal aunt, maternal uncle, and paternal grandfather; Diabetes in his brother and sister. No Known Allergies       OBJECTIVE:  Wt Readings from Last 3 Encounters:   09/20/22 284 lb 13.4 oz (129.2 kg)   07/28/22 290 lb (131.5 kg)   06/14/22 296 lb (134.3 kg)       Intake/Output Summary (Last 24 hours) at 9/21/2022 0936  Last data filed at 9/20/2022 1641  Gross per 24 hour   Intake 281.16 ml   Output 1550 ml   Net -1268.84 ml         Physical Exam    Vitals:   Vitals:    09/20/22 2200 09/21/22 0200 09/21/22 0400 09/21/22 0600   BP: (!) 123/94      Pulse: (!) 120 83 89 86   Resp: 24      Temp: 98.3 °F (36.8 °C)      SpO2: 94%      Weight:       Height:             General:    Drowsy, cooperative, no distress, appears stated age. Neck:   Supple, no carotid bruit and no JVD. Back:     Symmetric    Lungs:     diminished to auscultation bilaterally. Heart[de-identified]    irregular rate and rhythm, S1, S2 normal, no murmur, click, rub or gallop. Abdomen:     Soft, non-tender. Bowel sounds normal.    Extremities:   Extremities normal, atraumatic, no cyanosis or edema.    Vascular:   Pulses - sudheer UE/LE equal   Skin:   Skin color normal. Abrasions right leg Neurologic:   Moves left extremities. Speech somewhat garbled       Data Review:     Radiology:   XR Results (most recent):  Results from Hospital Encounter encounter on 09/14/22    XR CHEST PORT    Narrative  Indication: Shortness of breath    Comparison: 9/14/2022    Portable exam of the chest obtained at 1531 demonstrates normal heart size. Low  lung volumes again demonstrated without focal infiltrate. The osseous structures  are unremarkable. Impression  Low lung volumes without focal infiltrate. CT Results (most recent):  Results from Hospital Encounter encounter on 09/14/22    CT HEAD WO CONT    Narrative  EXAM: CT HEAD WO CONT    INDICATION: hemorrhagic conversion? COMPARISON: CT dose prior to this exam.    CONTRAST: None. TECHNIQUE: Unenhanced CT of the head was performed using 5 mm images. Brain and  bone windows were generated. Coronal and sagittal reformats. CT dose reduction  was achieved through use of a standardized protocol tailored for this  examination and automatic exposure control for dose modulation. FINDINGS:  Left anterolateral temporal lobe hypodensity with loss of gray-white  differentiation is redemonstrated with no evident interval hemorrhage or other  significant interval change with localized mass effect and effacement of the  anterior horn of the right lateral ventricle. The ventricles and sulci are  otherwise normal in size, shape and configuration. There is no significant white  matter disease. There is no new intracranial hemorrhage, extra-axial collection,  or mass effect. The basilar cisterns are open. The bone windows demonstrate no abnormalities. The visualized portions of the  paranasal sinuses and mastoid air cells are clear. There is a soft tissue  collection overlying the right frontoparietal skull, not significant change. Impression  Left MCA territory left temporal lobe infarct is stable appearance  without interval hemorrhagic transformation.  Right frontoparietal scalp  collection unchanged. MRI Results (most recent):  Results from [place] encounter on 09/14/22    MRI BRAIN WO CONT    Narrative  INDICATION:   follow up ischemic cva    EXAMINATION:  MRI BRAIN WO CONTRAST    COMPARISON:  CT September 14, 2022    TECHNIQUE:  Multiplanar multisequence acquisition without contrast of the brain. FINDINGS:    There is a moderate to large area of acute infarction in the left middle  cerebral artery territory, involving the left basal ganglia and corona radiata,  left anterior and mid temporal lobe, left insula and left frontal operculum. There is associated susceptibility artifact predominantly within the basal  ganglia and corona radiata portions of the infarction, with slight mass effect  upon the left lateral ventricle similar to recent CT. There is no hydrocephalus  or midline shift. Major intracranial vascular flow-voids are patent. Subcutaneous edema throughout the scalp right greater than left. .    Impression  Moderate to large acute left MCA territory infarction, with associated  susceptibility artifact predominantly in the basal ganglia/corona radiata  consistent with blood product/petechial hemorrhage. Overall appearance unchanged  compared to recent CT, with no hydrocephalus or midline shift. No results for input(s): CPK, TROIQ in the last 72 hours. No lab exists for component: CKQMB, CPKMB, BMPP    Recent Labs     09/21/22  0516 09/20/22  0346   * 154*   K 4.4 4.6   * 125*   CO2 23 20*   BUN 89* 75*   CREA 3.39* 3.73*   * 166*   CA 8.7 8.8     Recent Labs     09/21/22  0516 09/20/22  0346   WBC 12.3* 16.4*   HGB 13.5 14.8   HCT 43.8 46.4   * 212     No results for input(s): PTP, INR, AP, INREXT, INREXT in the last 72 hours. No lab exists for component: PTTP, GPT, SGOT  No results for input(s): CHOL, LDLC in the last 72 hours.     No lab exists for component: TGL, HDLC,  HBA1C  No results for input(s): CRP, TSH, TSHEXT, TSHEXT in the last 72 hours.     No lab exists for component: ESR        Current meds:    Current Facility-Administered Medications:     0.45% sodium chloride infusion, 100 mL/hr, IntraVENous, CONTINUOUS, Sandeep Ware NP, Last Rate: 100 mL/hr at 09/21/22 0928, 100 mL/hr at 09/21/22 0928    cefTRIAXone (ROCEPHIN) 1 g in 0.9% sodium chloride 10 mL IV syringe, 1 g, IntraVENous, Q24H, Otf Tom MD    dilTIAZem (CARDIZEM) 125 mg in dextrose 5% 125 mL infusion, 0-15 mg/hr, IntraVENous, TITRATE, Jimena Dietrich NP, Last Rate: 15 mL/hr at 09/21/22 0930, 15 mg/hr at 09/21/22 0930    famotidine (PEPCID) tablet 20 mg, 20 mg, Oral, DAILY, Otf Tom MD, 20 mg at 09/21/22 0928    metoprolol (LOPRESSOR) injection 5 mg, 5 mg, IntraVENous, Q6H, Sharmin CALLAHAN NP, 5 mg at 09/21/22 0511    HYDROmorphone (DILAUDID) injection 0.2 mg, 0.2 mg, IntraVENous, Q3H PRN, JAUN Jarvis    labetaloL (NORMODYNE;TRANDATE) injection 5 mg, 5 mg, IntraVENous, Q6H PRN, JAUN Jarvis, 5 mg at 09/19/22 1221    balsam peru-castor oiL (VENELEX) ointment, , Topical, BID, Aleshia Wolff MD, Given at 09/21/22 0930    glucose chewable tablet 16 g, 4 Tablet, Oral, PRN, Aleshia Wolff MD    glucagon (GLUCAGEN) injection 1 mg, 1 mg, IntraMUSCular, PRN, Aleshia Wolff MD    dextrose 10 % infusion 0-250 mL, 0-250 mL, IntraVENous, PRN, Aleshia Wolff MD    insulin lispro (HUMALOG) injection, , SubCUTAneous, Q6H, Aleshia Wolff MD, 2 Units at 09/20/22 1250    acetaminophen (TYLENOL) tablet 650 mg, 650 mg, Oral, Q4H PRN, 650 mg at 09/19/22 1343 **OR** acetaminophen (TYLENOL) solution 650 mg, 650 mg, Per NG tube, Q4H PRN **OR** acetaminophen (TYLENOL) suppository 650 mg, 650 mg, Rectal, Q4H PRN, JAUN Javris    atorvastatin (LIPITOR) tablet 80 mg, 80 mg, Oral, QHS, JAUN Jarvis, 80 mg at 09/20/22 2223    docusate sodium (COLACE) capsule 100 mg, 100 mg, Oral, BID, JAUN Monterroso, 100 mg at 09/21/22 4274    Buddy Garza MD  [facility], [address] [address]  [place]  [phone]      Otis Gomez MD

## 2022-09-21 NOTE — PROGRESS NOTES
Problem: Dysphagia (Adult)  Goal: *Acute Goals and Plan of Care (Insert Text)  Description: Speech therapy goals  Initiated 9/16/2022   1. Patient will tolerate pureed snacks without s/s of aspiration or adverse effects within 7 days   2. Patient will participate in repeat FEES to determine safety to initiate PO diet within 7 days   Initiated 9/15/2022   1. Patient will tolerate meds in applesauce without s/s of aspiration within 7 days MET 9/16/2022   2. Patient will participate in re-assessment of swallow function within 7 days MET 9/16/2022   Outcome: Progressing Towards Goal     Problem: Communication Impaired (Adult)  Goal: *Acute Goals and Plan of Care (Insert Text)  Description: Problem: Communication Impaired (Adult)  Goal: *Acute Goals and Plan of Care (Insert Text)  Note:   1. Patient will improve verbal expression for naming ADL objects, word level sentence completion tasks, automatic sequences, and responsive naming tasks with 80% accuracy given repetition within 7 days. 2. Patient will improve auditory comprehension for simple yes/no questions and 1 step commands with 80% accuracy given repetition within 7 days. 3. Patient will improve auditory comprehension for ID of objects given a field of 2-3 with 80% accuracy given repetition within 7 days. Outcome: Progressing Towards Goal     SPEECH LANGUAGE PATHOLOGY DYSPHAGIA AND SPEECH TREATMENT  Patient: Tyrese Fung (67 y.o. male)  Date: 9/21/2022  Diagnosis: Ischemic cerebrovascular accident [facility] [I63.9] <principal problem not specified>      Precautions: Aspiration; Fall (SBP< 180/90)    ASSESSMENT:  Pt siting upright in bed and agreeable to session. He continues to present with severe right hemiparesis with no change RN reports he doing well. Pt observed with a full cup of applesauce with functional bolus acceptance, reduced oral transit and delayed initiation without overt s/s of aspiration. Pt with intermittent audible swallow noted. Pt with mild improvements noted in language tx: yes/no 40%, 1 step directions 40%, confrontational naming 70%, automatics 1-10, 100%. Pt with intermittent full sentence production that is not able to reduplicated. Pt also with improvement noted in repetition with verbal cues in modeling 1-3x prior to him being able to repeat. Recommend continuing with current pureed snacks and no liquids. FEES completed 9/16 with silent aspiration of liquids and will require imaging prior to upgrade. Will continue to monitor for appropriateness. PLAN:  Recommendations and Planned Interventions:  --puree snacks, no liquids  --upright for all PO intake  --remain upright for at least 30 minutes after PO intake  --stringent oral care  --continue with language tx  --monitor for repeat imaging, still dense R hemiparesis    Patient continues to benefit from skilled intervention to address the above impairments. Continue treatment per established plan of care. Discharge Recommendations:  None     SUBJECTIVE:   Patient stated hi. OBJECTIVE:   Cognitive and Communication Status:  Neurologic State: Alert, Confused  Orientation Level: Oriented to person  Cognition: Follows commands  Perception: Cues to attend to right side of body  Perseveration: Perseverates during conversation  Safety/Judgement: Decreased awareness of environment, Decreased awareness of need for assistance, Decreased awareness of need for safety, Decreased insight into deficits    Dysphagia Treatment and Interventions:  Oral Assessment:  Oral Assessment  Labial: No impairment  Dentition: Natural  Oral Hygiene: oral mucosa clear; mild xerostomia  Lingual: No impairment  P.O.  Trials:  Patient Position: upright in bed  Vocal quality prior to P.O.: No impairment  Consistency Presented: Puree  How Presented: SLP-fed/presented;Spoon     Bolus Acceptance: No impairment  Bolus Formation/Control: Impaired  Type of Impairment: Delayed  Propulsion: Delayed (# of seconds)  Oral Residue: None  Initiation of Swallow: Delayed (# of seconds)     Aspiration Signs/Symptoms: None     Speech Treatment and Interventions: Motor Speech:  Intelligibility: Impaired  Speech Characteristics: Ab Mc; Neologisms;Paraphasias  Dysarthric Characteristics: Blended word boundaries; Imprecise  Interfering Components: Attention;Limited error awareness  Language Comprehension and Expression:  Auditory Comprehension   Hearing Aid: None  Response to Basic Yes/No Questions (%): 40 %  One-Step Basic Commands (%): 40 %  Verbal Expression  Verbal Expression  Initiation: Impaired (%)  Automatic Speech Task: Impaired (comment)  Automatic speech task cueing type: Verbal;Repetition  Automatic speech task cueing amount: Moderate  Repetition: Impaired  Word Repetition (%): 60 %  Confrontation (%): 70 %  Conversation: Non-fluent  Speech Characteristics: Ab Mc; Neologisms;Paraphasias  Interfering Components: Attention;Limited error awareness  Overall Impairment: Severe  Neuro-Linguistics:    Pain:  Pain Scale 1: Numeric (0 - 10)  Pain Intensity 1: 0       After treatment:   Patient left in no apparent distress in bed, Call bell within reach, and Nursing notified    COMMUNICATION/EDUCATION:   The patient's plan of care including recommendations, planned interventions, and recommended diet changes were discussed with: Registered nurse.      LEOLA Lenoard  Time Calculation: 22 mins

## 2022-09-21 NOTE — PROGRESS NOTES
Problem: Non-Violent Restraints  Goal: Removal from restraints as soon as assessed to be safe  Outcome: Progressing Towards Goal  Goal: No harm/injury to patient while restraints in use  Outcome: Progressing Towards Goal  Goal: Patient's dignity will be maintained  Outcome: Progressing Towards Goal  Goal: Patient Interventions  Outcome: Progressing Towards Goal     Problem: Pressure Injury - Risk of  Goal: *Prevention of pressure injury  Description: Document Pepe Scale and appropriate interventions in the flowsheet.   Outcome: Progressing Towards Goal  Note: Pressure Injury Interventions:  Sensory Interventions: Assess changes in LOC, Assess need for specialty bed, Avoid rigorous massage over bony prominences, Check visual cues for pain, Discuss PT/OT consult with provider, Float heels, Keep linens dry and wrinkle-free, Maintain/enhance activity level, Minimize linen layers, Monitor skin under medical devices, Pad between skin to skin    Moisture Interventions: Assess need for specialty bed, Apply protective barrier, creams and emollients, Internal/External urinary devices    Activity Interventions: Increase time out of bed, Pressure redistribution bed/mattress(bed type), PT/OT evaluation    Mobility Interventions: Float heels, HOB 30 degrees or less, Pressure redistribution bed/mattress (bed type), PT/OT evaluation    Nutrition Interventions: Document food/fluid/supplement intake    Friction and Shear Interventions: Feet elevated on foot rest, Foam dressings/transparent film/skin sealants, HOB 30 degrees or less, Lift sheet, Minimize layers                Problem: Patient Education: Go to Patient Education Activity  Goal: Patient/Family Education  Outcome: Progressing Towards Goal     Problem: Patient Education: Go to Patient Education Activity  Goal: Patient/Family Education  Outcome: Progressing Towards Goal     Problem: Patient Education: Go to Patient Education Activity  Goal: Patient/Family Education  Outcome: Progressing Towards Goal     Problem: Patient Education: Go to Patient Education Activity  Goal: Patient/Family Education  Outcome: Progressing Towards Goal     Problem: Falls - Risk of  Goal: *Absence of Falls  Description: Document Myesha Sanchez Fall Risk and appropriate interventions in the flowsheet.   Outcome: Progressing Towards Goal  Note: Fall Risk Interventions:       Mentation Interventions: Door open when patient unattended, Evaluate medications/consider consulting pharmacy, More frequent rounding, Increase mobility, Reorient patient, Room close to nurse's station    Medication Interventions: Evaluate medications/consider consulting pharmacy    Elimination Interventions: Call light in reach    History of Falls Interventions: Consult care management for discharge planning, Evaluate medications/consider consulting pharmacy, Door open when patient unattended         Problem: Patient Education: Go to Patient Education Activity  Goal: Patient/Family Education  Outcome: Progressing Towards Goal     Problem: Patient Education: Go to Patient Education Activity  Goal: Patient/Family Education  Outcome: Progressing Towards Goal     Problem: TIA/CVA Stroke: Day 2 Until Discharge  Goal: Off Pathway (Use only if patient is Off Pathway)  Outcome: Progressing Towards Goal  Goal: Activity/Safety  Outcome: Progressing Towards Goal  Goal: Diagnostic Test/Procedures  Outcome: Progressing Towards Goal  Goal: Nutrition/Diet  Outcome: Progressing Towards Goal  Goal: Discharge Planning  Outcome: Progressing Towards Goal  Goal: Medications  Outcome: Progressing Towards Goal  Goal: Respiratory  Outcome: Progressing Towards Goal  Goal: Treatments/Interventions/Procedures  Outcome: Progressing Towards Goal  Goal: Psychosocial  Outcome: Progressing Towards Goal  Goal: *Verbalizes anxiety and depression are reduced or absent  Outcome: Progressing Towards Goal  Goal: *Absence of aspiration  Outcome: Progressing Towards Goal  Goal: *Absence of deep venous thrombosis signs and symptoms(Stroke Metric)  Outcome: Progressing Towards Goal  Goal: *Optimal pain control at patient's stated goal  Outcome: Progressing Towards Goal  Goal: *Tolerating diet  Outcome: Progressing Towards Goal  Goal: *Ability to perform ADLs and demonstrates progressive mobility and function  Outcome: Progressing Towards Goal  Goal: *Stroke education continued(Stroke Metric)  Outcome: Progressing Towards Goal

## 2022-09-21 NOTE — PROGRESS NOTES
Problem: Self Care Deficits Care Plan (Adult)  Goal: *Acute Goals and Plan of Care (Insert Text)  Description: FUNCTIONAL STATUS PRIOR TO ADMISSION: Patient with communication deficits limiting gathering home environment/prior level of function information. Per chart, patient lives by himself and was climbing a ladder when event happened, presumed independent prior level of function. HOME SUPPORT: The patient lived alone with sister and brother-in-law for family support. Sister had called on the 13th and became concerned when no response, called a neighbor who found the patient. Would benefit from confirming prior level of function and home environment information with family when able. Occupational Therapy Goals  Initiated 9/15/2022   1. Patient will perform self-feeding with minimal assistance/contact guard assist within 7 day(s). 2.  Patient will perform grooming in sitting with moderate assist within 7 day(s). 3.  Patient will perform lower body dressing with maximal assistance within 7 day(s). 4.  Patient will perform toilet transfers to [facility] with maximal assistance within 7 day(s). 5.  Patient will perform all aspects of toileting with maximal assistance within 7 day(s). 6.  Patient will participate in upper extremity therapeutic exercise/activities with minimal assistance/contact guard assist within 7 day(s). 7.  Patient will participate in R [address] within 7 days. Outcome: Progressing Towards Goal   OCCUPATIONAL THERAPY TREATMENT  Patient: Ray Christy (20 y.o. male)  Date: 9/21/2022  Diagnosis: Ischemic cerebrovascular accident (CVA) ([facility] [address] [I63.9] <principal problem not specified>      Precautions: Fall (SBP< 180/90)  Chart, occupational therapy assessment, plan of care, and goals were reviewed. ASSESSMENT  Patient continues with skilled OT services and is progressing towards goals.  Patient presents this session with improved initiation for standing, continues with impaired balance with right sided lean in standing, muscle activation in right shoulder, otherwise flaccid right upper extremity with noted 1 finger subluxation, follows commands with multi modal cueing, impaired activity tolerance with HR elevating with activity, and easily distracted throughout session requiring redirection. Patient received semi supine in bed without gown on, agreeable to working with therapy. Gown donned prior to all mobility. Patient rolled to right side and assisted for legs off bed to transfer into sitting. Impaired sitting balance at edge of bed with slight left lean, requiring cueing and occasional physical assist to correct. Patient completed coming down onto right elbow for weight bearing in sitting x 3 with max A to push back up into sitting. Patient's nurse then present to assist with changing patient's bed linens while standing. Patient stood twice from edge of bed with max A x2 but noted improvement in initiation to come to standing. Bed linens changed and brief doffed before returning to edge of bed. Patient educated on using left upper extremity to assist with right, including to hold right arm at stable position (ie not pulling on fingers) and patient able to demonstrate to lift right upper extremity while staff completed CHG of right arm/underarm. Patient then returned to supine in bed and repositioned for comfort, HOB raised in prep for lunch tray. Patient participating in range of motion of right upper extremity, noted muscle activation for shoulder flexion and extension, otherwise remains 0/5 for right upper extremity strength, attempts to compensate for all other movement and perseverates on moving right shoulder. Discussed importance of visualizing right upper extremity whne attempting to move for increased visual feedback.  Nurse reports that patient requires some assist for setup of food items (opening pudding) but then able to feed self using left upper extremity to complete. Right upper extremity propped on pillows at end of session in protective position for joint. HR fluctuated between low 100s up to 168 with standing, continues to be a limiting factor for patient participation at this time. Patient would benefit from skilled OT services during admission to improve independence with self care and functional mobility/transfers. Recommend discharge to [facility] at this time to maximize NM recovery due to acute CVA and patient independence prior to admission. Current Level of Function Impacting Discharge (ADLs): max A x2 for mobility/transfers, total A lower extremity activities of daily living, mod A upper extremity dressing    Other factors to consider for discharge: prior level of function independent, living alone prior to admission, time spent down prior to presentation, severity of deficits, supportive family members         PLAN :  Patient continues to benefit from skilled intervention to address the above impairments. Continue treatment per established plan of care to address goals. Recommend with staff: bed in modified chair position for meals, pt complete ADLs as able throughout day, bedpan for toileting if able to communicate need    Recommend next OT session: continue POC    Recommendation for discharge: (in order for the patient to meet his/her long term goals)  Therapy 3 hours per day 5-7 days per week    This discharge recommendation:  Has been made in collaboration with the attending provider and/or case management    IF patient discharges home will need the following DME: TBD pending progress, at this time would require mechanical lift, hospital bed, wheelchair, and 24/7 physical assistance        SUBJECTIVE:   Patient stated forward and backward.  when discussing what animals he has at home. With yes/no able to state he only has chickens.     OBJECTIVE DATA SUMMARY:   Cognitive/Behavioral Status:  Neurologic State: Alert;Confused  Orientation Level: Oriented to person  Cognition: Follows commands             Functional Mobility and Transfers for ADLs:  Bed Mobility:  Supine to Sit: Assist x2;Maximum assistance  Sit to Supine: Assist x2;Maximum assistance  Scooting: Maximum assistance    Transfers:  Sit to Stand: Assist x2;Maximum assistance          Balance:  Sitting: Impaired  Sitting - Static: Fair (occasional)  Sitting - Dynamic: Fair (occasional)  Standing: Impaired;Pull to stand; With support  Standing - Static: Constant support;Poor    ADL Intervention:                 Type of Bath: Chlorhexidine (CHG)         Upper Body [address]: Moderate assistance (requires assist for right side and line management)    Lower Body Dressing Assistance  Socks: Total assistance (dependent)  Leg Crossed Method Used: No  Position Performed: Supine    Toileting  Clothing Management: Total assistance (dependent) (to doff brief in standing)         Therapeutic Exercises:   X2 active assist range of motion of right shoulder, passive range of motion of elbow, wrist, and fingers    Pain:  Patient with tenderness at sites of skin breakdown (back, elbow, shoulder, and leg on right side)    Activity Tolerance:   Fair, SpO2 stable on RA, requires rest breaks, and HR elevated up to 168 with activity    After treatment patient left in no apparent distress:   Supine in bed, Call bell within reach, Bed / chair alarm activated, and Side rails x 3    COMMUNICATION/COLLABORATION:   The patients plan of care was discussed with: Physical therapist, Registered nurse, and Certified nursing assistant/patient care technician.      MARIELLE Poon/L  Time Calculation: 40 mins

## 2022-09-21 NOTE — PROGRESS NOTES
Comprehensive Nutrition Assessment    Type and Reason for Visit: Initial    Nutrition Recommendations/Plan:   Continue diet per SLP, diet appropriate ONS added BID. Please record PO intakes in I/O Flowsheet. Given pt free water deficit, may be beneficial to consider DHT for hydration/nutrition at this juncture - especially if continued to be unsafe to take PO liquids. Malnutrition Assessment:  Malnutrition Status: Moderate malnutrition (09/21/22 1500)    Context:  Acute illness     Findings of the 6 clinical characteristics of malnutrition:   Energy Intake:  75% or less of est energy req for 7 or more days  Weight Loss:  Unable to assess     Body Fat Loss:  Mild body fat loss, Buccal region   Muscle Mass Loss:  No significant muscle mass loss,    Fluid Accumulation:  Mild, Generalized   Strength:  Not performed        Nutrition Assessment:    Past Medical History:   Diagnosis Date    Arrhythmia     atrial fib    Depression 4/24/2010    Diabetes ([address]     diet control for pre-diabetes    Dyslipidemia, goal LDL below 100 6/14/2011    Gout     HTN (hypertension) 4/24/2010    Impotence 4/24/2010    Morbid obesity ([facility] [address] 5/17/2010    VALENTE (obstructive sleep apnea) 4/24/2010    CPAP    Restless legs 4/15/2015   Pt admitted for aphasia, R sided weakness. Found + L MCA infarct with hemorrhagic conversion and occluding thrombus in left M1 and other areas of IC stenosis. Nutrition review completed for pt d/t ongoing LOS. Pt had FEES 9/16 which indicated silent aspiration with all liquids. Receiving pureed \"snacks\" (small trays) and overall has had poor intakes throughout his stay. Nephrology following for DOMINICK, note increase in BUN, Cr, and Na levels. Visited this afternoon, family member at bedside reporting she heard breakfast intake went well and he consumed 100% of what was provided. Discussed addition of diet appropriate magic cup, in agreement. Reports good and stable appetite PTA.      Nutritionally Significant Medications:  Colace, Pepcid, IV Rocephin; IVFs - 1/2 NaCl @ 100 ml/hr      Estimated Daily Nutrient Needs:  Energy Requirements Based On: Formula  Weight Used for Energy Requirements: Current  Energy (kcal/day): 3233-7947 (MSJ x 1.2)  Weight Used for Protein Requirements: Ideal  Protein (g/day): 115 (1.3 g/kg IBW)  Method Used for Fluid Requirements: 1 ml/kcal  Fluid (ml/day): 2500    Nutrition Related Findings:   Edema: Generalized: 1+ (9/21/2022  8:00 AM)  LLE: 1+ (9/21/2022  8:00 AM)  LUE: 1+ (9/21/2022  8:00 AM)  RLE: 2+ (9/21/2022  8:00 AM)  RUE: 2+ (9/21/2022  8:00 AM)    Last BM: 09/20/22,      Wounds: None      Current Nutrition Therapies:  Diet: Pureed, no fluids  Supplements: None ordered  Meal intake: No data found. Supplement intake: No data found. Nutrition Support: N/A      Anthropometric Measures:  Height: 6' 3\" (190.5 cm)  Ideal Body Weight (IBW): 196 lbs (89 kg)     Current Body Wt:  125.6 kg (277 lb),   IBW. Bed scale  Current BMI (kg/m2): 34.6        Weight Adjustment: No adjustment                 BMI Category: Obese class 1 (BMI 30.0-34. 9)    Wt Readings from Last 10 Encounters:   09/21/22 125.8 kg (277 lb 5.4 oz)   07/28/22 131.5 kg (290 lb)   06/14/22 134.3 kg (296 lb)   04/18/22 142.2 kg (313 lb 9.6 oz)   02/21/22 139.7 kg (308 lb)   02/10/22 138.8 kg (306 lb)   11/14/21 143 kg (315 lb 4.1 oz)   08/23/21 144.2 kg (318 lb)   07/22/21 143.6 kg (316 lb 9.3 oz)   06/24/21 146.1 kg (322 lb)           Nutrition Diagnosis:   Inadequate oral intake related to cognitive or neurological impairment as evidenced by intake 0-25%    Nutrition Interventions:   Food and/or Nutrient Delivery: Start oral nutrition supplement, Continue current diet  Nutrition Education/Counseling: Education not indicated  Coordination of Nutrition Care: Swallow evaluation, Feeding assistance/environmental change, Continue to monitor while inpatient       Goals:     Goals: PO intake 50% or greater, within 7 days Nutrition Monitoring and Evaluation:   Behavioral-Environmental Outcomes: None identified  Food/Nutrient Intake Outcomes: Food and nutrient intake, Supplement intake, Diet advancement/tolerance  Physical Signs/Symptoms Outcomes: Biochemical data, GI status, Meal time behavior    Discharge Planning:     Too soon to determine    Aldo Esquivel RD  Available via [employer] or ext [phone]

## 2022-09-22 ENCOUNTER — APPOINTMENT (OUTPATIENT)
Dept: GENERAL RADIOLOGY | Age: 72
DRG: 064 | End: 2022-09-22
Attending: INTERNAL MEDICINE
Payer: MEDICARE

## 2022-09-22 ENCOUNTER — APPOINTMENT (OUTPATIENT)
Dept: ULTRASOUND IMAGING | Age: 72
DRG: 064 | End: 2022-09-22
Attending: NURSE PRACTITIONER
Payer: MEDICARE

## 2022-09-22 LAB
ANION GAP SERPL CALC-SCNC: 7 MMOL/L (ref 5–15)
BASOPHILS # BLD: 0 K/UL (ref 0–0.1)
BASOPHILS NFR BLD: 0 % (ref 0–1)
BUN SERPL-MCNC: 73 MG/DL (ref 6–20)
BUN/CREAT SERPL: 35 (ref 12–20)
CALCIUM SERPL-MCNC: 8.6 MG/DL (ref 8.5–10.1)
CHLORIDE SERPL-SCNC: 133 MMOL/L (ref 97–108)
CO2 SERPL-SCNC: 23 MMOL/L (ref 21–32)
CREAT SERPL-MCNC: 2.07 MG/DL (ref 0.7–1.3)
DIFFERENTIAL METHOD BLD: ABNORMAL
EOSINOPHIL # BLD: 0.5 K/UL (ref 0–0.4)
EOSINOPHIL NFR BLD: 4 % (ref 0–7)
ERYTHROCYTE [DISTWIDTH] IN BLOOD BY AUTOMATED COUNT: 14.7 % (ref 11.5–14.5)
GLUCOSE BLD STRIP.AUTO-MCNC: 103 MG/DL (ref 65–117)
GLUCOSE BLD STRIP.AUTO-MCNC: 116 MG/DL (ref 65–117)
GLUCOSE BLD STRIP.AUTO-MCNC: 149 MG/DL (ref 65–117)
GLUCOSE BLD STRIP.AUTO-MCNC: 152 MG/DL (ref 65–117)
GLUCOSE SERPL-MCNC: 127 MG/DL (ref 65–100)
HCT VFR BLD AUTO: 46 % (ref 36.6–50.3)
HGB BLD-MCNC: 13.8 G/DL (ref 12.1–17)
IMM GRANULOCYTES # BLD AUTO: 0.1 K/UL (ref 0–0.04)
IMM GRANULOCYTES NFR BLD AUTO: 1 % (ref 0–0.5)
LYMPHOCYTES # BLD: 1.3 K/UL (ref 0.8–3.5)
LYMPHOCYTES NFR BLD: 12 % (ref 12–49)
MAGNESIUM SERPL-MCNC: 3.3 MG/DL (ref 1.6–2.4)
MCH RBC QN AUTO: 29 PG (ref 26–34)
MCHC RBC AUTO-ENTMCNC: 30 G/DL (ref 30–36.5)
MCV RBC AUTO: 96.6 FL (ref 80–99)
MONOCYTES # BLD: 1.2 K/UL (ref 0–1)
MONOCYTES NFR BLD: 11 % (ref 5–13)
NEUTS SEG # BLD: 8.1 K/UL (ref 1.8–8)
NEUTS SEG NFR BLD: 72 % (ref 32–75)
NRBC # BLD: 0 K/UL (ref 0–0.01)
NRBC BLD-RTO: 0 PER 100 WBC
PLATELET # BLD AUTO: 120 K/UL (ref 150–400)
PMV BLD AUTO: 12 FL (ref 8.9–12.9)
POTASSIUM SERPL-SCNC: 4.1 MMOL/L (ref 3.5–5.1)
RBC # BLD AUTO: 4.76 M/UL (ref 4.1–5.7)
SERVICE CMNT-IMP: ABNORMAL
SERVICE CMNT-IMP: ABNORMAL
SERVICE CMNT-IMP: NORMAL
SERVICE CMNT-IMP: NORMAL
SODIUM SERPL-SCNC: 163 MMOL/L (ref 136–145)
WBC # BLD AUTO: 11.2 K/UL (ref 4.1–11.1)

## 2022-09-22 PROCEDURE — 99232 SBSQ HOSP IP/OBS MODERATE 35: CPT | Performed by: SPECIALIST

## 2022-09-22 PROCEDURE — 74011000250 HC RX REV CODE- 250: Performed by: FAMILY MEDICINE

## 2022-09-22 PROCEDURE — 74011636637 HC RX REV CODE- 636/637: Performed by: ANESTHESIOLOGY

## 2022-09-22 PROCEDURE — 74018 RADEX ABDOMEN 1 VIEW: CPT

## 2022-09-22 PROCEDURE — 83735 ASSAY OF MAGNESIUM: CPT

## 2022-09-22 PROCEDURE — 97530 THERAPEUTIC ACTIVITIES: CPT

## 2022-09-22 PROCEDURE — 92526 ORAL FUNCTION THERAPY: CPT | Performed by: SPEECH-LANGUAGE PATHOLOGIST

## 2022-09-22 PROCEDURE — 74011250637 HC RX REV CODE- 250/637: Performed by: NURSE PRACTITIONER

## 2022-09-22 PROCEDURE — 74011250636 HC RX REV CODE- 250/636: Performed by: INTERNAL MEDICINE

## 2022-09-22 PROCEDURE — 82962 GLUCOSE BLOOD TEST: CPT

## 2022-09-22 PROCEDURE — 36415 COLL VENOUS BLD VENIPUNCTURE: CPT

## 2022-09-22 PROCEDURE — 85025 COMPLETE CBC W/AUTO DIFF WBC: CPT

## 2022-09-22 PROCEDURE — 76770 US EXAM ABDO BACK WALL COMP: CPT

## 2022-09-22 PROCEDURE — 65660000001 HC RM ICU INTERMED STEPDOWN

## 2022-09-22 PROCEDURE — 92507 TX SP LANG VOICE COMM INDIV: CPT | Performed by: SPEECH-LANGUAGE PATHOLOGIST

## 2022-09-22 PROCEDURE — 74011000258 HC RX REV CODE- 258: Performed by: NURSE PRACTITIONER

## 2022-09-22 PROCEDURE — 74011250636 HC RX REV CODE- 250/636: Performed by: FAMILY MEDICINE

## 2022-09-22 PROCEDURE — 74011250637 HC RX REV CODE- 250/637: Performed by: FAMILY MEDICINE

## 2022-09-22 PROCEDURE — 74011000250 HC RX REV CODE- 250: Performed by: NURSE PRACTITIONER

## 2022-09-22 PROCEDURE — 80048 BASIC METABOLIC PNL TOTAL CA: CPT

## 2022-09-22 RX ORDER — DILTIAZEM HYDROCHLORIDE 60 MG/1
60 TABLET, FILM COATED ORAL EVERY 8 HOURS
Status: DISCONTINUED | OUTPATIENT
Start: 2022-09-22 | End: 2022-09-28 | Stop reason: HOSPADM

## 2022-09-22 RX ORDER — METOPROLOL TARTRATE 25 MG/1
25 TABLET, FILM COATED ORAL EVERY 12 HOURS
Status: DISCONTINUED | OUTPATIENT
Start: 2022-09-22 | End: 2022-09-23

## 2022-09-22 RX ORDER — DEXTROSE MONOHYDRATE 50 MG/ML
150 INJECTION, SOLUTION INTRAVENOUS CONTINUOUS
Status: DISCONTINUED | OUTPATIENT
Start: 2022-09-22 | End: 2022-09-25

## 2022-09-22 RX ADMIN — DOCUSATE SODIUM 100 MG: 100 CAPSULE, LIQUID FILLED ORAL at 18:16

## 2022-09-22 RX ADMIN — FAMOTIDINE 20 MG: 20 TABLET, FILM COATED ORAL at 11:04

## 2022-09-22 RX ADMIN — METOPROLOL TARTRATE 7.5 MG: 5 INJECTION, SOLUTION INTRAVENOUS at 06:12

## 2022-09-22 RX ADMIN — DILTIAZEM HYDROCHLORIDE 60 MG: 60 TABLET, FILM COATED ORAL at 22:41

## 2022-09-22 RX ADMIN — DILTIAZEM HYDROCHLORIDE 5 MG/HR: 5 INJECTION, SOLUTION INTRAVENOUS at 05:07

## 2022-09-22 RX ADMIN — ATORVASTATIN CALCIUM 80 MG: 40 TABLET, FILM COATED ORAL at 22:41

## 2022-09-22 RX ADMIN — Medication: at 11:09

## 2022-09-22 RX ADMIN — Medication 2 UNITS: at 19:18

## 2022-09-22 RX ADMIN — Medication 2 UNITS: at 00:49

## 2022-09-22 RX ADMIN — DILTIAZEM HYDROCHLORIDE 10 MG/HR: 5 INJECTION, SOLUTION INTRAVENOUS at 11:10

## 2022-09-22 RX ADMIN — METOPROLOL TARTRATE 7.5 MG: 5 INJECTION, SOLUTION INTRAVENOUS at 00:49

## 2022-09-22 RX ADMIN — DOCUSATE SODIUM 100 MG: 100 CAPSULE, LIQUID FILLED ORAL at 11:04

## 2022-09-22 RX ADMIN — DILTIAZEM HYDROCHLORIDE 10 MG/HR: 5 INJECTION, SOLUTION INTRAVENOUS at 01:48

## 2022-09-22 RX ADMIN — METOPROLOL TARTRATE 25 MG: 25 TABLET, FILM COATED ORAL at 18:16

## 2022-09-22 RX ADMIN — DEXTROSE MONOHYDRATE 150 ML/HR: 50 INJECTION, SOLUTION INTRAVENOUS at 18:16

## 2022-09-22 RX ADMIN — DILTIAZEM HYDROCHLORIDE 60 MG: 60 TABLET, FILM COATED ORAL at 13:08

## 2022-09-22 RX ADMIN — SODIUM CHLORIDE, PRESERVATIVE FREE 1 G: 5 INJECTION INTRAVENOUS at 11:04

## 2022-09-22 RX ADMIN — DEXTROSE MONOHYDRATE 150 ML/HR: 50 INJECTION, SOLUTION INTRAVENOUS at 10:46

## 2022-09-22 RX ADMIN — METOPROLOL TARTRATE 7.5 MG: 5 INJECTION, SOLUTION INTRAVENOUS at 12:57

## 2022-09-22 NOTE — PROGRESS NOTES
[facility] Adult  Hospitalist Group                                                                                          Hospitalist Progress Note  Luana Khanna MD  Answering service: [phone] OR [phone] from in house phone        Date of Service:  2022  NAME:  Yesica Pagan  :  1950  MRN:  [id number]      Admission Summary:     Patient presents with acute CVA, CT head and follow-up MRI shows large acute left MCA territory infarct. Patient with some expressive aphasia and right-sided weakness. Neuro following. Initially was admitted to ICU and transferred out of ICU 9/15/2022. Interval history / Subjective:     Patient is seen and examined at bedside this AM. He is alert, following commands.    Discussed with nursing      Assessment & Plan:     Acute CVA  -CT head shows large acute left MCA territory infarct, CTA of the head and neck shows severe stenosis, near occlusive thrombus of the distal left MCA M1  -MRI of the brain which shows moderate to large acute left MCA territory infarct with findings associated with petechial hemorrhage  -Echo with bubble study shows EF of 50 to 55%, bubble study is not adequate to rule out shunt  -Neurology following, recommending holding antiplatelet and anticoagulation for 2 weeks due to large CVA with hemorrhagic conversion  -c/w  statin  -Speech therapy following for aphasia and dysphagia, s/p FEES  and started on pureed diet    Hypernatremia - worsening   -  appreciate nephrology input  - started on D5 IVF   -Monitor sodium level     Paroxysmal atrial fibrillation with RVR  - appreciate cardiology input   -Currently holding anticoagulation due to large CVA with hemorrhagic conversion  -Patient was previously taking Xarelto compliantly and therefore may indicate failure of Xarelto, plan to change to Eliquis when patient is cleared to take oral anticoagulation  - off cardizem gtt on   - started on PO Diltiazem 60mg q6hr and Metoprolol 25mg bid     DOMINICK - cr improving   -Multiple etiologies including volume depletion, ATN from contrast  -Renal ultrasound shows mild left hydronephrosis  -S/p Sexton catheter placement, plan to repeat ultrasound in 1 to 2 days  -Nephrology evaluating the patient     Rhabdomyolysis  -This is likely due to prolonged immobilization after his CVA  -CK trending down     Elevated troponin  -Likely demand ischemia     Dyslipidemia  -Continue statin, LDL at goal     ? UTI on 9/21  - c/w IV ceftriaxone   - urine cx pending      Code status: Full  Prophylaxis: SCDs  Care Plan discussed with: Patient  Anticipated Disposition: IPR - [facility] - possible DC on Monday        Hospital Problems  Date Reviewed: 7/28/2022            Codes Class Noted POA    Ischemic cerebrovascular accident (CVA) [facility]) ICD-10-CM: I63.9  ICD-9-CM: 434.91  9/14/2022 Unknown           Review of Systems:   A comprehensive review of systems was negative except for that written in the HPI. Vital Signs:    Last 24hrs VS reviewed since prior progress note. Most recent are:  Visit Vitals  /79 (BP 1 Location: Left upper arm, BP Patient Position: At rest)   Pulse 84   Temp 97.8 °F (36.6 °C)   Resp 22   Ht 6' 3\" (1.905 m)   Wt 125.8 kg (277 lb 5.4 oz)   SpO2 95%   BMI 34.66 kg/m²         Intake/Output Summary (Last 24 hours) at 9/22/2022 1634  Last data filed at 9/22/2022 1557  Gross per 24 hour   Intake --   Output 3400 ml   Net -3400 ml            Physical Examination:     I had a face to face encounter with this patient and independently examined them on 9/22/2022 as outlined below:          Constitutional:  No acute distress, cooperative, pleasant    ENT:  Oral mucosa moist, oropharynx benign. Resp:  CTA bilaterally. No wheezing/rhonchi/rales. No accessory muscle use. CV:  Regular rhythm, normal rate, no murmurs, gallops, rubs    GI:  Soft, non distended, non tender.  normoactive bowel sounds, no hepatosplenomegaly Musculoskeletal:  No edema, warm, 2+ pulses throughout    Neurologic:  Moves all extremities. Awake and alert            Data Review:    Review and/or order of clinical lab test      Labs:     Recent Labs     09/22/22  0444 09/21/22  0516   WBC 11.2* 12.3*   HGB 13.8 13.5   HCT 46.0 43.8   * 147*       Recent Labs     09/22/22  0444 09/21/22  0516 09/20/22  0346   * 158* 154*   K 4.1 4.4 4.6   * 129* 125*   CO2 23 23 20*   BUN 73* 89* 75*   CREA 2.07* 3.39* 3.73*   * 153* 166*   CA 8.6 8.7 8.8   MG 3.3*  --  2.8*   PHOS  --  5.1*  --        No results for input(s): ALT, AP, TBIL, TBILI, TP, ALB, GLOB, GGT, AML, LPSE in the last 72 hours. No lab exists for component: SGOT, GPT, AMYP, HLPSE    No results for input(s): INR, PTP, APTT, INREXT, INREXT in the last 72 hours. No results for input(s): FE, TIBC, PSAT, FERR in the last 72 hours. No results found for: FOL, RBCF   No results for input(s): PH, PCO2, PO2 in the last 72 hours. No results for input(s): CPK, CKNDX, TROIQ in the last 72 hours.     No lab exists for component: CPKMB    Lab Results   Component Value Date/Time    Cholesterol, total 146 09/15/2022 02:40 AM    HDL Cholesterol 53 09/15/2022 02:40 AM    LDL, calculated 65 09/15/2022 02:40 AM    Triglyceride 140 09/15/2022 02:40 AM    CHOL/HDL Ratio 2.8 09/15/2022 02:40 AM     Lab Results   Component Value Date/Time    Glucose (POC) 116 09/22/2022 12:07 PM    Glucose (POC) 103 09/22/2022 06:19 AM    Glucose (POC) 149 (H) 09/22/2022 12:30 AM    Glucose (POC) 161 (H) 09/21/2022 04:41 PM    Glucose (POC) 107 09/21/2022 11:53 AM     Lab Results   Component Value Date/Time    Color YELLOW/STRAW 09/21/2022 10:33 AM    Appearance CLOUDY (A) 09/21/2022 10:33 AM    Specific gravity 1.014 09/21/2022 10:33 AM    pH (UA) 5.0 09/21/2022 10:33 AM    Protein TRACE (A) 09/21/2022 10:33 AM    Glucose Negative 09/21/2022 10:33 AM    Ketone Negative 09/21/2022 10:33 AM    Bilirubin Negative 09/21/2022 10:33 AM    Urobilinogen 0.2 09/21/2022 10:33 AM    Nitrites Negative 09/21/2022 10:33 AM    Leukocyte Esterase MODERATE (A) 09/21/2022 10:33 AM    Epithelial cells FEW 09/21/2022 10:33 AM    Bacteria 2+ (A) 09/21/2022 10:33 AM    WBC 10-20 09/21/2022 10:33 AM    RBC  09/21/2022 10:33 AM         Medications Reviewed:     Current Facility-Administered Medications   Medication Dose Route Frequency    dextrose 5% infusion  150 mL/hr IntraVENous CONTINUOUS    dilTIAZem IR (CARDIZEM) tablet 60 mg  60 mg Oral Q8H    metoprolol tartrate (LOPRESSOR) tablet 25 mg  25 mg Oral Q12H    cefTRIAXone (ROCEPHIN) 1 g in 0.9% sodium chloride 10 mL IV syringe  1 g IntraVENous Q24H    famotidine (PEPCID) tablet 20 mg  20 mg Oral DAILY    HYDROmorphone (DILAUDID) injection 0.2 mg  0.2 mg IntraVENous Q3H PRN    labetaloL (NORMODYNE;TRANDATE) injection 5 mg  5 mg IntraVENous Q6H PRN    balsam peru-castor oiL (VENELEX) ointment   Topical BID    glucose chewable tablet 16 g  4 Tablet Oral PRN    glucagon (GLUCAGEN) injection 1 mg  1 mg IntraMUSCular PRN    dextrose 10 % infusion 0-250 mL  0-250 mL IntraVENous PRN    insulin lispro (HUMALOG) injection   SubCUTAneous Q6H    acetaminophen (TYLENOL) tablet 650 mg  650 mg Oral Q4H PRN    Or    acetaminophen (TYLENOL) solution 650 mg  650 mg Per NG tube Q4H PRN    Or    acetaminophen (TYLENOL) suppository 650 mg  650 mg Rectal Q4H PRN    atorvastatin (LIPITOR) tablet 80 mg  80 mg Oral QHS    docusate sodium (COLACE) capsule 100 mg  100 mg Oral BID     ______________________________________________________________________  EXPECTED LENGTH OF STAY: 4d 9h  ACTUAL LENGTH OF STAY:          8                 Sheela Danielson MD

## 2022-09-22 NOTE — PROGRESS NOTES
Attending Note :    Pt was seen and examined independently by me and I agree with A&P as outlines by Ms Camilo Parks, LAUREEN. Serum Na increased steeply from free water diuresis after vazquez, lack of adequate po FW and receiving 1/2 NS. Pt is agreeable to DHT placement and start enteral FW. Has FW deficit of close to 12L. S&S to follow for swallowing issues. D/w Nursing staff     Prachi Martinez MD        Nephrology Progress Note  [facility]  Date of Admission : 9/14/2022    CC:  Follow up for DOMINICK       Assessment and Plan     DOMINICK:  - multifactorial: volume depletion from poor po intake + urinary retention  - 9/20 US showing L hydro   - UA suggestive of UTI  - continue IVF - change to D5w     Probable UTI:  - 9/21 Urine culture pending    L hydro:  - would repeat U/S plan to repeat tomorrow     Hypernatremia:  - 2/2 lack of po free water intake  - hypotonic fluids as above   -Dobhoff placement with Free water 250 cc q 3 hours     Acute CVA:  - L MCA territory infarct  - per neurology     Afib w/ RVR:  - on dilt drip     Mild Rhabdo:  - CPK improving after IVF     DM2  Obesity  VALENTE       Interval History:  Seen and examined. Nonverbal, resting in bed. O2 stable, Cr continues to improve. UOP > 2800 cc. Unable to obtain ROS. Current Medications: all current  Medications have been eviewed in EPIC  Review of Systems: Pertinent items are noted in HPI. Objective:  Vitals:    Vitals:    09/22/22 0200 09/22/22 0400 09/22/22 0600 09/22/22 0612   BP:    (!) 143/79   Pulse: 74 87 97 95   Resp:       Temp:       SpO2:       Weight:       Height:         Intake and Output:  No intake/output data recorded.   09/20 1901 - 09/22 0700  In: -   Out: 2850 [Urine:2850]    Physical Examination:    General: NAD,aphasic  Neck:  Supple, no mass  Resp:  Lungs CTA B/L, no wheezing , normal respiratory effort  CV:  RRR,  no murmur or rub, no LE edema  GI:  Soft, NT, + Bowel sounds, no hepatosplenomegaly  Neurologic:  Aphasic  Psych: unable to assess  Skin:  No Rash  :  S[place] in place    []    High complexity decision making was performed  []    Patient is at high-risk of decompensation with multiple organ involvement    Lab Data Personally Reviewed: I have reviewed all the pertinent labs, microbiology data and radiology studies during assessment. Recent Labs     09/22/22 0444 09/21/22 0516 09/20/22  0346   * 158* 154*   K 4.1 4.4 4.6   * 129* 125*   CO2 23 23 20*   * 153* 166*   BUN 73* 89* 75*   CREA 2.07* 3.39* 3.73*   CA 8.6 8.7 8.8   MG  --   --  2.8*   PHOS  --  5.1*  --        Recent Labs     09/22/22 0444 09/21/22 0516 09/20/22 0346   WBC 11.2* 12.3* 16.4*   HGB 13.8 13.5 14.8   HCT 46.0 43.8 46.4   * 147* 212       No results found for: SDES  No results found for: CULT  Recent Results (from the past 24 hour(s))   URINALYSIS W/MICROSCOPIC    Collection Time: 09/21/22 10:33 AM   Result Value Ref Range    Color YELLOW/STRAW      Appearance CLOUDY (A) CLEAR      Specific gravity 1.014 1.003 - 1.030      pH (UA) 5.0 5.0 - 8.0      Protein TRACE (A) NEG mg/dL    Glucose Negative NEG mg/dL    Ketone Negative NEG mg/dL    Bilirubin Negative NEG      Blood LARGE (A) NEG      Urobilinogen 0.2 0.2 - 1.0 EU/dL    Nitrites Negative NEG      Leukocyte Esterase MODERATE (A) NEG      WBC 10-20 0 - 4 /hpf    RBC  0 - 5 /hpf    Epithelial cells FEW FEW /lpf    Bacteria 2+ (A) NEG /hpf    Amorphous Crystals 1+ (A) NEG    Granular cast 2-5 (A) NEG /lpf   URINE CULTURE HOLD SAMPLE    Collection Time: 09/21/22 10:33 AM    Specimen: Serum; Urine   Result Value Ref Range    Urine culture hold        Urine on hold in Microbiology dept for 2 days. If unpreserved urine is submitted, it cannot be used for addtional testing after 24 hours, recollection will be required.    GLUCOSE, POC    Collection Time: 09/21/22 11:53 AM   Result Value Ref Range    Glucose (POC) 107 65 - 117 mg/dL    Performed by Marti Solano GLUCOSE, POC    Collection Time: 09/21/22  4:41 PM   Result Value Ref Range    Glucose (POC) 161 (H) 65 - 117 mg/dL    Performed by Ky Alba    GLUCOSE, POC    Collection Time: 09/22/22 12:30 AM   Result Value Ref Range    Glucose (POC) 149 (H) 65 - 117 mg/dL    Performed by Fara Favors    METABOLIC PANEL, BASIC    Collection Time: 09/22/22  4:44 AM   Result Value Ref Range    Sodium 163 (HH) 136 - 145 mmol/L    Potassium 4.1 3.5 - 5.1 mmol/L    Chloride 133 (H) 97 - 108 mmol/L    CO2 23 21 - 32 mmol/L    Anion gap 7 5 - 15 mmol/L    Glucose 127 (H) 65 - 100 mg/dL    BUN 73 (H) 6 - 20 MG/DL    Creatinine 2.07 (H) 0.70 - 1.30 MG/DL    BUN/Creatinine ratio 35 (H) 12 - 20      GFR est AA 39 (L) >60 ml/min/1.73m2    GFR est non-AA 32 (L) >60 ml/min/1.73m2    Calcium 8.6 8.5 - 10.1 MG/DL   CBC WITH AUTOMATED DIFF    Collection Time: 09/22/22  4:44 AM   Result Value Ref Range    WBC 11.2 (H) 4.1 - 11.1 K/uL    RBC 4.76 4.10 - 5.70 M/uL    HGB 13.8 12.1 - 17.0 g/dL    HCT 46.0 36.6 - 50.3 %    MCV 96.6 80.0 - 99.0 FL    MCH 29.0 26.0 - 34.0 PG    MCHC 30.0 30.0 - 36.5 g/dL    RDW 14.7 (H) 11.5 - 14.5 %    PLATELET 161 (L) 554 - 400 K/uL    MPV 12.0 8.9 - 12.9 FL    NRBC 0.0 0  WBC    ABSOLUTE NRBC 0.00 0.00 - 0.01 K/uL    NEUTROPHILS 72 32 - 75 %    LYMPHOCYTES 12 12 - 49 %    MONOCYTES 11 5 - 13 %    EOSINOPHILS 4 0 - 7 %    BASOPHILS 0 0 - 1 %    IMMATURE GRANULOCYTES 1 (H) 0.0 - 0.5 %    ABS. NEUTROPHILS 8.1 (H) 1.8 - 8.0 K/UL    ABS. LYMPHOCYTES 1.3 0.8 - 3.5 K/UL    ABS. MONOCYTES 1.2 (H) 0.0 - 1.0 K/UL    ABS. EOSINOPHILS 0.5 (H) 0.0 - 0.4 K/UL    ABS. BASOPHILS 0.0 0.0 - 0.1 K/UL    ABS. IMM.  GRANS. 0.1 (H) 0.00 - 0.04 K/UL    DF AUTOMATED     GLUCOSE, POC    Collection Time: 09/22/22  6:19 AM   Result Value Ref Range    Glucose (POC) 103 65 - 117 mg/dL    Performed by LAUREEN Mcnair [facility]   [id number] [place] [id number]  Phone - [phone]   Fax - [phone]  [email or web address]

## 2022-09-22 NOTE — PROGRESS NOTES
Problem: Pressure Injury - Risk of  Goal: *Prevention of pressure injury  Description: Document Pepe Scale and appropriate interventions in the flowsheet. Outcome: Progressing Towards Goal  Note: Pressure Injury Interventions:  Sensory Interventions: Assess changes in LOC, Check visual cues for pain, Keep linens dry and wrinkle-free, Minimize linen layers, Pressure redistribution bed/mattress (bed type)    Moisture Interventions: Absorbent underpads, Apply protective barrier, creams and emollients, Internal/External urinary devices, Minimize layers    Activity Interventions: Increase time out of bed, Pressure redistribution bed/mattress(bed type), PT/OT evaluation    Mobility Interventions: Float heels, HOB 30 degrees or less, Pressure redistribution bed/mattress (bed type), PT/OT evaluation    Nutrition Interventions: Document food/fluid/supplement intake    Friction and Shear Interventions: HOB 30 degrees or less, Minimize layers                Problem: Falls - Risk of  Goal: *Absence of Falls  Description: Document Kasi Fall Risk and appropriate interventions in the flowsheet.   Outcome: Progressing Towards Goal  Note: Fall Risk Interventions:       Mentation Interventions: Adequate sleep, hydration, pain control, Bed/chair exit alarm, Door open when patient unattended, Evaluate medications/consider consulting pharmacy, More frequent rounding, Reorient patient, Update white board    Medication Interventions: Bed/chair exit alarm, Evaluate medications/consider consulting pharmacy, Patient to call before getting OOB, Teach patient to arise slowly    Elimination Interventions: Bed/chair exit alarm, Call light in reach, Patient to call for help with toileting needs    History of Falls Interventions: Bed/chair exit alarm, Consult care management for discharge planning, Door open when patient unattended, Evaluate medications/consider consulting pharmacy, Investigate reason for fall, Room close to nurse's station         Problem: TIA/CVA Stroke: Day 2 Until Discharge  Goal: Off Pathway (Use only if patient is Off Pathway)  Outcome: Progressing Towards Goal  Goal: Activity/Safety  Outcome: Progressing Towards Goal  Goal: Diagnostic Test/Procedures  Outcome: Progressing Towards Goal  Goal: Nutrition/Diet  Outcome: Progressing Towards Goal  Goal: Discharge Planning  Outcome: Progressing Towards Goal  Goal: Medications  Outcome: Progressing Towards Goal  Goal: Respiratory  Outcome: Progressing Towards Goal  Goal: Treatments/Interventions/Procedures  Outcome: Progressing Towards Goal  Goal: Psychosocial  Outcome: Progressing Towards Goal  Goal: *Verbalizes anxiety and depression are reduced or absent  Outcome: Progressing Towards Goal  Goal: *Absence of aspiration  Outcome: Progressing Towards Goal  Goal: *Absence of deep venous thrombosis signs and symptoms(Stroke Metric)  Outcome: Progressing Towards Goal  Goal: *Optimal pain control at patient's stated goal  Outcome: Progressing Towards Goal  Goal: *Tolerating diet  Outcome: Progressing Towards Goal  Goal: *Ability to perform ADLs and demonstrates progressive mobility and function  Outcome: Progressing Towards Goal  Goal: *Stroke education continued(Stroke Metric)  Outcome: Progressing Towards Goal

## 2022-09-22 NOTE — PROGRESS NOTES
Problem: Mobility Impaired (Adult and Pediatric)  Goal: *Acute Goals and Plan of Care (Insert Text)  Description:   FUNCTIONAL STATUS PRIOR TO ADMISSION: Patient was independent and active without use of DME.    HOME SUPPORT PRIOR TO ADMISSION: The patient lived alone with no local support. Physical Therapy Goals  Initiated 9/15/2022; reassessed 09/22 and appropriate for carryover x7 days. 1.  Patient will move from supine to sit and sit to supine , scoot up and down, and roll side to side in bed with moderate assistance  within 7 day(s). 2.  Patient will transfer from bed to chair and chair to bed with moderate assistance  using the least restrictive device within 7 day(s). 3.  Patient will perform sit to stand with moderate assistance  within 7 day(s). 4.  Patient will ambulate with moderate assistance  for 25 feet with the least restrictive device within 7 day(s). 5.  Patient will improve Bowles Balance score by 7 points within 7 days. Outcome: Progressing Towards Goal     PHYSICAL THERAPY TREATMENT: WEEKLY REASSESSMENT  Patient: Blessing Mcdonald (18 y.o. male)  Date: 9/22/2022  Primary Diagnosis: Ischemic cerebrovascular accident (CVA) ([facility] [address] [I63.9]       Precautions:   Fall (SBP< 180/90)      ASSESSMENT  Patient continues with skilled PT services and is progressing towards goals however remains most limited by dense R hemiparesis, mild R inattention, impaired balance, aphasia, decreased cardiopulmonary tolerance to activity, and c/o pain leading to increased falls risk and dependency from baseline level. Received pt supine in bed on room air following DHT placement. Appears drowsy with slowed responses and slowed command following today compared to last session. Facilitated supine>sit with max A x2/total A and one step cues to increase indep with task - less active participation today.  In sitting, briefly worked on attaining/maintaining midline postural control, scooting, and weight shifting in prep for hopeful attempt to transfer to chair however xray arrived for testing and therefore assisted back to supine. Pt left in NAD with xray techs present. Continue to recommend discharge to IPR once medically cleared to maximize NM recovery and indep. .     Patient's progression toward goals since last assessment: slowly progressing towards goals    Current Level of Function Impacting Discharge (mobility/balance): max A x2; R HP    Other factors to consider for discharge: indep at baseline; high falls risk         PLAN :  Goals have been updated based on progression since last assessment. Patient continues to benefit from skilled intervention to address the above impairments. Recommendations and Planned Interventions: bed mobility training, transfer training, gait training, therapeutic exercises, neuromuscular re-education, patient and family training/education, and therapeutic activities      Frequency/Duration: Patient will be followed by physical therapy:  5 times a week to address goals. Recommendation for discharge: (in order for the patient to meet his/her long term goals)  Therapy 3 hours per day 5-7 days per week    This discharge recommendation:  A follow-up discussion with the attending provider and/or case management is planned    IF patient discharges home will need the following DME: to be determined (TBD)         SUBJECTIVE:   Patient stated Awilda Manus.  (less interactive today)    OBJECTIVE DATA SUMMARY:   HISTORY:    Past Medical History:   Diagnosis Date    Arrhythmia     atrial fib    Depression 4/24/2010    Diabetes ([address]     diet control for pre-diabetes    Dyslipidemia, goal LDL below 100 6/14/2011    Gout     HTN (hypertension) 4/24/2010    Impotence 4/24/2010    Morbid obesity ([address] 5/17/2010    VALENTE (obstructive sleep apnea) 4/24/2010    CPAP    Restless legs 4/15/2015     Past Surgical History:   Procedure Laterality Date    COLONOSCOPY N/A 6/27/2017    COLONOSCOPY performed by Tara Torrez Zack Chapa MD at [address] [place] [address] ENDOSCOPY    ENDOSCOPY, COLON, DIAGNOSTIC  2007    HX APPENDECTOMY      HX ORTHOPAEDIC  2000    bilat TKR     HX ORTHOPAEDIC  2010    left THR    HX UROLOGICAL  03/2018    urolift    HX UROLOGICAL  03/2019    prosthesis       Personal factors and/or comorbidities impacting plan of care: PMHx    Home Situation  Home Environment: Private residence  Living Alone: Yes  Support Systems: Child(alexandru) (Son Nyla Cardoza [phone])  Patient Expects to be Discharged to[de-identified] Rehab hospital/unit acute    EXAMINATION/PRESENTATION/DECISION MAKING:   Critical Behavior:  Neurologic State: Alert, Confused, Eyes open spontaneously  Orientation Level: Oriented to person  Cognition: Follows commands  Safety/Judgement: Decreased awareness of environment, Decreased awareness of need for assistance, Decreased awareness of need for safety, Decreased insight into deficits  Hearing: Auditory  Auditory Impairment: Hard of hearing, bilateral     Functional Mobility:  Bed Mobility:     Supine to Sit: Assist x2;Maximum assistance  Sit to Supine: Assist x2;Maximum assistance  Scooting: Maximum assistance  Transfers:    Balance:   Sitting: Impaired  Sitting - Static: Fair (occasional)  Sitting - Dynamic: Fair (occasional)    Activity Tolerance:   Fair and requires frequent rest breaks    After treatment patient left in no apparent distress:   Supine in bed, Call bell within reach, Caregiver / family present, and Side rails x 3    COMMUNICATION/EDUCATION:   The patients plan of care was discussed with: Occupational therapist and Registered nurse. Fall prevention education was provided and the patient/caregiver indicated understanding., Patient/family have participated as able in goal setting and plan of care. , and Patient/family agree to work toward stated goals and plan of care.     Thank you for this referral.  Taya Chun, PT, DPT   Time Calculation: 20 mins

## 2022-09-22 NOTE — PROGRESS NOTES
[facility]  Cardiology Care Note                  []Initial visit     [x]Established visit     Patient Name: Hipolito Sepulveda  Primary Cardiologist: Sanaz Otero MD  Consulting Cardiologist: Lorenza Mas MD     Reason for initial visit: atrial fibrillation with RVR    HPI:   Patient is a 71year old male with a hx of chronic afib on Xarelto, DM, VALENTE on CPAP, HLD, and gout. 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 He reports that he has been faithfully taking Xarelto, not missing the medication, though this is under investigation. Sister is supposed to bring in Xarelto bottle. He is currently off APT/OAC d/t hemorrhagic conversion with plans to start in 2 weeks per neurology. He has been rate controlled on Metoprolol until overnight with HR up to 140s on telemetry. He was given IV Labetalol and Metoprolol without much improvement. He was started on a Diltiazem gtt. SUBJECTIVE: More alert this morning. Still speech difficult, but able to communicate that he feels like he is making progress. Denies CP, SOB, palpitations. Assessment and Plan     Atrial fibrillation with RVR: remains in rate controlled afib overnight on Diltiazem gtt at 5mg/hr and Metoprolol 7.5mg IV q6hrs. Overall HR 90s. BP trending 120-140s/70s. Plans to convert to PO Diltiazem 60mg q6hr and Metoprolol 25mg bid later today if remains stable. Holding APT/OAC for 2 weeks, switch to Eliquis 5mg bid when able to start medication. Acute CVA: per neurology found to have a large left MCA infarct with hemorrhagic conversion, and occluding thrombus in left M1 with other areas of IC stenoses. Taking Lipitor, see above for APT/OAC management. Echo with inadequate bubble study.      HTN: stable see above     HLD: on statin ____________________________________________________________    Cardiac testing  09/14/22    ECHO ADULT COMPLETE 09/15/2022 9/15/2022    Interpretation Summary  Formatting of this result is different from the original.      Left Ventricle: Normal left ventricular systolic function with a visually estimated EF of 50 - 55%. Left ventricle size is normal. Normal wall thickness. Right Ventricle: Right ventricle is mildly dilated. Normal wall thickness. Mildly reduced systolic function. Left Atrium: Left atrium is mildly dilated. Technical qualifiers: Echo study was technically difficult with poor endocardial visualization and technically difficult due to patient's body habitus. Contrast used: Definity. Bubble study is not adequate to rule out shunt    Signed by: Danyel Ivan MD on 9/15/2022  4:12 PM                Most recent HS troponins:  No results for input(s): TROPHS in the last 72 hours. No lab exists for component:  CKMB      Review of Systems    [x]All other systems reviewed and all negative except as written in HPI    [] Patient unable to provide secondary to condition         Past Medical History:   Diagnosis Date    Arrhythmia     atrial fib    Depression 4/24/2010    Diabetes ([address]     diet control for pre-diabetes    Dyslipidemia, goal LDL below 100 6/14/2011    Gout     HTN (hypertension) 4/24/2010    Impotence 4/24/2010    Morbid obesity ([address] 5/17/2010    VALENTE (obstructive sleep apnea) 4/24/2010    CPAP    Restless legs 4/15/2015     Past Surgical History:   Procedure Laterality Date    COLONOSCOPY N/A 6/27/2017    COLONOSCOPY performed by Prachi Bob MD at [facility], DIAGNOSTIC  2007    HX APPENDECTOMY      HX ORTHOPAEDIC  2000    bilat TKR     HX ORTHOPAEDIC  2010    left THR    HX UROLOGICAL  03/2018    urolift    HX UROLOGICAL  03/2019    prosthesis     Social Hx:  reports that he quit smoking about 32 years ago. His smoking use included cigarettes. He has a 2.50 pack-year smoking history. He has never used smokeless tobacco. He reports current alcohol use of about 1.7 standard drinks per week. He reports that he does not use drugs. Family Hx: family history includes Cancer in his father, maternal aunt, maternal uncle, and paternal grandfather; Diabetes in his brother and sister. No Known Allergies       OBJECTIVE:  Wt Readings from Last 3 Encounters:   09/21/22 125.8 kg (277 lb 5.4 oz)   07/28/22 131.5 kg (290 lb)   06/14/22 134.3 kg (296 lb)       Intake/Output Summary (Last 24 hours) at 9/22/2022 0812  Last data filed at 9/22/2022 0148  Gross per 24 hour   Intake --   Output 2850 ml   Net -2850 ml         Physical Exam    Vitals:   Vitals:    09/22/22 0200 09/22/22 0400 09/22/22 0600 09/22/22 0612   BP:    (!) 143/79   Pulse: 74 87 97 95   Resp:       Temp:       SpO2:       Weight:       Height:             General:    Alert, cooperative, no distress, appears stated age. Neck:   Supple, no carotid bruit and no JVD. Back:     Symmetric    Lungs:     Clear to auscultation bilaterally. Heart[de-identified]    irregular rate and rhythm, S1, S2 normal, no murmur, click, rub or gallop. Abdomen:     Soft, non-tender. Bowel sounds normal.    Extremities:   Extremities normal, atraumatic, no cyanosis, 1+ right leg edema. Vascular:   Pulses - sudheer UE/LE equal   Skin:   Skin color normal. Abrasions right leg   Neurologic:   Moves left extremities. Speech somewhat garbled       Data Review:     Radiology:   XR Results (most recent):  Results from Hospital Encounter encounter on 09/14/22    XR CHEST PORT    Narrative  Indication: Shortness of breath    Comparison: 9/14/2022    Portable exam of the chest obtained at 1531 demonstrates normal heart size. Low  lung volumes again demonstrated without focal infiltrate. The osseous structures  are unremarkable. Impression  Low lung volumes without focal infiltrate.     CT Results (most recent):  Results from [facility] encounter on 09/14/22    CT HEAD WO CONT    Narrative  EXAM: CT HEAD WO CONT    INDICATION: hemorrhagic conversion? COMPARISON: CT dose prior to this exam.    CONTRAST: None. TECHNIQUE: Unenhanced CT of the head was performed using 5 mm images. Brain and  bone windows were generated. Coronal and sagittal reformats. CT dose reduction  was achieved through use of a standardized protocol tailored for this  examination and automatic exposure control for dose modulation. FINDINGS:  Left anterolateral temporal lobe hypodensity with loss of gray-white  differentiation is redemonstrated with no evident interval hemorrhage or other  significant interval change with localized mass effect and effacement of the  anterior horn of the right lateral ventricle. The ventricles and sulci are  otherwise normal in size, shape and configuration. There is no significant white  matter disease. There is no new intracranial hemorrhage, extra-axial collection,  or mass effect. The basilar cisterns are open. The bone windows demonstrate no abnormalities. The visualized portions of the  paranasal sinuses and mastoid air cells are clear. There is a soft tissue  collection overlying the right frontoparietal skull, not significant change. Impression  Left MCA territory left temporal lobe infarct is stable appearance  without interval hemorrhagic transformation. Right frontoparietal scalp  collection unchanged. MRI Results (most recent):  Results from [place] encounter on 09/14/22    MRI BRAIN WO CONT    Narrative  INDICATION:   follow up ischemic cva    EXAMINATION:  MRI BRAIN WO CONTRAST    COMPARISON:  CT September 14, 2022    TECHNIQUE:  Multiplanar multisequence acquisition without contrast of the brain.       FINDINGS:    There is a moderate to large area of acute infarction in the left middle  cerebral artery territory, involving the left basal ganglia and corona radiata,  left anterior and mid temporal lobe, left insula and left frontal operculum. There is associated susceptibility artifact predominantly within the basal  ganglia and corona radiata portions of the infarction, with slight mass effect  upon the left lateral ventricle similar to recent CT. There is no hydrocephalus  or midline shift. Major intracranial vascular flow-voids are patent. Subcutaneous edema throughout the scalp right greater than left. .    Impression  Moderate to large acute left MCA territory infarction, with associated  susceptibility artifact predominantly in the basal ganglia/corona radiata  consistent with blood product/petechial hemorrhage. Overall appearance unchanged  compared to recent CT, with no hydrocephalus or midline shift. No results for input(s): CPK, TROIQ in the last 72 hours. No lab exists for component: CKQMB, CPKMB, BMPP    Recent Labs     09/22/22  0444 09/21/22  0516   * 158*   K 4.1 4.4   * 129*   CO2 23 23   BUN 73* 89*   CREA 2.07* 3.39*   * 153*   PHOS  --  5.1*   CA 8.6 8.7     Recent Labs     09/22/22  0444 09/21/22  0516   WBC 11.2* 12.3*   HGB 13.8 13.5   HCT 46.0 43.8   * 147*     No results for input(s): PTP, INR, AP, INREXT, INREXT in the last 72 hours. No lab exists for component: PTTP, GPT, SGOT  No results for input(s): CHOL, LDLC in the last 72 hours. No lab exists for component: TGL, HDLC,  HBA1C  No results for input(s): CRP, TSH, TSHEXT, TSHEXT in the last 72 hours.     No lab exists for component: ESR        Current meds:    Current Facility-Administered Medications:     dextrose 5% infusion, 150 mL/hr, IntraVENous, CONTINUOUS, Enrique Kaminski MD    cefTRIAXone (ROCEPHIN) 1 g in 0.9% sodium chloride 10 mL IV syringe, 1 g, IntraVENous, Q24H, Otf Tom MD, 1 g at 09/21/22 1029    metoprolol (LOPRESSOR) injection 7.5 mg, 7.5 mg, IntraVENous, Q6H, Otf Tom MD, 7.5 mg at 09/22/22 0612    dilTIAZem (CARDIZEM) 125 mg in dextrose 5% 125 mL infusion, 0-15 mg/hr, IntraVENous, TITRATE, Jimena Dietrich NP, Last Rate: 5 mL/hr at 09/22/22 0507, 5 mg/hr at 09/22/22 0507    famotidine (PEPCID) tablet 20 mg, 20 mg, Oral, DAILY, Otf Tom MD, 20 mg at 09/21/22 2397    HYDROmorphone (DILAUDID) injection 0.2 mg, 0.2 mg, IntraVENous, Q3H PRN, Mal Primes, ACNP    labetaloL (NORMODYNE;TRANDATE) injection 5 mg, 5 mg, IntraVENous, Q6H PRN, Mal Primes, ACNP, 5 mg at 09/19/22 1221    balsam peru-castor oiL (VENELEX) ointment, , Topical, BID, Veronica Child MD, Given at 09/21/22 1705    glucose chewable tablet 16 g, 4 Tablet, Oral, PRN, Veronica Child MD    glucagon (GLUCAGEN) injection 1 mg, 1 mg, IntraMUSCular, PRN, Veronica Child MD    dextrose 10 % infusion 0-250 mL, 0-250 mL, IntraVENous, PRN, Veronica Child MD    insulin lispro (HUMALOG) injection, , SubCUTAneous, Q6H, Veronica Child MD, 2 Units at 09/22/22 0049    acetaminophen (TYLENOL) tablet 650 mg, 650 mg, Oral, Q4H PRN, 650 mg at 09/19/22 1343 **OR** acetaminophen (TYLENOL) solution 650 mg, 650 mg, Per NG tube, Q4H PRN **OR** acetaminophen (TYLENOL) suppository 650 mg, 650 mg, Rectal, Q4H PRN, Mal Primes, ACNP    atorvastatin (LIPITOR) tablet 80 mg, 80 mg, Oral, QHS, Mal Primes, ACNP, 80 mg at 09/21/22 2220    docusate sodium (COLACE) capsule 100 mg, 100 mg, Oral, BID, Mal Primes, ACNP, 100 mg at 09/21/22 18 [facility] Brittanie Guerrero NP  [facility] [address] [place] [address] 13, [address] [address] [place] [address] [facility]  [phone]      CC:Geovanni Villalba MD

## 2022-09-22 NOTE — PROGRESS NOTES
Transition of Care Plan  RUR- Med 16%  DISPOSITION: [facility] - [facility] - pending insurance auth started 9/22 and medical stability  F/U with PCP/Specialist    Transport: [facility]    Emergency contact: Donte Licea [phone]    CM barriers to discharge: insurance auth    Per Dr. Nikki Cancino, plan for patient to discharge hopefully Monday if he continues to improve. [facility] has started insurance auth today 9/22. Moody Collet) Patrici Maxin, M.S.ABDI.

## 2022-09-22 NOTE — PROGRESS NOTES
Problem: Dysphagia (Adult)  Goal: *Acute Goals and Plan of Care (Insert Text)  Description: Speech therapy goals  Initiated 9/16/2022   1. Patient will tolerate pureed snacks without s/s of aspiration or adverse effects within 7 days   2. Patient will participate in repeat FEES to determine safety to initiate PO diet within 7 days   Initiated 9/15/2022   1. Patient will tolerate meds in applesauce without s/s of aspiration within 7 days MET 9/16/2022   2. Patient will participate in re-assessment of swallow function within 7 days MET 9/16/2022   Outcome: Progressing Towards Goal     Problem: Communication Impaired (Adult)  Goal: *Acute Goals and Plan of Care (Insert Text)  Description: Problem: Communication Impaired (Adult)  Goal: *Acute Goals and Plan of Care (Insert Text)  Note:   1. Patient will improve verbal expression for naming ADL objects, word level sentence completion tasks, automatic sequences, and responsive naming tasks with 80% accuracy given repetition within 7 days. 2. Patient will improve auditory comprehension for simple yes/no questions and 1 step commands with 80% accuracy given repetition within 7 days. 3. Patient will improve auditory comprehension for ID of objects given a field of 2-3 with 80% accuracy given repetition within 7 days. Outcome: Progressing Towards Goal      SPEECH LANGUAGE PATHOLOGY DYSPHAGIA AND SPEECH TREATMENT  Patient: Madeline Galvan (9 y.o. male)  Date: 9/22/2022  Diagnosis: Ischemic cerebrovascular accident (CVA) ([facility] [I63.9] <principal problem not specified>      Precautions:  Fall (SBP< 180/90)    ASSESSMENT:  Patient seen in follow up. Today he had naming characterized by mumbled phrases followed by correct answers. Continues with perseveration. Was able to say \"I'm ready\" between bites to ask for next bite. No difficulty swallowing puree, we will plan to repeat imaging before discharge in hopes of advancing liquids.       PLAN:  Recommendations and Planned Interventions:  -Continue puree, no liquids  -we will plan to repeat imaging before discharge in hopes of advancing liquids.   -Communication tx  Patient continues to benefit from skilled intervention to address the above impairments. Continue treatment per established plan of care. Discharge Recommendations:  Inpatient Rehab     SUBJECTIVE:   Patient stated I think I'm ready. OBJECTIVE:   Cognitive and Communication Status:  Neurologic State: Alert  Orientation Level: Unable to verbalize  Cognition: Decreased command following  Perception: Cues to attend to right side of body  Perseveration: Perseverates during conversation  Safety/Judgement: Decreased awareness of environment, Decreased awareness of need for assistance, Decreased awareness of need for safety, Decreased insight into deficits    Dysphagia Treatment and Interventions:  Oral Assessment:  Oral Assessment  Labial: Decreased seal  Dentition: Natural  Oral Hygiene: clear  Mandible: No impairment  P.O. Trials:  Patient Position: upright in bed  Vocal quality prior to P.O.: No impairment           Bolus Acceptance: No impairment  Bolus Formation/Control: No impairment (no difficulty with pure)     Propulsion: No impairment  Oral Residue: None     Laryngeal Elevation: Functional  Aspiration Signs/Symptoms: None  Pharyngeal Phase Characteristics: No impairment, issues, or problems                    Exercises:  Laryngeal Exercises:                                                                                                                                     Speech Treatment and Interventions: Motor Speech:     Intelligibility: Impaired  Word Intelligibility (%):  (50)              Speech Characteristics: Vale Sellar; Neologisms;Paraphasias;Perseveration; Word retrieval     Dysarthric Characteristics: Blended word boundaries; Decreased breath support; Imprecise  Interfering Components: Attention  Language Comprehension and Expression:     Verbal Expression  Verbal Expression  Primary Mode of Expression: Verbal  Initiation: Impaired (%)  Automatic Speech Task: Impaired (comment)  Automatic speech task cueing type: Verbal;Repetition  Repetition: Impaired  Word Repetition (%):  (66)  Phrase Repetition (%):  (66%, two word phrases)  Naming: Impaired (able to name body parts 2/3, not able to name other items)  Naming cueing type: Verbal  Sentence Completion: Impaired (66%)  Sentence completion cueing type: Verbal  Sentence completion cueing amount: Moderate  Sentence Formulation:  (able to express a few phrases, no full sentences)  Sentence forumlation cueing type: Verbal  Speech Characteristics: Vale Sellar; Neologisms;Paraphasias;Perseveration; Word retrieval  Interfering Components: Attention  Overall Impairment: Severe          Response & Tolerance to Activities:     No difficulty    Pain:  Pain Scale 1: Numeric (0 - 10)  Pain Intensity 1: 0       After treatment:   Patient left in no apparent distress in bed and Call bell within reach    COMMUNICATION/EDUCATION:     The patient's plan of care including recommendations, planned interventions, and recommended diet changes were discussed with: Registered nurse.        Dennis Najera, SLP  Time Calculation: 20 mins

## 2022-09-22 NOTE — PROGRESS NOTES
Problem: Self Care Deficits Care Plan (Adult)  Goal: *Acute Goals and Plan of Care (Insert Text)  Description: FUNCTIONAL STATUS PRIOR TO ADMISSION: Patient with communication deficits limiting gathering home environment/prior level of function information. Per chart, patient lives by himself and was climbing a ladder when event happened, presumed independent prior level of function. HOME SUPPORT: The patient lived alone with sister and brother-in-law for family support. Sister had called on the 13th and became concerned when no response, called a neighbor who found the patient. Would benefit from confirming prior level of function and home environment information with family when able. Occupational Therapy Goals  Weekly RA 9/22/2022  1. Patient will perform self-feeding with setup assist using left upper extremity within 7 day(s). upgraded  2. Patient will perform grooming in sitting with contact guard assist within 7 day(s). upgraded  3. Patient will perform lower body dressing with maximal assistance within 7 day(s). Cont   4. Patient will perform toilet transfers to [facility] with maximal assistance within 7 day(s). Cont   5. Patient will perform all aspects of toileting with maximal assistance within 7 day(s). Cont   6. Patient will participate in upper extremity therapeutic exercise/activities with minimal assistance/contact guard assist within 7 day(s). Cont    7. Patient will participate in R [address] within 7 days. Cont     Initiated 9/15/2022   1. Patient will perform self-feeding with minimal assistance/contact guard assist within 7 day(s). 2.  Patient will perform grooming in sitting with moderate assist within 7 day(s). 3.  Patient will perform lower body dressing with maximal assistance within 7 day(s). 4.  Patient will perform toilet transfers to [facility] with maximal assistance within 7 day(s).   5.  Patient will perform all aspects of toileting with maximal assistance within 7 day(s). 6.  Patient will participate in upper extremity therapeutic exercise/activities with minimal assistance/contact guard assist within 7 day(s). 7.  [facility] within 7 days. Outcome: Progressing Towards Goal   OCCUPATIONAL THERAPY TREATMENT  Patient: Irish Lyman (61 y.o. male)  Date: 9/22/2022  Diagnosis: Ischemic cerebrovascular accident (CVA) ([facility] [address] [I63.9] <principal problem not specified>      Precautions: Fall (SBP< 180/90)  Chart, occupational therapy assessment, plan of care, and goals were reviewed. ASSESSMENT  Patient continues with skilled OT services and is progressing towards goals. Patient presents this session with impaired sitting balance, right upper extremity deficits, improving but limited command following, distractibility, impaired safety awareness, and requires encouragement to attempt movement on right side. Patient received semi supine in bed, nursing had recently placed NG tube, noted eyes watering. Patient agreeable to working with therapy and requiring total A to don socks in supine prior to mobility. Patient transferred sup <> sit on edge of bed with max A x2 and rolled to right side to complete transfer. Patient sitting with multi modal cueing for seated balance. Xray then entering room and reporting need for stat imaging to be completed, patient returned to supine and xray tech assisted for rolling patient to place board under patient and session terminated due to need for imaging. Overall patient is progressing towards goals, see updated goals above, but remains limited by significant right sided deficits. Will continue to progress toward goals as able. Patient would benefit from skilled OT services during admission to improve independence with self care and functional mobility/transfers. Recommend discharge to [facility] at this time.      Current Level of Function Impacting Discharge (ADLs): max A x2 for mobility/transfers, total A lower extremity dressing, infer upper extremity activities of daily living with setup to min A     Other factors to consider for discharge: prior level of function independent, living alone, acute CVA         PLAN :  Patient continues to benefit from skilled intervention to address the above impairments. Continue treatment per established plan of care to address goals. Recommend with staff: bed in modified chair position for meals, bedpan for toileting    Recommend next OT session: attempt out of bed transfers as appropriate, grooming in supported sitting    Recommendation for discharge: (in order for the patient to meet his/her long term goals)  Therapy 3 hours per day 5-7 days per week    This discharge recommendation:  Has been made in collaboration with the attending provider and/or case management    IF patient discharges home will need the following DME: TBD pending progress, would require mechanical lift and 24/ physical assistance at this time       SUBJECTIVE:   Patient stated I'm tired.     OBJECTIVE DATA SUMMARY:   Cognitive/Behavioral Status:  Neurologic State: Alert                   Functional Mobility and Transfers for ADLs:  Bed Mobility:  Rolling: Maximum assistance  Supine to Sit: Assist x2;Maximum assistance  Sit to Supine: Assist x2;Maximum assistance  Scooting: Maximum assistance    Transfers:             Balance:  Sitting: Impaired  Sitting - Static: Fair (occasional)  Sitting - Dynamic: Fair (occasional)    ADL Intervention:                                Lower Body Dressing Assistance  Socks:  Total assistance (dependent)  Leg Crossed Method Used: No  Position Performed: Supine              Neuro Re-Education:   Orientation to midline in sitting and assisted for weight bearing through right upper extremity at edge of bed             Functional Measure:    Barthel Index:  Bathin  Bladder: 0  Bowels: 0  Groomin  Dressin  Feedin  Mobility: 0  Stairs: 0  Toilet Use: 0  Transfer (Bed to Chair and Back): 5  Total: 5/100      The Barthel ADL Index: Guidelines  1. The index should be used as a record of what a patient does, not as a record of what a patient could do. 2. The main aim is to establish degree of independence from any help, physical or verbal, however minor and for whatever reason. 3. The need for supervision renders the patient not independent. 4. A patient's performance should be established using the best available evidence. Asking the patient, friends/relatives and nurses are the usual sources, but direct observation and common sense are also important. However direct testing is not needed. 5. Usually the patient's performance over the preceding 24-48 hours is important, but occasionally longer periods will be relevant. 6. Middle categories imply that the patient supplies over 50 per cent of the effort. 7. Use of aids to be independent is allowed. Score Interpretation (from [address] [address])    Independent   60-79 Minimally independent   40-59 Partially dependent   20-39 Very dependent   <20 Totally dependent     -Kristen Mosley., Barthel, D.W. (1965). Functional evaluation: the Barthel Index. [address] [facility] ([address] (1997). The Barthel activities of daily living index: self-reporting versus actual performance in the old (> or = 75 years). Journal of [address] [place] [address] 45(7), [facility], J.ERNESTO, Tori Francisco., [facility]. (1999). Measuring the change in disability after inpatient rehabilitation; comparison of the responsiveness of the Barthel Index and Functional Daniels Measure. Journal of Neurology, Neurosurgery, and Psychiatry, 66(4), 323-457. Rosy Calvert, N.J.A, AMY Gamino, & Prachi Hutchinson MGriffinA. (2004) Assessment of post-stroke quality of life in cost-effectiveness studies: The usefulness of the Barthel Index and the EuroQoL-5D.  [facility], 13, 939-99 Pain:  Discomfort following NG tube placement    Activity Tolerance:   Fair and requires rest breaks    After treatment patient left in no apparent distress:   Supine in bed, Call bell within reach, and xray tech present in room    COMMUNICATION/COLLABORATION:   The patients plan of care was discussed with: Physical therapist and Registered nurse. Patient was educated regarding His deficit(s) of right sided weakness as this relates to His diagnosis of CVA. He demonstrated Fair understanding as evidenced by communication deficits. Patient and/or family was verbally educated on the BE FAST acronym for signs/symptoms of CVA and TIA. BE FAST was written on patient's communication board  for visual education and reinforcement. All questions answered with patient indicating fair understanding.      Shannan Finn OTR/L  Time Calculation: 12 mins

## 2022-09-22 NOTE — PROGRESS NOTES
Hypernatremia noted  Ordered DHT w/ water flushes and changed 1/2 NS to D5W  [facility]  Office :[phone]  Fax: [phone]

## 2022-09-23 LAB
ANION GAP SERPL CALC-SCNC: 1 MMOL/L (ref 5–15)
BUN SERPL-MCNC: 57 MG/DL (ref 6–20)
BUN/CREAT SERPL: 42 (ref 12–20)
CALCIUM SERPL-MCNC: 8.5 MG/DL (ref 8.5–10.1)
CHLORIDE SERPL-SCNC: 132 MMOL/L (ref 97–108)
CO2 SERPL-SCNC: 26 MMOL/L (ref 21–32)
CREAT SERPL-MCNC: 1.37 MG/DL (ref 0.7–1.3)
GLUCOSE BLD STRIP.AUTO-MCNC: 115 MG/DL (ref 65–117)
GLUCOSE BLD STRIP.AUTO-MCNC: 133 MG/DL (ref 65–117)
GLUCOSE BLD STRIP.AUTO-MCNC: 148 MG/DL (ref 65–117)
GLUCOSE BLD STRIP.AUTO-MCNC: 148 MG/DL (ref 65–117)
GLUCOSE SERPL-MCNC: 148 MG/DL (ref 65–100)
MAGNESIUM SERPL-MCNC: 2.7 MG/DL (ref 1.6–2.4)
POTASSIUM SERPL-SCNC: 3.9 MMOL/L (ref 3.5–5.1)
SERVICE CMNT-IMP: ABNORMAL
SERVICE CMNT-IMP: NORMAL
SODIUM SERPL-SCNC: 159 MMOL/L (ref 136–145)

## 2022-09-23 PROCEDURE — 74011250636 HC RX REV CODE- 250/636: Performed by: FAMILY MEDICINE

## 2022-09-23 PROCEDURE — 74011000250 HC RX REV CODE- 250: Performed by: FAMILY MEDICINE

## 2022-09-23 PROCEDURE — 74011250637 HC RX REV CODE- 250/637: Performed by: INTERNAL MEDICINE

## 2022-09-23 PROCEDURE — 74011250637 HC RX REV CODE- 250/637: Performed by: NURSE PRACTITIONER

## 2022-09-23 PROCEDURE — 74011636637 HC RX REV CODE- 636/637: Performed by: ANESTHESIOLOGY

## 2022-09-23 PROCEDURE — 83735 ASSAY OF MAGNESIUM: CPT

## 2022-09-23 PROCEDURE — 99232 SBSQ HOSP IP/OBS MODERATE 35: CPT | Performed by: SPECIALIST

## 2022-09-23 PROCEDURE — 97530 THERAPEUTIC ACTIVITIES: CPT

## 2022-09-23 PROCEDURE — 82962 GLUCOSE BLOOD TEST: CPT

## 2022-09-23 PROCEDURE — 74011250637 HC RX REV CODE- 250/637: Performed by: FAMILY MEDICINE

## 2022-09-23 PROCEDURE — 92612 ENDOSCOPY SWALLOW (FEES) VID: CPT | Performed by: SPEECH-LANGUAGE PATHOLOGIST

## 2022-09-23 PROCEDURE — 80048 BASIC METABOLIC PNL TOTAL CA: CPT

## 2022-09-23 PROCEDURE — 74011250636 HC RX REV CODE- 250/636: Performed by: INTERNAL MEDICINE

## 2022-09-23 PROCEDURE — 36415 COLL VENOUS BLD VENIPUNCTURE: CPT

## 2022-09-23 PROCEDURE — 65660000001 HC RM ICU INTERMED STEPDOWN

## 2022-09-23 RX ORDER — TAMSULOSIN HYDROCHLORIDE 0.4 MG/1
0.4 CAPSULE ORAL DAILY
Status: DISCONTINUED | OUTPATIENT
Start: 2022-09-23 | End: 2022-09-28 | Stop reason: HOSPADM

## 2022-09-23 RX ORDER — FAMOTIDINE 20 MG/1
20 TABLET, FILM COATED ORAL EVERY 12 HOURS
Status: DISCONTINUED | OUTPATIENT
Start: 2022-09-23 | End: 2022-09-28 | Stop reason: HOSPADM

## 2022-09-23 RX ORDER — METOPROLOL TARTRATE 25 MG/1
37.5 TABLET, FILM COATED ORAL EVERY 12 HOURS
Status: DISCONTINUED | OUTPATIENT
Start: 2022-09-23 | End: 2022-09-28 | Stop reason: HOSPADM

## 2022-09-23 RX ADMIN — FAMOTIDINE 20 MG: 20 TABLET, FILM COATED ORAL at 21:41

## 2022-09-23 RX ADMIN — Medication 2 UNITS: at 14:02

## 2022-09-23 RX ADMIN — METOPROLOL TARTRATE 25 MG: 25 TABLET, FILM COATED ORAL at 06:28

## 2022-09-23 RX ADMIN — SODIUM CHLORIDE, PRESERVATIVE FREE 1 G: 5 INJECTION INTRAVENOUS at 10:43

## 2022-09-23 RX ADMIN — DILTIAZEM HYDROCHLORIDE 60 MG: 60 TABLET, FILM COATED ORAL at 14:03

## 2022-09-23 RX ADMIN — FAMOTIDINE 20 MG: 20 TABLET, FILM COATED ORAL at 10:42

## 2022-09-23 RX ADMIN — Medication: at 17:51

## 2022-09-23 RX ADMIN — ATORVASTATIN CALCIUM 80 MG: 40 TABLET, FILM COATED ORAL at 21:40

## 2022-09-23 RX ADMIN — DEXTROSE MONOHYDRATE 150 ML/HR: 50 INJECTION, SOLUTION INTRAVENOUS at 02:06

## 2022-09-23 RX ADMIN — Medication 2 UNITS: at 06:28

## 2022-09-23 RX ADMIN — METOPROLOL TARTRATE 37.5 MG: 25 TABLET, FILM COATED ORAL at 17:52

## 2022-09-23 RX ADMIN — Medication: at 15:01

## 2022-09-23 RX ADMIN — DOCUSATE SODIUM 100 MG: 100 CAPSULE, LIQUID FILLED ORAL at 10:42

## 2022-09-23 RX ADMIN — TAMSULOSIN HYDROCHLORIDE 0.4 MG: 0.4 CAPSULE ORAL at 10:42

## 2022-09-23 RX ADMIN — DILTIAZEM HYDROCHLORIDE 60 MG: 60 TABLET, FILM COATED ORAL at 06:28

## 2022-09-23 RX ADMIN — DEXTROSE MONOHYDRATE 150 ML/HR: 50 INJECTION, SOLUTION INTRAVENOUS at 10:38

## 2022-09-23 RX ADMIN — DEXTROSE MONOHYDRATE 150 ML/HR: 50 INJECTION, SOLUTION INTRAVENOUS at 22:58

## 2022-09-23 RX ADMIN — DOCUSATE SODIUM 100 MG: 100 CAPSULE, LIQUID FILLED ORAL at 17:52

## 2022-09-23 NOTE — PROGRESS NOTES
Speech Therapy    FEES completed and full note to follow. Advancing diet to easy to chew with thin liquids. Of note, patient does aspirate thin liquids intermittently, but coughs and clears material from the airway. Please look to other indicators of infection for decreased diet tolerance. Lucila Xavier M.S., CCC-SLP

## 2022-09-23 NOTE — PROGRESS NOTES
Problem: Mobility Impaired (Adult and Pediatric)  Goal: *Acute Goals and Plan of Care (Insert Text)  Description:   FUNCTIONAL STATUS PRIOR TO ADMISSION: Patient was independent and active without use of DME.    HOME SUPPORT PRIOR TO ADMISSION: The patient lived alone with no local support. Physical Therapy Goals  Initiated 9/15/2022; reassessed 09/22 and appropriate for carryover x7 days. 1.  Patient will move from supine to sit and sit to supine , scoot up and down, and roll side to side in bed with moderate assistance  within 7 day(s). 2.  Patient will transfer from bed to chair and chair to bed with moderate assistance  using the least restrictive device within 7 day(s). 3.  Patient will perform sit to stand with moderate assistance  within 7 day(s). 4.  Patient will ambulate with moderate assistance  for 25 feet with the least restrictive device within 7 day(s). 5.  Patient will improve Bowles Balance score by 7 points within 7 days. Outcome: Progressing Towards Goal     PHYSICAL THERAPY TREATMENT  Patient: Rickie Gallagher (17 y.o. male)  Date: 9/23/2022  Diagnosis: Ischemic cerebrovascular accident (CVA) ([facility] [address] [I63.9] <principal problem not specified>      Precautions: Fall (SBP< 180/90)  Chart, physical therapy assessment, plan of care and goals were reviewed. ASSESSMENT  Patient continues with skilled PT services and is progressing towards goals, however remains most limited by dense R hemiparesis, mild R inattention, impaired balance, aphasia, decreased cardiopulmonary tolerance to activity, and c/o pain leading to increased falls risk and dependency from baseline level. Received pt supine in bed, much more alert today. Appropriately nodding to yes/no questions and getting a few words out with increased time/effort. Continues to require largely max A x2 to facilitate rolling towards R and transition to sitting.  Cues provided to increase indep with task and use of L LE to hook/assist with right. Good initiation to scoot/square hips in sitting. Progressed to completing squat pivot transfer to chair towards LEFT with max A x2 and cues/assist for fwd weight shift. Pt left up in chair at end of session, NAD. Recommend no more than ~1hr up in chair due to anticipated quick fatigue - squat pivot towards LEFT back to bed with MAX A x2 and close protection of R UE. Continue to recommend discharge to IPR to maximize NM recovery once medically cleared. Will follow. Current Level of Function Impacting Discharge (mobility/balance): dense R HP; high falls risk risk; max A x2     Other factors to consider for discharge: IPR         PLAN :  Patient continues to benefit from skilled intervention to address the above impairments. Continue treatment per established plan of care. to address goals. Recommendation for discharge: (in order for the patient to meet his/her long term goals)  Therapy 3 hours per day 5-7 days per week    This discharge recommendation:  Has been made in collaboration with the attending provider and/or case management    IF patient discharges home will need the following DME: to be determined (TBD)       SUBJECTIVE:   Patient stated I'm ok.     OBJECTIVE DATA SUMMARY:   Critical Behavior:  Neurologic State: Alert, Confused, Eyes open spontaneously  Orientation Level: Oriented to person, Oriented to place  Cognition: Decreased command following, Poor safety awareness, Decreased attention/concentration  Safety/Judgement: Decreased awareness of environment, Decreased awareness of need for assistance, Decreased awareness of need for safety, Decreased insight into deficits  Functional Mobility Training:  Bed Mobility:     Supine to Sit: Assist x2;Maximum assistance  Sit to Supine: Assist x2;Maximum assistance  Scooting:  Moderate assistance     Transfers:  Sit to Stand: Assist x2;Maximum assistance  Stand to Sit: Assist x2;Maximum assistance  Bed to Chair: Assist x2;Maximum assistance (squat pivot)     Balance:  Sitting: Impaired  Sitting - Static: Fair (occasional)  Sitting - Dynamic: Fair (occasional)      Activity Tolerance:   Good    After treatment patient left in no apparent distress:   Sitting in chair, Call bell within reach, and Bed / chair alarm activated    COMMUNICATION/COLLABORATION:   The patients plan of care was discussed with: Occupational therapist and Registered nurse. Patient was educated regarding His deficit(s) of R HP as this relates to His diagnosis of CVA. He demonstrated Good understanding as evidenced by nodding. Patient and/or family was verbally educated on the BE FAST acronym for signs/symptoms of CVA and TIA. BE FAST was written on patient's communication board  for visual education and reinforcement. All questions answered with patient indicating good understanding.      Taya Chun, PT, DPT   Time Calculation: 24 mins

## 2022-09-23 NOTE — PROGRESS NOTES
Problem: Pressure Injury - Risk of  Goal: *Prevention of pressure injury  Description: Document Pepe Scale and appropriate interventions in the flowsheet. Outcome: Progressing Towards Goal  Note: Pressure Injury Interventions:  Sensory Interventions: Assess changes in LOC, Discuss PT/OT consult with provider, Float heels, Keep linens dry and wrinkle-free    Moisture Interventions: Absorbent underpads, Internal/External urinary devices, Minimize layers    Activity Interventions: Increase time out of bed, Pressure redistribution bed/mattress(bed type)    Mobility Interventions: Float heels, PT/OT evaluation, Turn and reposition approx. every two hours(pillow and wedges)    Nutrition Interventions: Document food/fluid/supplement intake    Friction and Shear Interventions: Lift sheet, Transferring/repositioning devices, Foam dressings/transparent film/skin sealants                Problem: Patient Education: Go to Patient Education Activity  Goal: Patient/Family Education  Outcome: Progressing Towards Goal     Problem: Patient Education: Go to Patient Education Activity  Goal: Patient/Family Education  Outcome: Progressing Towards Goal     Problem: Patient Education: Go to Patient Education Activity  Goal: Patient/Family Education  Outcome: Progressing Towards Goal     Problem: Patient Education: Go to Patient Education Activity  Goal: Patient/Family Education  Outcome: Progressing Towards Goal     Problem: Falls - Risk of  Goal: *Absence of Falls  Description: Document Kasi Fall Risk and appropriate interventions in the flowsheet.   Outcome: Progressing Towards Goal  Note: Fall Risk Interventions:  Mobility Interventions: Bed/chair exit alarm, Communicate number of staff needed for ambulation/transfer, OT consult for ADLs, Patient to call before getting OOB, PT Consult for mobility concerns, Utilize gait belt for transfers/ambulation    Mentation Interventions: Adequate sleep, hydration, pain control, Bed/chair exit alarm    Medication Interventions: Evaluate medications/consider consulting pharmacy, Patient to call before getting OOB    Elimination Interventions: Bed/chair exit alarm, Call light in reach, Patient to call for help with toileting needs, Toileting schedule/hourly rounds    History of Falls Interventions: Bed/chair exit alarm, Consult care management for discharge planning, Investigate reason for fall         Problem: Patient Education: Go to Patient Education Activity  Goal: Patient/Family Education  Outcome: Progressing Towards Goal     Problem: Patient Education: Go to Patient Education Activity  Goal: Patient/Family Education  Outcome: Progressing Towards Goal     Problem: TIA/CVA Stroke: Day 2 Until Discharge  Goal: Off Pathway (Use only if patient is Off Pathway)  Outcome: Progressing Towards Goal  Goal: Activity/Safety  Outcome: Progressing Towards Goal  Goal: Diagnostic Test/Procedures  Outcome: Progressing Towards Goal  Goal: Nutrition/Diet  Outcome: Progressing Towards Goal  Goal: Discharge Planning  Outcome: Progressing Towards Goal  Goal: Medications  Outcome: Progressing Towards Goal  Goal: Respiratory  Outcome: Progressing Towards Goal  Goal: Treatments/Interventions/Procedures  Outcome: Progressing Towards Goal  Goal: Psychosocial  Outcome: Progressing Towards Goal  Goal: *Verbalizes anxiety and depression are reduced or absent  Outcome: Progressing Towards Goal  Goal: *Absence of aspiration  Outcome: Progressing Towards Goal  Goal: *Absence of deep venous thrombosis signs and symptoms(Stroke Metric)  Outcome: Progressing Towards Goal  Goal: *Optimal pain control at patient's stated goal  Outcome: Progressing Towards Goal  Goal: *Tolerating diet  Outcome: Progressing Towards Goal  Goal: *Ability to perform ADLs and demonstrates progressive mobility and function  Outcome: Progressing Towards Goal  Goal: *Stroke education continued(Stroke Metric)  Outcome: Progressing Towards Goal

## 2022-09-23 NOTE — PROGRESS NOTES
Problem: Pressure Injury - Risk of  Goal: *Prevention of pressure injury  Description: Document Pepe Scale and appropriate interventions in the flowsheet. Outcome: Progressing Towards Goal  Note: Pressure Injury Interventions:  Sensory Interventions: Assess changes in LOC, Check visual cues for pain, Float heels, Keep linens dry and wrinkle-free, Maintain/enhance activity level, Minimize linen layers, Pressure redistribution bed/mattress (bed type)    Moisture Interventions: Absorbent underpads, Apply protective barrier, creams and emollients, Internal/External urinary devices, Minimize layers    Activity Interventions: Increase time out of bed, Pressure redistribution bed/mattress(bed type), PT/OT evaluation    Mobility Interventions: Float heels, HOB 30 degrees or less, Pressure redistribution bed/mattress (bed type), PT/OT evaluation    Nutrition Interventions: Document food/fluid/supplement intake, Offer support with meals,snacks and hydration    Friction and Shear Interventions: Apply protective barrier, creams and emollients, HOB 30 degrees or less, Minimize layers                Problem: Patient Education: Go to Patient Education Activity  Goal: Patient/Family Education  Outcome: Progressing Towards Goal     Problem: Patient Education: Go to Patient Education Activity  Goal: Patient/Family Education  Outcome: Progressing Towards Goal     Problem: Patient Education: Go to Patient Education Activity  Goal: Patient/Family Education  Outcome: Progressing Towards Goal     Problem: Patient Education: Go to Patient Education Activity  Goal: Patient/Family Education  Outcome: Progressing Towards Goal     Problem: Falls - Risk of  Goal: *Absence of Falls  Description: Document Kasi Fall Risk and appropriate interventions in the flowsheet.   Outcome: Progressing Towards Goal  Note: Fall Risk Interventions:       Mentation Interventions: Adequate sleep, hydration, pain control, Bed/chair exit alarm, Door open when patient unattended    Medication Interventions: Bed/chair exit alarm, Evaluate medications/consider consulting pharmacy    Elimination Interventions: Bed/chair exit alarm, Call light in reach, Patient to call for help with toileting needs    History of Falls Interventions: Bed/chair exit alarm, Door open when patient unattended, Evaluate medications/consider consulting pharmacy         Problem: Patient Education: Go to Patient Education Activity  Goal: Patient/Family Education  Outcome: Progressing Towards Goal     Problem: Patient Education: Go to Patient Education Activity  Goal: Patient/Family Education  Outcome: Progressing Towards Goal     Problem: TIA/CVA Stroke: Day 2 Until Discharge  Goal: Off Pathway (Use only if patient is Off Pathway)  Outcome: Progressing Towards Goal  Goal: Activity/Safety  Outcome: Progressing Towards Goal  Goal: Diagnostic Test/Procedures  Outcome: Progressing Towards Goal  Goal: Nutrition/Diet  Outcome: Progressing Towards Goal  Goal: Discharge Planning  Outcome: Progressing Towards Goal  Goal: Medications  Outcome: Progressing Towards Goal  Goal: Respiratory  Outcome: Progressing Towards Goal  Goal: Treatments/Interventions/Procedures  Outcome: Progressing Towards Goal  Goal: Psychosocial  Outcome: Progressing Towards Goal  Goal: *Verbalizes anxiety and depression are reduced or absent  Outcome: Progressing Towards Goal  Goal: *Absence of aspiration  Outcome: Progressing Towards Goal  Goal: *Absence of deep venous thrombosis signs and symptoms(Stroke Metric)  Outcome: Progressing Towards Goal  Goal: *Optimal pain control at patient's stated goal  Outcome: Progressing Towards Goal  Goal: *Tolerating diet  Outcome: Progressing Towards Goal  Goal: *Ability to perform ADLs and demonstrates progressive mobility and function  Outcome: Progressing Towards Goal  Goal: *Stroke education continued(Stroke Metric)  Outcome: Progressing Towards Goal

## 2022-09-23 NOTE — PROGRESS NOTES
Problem: Dysphagia (Adult)  Goal: *Acute Goals and Plan of Care (Insert Text)  Description: Speech therapy goals  Initiated 9/23/2022  1. Patient will tolerate easy to chew diet with thin liquids free of sequelae of aspiration within 7 days. Initiated 9/16/2022   1. Patient will tolerate pureed snacks without s/s of aspiration or adverse effects within 7 days  MET  2. Patient will participate in repeat FEES to determine safety to initiate PO diet within 7 days   Initiated 9/15/2022  MET  1. Patient will tolerate meds in applesauce without s/s of aspiration within 7 days MET 9/16/2022   2. Patient will participate in re-assessment of swallow function within 7 days MET 9/16/2022   Outcome: Progressing Towards Goal   Speech Language Pathology  Flexible Endoscopic Evaluation of Swallowing-FEES  Patient: Von Scott (48 y.o. male)  Date: 9/23/2022  Primary Diagnosis: Ischemic cerebrovascular accident (CVA) [facility]) [I63.9]       Precautions:    Fall (SBP< 180/90)    ASSESSMENT :  Based on the objective data described below, the patient presents with subjectively mild oral dysphagia and objectively mild pharyngeal dysphagia. This is an improvement from last SLP visit. Patient demonstrates adequate bolus extraction from spoon and straw, prolonged chew of solids, and functional oral clearance. The swallow is generally initiated with liquids at the level of the pyriform sinuses. Higher for all other consistencies. There is penetration and aspiration of thin liquids due to delay in swallow initiation, but also proximal escape of material from the UES. He is sensate to this and responds appropriately. With penetrated material, he tends to swallow a second time to clear the airway, or use a breath hold, throat clear. When he occasionally aspirates thins, he consistently coughs and clears the airway. There is no residue of puree or solids after the swallow.  At this juncture, he has demonstrated sufficient improvement in function to advance diet. Patient will benefit from skilled intervention to address the above impairments. Patient's rehabilitation potential is considered to be Good          PLAN :  Recommendations and Planned Interventions:  Easy to chew diet, thin liquids  Upright for all PO  He may cough intermittently, please look to other indicators for lack of tolerance (shortness of breath, fever, elevated WBC, etc)  Frequency/Duration: Patient will be followed by speech-language pathology 3 times a week to address goals. Discharge Recommendations: To Be Determined     SUBJECTIVE:   Patient stated Yeah ok.     OBJECTIVE:     Past Medical History:   Diagnosis Date    Arrhythmia     atrial fib    Depression 4/24/2010    Diabetes ([facility] [address]     diet control for pre-diabetes    Dyslipidemia, goal LDL below 100 6/14/2011    Gout     HTN (hypertension) 4/24/2010    Impotence 4/24/2010    Morbid obesity ([facility] [address] 5/17/2010    VALENTE (obstructive sleep apnea) 4/24/2010    CPAP    Restless legs 4/15/2015     Past Surgical History:   Procedure Laterality Date    COLONOSCOPY N/A 6/27/2017    COLONOSCOPY performed by Ezra Johnson MD at [facility], [address] [facility], [facility]  2007    HX APPENDECTOMY      HX ORTHOPAEDIC  2000    bilat TKR     HX ORTHOPAEDIC  2010    left THR    HX UROLOGICAL  03/2018    urolift    HX UROLOGICAL  03/2019    prosthesis     Prior Level of Function/Home Situation:    Home Situation  Home Environment: Private residence  Living Alone: Yes  Support Systems: Child(alexandru) (Donte Marley [phone])  Patient Expects to be Discharged to[de-identified] Rehab hospital/unit acute  Diet prior to admission: suspect regular with thins  Current Diet:  puree snacks, no liquids     Cognitive and Communication Status:  Neurologic State: Alert, Confused, Eyes open spontaneously  Orientation Level: Oriented to person, Oriented to place  Cognition: Decreased command following, Poor safety awareness, Decreased attention/concentration  Perception: Cues to attend to right side of body  Perseveration: Perseverates during conversation  Safety/Judgement: Decreased awareness of environment, Decreased awareness of need for assistance, Decreased awareness of need for safety, Decreased insight into deficits    History/indication for procedure: repeat FEES to determine readiness for diet advancement  Lidocaine used: No  Nostril used: right  Scope Used: Ambu disposable scope  Feeding Tube Present in Nare: No  Adverse Reaction: No  Respiratory status: room air        Part I:  Anatomy:  Oral Assessment  Labial: Decreased seal  Dentition: Natural  Oral Hygiene: clear  Lingual: No impairment  Velum: Unable to visualize  Mandible: No impairment    General Comments: no noted secretions in pharynx     Palate:   WFL   Base of tongue:   WFL   Valleculae:   WFL   Epiglottis:   WFL   Arytenoids:   WFL   False vocal folds:   WFL   Vocal folds:   WFL Pyriform sinus:   WFL         Part II:  Laryngeal Function:    Adduction:   Full   Abduction:   Full   Arytenoid movement:   Symmetric Vocal quality:   WFL         Part III:  Secretions:    [place] Secretion Rating (0-7): 0     Location:  0 - Nil significant pooled secretions in pyriform fossae or laryngeal vestibule Amount:   0 - Nil significant pooled secretions in pyriform fossae (0-20%) Response*:  0 - Secretions in pyriform fossae or laryngeal vestibule effectively cleared   Comments (e.g., quality/texture/color): clear, moist secretions     *Normal airway responses in the pharynx or laryngeal vestibule may include spontaneous coughing, throat clearing, and/or swallowing        Part IV:  Swallow Trials:    Subjective comments regarding oral phase of swallow: adequate bolus extraction from spoon and straw, functional oral clearance after prolonged chew    Comments regarding esophageal phase of swallow: proximal escape of thin liquids to the pyriform sinuses after the swallow    Consistency: Ice chips  Position of Bolus Pre-Swallow: Pyriform sinuses  Ald[place] Pharyngeal Residue Severity Rating Scale: Valleculae residue: 1 - none; 0%, no residue and Pyriform sinus residue: 1 - none; 0%, no residue  Spontaneously Cleared: n/a  Penetration: Yes  Aspiration: No  Response: second swallow to clear  Rosenbek Penetration-Aspiration Scale: 2 - Material enters the airway, remains above the vocal folds and is ejected from the airway    Consistency: Thin liquid  Position of Bolus Pre-Swallow: Pyriform sinuses  Elena Pharyngeal Residue Severity Rating Scale: Valleculae residue: 1 - none; 0%, no residue and Pyriform sinus residue: 3 - mild; 5-25%, up wall to quarter full  Spontaneously Cleared: Yes  Penetration: Yes  Aspiration: Yes  Response: Cough effective  Rosenbek Penetration-Aspiration Scale: 6 - Material enters the airway, passes below the vocal folds, and is ejected into the larynx or out of the airway    Consistency: Puree  Position of Bolus Pre-Swallow: Valleculae  Elena Pharyngeal Residue Severity Rating Scale: Valleculae residue: 1 - none; 0%, no residue and Pyriform sinus residue: 1 - none; 0%, no residue  Spontaneously Cleared: n/a  Penetration: No  Aspiration: No  Response: n/a  Rosenbek Penetration-Aspiration Scale: 1 - Material does not enter the airway    Consistency: Solid  Position of Bolus Pre-Swallow: Presumed base of tongue  Elena Pharyngeal Residue Severity Rating Scale: Valleculae residue: 1 - none; 0%, no residue and Pyriform sinus residue: 1 - none; 0%, no residue  Spontaneously Cleared: n/a  Penetration: n/a  Aspiration: n/a  Response: n/a  Rosenbek Penetration-Aspiration Scale: 1 - Material does not enter the airway      Dysphagia Severity Rating:   Oral phase: Mild  Pharyngeal phase: Mild                 COMMUNICATION/EDUCATION:   Patient was educated regarding FEES results and recommendations. He seemed to indicate understanding.      The patient's plan of care including findings from FEES, recommendations, planned interventions, and recommended diet changes were discussed with: Registered nurse. Patient/family agree to work toward stated goals and plan of care.     Thank you for this referral.  LEOLA Caraballo  Time Calculation: 20 mins

## 2022-09-23 NOTE — PROGRESS NOTES
[id number]  Hospitalist Group                                                                                          Hospitalist Progress Note  Darnell Marie MD  Answering service: [phone] from in house phone        Date of Service:  2022  NAME:  Vanessa Yoo  :  1950  MRN:  [id number]      Admission Summary:     Patient presents with acute CVA, CT head and follow-up MRI shows large acute left MCA territory infarct. Patient with some expressive aphasia and right-sided weakness. Neuro following. Initially was admitted to ICU and transferred out of ICU 9/15/2022. Interval history / Subjective:     Patient is seen and examined at bedside this AM. He is alert, following commands.  Sitting in chair  Discussed with nursing     Spoke with son Fátima Duke on phone and updated patient's status- improving overall, Cr and Na improving,  eating better , possible dc on Monday , auth pending      Assessment & Plan:     Acute CVA  -CT head shows large acute left MCA territory infarct, CTA of the head and neck shows severe stenosis, near occlusive thrombus of the distal left MCA M1  -MRI of the brain which shows moderate to large acute left MCA territory infarct with findings associated with petechial hemorrhage  -Echo with bubble study shows EF of 50 to 55%, bubble study is not adequate to rule out shunt  -Neurology following, recommending holding antiplatelet and anticoagulation for 2 weeks due to large CVA with hemorrhagic conversion  -c/w  statin  -Speech therapy following for aphasia and dysphagia    Hypernatremia - improving    -  appreciate nephrology input  - c/w  D5 IVF   -Monitor sodium level     Paroxysmal atrial fibrillation with RVR  - appreciate cardiology input   -Currently holding anticoagulation due to large CVA with hemorrhagic conversion  -Patient was previously taking Xarelto compliantly and therefore may indicate failure of Xarelto, plan to change to Eliquis when patient is cleared to take oral anticoagulation  - off cardizem gtt on 9/22  - started on PO Diltiazem 60mg q8hr and Metoprolol 37.5mg bid     DOMINICK - cr improving   -Multiple etiologies including volume depletion, ATN from contrast  -Renal ultrasound shows mild left hydronephrosis - resolved on rpt US   -S/p Sexton catheter placement  - appreciate nephro input  - monitor renal function      Rhabdomyolysis  -This is likely due to prolonged immobilization after his CVA  -CK trending down     Elevated troponin  -Likely demand ischemia     Dyslipidemia  -Continue statin, LDL at goal     ? UTI on 9/21  - c/w IV ceftriaxone   - urine cx - Staph species, coagulase neg      Code status: Full  Prophylaxis: SCDs  Care Plan discussed with: Patient  Anticipated Disposition: IPR - [facility] - possible DC on Monday        Hospital Problems  Date Reviewed: 7/28/2022            Codes Class Noted POA    Ischemic cerebrovascular accident (CVA) [facility]) ICD-10-CM: I63.9  ICD-9-CM: 434.91  9/14/2022 Unknown         Review of Systems:   A comprehensive review of systems was negative except for that written in the HPI. Vital Signs:    Last 24hrs VS reviewed since prior progress note. Most recent are:  Visit Vitals  BP (!) 141/87 (BP 1 Location: Right upper arm, BP Patient Position: Reclining)   Pulse (!) 108   Temp 97.4 °F (36.3 °C)   Resp 17   Ht 6' 3\" (1.905 m)   Wt 124.6 kg (274 lb 11.1 oz)   SpO2 97%   BMI 34.33 kg/m²         Intake/Output Summary (Last 24 hours) at 9/23/2022 1545  Last data filed at 9/23/2022 1102  Gross per 24 hour   Intake --   Output 2525 ml   Net -2525 ml            Physical Examination:     I had a face to face encounter with this patient and independently examined them on 9/23/2022 as outlined below:          Constitutional:  No acute distress, cooperative, pleasant    ENT:  Oral mucosa moist, oropharynx benign. Resp:  CTA bilaterally. No wheezing/rhonchi/rales. No accessory muscle use.     CV: Regular rhythm, normal rate, no murmurs, gallops, rubs    GI:  Soft, non distended, non tender. normoactive bowel sounds, no hepatosplenomegaly     Musculoskeletal:  No edema, warm, 2+ pulses throughout    Neurologic:  Moves all extremities. Awake and alert            Data Review:    Review and/or order of clinical lab test      Labs:     Recent Labs     09/22/22  0444 09/21/22  0516   WBC 11.2* 12.3*   HGB 13.8 13.5   HCT 46.0 43.8   * 147*       Recent Labs     09/23/22  0236 09/22/22  0444 09/21/22  0516   * 163* 158*   K 3.9 4.1 4.4   * 133* 129*   CO2 26 23 23   BUN 57* 73* 89*   CREA 1.37* 2.07* 3.39*   * 127* 153*   CA 8.5 8.6 8.7   MG 2.7* 3.3*  --    PHOS  --   --  5.1*       No results for input(s): ALT, AP, TBIL, TBILI, TP, ALB, GLOB, GGT, AML, LPSE in the last 72 hours. No lab exists for component: SGOT, GPT, AMYP, HLPSE    No results for input(s): INR, PTP, APTT, INREXT, INREXT in the last 72 hours. No results for input(s): FE, TIBC, PSAT, FERR in the last 72 hours. No results found for: FOL, RBCF   No results for input(s): PH, PCO2, PO2 in the last 72 hours. No results for input(s): CPK, CKNDX, TROIQ in the last 72 hours.     No lab exists for component: CPKMB    Lab Results   Component Value Date/Time    Cholesterol, total 146 09/15/2022 02:40 AM    HDL Cholesterol 53 09/15/2022 02:40 AM    LDL, calculated 65 09/15/2022 02:40 AM    Triglyceride 140 09/15/2022 02:40 AM    CHOL/HDL Ratio 2.8 09/15/2022 02:40 AM     Lab Results   Component Value Date/Time    Glucose (POC) 148 (H) 09/23/2022 12:29 PM    Glucose (POC) 148 (H) 09/23/2022 05:59 AM    Glucose (POC) 133 (H) 09/23/2022 12:46 AM    Glucose (POC) 152 (H) 09/22/2022 06:28 PM    Glucose (POC) 116 09/22/2022 12:07 PM     Lab Results   Component Value Date/Time    Color YELLOW/STRAW 09/21/2022 10:33 AM    Appearance CLOUDY (A) 09/21/2022 10:33 AM    Specific gravity 1.014 09/21/2022 10:33 AM    pH (UA) 5.0 09/21/2022 10:33 AM    Protein TRACE (A) 09/21/2022 10:33 AM    Glucose Negative 09/21/2022 10:33 AM    Ketone Negative 09/21/2022 10:33 AM    Bilirubin Negative 09/21/2022 10:33 AM    Urobilinogen 0.2 09/21/2022 10:33 AM    Nitrites Negative 09/21/2022 10:33 AM    Leukocyte Esterase MODERATE (A) 09/21/2022 10:33 AM    Epithelial cells FEW 09/21/2022 10:33 AM    Bacteria 2+ (A) 09/21/2022 10:33 AM    WBC 10-20 09/21/2022 10:33 AM    RBC  09/21/2022 10:33 AM         Medications Reviewed:     Current Facility-Administered Medications   Medication Dose Route Frequency    tamsulosin (FLOMAX) capsule 0.4 mg  0.4 mg Oral DAILY    famotidine (PEPCID) tablet 20 mg  20 mg Oral Q12H    metoprolol tartrate (LOPRESSOR) tablet 37.5 mg  37.5 mg Oral Q12H    dextrose 5% infusion  150 mL/hr IntraVENous CONTINUOUS    dilTIAZem IR (CARDIZEM) tablet 60 mg  60 mg Oral Q8H    cefTRIAXone (ROCEPHIN) 1 g in 0.9% sodium chloride 10 mL IV syringe  1 g IntraVENous Q24H    HYDROmorphone (DILAUDID) injection 0.2 mg  0.2 mg IntraVENous Q3H PRN    labetaloL (NORMODYNE;TRANDATE) injection 5 mg  5 mg IntraVENous Q6H PRN    balsam peru-castor oiL (VENELEX) ointment   Topical BID    glucose chewable tablet 16 g  4 Tablet Oral PRN    glucagon (GLUCAGEN) injection 1 mg  1 mg IntraMUSCular PRN    dextrose 10 % infusion 0-250 mL  0-250 mL IntraVENous PRN    insulin lispro (HUMALOG) injection   SubCUTAneous Q6H    acetaminophen (TYLENOL) tablet 650 mg  650 mg Oral Q4H PRN    Or    acetaminophen (TYLENOL) solution 650 mg  650 mg Per NG tube Q4H PRN    Or    acetaminophen (TYLENOL) suppository 650 mg  650 mg Rectal Q4H PRN    atorvastatin (LIPITOR) tablet 80 mg  80 mg Oral QHS    docusate sodium (COLACE) capsule 100 mg  100 mg Oral BID     ______________________________________________________________________  EXPECTED LENGTH OF STAY: 4d 9h  ACTUAL LENGTH OF STAY:          9                 Candice Andres MD

## 2022-09-23 NOTE — PROGRESS NOTES
1930 Received handoff from [employer]. Informed of multiple attempts to place dobhoff resulting in KUB showing not in correct position. Last attempt, guide wire was accidentally pulled out, but dobhoff left in place for KUB. 2100 Tape on patient's nose loose and dobhoff pulled out some. Unable to advance back in due to no guide wire in place. Dobhoff removed    2145 KUB finally resulted showing last dobhoff attempt was in correct position    Attempted to replace dobhoff, unsuccessfully. Met resistance in the nares and could not advance.  Nares have some swelling in them

## 2022-09-23 NOTE — ROUTINE PROCESS
Bedside and Verbal shift change report given to Jessica Gold RN (oncoming nurse) by Anjum Morris RN (offgoing nurse). Report included the following information SBAR, Kardex, MAR, Cardiac Rhythm A fib, and Dual Neuro Assessment.

## 2022-09-23 NOTE — PROGRESS NOTES
Problem: Self Care Deficits Care Plan (Adult)  Goal: *Acute Goals and Plan of Care (Insert Text)  Description: FUNCTIONAL STATUS PRIOR TO ADMISSION: Patient with communication deficits limiting gathering home environment/prior level of function information. Per chart, patient lives by himself and was climbing a ladder when event happened, presumed independent prior level of function. HOME SUPPORT: The patient lived alone with sister and brother-in-law for family support. Sister had called on the 13th and became concerned when no response, called a neighbor who found the patient. Would benefit from confirming prior level of function and home environment information with family when able. Occupational Therapy Goals  Weekly RA 9/22/2022  1. Patient will perform self-feeding with setup assist using left upper extremity within 7 day(s). upgraded  2. Patient will perform grooming in sitting with contact guard assist within 7 day(s). upgraded  3. Patient will perform lower body dressing with maximal assistance within 7 day(s). Cont   4. Patient will perform toilet transfers to [facility] with maximal assistance within 7 day(s). Cont   5. Patient will perform all aspects of toileting with maximal assistance within 7 day(s). Cont   6. Patient will participate in upper extremity therapeutic exercise/activities with minimal assistance/contact guard assist within 7 day(s). Cont    7. Patient will participate in R [address] within 7 days. Cont     Initiated 9/15/2022   1. Patient will perform self-feeding with minimal assistance/contact guard assist within 7 day(s). 2.  Patient will perform grooming in sitting with moderate assist within 7 day(s). 3.  Patient will perform lower body dressing with maximal assistance within 7 day(s). 4.  Patient will perform toilet transfers to [facility] with maximal assistance within 7 day(s).   5.  Patient will perform all aspects of toileting with maximal assistance within 7 day(s). 6.  Patient will participate in upper extremity therapeutic exercise/activities with minimal assistance/contact guard assist within 7 day(s). 7.  Patient will participate in R [address] within 7 days. Outcome: Progressing Towards Goal    OCCUPATIONAL THERAPY TREATMENT  Patient: Tyrese Fung (89 y.o. male)  Date: 9/23/2022  Diagnosis: Ischemic cerebrovascular accident (CVA) ([facility] [address] [I63.9] <principal problem not specified>      Precautions: Fall (SBP< 180/90)  Chart, occupational therapy assessment, plan of care, and goals were reviewed. ASSESSMENT  Patient continues with skilled OT services and is progressing towards goals. Patient required max A x2 to come to sitting and CGA-supervision for sitting balance sitting EOB. Patient required max A x2 to complete squat pivot transfer to chair toward L side. Patient required min A to wash face using LUE - assist for thoroughness. Patient educated on importance of knowing where/placement of RUE during bed and functional mobility 2/2 high risk of subluxation. Of note, patient with 1.5 finger width separation of humeral head from acromion - made RN aware to ensure protection fo shoulder joint during mobility and for proper placement on pillows in bed and in chair. Patient left with PCT completing vitals - left with call bell in reach, RN aware, all needs met. Continue to recommend IPR once cleared for D/C. Current Level of Function Impacting Discharge (ADLs): min-total A for ADLs and max Ax2 for mobility    Other factors to consider for discharge: assumed IND PTA and living alone         PLAN :  Patient continues to benefit from skilled intervention to address the above impairments. Continue treatment per established plan of care to address goals. Recommend with staff: Recommend with nursing, ADLs with supervision/setup, OOB to chair 3x/day with 2 assist and use of gait belt and toileting via bedpan.  Thank you for completing as able in order to maintain patient strength, endurance and independence. Recommend next OT session: [facility] transfer/toileting    Recommendation for discharge: (in order for the patient to meet his/her long term goals)  Therapy 3 hours per day 5-7 days per week    This discharge recommendation:  Has been made in collaboration with the attending provider and/or case management    IF patient discharges home will need the following DME: bedside commode, hospital bed, mechanical lift, and transfer bench       SUBJECTIVE:   Patient stated I'm okay.  re: asking how he was feeling upon entering room    OBJECTIVE DATA SUMMARY:   Cognitive/Behavioral Status:  Neurologic State: Alert;Confused  Orientation Level: Oriented to person;Oriented to place; Disoriented to situation;Disoriented to time  Cognition: Decreased attention/concentration;Decreased command following; Impaired decision making  Perception: Cues to attend to right side of body;Cues to maintain midline in sitting; Tactile;Verbal;Visual  Perseveration: Perseverates during ADLS  Safety/Judgement: Decreased awareness of environment;Decreased awareness of need for assistance;Decreased awareness of need for safety;Decreased insight into deficits; Fall prevention    Functional Mobility and Transfers for ADLs:  Bed Mobility:  Supine to Sit: Assist x2;Maximum assistance  Sit to Supine: Assist x2;Maximum assistance  Scooting:  Moderate assistance    Transfers:  Sit to Stand: Assist x2;Maximum assistance  Bed to Chair: Assist x2;Maximum assistance (squat pivot)    Balance:  Sitting: Impaired  Sitting - Static: Fair (occasional)  Sitting - Dynamic: Fair (occasional)  Standing: Impaired;Pull to stand  Standing - Static: Poor  Standing - Dynamic : Poor    ADL Intervention:  Grooming  Position Performed: Seated in chair  Washing Face: Minimum assistance (using LUE, min A for thoroughness)  Cues: Physical assistance;Verbal cues provided    Cognitive Retraining  Safety/Judgement: Decreased awareness of environment;Decreased awareness of need for assistance;Decreased awareness of need for safety;Decreased insight into deficits; Fall prevention    Pain:  Reporting overall soreness but did not specify specific painful area     Activity Tolerance:   Good    After treatment patient left in no apparent distress:   Sitting in chair, Call bell within reach, Bed / chair alarm activated, and RN aware    COMMUNICATION/COLLABORATION:   The patients plan of care was discussed with: Physical therapist and Registered nurse.      Nora Leary OT  Time Calculation: 23 mins

## 2022-09-23 NOTE — PROGRESS NOTES
[facility]  Cardiology Care Note                  []Initial visit     [x]Established visit     Patient Name: Ayana Bhandari IVD:[id number]  Primary Cardiologist: Crista Plascencia MD  Consulting Cardiologist: Buddy Garza MD     Assessment/Plan/Discussion:       Patient seen on the day of progress note and examined  and agree with Advance Practice Provider (KAMLA, NP,PA)  assessment and plans as amended. Dipak Hides  71 y.o. no overnight changes in fact seems more alert today. Denies chest pain or shortness of breath remains in atrial fibrillation with controlled heart rate in the  range and stable blood pressure. We will continue with current diltiazem and metoprolol though perhaps a small increase. Is off DOAC and antiplatelet therapy for couple weeks due to large left MCA infarct. We will see back as needed over the weekend and pick back up on Monday if he still here     anticipate that he will be going to rehab and will follow-up as an outpatient with Dr. Jose Peña. Future Appointments   Date Time Provider Doc Hernandez   10/12/2022  9:40 AM MD TOOTIE Lauren BS AMB   10/19/2022  2:45 PM MD SHANE Asif BS AMB   11/30/2022  4:00 PM Luz Mcgrath NP [facility] BS AMB   2/15/2023  4:00 PM MD Mariaa Lauren MD  9/23/2022 5:33 PM                   Reason for initial visit: atrial fibrillation with RVR    HPI:   Patient is a 71year old male with a hx of chronic afib on Xarelto, DM, VALENTE on CPAP, HLD, and gout. 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 He reports that he has been faithfully taking Xarelto, not missing the medication, though this is under investigation. Sister is supposed to bring in Xarelto bottle.  He is currently off APT/OAC d/t hemorrhagic conversion with plans to start in 2 weeks per neurology. He has been rate controlled on Metoprolol until overnight with HR up to 140s on telemetry. He was given IV Labetalol and Metoprolol without much improvement. He was started on a Diltiazem gtt. SUBJECTIVE: Patient denies palpitations, CP, SOB today. Assessment and Plan     Atrial fibrillation with RVR: remains in rate controlled afib overnight. Overall HR 90-100s, BP trending 130s/70s after converting to PO Diltiazem 60mg q6hr and Metoprolol 25mg bid yesterday. Increase Metoprolol to 37.5mg bid for perhaps better control now that taking po. Holding APT/OAC for 2 weeks, switch to Eliquis 5mg bid when able to start medication. Acute CVA: per neurology found to have a large left MCA infarct with hemorrhagic conversion, and occluding thrombus in left M1 with other areas of IC stenoses. Taking Lipitor, see above for APT/OAC management. Echo with inadequate bubble study. HTN: stable see above     HLD: on statin             ____________________________________________________________    Cardiac testing  09/14/22    ECHO ADULT COMPLETE 09/15/2022 9/15/2022    Interpretation Summary  Formatting of this result is different from the original.      Left Ventricle: Normal left ventricular systolic function with a visually estimated EF of 50 - 55%. Left ventricle size is normal. Normal wall thickness. Right Ventricle: Right ventricle is mildly dilated. Normal wall thickness. Mildly reduced systolic function. Left Atrium: Left atrium is mildly dilated. Technical qualifiers: Echo study was technically difficult with poor endocardial visualization and technically difficult due to patient's body habitus. Contrast used: Definity. Bubble study is not adequate to rule out shunt    Signed by: Adalberto Mcmanus MD on 9/15/2022  4:12 PM                Most recent HS troponins:  No results for input(s): TROPHS in the last 72 hours.     No lab exists for component:  CKMB      Review of Systems    [x]All other systems reviewed and all negative except as written in HPI    [] Patient unable to provide secondary to condition         Past Medical History:   Diagnosis Date    Arrhythmia     atrial fib    Depression 4/24/2010    Diabetes ([facility] [address]     diet control for pre-diabetes    Dyslipidemia, goal LDL below 100 6/14/2011    Gout     HTN (hypertension) 4/24/2010    Impotence 4/24/2010    Morbid obesity ([facility] [address] 5/17/2010    VALENTE (obstructive sleep apnea) 4/24/2010    CPAP    Restless legs 4/15/2015     Past Surgical History:   Procedure Laterality Date    COLONOSCOPY N/A 6/27/2017    COLONOSCOPY performed by Sheila Asencio MD at [facility], [facility], DIAGNOSTIC  2007    HX APPENDECTOMY      HX ORTHOPAEDIC  2000    bilat TKR     HX ORTHOPAEDIC  2010    left THR    HX UROLOGICAL  03/2018    urolift    HX UROLOGICAL  03/2019    prosthesis     Social Hx:  reports that he quit smoking about 32 years ago. His smoking use included cigarettes. He has a 2.50 pack-year smoking history. He has never used smokeless tobacco. He reports current alcohol use of about 1.7 standard drinks per week. He reports that he does not use drugs. Family Hx: family history includes Cancer in his father, maternal aunt, maternal uncle, and paternal grandfather; Diabetes in his brother and sister.   No Known Allergies       OBJECTIVE:  Wt Readings from Last 3 Encounters:   09/23/22 124.6 kg (274 lb 11.1 oz)   07/28/22 131.5 kg (290 lb)   06/14/22 134.3 kg (296 lb)       Intake/Output Summary (Last 24 hours) at 9/23/2022 0917  Last data filed at 9/22/2022 2010  Gross per 24 hour   Intake --   Output 2475 ml   Net -2475 ml         Physical Exam    Vitals:   Vitals:    09/23/22 0206 09/23/22 0212 09/23/22 0359 09/23/22 0615   BP: 133/78   138/76   Pulse: 96 88 (!) 105 95   Resp: 16   15   Temp: 97.6 °F (36.4 °C)   97.7 °F (36.5 °C)   SpO2:       Weight: 124.6 kg (274 lb 11.1 oz) Height:             General:    Alert, cooperative, no distress, appears stated age. Neck:   Supple, no carotid bruit and no JVD. Back:     Symmetric    Lungs:     Clear to auscultation bilaterally. Heart[de-identified]    irregular rate and rhythm, S1, S2 normal, no murmur, click, rub or gallop. Abdomen:     Soft, non-tender. Bowel sounds normal.    Extremities:   Extremities normal, atraumatic, no cyanosis, 1+ right leg edema. Vascular:   Pulses - sudheer UE/LE equal   Skin:   Skin color normal. Abrasions right leg   Neurologic:   Moves left extremities. Speech somewhat garbled       Data Review:     Radiology:   XR Results (most recent):  Results from Hospital Encounter encounter on 09/14/22    XR ABD (KUB)    Narrative  INDICATION: Dobbhoff placement. Impression  Supine view of the upper abdomen demonstrates a weighted feeding  tube to overlie the expected location of the GE junction. There is a nonspecific  bowel gas pattern. CT Results (most recent):  Results from Hospital Encounter encounter on 09/14/22    CT HEAD WO CONT    Narrative  EXAM: CT HEAD WO CONT    INDICATION: hemorrhagic conversion? COMPARISON: CT dose prior to this exam.    CONTRAST: None. TECHNIQUE: Unenhanced CT of the head was performed using 5 mm images. Brain and  bone windows were generated. Coronal and sagittal reformats. CT dose reduction  was achieved through use of a standardized protocol tailored for this  examination and automatic exposure control for dose modulation. FINDINGS:  Left anterolateral temporal lobe hypodensity with loss of gray-white  differentiation is redemonstrated with no evident interval hemorrhage or other  significant interval change with localized mass effect and effacement of the  anterior horn of the right lateral ventricle. The ventricles and sulci are  otherwise normal in size, shape and configuration. There is no significant white  matter disease.  There is no new intracranial hemorrhage, extra-axial collection,  or mass effect. The basilar cisterns are open. The bone windows demonstrate no abnormalities. The visualized portions of the  paranasal sinuses and mastoid air cells are clear. There is a soft tissue  collection overlying the right frontoparietal skull, not significant change. Impression  Left MCA territory left temporal lobe infarct is stable appearance  without interval hemorrhagic transformation. Right frontoparietal scalp  collection unchanged. MRI Results (most recent):  Results from [place] encounter on 09/14/22    MRI BRAIN WO CONT    Narrative  INDICATION:   follow up ischemic cva    EXAMINATION:  MRI BRAIN WO CONTRAST    COMPARISON:  CT September 14, 2022    TECHNIQUE:  Multiplanar multisequence acquisition without contrast of the brain. FINDINGS:    There is a moderate to large area of acute infarction in the left middle  cerebral artery territory, involving the left basal ganglia and corona radiata,  left anterior and mid temporal lobe, left insula and left frontal operculum. There is associated susceptibility artifact predominantly within the basal  ganglia and corona radiata portions of the infarction, with slight mass effect  upon the left lateral ventricle similar to recent CT. There is no hydrocephalus  or midline shift. Major intracranial vascular flow-voids are patent. Subcutaneous edema throughout the scalp right greater than left. .    Impression  Moderate to large acute left MCA territory infarction, with associated  susceptibility artifact predominantly in the basal ganglia/corona radiata  consistent with blood product/petechial hemorrhage. Overall appearance unchanged  compared to recent CT, with no hydrocephalus or midline shift. No results for input(s): CPK, TROIQ in the last 72 hours.     No lab exists for component: CKQMB, CPKMB, BMPP    Recent Labs     09/23/22  0236 09/22/22  0444 09/21/22  0516   * 163* 158*   K 3.9 4.1 4.4 * 133* 129*   CO2 26 23 23   BUN 57* 73* 89*   CREA 1.37* 2.07* 3.39*   * 127* 153*   PHOS  --   --  5.1*   CA 8.5 8.6 8.7     Recent Labs     09/22/22  0444 09/21/22  0516   WBC 11.2* 12.3*   HGB 13.8 13.5   HCT 46.0 43.8   * 147*     No results for input(s): PTP, INR, AP, INREXT, INREXT in the last 72 hours. No lab exists for component: PTTP, GPT, SGOT  No results for input(s): CHOL, LDLC in the last 72 hours. No lab exists for component: TGL, HDLC,  HBA1C  No results for input(s): CRP, TSH, TSHEXT, TSHEXT in the last 72 hours.     No lab exists for component: ESR        Current meds:    Current Facility-Administered Medications:     tamsulosin (FLOMAX) capsule 0.4 mg, 0.4 mg, Oral, DAILY, Nicolas Fu NP    dextrose 5% infusion, 150 mL/hr, IntraVENous, CONTINUOUS, Enrique Kaminski MD, Last Rate: 150 mL/hr at 09/23/22 0206, 150 mL/hr at 09/23/22 0206    dilTIAZem IR (CARDIZEM) tablet 60 mg, 60 mg, Oral, Q8H, Kamilah Clutter D, NP, 60 mg at 09/23/22 1887    metoprolol tartrate (LOPRESSOR) tablet 25 mg, 25 mg, Oral, Q12H, Kamilah Clutter D, NP, 25 mg at 09/23/22 4251    cefTRIAXone (ROCEPHIN) 1 g in 0.9% sodium chloride 10 mL IV syringe, 1 g, IntraVENous, Q24H, Otf Tom MD, 1 g at 09/22/22 1104    famotidine (PEPCID) tablet 20 mg, 20 mg, Oral, DAILY, Otf Tom MD, 20 mg at 09/22/22 1104    HYDROmorphone (DILAUDID) injection 0.2 mg, 0.2 mg, IntraVENous, Q3H PRN, Momo Poster, ACNP    labetaloL (NORMODYNE;TRANDATE) injection 5 mg, 5 mg, IntraVENous, Q6H PRN, Momo Conn, JAUN, 5 mg at 09/19/22 1221    balsam peru-castor oiL (VENELEX) ointment, , Topical, BID, Doni Arellano MD, Given at 09/22/22 1109    glucose chewable tablet 16 g, 4 Tablet, Oral, PRN, Doni Arellano MD    glucagon (GLUCAGEN) injection 1 mg, 1 mg, IntraMUSCular, PRN, Doni Arellano MD    dextrose 10 % infusion 0-250 mL, 0-250 mL, IntraVENous, PRN, Doni Arellano MD    insulin lispro (HUMALOG) injection, , SubCUTAneous, Q6H, Isabel Gruber MD, 2 Units at 09/23/22 7070    acetaminophen (TYLENOL) tablet 650 mg, 650 mg, Oral, Q4H PRN, 650 mg at 09/19/22 1343 **OR** acetaminophen (TYLENOL) solution 650 mg, 650 mg, Per NG tube, Q4H PRN **OR** acetaminophen (TYLENOL) suppository 650 mg, 650 mg, Rectal, Q4H PRN, Orvel Fish, ACNP    atorvastatin (LIPITOR) tablet 80 mg, 80 mg, Oral, QHS, Orvel Fish, ACNP, 80 mg at 09/22/22 2241    docusate sodium (COLACE) capsule 100 mg, 100 mg, Oral, BID, Orvel Fish, ACNP, 100 mg at 09/22/22 320 Marty Short NP  [facility] [address] [place] [address] [facility] [address] [address] [place] [address] [facility]  [phone]      Chemo Sevilla MD

## 2022-09-23 NOTE — PROGRESS NOTES
Renal Dosing/Monitoring  Medication: famotidine   Current regimen:  20 every 24 hr  Recent Labs     09/23/22  0236 09/22/22  0444 09/21/22  0516   CREA 1.37* 2.07* 3.39*   BUN 57* 73* 89*     Estimated CrCl:  70 ml/min  Plan: Change to famotidine 20 mg Q24H

## 2022-09-23 NOTE — PROGRESS NOTES
Nephrology Progress Note  Lucy Lopez  Date of Admission : 9/14/2022    CC:  Follow up for DOMINICK       Assessment and Plan     DOMINICK:  - multifactorial: volume depletion from poor po intake + urinary retention  - 9/20 US showing L hydro (9/22) Repeat US L hydro resolved  -  continue IVF D5W  -creatinine improving not at baseline     Probable UTI:  - 9/21 Urine culture pending  - on Rocephin    L hydro:  -9/20 US showing L hydro (9/22) Repeat US L hydro resolved  - start Flomax 0.4 mg daily  - continue with vazquez may be able to switch to external catheter on Monday    Hypernatremia:  - 2/2 lack of po free water intake, leaving improving  - hypotonic fluids as above   - unable to replace Dobbhoff         Acute CVA:  - L MCA territory infarct  - per neurology     Afib w/ RVR:  - switched PO Diltiazem     Mild Rhabdo:  - CPK improving after IVF     DM2  Obesity  VALENTE       Interval History:  Seen and examined. NAD resting in bed. Cr continues to improve,not at baseline. UOP > 2400 cc. Dobbhoff pulled out last night and unable to replace. Unable to obtain reliable ROS. Nods head with yes to all questions. Current Medications: all current  Medications have been eviewed in EPIC  Review of Systems: Pertinent items are noted in HPI. Objective:  Vitals:    Vitals:    09/23/22 0206 09/23/22 0212 09/23/22 0359 09/23/22 0615   BP: 133/78   138/76   Pulse: 96 88 (!) 105 95   Resp: 16   15   Temp: 97.6 °F (36.4 °C)   97.7 °F (36.5 °C)   SpO2:       Weight: 124.6 kg (274 lb 11.1 oz)      Height:         Intake and Output:  No intake/output data recorded.   09/21 1901 - 09/23 0700  In: -   Out: 0167 [Urine:4325]    Physical Examination:    General: NAD,aphasic  Neck:  Supple, no mass  Resp:  Lungs CTA B/L, no wheezing , normal respiratory effort  CV:  RRR,  no murmur or rub, no LE edema  GI:  Soft, NT, + Bowel sounds, no hepatosplenomegaly  Neurologic:  Aphasic  Psych:             unable to assess  Skin:  No Rash, right LE wound with D/I  :  Sexton in place      Lab Data Personally Reviewed: I have reviewed all the pertinent labs, microbiology data and radiology studies during assessment.     Recent Labs     09/23/22  0236 09/22/22  0444 09/21/22  0516   * 163* 158*   K 3.9 4.1 4.4   * 133* 129*   CO2 26 23 23   * 127* 153*   BUN 57* 73* 89*   CREA 1.37* 2.07* 3.39*   CA 8.5 8.6 8.7   MG 2.7* 3.3*  --    PHOS  --   --  5.1*       Recent Labs     09/22/22 0444 09/21/22  0516   WBC 11.2* 12.3*   HGB 13.8 13.5   HCT 46.0 43.8   * 147*       No results found for: SDES  No results found for: CULT  Recent Results (from the past 24 hour(s))   GLUCOSE, POC    Collection Time: 09/22/22 12:07 PM   Result Value Ref Range    Glucose (POC) 116 65 - 117 mg/dL    Performed by Fara Dailey    GLUCOSE, POC    Collection Time: 09/22/22  6:28 PM   Result Value Ref Range    Glucose (POC) 152 (H) 65 - 117 mg/dL    Performed by Maxine Brock    GLUCOSE, POC    Collection Time: 09/23/22 12:46 AM   Result Value Ref Range    Glucose (POC) 133 (H) 65 - 117 mg/dL    Performed by Magnus Habermann    MAGNESIUM    Collection Time: 09/23/22  2:36 AM   Result Value Ref Range    Magnesium 2.7 (H) 1.6 - 2.4 mg/dL   METABOLIC PANEL, BASIC    Collection Time: 09/23/22  2:36 AM   Result Value Ref Range    Sodium 159 (H) 136 - 145 mmol/L    Potassium 3.9 3.5 - 5.1 mmol/L    Chloride 132 (H) 97 - 108 mmol/L    CO2 26 21 - 32 mmol/L    Anion gap 1 (L) 5 - 15 mmol/L    Glucose 148 (H) 65 - 100 mg/dL    BUN 57 (H) 6 - 20 MG/DL    Creatinine 1.37 (H) 0.70 - 1.30 MG/DL    BUN/Creatinine ratio 42 (H) 12 - 20      GFR est AA >60 >60 ml/min/1.73m2    GFR est non-AA 51 (L) >60 ml/min/1.73m2    Calcium 8.5 8.5 - 10.1 MG/DL   GLUCOSE, POC    Collection Time: 09/23/22  5:59 AM   Result Value Ref Range    Glucose (POC) 148 (H) 65 - 117 mg/dL    Performed by Magnus Habermann        The above assessment and plan reviewed with Dr. Solitario Chapa Shelly Espinal, [facility] [address]  Phone - [phone]   Fax - [phone]  [email or web address]

## 2022-09-23 NOTE — PROGRESS NOTES
Problem: Pressure Injury - Risk of  Goal: *Prevention of pressure injury  Description: Document Pepe Scale and appropriate interventions in the flowsheet. 9/23/2022 1938 by Hamida Ryder RN  Outcome: Progressing Towards Goal  Note: Pressure Injury Interventions:  Sensory Interventions: Assess changes in LOC, Discuss PT/OT consult with provider, Float heels, Keep linens dry and wrinkle-free    Moisture Interventions: Absorbent underpads, Internal/External urinary devices, Minimize layers    Activity Interventions: Increase time out of bed, Pressure redistribution bed/mattress(bed type)    Mobility Interventions: Float heels, PT/OT evaluation, Turn and reposition approx. every two hours(pillow and wedges)    Nutrition Interventions: Document food/fluid/supplement intake    Friction and Shear Interventions: Lift sheet, Transferring/repositioning devices, Foam dressings/transparent film/skin sealants             9/23/2022 1937 by Hamida Ryder RN  Outcome: Progressing Towards Goal  Note: Pressure Injury Interventions:  Sensory Interventions: Assess changes in LOC, Discuss PT/OT consult with provider, Float heels, Keep linens dry and wrinkle-free    Moisture Interventions: Absorbent underpads, Internal/External urinary devices, Minimize layers    Activity Interventions: Increase time out of bed, Pressure redistribution bed/mattress(bed type)    Mobility Interventions: Float heels, PT/OT evaluation, Turn and reposition approx.  every two hours(pillow and wedges)    Nutrition Interventions: Document food/fluid/supplement intake    Friction and Shear Interventions: Lift sheet, Transferring/repositioning devices, Foam dressings/transparent film/skin sealants                Problem: Patient Education: Go to Patient Education Activity  Goal: Patient/Family Education  9/23/2022 1938 by Hamida Ryder RN  Outcome: Progressing Towards Goal  9/23/2022 1937 by Hamida Ryder RN  Outcome: Progressing Towards Goal     Problem: Patient Education: Go to Patient Education Activity  Goal: Patient/Family Education  9/23/2022 1938 by Rossana Mancilla RN  Outcome: Progressing Towards Goal  9/23/2022 1937 by Rossana Mancilla RN  Outcome: Progressing Towards Goal     Problem: Patient Education: Go to Patient Education Activity  Goal: Patient/Family Education  9/23/2022 1938 by Rossana Mancilla RN  Outcome: Progressing Towards Goal  9/23/2022 1937 by Rossana Mancilla RN  Outcome: Progressing Towards Goal     Problem: Patient Education: Go to Patient Education Activity  Goal: Patient/Family Education  9/23/2022 1938 by Rossana Mancilla RN  Outcome: Progressing Towards Goal  9/23/2022 1937 by Rossana Mancilla RN  Outcome: Progressing Towards Goal     Problem: Falls - Risk of  Goal: *Absence of Falls  Description: Document Mauro Calloway Fall Risk and appropriate interventions in the flowsheet.   9/23/2022 1938 by Rossana Mancilla RN  Outcome: Progressing Towards Goal  Note: Fall Risk Interventions:  Mobility Interventions: Bed/chair exit alarm, Communicate number of staff needed for ambulation/transfer, OT consult for ADLs, Patient to call before getting OOB, PT Consult for mobility concerns, Utilize gait belt for transfers/ambulation    Mentation Interventions: Adequate sleep, hydration, pain control, Bed/chair exit alarm    Medication Interventions: Evaluate medications/consider consulting pharmacy, Patient to call before getting OOB    Elimination Interventions: Bed/chair exit alarm, Call light in reach, Patient to call for help with toileting needs, Toileting schedule/hourly rounds    History of Falls Interventions: Bed/chair exit alarm, Consult care management for discharge planning, Investigate reason for fall      9/23/2022 1937 by Rossana Mancilla RN  Outcome: Progressing Towards Goal  Note: Fall Risk Interventions:  Mobility Interventions: Bed/chair exit alarm, Communicate number of staff needed for ambulation/transfer, OT consult for ADLs, Patient to call before getting OOB, PT Consult for mobility concerns, Utilize gait belt for transfers/ambulation    Mentation Interventions: Adequate sleep, hydration, pain control, Bed/chair exit alarm    Medication Interventions: Evaluate medications/consider consulting pharmacy, Patient to call before getting OOB    Elimination Interventions: Bed/chair exit alarm, Call light in reach, Patient to call for help with toileting needs, Toileting schedule/hourly rounds    History of Falls Interventions: Bed/chair exit alarm, Consult care management for discharge planning, Investigate reason for fall         Problem: Patient Education: Go to Patient Education Activity  Goal: Patient/Family Education  9/23/2022 1938 by Ashly Gutierrez RN  Outcome: Progressing Towards Goal  9/23/2022 1937 by Ashly Gutierrez RN  Outcome: Progressing Towards Goal     Problem: Patient Education: Go to Patient Education Activity  Goal: Patient/Family Education  9/23/2022 1938 by Ashly Gutierrez RN  Outcome: Progressing Towards Goal  9/23/2022 1937 by Ashly Gutierrez RN  Outcome: Progressing Towards Goal     Problem: TIA/CVA Stroke: Day 2 Until Discharge  Goal: Off Pathway (Use only if patient is Off Pathway)  9/23/2022 1938 by Ashly Gutierrez RN  Outcome: Progressing Towards Goal  9/23/2022 1937 by Ashly Gutierrez RN  Outcome: Progressing Towards Goal  Goal: Activity/Safety  9/23/2022 1938 by Ashly Gutierrez RN  Outcome: Progressing Towards Goal  9/23/2022 1937 by Ashly Gutierrez RN  Outcome: Progressing Towards Goal  Goal: Diagnostic Test/Procedures  9/23/2022 1938 by Ashly Gutierrez RN  Outcome: Progressing Towards Goal  9/23/2022 1937 by Ashly Gutierrez RN  Outcome: Progressing Towards Goal  Goal: Nutrition/Diet  9/23/2022 1938 by Ashly Gutierrez RN  Outcome: Progressing Towards Goal  9/23/2022 1937 by Ashly Gutierrez RN  Outcome: Progressing Towards Goal  Goal: Discharge Planning  9/23/2022 1938 by Ashly Gutierrez RN  Outcome: Progressing Towards Goal  9/23/2022 1937 by Ashly Gutierrez RN  Outcome: Progressing Towards Goal  Goal: Medications  9/23/2022 1938 by Elan Quintero RN  Outcome: Progressing Towards Goal  9/23/2022 1937 by Elan Quintero RN  Outcome: Progressing Towards Goal  Goal: Respiratory  9/23/2022 1938 by Elan Quintero RN  Outcome: Progressing Towards Goal  9/23/2022 1937 by Elan Quintero RN  Outcome: Progressing Towards Goal  Goal: Treatments/Interventions/Procedures  9/23/2022 1938 by Elan Quintero RN  Outcome: Progressing Towards Goal  9/23/2022 1937 by Elan Quintero RN  Outcome: Progressing Towards Goal  Goal: Psychosocial  9/23/2022 1938 by Elan Quintero RN  Outcome: Progressing Towards Goal  9/23/2022 1937 by Elan Quintero RN  Outcome: Progressing Towards Goal  Goal: *Verbalizes anxiety and depression are reduced or absent  9/23/2022 1938 by Elan Quintero RN  Outcome: Progressing Towards Goal  9/23/2022 1937 by Elan Quintero RN  Outcome: Progressing Towards Goal  Goal: *Absence of aspiration  9/23/2022 1938 by Elan Quintero RN  Outcome: Progressing Towards Goal  9/23/2022 1937 by Elan Quintero RN  Outcome: Progressing Towards Goal  Goal: *Absence of deep venous thrombosis signs and symptoms(Stroke Metric)  9/23/2022 1938 by Elan Quintero RN  Outcome: Progressing Towards Goal  9/23/2022 1937 by Elan Quintero RN  Outcome: Progressing Towards Goal  Goal: *Optimal pain control at patient's stated goal  9/23/2022 1938 by Elan Quintero RN  Outcome: Progressing Towards Goal  9/23/2022 1937 by Elan Quintero RN  Outcome: Progressing Towards Goal  Goal: *Tolerating diet  9/23/2022 1938 by Elan Quintero RN  Outcome: Progressing Towards Goal  9/23/2022 1937 by Elan Quintero RN  Outcome: Progressing Towards Goal  Goal: *Ability to perform ADLs and demonstrates progressive mobility and function  9/23/2022 1938 by Elan Quintero RN  Outcome: Progressing Towards Goal  9/23/2022 1937 by Elan Quintero RN  Outcome: Progressing Towards Goal  Goal: *Stroke education continued(Stroke Metric)  9/23/2022 1938 by Elan Quintero RN  Outcome: Progressing Towards Goal  9/23/2022 1937 by Raquel Grover, LASHAWN Ramirez  Outcome: Progressing Towards Goal

## 2022-09-24 LAB
ANION GAP SERPL CALC-SCNC: 4 MMOL/L (ref 5–15)
BACTERIA SPEC CULT: ABNORMAL
BUN SERPL-MCNC: 36 MG/DL (ref 6–20)
BUN/CREAT SERPL: 34 (ref 12–20)
CALCIUM SERPL-MCNC: 8.5 MG/DL (ref 8.5–10.1)
CC UR VC: ABNORMAL
CHLORIDE SERPL-SCNC: 119 MMOL/L (ref 97–108)
CO2 SERPL-SCNC: 27 MMOL/L (ref 21–32)
CREAT SERPL-MCNC: 1.06 MG/DL (ref 0.7–1.3)
GLUCOSE BLD STRIP.AUTO-MCNC: 126 MG/DL (ref 65–117)
GLUCOSE BLD STRIP.AUTO-MCNC: 128 MG/DL (ref 65–117)
GLUCOSE BLD STRIP.AUTO-MCNC: 136 MG/DL (ref 65–117)
GLUCOSE BLD STRIP.AUTO-MCNC: 188 MG/DL (ref 65–117)
GLUCOSE SERPL-MCNC: 134 MG/DL (ref 65–100)
MAGNESIUM SERPL-MCNC: 2.2 MG/DL (ref 1.6–2.4)
POTASSIUM SERPL-SCNC: 3.6 MMOL/L (ref 3.5–5.1)
SERVICE CMNT-IMP: ABNORMAL
SODIUM SERPL-SCNC: 150 MMOL/L (ref 136–145)

## 2022-09-24 PROCEDURE — 36415 COLL VENOUS BLD VENIPUNCTURE: CPT

## 2022-09-24 PROCEDURE — 74011000250 HC RX REV CODE- 250: Performed by: FAMILY MEDICINE

## 2022-09-24 PROCEDURE — 74011250636 HC RX REV CODE- 250/636: Performed by: INTERNAL MEDICINE

## 2022-09-24 PROCEDURE — 74011636637 HC RX REV CODE- 636/637: Performed by: ANESTHESIOLOGY

## 2022-09-24 PROCEDURE — 74011250637 HC RX REV CODE- 250/637: Performed by: NURSE PRACTITIONER

## 2022-09-24 PROCEDURE — 80048 BASIC METABOLIC PNL TOTAL CA: CPT

## 2022-09-24 PROCEDURE — 74011250637 HC RX REV CODE- 250/637: Performed by: INTERNAL MEDICINE

## 2022-09-24 PROCEDURE — 82962 GLUCOSE BLOOD TEST: CPT

## 2022-09-24 PROCEDURE — 94760 N-INVAS EAR/PLS OXIMETRY 1: CPT

## 2022-09-24 PROCEDURE — 65660000001 HC RM ICU INTERMED STEPDOWN

## 2022-09-24 PROCEDURE — 74011250636 HC RX REV CODE- 250/636: Performed by: FAMILY MEDICINE

## 2022-09-24 PROCEDURE — 51798 US URINE CAPACITY MEASURE: CPT

## 2022-09-24 PROCEDURE — 83735 ASSAY OF MAGNESIUM: CPT

## 2022-09-24 RX ADMIN — ATORVASTATIN CALCIUM 80 MG: 40 TABLET, FILM COATED ORAL at 21:05

## 2022-09-24 RX ADMIN — SODIUM CHLORIDE, PRESERVATIVE FREE 1 G: 5 INJECTION INTRAVENOUS at 10:18

## 2022-09-24 RX ADMIN — FAMOTIDINE 20 MG: 20 TABLET, FILM COATED ORAL at 21:06

## 2022-09-24 RX ADMIN — DOCUSATE SODIUM 100 MG: 100 CAPSULE, LIQUID FILLED ORAL at 08:41

## 2022-09-24 RX ADMIN — DEXTROSE MONOHYDRATE 150 ML/HR: 50 INJECTION, SOLUTION INTRAVENOUS at 21:03

## 2022-09-24 RX ADMIN — FAMOTIDINE 20 MG: 20 TABLET, FILM COATED ORAL at 08:41

## 2022-09-24 RX ADMIN — DILTIAZEM HYDROCHLORIDE 60 MG: 60 TABLET, FILM COATED ORAL at 06:42

## 2022-09-24 RX ADMIN — TAMSULOSIN HYDROCHLORIDE 0.4 MG: 0.4 CAPSULE ORAL at 08:41

## 2022-09-24 RX ADMIN — DILTIAZEM HYDROCHLORIDE 60 MG: 60 TABLET, FILM COATED ORAL at 21:06

## 2022-09-24 RX ADMIN — METOPROLOL TARTRATE 37.5 MG: 25 TABLET, FILM COATED ORAL at 17:54

## 2022-09-24 RX ADMIN — Medication: at 17:57

## 2022-09-24 RX ADMIN — DEXTROSE MONOHYDRATE 150 ML/HR: 50 INJECTION, SOLUTION INTRAVENOUS at 12:06

## 2022-09-24 RX ADMIN — DILTIAZEM HYDROCHLORIDE 60 MG: 60 TABLET, FILM COATED ORAL at 14:07

## 2022-09-24 RX ADMIN — Medication: at 08:42

## 2022-09-24 RX ADMIN — METOPROLOL TARTRATE 37.5 MG: 25 TABLET, FILM COATED ORAL at 06:42

## 2022-09-24 RX ADMIN — Medication 2 UNITS: at 12:40

## 2022-09-24 RX ADMIN — DEXTROSE MONOHYDRATE 150 ML/HR: 50 INJECTION, SOLUTION INTRAVENOUS at 05:32

## 2022-09-24 NOTE — PROGRESS NOTES
[address] [facility]  Hospitalist Group                                                                                          Hospitalist Progress Note  Darnell Marie MD  Answering service: [phone] OR 36 from in house phone        Date of Service:  2022  NAME:  Vanessa Yoo  :  1950  MRN:  [id number]      Admission Summary:     Patient presents with acute CVA, CT head and follow-up MRI shows large acute left MCA territory infarct. Patient with some expressive aphasia and right-sided weakness. Neuro following. Initially was admitted to ICU and transferred out of ICU 9/15/2022. Interval history / Subjective:     Patient is seen and examined at bedside this AM. He is alert, following commands.  No overnight events or new complaints   Discussed with nursing      Assessment & Plan:     Acute CVA  -CT head shows large acute left MCA territory infarct, CTA of the head and neck shows severe stenosis, near occlusive thrombus of the distal left MCA M1  -MRI of the brain which shows moderate to large acute left MCA territory infarct with findings associated with petechial hemorrhage  -Echo with bubble study shows EF of 50 to 55%, bubble study is not adequate to rule out shunt  -Neurology following, recommending holding antiplatelet and anticoagulation for 2 weeks due to large CVA with hemorrhagic conversion  -c/w  statin  -Speech therapy following for aphasia and dysphagia    Hypernatremia - improving    -  appreciate nephrology input  - c/w  D5 IVF   -Monitor sodium level     Paroxysmal atrial fibrillation with RVR  - appreciate cardiology input   -Currently holding anticoagulation due to large CVA with hemorrhagic conversion  -Patient was previously taking Xarelto compliantly and therefore may indicate failure of Xarelto, plan to change to Eliquis when patient is cleared to take oral anticoagulation  - off cardizem gtt on   - started on PO Diltiazem 60mg q8hr and Metoprolol 37.5mg bid     DOMINICK - cr improving   -Multiple etiologies including volume depletion, ATN from contrast  -Renal ultrasound shows mild left hydronephrosis - resolved on rpt US   -S/p Vazquez catheter placement  - appreciate nephro input  - monitor renal function      Rhabdomyolysis  -This is likely due to prolonged immobilization after his CVA  -CK trending down     Elevated troponin  -Likely demand ischemia     Dyslipidemia  -Continue statin, LDL at goal     ? UTI on 9/21  - c/w IV ceftriaxone   - urine cx - Staph species, coagulase neg   - discontinue vazquez. Voiding trail today      Code status: Full  Prophylaxis: SCDs  Care Plan discussed with: Patient  Anticipated Disposition: [facility] - [facility] - possible DC on Monday        Hospital Problems  Date Reviewed: 7/28/2022            Codes Class Noted POA    Ischemic cerebrovascular accident (CVA) [facility]) ICD-10-CM: I63.9  ICD-9-CM: 434.91  9/14/2022 Unknown         Review of Systems:   A comprehensive review of systems was negative except for that written in the HPI. Vital Signs:    Last 24hrs VS reviewed since prior progress note. Most recent are:  Visit Vitals  /79   Pulse 98   Temp 98.1 °F (36.7 °C)   Resp 17   Ht 6' 3\" (1.905 m)   Wt 132.2 kg (291 lb 7.2 oz)   SpO2 100%   BMI 36.43 kg/m²         Intake/Output Summary (Last 24 hours) at 9/24/2022 1504  Last data filed at 9/24/2022 0700  Gross per 24 hour   Intake --   Output 2550 ml   Net -2550 ml            Physical Examination:     I had a face to face encounter with this patient and independently examined them on 9/24/2022 as outlined below:          Constitutional:  No acute distress, cooperative, pleasant    ENT:  Oral mucosa moist, oropharynx benign. Resp:  CTA bilaterally. No wheezing/rhonchi/rales. No accessory muscle use. CV:  Regular rhythm, normal rate, no murmurs, gallops, rubs    GI:  Soft, non distended, non tender.  normoactive bowel sounds, no hepatosplenomegaly     Musculoskeletal:  No edema, warm, 2+ pulses throughout    Neurologic:  Right side weakness. Awake and alert, oriented x 3            Data Review:    Review and/or order of clinical lab test      Labs:     Recent Labs     09/22/22  0444   WBC 11.2*   HGB 13.8   HCT 46.0   *       Recent Labs     09/24/22  0430 09/23/22  0236 09/22/22  0444   * 159* 163*   K 3.6 3.9 4.1   * 132* 133*   CO2 27 26 23   BUN 36* 57* 73*   CREA 1.06 1.37* 2.07*   * 148* 127*   CA 8.5 8.5 8.6   MG 2.2 2.7* 3.3*       No results for input(s): ALT, AP, TBIL, TBILI, TP, ALB, GLOB, GGT, AML, LPSE in the last 72 hours. No lab exists for component: SGOT, GPT, AMYP, HLPSE    No results for input(s): INR, PTP, APTT, INREXT, INREXT in the last 72 hours. No results for input(s): FE, TIBC, PSAT, FERR in the last 72 hours. No results found for: FOL, RBCF   No results for input(s): PH, PCO2, PO2 in the last 72 hours. No results for input(s): CPK, CKNDX, TROIQ in the last 72 hours.     No lab exists for component: CPKMB    Lab Results   Component Value Date/Time    Cholesterol, total 146 09/15/2022 02:40 AM    HDL Cholesterol 53 09/15/2022 02:40 AM    LDL, calculated 65 09/15/2022 02:40 AM    Triglyceride 140 09/15/2022 02:40 AM    CHOL/HDL Ratio 2.8 09/15/2022 02:40 AM     Lab Results   Component Value Date/Time    Glucose (POC) 188 (H) 09/24/2022 12:05 PM    Glucose (POC) 126 (H) 09/24/2022 06:34 AM    Glucose (POC) 136 (H) 09/24/2022 12:44 AM    Glucose (POC) 115 09/23/2022 05:34 PM    Glucose (POC) 148 (H) 09/23/2022 12:29 PM     Lab Results   Component Value Date/Time    Color YELLOW/STRAW 09/21/2022 10:33 AM    Appearance CLOUDY (A) 09/21/2022 10:33 AM    Specific gravity 1.014 09/21/2022 10:33 AM    pH (UA) 5.0 09/21/2022 10:33 AM    Protein TRACE (A) 09/21/2022 10:33 AM    Glucose Negative 09/21/2022 10:33 AM    Ketone Negative 09/21/2022 10:33 AM    Bilirubin Negative 09/21/2022 10:33 AM    Urobilinogen 0.2 09/21/2022 10:33 AM Nitrites Negative 09/21/2022 10:33 AM    Leukocyte Esterase MODERATE (A) 09/21/2022 10:33 AM    Epithelial cells FEW 09/21/2022 10:33 AM    Bacteria 2+ (A) 09/21/2022 10:33 AM    WBC 10-20 09/21/2022 10:33 AM    RBC  09/21/2022 10:33 AM         Medications Reviewed:     Current Facility-Administered Medications   Medication Dose Route Frequency    tamsulosin (FLOMAX) capsule 0.4 mg  0.4 mg Oral DAILY    famotidine (PEPCID) tablet 20 mg  20 mg Oral Q12H    metoprolol tartrate (LOPRESSOR) tablet 37.5 mg  37.5 mg Oral Q12H    dextrose 5% infusion  150 mL/hr IntraVENous CONTINUOUS    dilTIAZem IR (CARDIZEM) tablet 60 mg  60 mg Oral Q8H    cefTRIAXone (ROCEPHIN) 1 g in 0.9% sodium chloride 10 mL IV syringe  1 g IntraVENous Q24H    HYDROmorphone (DILAUDID) injection 0.2 mg  0.2 mg IntraVENous Q3H PRN    labetaloL (NORMODYNE;TRANDATE) injection 5 mg  5 mg IntraVENous Q6H PRN    balsam peru-castor oiL (VENELEX) ointment   Topical BID    glucose chewable tablet 16 g  4 Tablet Oral PRN    glucagon (GLUCAGEN) injection 1 mg  1 mg IntraMUSCular PRN    dextrose 10 % infusion 0-250 mL  0-250 mL IntraVENous PRN    insulin lispro (HUMALOG) injection   SubCUTAneous Q6H    acetaminophen (TYLENOL) tablet 650 mg  650 mg Oral Q4H PRN    Or    acetaminophen (TYLENOL) solution 650 mg  650 mg Per NG tube Q4H PRN    Or    acetaminophen (TYLENOL) suppository 650 mg  650 mg Rectal Q4H PRN    atorvastatin (LIPITOR) tablet 80 mg  80 mg Oral QHS    docusate sodium (COLACE) capsule 100 mg  100 mg Oral BID     ______________________________________________________________________  EXPECTED LENGTH OF STAY: 4d 9h  ACTUAL LENGTH OF STAY:          10                 Jared Wall MD

## 2022-09-24 NOTE — ROUTINE PROCESS
Bedside and Verbal shift change report given to Sarah Paz RN (oncoming nurse) by Gayla Hatfield (offgoing nurse). Report included the following information SBAR, Kardex, MAR, Cardiac Rhythm A FIB, and Dual Neuro Assessment.

## 2022-09-24 NOTE — PROGRESS NOTES
Renal function improving  Cont IV D5W  Daily labs  Call with any issues over the weekend        Nalini Mcmanus MD  [facility]   [address] [facility] [address]  Phone - [phone]   Fax - [phone]  [email or web address]

## 2022-09-24 NOTE — PROGRESS NOTES
Problem: Pressure Injury - Risk of  Goal: *Prevention of pressure injury  Description: Document Pepe Scale and appropriate interventions in the flowsheet. Outcome: Progressing Towards Goal  Note: Pressure Injury Interventions:  Sensory Interventions: Float heels, Minimize linen layers, Turn and reposition approx. every two hours (pillows and wedges if needed)    Moisture Interventions: Apply protective barrier, creams and emollients, Minimize layers    Activity Interventions: PT/OT evaluation, Increase time out of bed    Mobility Interventions: PT/OT evaluation, Turn and reposition approx. every two hours(pillow and wedges)    Nutrition Interventions: Offer support with meals,snacks and hydration, Document food/fluid/supplement intake    Friction and Shear Interventions: Lift sheet, Apply protective barrier, creams and emollients, Minimize layers                Problem: Patient Education: Go to Patient Education Activity  Goal: Patient/Family Education  Outcome: Progressing Towards Goal     Problem: Patient Education: Go to Patient Education Activity  Goal: Patient/Family Education  Outcome: Progressing Towards Goal     Problem: Patient Education: Go to Patient Education Activity  Goal: Patient/Family Education  Outcome: Progressing Towards Goal     Problem: Patient Education: Go to Patient Education Activity  Goal: Patient/Family Education  Outcome: Progressing Towards Goal     Problem: Falls - Risk of  Goal: *Absence of Falls  Description: Document Kasi Fall Risk and appropriate interventions in the flowsheet.   Outcome: Progressing Towards Goal  Note: Fall Risk Interventions:  Mobility Interventions: Bed/chair exit alarm, Patient to call before getting OOB, Strengthening exercises (ROM-active/passive)    Mentation Interventions: Bed/chair exit alarm, Adequate sleep, hydration, pain control, Increase mobility, Reorient patient    Medication Interventions: Patient to call before getting OOB, Teach patient to arise slowly, Bed/chair exit alarm    Elimination Interventions: Call light in reach, Bed/chair exit alarm    History of Falls Interventions: Bed/chair exit alarm, Investigate reason for fall         Problem: Patient Education: Go to Patient Education Activity  Goal: Patient/Family Education  Outcome: Progressing Towards Goal     Problem: Patient Education: Go to Patient Education Activity  Goal: Patient/Family Education  Outcome: Progressing Towards Goal     Problem: TIA/CVA Stroke: Day 2 Until Discharge  Goal: Off Pathway (Use only if patient is Off Pathway)  Outcome: Progressing Towards Goal  Goal: Activity/Safety  Outcome: Progressing Towards Goal  Goal: Diagnostic Test/Procedures  Outcome: Progressing Towards Goal  Goal: Nutrition/Diet  Outcome: Progressing Towards Goal  Goal: Discharge Planning  Outcome: Progressing Towards Goal  Goal: Medications  Outcome: Progressing Towards Goal  Goal: Respiratory  Outcome: Progressing Towards Goal  Goal: Treatments/Interventions/Procedures  Outcome: Progressing Towards Goal  Goal: Psychosocial  Outcome: Progressing Towards Goal  Goal: *Verbalizes anxiety and depression are reduced or absent  Outcome: Progressing Towards Goal  Goal: *Absence of aspiration  Outcome: Progressing Towards Goal  Goal: *Absence of deep venous thrombosis signs and symptoms(Stroke Metric)  Outcome: Progressing Towards Goal  Goal: *Optimal pain control at patient's stated goal  Outcome: Progressing Towards Goal  Goal: *Tolerating diet  Outcome: Progressing Towards Goal  Goal: *Ability to perform ADLs and demonstrates progressive mobility and function  Outcome: Progressing Towards Goal  Goal: *Stroke education continued(Stroke Metric)  Outcome: Progressing Towards Goal

## 2022-09-25 LAB
ANION GAP SERPL CALC-SCNC: 5 MMOL/L (ref 5–15)
BUN SERPL-MCNC: 25 MG/DL (ref 6–20)
BUN/CREAT SERPL: 28 (ref 12–20)
CALCIUM SERPL-MCNC: 8.1 MG/DL (ref 8.5–10.1)
CHLORIDE SERPL-SCNC: 112 MMOL/L (ref 97–108)
CO2 SERPL-SCNC: 24 MMOL/L (ref 21–32)
CREAT SERPL-MCNC: 0.9 MG/DL (ref 0.7–1.3)
GLUCOSE BLD STRIP.AUTO-MCNC: 123 MG/DL (ref 65–117)
GLUCOSE BLD STRIP.AUTO-MCNC: 141 MG/DL (ref 65–117)
GLUCOSE BLD STRIP.AUTO-MCNC: 161 MG/DL (ref 65–117)
GLUCOSE BLD STRIP.AUTO-MCNC: 187 MG/DL (ref 65–117)
GLUCOSE SERPL-MCNC: 153 MG/DL (ref 65–100)
MAGNESIUM SERPL-MCNC: 2 MG/DL (ref 1.6–2.4)
POTASSIUM SERPL-SCNC: 3.3 MMOL/L (ref 3.5–5.1)
SERVICE CMNT-IMP: ABNORMAL
SODIUM SERPL-SCNC: 141 MMOL/L (ref 136–145)

## 2022-09-25 PROCEDURE — 74011250637 HC RX REV CODE- 250/637: Performed by: INTERNAL MEDICINE

## 2022-09-25 PROCEDURE — 74011250636 HC RX REV CODE- 250/636: Performed by: INTERNAL MEDICINE

## 2022-09-25 PROCEDURE — 74011000250 HC RX REV CODE- 250: Performed by: FAMILY MEDICINE

## 2022-09-25 PROCEDURE — 80048 BASIC METABOLIC PNL TOTAL CA: CPT

## 2022-09-25 PROCEDURE — 36415 COLL VENOUS BLD VENIPUNCTURE: CPT

## 2022-09-25 PROCEDURE — 74011636637 HC RX REV CODE- 636/637: Performed by: ANESTHESIOLOGY

## 2022-09-25 PROCEDURE — 74011250637 HC RX REV CODE- 250/637: Performed by: NURSE PRACTITIONER

## 2022-09-25 PROCEDURE — 74011250636 HC RX REV CODE- 250/636: Performed by: FAMILY MEDICINE

## 2022-09-25 PROCEDURE — 83735 ASSAY OF MAGNESIUM: CPT

## 2022-09-25 PROCEDURE — 65660000001 HC RM ICU INTERMED STEPDOWN

## 2022-09-25 PROCEDURE — 51798 US URINE CAPACITY MEASURE: CPT

## 2022-09-25 PROCEDURE — 94760 N-INVAS EAR/PLS OXIMETRY 1: CPT

## 2022-09-25 PROCEDURE — 82962 GLUCOSE BLOOD TEST: CPT

## 2022-09-25 RX ADMIN — FAMOTIDINE 20 MG: 20 TABLET, FILM COATED ORAL at 09:08

## 2022-09-25 RX ADMIN — TAMSULOSIN HYDROCHLORIDE 0.4 MG: 0.4 CAPSULE ORAL at 09:08

## 2022-09-25 RX ADMIN — DEXTROSE MONOHYDRATE 150 ML/HR: 50 INJECTION, SOLUTION INTRAVENOUS at 03:50

## 2022-09-25 RX ADMIN — Medication 2 UNITS: at 12:57

## 2022-09-25 RX ADMIN — DILTIAZEM HYDROCHLORIDE 60 MG: 60 TABLET, FILM COATED ORAL at 06:28

## 2022-09-25 RX ADMIN — ATORVASTATIN CALCIUM 80 MG: 40 TABLET, FILM COATED ORAL at 21:32

## 2022-09-25 RX ADMIN — DILTIAZEM HYDROCHLORIDE 60 MG: 60 TABLET, FILM COATED ORAL at 21:33

## 2022-09-25 RX ADMIN — POTASSIUM BICARBONATE 40 MEQ: 782 TABLET, EFFERVESCENT ORAL at 09:06

## 2022-09-25 RX ADMIN — Medication 2 UNITS: at 00:33

## 2022-09-25 RX ADMIN — Medication: at 18:35

## 2022-09-25 RX ADMIN — FAMOTIDINE 20 MG: 20 TABLET, FILM COATED ORAL at 21:32

## 2022-09-25 RX ADMIN — Medication 2 UNITS: at 06:28

## 2022-09-25 RX ADMIN — DILTIAZEM HYDROCHLORIDE 60 MG: 60 TABLET, FILM COATED ORAL at 14:44

## 2022-09-25 RX ADMIN — Medication: at 09:07

## 2022-09-25 RX ADMIN — METOPROLOL TARTRATE 37.5 MG: 25 TABLET, FILM COATED ORAL at 06:28

## 2022-09-25 RX ADMIN — SODIUM CHLORIDE, PRESERVATIVE FREE 1 G: 5 INJECTION INTRAVENOUS at 09:12

## 2022-09-25 RX ADMIN — METOPROLOL TARTRATE 37.5 MG: 25 TABLET, FILM COATED ORAL at 18:36

## 2022-09-25 NOTE — PROGRESS NOTES
Problem: Pressure Injury - Risk of  Goal: *Prevention of pressure injury  Description: Document Pepe Scale and appropriate interventions in the flowsheet. Outcome: Progressing Towards Goal  Note: Pressure Injury Interventions:  Sensory Interventions: Minimize linen layers, Pad between skin to skin, Turn and reposition approx. every two hours (pillows and wedges if needed)    Moisture Interventions: Apply protective barrier, creams and emollients, Limit adult briefs, Minimize layers    Activity Interventions: PT/OT evaluation, Increase time out of bed    Mobility Interventions: Turn and reposition approx. every two hours(pillow and wedges), PT/OT evaluation    Nutrition Interventions: Offer support with meals,snacks and hydration    Friction and Shear Interventions: Lift sheet, Minimize layers, Apply protective barrier, creams and emollients                Problem: Patient Education: Go to Patient Education Activity  Goal: Patient/Family Education  Outcome: Progressing Towards Goal     Problem: Patient Education: Go to Patient Education Activity  Goal: Patient/Family Education  Outcome: Progressing Towards Goal     Problem: Patient Education: Go to Patient Education Activity  Goal: Patient/Family Education  Outcome: Progressing Towards Goal     Problem: Patient Education: Go to Patient Education Activity  Goal: Patient/Family Education  Outcome: Progressing Towards Goal     Problem: Falls - Risk of  Goal: *Absence of Falls  Description: Document Lorie Led Fall Risk and appropriate interventions in the flowsheet.   Outcome: Progressing Towards Goal  Note: Fall Risk Interventions:  Mobility Interventions: Bed/chair exit alarm, Patient to call before getting OOB, Strengthening exercises (ROM-active/passive), Utilize walker, cane, or other assistive device    Mentation Interventions: Bed/chair exit alarm, Adequate sleep, hydration, pain control, Increase mobility    Medication Interventions: Bed/chair exit alarm, Patient to call before getting OOB, Teach patient to arise slowly    Elimination Interventions: Call light in reach, Bed/chair exit alarm, Toileting schedule/hourly rounds    History of Falls Interventions: Bed/chair exit alarm, Investigate reason for fall, Room close to nurse's station         Problem: Patient Education: Go to Patient Education Activity  Goal: Patient/Family Education  Outcome: Progressing Towards Goal     Problem: Patient Education: Go to Patient Education Activity  Goal: Patient/Family Education  Outcome: Progressing Towards Goal     Problem: TIA/CVA Stroke: Day 2 Until Discharge  Goal: Off Pathway (Use only if patient is Off Pathway)  Outcome: Progressing Towards Goal  Goal: Activity/Safety  Outcome: Progressing Towards Goal  Goal: Diagnostic Test/Procedures  Outcome: Progressing Towards Goal  Goal: Nutrition/Diet  Outcome: Progressing Towards Goal  Goal: Discharge Planning  Outcome: Progressing Towards Goal  Goal: Medications  Outcome: Progressing Towards Goal  Goal: Respiratory  Outcome: Progressing Towards Goal  Goal: Treatments/Interventions/Procedures  Outcome: Progressing Towards Goal  Goal: Psychosocial  Outcome: Progressing Towards Goal  Goal: *Verbalizes anxiety and depression are reduced or absent  Outcome: Progressing Towards Goal  Goal: *Absence of aspiration  Outcome: Progressing Towards Goal  Goal: *Absence of deep venous thrombosis signs and symptoms(Stroke Metric)  Outcome: Progressing Towards Goal  Goal: *Optimal pain control at patient's stated goal  Outcome: Progressing Towards Goal  Goal: *Tolerating diet  Outcome: Progressing Towards Goal  Goal: *Ability to perform ADLs and demonstrates progressive mobility and function  Outcome: Progressing Towards Goal  Goal: *Stroke education continued(Stroke Metric)  Outcome: Progressing Towards Goal

## 2022-09-25 NOTE — PROGRESS NOTES
Problem: Pressure Injury - Risk of  Goal: *Prevention of pressure injury  Description: Document Pepe Scale and appropriate interventions in the flowsheet. Outcome: Progressing Towards Goal  Note: Pressure Injury Interventions:  Sensory Interventions: Float heels, Check visual cues for pain, Keep linens dry and wrinkle-free    Moisture Interventions: Apply protective barrier, creams and emollients    Activity Interventions: PT/OT evaluation    Mobility Interventions: PT/OT evaluation    Nutrition Interventions: Offer support with meals,snacks and hydration    Friction and Shear Interventions: Apply protective barrier, creams and emollients                Problem: Patient Education: Go to Patient Education Activity  Goal: Patient/Family Education  Outcome: Progressing Towards Goal     Problem: Patient Education: Go to Patient Education Activity  Goal: Patient/Family Education  Outcome: Progressing Towards Goal     Problem: Patient Education: Go to Patient Education Activity  Goal: Patient/Family Education  Outcome: Progressing Towards Goal     Problem: Patient Education: Go to Patient Education Activity  Goal: Patient/Family Education  Outcome: Progressing Towards Goal     Problem: Falls - Risk of  Goal: *Absence of Falls  Description: Document Kasi Fall Risk and appropriate interventions in the flowsheet.   Outcome: Progressing Towards Goal  Note: Fall Risk Interventions:  Mobility Interventions: Bed/chair exit alarm    Mentation Interventions: Bed/chair exit alarm    Medication Interventions: Bed/chair exit alarm, Teach patient to arise slowly    Elimination Interventions: Call light in reach    History of Falls Interventions: Bed/chair exit alarm         Problem: Patient Education: Go to Patient Education Activity  Goal: Patient/Family Education  Outcome: Progressing Towards Goal     Problem: Patient Education: Go to Patient Education Activity  Goal: Patient/Family Education  Outcome: Progressing Towards Goal     Problem: TIA/CVA Stroke: Day 2 Until Discharge  Goal: Off Pathway (Use only if patient is Off Pathway)  Outcome: Progressing Towards Goal  Goal: Activity/Safety  Outcome: Progressing Towards Goal  Goal: Diagnostic Test/Procedures  Outcome: Progressing Towards Goal  Goal: Nutrition/Diet  Outcome: Progressing Towards Goal  Goal: Discharge Planning  Outcome: Progressing Towards Goal  Goal: Medications  Outcome: Progressing Towards Goal  Goal: Respiratory  Outcome: Progressing Towards Goal  Goal: Treatments/Interventions/Procedures  Outcome: Progressing Towards Goal  Goal: Psychosocial  Outcome: Progressing Towards Goal  Goal: *Verbalizes anxiety and depression are reduced or absent  Outcome: Progressing Towards Goal  Goal: *Absence of aspiration  Outcome: Progressing Towards Goal  Goal: *Absence of deep venous thrombosis signs and symptoms(Stroke Metric)  Outcome: Progressing Towards Goal  Goal: *Optimal pain control at patient's stated goal  Outcome: Progressing Towards Goal  Goal: *Tolerating diet  Outcome: Progressing Towards Goal  Goal: *Ability to perform ADLs and demonstrates progressive mobility and function  Outcome: Progressing Towards Goal  Goal: *Stroke education continued(Stroke Metric)  Outcome: Progressing Towards Goal

## 2022-09-25 NOTE — PROGRESS NOTES
[facility] [facility] Adult  Hospitalist Group                                                                                          Hospitalist Progress Note  Dixie Clarke MD  Answering service: [phone] from in house phone        Date of Service:  2022  NAME:  Felipe Saldaña  :  1950  MRN:  [id number]      Admission Summary:     Patient presents with acute CVA, CT head and follow-up MRI shows large acute left MCA territory infarct. Patient with some expressive aphasia and right-sided weakness. Neuro following. Initially was admitted to ICU and transferred out of ICU 9/15/2022. Interval history / Subjective:     Patient is seen and examined at bedside this AM. He is sleepy today. Discussed with nursing - failed voiding trail.  Sexton reinsert      Assessment & Plan:     Acute CVA  -CT head shows large acute left MCA territory infarct, CTA of the head and neck shows severe stenosis, near occlusive thrombus of the distal left MCA M1  -MRI of the brain which shows moderate to large acute left MCA territory infarct with findings associated with petechial hemorrhage  -Echo with bubble study shows EF of 50 to 55%, bubble study is not adequate to rule out shunt  -Neurology following, recommending holding antiplatelet and anticoagulation for 2 weeks due to large CVA with hemorrhagic conversion  -c/w  statin  -Speech therapy following for aphasia and dysphagia    Hypernatremia - resolved     -  appreciate nephrology input  - s/p D5 IVF   -Monitor sodium level     Paroxysmal atrial fibrillation with RVR  - appreciate cardiology input   -Currently holding anticoagulation due to large CVA with hemorrhagic conversion  -Patient was previously taking Xarelto compliantly and therefore may indicate failure of Xarelto, plan to change to Eliquis when patient is cleared to take oral anticoagulation  - off cardizem gtt on   - c/w Diltiazem 60mg q8hr and Metoprolol 37.5mg bid     DOMINICK - resolved   -Multiple etiologies including volume depletion, ATN from contrast  -Renal ultrasound shows mild left hydronephrosis - resolved on rpt US   -S/p Vazquez catheter placement  - appreciate nephro input  - monitor renal function      Rhabdomyolysis  -This is likely due to prolonged immobilization after his CVA  -CK trending down     Elevated troponin  -Likely demand ischemia     Dyslipidemia  -Continue statin, LDL at goal     ? UTI on 9/21  - s/p IV ceftriaxone   - urine cx - Staph species, coagulase neg   - urinary rentention on vazquez. C/w flomax      Code status: Full  Prophylaxis: SCDs  Care Plan discussed with: Patient  Anticipated Disposition: IPR - [facility] - possible DC on Monday        Hospital Problems  Date Reviewed: 7/28/2022            Codes Class Noted POA    Ischemic cerebrovascular accident (CVA) [facility]) ICD-10-CM: I63.9  ICD-9-CM: 434.91  9/14/2022 Unknown       Review of Systems:   A comprehensive review of systems was negative except for that written in the HPI. Vital Signs:    Last 24hrs VS reviewed since prior progress note. Most recent are:  Visit Vitals  /64   Pulse 99   Temp 97.8 °F (36.6 °C)   Resp 19   Ht 6' 3\" (1.905 m)   Wt 129.5 kg (285 lb 7.9 oz)   SpO2 100%   BMI 35.68 kg/m²         Intake/Output Summary (Last 24 hours) at 9/25/2022 1126  Last data filed at 9/25/2022 2189  Gross per 24 hour   Intake --   Output 2535 ml   Net -2535 ml            Physical Examination:     I had a face to face encounter with this patient and independently examined them on 9/25/2022 as outlined below:          Constitutional:  No acute distress, cooperative, pleasant    ENT:  Oral mucosa moist, oropharynx benign. Resp:  CTA bilaterally. No wheezing/rhonchi/rales. No accessory muscle use. CV:  Regular rhythm, normal rate, no murmurs, gallops, rubs    GI:  Soft, non distended, non tender.  normoactive bowel sounds, no hepatosplenomegaly     Musculoskeletal:  No edema, warm, 2+ pulses throughout    Neurologic:  Right side weakness. Awake and alert, oriented x 3            Data Review:    Review and/or order of clinical lab test      Labs:     No results for input(s): WBC, HGB, HCT, PLT, HGBEXT, HCTEXT, PLTEXT, HGBEXT, HCTEXT, PLTEXT in the last 72 hours. Recent Labs     09/25/22  0517 09/24/22  0430 09/23/22  0236    150* 159*   K 3.3* 3.6 3.9   * 119* 132*   CO2 24 27 26   BUN 25* 36* 57*   CREA 0.90 1.06 1.37*   * 134* 148*   CA 8.1* 8.5 8.5   MG 2.0 2.2 2.7*       No results for input(s): ALT, AP, TBIL, TBILI, TP, ALB, GLOB, GGT, AML, LPSE in the last 72 hours. No lab exists for component: SGOT, GPT, AMYP, HLPSE    No results for input(s): INR, PTP, APTT, INREXT, INREXT in the last 72 hours. No results for input(s): FE, TIBC, PSAT, FERR in the last 72 hours. No results found for: FOL, RBCF   No results for input(s): PH, PCO2, PO2 in the last 72 hours. No results for input(s): CPK, CKNDX, TROIQ in the last 72 hours.     No lab exists for component: CPKMB    Lab Results   Component Value Date/Time    Cholesterol, total 146 09/15/2022 02:40 AM    HDL Cholesterol 53 09/15/2022 02:40 AM    LDL, calculated 65 09/15/2022 02:40 AM    Triglyceride 140 09/15/2022 02:40 AM    CHOL/HDL Ratio 2.8 09/15/2022 02:40 AM     Lab Results   Component Value Date/Time    Glucose (POC) 141 (H) 09/25/2022 05:32 AM    Glucose (POC) 187 (H) 09/25/2022 12:28 AM    Glucose (POC) 128 (H) 09/24/2022 05:06 PM    Glucose (POC) 188 (H) 09/24/2022 12:05 PM    Glucose (POC) 126 (H) 09/24/2022 06:34 AM     Lab Results   Component Value Date/Time    Color YELLOW/STRAW 09/21/2022 10:33 AM    Appearance CLOUDY (A) 09/21/2022 10:33 AM    Specific gravity 1.014 09/21/2022 10:33 AM    pH (UA) 5.0 09/21/2022 10:33 AM    Protein TRACE (A) 09/21/2022 10:33 AM    Glucose Negative 09/21/2022 10:33 AM    Ketone Negative 09/21/2022 10:33 AM    Bilirubin Negative 09/21/2022 10:33 AM    Urobilinogen 0.2 09/21/2022 10:33 AM    Nitrites Negative 09/21/2022 10:33 AM    Leukocyte Esterase MODERATE (A) 09/21/2022 10:33 AM    Epithelial cells FEW 09/21/2022 10:33 AM    Bacteria 2+ (A) 09/21/2022 10:33 AM    WBC 10-20 09/21/2022 10:33 AM    RBC  09/21/2022 10:33 AM         Medications Reviewed:     Current Facility-Administered Medications   Medication Dose Route Frequency    tamsulosin (FLOMAX) capsule 0.4 mg  0.4 mg Oral DAILY    famotidine (PEPCID) tablet 20 mg  20 mg Oral Q12H    metoprolol tartrate (LOPRESSOR) tablet 37.5 mg  37.5 mg Oral Q12H    dilTIAZem IR (CARDIZEM) tablet 60 mg  60 mg Oral Q8H    HYDROmorphone (DILAUDID) injection 0.2 mg  0.2 mg IntraVENous Q3H PRN    labetaloL (NORMODYNE;TRANDATE) injection 5 mg  5 mg IntraVENous Q6H PRN    balsam peru-castor oiL (VENELEX) ointment   Topical BID    glucose chewable tablet 16 g  4 Tablet Oral PRN    glucagon (GLUCAGEN) injection 1 mg  1 mg IntraMUSCular PRN    dextrose 10 % infusion 0-250 mL  0-250 mL IntraVENous PRN    insulin lispro (HUMALOG) injection   SubCUTAneous Q6H    acetaminophen (TYLENOL) tablet 650 mg  650 mg Oral Q4H PRN    Or    acetaminophen (TYLENOL) solution 650 mg  650 mg Per NG tube Q4H PRN    Or    acetaminophen (TYLENOL) suppository 650 mg  650 mg Rectal Q4H PRN    atorvastatin (LIPITOR) tablet 80 mg  80 mg Oral QHS    docusate sodium (COLACE) capsule 100 mg  100 mg Oral BID     ______________________________________________________________________  EXPECTED LENGTH OF STAY: 4d 9h  ACTUAL LENGTH OF STAY:          11                 Westley Harmon MD

## 2022-09-25 NOTE — PROGRESS NOTES
Hyper Na resolved, DOMINICK resolved  Replete K  Will sign off  Call us back with any issues        Nani Coppola MD  [facility]   [address] [place] [address], [facility]  Phone - [phone]   Fax - [phone]  [email or web address]

## 2022-09-26 LAB
GLUCOSE BLD STRIP.AUTO-MCNC: 109 MG/DL (ref 65–117)
GLUCOSE BLD STRIP.AUTO-MCNC: 116 MG/DL (ref 65–117)
GLUCOSE BLD STRIP.AUTO-MCNC: 139 MG/DL (ref 65–117)
GLUCOSE BLD STRIP.AUTO-MCNC: 147 MG/DL (ref 65–117)
SERVICE CMNT-IMP: ABNORMAL
SERVICE CMNT-IMP: ABNORMAL
SERVICE CMNT-IMP: NORMAL
SERVICE CMNT-IMP: NORMAL

## 2022-09-26 PROCEDURE — 74011636637 HC RX REV CODE- 636/637: Performed by: ANESTHESIOLOGY

## 2022-09-26 PROCEDURE — 65660000001 HC RM ICU INTERMED STEPDOWN

## 2022-09-26 PROCEDURE — 82962 GLUCOSE BLOOD TEST: CPT

## 2022-09-26 PROCEDURE — 97112 NEUROMUSCULAR REEDUCATION: CPT

## 2022-09-26 PROCEDURE — 97530 THERAPEUTIC ACTIVITIES: CPT

## 2022-09-26 PROCEDURE — 77030037878 HC DRSG MEPILEX >48IN BORD MOLN -B

## 2022-09-26 PROCEDURE — 51798 US URINE CAPACITY MEASURE: CPT

## 2022-09-26 PROCEDURE — 97535 SELF CARE MNGMENT TRAINING: CPT

## 2022-09-26 PROCEDURE — 77030021595 HC DRSG OPTIFOAM MDII -B

## 2022-09-26 PROCEDURE — 74011250637 HC RX REV CODE- 250/637: Performed by: INTERNAL MEDICINE

## 2022-09-26 PROCEDURE — 77030037877 HC DRSG MEPILEX >48IN BORD MOLN -A

## 2022-09-26 PROCEDURE — 74011250637 HC RX REV CODE- 250/637: Performed by: NURSE PRACTITIONER

## 2022-09-26 PROCEDURE — 94760 N-INVAS EAR/PLS OXIMETRY 1: CPT

## 2022-09-26 RX ORDER — DOCUSATE SODIUM 100 MG/1
100 CAPSULE, LIQUID FILLED ORAL
Qty: 60 CAPSULE | Refills: 0 | Status: SHIPPED
Start: 2022-09-26 | End: 2022-12-25

## 2022-09-26 RX ORDER — FAMOTIDINE 20 MG/1
20 TABLET, FILM COATED ORAL EVERY 12 HOURS
Qty: 30 TABLET | Refills: 0 | Status: SHIPPED
Start: 2022-09-26

## 2022-09-26 RX ORDER — BALSAM PERU/CASTOR OIL
OINTMENT (GRAM) TOPICAL 2 TIMES DAILY
Qty: 5 G | Refills: 0 | Status: SHIPPED
Start: 2022-09-26

## 2022-09-26 RX ORDER — ATORVASTATIN CALCIUM 80 MG/1
80 TABLET, FILM COATED ORAL
Qty: 30 TABLET | Refills: 0 | Status: SHIPPED
Start: 2022-09-26

## 2022-09-26 RX ORDER — DILTIAZEM HYDROCHLORIDE 60 MG/1
60 TABLET, FILM COATED ORAL EVERY 8 HOURS
Qty: 30 TABLET | Refills: 0 | Status: SHIPPED
Start: 2022-09-26

## 2022-09-26 RX ORDER — METOPROLOL TARTRATE 37.5 MG/1
37.5 TABLET, FILM COATED ORAL EVERY 12 HOURS
Qty: 60 TABLET | Refills: 0 | Status: SHIPPED
Start: 2022-09-26 | End: 2022-10-29

## 2022-09-26 RX ORDER — TAMSULOSIN HYDROCHLORIDE 0.4 MG/1
0.4 CAPSULE ORAL DAILY
Qty: 30 CAPSULE | Refills: 0 | Status: SHIPPED
Start: 2022-09-27

## 2022-09-26 RX ADMIN — FAMOTIDINE 20 MG: 20 TABLET, FILM COATED ORAL at 08:57

## 2022-09-26 RX ADMIN — Medication: at 08:58

## 2022-09-26 RX ADMIN — TAMSULOSIN HYDROCHLORIDE 0.4 MG: 0.4 CAPSULE ORAL at 08:57

## 2022-09-26 RX ADMIN — FAMOTIDINE 20 MG: 20 TABLET, FILM COATED ORAL at 21:20

## 2022-09-26 RX ADMIN — METOPROLOL TARTRATE 37.5 MG: 25 TABLET, FILM COATED ORAL at 18:24

## 2022-09-26 RX ADMIN — METOPROLOL TARTRATE 37.5 MG: 25 TABLET, FILM COATED ORAL at 06:42

## 2022-09-26 RX ADMIN — Medication: at 18:32

## 2022-09-26 RX ADMIN — DILTIAZEM HYDROCHLORIDE 60 MG: 60 TABLET, FILM COATED ORAL at 06:42

## 2022-09-26 RX ADMIN — DILTIAZEM HYDROCHLORIDE 60 MG: 60 TABLET, FILM COATED ORAL at 21:20

## 2022-09-26 RX ADMIN — DOCUSATE SODIUM 100 MG: 100 CAPSULE, LIQUID FILLED ORAL at 08:57

## 2022-09-26 RX ADMIN — Medication 2 UNITS: at 01:01

## 2022-09-26 RX ADMIN — DILTIAZEM HYDROCHLORIDE 60 MG: 60 TABLET, FILM COATED ORAL at 14:19

## 2022-09-26 RX ADMIN — ATORVASTATIN CALCIUM 80 MG: 40 TABLET, FILM COATED ORAL at 21:20

## 2022-09-26 NOTE — PROGRESS NOTES
[address] [facility] Adult  Hospitalist Group                                                                                          Hospitalist Progress Note  Lucy Duran MD  Answering service: [phone] from in house phone        Date of Service:  2022  NAME:  Brooklynn Garza  :  1950  MRN:  [id number]      Admission Summary:     Patient presents with acute CVA, CT head and follow-up MRI shows large acute left MCA territory infarct. Patient with some expressive aphasia and right-sided weakness. Neuro following. Initially was admitted to ICU and transferred out of ICU 9/15/2022. Interval history / Subjective:     Patient is seen and examined at bedside this AM. He is alert, following simple commands.  Denied any new complaints   Discussed with nursing     Assessment & Plan:     Acute CVA  -CT head shows large acute left MCA territory infarct, CTA of the head and neck shows severe stenosis, near occlusive thrombus of the distal left MCA M1  -MRI of the brain which shows moderate to large acute left MCA territory infarct with findings associated with petechial hemorrhage  -Echo with bubble study shows EF of 50 to 55%, bubble study is not adequate to rule out shunt  -Neurology following, recommending holding antiplatelet and anticoagulation for 2 weeks due to large CVA with hemorrhagic conversion  -c/w  statin  -Speech therapy following for aphasia and dysphagia    Hypernatremia - resolved     - appreciate nephrology input  - s/p D5 IVF   -Monitor sodium level     Paroxysmal atrial fibrillation with RVR  - appreciate cardiology input   -Currently holding anticoagulation due to large CVA with hemorrhagic conversion  -Patient was previously taking Xarelto compliantly and therefore may indicate failure of Xarelto, plan to change to Eliquis when patient is cleared to take oral anticoagulation  - off cardizem gtt on   - c/w Diltiazem 60mg q8hr and Metoprolol 37.5mg bid   - plan to start on Eliquis 9/29     DOMINICK - resolved   -Multiple etiologies including volume depletion, ATN from contrast  -Renal ultrasound shows mild left hydronephrosis - resolved on rpt US   -S/p Vazquez catheter placement  - appreciate nephro input  - monitor renal function      Rhabdomyolysis  -This is likely due to prolonged immobilization after his CVA  -CK trending down     Elevated troponin  -Likely demand ischemia     Dyslipidemia  -Continue statin, LDL at goal     ? UTI on 9/21  - s/p IV ceftriaxone   - urine cx - Staph species, coagulase neg   - urinary rentention on vazquez. C/w flomax      Code status: Full  Prophylaxis: SCDs  Care Plan discussed with: Patient  Anticipated Disposition: IPR - [facility] - waiting for auth. Medically stable        Hospital Problems  Date Reviewed: 7/28/2022            Codes Class Noted POA    Ischemic cerebrovascular accident (CVA) [facility]) ICD-10-CM: I63.9  ICD-9-CM: 434.91  9/14/2022 Unknown       Review of Systems:   A comprehensive review of systems was negative except for that written in the HPI. Vital Signs:    Last 24hrs VS reviewed since prior progress note. Most recent are:  Visit Vitals  /82   Pulse 100   Temp 97.6 °F (36.4 °C)   Resp 20   Ht 6' 3\" (1.905 m)   Wt 129.5 kg (285 lb 7.9 oz)   SpO2 95%   BMI 35.68 kg/m²         Intake/Output Summary (Last 24 hours) at 9/26/2022 1450  Last data filed at 9/26/2022 1202  Gross per 24 hour   Intake 100 ml   Output 3275 ml   Net -3175 ml            Physical Examination:     I had a face to face encounter with this patient and independently examined them on 9/26/2022 as outlined below:          Constitutional:  No acute distress, cooperative, pleasant    ENT:  Oral mucosa moist, oropharynx benign. Resp:  CTA bilaterally. No wheezing/rhonchi/rales. No accessory muscle use. CV:  Regular rhythm, normal rate, no murmurs, gallops, rubs    GI:  Soft, non distended, non tender.  normoactive bowel sounds, no hepatosplenomegaly Musculoskeletal:  No edema, warm, 2+ pulses throughout    Neurologic:  Right side weakness. Awake and alert, oriented x 3            Data Review:    Review and/or order of clinical lab test      Labs:     No results for input(s): WBC, HGB, HCT, PLT, HGBEXT, HCTEXT, PLTEXT, HGBEXT, HCTEXT, PLTEXT in the last 72 hours. Recent Labs     09/25/22  0517 09/24/22  0430    150*   K 3.3* 3.6   * 119*   CO2 24 27   BUN 25* 36*   CREA 0.90 1.06   * 134*   CA 8.1* 8.5   MG 2.0 2.2       No results for input(s): ALT, AP, TBIL, TBILI, TP, ALB, GLOB, GGT, AML, LPSE in the last 72 hours. No lab exists for component: SGOT, GPT, AMYP, HLPSE    No results for input(s): INR, PTP, APTT, INREXT, INREXT in the last 72 hours. No results for input(s): FE, TIBC, PSAT, FERR in the last 72 hours. No results found for: FOL, RBCF   No results for input(s): PH, PCO2, PO2 in the last 72 hours. No results for input(s): CPK, CKNDX, TROIQ in the last 72 hours.     No lab exists for component: CPKMB    Lab Results   Component Value Date/Time    Cholesterol, total 146 09/15/2022 02:40 AM    HDL Cholesterol 53 09/15/2022 02:40 AM    LDL, calculated 65 09/15/2022 02:40 AM    Triglyceride 140 09/15/2022 02:40 AM    CHOL/HDL Ratio 2.8 09/15/2022 02:40 AM     Lab Results   Component Value Date/Time    Glucose (POC) 116 09/26/2022 11:36 AM    Glucose (POC) 109 09/26/2022 06:40 AM    Glucose (POC) 147 (H) 09/26/2022 12:49 AM    Glucose (POC) 123 (H) 09/25/2022 05:21 PM    Glucose (POC) 161 (H) 09/25/2022 12:03 PM     Lab Results   Component Value Date/Time    Color YELLOW/STRAW 09/21/2022 10:33 AM    Appearance CLOUDY (A) 09/21/2022 10:33 AM    Specific gravity 1.014 09/21/2022 10:33 AM    pH (UA) 5.0 09/21/2022 10:33 AM    Protein TRACE (A) 09/21/2022 10:33 AM    Glucose Negative 09/21/2022 10:33 AM    Ketone Negative 09/21/2022 10:33 AM    Bilirubin Negative 09/21/2022 10:33 AM    Urobilinogen 0.2 09/21/2022 10:33 AM Nitrites Negative 09/21/2022 10:33 AM    Leukocyte Esterase MODERATE (A) 09/21/2022 10:33 AM    Epithelial cells FEW 09/21/2022 10:33 AM    Bacteria 2+ (A) 09/21/2022 10:33 AM    WBC 10-20 09/21/2022 10:33 AM    RBC  09/21/2022 10:33 AM         Medications Reviewed:     Current Facility-Administered Medications   Medication Dose Route Frequency    [START ON 9/29/2022] apixaban (ELIQUIS) tablet 5 mg  5 mg Oral BID    tamsulosin (FLOMAX) capsule 0.4 mg  0.4 mg Oral DAILY    famotidine (PEPCID) tablet 20 mg  20 mg Oral Q12H    metoprolol tartrate (LOPRESSOR) tablet 37.5 mg  37.5 mg Oral Q12H    dilTIAZem IR (CARDIZEM) tablet 60 mg  60 mg Oral Q8H    HYDROmorphone (DILAUDID) injection 0.2 mg  0.2 mg IntraVENous Q3H PRN    labetaloL (NORMODYNE;TRANDATE) injection 5 mg  5 mg IntraVENous Q6H PRN    balsam peru-castor oiL (VENELEX) ointment   Topical BID    glucose chewable tablet 16 g  4 Tablet Oral PRN    glucagon (GLUCAGEN) injection 1 mg  1 mg IntraMUSCular PRN    dextrose 10 % infusion 0-250 mL  0-250 mL IntraVENous PRN    insulin lispro (HUMALOG) injection   SubCUTAneous Q6H    acetaminophen (TYLENOL) tablet 650 mg  650 mg Oral Q4H PRN    Or    acetaminophen (TYLENOL) solution 650 mg  650 mg Per NG tube Q4H PRN    Or    acetaminophen (TYLENOL) suppository 650 mg  650 mg Rectal Q4H PRN    atorvastatin (LIPITOR) tablet 80 mg  80 mg Oral QHS    docusate sodium (COLACE) capsule 100 mg  100 mg Oral BID     ______________________________________________________________________  EXPECTED LENGTH OF STAY: 4d 9h  ACTUAL LENGTH OF STAY:          12                 Darnell Marie MD

## 2022-09-26 NOTE — PROGRESS NOTES
Transition of Care Plan  RUR- Low 13%  DISPOSITION: IPR - [facility] - pending insurance auth started 9/22   F/U with PCP/Specialist    Transport: [facility]    Emergency contact: Son Erman Litten [phone]    CECILIA barriers to discharge: insurance auth    Patient is medically stable for discharge today. CM spoke with [facility] liaison, Gilberto Simpson, insurance Paul Grater is still pending at this time. LILLY NoriegaS.W.

## 2022-09-26 NOTE — WOUND CARE
WOCN Note     Follow up visit. Previously seen by Laura Gamboa RN. Chart shows:  Admitted for ischemic cerebrovascular accident       Past Medical History:   Diagnosis Date    Arrhythmia       atrial fib    Depression 4/24/2010    Diabetes ([facility] [address]       diet control for pre-diabetes    Dyslipidemia, goal LDL below 100 6/14/2011    Gout      HTN (hypertension) 4/24/2010    Impotence 4/24/2010    Morbid obesity ([facility] [address] 5/17/2010    VALENTE (obstructive sleep apnea) 4/24/2010     CPAP    Restless legs 4/15/2015      Assessment:   Patient is alert, communicates and requires moderate assistance needed in repositioning. Bed: [place]  Patient reports no pain. Right distal lateral foot, partial thickness wound:  2 x 1 x 0.1 cm; 100% pink; no exudate or odor   2. Right ankle, partial thickness wound:  1.2 x 0.5 x 0.1 cm; 100% pink; no exudate or odor. 3.  Right heel, partial thickness wound:  1 x 0.8 x 0.1 cm; 100% pink; no exudate or odor. 4.  Right lateral leg, partial thickness wound:  8.5 x 1 x 0.1 cm; 100% pink; no exudate or odor. 5.  Right hip, partial thickness wound:  3 x 1 x 0.1 cm; 100% moist yellow; no exudate or odor  6. Right elbow, partial thickness wound:  5 x 1 x 0.1 cm; 100% pink; no exudate or odor:    7. Right back, healing superficial abrasion. Recommendations:     Posterior head, right back, right flank, right hip, right lateral lower leg, right ankle/foot, right toes, bilateral heels and sacrum:  Apply Venelex BID. Right elbow:  Daily cleanse, apply Venelex, cover with folded Xeroform and foam dressing. Skin Care & Pressure Prevention:  Minimize layers of linen/pads under patient to optimize support surface. Turn/reposition approximately every 2 hours and offload heels.     Discussed above plan with [employer], RN     Transition of Care: Plan to follow as needed while admitted to hospital.    QIAN Avila RN [facility] Inpatient Wound Care  Available on [employer]  Office [phone]

## 2022-09-26 NOTE — PROGRESS NOTES
At [place] [address], entered room for 0000 reassessment and noted that there was only 50mL of clear yellow urine in vazquez bag. Pt unable to communicate if he had the urge to void or not. Palpation showed a mildly distended bladder and bladder scar demonstrated 1438mL in bladder. Dr. Cassandra Lemos alerted and permission to reposition Vazquez and flush PRN with sterile solution obtained. After instilling 50mL sterile water and advancing/repositioning catheter, 1400mL efe, stale smelling uring draining to vazquez reservoir. Close monitoring to urine output to continue, with possibilty of replacing vazquez if it continues to inappropriately drain.

## 2022-09-26 NOTE — PROGRESS NOTES
Problem: Mobility Impaired (Adult and Pediatric)  Goal: *Acute Goals and Plan of Care (Insert Text)  Description:   FUNCTIONAL STATUS PRIOR TO ADMISSION: Patient was independent and active without use of DME.    HOME SUPPORT PRIOR TO ADMISSION: The patient lived alone with no local support. Physical Therapy Goals  Initiated 9/15/2022; reassessed 09/22 and appropriate for carryover x7 days. 1.  Patient will move from supine to sit and sit to supine , scoot up and down, and roll side to side in bed with moderate assistance  within 7 day(s). 2.  Patient will transfer from bed to chair and chair to bed with moderate assistance  using the least restrictive device within 7 day(s). 3.  Patient will perform sit to stand with moderate assistance  within 7 day(s). 4.  Patient will ambulate with moderate assistance  for 25 feet with the least restrictive device within 7 day(s). 5.  Patient will improve Bowles Balance score by 7 points within 7 days. Outcome: Progressing Towards Goal   PHYSICAL THERAPY TREATMENT  Patient: Ray Christy (25 y.o. male)  Date: 9/26/2022  Diagnosis: Ischemic cerebrovascular accident (CVA) ([facility] [address] [I63.9] <principal problem not specified>      Precautions: Fall (SBP< 180/90)  Chart, physical therapy assessment, plan of care and goals were reviewed. ASSESSMENT  Pt seen with OT 2/2 increased skilled needs anticipated. Patient continues with skilled PT services and is progressing minimally towards goals this session as compared with previous. Pt agreeable to participation and POC for bed mobility and transfer training. Pt presents with decreased strength (R>L/dense R hemiplegia with R shoulder subluxation)/balance/AROM/coordination/protective rxns with seated EOB LOB and R inattention/aphasia and increased LLE pain with BLE edema noted resulting in impaired functional mobility from reported baseline status of independent.  Pt with VSS today on room air during bed mobility and attempted transfers. Pt incontinent of stool noted post mobility and additional time spent on bed mobility for rolling/dependent michael-care. Attempted supine to sit, seated balance work, and sit to stand transfers this date with assist x2. Un able to obtain a successful sit to stand this date and pt not appropriate for pivot transfer to the chair, either. Recommend: OOB to recliner via sarah lift for safety if desired    Next session: consider mirror anteriorly for biofeedback for midline sitting work    Current Level of Function Impacting Discharge (mobility/balance): upwards of total-maxA x2    Other factors to consider for discharge: pt with sister and brother in law present during session today- generally encouraging          PLAN :  Patient continues to benefit from skilled intervention to address the above impairments. Continue treatment per established plan of care. to address goals. Recommendation for discharge: (in order for the patient to meet his/her long term goals)  Therapy 3 hours per day 5-7 days per week    This discharge recommendation:  Has been made in collaboration with the attending provider and/or case management    IF patient discharges home will need the following DME: to be determined (TBD)       SUBJECTIVE:   Patient expressing pain on LLE with manipulation during mobility. OBJECTIVE DATA SUMMARY:   Critical Behavior:  Neurologic State: Alert  Orientation Level: Oriented to person  Cognition: Decreased command following (requiring multimodal cuing)  Safety/Judgement: Decreased awareness of environment, Decreased awareness of need for assistance, Decreased awareness of need for safety, Decreased insight into deficits, Fall prevention  Functional Mobility Training:  Bed Mobility:  Rolling: Maximum assistance; Additional time;Assist x2;Total assistance (total A to L and maxA to R; pt able to reach for and utilize R rail with LUE w/ cues)  Supine to Sit: Maximum assistance; Additional time;Assist x2;Bed Modified (HOB elevated)  Sit to Supine: Maximum assistance; Additional time;Assist x2  Scooting: Maximum assistance;Assist x2  Transfers:  Sit to Stand: Total assistance;Assist x2 (Unsuccessful 2/2 trials)  Balance: pt performed seated balance work with bilateral forearm WBing and return to midline; pt also completed PROM of RUE with LUE and cues for attention to R limb; seated visual tracking and cervical rotation to right also completed; tactile cues for L hip pressure/lean for midline from R lean/LOB encouraged   Sitting: Impaired; With support  Sitting - Static: Fair (occasional); Poor (constant support)  Sitting - Dynamic: Poor (constant support)  Standing: Impaired; With support  Standing - Static: Poor (Unable to obtain full stance)  Standing - Dynamic : Not tested        Pain Rating:  No VAS but pain evident in LEs    Activity Tolerance:   Fair, SpO2 stable on RA, and requires rest breaks    After treatment patient left in no apparent distress:   Supine in bed, Heels elevated for pressure relief, Call bell within reach, Caregiver / family present, and Side rails x 3    COMMUNICATION/COLLABORATION:   The patients plan of care was discussed with: Occupational therapist and Registered nurse. Sheryle Dus   Time Calculation: 54 mins

## 2022-09-26 NOTE — PROGRESS NOTES
Problem: Self Care Deficits Care Plan (Adult)  Goal: *Acute Goals and Plan of Care (Insert Text)  Description: FUNCTIONAL STATUS PRIOR TO ADMISSION: Patient with communication deficits limiting gathering home environment/prior level of function information. Per chart, patient lives by himself and was climbing a ladder when event happened, presumed independent prior level of function. HOME SUPPORT: The patient lived alone with sister and brother-in-law for family support. Sister had called on the 13th and became concerned when no response, called a neighbor who found the patient. Would benefit from confirming prior level of function and home environment information with family when able. Occupational Therapy Goals  Weekly RA 9/22/2022  1. Patient will perform self-feeding with setup assist using left upper extremity within 7 day(s). upgraded  2. Patient will perform grooming in sitting with contact guard assist within 7 day(s). upgraded  3. Patient will perform lower body dressing with maximal assistance within 7 day(s). Cont   4. Patient will perform toilet transfers to [facility] with maximal assistance within 7 day(s). Cont   5. Patient will perform all aspects of toileting with maximal assistance within 7 day(s). Cont   6. Patient will participate in upper extremity therapeutic exercise/activities with minimal assistance/contact guard assist within 7 day(s). Cont    7. Patient will participate in R [address] within 7 days. Cont     Initiated 9/15/2022   1. Patient will perform self-feeding with minimal assistance/contact guard assist within 7 day(s). 2.  Patient will perform grooming in sitting with moderate assist within 7 day(s). 3.  Patient will perform lower body dressing with maximal assistance within 7 day(s). 4.  Patient will perform toilet transfers to [facility] with maximal assistance within 7 day(s).   5.  Patient will perform all aspects of toileting with maximal assistance within 7 day(s). 6.  Patient will participate in upper extremity therapeutic exercise/activities with minimal assistance/contact guard assist within 7 day(s). 7.  Patient will participate [facility] within 7 days. Outcome: Progressing Towards Goal    OCCUPATIONAL THERAPY TREATMENT  Patient: Velvet Call (5 y.o. male)  Date: 9/26/2022  Diagnosis: Ischemic cerebrovascular accident (CVA) ([facility] [address] [I63.9] <principal problem not specified>      Precautions: Fall (SBP< 180/90)  Chart, occupational therapy assessment, plan of care, and goals were reviewed. ASSESSMENT  Patient is progressing in OT goals but remains limited by aphasia, decreased command following requiring multimodal cuing, poor sitting balance, severe R hemiparesis (slight active scapular elevation, otherwise RUE flaccid), R neglect (moderate-severe to person, mild to environment), R shoulder subluxation, severe RUE edema, and L ankle/ foot pain. Required maxA x2 for supine-sit, with strong R lean requiring initially maxA assist to correct, improving to Aryan with facilitation/ lateral leaning and re-orienting to midline. Relied heavily on unaffected LUE to hold self in midline. Patient was instructed in RUE SROM exercises and required max cuing to visually attend to RUE. Attempted sit-stand with x2 assist but achieved no buttocks clearing from EOB- L foot/ ankle pain may be a factor. Required total assistance x2 for toileting/ bowel hygiene rolling in bed. Continue to recommend inpatient rehab at d/c to maximize functional/ neurological recovery. Current Level of Function Impacting Discharge (ADLs): maximum assistance x2 for supine<>sit, unable to stand with x2 assist, total assistance x2 for toileting (rolling in bed). Infer overall minimum to maximum assistance UB ADLs and total assistance LB ADLs       PLAN :  Patient continues to benefit from skilled intervention to address the above impairments.   Continue treatment per established plan of care to address goals. Recommendation for discharge: (in order for the patient to meet his/her long term goals)  Therapy 3 hours per day 5-7 days per week    This discharge recommendation:  Has been made in collaboration with the attending provider and/or case management         SUBJECTIVE:   Patient stated Maylin Doshi.     OBJECTIVE DATA SUMMARY:   Cognitive/Behavioral Status:  Neurologic State: Alert  Orientation Level: Oriented to person  Cognition: Decreased command following (requiring multimodal cuing)  Perception: Cues to attend to right side of body;Cues to maintain midline in sitting;Cues to attend right visual field          Functional Mobility and Transfers for ADLs:  Bed Mobility:  Supine to Sit: Maximum assistance;Assist x2  Sit to Supine: Maximum assistance;Assist x2    Transfers:  Sit to Stand:  (attempted with x2 assist, no buttocks clearance from EOB)          Balance:  Sitting: Impaired  Sitting - Static: Poor (constant support); Fair (occasional)  Sitting - Dynamic: Poor (constant support)    ADL Intervention:                 Type of Bath: Chlorhexidine (CHG)                   Toileting  Bowel Hygiene: Total assistance (dependent) (rolling in bed)        Pain:  Patient indicated L foot/ ankle pain    Activity Tolerance:   Good    After treatment patient left in no apparent distress:   Supine in bed, Call bell within reach, Bed / chair alarm activated, and Caregiver / family present    COMMUNICATION/COLLABORATION:   The patients plan of care was discussed with: Physical therapist, Registered nurse, and Case management. Patient was educated regarding His deficit(s) of impaired balance, aphasia, and R hemiparesis as this relates to His diagnosis of CVA. He demonstrated Guarded understanding as evidenced by limited resopnse. Patient and/or family was verbally educated on the BE FAST acronym for signs/symptoms of CVA and TIA.  BE FAST was written on patient's communication board for visual education and reinforcement. All questions answered with patient indicating guarded understanding.      Tim Wright, OT  Time Calculation: 53 mins

## 2022-09-26 NOTE — PROGRESS NOTES
Bedside and Verbal shift change report given to Abelardo Sloan (oncoming nurse) by Vu Rogers RN (offgoing nurse). Report included the following information SBAR, Kardex, Intake/Output, MAR, Recent Results, and Med Rec Status.

## 2022-09-26 NOTE — PROGRESS NOTES
Bedside and Verbal shift change report given to [facility] (oncoming nurse) by Tod Wall (offgoing nurse). Report included the following information SBAR, Kardex, MAR, Cardiac Rhythm Afib, and Dual Neuro Assessment.

## 2022-09-26 NOTE — PROGRESS NOTES
Patient remains in rate controlled afib. Continue with current medication management. Patient is now day 12 for this admission. Neurology has recommended starting [address] after 2 weeks. Would recommend having neurology confirm that pt is stable to start back on [address]. If so, start Eliquis 5mg bid.

## 2022-09-26 NOTE — PROGRESS NOTES
Problem: Pressure Injury - Risk of  Goal: *Prevention of pressure injury  Description: Document Pepe Scale and appropriate interventions in the flowsheet. Outcome: Progressing Towards Goal  Note: Pressure Injury Interventions:  Sensory Interventions: Assess changes in LOC, Discuss PT/OT consult with provider, Float heels, Keep linens dry and wrinkle-free, Minimize linen layers    Moisture Interventions: Absorbent underpads, Limit adult briefs, Minimize layers, Internal/External urinary devices    Activity Interventions: Pressure redistribution bed/mattress(bed type), PT/OT evaluation    Mobility Interventions: HOB 30 degrees or less, Pressure redistribution bed/mattress (bed type), PT/OT evaluation    Nutrition Interventions: Document food/fluid/supplement intake    Friction and Shear Interventions: HOB 30 degrees or less, Minimize layers                Problem: Patient Education: Go to Patient Education Activity  Goal: Patient/Family Education  Outcome: Progressing Towards Goal     Problem: Patient Education: Go to Patient Education Activity  Goal: Patient/Family Education  Outcome: Progressing Towards Goal     Problem: Patient Education: Go to Patient Education Activity  Goal: Patient/Family Education  Outcome: Progressing Towards Goal     Problem: Patient Education: Go to Patient Education Activity  Goal: Patient/Family Education  Outcome: Progressing Towards Goal     Problem: Falls - Risk of  Goal: *Absence of Falls  Description: Document Kasi Fall Risk and appropriate interventions in the flowsheet.   Outcome: Progressing Towards Goal  Note: Fall Risk Interventions:  Mobility Interventions: Bed/chair exit alarm, Communicate number of staff needed for ambulation/transfer, OT consult for ADLs, PT Consult for mobility concerns, PT Consult for assist device competence, Strengthening exercises (ROM-active/passive)    Mentation Interventions: Bed/chair exit alarm, Door open when patient unattended, Adequate sleep, hydration, pain control, Evaluate medications/consider consulting pharmacy, Increase mobility, More frequent rounding, Reorient patient    Medication Interventions: Assess postural VS orthostatic hypotension, Evaluate medications/consider consulting pharmacy, Patient to call before getting OOB, Teach patient to arise slowly    Elimination Interventions: Bed/chair exit alarm, Call light in reach, Toileting schedule/hourly rounds, Toilet paper/wipes in reach    History of Falls Interventions: Bed/chair exit alarm, Consult care management for discharge planning, Door open when patient unattended, Evaluate medications/consider consulting pharmacy         Problem: Patient Education: Go to Patient Education Activity  Goal: Patient/Family Education  Outcome: Progressing Towards Goal     Problem: Patient Education: Go to Patient Education Activity  Goal: Patient/Family Education  Outcome: Progressing Towards Goal     Problem: TIA/CVA Stroke: Day 2 Until Discharge  Goal: Off Pathway (Use only if patient is Off Pathway)  Outcome: Progressing Towards Goal  Goal: Activity/Safety  Outcome: Progressing Towards Goal  Goal: Diagnostic Test/Procedures  Outcome: Progressing Towards Goal  Goal: Nutrition/Diet  Outcome: Progressing Towards Goal  Goal: Discharge Planning  Outcome: Progressing Towards Goal  Goal: Medications  Outcome: Progressing Towards Goal  Goal: Respiratory  Outcome: Progressing Towards Goal  Goal: Treatments/Interventions/Procedures  Outcome: Progressing Towards Goal  Goal: Psychosocial  Outcome: Progressing Towards Goal  Goal: *Verbalizes anxiety and depression are reduced or absent  Outcome: Progressing Towards Goal  Goal: *Absence of aspiration  Outcome: Progressing Towards Goal  Goal: *Absence of deep venous thrombosis signs and symptoms(Stroke Metric)  Outcome: Progressing Towards Goal  Goal: *Optimal pain control at patient's stated goal  Outcome: Progressing Towards Goal  Goal: *Tolerating diet  Outcome: Progressing Towards Goal  Goal: *Ability to perform ADLs and demonstrates progressive mobility and function  Outcome: Progressing Towards Goal  Goal: *Stroke education continued(Stroke Metric)  Outcome: Progressing Towards Goal

## 2022-09-27 ENCOUNTER — APPOINTMENT (OUTPATIENT)
Dept: GENERAL RADIOLOGY | Age: 72
DRG: 064 | End: 2022-09-27
Attending: INTERNAL MEDICINE
Payer: MEDICARE

## 2022-09-27 LAB
ANION GAP SERPL CALC-SCNC: 5 MMOL/L (ref 5–15)
BUN SERPL-MCNC: 23 MG/DL (ref 6–20)
BUN/CREAT SERPL: 28 (ref 12–20)
CALCIUM SERPL-MCNC: 8.4 MG/DL (ref 8.5–10.1)
CHLORIDE SERPL-SCNC: 106 MMOL/L (ref 97–108)
CO2 SERPL-SCNC: 25 MMOL/L (ref 21–32)
CREAT SERPL-MCNC: 0.83 MG/DL (ref 0.7–1.3)
ERYTHROCYTE [DISTWIDTH] IN BLOOD BY AUTOMATED COUNT: 13.1 % (ref 11.5–14.5)
GLUCOSE BLD STRIP.AUTO-MCNC: 124 MG/DL (ref 65–117)
GLUCOSE BLD STRIP.AUTO-MCNC: 137 MG/DL (ref 65–117)
GLUCOSE BLD STRIP.AUTO-MCNC: 138 MG/DL (ref 65–117)
GLUCOSE BLD STRIP.AUTO-MCNC: 176 MG/DL (ref 65–117)
GLUCOSE SERPL-MCNC: 139 MG/DL (ref 65–100)
HCT VFR BLD AUTO: 36 % (ref 36.6–50.3)
HGB BLD-MCNC: 11.8 G/DL (ref 12.1–17)
MAGNESIUM SERPL-MCNC: 2 MG/DL (ref 1.6–2.4)
MCH RBC QN AUTO: 29 PG (ref 26–34)
MCHC RBC AUTO-ENTMCNC: 32.8 G/DL (ref 30–36.5)
MCV RBC AUTO: 88.5 FL (ref 80–99)
NRBC # BLD: 0 K/UL (ref 0–0.01)
NRBC BLD-RTO: 0 PER 100 WBC
PLATELET # BLD AUTO: 208 K/UL (ref 150–400)
PMV BLD AUTO: 12 FL (ref 8.9–12.9)
POTASSIUM SERPL-SCNC: 4.1 MMOL/L (ref 3.5–5.1)
RBC # BLD AUTO: 4.07 M/UL (ref 4.1–5.7)
SERVICE CMNT-IMP: ABNORMAL
SODIUM SERPL-SCNC: 136 MMOL/L (ref 136–145)
WBC # BLD AUTO: 14.4 K/UL (ref 4.1–11.1)

## 2022-09-27 PROCEDURE — 74011250637 HC RX REV CODE- 250/637: Performed by: NURSE PRACTITIONER

## 2022-09-27 PROCEDURE — 83735 ASSAY OF MAGNESIUM: CPT

## 2022-09-27 PROCEDURE — 85027 COMPLETE CBC AUTOMATED: CPT

## 2022-09-27 PROCEDURE — 92507 TX SP LANG VOICE COMM INDIV: CPT | Performed by: SPEECH-LANGUAGE PATHOLOGIST

## 2022-09-27 PROCEDURE — 80048 BASIC METABOLIC PNL TOTAL CA: CPT

## 2022-09-27 PROCEDURE — 74011250637 HC RX REV CODE- 250/637: Performed by: INTERNAL MEDICINE

## 2022-09-27 PROCEDURE — 73600 X-RAY EXAM OF ANKLE: CPT

## 2022-09-27 PROCEDURE — 65660000001 HC RM ICU INTERMED STEPDOWN

## 2022-09-27 PROCEDURE — 92526 ORAL FUNCTION THERAPY: CPT | Performed by: SPEECH-LANGUAGE PATHOLOGIST

## 2022-09-27 PROCEDURE — 74011636637 HC RX REV CODE- 636/637: Performed by: ANESTHESIOLOGY

## 2022-09-27 PROCEDURE — 97530 THERAPEUTIC ACTIVITIES: CPT

## 2022-09-27 PROCEDURE — 97112 NEUROMUSCULAR REEDUCATION: CPT

## 2022-09-27 PROCEDURE — 36415 COLL VENOUS BLD VENIPUNCTURE: CPT

## 2022-09-27 PROCEDURE — 82962 GLUCOSE BLOOD TEST: CPT

## 2022-09-27 RX ADMIN — DILTIAZEM HYDROCHLORIDE 60 MG: 60 TABLET, FILM COATED ORAL at 14:17

## 2022-09-27 RX ADMIN — DOCUSATE SODIUM 100 MG: 100 CAPSULE, LIQUID FILLED ORAL at 17:48

## 2022-09-27 RX ADMIN — Medication: at 17:47

## 2022-09-27 RX ADMIN — Medication 2 UNITS: at 12:04

## 2022-09-27 RX ADMIN — FAMOTIDINE 20 MG: 20 TABLET, FILM COATED ORAL at 22:02

## 2022-09-27 RX ADMIN — ATORVASTATIN CALCIUM 80 MG: 40 TABLET, FILM COATED ORAL at 22:01

## 2022-09-27 RX ADMIN — METOPROLOL TARTRATE 37.5 MG: 25 TABLET, FILM COATED ORAL at 17:49

## 2022-09-27 RX ADMIN — DILTIAZEM HYDROCHLORIDE 60 MG: 60 TABLET, FILM COATED ORAL at 22:02

## 2022-09-27 RX ADMIN — DOCUSATE SODIUM 100 MG: 100 CAPSULE, LIQUID FILLED ORAL at 09:04

## 2022-09-27 RX ADMIN — Medication: at 09:05

## 2022-09-27 RX ADMIN — FAMOTIDINE 20 MG: 20 TABLET, FILM COATED ORAL at 09:04

## 2022-09-27 RX ADMIN — METOPROLOL TARTRATE 37.5 MG: 25 TABLET, FILM COATED ORAL at 06:37

## 2022-09-27 RX ADMIN — DILTIAZEM HYDROCHLORIDE 60 MG: 60 TABLET, FILM COATED ORAL at 06:37

## 2022-09-27 RX ADMIN — TAMSULOSIN HYDROCHLORIDE 0.4 MG: 0.4 CAPSULE ORAL at 09:04

## 2022-09-27 NOTE — PROGRESS NOTES
Problem: Mobility Impaired (Adult and Pediatric)  Goal: *Acute Goals and Plan of Care (Insert Text)  Description:   FUNCTIONAL STATUS PRIOR TO ADMISSION: Patient was independent and active without use of DME.    HOME SUPPORT PRIOR TO ADMISSION: The patient lived alone with no local support. Physical Therapy Goals  Initiated 9/15/2022; reassessed 09/22 and appropriate for carryover x7 days. 1.  Patient will move from supine to sit and sit to supine , scoot up and down, and roll side to side in bed with moderate assistance  within 7 day(s). 2.  Patient will transfer from bed to chair and chair to bed with moderate assistance  using the least restrictive device within 7 day(s). 3.  Patient will perform sit to stand with moderate assistance  within 7 day(s). 4.  Patient will ambulate with moderate assistance  for 25 feet with the least restrictive device within 7 day(s). 5.  Patient will improve Bowles Balance score by 7 points within 7 days. Outcome: Progressing Towards Goal     PHYSICAL THERAPY TREATMENT  Patient: Omega Ellis (22 y.o. male)  Date: 9/27/2022  Diagnosis: Ischemic cerebrovascular accident (CVA) ([facility] [address] [I63.9] <principal problem not specified>      Precautions: Fall (SBP< 180/90)  Chart, physical therapy assessment, plan of care and goals were reviewed. ASSESSMENT  Patient continues with skilled PT services and is marginally progressing towards goals however remains most limited by dense R hemiparesis, mild R inattention, impaired balance, aphasia, decreased cardiopulmonary tolerance to activity, and c/o severe L ankle pain leading to increased falls risk and dependency from baseline level. Received pt supine in bed, alert and talkative today (though largely unintelligible). HR elevated at rest in the low 120s and up to 150s briefly with activity. Continues to c/o severe L ankle and ?great toe pain - seemingly most tender to palpation at MTP joint and along medial malleolus. Diffiulty to ascertain details 2* aphasia. B LE with pitting edema. He was able to mobilize to EOB with max A  x2 and one step cues. Max A to scoot/square hips. Demos improved sitting balance (good to fair) requiring occasional assist to correct from multi directional LOBs. Attempted sit<>stand (pull to stand on chair) with max A x2, elevated bed height, and compensatory fwd rock however unable to achieve any clearance from bed 2* c/o L ankle pain. Completed seated B LE there ex and assisted back to bed at end of session with R UE and B LEs elevated/supported. Will obtain prevalon boot for R foot next session. Continue to recommend discharge to [facility] to maximize NM recovery once medically cleared. Will follow. .     Current Level of Function Impacting Discharge (mobility/balance): max/total A; dense R HP    Other factors to consider for discharge: below baseline; high falls risk; will require intense multidisciplinary rehab efforts          PLAN :  Patient continues to benefit from skilled intervention to address the above impairments. Continue treatment per established plan of care. to address goals. Recommendation for discharge: (in order for the patient to meet his/her long term goals)  Therapy 3 hours per day 5-7 days per week    This discharge recommendation:  A follow-up discussion with the attending provider and/or case management is planned    IF patient discharges home will need the following DME: to be determined (TBD)       SUBJECTIVE:   Patient stated OW.     OBJECTIVE DATA SUMMARY:   Critical Behavior:  Neurologic State: Alert  Orientation Level:  (limited by aphasia)  Cognition: Decreased command following  Safety/Judgement: Decreased awareness of environment, Decreased awareness of need for assistance, Decreased awareness of need for safety, Decreased insight into deficits, Fall prevention  Functional Mobility Training:  Bed Mobility:     Supine to Sit: Maximum assistance;Assist x2 (HOB raised, using rail with LUE)  Sit to Supine: Maximum assistance;Assist x2       Transfers:     Stand to Sit:  (Attempted pull to stand with LUE on anterior rail. 3 attempts. No buttocks clearance from despite x2 assist.  Little/ no contribution from patient on unaffected L side. Limited by L ankle/foot pain?)        Balance:  Sitting: Impaired; With support  Sitting - Static: Fair (occasional)  Sitting - Dynamic: Fair (occasional); Poor (constant support)    Activity Tolerance:   Good    After treatment patient left in no apparent distress:   Supine in bed, Heels elevated for pressure relief, Call bell within reach, Bed / chair alarm activated, and Side rails x 3    COMMUNICATION/COLLABORATION:   The patients plan of care was discussed with: Occupational therapist and Registered nurse. Patient and/or family was verbally educated on the BE FAST acronym for signs/symptoms of CVA and TIA. BE FAST was written on patient's communication board  for visual education and reinforcement. All questions answered with patient indicating good understanding.      Romario Brizuela, PT, DPT   Time Calculation: 35 mins

## 2022-09-27 NOTE — ADT AUTH CERT NOTES
Comment          Patient Demographics    Patient Name   Teresa Penn   [id number] Legal Sex   Male    1950 Address   [address] [address] Phone   [phone] (Home)   [phone] (Mobile) *Preferred*     Patient Demographics    Patient Name   Teresa Penn   [id number] Legal Sex   Male    1950 Address   [address] [address] Phone   [phone] (Home)   [phone] (Mobile) *Preferred*     CSN:   [id number]     Admit Date: Admit Time Room Bed   Sep 14, 2022  9:48 PM [phone]     Attending Providers    Provider Pager From To   Izzy Aguillon MD  22   Vick Becerra MD  09/14/22 09/15/22   Vick Becerra MD  09/15/22 09/15/22   Christi Win MD  09/15/22 09/22/22   Mahnaz Kaplan MD  22      Patient Contacts    Name Relation Home Work Mobile   Silvio Ramírez [phone]   96 Tell Tiffanie Santillan [id number]     Utilization Reviews       Stroke: Ischemic - Care Day 8 (2022) by [facility]       Review Entered Review Status   2022 1221 Completed      Criteria Review      Care Day: 8 Care Date: 2022 Level of Care: Inpatient Floor    Guideline Day 2    Level Of Care    (X) Stroke unit, ICU, telemetry, or floor    Clinical Status    ( ) * Hemodynamic stability    2022 12:13:39 EDT by Liam Lewis 97.9, /75, P , R 18-25, 02 SAT 94% RA    ( ) * Mental status at baseline or stable    (X) * Neurologic deficits absent or stable    (X) * Unimpaired swallowing    Activity    (X) * Up to chair    (X) Rehabilitation at bedside    (X) Possible assisted ambulation    2022 12:13:39 EDT by [facility]      SEE PT NOTE    Routes    ( ) * Oral hydration    2022 12:13:39 EDT by [facility]      1/ NS 100ML/HR    (X) * Oral medications    2022 12:13:39 EDT by [facility]      ROCEPHIN 1G IV Q 24 HR,  SSI SC Q 6 HR (2U X1),  LIPITOR 80MG PO Q HS, PEPCID 20MG PO QD,    ( ) * Advance diet as tolerated    Interventions    (X) Neurologic checks    (X) Rehabilitation assessment    (X) Possible physical therapy    (X) Possible occupational and speech therapy    9/23/2022 12:13:39 EDT by Sabra Elizondo      SEE REVIEW    Medications    (X) Blood pressure control    9/23/2022 12:13:39 EDT by Sabra Elizondo      LOPRESSOR 5MG  IV Q 6 HR-CHANGE TO 7.5MG IV Q 6 HR    * Milestone   Additional Notes   Hospitalist Progress Note       Patient has no acute complaint. Noted A. fib with RVR this AM.       EXAM: Constitutional:   No acute distress, cooperative, pleasant    ENT:   Oral mucosa moist, oropharynx benign. Resp:   CTA bilaterally. No wheezing/rhonchi/rales. No accessory muscle use. CV:   Regular rhythm, normal rate, no murmurs, gallops, rubs   GI:   Soft, non distended, non tender. normoactive bowel sounds, no hepatosplenomegaly    Musculoskeletal:   No edema, warm, 2+ pulses throughout   Neurologic:   Moves all extremities. Awake and alert        WBC 12.3, PLT  147, , , GLUC 153, BUN 89, CREAT 3.39, PHOS 5.1, GFRNAA 18        UA: PROT TRACE, BLOOD LARGE, LEUK EST MOD, BACTERIA 2+, AMORPH CRYST 1+, GRAN CAST 2-5        9/20/22 RENAL US:  1. Mild left hydronephrosis.    2. Bilateral simple renal cysts        Assessment & Plan:       Acute CVA   -CT head shows large acute left MCA territory infarct, CTA of the head and neck shows severe stenosis, near occlusive thrombus of the distal left MCA M1   -MRI of the brain which shows moderate to large acute left MCA territory infarct with findings associated with petechial hemorrhage   -Echo with bubble study shows EF of 50 to 55%, bubble study is not adequate to rule out shunt   -Neurology following, recommending holding antiplatelet and anticoagulation for 2 weeks due to large CVA with hemorrhagic conversion   -Patient continuing on statin   -Speech therapy following for aphasia and dysphagia, s/p FEES 9/16 and started on p.o. Hypernatremia   -On hypotonic saline   -Monitor sodium level       Paroxysmal atrial fibrillation with RVR   -On IV metoprolol and Cardizem, cardiology adjusting doses   -Currently holding anticoagulation due to large CVA with hemorrhagic conversion   -Patient was previously taking Xarelto compliantly and therefore may indicate failure of Xarelto, plan to change to Eliquis when patient is cleared to take oral anticoagulation       DOMINICK   -Multiple etiologies including volume depletion, ATN from contrast   -Renal ultrasound shows mild left hydronephrosis   -S/p Vazquez catheter placement, plan to repeat ultrasound in 1 to 2 days   -Nephrology evaluating the patient       Rhabdomyolysis   -This is likely due to prolonged immobilization after his CVA   -Continue IV fluid, CK trending down       Elevated troponin   -Likely demand ischemia       Prerenal azotemia   -Continue IV fluid and monitor       Dyslipidemia   -Continue statin, LDL at goal        Code status: Full   Prophylaxis: SCDs   Care Plan discussed with: Patient   Anticipated Disposition: 24 to 48 hours           Nephrology Progress Note   Nonverbal, resting in bed. O2 stable, Cr better. UOP increased after vazquez placement. Unable to obtain ROS.        Assessment and Plan       DOMINICK:   - multifactorial: volume depletion from poor po intake + urinary retention   - US showing L hydro   - UA suggestive of UTI   - continue IVF - change to 1/2 NS       Probable UTI:   - cx pending, not on abx at this time       L hydro:   - would repeat U/S in a few days       Hypernatremia:   - 2/2 lack of po free water intake   - hypotonic fluids as above until po intake improves       Acute CVA:   - L MCA territory infarct   - per neurology       Afib w/ RVR:   - on dilt drip       Mild Rhabdo:   - CPK improving after IVF       DM2   Obesity   VALENTE       CARDIOLOGY NOTE:        SUBJECTIVE: Patient seemed a bit more confused this am. Still able to answer simple questions. Drowsy. Assessment and Plan       Atrial fibrillation with RVR: converted back to afib, but with better HR control overnight on Diltiazem gtt at 15mg/hr and Metoprolol 5mg IV q6hrs. BP trending 120-130s/70-90s. Unsure of preferred BP with recent CVA/hemorrhagic conversion. Holding APT/OAC for 2 weeks, switch to Eliquis 5mg bid when able to start medication. Acute CVA: per neurology found to have a large left MCA infarct with hemorrhagic conversion, and occluding thrombus in left M1 with other areas of IC stenoses. Taking Lipitor, see above for APT/OAC management. Echo with inadequate bubble study. HTN: see above       HLD: on statin           PT NOTE: Received pt supine in bed on room air, SpO2 in the mid 90s HR ranging 100s to briefly 160s with activity. Completed supine bridging with NM facilitation in prep for standing activity. Facilitated supine>sit with max A x2 and one step cues to increase indep with task. In sitting worked on attaining/maintaining midline postural control, down on R elbow>midline, and R LE AAROM/PROM. Progressed to completing sit<>stands x2 reps (pull to stand) with overall max A x2 but able to offer better initiation this date and with less R sided lean. Assisted back to bed at end of session, NAD. Continue to recommend discharge to IPR once medically cleared to maximize NM recovery and indep. SPEECH LANGUAGE PATHOLOGY DYSPHAGIA AND SPEECH TREATMENT   He continues to present with severe right hemiparesis with no change RN reports he doing well. Pt  observed with a full cup of applesauce with functional bolus acceptance, reduced oral transit and delayed initiation without overt s/s of aspiration. Pt with intermittent audible swallow noted. Lorean Snare Lorean Snare Rec  continuing with current pureed snacks and no liquids.   FEES completed 9/16 with silent aspiration of liquids and will require imaging prior to upgrade. Will continue to monitor for appropriateness. Comprehensive Nutrition Assessment      Receiving pureed \"snacks\" (small trays) and overall has had poor intakes throughout his stay. Nephrology following for DOMINICK, note increase in BUN, Cr, and Na levels. Visited this afternoon, family member at bedside reporting she heard breakfast intake went well and he consumed 100% of what was provided. Discussed addition of diet appropriate magic cup, in agreement. Reports good and stable appetite PTA. Rec: Continue diet per SLP, diet appropriate ONS added BID. Please record PO intakes in I/O Flowsheet. Given pt free water deficit, may be beneficial to consider DHT for hydration/nutrition at this juncture - especially if continued to be unsafe to take PO liquids. Stroke: Ischemic - Care Day 5 (9/18/2022) by Radha Rodriguez       Review Entered Review Status   9/22/2022 1611 Completed      Criteria Review      Care Day: 5 Care Date: 9/18/2022 Level of Care: Inpatient Floor    Guideline Day 2    Level Of Care    (X) Stroke unit, ICU, telemetry, or floor    Clinical Status    (X) * Hemodynamic stability    9/22/2022 16:12:00 EDT by Radha Rodriguez      T 99, /82, P 84, R 20,  02 SAT 96% RA    (X) * Mental status at baseline or stable    9/22/2022 16:12:00 EDT by Radha Rodriguez      Neurologic: Moves all extremities. Awake and alert    (X) * Neurologic deficits absent or stable    (X) * Unimpaired swallowing    9/22/2022 16:12:00 EDT by Radha Rodriguez      Dysphagia - Pureed;  No Fluids on Tray    Activity    ( ) * Up to chair    (X) Rehabilitation at bedside    Routes    ( ) * Oral hydration    9/22/2022 16:12:00 EDT by Radha Rodriguez      D5 1/2 NS 75ML/HR    (X) * Oral medications    9/22/2022 16:12:00 EDT by Radha Rodriguez      LIPITOR 80MG PO Q HS, COLACE 100MG PO BID,    ( ) * Advance diet as tolerated    Interventions    (X) Neurologic checks    (X) Rehabilitation assessment    (X) Possible physical therapy    (X) Possible occupational and speech therapy    Medications    (X) Blood pressure control    9/22/2022 16:12:00 EDT by Chandan Cassidy      LABETALOL 5MG IV Q 6 HR PRN X1, LOPRESSOR 12.5MG PO BID    * Milestone   Additional Notes   Hospitalist Progress Note       Patient has no acute complaint. Overall improving. EXAM:  Constitutional:   No acute distress, cooperative, pleasant    ENT:   Oral mucosa moist, oropharynx benign. Resp:   CTA bilaterally. No wheezing/rhonchi/rales. No accessory muscle use. CV:   Regular rhythm, normal rate, no murmurs, gallops, rubs   GI:   Soft, non distended, non tender. normoactive bowel sounds, no hepatosplenomegaly    Musculoskeletal:   No edema, warm, 2+ pulses throughout   Neurologic:   Moves all extremities. Awake and alert        , , GLUC 133, BUN 36, CREAT 0.68, MAG 2.5        Assessment & Plan:       Acute CVA   -CT head shows large acute left MCA territory infarct, CTA of the head and neck shows severe stenosis, near occlusive thrombus of the distal left MCA M1   -MRI of the brain which shows moderate to large acute left MCA territory infarct with findings associated with petechial hemorrhage   -Echo with bubble study shows EF of 50 to 55%, bubble study is not adequate to rule out shunt   -Neurology following, recommending holding antiplatelet and anticoagulation for 2 weeks due to large CVA with hemorrhagic conversion   -Patient continuing on statin   -Speech therapy following for aphasia and dysphagia, s/p FEES 9/16 and started on p.o.        Hypernatremia   -Change IV fluid to hypotonic   -Monitor sodium level       Paroxysmal atrial fibrillation with RVR   -Patient noncompliant with anticoagulation per records, continue metoprolol   -Currently holding anticoagulation due to large CVA with hemorrhagic conversion   -Continue monitor on telemetry       Rhabdomyolysis   -This is likely due to prolonged immobilization after his CVA   -Continue IV fluid, CK trending down       Elevated troponin   -Likely demand ischemia       Prerenal azotemia   -Continue IV fluid and monitor       Dyslipidemia   -Continue statin, LDL at goal        Code status: Full   Prophylaxis: SCDs   Care Plan discussed with: Patient   Anticipated Disposition: 24 to 48 hours

## 2022-09-27 NOTE — PROGRESS NOTES
Bedside and Verbal shift change report given to Ness Rosa RN (oncoming nurse) by Blaine Hill RN and Sienna Wolff LPN (offgoing nurse). Report included the following information SBAR, Kardex, MAR, and Recent Results.

## 2022-09-27 NOTE — PROGRESS NOTES
Transition of Care Plan  RUR- Low 12%  DISPOSITION: IPR - [facility] - pending insurance auth started 9/22   F/U with PCP/Specialist    Transport: [facility]    Emergency contact: Son Alejandrina Samuel [phone]    CECILIA barriers to discharge: insurance auth    Patient is medically stable for discharge today. CM spoke with [facility] liaison, Sal Tam, insurance [place] is still pending at this time. CM called patient's son, Rony Zaragoza, to provide update. In addition, CM asked patient's son to identify 3 SNF choices as a back up plan. CECILIA emailed SNF choice list to [email or web address]. Plan to follow up tomorrow. REYNOLD Zepeda.

## 2022-09-27 NOTE — PROGRESS NOTES
Problem: Dysphagia (Adult)  Goal: *Acute Goals and Plan of Care (Insert Text)  Description: Speech therapy goals  Initiated 9/23/2022  1. Patient will tolerate easy to chew diet with thin liquids free of sequelae of aspiration within 7 days. Initiated 9/16/2022   1. Patient will tolerate pureed snacks without s/s of aspiration or adverse effects within 7 days  MET  2. Patient will participate in repeat FEES to determine safety to initiate PO diet within 7 days   Initiated 9/15/2022  MET  1. Patient will tolerate meds in applesauce without s/s of aspiration within 7 days MET 9/16/2022   2. Patient will participate in re-assessment of swallow function within 7 days MET 9/16/2022   Outcome: Not Met     Problem: Communication Impaired (Adult)  Goal: *Acute Goals and Plan of Care (Insert Text)  Description: Problem: Communication Impaired (Adult)  Goal: *Acute Goals and Plan of Care (Insert Text) Re-eval 9/27/2022 continue 7 more days  Note:   1. Patient will improve verbal expression for naming ADL objects, word level sentence completion tasks, automatic sequences, and responsive naming tasks with 80% accuracy given repetition within 7 days. 2. Patient will improve auditory comprehension for simple yes/no questions and 1 step commands with 80% accuracy given repetition within 7 days. 3. Patient will improve auditory comprehension for ID of objects given a field of 2-3 with 80% accuracy given repetition within 7 days. Outcome: Not Met   SPEECH LANGUAGE PATHOLOGY DYSPHAGIA AND SPEECH TREATMENT  Patient: Omega Ellis (14 y.o. male)  Date: 9/27/2022  Diagnosis: [facility] [I63.9] <principal problem not specified>      Precautions:   Fall (SBP< 180/90)    ASSESSMENT:  Patient presents with mild to moderate oral and mild pharyngeal dysphagia. He demonstrates prolonged, incomplete mastication of solid with pocketing on the R. He was able to clear this with a liquid wash.  Given his aphasia he was unable to request a liquid wash. Throat clear noted once after thins. Otherwise, no s/s of aspiration. Communication remains nonfunctional. He was able to produce several low-content automatic phrases independently, \"It's doing much better. \" He was able to count 1-10 with jargon noted in between numbers. Unable to state days of the week, name objects, or produce word level responses during structured tasks. PLAN:  Recommendations and Planned Interventions:  Continue current diet  Following for communication and swallowing. Patient continues to benefit from skilled intervention to address the above impairments. Continue treatment per established plan of care. Discharge Recommendations: To Be Determined     SUBJECTIVE:   Patient stated It's doing much better, when asked how his speech was. OBJECTIVE:   Cognitive and Communication Status:  Neurologic State: Alert  Orientation Level: Unable to verbalize  Cognition: Decreased command following  Perception: Cues to attend to right side of body, Cues to maintain midline in sitting, Cues to attend right visual field  Perseveration: No perseveration noted  Safety/Judgement: Decreased awareness of environment, Decreased awareness of need for assistance, Decreased awareness of need for safety, Decreased insight into deficits, Fall prevention    Dysphagia Treatment and Interventions:       P.O. Trials:     Vocal quality prior to P.O.: Low volume  Consistency Presented: Thin liquid; Solid  How Presented: SLP-fed/presented;Straw;Successive swallows     Bolus Acceptance: No impairment  Bolus Formation/Control: Impaired  Type of Impairment: Incomplete;Mastication  Propulsion: Delayed (# of seconds)  Oral Residue: Right;Pocketing        Aspiration Signs/Symptoms:  (throat clear x 1)                                                                                                                                             Speech Treatment and Interventions:                                     Language Comprehension and Expression:  Auditory Comprehension   Hearing Aid: None  Verbal Expression  Verbal Expression  Automatic Speech Task: Impaired (comment)  Automatic speech task cueing type: Verbal  Automatic speech task cueing amount: Min-mod  Confrontation (%): 0 %  Sentence Completion: Impaired  Word level (%): 0 %                 After treatment:   Patient left in no apparent distress in bed, Call bell within reach, and Nursing notified    COMMUNICATION/EDUCATION:   Patient was educated regarding purpose of SLP visit. He participated but was unable to communicate understanding due to aphasia. The patient's plan of care including recommendations, planned interventions, and recommended diet changes were discussed with: Registered nurse.        LEOLA Copeland  Time Calculation: 25 mins

## 2022-09-27 NOTE — PROGRESS NOTES
Problem: Pressure Injury - Risk of  Goal: *Prevention of pressure injury  Description: Document Pepe Scale and appropriate interventions in the flowsheet. Outcome: Progressing Towards Goal  Note: Pressure Injury Interventions:  Sensory Interventions: Assess changes in LOC, Check visual cues for pain    Moisture Interventions: Absorbent underpads, Internal/External urinary devices    Activity Interventions: Pressure redistribution bed/mattress(bed type)    Mobility Interventions: HOB 30 degrees or less, Turn and reposition approx. every two hours(pillow and wedges)    Nutrition Interventions: Document food/fluid/supplement intake    Friction and Shear Interventions: HOB 30 degrees or less                Problem: Patient Education: Go to Patient Education Activity  Goal: Patient/Family Education  Outcome: Progressing Towards Goal     Problem: Patient Education: Go to Patient Education Activity  Goal: Patient/Family Education  Outcome: Progressing Towards Goal     Problem: Patient Education: Go to Patient Education Activity  Goal: Patient/Family Education  Outcome: Progressing Towards Goal     Problem: Patient Education: Go to Patient Education Activity  Goal: Patient/Family Education  Outcome: Progressing Towards Goal     Problem: Falls - Risk of  Goal: *Absence of Falls  Description: Document Kasi Fall Risk and appropriate interventions in the flowsheet.   Outcome: Progressing Towards Goal  Note: Fall Risk Interventions:  Mobility Interventions: Bed/chair exit alarm    Mentation Interventions: Bed/chair exit alarm, Door open when patient unattended, More frequent rounding, Reorient patient    Medication Interventions: Evaluate medications/consider consulting pharmacy    Elimination Interventions: Bed/chair exit alarm, Call light in reach, Toileting schedule/hourly rounds    History of Falls Interventions: Bed/chair exit alarm, Investigate reason for fall         Problem: Patient Education: Go to Patient Education Activity  Goal: Patient/Family Education  Outcome: Progressing Towards Goal     Problem: Patient Education: Go to Patient Education Activity  Goal: Patient/Family Education  Outcome: Progressing Towards Goal     Problem: TIA/CVA Stroke: Day 2 Until Discharge  Goal: Off Pathway (Use only if patient is Off Pathway)  Outcome: Progressing Towards Goal  Goal: Activity/Safety  Outcome: Progressing Towards Goal  Goal: Diagnostic Test/Procedures  Outcome: Progressing Towards Goal  Goal: Nutrition/Diet  Outcome: Progressing Towards Goal  Goal: Discharge Planning  Outcome: Progressing Towards Goal  Goal: Medications  Outcome: Progressing Towards Goal  Goal: Respiratory  Outcome: Progressing Towards Goal  Goal: Treatments/Interventions/Procedures  Outcome: Progressing Towards Goal  Goal: Psychosocial  Outcome: Progressing Towards Goal  Goal: *Verbalizes anxiety and depression are reduced or absent  Outcome: Progressing Towards Goal  Goal: *Absence of aspiration  Outcome: Progressing Towards Goal  Goal: *Absence of deep venous thrombosis signs and symptoms(Stroke Metric)  Outcome: Progressing Towards Goal  Goal: *Optimal pain control at patient's stated goal  Outcome: Progressing Towards Goal  Goal: *Tolerating diet  Outcome: Progressing Towards Goal  Goal: *Ability to perform ADLs and demonstrates progressive mobility and function  Outcome: Progressing Towards Goal  Goal: *Stroke education continued(Stroke Metric)  Outcome: Progressing Towards Goal

## 2022-09-27 NOTE — PROGRESS NOTES
Problem: Self Care Deficits Care Plan (Adult)  Goal: *Acute Goals and Plan of Care (Insert Text)  Description: FUNCTIONAL STATUS PRIOR TO ADMISSION: Patient with communication deficits limiting gathering home environment/prior level of function information. Per chart, patient lives by himself and was climbing a ladder when event happened, presumed independent prior level of function. HOME SUPPORT: The patient lived alone with sister and brother-in-law for family support. Sister had called on the 13th and became concerned when no response, called a neighbor who found the patient. Would benefit from confirming prior level of function and home environment information with family when able. Occupational Therapy Goals  Weekly RA 9/22/2022  1. Patient will perform self-feeding with setup assist using left upper extremity within 7 day(s). upgraded  2. Patient will perform grooming in sitting with contact guard assist within 7 day(s). upgraded  3. Patient will perform lower body dressing with maximal assistance within 7 day(s). Cont   4. Patient will perform toilet transfers to [facility] with maximal assistance within 7 day(s). Cont   5. Patient will perform all aspects of toileting with maximal assistance within 7 day(s). Cont   6. Patient will participate in upper extremity therapeutic exercise/activities with minimal assistance/contact guard assist within 7 day(s). Cont    7. Patient will participate in R [address] within 7 days. Cont     Initiated 9/15/2022   1. Patient will perform self-feeding with minimal assistance/contact guard assist within 7 day(s). 2.  Patient will perform grooming in sitting with moderate assist within 7 day(s). 3.  Patient will perform lower body dressing with maximal assistance within 7 day(s). 4.  Patient will perform toilet transfers to [facility] with maximal assistance within 7 day(s).   5.  Patient will perform all aspects of toileting with maximal assistance within 7 day(s). 6.  Patient will participate in upper extremity therapeutic exercise/activities with minimal assistance/contact guard assist within 7 day(s). 7.  Patient will participate in R [address] within 7 days. Outcome: Progressing Towards Goal    OCCUPATIONAL THERAPY TREATMENT  Patient: Guera Pennington (59 y.o. male)  Date: 9/27/2022  Diagnosis: Ischemic cerebrovascular accident (CVA) ([facility] [I63.9] <principal problem not specified>      Precautions: Fall (SBP< 180/90)  Chart, occupational therapy assessment, plan of care, and goals were reviewed. ASSESSMENT  Patient is progressing in OT goals slowly. Sitting balance improved today compared to yesterday. Remains limited by aphasia, decreased command following requiring multimodal cuing, impaired sitting balance, flaccid R hemiparalysis (RUE and RLE non-functional), moderate R neglect, R shoulder subluxation (~3 cm), severe RUE edema, BLE edema (R>L), L ankle/ foot pain, and tachycardia (110s at rest, up to 160 with activity). Patient continues to require maximum assistance x2 for supine<>sit and maintained sitting balance with only occasional support for R lean. Attempted pull-to-stand using LUE on anterior rail x3 trails with x2 assist but achieved no buttocks clearing from EOB- L foot/ ankle pain may be a factor. Continue to recommend inpatient rehab at d/c to maximize functional/ neurological recovery. Current Level of Function Impacting Discharge (ADLs): maximum assistance x2 for supine<>sit, unable to stand with x2 assist. Infer overall minimum to maximum assistance UB ADLs and total assistance LB ADLs          PLAN :  Patient continues to benefit from skilled intervention to address the above impairments. Continue treatment per established plan of care to address goals.     Recommendation for discharge: (in order for the patient to meet his/her long term goals)  Therapy 3 hours per day 5-7 days per week    This discharge recommendation:  Has been made in collaboration with the attending provider and/or case management         SUBJECTIVE:   Patient stated Ouch.    OBJECTIVE DATA SUMMARY:   Cognitive/Behavioral Status:  Neurologic State: Alert  Orientation Level:  (limited by aphasia)  Cognition: Decreased command following  Perception: Cues to attend to right side of body;Cues to attend right visual field;Cues to maintain midline in sitting  Perseveration: No perseveration noted       Functional Mobility and Transfers for ADLs:  Bed Mobility:  Supine to Sit: Maximum assistance;Assist x2 (HOB raised, using rail with LUE)  Sit to Supine: Maximum assistance;Assist x2           Balance:  Sitting: Impaired; With support  Sitting - Static: Fair (occasional)  Sitting - Dynamic: Fair (occasional); Poor (constant support)    ADL Intervention:                 Type of Bath: Chlorhexidine (CHG)         Pain:  Patient indicated L foot/ankle pain    Activity Tolerance:   Fair    After treatment patient left in no apparent distress:   Supine in bed, Call bell within reach, and Bed / chair alarm activated    COMMUNICATION/COLLABORATION:   The patients plan of care was discussed with: Physical therapist and Registered nurse. Patient was educated regarding His deficit(s) of impaired balance and R hemiparesis as this relates to His diagnosis of CVA. He demonstrated Guarded understanding as evidenced by limited response. Patient and/or family was verbally educated on the BE FAST acronym for signs/symptoms of CVA and TIA. BE FAST was written on patient's communication board  for visual education and reinforcement. All questions answered with patient indicating guarded understanding.      Julian Benton OT  Time Calculation: 30 mins

## 2022-09-27 NOTE — PROGRESS NOTES
[address] [facility] Adult  Hospitalist Group                                                                                          Hospitalist Progress Note  Maxwell Dunne MD  Answering service: [phone] from in house phone        Date of Service:  2022  NAME:  Velvet Call  :  1950  MRN:  [id number]      Admission Summary:     Patient presents with acute CVA, CT head and follow-up MRI shows large acute left MCA territory infarct. Patient with some expressive aphasia and right-sided weakness. Neuro following. Initially was admitted to ICU and transferred out of ICU 9/15/2022. Interval history / Subjective:     Patient is seen and examined at bedside this AM. He is alert, following simple commands.  C/w Left ankle pain - chronic - XR ordered    Discussed with nursing     Assessment & Plan:     Acute CVA  -CT head shows large acute left MCA territory infarct, CTA of the head and neck shows severe stenosis, near occlusive thrombus of the distal left MCA M1  -MRI of the brain which shows moderate to large acute left MCA territory infarct with findings associated with petechial hemorrhage  -Echo with bubble study shows EF of 50 to 55%, bubble study is not adequate to rule out shunt  -Neurology following, recommending holding antiplatelet and anticoagulation for 2 weeks due to large CVA with hemorrhagic conversion  -c/w  statin  -Speech therapy following for aphasia and dysphagia    Hypernatremia - resolved     - appreciate nephrology input  - s/p D5 IVF   -Monitor sodium level     Paroxysmal atrial fibrillation with RVR  - appreciate cardiology input   -Currently holding anticoagulation due to large CVA with hemorrhagic conversion  -Patient was previously taking Xarelto compliantly and therefore may indicate failure of Xarelto, plan to change to Eliquis when patient is cleared to take oral anticoagulation  - off cardizem gtt on   - c/w Diltiazem 60mg q8hr and Metoprolol 37.5mg bid   - plan to start on Eliquis 9/29     DOMINICK - resolved   -Multiple etiologies including volume depletion, ATN from contrast  -Renal ultrasound shows mild left hydronephrosis - resolved on rpt US   -S/p Vazquez catheter placement  - appreciate nephro input  - monitor renal function      Rhabdomyolysis  -This is likely due to prolonged immobilization after his CVA  -CK trending down     Elevated troponin  -Likely demand ischemia     Dyslipidemia  -Continue statin, LDL at goal     ? UTI on 9/21  - s/p IV ceftriaxone   - urine cx - Staph species, coagulase neg   - urinary rentention on vazquez. C/w flomax     Left Ankle pain  - XR: Left ankle osteoarthritis. Possible impingement syndrome.   -  Orthopedics consulted    Code status: Full  Prophylaxis: SCDs  Care Plan discussed with: Patient  Anticipated Disposition: IPR - [facility] - waiting for auth. Medically stable        Hospital Problems  Date Reviewed: 7/28/2022            Codes Class Noted POA    Ischemic cerebrovascular accident (CVA) [facility]) ICD-10-CM: I63.9  ICD-9-CM: 434.91  9/14/2022 Unknown       Review of Systems:   A comprehensive review of systems was negative except for that written in the HPI. Vital Signs:    Last 24hrs VS reviewed since prior progress note. Most recent are:  Visit Vitals  /67 (BP Patient Position: Semi fowlers)   Pulse (!) 121   Temp 97.9 °F (36.6 °C)   Resp 19   Ht 6' 3\" (1.905 m)   Wt 130 kg (286 lb 9.6 oz)   SpO2 98%   BMI 35.82 kg/m²         Intake/Output Summary (Last 24 hours) at 9/27/2022 1530  Last data filed at 9/27/2022 1000  Gross per 24 hour   Intake --   Output 1400 ml   Net -1400 ml            Physical Examination:     I had a face to face encounter with this patient and independently examined them on 9/27/2022 as outlined below:          Constitutional:  No acute distress, cooperative, pleasant    ENT:  Oral mucosa moist, oropharynx benign. Resp:  CTA bilaterally. No wheezing/rhonchi/rales.  No accessory muscle use. CV:  Regular rhythm, normal rate, no murmurs, gallops, rubs    GI:  Soft, non distended, non tender. normoactive bowel sounds, no hepatosplenomegaly     Musculoskeletal:  No edema, warm, 2+ pulses throughout    Neurologic:  Right side weakness. Awake and alert, oriented x 3            Data Review:    Review and/or order of clinical lab test      Labs:     Recent Labs     09/27/22  0636   WBC 14.4*   HGB 11.8*   HCT 36.0*          Recent Labs     09/27/22  0636 09/25/22  0517    141   K 4.1 3.3*    112*   CO2 25 24   BUN 23* 25*   CREA 0.83 0.90   * 153*   CA 8.4* 8.1*   MG 2.0 2.0       No results for input(s): ALT, AP, TBIL, TBILI, TP, ALB, GLOB, GGT, AML, LPSE in the last 72 hours. No lab exists for component: SGOT, GPT, AMYP, HLPSE    No results for input(s): INR, PTP, APTT, INREXT, INREXT in the last 72 hours. No results for input(s): FE, TIBC, PSAT, FERR in the last 72 hours. No results found for: FOL, RBCF   No results for input(s): PH, PCO2, PO2 in the last 72 hours. No results for input(s): CPK, CKNDX, TROIQ in the last 72 hours.     No lab exists for component: CPKMB    Lab Results   Component Value Date/Time    Cholesterol, total 146 09/15/2022 02:40 AM    HDL Cholesterol 53 09/15/2022 02:40 AM    LDL, calculated 65 09/15/2022 02:40 AM    Triglyceride 140 09/15/2022 02:40 AM    CHOL/HDL Ratio 2.8 09/15/2022 02:40 AM     Lab Results   Component Value Date/Time    Glucose (POC) 176 (H) 09/27/2022 11:27 AM    Glucose (POC) 137 (H) 09/27/2022 06:30 AM    Glucose (POC) 138 (H) 09/27/2022 12:36 AM    Glucose (POC) 139 (H) 09/26/2022 05:51 PM    Glucose (POC) 116 09/26/2022 11:36 AM     Lab Results   Component Value Date/Time    Color YELLOW/STRAW 09/21/2022 10:33 AM    Appearance CLOUDY (A) 09/21/2022 10:33 AM    Specific gravity 1.014 09/21/2022 10:33 AM    pH (UA) 5.0 09/21/2022 10:33 AM    Protein TRACE (A) 09/21/2022 10:33 AM    Glucose Negative 09/21/2022 10:33 AM    Ketone Negative 09/21/2022 10:33 AM    Bilirubin Negative 09/21/2022 10:33 AM    Urobilinogen 0.2 09/21/2022 10:33 AM    Nitrites Negative 09/21/2022 10:33 AM    Leukocyte Esterase MODERATE (A) 09/21/2022 10:33 AM    Epithelial cells FEW 09/21/2022 10:33 AM    Bacteria 2+ (A) 09/21/2022 10:33 AM    WBC 10-20 09/21/2022 10:33 AM    RBC  09/21/2022 10:33 AM         Medications Reviewed:     Current Facility-Administered Medications   Medication Dose Route Frequency    [START ON 9/29/2022] apixaban (ELIQUIS) tablet 5 mg  5 mg Oral BID    tamsulosin (FLOMAX) capsule 0.4 mg  0.4 mg Oral DAILY    famotidine (PEPCID) tablet 20 mg  20 mg Oral Q12H    metoprolol tartrate (LOPRESSOR) tablet 37.5 mg  37.5 mg Oral Q12H    dilTIAZem IR (CARDIZEM) tablet 60 mg  60 mg Oral Q8H    HYDROmorphone (DILAUDID) injection 0.2 mg  0.2 mg IntraVENous Q3H PRN    labetaloL (NORMODYNE;TRANDATE) injection 5 mg  5 mg IntraVENous Q6H PRN    balsam peru-castor oiL (VENELEX) ointment   Topical BID    glucose chewable tablet 16 g  4 Tablet Oral PRN    glucagon (GLUCAGEN) injection 1 mg  1 mg IntraMUSCular PRN    dextrose 10 % infusion 0-250 mL  0-250 mL IntraVENous PRN    insulin lispro (HUMALOG) injection   SubCUTAneous Q6H    acetaminophen (TYLENOL) tablet 650 mg  650 mg Oral Q4H PRN    Or    acetaminophen (TYLENOL) solution 650 mg  650 mg Per NG tube Q4H PRN    Or    acetaminophen (TYLENOL) suppository 650 mg  650 mg Rectal Q4H PRN    atorvastatin (LIPITOR) tablet 80 mg  80 mg Oral QHS    docusate sodium (COLACE) capsule 100 mg  100 mg Oral BID     ______________________________________________________________________  EXPECTED LENGTH OF STAY: 4d 9h  ACTUAL LENGTH OF STAY:          Loretta Coffman MD

## 2022-09-28 VITALS
OXYGEN SATURATION: 98 % | HEIGHT: 75 IN | DIASTOLIC BLOOD PRESSURE: 90 MMHG | SYSTOLIC BLOOD PRESSURE: 131 MMHG | TEMPERATURE: 98.2 F | RESPIRATION RATE: 20 BRPM | WEIGHT: 290.35 LBS | HEART RATE: 105 BPM | BODY MASS INDEX: 36.1 KG/M2

## 2022-09-28 LAB
ANION GAP SERPL CALC-SCNC: 10 MMOL/L (ref 5–15)
BUN SERPL-MCNC: 23 MG/DL (ref 6–20)
BUN/CREAT SERPL: 29 (ref 12–20)
CALCIUM SERPL-MCNC: 8.1 MG/DL (ref 8.5–10.1)
CHLORIDE SERPL-SCNC: 105 MMOL/L (ref 97–108)
CO2 SERPL-SCNC: 17 MMOL/L (ref 21–32)
CREAT SERPL-MCNC: 0.8 MG/DL (ref 0.7–1.3)
ERYTHROCYTE [DISTWIDTH] IN BLOOD BY AUTOMATED COUNT: 13.2 % (ref 11.5–14.5)
GLUCOSE BLD STRIP.AUTO-MCNC: 116 MG/DL (ref 65–117)
GLUCOSE BLD STRIP.AUTO-MCNC: 121 MG/DL (ref 65–117)
GLUCOSE BLD STRIP.AUTO-MCNC: 134 MG/DL (ref 65–117)
GLUCOSE SERPL-MCNC: 99 MG/DL (ref 65–100)
HCT VFR BLD AUTO: 36.4 % (ref 36.6–50.3)
HGB BLD-MCNC: 11.9 G/DL (ref 12.1–17)
MAGNESIUM SERPL-MCNC: 2.1 MG/DL (ref 1.6–2.4)
MCH RBC QN AUTO: 29.3 PG (ref 26–34)
MCHC RBC AUTO-ENTMCNC: 32.7 G/DL (ref 30–36.5)
MCV RBC AUTO: 89.7 FL (ref 80–99)
NRBC # BLD: 0 K/UL (ref 0–0.01)
NRBC BLD-RTO: 0 PER 100 WBC
PLATELET # BLD AUTO: 230 K/UL (ref 150–400)
PMV BLD AUTO: 12.3 FL (ref 8.9–12.9)
POTASSIUM SERPL-SCNC: 4.6 MMOL/L (ref 3.5–5.1)
RBC # BLD AUTO: 4.06 M/UL (ref 4.1–5.7)
SERVICE CMNT-IMP: ABNORMAL
SERVICE CMNT-IMP: ABNORMAL
SERVICE CMNT-IMP: NORMAL
SODIUM SERPL-SCNC: 132 MMOL/L (ref 136–145)
WBC # BLD AUTO: 11.7 K/UL (ref 4.1–11.1)

## 2022-09-28 PROCEDURE — 85027 COMPLETE CBC AUTOMATED: CPT

## 2022-09-28 PROCEDURE — 82962 GLUCOSE BLOOD TEST: CPT

## 2022-09-28 PROCEDURE — 74011250637 HC RX REV CODE- 250/637: Performed by: NURSE PRACTITIONER

## 2022-09-28 PROCEDURE — 80048 BASIC METABOLIC PNL TOTAL CA: CPT

## 2022-09-28 PROCEDURE — 83735 ASSAY OF MAGNESIUM: CPT

## 2022-09-28 PROCEDURE — 36415 COLL VENOUS BLD VENIPUNCTURE: CPT

## 2022-09-28 PROCEDURE — 74011250637 HC RX REV CODE- 250/637: Performed by: INTERNAL MEDICINE

## 2022-09-28 RX ORDER — GABAPENTIN 300 MG/1
300 CAPSULE ORAL
Qty: 30 CAPSULE | Refills: 0 | Status: ON HOLD | OUTPATIENT
Start: 2022-09-28 | End: 2022-10-29 | Stop reason: SDUPTHER

## 2022-09-28 RX ADMIN — DILTIAZEM HYDROCHLORIDE 60 MG: 60 TABLET, FILM COATED ORAL at 06:43

## 2022-09-28 RX ADMIN — DOCUSATE SODIUM 100 MG: 100 CAPSULE, LIQUID FILLED ORAL at 08:48

## 2022-09-28 RX ADMIN — FAMOTIDINE 20 MG: 20 TABLET, FILM COATED ORAL at 08:49

## 2022-09-28 RX ADMIN — METOPROLOL TARTRATE 37.5 MG: 25 TABLET, FILM COATED ORAL at 06:43

## 2022-09-28 RX ADMIN — Medication: at 08:49

## 2022-09-28 RX ADMIN — TAMSULOSIN HYDROCHLORIDE 0.4 MG: 0.4 CAPSULE ORAL at 08:48

## 2022-09-28 RX ADMIN — DILTIAZEM HYDROCHLORIDE 60 MG: 60 TABLET, FILM COATED ORAL at 14:25

## 2022-09-28 NOTE — PROGRESS NOTES
Report given to oncoming nurse. Patient status/history, orders and plan reviewed.   All questions and concerns addressed at this time

## 2022-09-28 NOTE — PROGRESS NOTES
I have reviewed discharge instructions with the caregiver. The caregiver verbalized understanding. RN called report to [facility] Camille Ascencio RN patient Transport by [facility].

## 2022-09-28 NOTE — PROGRESS NOTES
Problem: Pressure Injury - Risk of  Goal: *Prevention of pressure injury  Description: Document Pepe Scale and appropriate interventions in the flowsheet.   Outcome: Progressing Towards Goal  Note: Pressure Injury Interventions:  Sensory Interventions: Assess changes in LOC, Discuss PT/OT consult with provider    Moisture Interventions: Absorbent underpads, Check for incontinence Q2 hours and as needed    Activity Interventions: Pressure redistribution bed/mattress(bed type), PT/OT evaluation    Mobility Interventions: HOB 30 degrees or less, Pressure redistribution bed/mattress (bed type)    Nutrition Interventions: Document food/fluid/supplement intake    Friction and Shear Interventions: HOB 30 degrees or less                Problem: Patient Education: Go to Patient Education Activity  Goal: Patient/Family Education  Outcome: Progressing Towards Goal     Problem: Patient Education: Go to Patient Education Activity  Goal: Patient/Family Education  Outcome: Progressing Towards Goal     Problem: Patient Education: Go to Patient Education Activity  Goal: Patient/Family Education  Outcome: Progressing Towards Goal

## 2022-09-28 NOTE — DISCHARGE SUMMARY
Discharge Instructions/Summary     Patient: Tevin Sparks       MRN: [id number]       YOB: 1950       Age: 71 y.o. Date of admission:  9/14/2022    Date of discharge:  9/28/2022    Primary care provider:  Gregory Walden MD     Admitting provider:  Von Mercedes MD    Discharging provider:  Lanette Shipley MD , to contact this individual call [phone] and ask the  to page, if unavailable ask for the triage hospitalist to be paged. Consultations  IP CONSULT TO NEUROLOGY  IP CONSULT TO NEPHROLOGY  IP CONSULT TO CARDIOLOGY    Procedures/Surgeries  * No surgery found *    Discharge destination: [facility]. The patient is stable for discharge. Admission diagnosis  Ischemic cerebrovascular accident (CVA) ([facility] [I63.9]      [address] COURSE:    Patient presents with acute CVA, CT head and follow-up MRI shows large acute left MCA territory infarct. Patient with some expressive aphasia and right-sided weakness. Neuro following.   Initially was admitted to ICU and transferred out of ICU     Acute CVA  -CT head shows large acute left MCA territory infarct, CTA of the head and neck shows severe stenosis, near occlusive thrombus of the distal left MCA M1  -MRI of the brain which shows moderate to large acute left MCA territory infarct with findings associated with petechial hemorrhage  -Echo with bubble study shows EF of 50 to 55%, bubble study is not adequate to rule out shunt  -Neurology following, recommending holding antiplatelet and anticoagulation for 2 weeks due to large CVA with hemorrhagic conversion  -c/w  statin  -Speech therapy following for aphasia and dysphagia     Hypernatremia - resolved     - appreciate nephrology input  - s/p D5 IVF   -Monitor sodium level     Paroxysmal atrial fibrillation with RVR  - appreciate cardiology input   -Currently holding anticoagulation due to large CVA with hemorrhagic conversion  -Patient was previously taking Xarelto compliantly and therefore may indicate failure of Xarelto, plan to change to Eliquis when patient is cleared to take oral anticoagulation  - off cardizem gtt on 9/22  - c/w Diltiazem 60mg q8hr and Metoprolol 37.5mg bid   - plan to start on Eliquis 9/29      DOMINICK - resolved   -Multiple etiologies including volume depletion, ATN from contrast  -Renal ultrasound shows mild left hydronephrosis - resolved on rpt US   -S/p Vazquez catheter placement  - appreciate nephro input  - monitor renal function      Rhabdomyolysis  -This is likely due to prolonged immobilization after his CVA  -CK trending down     Elevated troponin  -Likely demand ischemia     Dyslipidemia  -Continue statin, LDL at goal      ? UTI on 9/21  - s/p IV ceftriaxone   - urine cx - Staph species, coagulase neg   - urinary retention on vazquez. C/w flomax      Left Ankle pain  - XR: Left ankle osteoarthritis. Possible impingement syndrome.   -  Discussed with Orthopedics  - outpt f/u . No need for any weight bearing restrictions     Spoke with son Kaitlyn Del Cid on phone and updated discharge plan. He agreed        Current Discharge Medication List        START taking these medications    Details   apixaban (ELIQUIS) 5 mg tablet Take 1 Tablet by mouth two (2) times a day. Qty: 60 Tablet, Refills: 0  Start date: 9/29/2022      atorvastatin (LIPITOR) 80 mg tablet Take 1 Tablet by mouth nightly. Qty: 30 Tablet, Refills: 0  Start date: 9/26/2022      balsam peru-castor oiL (VENELEX) ointment Apply  to affected area two (2) times a day. APPLY TO all bony prominences, abrasions and ecchymosis to right side, posterior head Nursing, document site in comments  Qty: 5 g, Refills: 0  Start date: 9/26/2022      dilTIAZem IR (CARDIZEM) 60 mg tablet Take 1 Tablet by mouth every eight (8) hours.   Qty: 30 Tablet, Refills: 0  Start date: 9/26/2022      docusate sodium (COLACE) 100 mg capsule Take 1 Capsule by mouth two (2) times daily as needed for Constipation for up to 90 days. Qty: 60 Capsule, Refills: 0  Start date: 9/26/2022, End date: 12/25/2022      famotidine (PEPCID) 20 mg tablet Take 1 Tablet by mouth every twelve (12) hours. Qty: 30 Tablet, Refills: 0  Start date: 9/26/2022      metoprolol tartrate 37.5 mg tab Take 37.5 mg by mouth every twelve (12) hours. Qty: 60 Tablet, Refills: 0  Start date: 9/26/2022      tamsulosin (FLOMAX) 0.4 mg capsule Take 1 Capsule by mouth daily. Qty: 30 Capsule, Refills: 0  Start date: 9/27/2022           CONTINUE these medications which have CHANGED    Details   gabapentin (NEURONTIN) 300 mg capsule Take 1 Capsule by mouth nightly. Max Daily Amount: 300 mg. Qty: 30 Capsule, Refills: 0  Start date: 9/28/2022    Comments: Requesting 1 year supply  Associated Diagnoses: Diabetic polyneuropathy associated with type 2 diabetes mellitus ([facility] [address]           CONTINUE these medications which have NOT CHANGED    Details   allopurinoL (ZYLOPRIM) 100 mg tablet Take 1 Tablet by mouth daily. 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           STOP taking these medications       lisinopril-hydroCHLOROthiazide (PRINZIDE, ZESTORETIC) 20-25 mg per tablet Comments:   Reason for Stopping:         colchicine 0.6 mg tablet Comments:   Reason for Stopping:         rivaroxaban (Xarelto) 20 mg tab tablet Comments:   Reason for Stopping:                 FOLLOW-UP CARE RECOMMENDATIONS/TESTING/NURSING ORDERS:  PT/OT  SLP   Start Eliquis 9/29   Voiding trail       DIET:  Regular Diet, easy to chew    ACTIVITY:  Activity as tolerated, PT/OT to evaluate and treat, and OOB for meals    WOUND CARE:   Posterior head, right back, right flank, right hip, right lateral lower leg, right ankle/foot, right toes, bilateral heels and sacrum:  Apply Venelex BID.      Right elbow:  Daily cleanse, apply Venelex, cover with folded Xeroform and foam dressing. APPOINTMENTS:  Follow-up with primary care provider, Dr. Archie Eckert MD  -  Please call to set up an appointment to be seen in  1 week   Neurology  Cardiology  Orthopedics       PENDING TEST RESULTS:  At the time of discharge the following test results are still pending: none . Please review these results as they become available. Specific symptoms to watch for: chest pain, shortness of breath, fever, chills, nausea, vomiting, diarrhea, change in mentation, falling, weakness, bleeding. GOALS OF CARE:  X  Eventual return to home/independent/assisted living     Long term SNF      Hospice     No rehospitalization     SOCIAL HISTORY:     Social History     Socioeconomic History    Marital status:      Spouse name: Not on file    Number of children: Not on file    Years of education: Not on file    Highest education level: Not on file   Occupational History    Not on file   Tobacco Use    Smoking status: Former     Packs/day: 0.50     Years: 5.00     Pack years: 2.50     Types: Cigarettes     Quit date: 1990     Years since quittin.3    Smokeless tobacco: Never   Substance and Sexual Activity    Alcohol use:  Yes     Alcohol/week: 1.7 standard drinks     Types: 2 Standard drinks or equivalent per week     Comment: 2-3 drinks per week    Drug use: No    Sexual activity: Not Currently   Other Topics Concern    Not on file   Social History Narrative    Not on file     Social Determinants of Health     Financial Resource Strain: Not on file   Food Insecurity: Not on file   Transportation Needs: Not on file   Physical Activity: Not on file   Stress: Not on file   Social Connections: Not on file   Intimate Partner Violence: Not on file   Housing Stability: Not on file       Patient condition at discharge:   Functional status    Poor    X  Deconditioned      Independent   Cognition    Lucid     Forgetful (some sensescence)     Dementia   Catheters/lines (plus indication)  X  [place]     PICC      PEG         Code status  X  Full code      DNR         CHRONIC MEDICAL CONDITIONS:  Problem List as of 9/28/2022 Date Reviewed: 7/28/2022            Codes Class Noted - Resolved    Ischemic cerebrovascular accident (CVA) ([facility] ICD-10-CM: I63.9  ICD-9-CM: 434.91  9/14/2022 - Present        Diabetes mellitus type 2, diet-controlled ([facility] ICD-10-CM: E11.9  ICD-9-CM: 250.00  5/2/2018 - Present        Benign non-nodular prostatic hyperplasia with lower urinary tract symptoms ICD-10-CM: N40.1  ICD-9-CM: 600.91  4/25/2017 - Present    Overview Signed 4/25/2017  9:10 AM by Alva Witt MD     Retention had catheter 3 months             Urinary retention due to benign prostatic hyperplasia ICD-10-CM: N40.1, R33.8  ICD-9-CM: 600.91, 788.20  10/25/2016 - Present        Diabetes mellitus type 2, controlled ([facility] ICD-10-CM: E11.9  ICD-9-CM: 250.00  10/15/2015 - Present        Restless legs ICD-10-CM: G25.81  ICD-9-CM: 333.94  4/15/2015 - Present        VALENTE (obstructive sleep apnea) ICD-10-CM: Z29.49  ICD-9-CM: 327.23  6/19/2012 - Present    Overview Signed 6/19/2012 10:57 AM by Luciana Piedra MD     Bi-Level: 19/15 cmH2O. Dyslipidemia, goal LDL below 100 ICD-10-CM: E78.5  ICD-9-CM: 272.4  6/14/2011 - Present        A-fib ([facility] ICD-10-CM: I48.91  ICD-9-CM: 427.31  2/7/2011 - Present    Overview Addendum 2/9/2011  3:42 PM by Kev Perez NP     Assymptomatic, noted on ekg 2/11; found when had sleep study. Saw cardiologist 2/8/11.              Diabetes [facility]) ICD-10-CM: E11.9  ICD-9-CM: 250.00  1/28/2011 - Present    Overview Addendum 2/9/2011  3:37 PM by Kev Perez NP     Recent diagnosis; just started diabetes education  HgA1C 6.5%             Morbid obesity ([facility] ICD-10-CM: E66.01  ICD-9-CM: 278.01  5/17/2010 - Present    Overview Signed 2/9/2011  3:35 PM by Kev Perez NP     OBESE 20 + YEARS; HIGH WEIGHT 362 LBS PAST MONTH; LOW WEIGHT 190 LBS 30 YEARS AGO VALENTE (obstructive sleep apnea) ICD-10-CM: G47.33  ICD-9-CM: 327.23  4/24/2010 - Present        Erectile dysfunction due to arterial insufficiency ICD-10-CM: N52.01  ICD-9-CM: 607.84  4/24/2010 - Present        HTN (hypertension) ICD-10-CM: I10  ICD-9-CM: 401.9  4/24/2010 - Present    Overview Signed 4/24/2010  9:31 AM by Aleksandra Worrell     borderline             Depression ICD-10-CM: [place] MARY  ICD-9-CM: 727  4/24/2010 - Present               Physical examination at discharge  Visit Vitals  /76 (BP 1 Location: Left upper arm, BP Patient Position: At rest)   Pulse 83   Temp 97.9 °F (36.6 °C)   Resp 22   Ht 6' 3\" (1.905 m)   Wt 130.4 kg (287 lb 7.7 oz)   SpO2 98%   BMI 35.93 kg/m²                                           Constitutional:  No acute distress, cooperative, pleasant    ENT:  Oral mucosa moist, oropharynx benign. Resp:  CTA bilaterally. No wheezing/rhonchi/rales. No accessory muscle use. CV:  Regular rhythm, normal rate, no murmurs, gallops, rubs    GI:  Soft, non distended, non tender. normoactive bowel sounds, no hepatosplenomegaly     Musculoskeletal:  No edema, warm, 2+ pulses throughout    Neurologic:  Right side weakness. Awake and alert, oriented x 3                             Significant Diagnostic Studies:   9/14/2022: BUN 48 MG/DL (H; Ref range: 6 - 20 MG/DL); Calcium 9.6 MG/DL (Ref range: 8.5 - 10.1 MG/DL); CO2 20 mmol/L (L; Ref range: 21 - 32 mmol/L); Creatinine 1.21 MG/DL (Ref range: 0.70 - 1.30 MG/DL); Glucose 163 mg/dL (H; Ref range: 65 - 100 mg/dL); HCT 48.3 % (Ref range: 36.6 - 50.3 %); HGB 16.5 g/dL (Ref range: 12.1 - 17.0 g/dL); Potassium 3.6 mmol/L (Ref range: 3.5 - 5.1 mmol/L); Sodium 134 mmol/L (L; Ref range: 136 - 145 mmol/L)  9/15/2022: BUN 46 MG/DL (H; Ref range: 6 - 20 MG/DL); Calcium 9.3 MG/DL (Ref range: 8.5 - 10.1 MG/DL); CO2 25 mmol/L (Ref range: 21 - 32 mmol/L); Creatinine 0.99 MG/DL (Ref range: 0.70 - 1.30 MG/DL);  Glucose 166 mg/dL (H; Ref range: 65 - 100 mg/dL); HCT 44.4 % (Ref range: 36.6 - 50.3 %); HGB 15.2 g/dL (Ref range: 12.1 - 17.0 g/dL); Potassium 3.3 mmol/L (L; Ref range: 3.5 - 5.1 mmol/L); Sodium 139 mmol/L (Ref range: 136 - 145 mmol/L)  Recent Labs     09/28/22  0600 09/27/22  0636   WBC 11.7* 14.4*   HGB 11.9* 11.8*   HCT 36.4* 36.0*    208     Recent Labs     09/28/22  0600 09/27/22  0636   * 136   K 4.6 4.1    106   CO2 17* 25   BUN 23* 23*   CREA 0.80 0.83   GLU 99 139*   CA 8.1* 8.4*   MG 2.1 2.0     No results for input(s): AP, TBIL, TP, ALB, GLOB, GGT, AML, LPSE in the last 72 hours. No lab exists for component: SGOT, GPT, AMYP, HLPSE  No results for input(s): INR, PTP, APTT, INREXT in the last 72 hours. No results for input(s): FE, TIBC, PSAT, FERR in the last 72 hours. No results for input(s): PH, PCO2, PO2 in the last 72 hours. No results for input(s): CPK, CKMB in the last 72 hours. No lab exists for component: TROPONINI  No components found for: [place]    Pertinent imaging studies:    Per EMR       Time spent on discharge related activities today greater than 30 minutes. Signed:  La Malone MD                 Hospitalist                 9/28/2022                 10:18 AM        Cc:  Connie Bennett MD

## 2022-09-28 NOTE — DISCHARGE INSTRUCTIONS
Patient Discharge Instructions    Donna Bowman / [id number] : 1950    Admitted 2022 Discharged: 2022 10:12 AM     Discharging provider: Carli Quiroz MD. To contact this provider call [phone] and ask  to page, if not available ask for triage hospitalist to be paged. Primary care provider: @PCP@  . . . . . . . . . . . . . . . . . . . . . . . . . . . . . . . . . . . . . . . . . . . . . . . . . . . . . . . . . . . . . . . . . . . . . . . Kristian Feliciano    FINAL DIAGNOSES & HOSPITAL COURSE:    Acute CVA  -CT head shows large acute left MCA territory infarct, CTA of the head and neck shows severe stenosis, near occlusive thrombus of the distal left MCA M1  -MRI of the brain which shows moderate to large acute left MCA territory infarct with findings associated with petechial hemorrhage  -Echo with bubble study shows EF of 50 to 55%, bubble study is not adequate to rule out shunt  -Neurology following, recommending holding antiplatelet and anticoagulation for 2 weeks due to large CVA with hemorrhagic conversion  -c/w  statin  -Speech therapy following for aphasia and dysphagia     Hypernatremia - resolved     - appreciate nephrology input  - s/p D5 IVF   -Monitor sodium level     Paroxysmal atrial fibrillation with RVR  - appreciate cardiology input   -Currently holding anticoagulation due to large CVA with hemorrhagic conversion  -Patient was previously taking Xarelto compliantly and therefore may indicate failure of Xarelto, plan to change to Eliquis when patient is cleared to take oral anticoagulation  - off cardizem gtt on   - c/w Diltiazem 60mg q8hr and Metoprolol 37.5mg bid   - plan to start on Eliquis       DOMINICK - resolved   -Multiple etiologies including volume depletion, ATN from contrast  -Renal ultrasound shows mild left hydronephrosis - resolved on rpt US   -S/p Sexton catheter placement  - appreciate nephro input  - monitor renal function      Rhabdomyolysis  -This is likely due to prolonged immobilization after his CVA  -CK trending down     Elevated troponin  -Likely demand ischemia     Dyslipidemia  -Continue statin, LDL at goal      ? UTI on 9/21  - s/p IV ceftriaxone   - urine cx - Staph species, coagulase neg   - urinary rentention on vazquez. C/w flomax      Left Ankle pain  - XR: Left ankle osteoarthritis. Possible impingement syndrome.   -  Discussed with Ortho - outpt f/u. No need for any restrictions for weight bearing         FOLLOW-UP CARE RECOMMENDATIONS/TESTING/NURSING ORDERS:  PT/OT  SLP   Voiding trail       DIET:  Cardiac Diet    ACTIVITY:  Activity as tolerated, PT/OT to evaluate and treat, and OOB for meals    APPOINTMENTS:  Follow-up with primary care provider, . @TYB@  -  Please call to set up an appointment to be seen in  1 week   Neurology  Cardiology     It is very important that you keep follow-up appointment(s). Bring discharge papers, medication list (and/or medication bottles) to follow-up appointments for review by outpatient provider(s). PENDING TEST RESULTS:  At the time of discharge the following test results are still pending: none . Please review these results as they become available. Specific symptoms to watch for: chest pain, shortness of breath, fever, chills, nausea, vomiting, diarrhea, change in mentation, falling, weakness, bleeding. GOALS OF CARE:  X  Eventual return to home/independent/assisted living     Long term SNF      Hospice     No rehospitalization     Patient condition at discharge:   Functional status    Poor    X  Deconditioned      Independent   Cognition    Lucid     Forgetful (some sensescence)     Dementia   Catheters/lines (plus indication)  X  Vazquez     PICC      PEG         Code status  X  Full code      DNR         CHRONIC MEDICAL CONDITIONS:  [unfilled]      Information obtained by :   I understand that if any problems occur once I am at home I am to contact my physician.     I understand and acknowledge receipt of the instructions indicated above.                                                                                                                                              Physician's or R.N.'s Signature                                                                  Date/Time                                                                                                                                              Patient or Representative Signature                                                          Date/Time

## 2022-09-28 NOTE — PROGRESS NOTES
Spiritual Care Assessment/Progress Note  [facility]      NAME: Tim Stewart      MRN: [id number]  AGE: 71 y.o. SEX: male  Mandaen Affiliation: Jewish   Language: English     9/28/2022     Total Time (in minutes): 17     Spiritual Assessment begun in [address] through conversation with:         [x]Patient        [] Family    [] Friend(s)        Reason for Consult: Initial/Spiritual assessment, patient floor     Spiritual beliefs: (Please include comment if needed)     [] Identifies with a catherine tradition:         [] Supported by a catherine community:            [] Claims no spiritual orientation:           [] Seeking spiritual identity:                [] Adheres to an individual form of spirituality:           [x] Not able to assess:                           Identified resources for coping:      [] Prayer                               [] Music                  [] Guided Imagery     [x] Family/friends                 [] Pet visits     [] Devotional reading                         [] Unknown     [] Other:                                               Interventions offered during this visit: (See comments for more details)    Patient Interventions: Affirmation of emotions/emotional suffering           Plan of Care:     [] Support spiritual and/or cultural needs    [] Support AMD and/or advance care planning process      [] Support grieving process   [] Coordinate Rites and/or Rituals    [] Coordination with community clergy   [] No spiritual needs identified at this time   [] Detailed Plan of Care below (See Comments)  [] Make referral to Music Therapy  [] Make referral to Pet Therapy     [] Make referral to Addiction services  [] Make referral to [facility]  [] Make referral to Spiritual Care Partner  [] No future visits requested        [x] Follow up visits as needed     Visited patient for initial spiritual assessment. At time of visit he was disinclined to engage a conversation.  His chart was consulted.   [facility] MDiv, MS, [facility]

## 2022-09-28 NOTE — PROGRESS NOTES
Transition of Care Plan  RUR- Low 12%  DISPOSITION: [facility] - [facility]   F/U with PCP/Specialist    Transport:     Emergency contact: Son Sloan Gutierrez [phone]    CM barriers to discharge: insurance auth    Patient is medically stable for discharge. CM spoke with [facility] liaison, Bree White, insurance Barton Glassing is still pending at this time. [facility] is on will call. 10:31am: Patient has received insurance auth for [facility] at [facility], they have bed available today. Accepting physician: Dr. Melissa Abdul  Call report #[phone]    [facility] requested for 230pm    CM spoke with patient's son, Rocio Gonzalez and he is in agreement with discharge plan. CM also spoke with patient's sister, Sheldon Goodwin and informed her of DC. Medicare pt has received, reviewed, and signed 2nd IM letter informing them of their right to appeal the discharge. Signed copy has been placed on pt bedside chart. Fadumo Rivera M.S.ABDI.

## 2022-09-29 ENCOUNTER — PATIENT OUTREACH (OUTPATIENT)
Dept: CASE MANAGEMENT | Age: 72
End: 2022-09-29

## 2022-09-29 NOTE — PROGRESS NOTES
Transition of care outreach postponed for 7 days due to patient's discharge to inpatient rehab facility. Per EMR patient discharged to [facility], will continue to monitor patient progress.

## 2022-10-06 ENCOUNTER — PATIENT OUTREACH (OUTPATIENT)
Dept: CASE MANAGEMENT | Age: 72
End: 2022-10-06

## 2022-10-06 NOTE — PROGRESS NOTES
Outgoing call placed to [facility] ([facility]) regarding update of patient progress. Spoke to Smith GripeO () patient identified utilizing 2 identifiers. Introduced self and reason for the call. Ms. Jeana Soulier reports patient is currently admitted to the facility, will continue to monitor patient progress.

## 2022-10-13 ENCOUNTER — PATIENT OUTREACH (OUTPATIENT)
Dept: CASE MANAGEMENT | Age: 72
End: 2022-10-13

## 2022-10-13 NOTE — PROGRESS NOTES
Outgoing call placed to [facility] IRF regarding update of patient progress. Spoke to Render Dry (staff member) patient identified utilizing 2 identifiers. Introduced self and reason for the call, Render Dry states patient is currently admitted to the facility. Will continue to monitor patient progress.

## 2022-10-17 ENCOUNTER — HOSPITAL ENCOUNTER (INPATIENT)
Age: 72
LOS: 11 days | Discharge: NURSING, REHAB OR LONG-TERM CARE | DRG: 056 | End: 2022-10-29
Attending: EMERGENCY MEDICINE | Admitting: FAMILY MEDICINE
Payer: MEDICARE

## 2022-10-17 ENCOUNTER — APPOINTMENT (OUTPATIENT)
Dept: GENERAL RADIOLOGY | Age: 72
DRG: 056 | End: 2022-10-17
Attending: EMERGENCY MEDICINE
Payer: MEDICARE

## 2022-10-17 DIAGNOSIS — E11.42 DIABETIC POLYNEUROPATHY ASSOCIATED WITH TYPE 2 DIABETES MELLITUS (HCC): ICD-10-CM

## 2022-10-17 DIAGNOSIS — R62.7 FAILURE TO THRIVE IN ADULT: Primary | ICD-10-CM

## 2022-10-17 DIAGNOSIS — A49.8 PSEUDOMONAS AERUGINOSA INFECTION: ICD-10-CM

## 2022-10-17 DIAGNOSIS — N39.0 URINARY TRACT INFECTION ASSOCIATED WITH INDWELLING URETHRAL CATHETER, INITIAL ENCOUNTER (HCC): ICD-10-CM

## 2022-10-17 DIAGNOSIS — T83.511A URINARY TRACT INFECTION ASSOCIATED WITH INDWELLING URETHRAL CATHETER, INITIAL ENCOUNTER (HCC): ICD-10-CM

## 2022-10-17 DIAGNOSIS — U07.1 COVID-19: ICD-10-CM

## 2022-10-17 DIAGNOSIS — M1A.29X0 DRUG-INDUCED CHRONIC GOUT OF MULTIPLE SITES WITHOUT TOPHUS: ICD-10-CM

## 2022-10-17 DIAGNOSIS — Z86.73 HISTORY OF CVA (CEREBROVASCULAR ACCIDENT): ICD-10-CM

## 2022-10-17 LAB
ANION GAP SERPL CALC-SCNC: 5 MMOL/L (ref 5–15)
BASOPHILS # BLD: 0 K/UL (ref 0–0.1)
BASOPHILS NFR BLD: 0 % (ref 0–1)
BUN SERPL-MCNC: 16 MG/DL (ref 6–20)
BUN/CREAT SERPL: 24 (ref 12–20)
CALCIUM SERPL-MCNC: 8.9 MG/DL (ref 8.5–10.1)
CHLORIDE SERPL-SCNC: 99 MMOL/L (ref 97–108)
CO2 SERPL-SCNC: 28 MMOL/L (ref 21–32)
COMMENT, HOLDF: NORMAL
CREAT SERPL-MCNC: 0.67 MG/DL (ref 0.7–1.3)
DIFFERENTIAL METHOD BLD: ABNORMAL
EOSINOPHIL # BLD: 0.2 K/UL (ref 0–0.4)
EOSINOPHIL NFR BLD: 2 % (ref 0–7)
ERYTHROCYTE [DISTWIDTH] IN BLOOD BY AUTOMATED COUNT: 13.2 % (ref 11.5–14.5)
GLUCOSE SERPL-MCNC: 116 MG/DL (ref 65–100)
HCT VFR BLD AUTO: 33.2 % (ref 36.6–50.3)
HGB BLD-MCNC: 10.8 G/DL (ref 12.1–17)
IMM GRANULOCYTES # BLD AUTO: 0.1 K/UL (ref 0–0.04)
IMM GRANULOCYTES NFR BLD AUTO: 1 % (ref 0–0.5)
LYMPHOCYTES # BLD: 1.5 K/UL (ref 0.8–3.5)
LYMPHOCYTES NFR BLD: 12 % (ref 12–49)
MCH RBC QN AUTO: 28.8 PG (ref 26–34)
MCHC RBC AUTO-ENTMCNC: 32.5 G/DL (ref 30–36.5)
MCV RBC AUTO: 88.5 FL (ref 80–99)
MONOCYTES # BLD: 0.9 K/UL (ref 0–1)
MONOCYTES NFR BLD: 7 % (ref 5–13)
NEUTS SEG # BLD: 10.5 K/UL (ref 1.8–8)
NEUTS SEG NFR BLD: 78 % (ref 32–75)
NRBC # BLD: 0 K/UL (ref 0–0.01)
NRBC BLD-RTO: 0 PER 100 WBC
PLATELET # BLD AUTO: 321 K/UL (ref 150–400)
PMV BLD AUTO: 9.6 FL (ref 8.9–12.9)
POTASSIUM SERPL-SCNC: 4.3 MMOL/L (ref 3.5–5.1)
RBC # BLD AUTO: 3.75 M/UL (ref 4.1–5.7)
SAMPLES BEING HELD,HOLD: NORMAL
SODIUM SERPL-SCNC: 132 MMOL/L (ref 136–145)
WBC # BLD AUTO: 13.3 K/UL (ref 4.1–11.1)

## 2022-10-17 PROCEDURE — 80048 BASIC METABOLIC PNL TOTAL CA: CPT

## 2022-10-17 PROCEDURE — 71045 X-RAY EXAM CHEST 1 VIEW: CPT

## 2022-10-17 PROCEDURE — 99285 EMERGENCY DEPT VISIT HI MDM: CPT

## 2022-10-17 PROCEDURE — 85025 COMPLETE CBC W/AUTO DIFF WBC: CPT

## 2022-10-17 PROCEDURE — 36415 COLL VENOUS BLD VENIPUNCTURE: CPT

## 2022-10-17 RX ORDER — ALLOPURINOL 100 MG/1
TABLET ORAL
Qty: 90 TABLET | Refills: 1 | Status: SHIPPED | OUTPATIENT
Start: 2022-10-17

## 2022-10-17 NOTE — ED PROVIDER NOTES
58-year-old male with history of depression, diabetes, hypertension, obesity with large MCA territory stroke about 5 weeks ago presents to the emergency department by EMS with concern for placement. [facility] with failure to progress. He was discharged today home but is unable to manage at home. It is unclear why he was not sent directly to a long-term facility from rehab. Family bedside reports that they were instructed to call 911 when he gets home to bring him back to the hospital for further management. The history is provided by the patient and the spouse. Extremity Weakness   This is a chronic problem. The current episode started more than 1 week ago. The problem occurs constantly. The problem has not changed since onset. The patient is experiencing no pain. There has been no history of extremity trauma.       Past Medical History:   Diagnosis Date    Arrhythmia     atrial fib    Depression 4/24/2010    Diabetes ([address]     diet control for pre-diabetes    Dyslipidemia, goal LDL below 100 6/14/2011    Gout     HTN (hypertension) 4/24/2010    Impotence 4/24/2010    Morbid obesity ([address] 5/17/2010    VALENTE (obstructive sleep apnea) 4/24/2010    CPAP    Restless legs 4/15/2015       Past Surgical History:   Procedure Laterality Date    COLONOSCOPY N/A 6/27/2017    COLONOSCOPY performed by Ginger Engel MD at [facility], COLON, DIAGNOSTIC  2007    HX APPENDECTOMY      HX ORTHOPAEDIC  2000    bilat TKR     HX ORTHOPAEDIC  2010    left THR    HX UROLOGICAL  03/2018    urolift    HX UROLOGICAL  03/2019    prosthesis         Family History:   Problem Relation Age of Onset    Cancer Father         leukemia    Cancer Paternal Grandfather     Diabetes Sister     Diabetes Brother     Cancer Maternal Aunt     Cancer Maternal Uncle        Social History     Socioeconomic History    Marital status:      Spouse name: Not on file    Number of children: Not on file    Years of education: Not on file    Highest education level: Not on file   Occupational History    Not on file   Tobacco Use    Smoking status: Former     Packs/day: 0.50     Years: 5.00     Pack years: 2.50     Types: Cigarettes     Quit date: 1990     Years since quittin.4    Smokeless tobacco: Never   Substance and Sexual Activity    Alcohol use: Yes     Alcohol/week: 1.7 standard drinks     Types: 2 Standard drinks or equivalent per week     Comment: 2-3 drinks per week    Drug use: No    Sexual activity: Not Currently   Other Topics Concern    Not on file   Social History Narrative    Not on file     Social Determinants of Health     Financial Resource Strain: Not on file   Food Insecurity: Not on file   Transportation Needs: Not on file   Physical Activity: Not on file   Stress: Not on file   Social Connections: Not on file   Intimate Partner Violence: Not on file   Housing Stability: Not on file         ALLERGIES: Patient has no known allergies. Review of Systems   Constitutional:  Negative for fatigue and fever. HENT:  Negative for sneezing and sore throat. Respiratory:  Negative for cough and shortness of breath. Cardiovascular:  Negative for chest pain and leg swelling. Gastrointestinal:  Negative for abdominal pain, diarrhea, nausea and vomiting. Genitourinary:  Negative for difficulty urinating and dysuria. Musculoskeletal:  Negative for arthralgias and myalgias. Skin:  Negative for color change and rash. Neurological:  Positive for weakness. Negative for headaches. Psychiatric/Behavioral:  Negative for agitation and behavioral problems. Vitals:    10/17/22 1750   BP: (!) 150/90   Pulse: 75   Resp: 18   Temp: 98.5 °F (36.9 °C)   SpO2: 97%            Physical Exam  Vitals and nursing note reviewed. Constitutional:       General: He is not in acute distress. Appearance: Normal appearance. He is well-developed. He is not ill-appearing, toxic-appearing or diaphoretic. HENT:      Head: Normocephalic and atraumatic. Nose: Nose normal.      Mouth/Throat:      Mouth: Mucous membranes are moist.      Pharynx: Oropharynx is clear. Eyes:      Extraocular Movements: Extraocular movements intact. Conjunctiva/sclera: Conjunctivae normal.      Pupils: Pupils are equal, round, and reactive to light. Cardiovascular:      Rate and Rhythm: Normal rate and regular rhythm. Pulses: Normal pulses. Heart sounds: No murmur heard. Pulmonary:      Effort: Pulmonary effort is normal. No respiratory distress. Breath sounds: Normal breath sounds. No wheezing. Chest:      Chest wall: No mass or tenderness. Abdominal:      General: There is no distension. Palpations: Abdomen is soft. Tenderness: There is no abdominal tenderness. There is no guarding or rebound. Musculoskeletal:         General: No swelling, tenderness, deformity or signs of injury. Normal range of motion. Cervical back: Normal range of motion and neck supple. No rigidity. No muscular tenderness. Right lower leg: No tenderness. No edema. Left lower leg: No tenderness. No edema. Skin:     General: Skin is warm and dry. Capillary Refill: Capillary refill takes less than 2 seconds. Neurological:      Mental Status: He is alert and oriented to person, place, and time. Mental status is at baseline. Motor: Weakness (right sided) present. Psychiatric:         Mood and Affect: Mood normal.         Behavior: Behavior normal.        MDM  Number of Diagnoses or Management Options  Diagnosis management comments: 80-year-old male presents as above after being discharged from [facility]. Initially seen by case management in the ER. They will readdress in the morning to try to find placement for him. He was turned over to the oncoming physician. He is noted to have leukocytosis and therefore is pending chest x-ray and urinalysis at time of turnover.   Review of his chart indicates chronic elevated white blood cell count. Amount and/or Complexity of Data Reviewed  Clinical lab tests: reviewed  Decide to obtain previous medical records or to obtain history from someone other than the patient: yes           Procedures            9:30 PM  Change of shift. Care of patient signed over to Dr. Shorty Virgen. Handoff complete.

## 2022-10-17 NOTE — ED TRIAGE NOTES
Patient arrived via EMS from home. Patient has no complaints. Discharged approximately 1 hour ago from [facility]. At [facility] for stroke rehab, right side deficits and he cannot care for himself. He cannot walk stand or transfer. Patient lives alone. Sexton cath in place.

## 2022-10-17 NOTE — PROGRESS NOTES
SSED/CM consult received and appreciated. EMR reviewed. Patient d/cd from [facility] this evening home and unable to care for self s/p CVA. Met patient and sister Toya Valdez [phone] introduced to role of CM. History obtained from Sister  patient discharged from [facility] this evening arrived home asked to call 911 and report to ED. This writer asked about transitional care plan prior to discharge today informed last week of discharge today and provided at list of facilities. Family preference was [address] ([address] [place] [address]) . Sister lives on other side of [address] [place] [address] near [place] and [address] [facility]. Angie Keyes [phone] lives in [facility]. This writer asked if patient had AMD/ MPOA sister states not in writing  and uncertain of plan discuss w/ Henna Paz and what he was asked to do. This writer acknowledged CM Department unable to refer and place from the ED tonight also noted [employer] (requires authorization) and . Mr. Oma Perdomo is retired [employer] 21 years this writer thanked for providing Service. Merlyn Aldanapaula is uncertain if a medicaid application has been imitated. Patient is Alert and Oriented - noted delayed response and uncertain of month/year. Mr. Oma Perdomo confirmed Dr. Aki Vuong is PCP however unable to remember last appointment. Sister will await and talk w/ Dr. Melissa Treadwell then travel home. Informed CM Department will follow on 10/18. Met w/  and provided updates informed CM will need to work on transitional care plan in the morning (outcome pending).

## 2022-10-18 PROBLEM — U07.1 COVID: Status: ACTIVE | Noted: 2022-10-18

## 2022-10-18 LAB
APPEARANCE UR: ABNORMAL
BACTERIA URNS QL MICRO: ABNORMAL /HPF
BILIRUB UR QL: NEGATIVE
COLOR UR: ABNORMAL
COVID-19 RAPID TEST, COVR: DETECTED
EPITH CASTS URNS QL MICRO: ABNORMAL /LPF
GLUCOSE UR STRIP.AUTO-MCNC: NEGATIVE MG/DL
HGB UR QL STRIP: ABNORMAL
KETONES UR QL STRIP.AUTO: NEGATIVE MG/DL
LEUKOCYTE ESTERASE UR QL STRIP.AUTO: ABNORMAL
NITRITE UR QL STRIP.AUTO: POSITIVE
PH UR STRIP: 6.5 [PH] (ref 5–8)
PROT UR STRIP-MCNC: ABNORMAL MG/DL
RBC #/AREA URNS HPF: ABNORMAL /HPF (ref 0–5)
SOURCE, COVRS: ABNORMAL
SP GR UR REFRACTOMETRY: 1.01 (ref 1–1.03)
UA: UC IF INDICATED,UAUC: ABNORMAL
UROBILINOGEN UR QL STRIP.AUTO: 4 EU/DL (ref 0.2–1)
WBC URNS QL MICRO: ABNORMAL /HPF (ref 0–4)

## 2022-10-18 PROCEDURE — 87186 SC STD MICRODIL/AGAR DIL: CPT

## 2022-10-18 PROCEDURE — 94760 N-INVAS EAR/PLS OXIMETRY 1: CPT

## 2022-10-18 PROCEDURE — 87077 CULTURE AEROBIC IDENTIFY: CPT

## 2022-10-18 PROCEDURE — 97530 THERAPEUTIC ACTIVITIES: CPT

## 2022-10-18 PROCEDURE — 0T9B70Z DRAINAGE OF BLADDER WITH DRAINAGE DEVICE, VIA NATURAL OR ARTIFICIAL OPENING: ICD-10-PCS | Performed by: HOSPITALIST

## 2022-10-18 PROCEDURE — 87086 URINE CULTURE/COLONY COUNT: CPT

## 2022-10-18 PROCEDURE — 74011000250 HC RX REV CODE- 250: Performed by: FAMILY MEDICINE

## 2022-10-18 PROCEDURE — 87635 SARS-COV-2 COVID-19 AMP PRB: CPT

## 2022-10-18 PROCEDURE — 51702 INSERT TEMP BLADDER CATH: CPT

## 2022-10-18 PROCEDURE — 74011250636 HC RX REV CODE- 250/636: Performed by: FAMILY MEDICINE

## 2022-10-18 PROCEDURE — 65270000029 HC RM PRIVATE

## 2022-10-18 PROCEDURE — 97161 PT EVAL LOW COMPLEX 20 MIN: CPT

## 2022-10-18 PROCEDURE — 97165 OT EVAL LOW COMPLEX 30 MIN: CPT

## 2022-10-18 PROCEDURE — 81001 URINALYSIS AUTO W/SCOPE: CPT

## 2022-10-18 RX ORDER — ACETAMINOPHEN 325 MG/1
650 TABLET ORAL
Status: DISCONTINUED | OUTPATIENT
Start: 2022-10-18 | End: 2022-10-29 | Stop reason: HOSPADM

## 2022-10-18 RX ORDER — ACETAMINOPHEN 650 MG/1
650 SUPPOSITORY RECTAL
Status: DISCONTINUED | OUTPATIENT
Start: 2022-10-18 | End: 2022-10-29 | Stop reason: HOSPADM

## 2022-10-18 RX ORDER — ENOXAPARIN SODIUM 100 MG/ML
30 INJECTION SUBCUTANEOUS EVERY 12 HOURS
Status: DISCONTINUED | OUTPATIENT
Start: 2022-10-19 | End: 2022-10-19 | Stop reason: CLARIF

## 2022-10-18 RX ORDER — SODIUM CHLORIDE 0.9 % (FLUSH) 0.9 %
5-40 SYRINGE (ML) INJECTION AS NEEDED
Status: DISCONTINUED | OUTPATIENT
Start: 2022-10-18 | End: 2022-10-29 | Stop reason: HOSPADM

## 2022-10-18 RX ORDER — ONDANSETRON 2 MG/ML
4 INJECTION INTRAMUSCULAR; INTRAVENOUS
Status: DISCONTINUED | OUTPATIENT
Start: 2022-10-18 | End: 2022-10-29 | Stop reason: HOSPADM

## 2022-10-18 RX ORDER — ONDANSETRON 4 MG/1
4 TABLET, ORALLY DISINTEGRATING ORAL
Status: DISCONTINUED | OUTPATIENT
Start: 2022-10-18 | End: 2022-10-29 | Stop reason: HOSPADM

## 2022-10-18 RX ORDER — SODIUM CHLORIDE 0.9 % (FLUSH) 0.9 %
5-40 SYRINGE (ML) INJECTION EVERY 8 HOURS
Status: DISCONTINUED | OUTPATIENT
Start: 2022-10-18 | End: 2022-10-29 | Stop reason: HOSPADM

## 2022-10-18 RX ADMIN — SODIUM CHLORIDE, PRESERVATIVE FREE 10 ML: 5 INJECTION INTRAVENOUS at 22:50

## 2022-10-18 RX ADMIN — CEFTRIAXONE SODIUM 1 G: 1 INJECTION, POWDER, FOR SOLUTION INTRAMUSCULAR; INTRAVENOUS at 20:23

## 2022-10-18 NOTE — PROGRESS NOTES
Problem: Self Care Deficits Care Plan (Adult)  Goal: *Acute Goals and Plan of Care (Insert Text)  Description: FUNCTIONAL STATUS PRIOR TO ADMISSION: At baseline pt was highly independent, active, and not using DME. Per pt and friends at bedside, pt owns several acres and frequently works outside, is a , and enjoys renovating [employer]. Immediately PTA, pt was at [facility] for OT/PT services to address deficits from L MCA stroke. Pt was d/c 10/17/22 and within 1 hour of being home was admitted to [facility] ED.     HOME SUPPORT: The patient lived alone with limited local support. Occupational Therapy Goals  Initiated 10/18/2022   1. Patient will perform seated grooming task with moderate assistance within 7 day(s). 2.  Patient will perform seated upper body dressing, using compensatory strategies, with moderate assistance within 7 day(s). 3.  Patient will perform BSC transfers with moderate assistance  within 7 day(s). 4.  Patient will perform all aspects of toileting with moderate assistance  within 7 day(s). 5.  Patient will participate in upper extremity therapeutic exercise/activities with supervision/set-up within 7 day(s). 6.  Patient will improve their Fugl Campbell score by 5 points in prep for ADLs within 7 days. Outcome: Not Met   OCCUPATIONAL THERAPY EVALUATION  Patient: Cecilia Cruz (2 y.o. male)  Date: 10/18/2022  Primary Diagnosis: No admission diagnoses are documented for this encounter.        Precautions:   Fall    ASSESSMENT  Based on the objective data described below, the patient presents with impaired sitting/standing balance, activity tolerance, cognition (orientation, insight, attention to task, safety awareness), RUE hemiparesis (ability to demonstrate partial shoulder elevation), RUE edema, RUE anterior subluxation (2 finger width), multiple scabs/scrapes to RUE/RLE, and generalized weakness - all of which are significantly impacting his ability to independently and safely complete basic ADL/IADL tasks. Pt admitted from home 10/17/2022 after being d/c from [facility] rehab for ~1 hour (per chart). Pt received supine on ED stretcher and agreeable to participation in therapy session. At time of evaluation supportive friends at bedside, pt A&Ox2-3, and VSS on RA. He demonstrated intact vision and sensation to light touch. RUE grossly impaired, however pt able to initiate partial R shoulder elevation with multimodal cues. Verbal and visual cues required for safe handling technique of RUE throughout session. At times, pt perseverative during conversation, but able to be redirected with min verbal cues. He demo'd bed mobility and sit<>stand transfers at EOB with mod to max A x2 with mod to max manual cues required for facilitation of trunk/hip/knee extension. Pt tolerated multiple trials of sit<>stand from EOB and lateral rolling for repositioning of linens. Seated UB dressing completed with max A, multimodal cues, and compensatory technique (geena-dressing). Pt returned to supine, HOB elevated, and set-up with breakfast tray. Pt able to perform self-feeding with set-up using LUE. Educated pt and friends at bedside on positioning of RUE for prevention/control of edema and protection of shoulder given significant subluxation. Given pt's significant deficits, limited social support, and motivation to participate in therapy services and progress towards goals, highly recommend d/c to [facility]. Pt is unable to safely care for himself, lives alone, and does not have adequate social support. D/c home is not a safe option given significant deficits and lack of resources. Discussed pt case with CM and RN. Current Level of Function Impacting Discharge (ADLs/self-care): up to total A LB ADLs, max A dressing/bathing, mod to max A x2 bed mobility and sit<>stand transfers in prep for ADL tasks     Functional Outcome Measure:   The patient scored Total A-D  Total A-D (Motor Function): 7/66 on the Fugl-Campbell Assessment which is indicative of severe impairment in upper extremity functional status. Other factors to consider for discharge: PLOF, recent rehab admission, highly motivated to participate/progress towards goals, demo'ing improvement from last admission, L MCA stroke (09/2022)     Patient will benefit from skilled therapy intervention to address the above noted impairments. PLAN :  Recommendations and Planned Interventions: self care training, functional mobility training, therapeutic exercise, balance training, therapeutic activities, cognitive retraining, endurance activities, neuromuscular re-education, patient education, and home safety training    Frequency/Duration: Patient will be followed by occupational therapy 5 times a week to address goals. Recommendation for discharge: (in order for the patient to meet his/her long term goals)  Therapy 3 hours per day 5-7 days per week    This discharge recommendation:  Has been made in collaboration with the attending provider and/or case management    IF patient discharges home will need the following DME: TBD       SUBJECTIVE:   Patient stated It's October of 2022.     OBJECTIVE DATA SUMMARY:   HISTORY:   Past Medical History:   Diagnosis Date    Arrhythmia     atrial fib    Depression 4/24/2010    Diabetes ([address]     diet control for pre-diabetes    Dyslipidemia, goal LDL below 100 6/14/2011    Gout     HTN (hypertension) 4/24/2010    Impotence 4/24/2010    Morbid obesity ([address] 5/17/2010    VALENTE (obstructive sleep apnea) 4/24/2010    CPAP    Restless legs 4/15/2015     Past Surgical History:   Procedure Laterality Date    COLONOSCOPY N/A 6/27/2017    COLONOSCOPY performed by Romero Richter MD at [facility], [address], DIAGNOSTIC  2007    HX APPENDECTOMY      HX ORTHOPAEDIC  2000    bilat TKR     HX ORTHOPAEDIC  2010    left THR    HX UROLOGICAL  03/2018    urolift    HX UROLOGICAL  03/2019    prosthesis     Expanded or extensive additional review of patient history:     Home Situation  Home Environment: Private residence  # Steps to Enter: 5  Rails to Enter: Yes  One/Two Story Residence: One story  Living Alone: Yes  Support Systems: Other Family Member(s) (brother and sister in law 3 hours)  Patient Expects to be Discharged to[de-identified] Other: (uknown)  Current DME Used/Available at Home: None  Tub or Shower Type: Tub/Shower combination    Hand dominance: Right    EXAMINATION OF PERFORMANCE DEFICITS:  Cognitive/Behavioral Status:  Neurologic State: Alert  Orientation Level: Oriented to person;Oriented to time;Disoriented to situation;Disoriented to place  Cognition: Follows commands;Decreased attention/concentration  Perception: Cues to attend right visual field;Cues to attend to right side of body;Cues to maintain midline in sitting;Verbal;Visual  Perseveration: Perseverates during conversation (some perseveration on dates (i.e. year and month))  Safety/Judgement: Awareness of environment    Skin: multiple scrapes/scabs/wounds to RUE/RLE    Edema: RUE - propped and positioned with pillows at conclusion of session     Hearing: Auditory  Auditory Impairment: Hard of hearing, bilateral    Vision/Perceptual:    Tracking: Able to track stimulus in all quadrants w/o difficulty    Saccades: Within Defined Limits    Convergence: Normal (within 6 inches from nose)    Visual Fields:  (WDL)  Diplopia: No    Acuity: Within Defined Limits;Able to read employee name badge without difficulty    Corrective Lenses: Reading glasses    Range of Motion:    AROM: Generally decreased, functional (LUE/ LLE; Decreased, non-functional RUE/ RLE)  PROM: Generally decreased, functional                      Strength:    Strength: Generally decreased, functional (LUE/LLE;  Non functional RLE/ RUE)                Coordination:  Coordination: Grossly decreased, non-functional  Fine Motor Skills-Upper: Left Intact; Right Impaired    Gross Motor Skills-Upper: Left Intact; Right Impaired    Tone & Sensation:    Tone: Abnormal  Sensation: Intact (light touch)                      Balance:  Sitting: Impaired; Without support  Sitting - Static: Fair (occasional)  Sitting - Dynamic: Fair (occasional)  Standing: Impaired; With support  Standing - Static: Poor;Constant support  Standing - Dynamic : Poor;Constant support    Functional Mobility and Transfers for ADLs:  Bed Mobility:  Rolling: Moderate assistance;Maximum assistance; Additional time; Adaptive equipment (Mod left; Max right)  Supine to Sit: Maximum assistance;Assist x2  Sit to Supine: Maximum assistance;Assist x2; Additional time  Scooting: Maximum assistance;Assist x2    Transfers:  Sit to Stand: Assist x2; Moderate assistance;Maximum assistance (did not achieve full upright & Max Ax2 for stand balance)  Stand to Sit: Moderate assistance;Maximum assistance;Assist x2    ADL Assessment:  Feeding: Setup (using LUE)    Oral Facial Hygiene/Grooming: Setup (inferred, in supported sitting, using LUE)    Bathing: Maximum assistance; Total assistance (inferred, for transfer, balance, and LB)         Upper Body Dressing: Maximum assistance    Lower Body Dressing: Total assistance    Toileting: Total assistance (vazquez)                ADL Intervention and task modifications:  Feeding  Feeding Assistance: Set-up  Container Management: Maximum assistance  Utensil Management: Modified independent  Food to Mouth: Modified independent  Drink to Mouth: Modified independent                        Upper Body [address] Gown: Maximum assistance; Compensatory technique training  Pullover Shirt: Maximum assistance; Compensatory technique training  Cues: Visual cues provided;Verbal cues provided; Tactile cues provided;Physical assistance    Lower Body Dressing Assistance  Socks: Total assistance (dependent)    Toileting  Toileting Assistance: Total assistance(dependent)  Bladder Hygiene:  Total assistance (dependent)  Clothing Management: Total assistance (dependent)    Cognitive Retraining  Safety/Judgement: Awareness of environment      Functional Measure:  Fugl-Campbell Assessment of Motor Recovery after Stroke:        Reflex Activity  Flexors/Biceps/Fingers: Can be elicited  Extensors/Triceps: Can be elicited  Reflex Subtotal: 4    Volitional Movement Within Synergies  Shoulder Retraction: None  Shoulder Elevation: Partial  Shoulder Abduction (90 degrees): None  Shoulder External Rotation: None  Elbow Flexion: None  Forearm Supination: None  Shoulder Adduction/Internal Rotation: None  Elbow Extension: None  Forearm Pronation: None  Subtotal: 1    Volitional Movement Mixing Synergies  Hand to Lumbar Spine: None  Shoulder Flexion (0-90 degrees): None  Pronation-Supination: None  Subtotal: 0    Volitional Movement With Little or No Synergy  Shoulder Abduction (0-90 degrees): None  Shoulder Flexion ( degrees): None  Pronation/Supination: None  Subtotal : 0    Normal Reflex Activity  Biceps, Triceps, Finger Flexors: Full  Subtotal : 2    Upper Extremity Total   Upper Extremity Total: 7    Wrist  Stability at 15 Degree Dorsiflexion: None  Repeated Dorsiflexion/ Volar Flexion: None  Stability at 15 Degree Dorsiflexion: None  Repeated Dorsiflexion/ Volar Flexion: None  Circumduction: None  Wrist Total: 0    Hand  Mass Flexion: None  Mass Extension: None  Grasp A: None  Grasp B: None  Grasp C: None  Grasp D: None  Grasp E: None  Hand Total: 0    Coordination/Speed  Tremor: Marked  Dysmetria: Marked  Time: >5s  Coordination/Speed Total : 0    Total A-D  Total A-D (Motor Function): 7/66     This is a reliable/valid measure of arm function after a neurological event. It has established value to characterize functional status and for measuring spontaneous and therapy-induced recovery; tests proximal and distal motor functions.  Fugl-Campbell Assessment - UE scores recorded between five and 30 days post neurologic event can be used to predict UE recovery at six months post neurologic event. Severe = 0-21 points   Moderately Severe = 22-33 points   Moderate = 34-47 points   Mild = 48-66 points  BRENNEN Lugo, TARA Santos, & ANA Rivers (1992). Measurement of motor recovery after stroke: Outcome assessment and sample size requirements. Stroke, 23, pp. 0709-8785.   ------------------------------------------------------------------------------------------------------------------------------------------------------------------  MCID:  Stroke:   Dionna Haile et al, 2001; n = 171; mean age 79 (6) years; assessed within 16 (12) days of stroke, Acute Stroke)  FMA Motor Scores from Admission to Discharge   10 point increase in FMA Upper Extremity = 1.5 change in discharge FIM   10 point increase in FMA Lower Extremity = 1.9 change in discharge FIM  MDC:   Stroke:   Chuy Olsen et al, 2008, n = 14, mean age = 59.9 (14.6) years, assessed on average 14 (6.5) months post stroke, Chronic Stroke)   FMA = 5.2 points for the Upper Extremity portion of the assessment     Occupational Therapy Evaluation Charge Determination   History Examination Decision-Making   LOW Complexity : Brief history review  LOW Complexity : 1-3 performance deficits relating to physical, cognitive , or psychosocial skils that result in activity limitations and / or participation restrictions  MEDIUM Complexity : Patient may present with comorbidities that affect occupational performnce.  Miniml to moderate modification of tasks or assistance (eg, physical or verbal ) with assesment(s) is necessary to enable patient to complete evaluation       Based on the above components, the patient evaluation is determined to be of the following complexity level: LOW   Pain Rating:  None reported     Activity Tolerance:   Good and Fair    After treatment patient left in no apparent distress:    Supine in bed, Heels elevated for pressure relief, Call bell within reach, and Caregiver / family present    COMMUNICATION/EDUCATION:   The patients plan of care was discussed with: Physical therapist, Registered nurse, and Case management. Patient is not yet appropriate for education on BE FAST. Will follow up with education in future sessions when appropriate. Patient/family have participated as able in goal setting and plan of care. and Patient/family agree to work toward stated goals and plan of care. This patients plan of care is appropriate for delegation to EL[facility]IA.     Thank you for this referral.  JALEEL Hoskins, OTR/L  Time Calculation: 46 mins

## 2022-10-18 NOTE — PROGRESS NOTES
Problem: Mobility Impaired (Adult and Pediatric)  Goal: *Acute Goals and Plan of Care (Insert Text)  Description: FUNCTIONAL STATUS PRIOR TO ADMISSION:  Prior to 9/14/22, patient was independent and active without use of DME, managed his 6 acre property, enjoys woodworking and refurbishing old campers. He was admitted to acute care 9/14/22-9/28/22 s/p CVA f/b 21 days of IPR. His insurance company denied additional IPR and SNF resulting in pt discharging home alone with home health services. Patient was not able to manage in his home alone, presented to the ED for assistance. Per IPR notes, at the time of discharge patient's functional mobility was as follows: Supv/ CGA sitting,  Max A sit<->stand, Mod A bed<->chair slide board transfer,  Non ambulatory, Max A to propel the WC 5 ft. HOME SUPPORT PRIOR TO ADMISSION: The patient lived alone and required no assistance prior to his CVA  ,  Physical Therapy Goals  Initiated 10/18/2022  1. Patient will move from supine to sit and sit to supine in bed with minimal assistance within 7 day(s). 2.  Patient will transfer from bed to chair and chair to bed with minimum assistance  using the least restrictive device within 7 day(s). 3.  Patient will perform sit to stand with minimal assistance within 7 day(s). 4.  Patient will be supervision sitting EOB 15 mins within 7 days. Outcome: Not Met     PHYSICAL THERAPY EVALUATION- NEURO POPULATION  Patient: Vania Salazar (14 y.o. male)  Date: 10/18/2022  Primary Diagnosis: No admission diagnoses are documented for this encounter. Precautions:   Fall      ASSESSMENT  Based on the objective data described below, the patient presents with impaired functional independence compared to baseline s/p CVA 9/14/22. Patient completed 21 days of IPR f/b discharge home with home health. Pt was not able to manage in his home alone, presented to the ED for assistance.  Currently pt's functional mobility is limited by impaired balance, weakness, right hemiparesis, activity tolerance, RUE pain (rt shoulder subluxation, abrasions RUE/ LE), and cognition (memory, problem solving, attention). Received pt on narrow ED plinth with CM and two friends in the room. Pt appeared tired from the events the past 24 hours (DC home from rehab, home for an hour, transported to this ED) though agreed to participate in therapy. Today he is generally Mod/Max A transitioning positions in the bed and to stand. He required Max A x2 for stand balance, unable to achieve full upright stand posture today. His friends are reporting improvement in pt's RLE strength, speech and ability to have a conversation (improved speech and alertness) this past week. Given pt's progress while in IPR (most significantly over the past week), motivation and participation during PT session today, and his lack of caregiver support in the home for safe discharge home, highly recommend return to IPR for additional stroke rehab. Patient may benefit from rehab at a facility specializing in CVA rehab. Discussed with care management. Also recommend transitioning pt to a hospital bed in the ED while CM works on placement. Discussed w/ RN. If admitted, acute therapy will follow. 18:00 - Informed by CM pt is covid +. Pt was not wearing a mask during this encounter. CM is requesting an early morning session tomorrow d/t IPR requesting additional notes for insurance auth. Current Level of Function Impacting Discharge (mobility/balance): As documented below    Functional Outcome Measure: The patient scored Total: 3/56 on the [facility] Assessment which is indicative of high fall risk. Other factors to consider for discharge: lives alone, lacks caregiver support for safe DC home, pt motivation and participation today, highly indep at baseline     Patient will benefit from skilled therapy intervention to address the above noted impairments. PLAN :  Recommendations and Planned Interventions: bed mobility training, transfer training, gait training, therapeutic exercises, neuromuscular re-education, patient and family training/education, and therapeutic activities      Frequency/Duration: Patient will be followed by physical therapy:  5 times a week to address goals. Recommendation for discharge: (in order for the patient to meet his/her long term goals)  Therapy 3 hours per day 5-7 days per week    This discharge recommendation:  Has been made in collaboration with the attending provider and/or case management    IF patient discharges home will need the following DME: Per CM, IPR arranged to have the following delivered at discharge: WC (manual), h         SUBJECTIVE:   Patient stated How do I turn this (TV) off.  (With instruction provided, pt turned the TV off using his LUE).       OBJECTIVE DATA SUMMARY:   HISTORY:    Past Medical History:   Diagnosis Date    Arrhythmia     atrial fib    Depression 4/24/2010    Diabetes ([facility] [address]     diet control for pre-diabetes    Dyslipidemia, goal LDL below 100 6/14/2011    Gout     HTN (hypertension) 4/24/2010    Impotence 4/24/2010    Morbid obesity ([facility] [address] 5/17/2010    VALENTE (obstructive sleep apnea) 4/24/2010    CPAP    Restless legs 4/15/2015     Past Surgical History:   Procedure Laterality Date    COLONOSCOPY N/A 6/27/2017    COLONOSCOPY performed by Obed Gardiner MD at [facility], COLON, DIAGNOSTIC  2007    HX APPENDECTOMY      HX ORTHOPAEDIC  2000    bilat TKR     HX ORTHOPAEDIC  2010    left THR    HX UROLOGICAL  03/2018    urolift    HX UROLOGICAL  03/2019    prosthesis       Home Situation  Home Environment: Private residence  # Steps to Enter: 5  Rails to Enter: Yes  One/Two Story Residence: One story  Living Alone: Yes  Support Systems: Other Family Member(s) (brother and sister in law 3 hours)  Patient Expects to be Discharged to[de-identified] Other: (uknown)  Current DME Used/Available at Home: None  Tub or Shower Type: Tub/Shower combination    EXAMINATION/PRESENTATION/DECISION MAKING:   Critical Behavior:  Neurologic State: Alert  Orientation Level: Oriented to person, Oriented to time, Disoriented to situation, Disoriented to place  Cognition: Follows commands, Decreased attention/concentration  Safety/Judgement: Awareness of environment  Hearing: Auditory  Auditory Impairment: Hard of hearing, bilateral  Skin:  Abrasions/ scabs RUE/ LE  Edema: RUE min (instructed in self retrograde massage which pt performed on his own and positioning for elevation using pillows for edema reduction)  Range Of Motion:  AROM: Generally decreased, functional LUE/ LLE; Decreased, non-functional RUE/ RLE           PROM: Generally decreased, functional           Strength:    Strength: Generally decreased, functional LUE/LLE;  Non functional RLE/ RUE  LLE > 3/5  RLE: 1/5 to 2/5                 Tone & Sensation:   Tone: Abnormal              Sensation: Intact (light touch)               Coordination:  Coordination: Grossly decreased, non-functional  Vision:   Tracking: Able to track stimulus in all quadrants w/o difficulty  Saccades: Within Defined Limits  Convergence: Normal (within 6 inches from nose)  Visual Fields:  (WDL)  Diplopia: No  Acuity: Within Defined Limits;Able to read employee name badge without difficulty  Corrective Lenses: Reading glasses  Functional Mobility:  Bed Mobility:  Rolling: Moderate assistance;Maximum assistance; Additional time; Adaptive equipment (Mod left; Max right)  Supine to Sit: Maximum assistance;Assist x2  Sit to Supine: Maximum assistance;Assist x2; Additional time  Scooting: Maximum assistance;Assist x2  Transfers:  Sit to Stand: Assist x2; Moderate assistance;Maximum assistance (did not achieve full upright & Max Ax2 for stand balance)  Stand to Sit: Moderate assistance;Maximum assistance;Assist x2                   Balance:   Sitting: Impaired; Without support  Sitting - Static: Fair (occasional)  Sitting - Dynamic: Fair (occasional)  Standing: Impaired; With support  Standing - Static: Poor;Constant support  Standing - Dynamic : Poor;Constant support  Ambulation/Gait Training:  Gait Description (WDL): Exceptions to WDL (no ability)                               Therapeutic Education:   Exs/ movement in the bed for improved strength endurance  Positioning and retrograde massage for edema management  Positioning for rt shoulder joint protection  Activity to facilitate endurance progress (OOB to chair daily)  Fall prevention - call for assistance    Functional Measure  Bowles Balance Test:    Sitting to Standin  Standing Unsupported: 0  Sitting with Back Unsupported: 3  Standing to Sittin  Transfers: 0  Standing Unsupported with Eyes Closed: 0  Standing Unsupported with Feet Together: 0  Reach Forward with Outstretched Arm: 0   Object: 0  Turn to Look Over Shoulders: 0  Turn 360 Degrees: 0  Alternate Foot on Step/Stool: 0  Standing Unsupported One Foot in Front: 0  Stand on One Le  Total: 3/56         56=Maximum possible score;   0-20=High fall risk  21-40=Moderate fall risk   41-56=Low fall risk        Physical Therapy Evaluation Charge Determination   History Examination Presentation Decision-Making   MEDIUM  Complexity : 1-2 comorbidities / personal factors will impact the outcome/ POC  MEDIUM Complexity : 3 Standardized tests and measures addressing body structure, function, activity limitation and / or participation in recreation  LOW Complexity : Stable, uncomplicated  Other outcome measures Bowles 3/56  HIGH       Based on the above components, the patient evaluation is determined to be of the following complexity level: LOW     Pain Rating:  Right shoulder/ UE     Activity Tolerance:   Fair      After treatment patient left in no apparent distress:   Supine in bed, Call bell within reach, Caregiver / family present, Side rails 2 (ED plinth), and RN aware (requested RN obtain a hosp bed in the ED  while pt waits for placement)    COMMUNICATION/EDUCATION:   The patients plan of care was discussed with: Occupational therapist, Registered nurse, and Case management. Fall prevention education was provided and the patient/caregiver indicated understanding., Patient/family have participated as able in goal setting and plan of care. , and Patient/family agree to work toward stated goals and plan of care.     Thank you for this referral.  Юлия Garcia, PT  Time Calculation: 43 mins

## 2022-10-18 NOTE — ED NOTES
Care assumed from Dr. Ruth Orlando at 3 PM.  Please see his note for full H&P.  67-year-old awaiting case management placement, no acute events during prior shift. Discussed case with  who requested rapid COVID swab for placement. Rapid COVID swab returned positive will need to be admitted here for discussed case with hospitalist for further care and assessment. Perfect Serve Consult for Admission  6:20 PM    ED Room Number: ER03/03  Patient Name and age:  Everardo France 71 y.o.  male  Working Diagnosis:   1. Failure to thrive in adult    2. COVID-19    3. History of CVA (cerebrovascular accident)        COVID-19 Suspicion:  yes  Sepsis present:  no  Reassessment needed: no  Code Status:  Full Code  Readmission: yes  Isolation Requirements:  yes  Recommended Level of Care:  telemetry  Department:[facility] ED - 21   Other: 67-year-old male who is awaiting case management placement and had screening COVID's swab come back positive. He is vaccinated and hemodynamically stable satting normally. He is going to be placed by case management due to failure to thrive at home recent discharge from rehab facility.

## 2022-10-18 NOTE — ACP (ADVANCE CARE PLANNING)
10/18/2022 -   CM spoke with patient's son Antionette Parker: [phone]). The son confirmed that the patient does not have a known AMD, is estranged from his daughter Zoë Rubin, and that the patient's sister has started to have memory issues. Patient is also estranged from his other sibling. Patient is not legally . Patient's Refugio Shutter and HCDM is his son, Antionette Parker: [phone]. Per request, CM removed the sister from the patient's emergency contact list due to inability to supply consistent information and updates to the family, and due to being a limited source of support to the patient. The son currently lives in [facility] and started a job within the past 2 months. The patient's son drives extensively for work and cannot access his phone while at work. Patient's son requested to have Joaquin Lang and Parrish Craft added to emergency contact list to act as having access to the patient's medical information and to relay needed information to the patient's son. Joaquin Lang, friend: [phone]  Parrish Craft, friend: [phone]    CM discussed that the patient is the only one that can appoint a HCDM/mPOA outside the scope of the [address] CM identified that all healthcare decisions must be made by the son, Antionette Parker, until it is determined if the patient's mentation will clear. The son expressed understanding of the above, and the son is in agreement to have the patient's friend Joaquin Lang and Parrish Craft) act as only receiving information for the patient's family.       CM added the friend to the patient's emergency contact list.    CRM: Alona Galloway, MPH, [address].: [phone] or [phone]

## 2022-10-18 NOTE — PROGRESS NOTES
10/18/2022 -   TRANSITIONS OF CARE PLAN:   DESTINATION: [facility] vs [facility]  TRANSPORT: BLS  NEEDS FOR DISCHARGE: Work with therapies 10/19, ins auth  ANTICIPATED FOLLOW UPS: PCP, Neuro, Establishment of Care with [facility] [place]      Anticipated Discharge is: 10/19 or 10/20, dependent on ins auth      08:20 -   CM discussed patient with ED Charge Nurse and received SBAR handoff from previous CM. CM will require PT and OT evals for any sort placement; consults placed and PT is aware of pending consult. CM attempted to reach [facility] liaison Yesica Breath [phone]) and left a HIPAA Compliant VM requesting a return call. CRM: Jony Eng, MPH, [facility] 18.: [phone] or [phone]    09:05 -   CM attempted to reach [facility] liaison. CRM: Jony Eng, MPH, [facility]  Z: [phone] or [phone]    10:05 -   CM met with patient, with family friends Tg Alonso Mayer and Rosario Martinez present at bedside. Patient was able to confirm name, , current president, and that he has 2 children. Patient was not able to provide the names of his children, names of present friends, nor current location. Patient is alert and oriented x2. The patient's friends provided additional information to include: At baseline, patient lives alone in a single story home, 5 exterior steps. Patient was fully independent, to include driving and no DME. Per present friends, the patient was found down outside of his home; patient had been down 24-36 hours prior to being found. The patient is estranged from his daughter Kirk Larsen, who is believed to live [place] [place] [place]. The patient's sister Gatito Brenner is not a reliable source of information due ongoing memory issues. The patient has an additional estranged sibling. The patient served nearly 30 years in the [employer]. The patient was discharged from [facility] on [facility]17 via BLS transport back to own home. Patient's sister was to meet the patient at home to let him into the home. Patient's sister was informed to call 911 in the event that patient has a decline in status. The sister called 911 upon arrival to the home and meeting the medical transport team.  Patient was home for less than 30 minutes and did not make it into his own home. Patient was transferred from medical transport stretcher to EMS stretcher and brought to [facility] ED for further eval. Patient's support system does not believe that DME was delivered and believe that patient's discharge disposition from [facility] was not a safe plan due to the patient living alone with minimal support. CM identified that the friends are currently not listed on the patient's Emergency Contact list, and this CM is not able to directly discuss the patient's current circumstances. The friends expressed understanding of the above. Patient's friends are identified as only reliable local support. CM identified plan to discuss [place] Planning with the patient's son. The friends expressed understanding. CRM: Conor Hawkins MPH, [facility]  18.: [phone] or [phone]    10:25 -  CM received call from [facility] Tata Greenfield: [phone]) identifying that patient had utilized his full 20 days of coverage for IPR under Medicare guidelines. The facility filed for an extension, which was denied. The facility then did a Peer to Peer review, which was denied. Facility then filed for SNF placement, which was also denied, and a SNF Peer to Peer, which was denied. Patient was discharged with [facility] via [facility] and DME plan through [place]. Facility also filed an APS case with [facility]. The facility did not file for LTC Medicaid, and they did not complete an UAI. [facility] confirmed that the patient has not established care with [facility], does not have a service connection, and did not qualify for placement at [facility] and [address] [place] [address].   CRM: Conor Hawkins MPH, [facility]  Z: [phone] or [phone]     11:00 -   CM spoke with patient's son Ham Sher Zoraida Stewartrosa: [phone]). The son confirmed that the patient does not have a known AMD, is estranged from his daughter Helena Moreland, and that the patient's sister has started to have memory issues. Patient is also estranged from his other sibling. Patient is not legally . Patient's Cintia Setter and HCDM is his son, Lewis Rodriguez: [phone]. Per request, CM removed the sister from the patient's emergency contact list due to inability to supply consistent information and updates to the family, and due to being a limited source of support to the patient. The son currently lives in [place] and started a job within the past 2 months. The patient's son drives extensively for work and cannot access his phone while at work. Patient's son requested to have Savannah Peacock and Pasha Gutiérrez added to emergency contact list to act as having access to the patient's medical information and to relay needed information to the patient's son. Savannah Peacock, friend: [phone]  Pasha Gutiérrez, friend: [phone]    CM discussed that the patient is the only one that can appoint a HCDM/mPOA outside the scope of the [address] CM identified that all healthcare decisions must be made by the son, Lewis Rodriguez, until it is determined if the patient's mentation will clear. The son expressed understanding of the above, and the son is in agreement to have the patient's friend Savannah Peacock and Pasha Gutiérrez) act as only receiving information for the patient's family. CM added the friend to the patient's emergency contact list.    Patient's son confirmed that the patient served in the [employer] for 21 years and served during the [facility] [employer]. CM discussed that the patient has never established care with [facility] and therefore his  currently acts only as a secondary insurance. CM discussed that patient should establish care with [facility] as soon as possible. CM also discussed possible [place] Planning.   Per the son, a potential goal would be to move the patient to [facility] for ongoing family support. The son was planning to be in [place] Saturday - Tuesday of this week. CM identified that the patient is not appropriate for a hospital admission at this time. CM inquired if the patient could discharge to the home of his friends for a short period of time. Patient's son to discuss the above further with the friends. CRM: Shira Gallagher, MPH, Leslee Lopez [facility] 18.: [phone] or [phone]    12:10 -   CM received call from [facility] Director of Case Management Aaronderrick Olga: [phone]) who identified that the disposition plan was for the patient to discharge to his own home via BLS transport, with the plan for the patient's sister to meet him at home and then notify the [facility] Rodney Chan Se of his arrival.  Due to the patient's brief amount of time at home, DME was not delivered to the home by [employer] [facility]. Per therapy request, [facility] CM Team to fax discharge summary, DME orders, and most recent therapy notes to confirmed ED fax F: [phone]. DME delivery from [employer] [facility] was to include: standard wheelchair, bedside commode, transfer board, heavy duty bariatric tub transfer bench, and semi-electric hospital bed. Per Chriss Perdue, potential options such as care aid services and LTC placement were previously discussed with the patient's son Maeve Sibley). Per Chriss Perdue, the patient was identified as being over resources for LTC Medicaid, and the family identified that the patient would not be able to afford intermediate placement. CM identified to [facility] that patient will require completion of an UAI and requested for [facility] CM Team to complete the above. CM also discussed that current therapy recommendations are for IPR level rehab. Per Chriss Perdue, the patient will likely be denied due to not have having a new dx for IPR level of care.   CM requested for the facility to review patient again; Chriss Perdue expressed understanding of the above and will defer to the facility's admission team.      CM discussed patient with CM Management. CM to submit referral to [facility] for IPR consideration via [employer]. CM contacted Vivian Romero (, [facility]: [phone]) who identified that the facility is not in network for [facility] Medicare Advantage. Patient's  is accessible only after his [facility] benefits are being utilized. [facility] also identified that it is currently open enrollment for [employer]. CM to discuss the above with patient's support system for ongoing [place] Planning. CRM: Jony Eng, MPH, [facility]  Z: [phone] or [phone]    1300 -   CM rounded on patient with PT and provided [facility]'s faxed records. Per PT, patient may be appropriate for placement option at [facility]. CM contacted patient's friend Marylin Harris: [phone]) and left a HIPPA Compliant VM requesting a return call. CM contacted patient's friend Carolyn Peters: [phone]) and son Kwaku Tellez), who identified that SNF preferences would be for referrals to be sent to: Josiah Francisco, and [facility]. CM to submit referrals via [facility]. Patient's support is also in agreement to a referral being sent to [facility]; referral was submitted via [employer]. CM also discussed Medicare Open Enrollment Period and need to establish care with [facility]. CM provided phone numbers for The VA Benefits Office ([phone]) and The VA directly ([phone]). Patient's support to begin work on coordinating patient's establishment of care with [facility] and reviewing appropriate Medicare plans. CM provided update regarding the above to patient's Medical Team.      CM to continue following. CRM: Jony Eng, MPH, Leslee [facility] 18.: [phone] or [phone]   CM received call from [facility] Rossi Melo: [phone]) identifying that the patient is to be presented to the facility Medical Director. CM provided brief overview regarding patient's case.   Jerry Reynoso to return call to CM with an update. CM also notes that patient has been accepted to [facility]. Patient's medical team is aware of the above and that ins auth can take 24-72 hours to obtain. CRM: Ambar Castillo, MPH, Leslee Lopez U. 18.: [phone] or [phone]    15:55 -   CM received call from [facility] Rossytyree Jeff) who identified that the facility's Medical Director will require further evidence of therapy based progression for patient's ins to auth another IPR placement. CM discussed patient with medical team.  Patient remains stable at this time and does not require a hospital admission. [facility] (Cesia Rojas: [phone]) who identified that patient will require ins auth; auth to be started today, 10/18. CM selected the facility in [facility]. Per Anselm People will continue to follow the patient as well. [employer] is aware that patient does not have a medical reason for admission at this time. Per request, CM discussed patient with therapies. PT and OT to work with patient first thing in the morning of 10/19 for additional therapy notes. [facility] to follow for potential progression with therapies and will attempt IPR level placement 10/19, pending ins auth. Patient to work with therapies morning of 10/19. Patient will also require a Rapid COVID test for placement; ED Attending is aware of the above. Potential disposition is for discharge to IPR at [facility] vs SNF at [facility] on 10/19, pending ins auth. ED Attending, additional medical team, and patient's support system are aware of the above. CM to provide SBAR Handoff to 10/19 CM Team.    CRM: Ambar Castillo, MPH, [facility]. 18.: [phone] or [phone] -   CM was notified by nursing that patient's Rapid COVID is positive. CM discussed patient with medical team; patient likely to be admitted at this time. Patient's son/HCDM Dyllan Swain) is aware of the above.   The son identified plan to be in [address] by Saturday, 10/22.    10/19 CM Team to continue following the patient for [facility] Planning.   CRM: Iglesia Santo MPH, Leslee [facility] 18.: [phone] or [phone]

## 2022-10-18 NOTE — H&P
[address] Alison Zuniga [facility] Adult  Hospitalist Group  History and Physical    Date of Service:  10/18/2022  Primary Care Provider: Alonso Fuentes MD  Source of information: The patient and Chart review    Chief Complaint: Complex Case Management      History of Presenting Illness:   Alexandro Vega is a 71 y.o. male with a pmhx paroxysmal atrial fibrillation, depression, dyslipidemia, HTN, gout, VALENTE on Cpap, RLS who presents with weakness, and is being admitted for acute cystitis. 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 He was also treated for urinary retention with vazquez, and acute cystitis receiving IV ceftriaxone. In the ED, BP was elevated to 161/107. Other VSS. Labs were significant for WBC 13.3, Hgb 10.8, Na 132, UA with positive nitrites, moderate leukocyte esterase, and 4+ bacteria, and covid +. REVIEW OF SYSTEMS:  A comprehensive review of systems was negative except for that written in the History of Present Illness. Past Medical History:   Diagnosis Date    Arrhythmia     atrial fib    Depression 4/24/2010    Diabetes ([facility] [address]     diet control for pre-diabetes    Dyslipidemia, goal LDL below 100 6/14/2011    Gout     HTN (hypertension) 4/24/2010    Impotence 4/24/2010    Morbid obesity ([address] 5/17/2010    VALENTE (obstructive sleep apnea) 4/24/2010    CPAP    Restless legs 4/15/2015      Past Surgical History:   Procedure Laterality Date    COLONOSCOPY N/A 6/27/2017    COLONOSCOPY performed by Mariana Weiss MD at [facility], [address], DIAGNOSTIC  2007    HX APPENDECTOMY      HX ORTHOPAEDIC  2000    bilat TKR     HX ORTHOPAEDIC  2010    left THR    HX UROLOGICAL  03/2018    urolift    HX UROLOGICAL  03/2019    prosthesis     Prior to Admission medications    Medication Sig Start Date End Date Taking?  Authorizing Provider allopurinoL (ZYLOPRIM) 100 mg tablet TAKE 1 TABLET BY MOUTH EVERY DAY 10/17/22  Yes Pham Villalba MD   apixaban (ELIQUIS) 5 mg tablet Take 1 Tablet by mouth two (2) times a day. 9/29/22  Yes Winston Hua MD   gabapentin (NEURONTIN) 300 mg capsule Take 1 Capsule by mouth nightly. Max Daily Amount: 300 mg. 9/28/22  Yes Winston Hua MD   atorvastatin (LIPITOR) 80 mg tablet Take 1 Tablet by mouth nightly. 9/26/22  Yes Winston Hua MD   balsam peru-castor oiL (VENELEX) ointment Apply  to affected area two (2) times a day. APPLY TO all bony prominences, abrasions and ecchymosis to right side, posterior head Nursing, document site in comments 9/26/22  Yes Winston Hua MD   dilTIAZem IR (CARDIZEM) 60 mg tablet Take 1 Tablet by mouth every eight (8) hours. 9/26/22  Yes Winston Hua MD   docusate sodium (COLACE) 100 mg capsule Take 1 Capsule by mouth two (2) times daily as needed for Constipation for up to 90 days. 9/26/22 12/25/22 Yes Winston Hua MD   famotidine (PEPCID) 20 mg tablet Take 1 Tablet by mouth every twelve (12) hours. 9/26/22  Yes Winston Hua MD   metoprolol tartrate 37.5 mg tab Take 37.5 mg by mouth every twelve (12) hours. 9/26/22  Yes Winston Hua MD   tamsulosin (FLOMAX) 0.4 mg capsule Take 1 Capsule by mouth daily. 9/27/22  Yes Winston Hua MD     No Known Allergies   Family History   Problem Relation Age of Onset    Cancer Father         leukemia    Cancer Paternal Grandfather     Diabetes Sister     Diabetes Brother     Cancer Maternal Aunt     Cancer Maternal Uncle       Social History:  reports that he quit smoking about 32 years ago. His smoking use included cigarettes. He has a 2.50 pack-year smoking history. He has never used smokeless tobacco. He reports current alcohol use of about 1.7 standard drinks per week. He reports that he does not use drugs.      Family and social history were personally reviewed, all pertinent and relevant details are outlined as above.    Objective:   Visit Vitals  BP (!) 142/100 (BP 1 Location: Left upper arm, BP Patient Position: At rest)   Pulse 100   Temp 98.3 °F (36.8 °C)   Resp 18   SpO2 96%      O2 Device: None (Room air)    PHYSICAL EXAM:   General: Alert x oriented to person, and follows commands awake, no acute distress, resting in bed, pleasant male, appears to be stated age  HEENT: PEERL, EOMI, moist mucus membranes  Neck: Supple, no JVD, no meningeal signs  Chest: Clear to auscultation bilaterally   CVS: RRR, S1 S2 heard, no murmurs/rubs/gallops  Abd: Soft, non-tender, non-distended, +bowel sounds   Ext: No clubbing, no cyanosis, no edema  Neuro/Psych: Pleasant mood and affect, CN 2-12 grossly intact, R hemiparesis  Cap refill: Brisk, less than 3 seconds  Pulses: 2+ radial pulses  Skin: Warm, dry, without rashes or lesions    Data Review: All diagnostic labs and studies have been reviewed. Abnormal Labs Reviewed   COVID-19 RAPID TEST - Abnormal; Notable for the following components:       Result Value    COVID-19 rapid test Detected (*)     All other components within normal limits   CBC WITH AUTOMATED DIFF - Abnormal; Notable for the following components:    WBC 13.3 (*)     RBC 3.75 (*)     HGB 10.8 (*)     HCT 33.2 (*)     NEUTROPHILS 78 (*)     IMMATURE GRANULOCYTES 1 (*)     ABS. NEUTROPHILS 10.5 (*)     ABS. IMM.  GRANS. 0.1 (*)     All other components within normal limits   METABOLIC PANEL, BASIC - Abnormal; Notable for the following components:    Sodium 132 (*)     Glucose 116 (*)     Creatinine 0.67 (*)     BUN/Creatinine ratio 24 (*)     All other components within normal limits   URINALYSIS W/ REFLEX CULTURE - Abnormal; Notable for the following components:    Appearance TURBID (*)     Protein TRACE (*)     Blood TRACE (*)     Urobilinogen 4.0 (*)     Nitrites Positive (*)     Leukocyte Esterase MODERATE (*)     Bacteria 4+ (*)     UA:UC IF INDICATED URINE CULTURE ORDERED (*)     All other components within normal limits       All Micro Results       Procedure Component Value Units Date/Time    COVID-19 RAPID TEST [[id number]]  (Abnormal) Collected: 10/18/22 1705    Order Status: Completed Specimen: Nasopharyngeal Updated: 10/18/22 1808     Specimen source Nasopharyngeal        COVID-19 rapid test Detected        Comment: Rapid Abbott ID Now       The specimen is POSITIVE for SARS-CoV-2, the novel coronavirus associated with COVID-19. This test has been authorized by the FDA under an Emergency Use Authorization (EUA) for use by authorized laboratories. Fact sheet for Healthcare Providers:  http://www.yaya.bibridget/  Fact sheet for Patients: http://www.nai-lily.bibridget/       Methodology: Isothermal Nucleic Acid Amplification  CALLED TO AND READ BACK BY  Chris Mcleod RN @ 1808/         CULTURE, URINE [[id number]] Collected: 10/18/22 0101    Order Status: No result Updated: 10/18/22 0605            IMAGING:   XR CHEST SNGL V   Final Result   Low lung volumes with crowded lung markings. No focal infiltrate. .  . ECG/ECHO:    Results for orders placed or performed during the hospital encounter of 09/14/22   EKG, 12 LEAD, INITIAL   Result Value Ref Range    Ventricular Rate 123 BPM    Atrial Rate 133 BPM    QRS Duration 86 ms    Q-T Interval 312 ms    QTC Calculation (Bezet) 446 ms    Calculated R Axis 31 degrees    Calculated T Axis -102 degrees    Diagnosis         Atrial fibrillation with rapid ventricular response with premature   ventricular or aberrantly conducted complexes  ST & T wave abnormality, consider inferior ischemia  When compared with ECG of 17-AUG-2017 13:09,  Vent.  rate has increased BY  50 BPM  Non-specific change in ST segment in Inferior leads  ST now depressed in Anterior leads  Nonspecific T wave abnormality now evident in Anterolateral leads  Confirmed by Rishi Oneil M.D., Kole Wall ([id number]) on 9/19/2022 4:05:18 PM          Assessment:   Given the patient's current clinical presentation, there is a high level of concern for decompensation if discharged from the emergency department. Complex decision making was performed, which includes reviewing the patient's available past medical records, laboratory results, and imaging studies. Active Problems:    COVID (10/18/2022)      Plan:     #Acute Cystitis  #Urinary Retention  #leukocytosis  -UA with + nitrites, + leukocyte esterase, 4+ bacteria, 20-50WBC's  -start rocephin (past culture with 45,000 staph epi, but + nitrites suggests gram negative UTI)  -follow urine culture  -maintain vazquez, and continue flomax    #Covid+  -rapid covid +  -spo2 96-99% on room air  -CXR with low lung volume, and no infiltrate  -check coagulation and inflammatory markers  -dvt ppx>pt. On eliquis    #Normocytic Anemia  -Hgb 10.8 (b/l 12-13)  -check iron studies, ferritin, and hemoccult    #Hx L MCA stroke with R hemiparesis  -case management consulted for help with placement    #Atrial Fibrillation  -continue eliquis for now, will hold if hemoccult +  -continue home metoprolol and diltiazem  -continue vazquez    #Dyslipidemia  #HTN  -continue statin    #VALENTE  #RLS  -continue Cpap    #GERD  -continue PPI    #gout  -continue allopurinol    DIET: ADULT DIET Regular  DIET ONE TIME MESSAGE   ISOLATION PRECAUTIONS: There are currently no Active Isolations  CODE STATUS: Prior   DVT PROPHYLAXIS: Eliquis  FUNCTIONAL STATUS PRIOR TO HOSPITALIZATION: Completely disabled. Cannot carry on any self-care. Totally confined to bed or chair. EARLY MOBILITY ASSESSMENT: Recommend an assessment from physical therapy and/or occupational therapy  ANTICIPATED DISCHARGE: 24-48 hours. ANTICIPATED DISPOSITION: [facility]  EMERGENCY CONTACT/SURROGATE DECISION MAKER: Aziza Street (son)      Signed By: Low Rios MD     October 18, 2022         Please note that this dictation may have been completed with Dragon, the [employer] voice recognition software.   Quite often unanticipated grammatical, syntax, homophones, and other interpretive errors are inadvertently transcribed by the computer software. Please disregard these errors. Please excuse any errors that have escaped final proofreading.

## 2022-10-19 ENCOUNTER — PATIENT OUTREACH (OUTPATIENT)
Dept: CASE MANAGEMENT | Age: 72
End: 2022-10-19

## 2022-10-19 ENCOUNTER — APPOINTMENT (OUTPATIENT)
Dept: GENERAL RADIOLOGY | Age: 72
DRG: 056 | End: 2022-10-19
Attending: PHYSICIAN ASSISTANT
Payer: MEDICARE

## 2022-10-19 LAB
25(OH)D3 SERPL-MCNC: 27.8 NG/ML (ref 30–100)
ANION GAP SERPL CALC-SCNC: 6 MMOL/L (ref 5–15)
APTT PPP: 31.9 SEC (ref 22.1–31)
BUN SERPL-MCNC: 13 MG/DL (ref 6–20)
BUN/CREAT SERPL: 26 (ref 12–20)
CALCIUM SERPL-MCNC: 8.8 MG/DL (ref 8.5–10.1)
CHLORIDE SERPL-SCNC: 102 MMOL/L (ref 97–108)
CO2 SERPL-SCNC: 25 MMOL/L (ref 21–32)
CREAT SERPL-MCNC: 0.5 MG/DL (ref 0.7–1.3)
CRP SERPL-MCNC: 3.59 MG/DL (ref 0–0.6)
D DIMER PPP FEU-MCNC: 3.8 MG/L FEU (ref 0–0.65)
FERRITIN SERPL-MCNC: 969 NG/ML (ref 26–388)
FIBRINOGEN PPP-MCNC: 659 MG/DL (ref 200–475)
GLUCOSE SERPL-MCNC: 98 MG/DL (ref 65–100)
INR PPP: 1.2 (ref 0.9–1.1)
IRON SATN MFR SERPL: 23 % (ref 20–50)
IRON SERPL-MCNC: 36 UG/DL (ref 35–150)
LACTATE SERPL-SCNC: 0.8 MMOL/L (ref 0.4–2)
LDH SERPL L TO P-CCNC: 174 U/L (ref 85–241)
POTASSIUM SERPL-SCNC: 3.7 MMOL/L (ref 3.5–5.1)
PROCALCITONIN SERPL-MCNC: <0.05 NG/ML
PROTHROMBIN TIME: 12.4 SEC (ref 9–11.1)
RETICS # AUTO: 0.06 M/UL (ref 0.03–0.1)
RETICS/RBC NFR AUTO: 1.6 % (ref 0.7–2.1)
SODIUM SERPL-SCNC: 133 MMOL/L (ref 136–145)
THERAPEUTIC RANGE,PTTT: ABNORMAL SECS (ref 58–77)
TIBC SERPL-MCNC: 160 UG/DL (ref 250–450)
TROPONIN-HIGH SENSITIVITY: 73 NG/L (ref 0–76)

## 2022-10-19 PROCEDURE — 83540 ASSAY OF IRON: CPT

## 2022-10-19 PROCEDURE — 82306 VITAMIN D 25 HYDROXY: CPT

## 2022-10-19 PROCEDURE — 85610 PROTHROMBIN TIME: CPT

## 2022-10-19 PROCEDURE — 85045 AUTOMATED RETICULOCYTE COUNT: CPT

## 2022-10-19 PROCEDURE — 84145 PROCALCITONIN (PCT): CPT

## 2022-10-19 PROCEDURE — 97530 THERAPEUTIC ACTIVITIES: CPT

## 2022-10-19 PROCEDURE — 83615 LACTATE (LD) (LDH) ENZYME: CPT

## 2022-10-19 PROCEDURE — 74011250636 HC RX REV CODE- 250/636: Performed by: FAMILY MEDICINE

## 2022-10-19 PROCEDURE — 74011250637 HC RX REV CODE- 250/637: Performed by: PHYSICIAN ASSISTANT

## 2022-10-19 PROCEDURE — 74011000250 HC RX REV CODE- 250: Performed by: FAMILY MEDICINE

## 2022-10-19 PROCEDURE — 84484 ASSAY OF TROPONIN QUANT: CPT

## 2022-10-19 PROCEDURE — 85730 THROMBOPLASTIN TIME PARTIAL: CPT

## 2022-10-19 PROCEDURE — 36415 COLL VENOUS BLD VENIPUNCTURE: CPT

## 2022-10-19 PROCEDURE — 85384 FIBRINOGEN ACTIVITY: CPT

## 2022-10-19 PROCEDURE — 77030037877 HC DRSG MEPILEX >48IN BORD MOLN -A

## 2022-10-19 PROCEDURE — 73030 X-RAY EXAM OF SHOULDER: CPT

## 2022-10-19 PROCEDURE — 82728 ASSAY OF FERRITIN: CPT

## 2022-10-19 PROCEDURE — 85379 FIBRIN DEGRADATION QUANT: CPT

## 2022-10-19 PROCEDURE — 77030037878 HC DRSG MEPILEX >48IN BORD MOLN -B

## 2022-10-19 PROCEDURE — 86140 C-REACTIVE PROTEIN: CPT

## 2022-10-19 PROCEDURE — 80048 BASIC METABOLIC PNL TOTAL CA: CPT

## 2022-10-19 PROCEDURE — 74011250637 HC RX REV CODE- 250/637: Performed by: FAMILY MEDICINE

## 2022-10-19 PROCEDURE — 83605 ASSAY OF LACTIC ACID: CPT

## 2022-10-19 PROCEDURE — 65270000029 HC RM PRIVATE

## 2022-10-19 RX ORDER — TAMSULOSIN HYDROCHLORIDE 0.4 MG/1
0.4 CAPSULE ORAL DAILY
Status: DISCONTINUED | OUTPATIENT
Start: 2022-10-19 | End: 2022-10-29 | Stop reason: HOSPADM

## 2022-10-19 RX ORDER — DILTIAZEM HYDROCHLORIDE 60 MG/1
60 TABLET, FILM COATED ORAL EVERY 8 HOURS
Status: COMPLETED | OUTPATIENT
Start: 2022-10-19 | End: 2022-10-19

## 2022-10-19 RX ORDER — ERGOCALCIFEROL 1.25 MG/1
50000 CAPSULE ORAL
Status: DISCONTINUED | OUTPATIENT
Start: 2022-10-19 | End: 2022-10-19

## 2022-10-19 RX ORDER — BALSAM PERU/CASTOR OIL
OINTMENT (GRAM) TOPICAL 2 TIMES DAILY
Status: DISCONTINUED | OUTPATIENT
Start: 2022-10-19 | End: 2022-10-29 | Stop reason: HOSPADM

## 2022-10-19 RX ORDER — DILTIAZEM HYDROCHLORIDE 180 MG/1
180 CAPSULE, COATED, EXTENDED RELEASE ORAL DAILY
Status: DISCONTINUED | OUTPATIENT
Start: 2022-10-20 | End: 2022-10-29 | Stop reason: HOSPADM

## 2022-10-19 RX ORDER — GABAPENTIN 300 MG/1
300 CAPSULE ORAL
Status: DISCONTINUED | OUTPATIENT
Start: 2022-10-19 | End: 2022-10-29 | Stop reason: HOSPADM

## 2022-10-19 RX ORDER — ERGOCALCIFEROL 1.25 MG/1
50000 CAPSULE ORAL
Status: DISCONTINUED | OUTPATIENT
Start: 2022-10-20 | End: 2022-10-29 | Stop reason: HOSPADM

## 2022-10-19 RX ORDER — ATORVASTATIN CALCIUM 40 MG/1
80 TABLET, FILM COATED ORAL
Status: DISCONTINUED | OUTPATIENT
Start: 2022-10-19 | End: 2022-10-29 | Stop reason: HOSPADM

## 2022-10-19 RX ORDER — FAMOTIDINE 20 MG/1
20 TABLET, FILM COATED ORAL EVERY 12 HOURS
Status: DISCONTINUED | OUTPATIENT
Start: 2022-10-19 | End: 2022-10-29 | Stop reason: HOSPADM

## 2022-10-19 RX ORDER — ALLOPURINOL 100 MG/1
100 TABLET ORAL DAILY
Status: DISCONTINUED | OUTPATIENT
Start: 2022-10-19 | End: 2022-10-29 | Stop reason: HOSPADM

## 2022-10-19 RX ORDER — METOPROLOL TARTRATE 25 MG/1
37.5 TABLET, FILM COATED ORAL 2 TIMES DAILY
Status: COMPLETED | OUTPATIENT
Start: 2022-10-19 | End: 2022-10-19

## 2022-10-19 RX ADMIN — GABAPENTIN 300 MG: 300 CAPSULE ORAL at 02:48

## 2022-10-19 RX ADMIN — APIXABAN 5 MG: 5 TABLET, FILM COATED ORAL at 08:53

## 2022-10-19 RX ADMIN — SODIUM CHLORIDE, PRESERVATIVE FREE 10 ML: 5 INJECTION INTRAVENOUS at 20:20

## 2022-10-19 RX ADMIN — FAMOTIDINE 20 MG: 20 TABLET ORAL at 02:48

## 2022-10-19 RX ADMIN — SODIUM CHLORIDE, PRESERVATIVE FREE 10 ML: 5 INJECTION INTRAVENOUS at 05:49

## 2022-10-19 RX ADMIN — DILTIAZEM HYDROCHLORIDE 60 MG: 60 TABLET, FILM COATED ORAL at 12:41

## 2022-10-19 RX ADMIN — DILTIAZEM HYDROCHLORIDE 60 MG: 60 TABLET, FILM COATED ORAL at 04:26

## 2022-10-19 RX ADMIN — ATORVASTATIN CALCIUM 80 MG: 40 TABLET, FILM COATED ORAL at 22:23

## 2022-10-19 RX ADMIN — ACETAMINOPHEN 650 MG: 325 TABLET ORAL at 22:23

## 2022-10-19 RX ADMIN — FAMOTIDINE 20 MG: 20 TABLET ORAL at 08:54

## 2022-10-19 RX ADMIN — ATORVASTATIN CALCIUM 80 MG: 40 TABLET, FILM COATED ORAL at 02:48

## 2022-10-19 RX ADMIN — METOPROLOL TARTRATE 37.5 MG: 25 TABLET, FILM COATED ORAL at 08:53

## 2022-10-19 RX ADMIN — TAMSULOSIN HYDROCHLORIDE 0.4 MG: 0.4 CAPSULE ORAL at 08:54

## 2022-10-19 RX ADMIN — Medication: at 22:24

## 2022-10-19 RX ADMIN — GABAPENTIN 300 MG: 300 CAPSULE ORAL at 22:23

## 2022-10-19 RX ADMIN — CEFTRIAXONE SODIUM 1 G: 1 INJECTION, POWDER, FOR SOLUTION INTRAMUSCULAR; INTRAVENOUS at 20:20

## 2022-10-19 RX ADMIN — APIXABAN 5 MG: 5 TABLET, FILM COATED ORAL at 00:37

## 2022-10-19 RX ADMIN — ALLOPURINOL 100 MG: 100 TABLET ORAL at 08:54

## 2022-10-19 RX ADMIN — SODIUM CHLORIDE, PRESERVATIVE FREE 10 ML: 5 INJECTION INTRAVENOUS at 22:23

## 2022-10-19 RX ADMIN — METOPROLOL TARTRATE 37.5 MG: 25 TABLET, FILM COATED ORAL at 20:19

## 2022-10-19 RX ADMIN — DILTIAZEM HYDROCHLORIDE 60 MG: 60 TABLET, FILM COATED ORAL at 20:19

## 2022-10-19 RX ADMIN — FAMOTIDINE 20 MG: 20 TABLET ORAL at 20:19

## 2022-10-19 RX ADMIN — APIXABAN 5 MG: 5 TABLET, FILM COATED ORAL at 20:19

## 2022-10-19 NOTE — PROGRESS NOTES
LOREN: anticipate d/c to SNF, referrals pending family choice;    [facility] had originally accepted, however pt they have now declined as pt is Covid positive    [facility] IPR pending    Pt is Covid positive, test resulted 10/18/22, pt is on RA  PCR Covid test pending, as per family's request/Attending in agreement with this    [facility]    Primary family contacts: Hannah Armas [phone]  Jacques Bolton [phone], Rosario Martinez [phone]    RUR: 16%  -History of L MCA Infarction with hemorrhagic conversion with baseline Right hemiplegia  -Covid-19  -Pt has indwelling vazquez catheter, on IV ABX for UTI  -1500-CM reviewed pt chart & met with pt's friend,Adi Chaparrori to discuss transitional planning and he advised that he would like to have pt do rehab and that he may look to transition to LTC. CM had confirmed with [facility] that they cannot accept a Covid positive patient. Cm discussed SNFS that could accept a Covid positive patient, [facility], [facility] and [facility], however Dez Marroquin advised he needs to check with pt's son and also requested that another Covid test be done to possibly give them more options. CM sent perfect serve message to Attending PA to ask about covid test. CM to follow. 1600-CM received return message from Attending and he put PCR covid test in for pt. CM to follow.   Stone Bryson RN BSN CCM

## 2022-10-19 NOTE — PROGRESS NOTES
Physician Progress Note      Wilmer German  [facility] #:                  [id number]  :                       1950  ADMIT DATE:       10/17/2022 9:15 PM  [address] [place] DATE:  RESPONDING  PROVIDER #:        Kush Orlando MD          QUERY TEXT:    Patient admitted with Acute Cystitis, noted to have atrial fibrillation and is maintained on eliquis. If possible, please document in progress notes and discharge summary if you are evaluating and/or treating any of the following: The medical record reflects the following:  Risk Factors: a fib  hypercoagulable state    Clinical Indicators:  H&P 10/18  #Atrial Fibrillation  -continue eliquis for now, will hold if hemoccult +    Treatment:  apixaban (ELIQUIS) tablet 5 mg    Thank you,  Emily Valverde RN [facility]  Clinical Documentation  [phone] or via Perfect Serve  Options provided:  -- Secondary hypercoagulable state in a patient with atrial fibrillation  -- Other - I will add my own diagnosis  -- Disagree - Not applicable / Not valid  -- Disagree - Clinically unable to determine / Unknown  -- Refer to Clinical Documentation Reviewer    PROVIDER RESPONSE TEXT:    This patient has secondary hypercoagulable state in a patient with atrial fibrillation.     Query created by: Alicja Blum on 10/19/2022 12:46 PM      Electronically signed by:  Kush Orlando MD 10/19/2022 2:09 PM

## 2022-10-19 NOTE — PROGRESS NOTES
[facility] Adult  Hospitalist Group                                                                                          Hospitalist Progress Note  Manuel Alexis PA-C  Answering service: [phone] from in house phone        Date of Service:  10/19/2022  NAME:  Ivana Grady  :  1950  MRN:  [id number]      Admission Summary:   Ivana Grady is a 71 y.o. 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 He discharged from [facility] on 10/18/22 but then presented to ED on 10/19/22 with no acute complaints. Work-up revealing c/f acute UTI with indwelling vazquez (on CTX) and incidentally COVID+. Working toward [address] [place] [address] placement. Interval history / Subjective:   Mr. Janiya Andrew was seen and examined after working with Physical Therapy and being seen by wound care. He is feeling tired. He denies any other complaints. He has not had any improvement in the right sided strength since being at rehab. We reviewed his lab work, imaging, and medications. All questions and concerns addressed. 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 No indication for ASA, LDL Goal < 70  -- Was started on Eliquis 5mg BID on 22. Continued on admission  -- Continue Atorvastatin 80mg nightly. Recheck cholesterol panel as an outpatient  -- Needs to follow-up outpatient with Neurology  -- Completed [facility] on 10/18.  Needs LTC    COVID-19  -- Incidental positive on 10/18  -- PCT < 0.05  -- No respiratory distress or oxygen requirement    A-Fib  -- Cardiology consulted during previous admission  -- Admitted on Metoprolol Tartrate 37.5mg BID and Diltiazem IR 60mg q8h.  -- Convert to XL dosing on 10/20/22 of both medications  -- On Eliquis for stroke prevention     Urinary Retention with indwelling vazquez  GNR UTI  -- Continue Flomax  -- On CTX (D1 = 10/18). Follow urine culture from 10/18  -- Remove vazquez and complete void trial (has not completed void trial to date). Straight cath x 2, and if unable to void, replace vazquez and consult urology. Needs vazquez replaced for infection as well    Vitamin D Deficiency  -- 25(OH)D 27.8 on 10/19. Start Ergocalciferol weekly x 8 doses. R Shoulder subluxation  -- Appreciated by PT on 10/19  -- Xray R Shoulder 10/19. If concerns will consult Ortho     Elevated D-dimer  -- D-dimer 3.80 on 10/19. No tachycardia/tachypnea/O2 requirement c/f PE. Likely elevated due to immobile state and COVID-19     Code status: Full code  Prophylaxis: [address] discussed with: Pt, RN, Care Coordinator  Anticipated Disposition: TBD     Hospital Problems  Date Reviewed: 10/12/2022            Codes Class Noted POA    COVID ICD-10-CM: U07.1  ICD-9-CM: 079.89  10/18/2022 Unknown             Review of Systems:   A comprehensive review of systems was negative except for that written in the HPI. Vital Signs:    Last 24hrs VS reviewed since prior progress note. Most recent are:  Visit Vitals  /84 (BP 1 Location: Left arm, BP Patient Position: At rest)   Pulse 74   Temp 97.5 °F (36.4 °C)   Resp 16   SpO2 97%         Intake/Output Summary (Last 24 hours) at 10/19/2022 1222  Last data filed at 10/19/2022 0943  Gross per 24 hour   Intake --   Output 3250 ml   Net -3250 ml        Physical Examination:     I had a face to face encounter with this patient and independently examined them on 10/19/2022 as outlined below:          Constitutional:  No acute distress, cooperative, pleasant    ENT:  Oral mucosa moist, oropharynx benign.  Voice is soft   Resp:  CTA bilaterally. No wheezing/rhonchi/rales. No accessory muscle use. CV:  Regular rhythm, normal rate, no murmurs    GI:  Soft, non distended, non tender. Musculoskeletal:  No edema, warm    Neurologic:  R hemiplegia with right facial asymmetry.  AAO to name, location being hospital, month/year  : [place] with clear/yellow urine            Data Review:    Review and/or order of clinical lab test  Review and/or order of tests in the radiology section of CPT  Review and/or order of tests in the medicine section of CPT      Labs:     Recent Labs     10/17/22  1933   WBC 13.3*   HGB 10.8*   HCT 33.2*        Recent Labs     10/19/22  0105 10/17/22  1933   * 132*   K 3.7 4.3    99   CO2 25 28   BUN 13 16   CREA 0.50* 0.67*   GLU 98 116*   CA 8.8 8.9     Recent Labs     10/19/22  0105   INR 1.2*   PTP 12.4*   APTT 31.9*      Recent Labs     10/19/22  0105   TIBC 160*   PSAT 23   FERR 969*        Lab Results   Component Value Date/Time    Cholesterol, total 146 09/15/2022 02:40 AM    HDL Cholesterol 53 09/15/2022 02:40 AM    LDL, calculated 65 09/15/2022 02:40 AM    Triglyceride 140 09/15/2022 02:40 AM    CHOL/HDL Ratio 2.8 09/15/2022 02:40 AM     Lab Results   Component Value Date/Time    Glucose (POC) 134 (H) 09/28/2022 11:46 AM    Glucose (POC) 116 09/28/2022 06:08 AM    Glucose (POC) 121 (H) 09/28/2022 12:34 AM    Glucose (POC) 124 (H) 09/27/2022 05:28 PM    Glucose (POC) 176 (H) 09/27/2022 11:27 AM     Lab Results   Component Value Date/Time    Color YELLOW/STRAW 10/18/2022 01:01 AM    Appearance TURBID (A) 10/18/2022 01:01 AM    Specific gravity 1.013 10/18/2022 01:01 AM    pH (UA) 6.5 10/18/2022 01:01 AM    Protein TRACE (A) 10/18/2022 01:01 AM    Glucose Negative 10/18/2022 01:01 AM    Ketone Negative 10/18/2022 01:01 AM    Bilirubin Negative 10/18/2022 01:01 AM    Urobilinogen 4.0 (H) 10/18/2022 01:01 AM    Nitrites Positive (A) 10/18/2022 01:01 AM    Leukocyte Esterase MODERATE (A) 10/18/2022 01:01 AM    Epithelial cells FEW 10/18/2022 01:01 AM    Bacteria 4+ (A) 10/18/2022 01:01 AM    WBC 20-50 10/18/2022 01:01 AM    RBC 0-5 10/18/2022 01:01 AM         Medications Reviewed:     Current Facility-Administered Medications   Medication Dose Route Frequency    allopurinoL (ZYLOPRIM) tablet 100 mg  100 mg Oral DAILY    apixaban (ELIQUIS) tablet 5 mg  5 mg Oral BID    atorvastatin (LIPITOR) tablet 80 mg  80 mg Oral QHS    dilTIAZem IR (CARDIZEM) tablet 60 mg  60 mg Oral Q8H    famotidine (PEPCID) tablet 20 mg  20 mg Oral Q12H    gabapentin (NEURONTIN) capsule 300 mg  300 mg Oral QHS    metoprolol tartrate (LOPRESSOR) tablet 37.5 mg  37.5 mg Oral BID    tamsulosin (FLOMAX) capsule 0.4 mg  0.4 mg Oral DAILY    [START ON 10/20/2022] ergocalciferol capsule 50,000 Units  50,000 Units Oral Q7D    [START ON 10/20/2022] metoprolol succinate (TOPROL-XL) XL tablet 75 mg  75 mg Oral DAILY    [START ON 10/20/2022] dilTIAZem ER (CARDIZEM CD) capsule 180 mg  180 mg Oral DAILY    balsam peru-castor oiL (VENELEX) ointment   Topical BID    sodium chloride (NS) flush 5-40 mL  5-40 mL IntraVENous Q8H    sodium chloride (NS) flush 5-40 mL  5-40 mL IntraVENous PRN    acetaminophen (TYLENOL) tablet 650 mg  650 mg Oral Q6H PRN    Or    acetaminophen (TYLENOL) suppository 650 mg  650 mg Rectal Q6H PRN    ondansetron (ZOFRAN ODT) tablet 4 mg  4 mg Oral Q8H PRN    Or    ondansetron (ZOFRAN) injection 4 mg  4 mg IntraVENous Q6H PRN    cefTRIAXone (ROCEPHIN) 1 g in 0.9% sodium chloride 10 mL IV syringe  1 g IntraVENous Q24H     ______________________________________________________________________  EXPECTED LENGTH OF STAY: - - -  ACTUAL LENGTH OF STAY:          1                 Manuel Alexis PA-C

## 2022-10-19 NOTE — PROGRESS NOTES
Problem: Mobility Impaired (Adult and Pediatric)  Goal: *Acute Goals and Plan of Care (Insert Text)  Description: FUNCTIONAL STATUS PRIOR TO ADMISSION:  Prior to 9/14/22, patient was independent and active without use of DME. He was admitted to acute care hosp 9/14/22-9/28/22 s/p acute CVA f/b 21 days of IPR. His insurance company denied additional IPR and SNF resulting in pt discharging home alone with home health services. Patient was not able to manage in his home alone, presented to the ED for assistance. Per IPR notes, at the time of discharge patient's functional mobility: Supv/ CGA sitting,  Max A sit<->stand, Mod A bed<->chair slide board transfer,  Non ambulatory, Max A to propel the WC 5 ft. HOME SUPPORT PRIOR TO ADMISSION: The patient lived alone and required no assistance prior to his CVA  ,  Physical Therapy Goals  Initiated 10/18/2022  1. Patient will move from supine to sit and sit to supine in bed with minimal assistance within 7 day(s). 2.  Patient will transfer from bed to chair and chair to bed with minimum assistance  using the least restrictive device within 7 day(s). 3.  Patient will perform sit to stand with minimal assistance within 7 day(s). 4.  Patient will be supervision sitting EOB 15 mins within 7 days. Outcome: Progressing Towards Goal   PHYSICAL THERAPY TREATMENT  Patient: Starla Lal (4 y.o. male)  Date: 10/19/2022  Diagnosis: COVID [U07.1] <principal problem not specified>      Precautions: Fall  Chart, physical therapy assessment, plan of care and goals were reviewed. ASSESSMENT  Patient continues with skilled PT services and is progressing towards goals. Patient received in bed having finished breakfast and most agreeable to therapy. Patient able to participate fully in assisting with bed mobility, sitting balance and sit to stand.   Demonstrates significant right sided weakness post CVA and having been in IPR but discharged home (left IPR, home and then directly back to acute hospital). Patient following directions well and able to engage in all activities. Does have decreased balance, requires assist of up to 2 person to assist with safe bed mobility and sit to stand. Anticipate next visit to assess transfer board transfer. Is not safe to return home without full 24/7 care and equipment . Current Level of Function Impacting Discharge (mobility/balance): mod to max assist of 1-2; ;nonambulatory    Other factors to consider for discharge: lives alone         PLAN :  Patient continues to benefit from skilled intervention to address the above impairments. Continue treatment per established plan of care. to address goals. Recommendation for discharge: (in order for the patient to meet his/her long term goals)  Therapy 3 hours per day 5-7 days per week    This discharge recommendation:  Has been made in collaboration with the attending provider and/or case management    IF patient discharges home will need the following DME: bedside commode, wheelchair, and sliding/transfer board       SUBJECTIVE:   Patient stated I haven't done anything like this before.  following session. OBJECTIVE DATA SUMMARY:   Critical Behavior:  Neurologic State: Alert  Orientation Level: Oriented X4  Cognition: Follows commands  Safety/Judgement: Awareness of environment  Functional Mobility Training:  Bed Mobility:  Rolling: Moderate assistance  Supine to Sit: Moderate assistance;Assist x2  Sit to Supine: Moderate assistance;Assist x2  Scooting: Maximum assistance;Assist x1        Transfers:  Sit to Stand: Moderate assistance;Assist x2  Stand to Sit: Maximum assistance                             Balance:  Sitting: Impaired; Without support  Sitting - Static: Good (unsupported)  Sitting - Dynamic: Fair (occasional)  Standing: Impaired; With support  Standing - Static: Constant support;Poor  Standing - Dynamic : Constant support;Poor        Activity Tolerance: Fair    After treatment patient left in no apparent distress:   Supine in bed, Heels elevated for pressure relief, Call bell within reach, Caregiver / family present, and Side rails x 3    COMMUNICATION/COLLABORATION:   The patients plan of care was discussed with: Occupational therapist, Registered nurse, and Case management.      Nia Pruitt, PT   Time Calculation: 35 mins

## 2022-10-19 NOTE — PROGRESS NOTES
Problem: Self Care Deficits Care Plan (Adult)  Goal: *Acute Goals and Plan of Care (Insert Text)  Description: FUNCTIONAL STATUS PRIOR TO ADMISSION: At baseline pt was highly independent, active, and not using DME. Per pt and friends at bedside, pt owns several acres and frequently works outside, is a , and enjoys renovating [employer]. Immediately PTA, pt was at [facility] for OT/PT services to address deficits from L MCA stroke. Pt was d/c 10/17/22 and within 1 hour of being home was admitted to [facility] ED.     HOME SUPPORT: The patient lived alone with limited local support. Occupational Therapy Goals  Initiated 10/18/2022   1. Patient will perform seated grooming task with moderate assistance within 7 day(s). 2.  Patient will perform seated upper body dressing, using compensatory strategies, with moderate assistance within 7 day(s). 3.  Patient will perform BSC transfers with moderate assistance  within 7 day(s). 4.  Patient will perform all aspects of toileting with moderate assistance  within 7 day(s). 5.  Patient will participate in upper extremity therapeutic exercise/activities with supervision/set-up within 7 day(s). 6.  Patient will improve their Fugl Campbell score by 5 points in prep for ADLs within 7 days. Outcome: Progressing Towards Goal    OCCUPATIONAL THERAPY TREATMENT  Patient: Lacey Pal (72 y.o. male)  Date: 10/19/2022  Diagnosis: COVID [U07.1] <principal problem not specified>      Precautions: Fall  Chart, occupational therapy assessment, plan of care, and goals were reviewed. ASSESSMENT  Patient continues with skilled OT services and is progressing towards goals. Patient ADLs continue to be limited by impaired balance, generalized weakness, limited lower body access, RUE hemiparesis with noted 2 finger width anterior subluxation of shoulder, some confusion, impaired cognition and R inattention.      Today, patient required mod A x2 to come to sitting on EOB and total A to kristin sling on RUE in prep for mobility. Patient able to reach out of MICHELLE in all directions using LUE with up to min A for balance. Patient able to stand with max A x 2 to allow wound care RN to replace dressing on sacrum. Patient returned to supine with max A x2 and left in semi fowlers with call bell in reach, RN aware, wound care RN and MD bedside. Will continue to follow, recommend IPR once cleared for D/C. If this is not possible - SNF with need for LTC as patient is NOT safe to return home alone. Current Level of Function Impacting Discharge (ADLs): mod-max A x2 for ADLs and mobility     Other factors to consider for discharge: lives alone, limited/no support         PLAN :  Patient continues to benefit from skilled intervention to address the above impairments. Continue treatment per established plan of care to address goals. Recommend with staff: Recommend with nursing, ADLs with supervision/setup, bed to chair position 3x/day and toileting via bedpan. Thank you for completing as able in order to maintain patient strength, endurance and independence. When mobilizing patient in bed, do not pull on RUE. When in supine, prop RUE with elbow at 90* and hand in neutral position to reduce edema in hand and reduce risk of further injury to R shoulder. Recommend next OT session: attempt getting to chair for ADLs    Recommendation for discharge: (in order for the patient to meet his/her long term goals)  Therapy 3 hours per day 5-7 days per week    This discharge recommendation:  Has been made in collaboration with the attending provider and/or case management    IF patient discharges home will need the following DME: bedside commode, hospital bed, mechanical lift, transfer bench, and wheelchair       SUBJECTIVE:   Patient stated I didn't do any of that.  re: when asked what kind of activities he was completing in rehab    OBJECTIVE DATA SUMMARY:   Cognitive/Behavioral Status:  Neurologic State: Alert  Orientation Level: Oriented X4  Cognition: Appropriate for age attention/concentration; Follows commands  Perception: Cues to attend to right side of body; Tactile;Verbal  Perseveration: No perseveration noted  Safety/Judgement: Awareness of environment;Decreased awareness of need for assistance;Decreased awareness of environment;Decreased insight into deficits; Fall prevention    Functional Mobility and Transfers for ADLs:  Bed Mobility:  Rolling: Moderate assistance  Supine to Sit: Moderate assistance;Assist x2  Sit to Supine: Moderate assistance;Assist x2  Scooting: Maximum assistance;Assist x1    Transfers:  Sit to Stand: Moderate assistance;Assist x2    Balance:  Sitting: Impaired; Without support  Sitting - Static: Good (unsupported)  Sitting - Dynamic: Fair (occasional)  Standing: Impaired; With support  Standing - Static: Constant support;Poor  Standing - Dynamic : Constant support;Poor    ADL Intervention:    Upper Body Dressing Assistance  Orthotics(Brace): Total assistance (dependent) (sling on/off RUE)  Cues: Physical assistance;Verbal cues provided    Lower Body Dressing Assistance  Socks: Total assistance (dependent)    Cognitive Retraining  Safety/Judgement: Awareness of environment;Decreased awareness of need for assistance;Decreased awareness of environment;Decreased insight into deficits; Fall prevention    Pain:  Reporting some pain in R elbow (noted wound with open areas)    Activity Tolerance:   Fair    After treatment patient left in no apparent distress:   Supine in bed, Call bell within reach, Caregiver / family present, Side rails x 3, and RN aware, wound care and MD present    COMMUNICATION/COLLABORATION:   The patients plan of care was discussed with: Physical therapist, Registered nurse, and Physician.      Deb Xavier OT  Time Calculation: 47 mins

## 2022-10-19 NOTE — PROGRESS NOTES
Patient came to the floor at 22:45 from ED via stretcher. Alert and orient X 4, room air. Fall and safety precaution has been taken. Bed alarm on with low position and bed brakes on. Call light and room phone within the reach of the patient. No complained of any pain.

## 2022-10-19 NOTE — PROGRESS NOTES
Received notification via staff messaging patient admitted to [facility] 10/17/2022 dx COVID-19. Will sign off.

## 2022-10-19 NOTE — ED NOTES
a final transfer review been performed? Yes    Reason for Admission: Failure to thrive  Patient comes from: ED 3  Mental Status: Alert and oriented  ADL:total assistance  Ambulation:wheelchair bound  Pertinent Info/Safety Concerns: Discharged from [facility] 10/17 after stroke rehab, called 911 when he arrived home - unable to care for self. Chronic vazquez, changed 10/18. A/O x 4, R sided deficits.    COVID Status: Positive  MEWS Score: 1  Atrial Fib  Vitals:    10/18/22 0146 10/18/22 1003 10/18/22 1915 10/18/22 2123   BP: (!) 145/78 (!) 142/100 (!) 161/107 (!) 152/88   Pulse: 71 100 87 90   Resp: 19 18 19 15   Temp:  98.3 °F (36.8 °C) 97.9 °F (36.6 °C) 98.1 °F (36.7 °C)   SpO2: 99% 96% 98% 99%     Lines:   Peripheral IV 10/17/22 Left Antecubital (Active)      Transport mode:ED stretcher    Carla Mojicas  being transferred to [address] (unit) for routine progression of care     \"Notification of etransfer note given to (Name) Carmen\"

## 2022-10-19 NOTE — WOUND CARE
WOCN Note:     New consult placed for assessment of right elbow, right lateral buttock, back and sacrum. Chart reviewed. Assessed in room 623. Admitted DX: COVID  Past Medical History:   Diagnosis Date    Arrhythmia     atrial fib    Depression 4/24/2010    Diabetes ([facility] [address]     diet control for pre-diabetes    Dyslipidemia, goal LDL below 100 6/14/2011    Gout     HTN (hypertension) 4/24/2010    Impotence 4/24/2010    Morbid obesity ([address] 5/17/2010    VALENTE (obstructive sleep apnea) 4/24/2010    CPAP    Restless legs 4/15/2015     Assessment:   Patient is alert, communicative and requires assist with repositioning. Bed: prius air mattress  Patient wearing briefs for incontinence. Patient reports no pain. Patient repositioned on left side with pillow. Heels offloaded with pillows. Heels intact without erythema. POA Sacrum intact with blanching pink erythema. 2.  POA Right lateral buttock, skin tear:  1 x 1 x 0.1 cm; 100% red; no exudate or odor. 3.  POA Back, blanching red erythema. 4.  POA Right elbow, partial thickness wound:  4 x 3 x 0.1 cm; 100% pink; no exudate or odor. 5.  POA Right foot anterior toes, scattered non blanching purple erythema vs ecchymosis. Wound, Pressure Prevention & Skin Care Recommendations:    Minimize layers of linen/pads under patient to optimize support surface. 2.  Turn/reposition approximately every 2 hours and offload heels. Patient mobility team to assist with q2 turns. 3.  Manage moisture/ Keep skin folds clean and dry/minimize brief usage. 4.  Specialty bed: Prius air mattress. Use only flat sheet and one incontinence pad.  5.  Sacrum, back and heels/feet:  Venelex BID. 6.  Right lateral buttock and right elbow:  Every 3 days cleanse with saline; apply xeroform and foam.    Discussed above plan with patient and Manuel PA.     Transition of Care:   Plan to follow as needed while admitted to hospital.    QIAN Almaraz RN [facility]  Available on [employer]  Office [phone]

## 2022-10-20 LAB
ANION GAP SERPL CALC-SCNC: 7 MMOL/L (ref 5–15)
BACTERIA SPEC CULT: ABNORMAL
BACTERIA SPEC CULT: ABNORMAL
BASOPHILS # BLD: 0 K/UL (ref 0–0.1)
BASOPHILS NFR BLD: 0 % (ref 0–1)
BUN SERPL-MCNC: 17 MG/DL (ref 6–20)
BUN/CREAT SERPL: 33 (ref 12–20)
CALCIUM SERPL-MCNC: 8.9 MG/DL (ref 8.5–10.1)
CC UR VC: ABNORMAL
CHLORIDE SERPL-SCNC: 102 MMOL/L (ref 97–108)
CO2 SERPL-SCNC: 25 MMOL/L (ref 21–32)
CREAT SERPL-MCNC: 0.52 MG/DL (ref 0.7–1.3)
DIFFERENTIAL METHOD BLD: ABNORMAL
EOSINOPHIL # BLD: 0.3 K/UL (ref 0–0.4)
EOSINOPHIL NFR BLD: 3 % (ref 0–7)
ERYTHROCYTE [DISTWIDTH] IN BLOOD BY AUTOMATED COUNT: 13.6 % (ref 11.5–14.5)
GLUCOSE SERPL-MCNC: 103 MG/DL (ref 65–100)
HCT VFR BLD AUTO: 33.2 % (ref 36.6–50.3)
HGB BLD-MCNC: 10.8 G/DL (ref 12.1–17)
IMM GRANULOCYTES # BLD AUTO: 0.1 K/UL (ref 0–0.04)
IMM GRANULOCYTES NFR BLD AUTO: 1 % (ref 0–0.5)
LYMPHOCYTES # BLD: 2.1 K/UL (ref 0.8–3.5)
LYMPHOCYTES NFR BLD: 19 % (ref 12–49)
MAGNESIUM SERPL-MCNC: 2 MG/DL (ref 1.6–2.4)
MCH RBC QN AUTO: 28.6 PG (ref 26–34)
MCHC RBC AUTO-ENTMCNC: 32.5 G/DL (ref 30–36.5)
MCV RBC AUTO: 87.8 FL (ref 80–99)
MONOCYTES # BLD: 0.9 K/UL (ref 0–1)
MONOCYTES NFR BLD: 9 % (ref 5–13)
NEUTS SEG # BLD: 7.2 K/UL (ref 1.8–8)
NEUTS SEG NFR BLD: 68 % (ref 32–75)
NRBC # BLD: 0 K/UL (ref 0–0.01)
NRBC BLD-RTO: 0 PER 100 WBC
PHOSPHATE SERPL-MCNC: 4.1 MG/DL (ref 2.6–4.7)
PLATELET # BLD AUTO: 334 K/UL (ref 150–400)
PMV BLD AUTO: 9.7 FL (ref 8.9–12.9)
POTASSIUM SERPL-SCNC: 4.4 MMOL/L (ref 3.5–5.1)
RBC # BLD AUTO: 3.78 M/UL (ref 4.1–5.7)
SARS-COV-2, COV2: NORMAL
SERVICE CMNT-IMP: ABNORMAL
SODIUM SERPL-SCNC: 134 MMOL/L (ref 136–145)
WBC # BLD AUTO: 10.6 K/UL (ref 4.1–11.1)

## 2022-10-20 PROCEDURE — 99223 1ST HOSP IP/OBS HIGH 75: CPT | Performed by: INTERNAL MEDICINE

## 2022-10-20 PROCEDURE — 74011250637 HC RX REV CODE- 250/637: Performed by: PHYSICIAN ASSISTANT

## 2022-10-20 PROCEDURE — 74011000258 HC RX REV CODE- 258: Performed by: FAMILY MEDICINE

## 2022-10-20 PROCEDURE — 65270000029 HC RM PRIVATE

## 2022-10-20 PROCEDURE — 74011250636 HC RX REV CODE- 250/636: Performed by: PHYSICIAN ASSISTANT

## 2022-10-20 PROCEDURE — 97110 THERAPEUTIC EXERCISES: CPT

## 2022-10-20 PROCEDURE — 74011250637 HC RX REV CODE- 250/637: Performed by: FAMILY MEDICINE

## 2022-10-20 PROCEDURE — 84100 ASSAY OF PHOSPHORUS: CPT

## 2022-10-20 PROCEDURE — 36415 COLL VENOUS BLD VENIPUNCTURE: CPT

## 2022-10-20 PROCEDURE — 74011000250 HC RX REV CODE- 250: Performed by: FAMILY MEDICINE

## 2022-10-20 PROCEDURE — 85025 COMPLETE CBC W/AUTO DIFF WBC: CPT

## 2022-10-20 PROCEDURE — 51798 US URINE CAPACITY MEASURE: CPT

## 2022-10-20 PROCEDURE — 97112 NEUROMUSCULAR REEDUCATION: CPT

## 2022-10-20 PROCEDURE — 80048 BASIC METABOLIC PNL TOTAL CA: CPT

## 2022-10-20 PROCEDURE — U0005 INFEC AGEN DETEC AMPLI PROBE: HCPCS

## 2022-10-20 PROCEDURE — 74011000258 HC RX REV CODE- 258: Performed by: PHYSICIAN ASSISTANT

## 2022-10-20 PROCEDURE — 74011250636 HC RX REV CODE- 250/636: Performed by: FAMILY MEDICINE

## 2022-10-20 PROCEDURE — 83735 ASSAY OF MAGNESIUM: CPT

## 2022-10-20 RX ORDER — FINASTERIDE 5 MG/1
5 TABLET, FILM COATED ORAL DAILY
Status: DISCONTINUED | OUTPATIENT
Start: 2022-10-21 | End: 2022-10-29 | Stop reason: HOSPADM

## 2022-10-20 RX ORDER — LANOLIN ALCOHOL/MO/W.PET/CERES
3 CREAM (GRAM) TOPICAL
Status: DISCONTINUED | OUTPATIENT
Start: 2022-10-20 | End: 2022-10-29 | Stop reason: HOSPADM

## 2022-10-20 RX ADMIN — TAMSULOSIN HYDROCHLORIDE 0.4 MG: 0.4 CAPSULE ORAL at 09:03

## 2022-10-20 RX ADMIN — GABAPENTIN 300 MG: 300 CAPSULE ORAL at 23:12

## 2022-10-20 RX ADMIN — FAMOTIDINE 20 MG: 20 TABLET ORAL at 09:02

## 2022-10-20 RX ADMIN — DILTIAZEM HYDROCHLORIDE 180 MG: 180 CAPSULE, COATED, EXTENDED RELEASE ORAL at 09:03

## 2022-10-20 RX ADMIN — FAMOTIDINE 20 MG: 20 TABLET ORAL at 23:12

## 2022-10-20 RX ADMIN — APIXABAN 5 MG: 5 TABLET, FILM COATED ORAL at 09:03

## 2022-10-20 RX ADMIN — AMPICILLIN SODIUM 2 G: 2 INJECTION, POWDER, FOR SOLUTION INTRAMUSCULAR; INTRAVENOUS at 14:59

## 2022-10-20 RX ADMIN — METOPROLOL SUCCINATE 75 MG: 25 TABLET, FILM COATED, EXTENDED RELEASE ORAL at 09:03

## 2022-10-20 RX ADMIN — Medication: at 18:00

## 2022-10-20 RX ADMIN — SODIUM CHLORIDE, PRESERVATIVE FREE 10 ML: 5 INJECTION INTRAVENOUS at 23:15

## 2022-10-20 RX ADMIN — APIXABAN 5 MG: 5 TABLET, FILM COATED ORAL at 17:42

## 2022-10-20 RX ADMIN — ATORVASTATIN CALCIUM 80 MG: 40 TABLET, FILM COATED ORAL at 23:12

## 2022-10-20 RX ADMIN — AMPICILLIN SODIUM 2 G: 2 INJECTION, POWDER, FOR SOLUTION INTRAMUSCULAR; INTRAVENOUS at 09:07

## 2022-10-20 RX ADMIN — SODIUM CHLORIDE, PRESERVATIVE FREE 10 ML: 5 INJECTION INTRAVENOUS at 06:31

## 2022-10-20 RX ADMIN — ALLOPURINOL 100 MG: 100 TABLET ORAL at 09:03

## 2022-10-20 RX ADMIN — Medication: at 09:04

## 2022-10-20 RX ADMIN — PIPERACILLIN AND TAZOBACTAM 4.5 G: 4; .5 INJECTION, POWDER, FOR SOLUTION INTRAVENOUS at 17:42

## 2022-10-20 RX ADMIN — ERGOCALCIFEROL 50000 UNITS: 1.25 CAPSULE ORAL at 09:03

## 2022-10-20 RX ADMIN — PIPERACILLIN AND TAZOBACTAM 3.38 G: 3; .375 INJECTION, POWDER, FOR SOLUTION INTRAVENOUS at 23:12

## 2022-10-20 NOTE — CONSULTS
Infectious Disease Consult    Impression/Plan   [place]on catheter related UTI. Sexton catheter changed at this admission per patient. Cultures growing Enterococcus and Pseudomonas. Antibiotics will be changed to piperacillin-tazobactam.  Pseudomonas ALIE to piperacillin-tazobactam mildly elevated however this antibiotic should achieve adequate levels in the urine to treat this infection. Plan 5 to 7-day course of therapy depending on clinical course. Depending on discharge plans may be able to complete antibiotic therapy with a dose of fosfomycin   COVID-19 infection. Asymptomatic. No treatment indicated. Continue isolation  Leukocytosis. May be a chronic component however has improved with antibiotics. Follow trend  Failure to thrive. [facility]  Debility. 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 male with a past medical history significant for hypertension, diabetes mellitus, and obesity who is being seen for urinary tract infection. 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 The patient's hospitalization was complicated by urinary retention requiring a Sexton catheter and acute cystitis which was treated with ceftriaxone. Of note urine culture from 9/21/2022 grew 45,000 colonies of Staphylococcus epidermis. He was discharged with a Sexton catheter to a rehabilitation center. He was discharged from rehab to home on 10/17 however within an hour was brought to [facility] for placement as he lives alone and was unable to walk, stand, or transfer. Work-up at the time of admission revealed pyuria and leukocytosis.   Urine cultures returned growing Enterococcus and gram-negative rods. He is currently on ampicillin and ceftriaxone. The infectious diseases service has been asked to assist with antibiotic management    Patient Active Problem List   Diagnosis Code    VALENTE (obstructive sleep apnea) G47.33    Erectile dysfunction due to arterial insufficiency N52.01    HTN (hypertension) I10    Depression F32. A    Morbid obesity ([facility]) E66.01    Diabetes ([facility]) E11.9    A-fib ([facility]) I48.91    Dyslipidemia, goal LDL below 100 E78.5    VALENTE (obstructive sleep apnea) G47.33    Restless legs G25.81    Diabetes mellitus type 2, controlled ([facility]) E11.9    Urinary retention due to benign prostatic hyperplasia N40.1, R33.8    Benign non-nodular prostatic hyperplasia with lower urinary tract symptoms N40.1    Diabetes mellitus type 2, diet-controlled ([facility]) E11.9    Ischemic cerebrovascular accident (CVA) ([address] I63.9    COVID U07.1     Past Medical History:   Diagnosis Date    Arrhythmia     atrial fib    Depression 2010    Diabetes ([address]     diet control for pre-diabetes    Dyslipidemia, goal LDL below 100 2011    Gout     HTN (hypertension) 2010    Impotence 2010    Morbid obesity ([address] 2010    VALENTE (obstructive sleep apnea) 2010    CPAP    Restless legs 4/15/2015      Family History   Problem Relation Age of Onset    Cancer Father         leukemia    Cancer Paternal Grandfather     Diabetes Sister     Diabetes Brother     Cancer Maternal Aunt     Cancer Maternal Uncle       Social History     Tobacco Use    Smoking status: Former     Packs/day: 0.50     Years: 5.00     Pack years: 2.50     Types: Cigarettes     Quit date: 1990     Years since quittin.4    Smokeless tobacco: Never   Substance Use Topics    Alcohol use:  Yes     Alcohol/week: 1.7 standard drinks     Types: 2 Standard drinks or equivalent per week     Comment: 2-3 drinks per week     Past Surgical History:   Procedure Laterality Date    COLONOSCOPY N/A 2017 COLONOSCOPY performed by Laurel Sauer MD at [facility] ENDOSCOPY    ENDOSCOPY, COLON, DIAGNOSTIC  2007    HX APPENDECTOMY      HX ORTHOPAEDIC  2000    bilat TKR     HX ORTHOPAEDIC  2010    left THR    HX UROLOGICAL  03/2018    urolift    HX UROLOGICAL  03/2019    prosthesis      Prior to Admission medications    Medication Sig Start Date End Date Taking? Authorizing Provider   allopurinoL (ZYLOPRIM) 100 mg tablet TAKE 1 TABLET BY MOUTH EVERY DAY 10/17/22  Yes Gera Villalba MD   apixaban (ELIQUIS) 5 mg tablet Take 1 Tablet by mouth two (2) times a day. 9/29/22  Yes Yesenia Dunaway MD   gabapentin (NEURONTIN) 300 mg capsule Take 1 Capsule by mouth nightly. Max Daily Amount: 300 mg. 9/28/22  Yes Yesenia Dunaway MD   atorvastatin (LIPITOR) 80 mg tablet Take 1 Tablet by mouth nightly. 9/26/22  Yes Consuello Mail, MD   balsam peru-castor oiL (VENELEX) ointment Apply  to affected area two (2) times a day. APPLY TO all bony prominences, abrasions and ecchymosis to right side, posterior head Nursing, document site in comments 9/26/22  Yes Yesenia Dunaway MD   dilTIAZem IR (CARDIZEM) 60 mg tablet Take 1 Tablet by mouth every eight (8) hours. 9/26/22  Yes Yesenia Dunaway MD   docusate sodium (COLACE) 100 mg capsule Take 1 Capsule by mouth two (2) times daily as needed for Constipation for up to 90 days. 9/26/22 12/25/22 Yes Yesenia Dunaway MD   famotidine (PEPCID) 20 mg tablet Take 1 Tablet by mouth every twelve (12) hours. 9/26/22  Yes Yesenia Dunaway MD   metoprolol tartrate 37.5 mg tab Take 37.5 mg by mouth every twelve (12) hours. 9/26/22  Yes Yesenia Dunaway MD   tamsulosin (FLOMAX) 0.4 mg capsule Take 1 Capsule by mouth daily. 9/27/22  Yes Yesenia Dunaway MD     No Known Allergies     Review of Systems:  A comprehensive review of systems was negative except for that written in the History of Present Illness. Objective:   Blood pressure 125/82, pulse 72, temperature 97.3 °F (36.3 °C), resp. rate 16, SpO2 100 %.   Temp (24hrs), Av.6 °F (36.4 °C), Min:97.3 °F (36.3 °C), Max:97.8 °F (36.6 °C)       Exam:    General:  Alert, cooperative, well noursished, well developed, appears stated age   Eyes:  Sclera anicteric. Mouth/Throat: Mucous membranes normal   Neck: Supple   Lungs:   Clear to auscultation anteriorly   CV:  Regular rate and rhythm     Abdomen:   Soft, non-tender, non-distended. No suprapubic tenderness to palpation. Sexton catheter in place with straw-colored urine   Extremities: No edema   Skin: No acute rash on exposed skin   Lymph nodes:    Musculoskeletal:    Lines/Devices:  Intact, no erythema, drainage or tenderness   Psych: Alert and oriented, normal mood affect given the setting       Data Review:   Recent Results (from the past 24 hour(s))   CBC WITH AUTOMATED DIFF    Collection Time: 10/20/22  4:13 AM   Result Value Ref Range    WBC 10.6 4.1 - 11.1 K/uL    RBC 3.78 (L) 4.10 - 5.70 M/uL    HGB 10.8 (L) 12.1 - 17.0 g/dL    HCT 33.2 (L) 36.6 - 50.3 %    MCV 87.8 80.0 - 99.0 FL    MCH 28.6 26.0 - 34.0 PG    MCHC 32.5 30.0 - 36.5 g/dL    RDW 13.6 11.5 - 14.5 %    PLATELET 908 044 - 674 K/uL    MPV 9.7 8.9 - 12.9 FL    NRBC 0.0 0  WBC    ABSOLUTE NRBC 0.00 0.00 - 0.01 K/uL    NEUTROPHILS 68 32 - 75 %    LYMPHOCYTES 19 12 - 49 %    MONOCYTES 9 5 - 13 %    EOSINOPHILS 3 0 - 7 %    BASOPHILS 0 0 - 1 %    IMMATURE GRANULOCYTES 1 (H) 0.0 - 0.5 %    ABS. NEUTROPHILS 7.2 1.8 - 8.0 K/UL    ABS. LYMPHOCYTES 2.1 0.8 - 3.5 K/UL    ABS. MONOCYTES 0.9 0.0 - 1.0 K/UL    ABS. EOSINOPHILS 0.3 0.0 - 0.4 K/UL    ABS. BASOPHILS 0.0 0.0 - 0.1 K/UL    ABS. IMM.  GRANS. 0.1 (H) 0.00 - 0.04 K/UL    DF AUTOMATED     METABOLIC PANEL, BASIC    Collection Time: 10/20/22  4:13 AM   Result Value Ref Range    Sodium 134 (L) 136 - 145 mmol/L    Potassium 4.4 3.5 - 5.1 mmol/L    Chloride 102 97 - 108 mmol/L    CO2 25 21 - 32 mmol/L    Anion gap 7 5 - 15 mmol/L    Glucose 103 (H) 65 - 100 mg/dL    BUN 17 6 - 20 MG/DL    Creatinine 0.52 (L) 0.70 - 1.30 MG/DL    BUN/Creatinine ratio 33 (H) 12 - 20      eGFR >60 >60 ml/min/1.73m2    Calcium 8.9 8.5 - 10.1 MG/DL   MAGNESIUM    Collection Time: 10/20/22  4:13 AM   Result Value Ref Range    Magnesium 2.0 1.6 - 2.4 mg/dL   PHOSPHORUS    Collection Time: 10/20/22  4:13 AM   Result Value Ref Range    Phosphorus 4.1 2.6 - 4.7 MG/DL      Microbiology:      Studies:      Signed By: Liz Padilla DO     October 20, 2022

## 2022-10-20 NOTE — CONSULTS
New Urology Consult Note    Patient: Mundo Taylor MRN: [id number]  SSN: [id number]    YOB: 1950  Age: 71 y.o. Sex: male            Assessment:     Mundo Taylor is a 71 y.o. male with Urinary retention     Recommendations:     1. Urinary retention - replace vazquez patient will be discharged with Vazquez catheter  -Continue tamsulosin we will add Proscar  2. UTI  - tailor abx therapy to culture results   Urology to arrange outpatient follow-up    Thank you for this consult. Please contact [facility] with any further questions/concerns. Dr Semaj Knapp  History of Present Illness:     Reason for Consult:  failed VT , Urinary retention     Mundo Taylor is seen in consultation for reasons noted above at the request of Alva Mota MD.    This is a 71 y.o. male with a history of with a pmhx paroxysmal atrial fibrillation, depression, dyslipidemia, HTN, gout, VALENTE on Cpap, RLS who presents with weakness, and is being admitted for acute cystitis. Urology has been asked to see this patient and evaluate for persistent urinary retention      Patient known to [facility] followed by [facility] for ED, BPH with obstruction, urinary retention. Patient has history of BPH with obstruction due to enlarged prostate patient had a Cystourethroscopy, (Urolift 4 ) in 2018. Patient voiding issues improved he was no longer on Proscar and Flomax. In 2019 patient had a failed penile prosthesis placed. The prosthesis at some point was explanted and  replacement with modified Roney washout. Please see last office note for reference. Patient seen in bed Vazquez catheter in place draining yellow urine. Discussed events of void trial and now replacement of catheter. Explained to patient catheter will need to remain since he has failed with a volume of greater than 900 mL. Discussed with patient previous urological procedures including UroLift.   Bladder function could have now be worsened by recent stroke will need further outpatient evaluation to assess if prostate has failed UroLift and could be revised or this patient now have a neurogenic bladder. Discussed outpatient follow-up with patient I realized patient will be going to a SNF will make those arrangements. On exam penis is slightly retracted but firm implants palpated on both sides no drainage or protrusion of implant noted meatus is patent with Sexton catheter slight erosion of the catheter repositioned. Patient denies any pain or discomfort or suprapubic pressure. He is aware that he will keep this Sexton and will follow-up outpatient          OFFICE NOTE 11/2020    A 29-year-old  male with known quite a large prostate. Has had a UroLift procedure. This has helped him. Prostate is a good 100 g in size, including a pretty prominent median lobe. He is now off both Flomax and Proscar. He has had a prior penile prosthesis. He is very happy with its function. All in all he is doing well. He has a little bit of postvoid hesitancy and need to double void, especially in the morning. He has had no further hematuria episodes. He remains though, on Xarelto. This evidently will be lifelong. PAST MEDICAL HISTORY:    Allergies: No known allergies. DENIES: Latex, Shellfish, X-Ray Dye, Iodine. Medications: AFTERBURN 2.5 % EXTERNAL GEL (LIDOCAINE HCL) apply to area 1-2 times daily prn; Route: EXTERNAL  LISINOPRIL 20 MG ORAL TABLET (LISINOPRIL) 1 x daily; Route: ORAL  XARELTO 20 MG ORAL TABLET (RIVAROXABAN) 1 x daily; Route: ORAL    Problems: Fatty liver (ICD-571.8) (JME22-P93.0)  Gross hematuria (ICD-599.71) (MSY02-O17.0)  Elevated prostate specific antigen PSA (ICD-790.93) (JFS41-V08.20)  Urinary frequency (ICD-788.41) (RQR41-P67.0)  Erectile dysfunction (ICD-607.84) (EQO45-L64.9)  788.20 Retention, urine (ICD-788.20) (UPS10-Q35. 9)  Asymptomatic microscopic hematuria (HDI05-J76.21)  599.72 MICROSCOPIC HEMATURIA (ICD-599.72) (NNV50-S38.2)  600.01 BPH w/urinary obstruction (ICD-600.01) (NQX66-I75.1)    Illnesses: Heart Disease and High Blood Pressure. DENIES: Pacemaker/Defibrillator, Lung Disease, Diabetes, Bowel Problems, Stroke/Seizure, Kidney Problems, Bleeding Problems, HIV, Hepatitis, or Cancer. Surgeries: DENIES prior surgeries      Family History: Kidney Disease. DENIES: Prostate cancer, Kidney cancer, Kidney stones. Social History: Retired. Single. Smoking status: never smoker. Drinks alcohol monthly or less. System Review: Admits to: None. DENIES: Unexplained Weight Loss, Dry Eyes, Dry Mouth, Leg Swelling, Shortness of Breath, Constipation, Involuntary Urine Loss, Lower Extremity Weakness, Dry Skin, Difficulty Walking, Psychiatric Problems, Impaired Sex Drive, Easy Bleeding, Rash. EXAMINATION: Vitals: Pulse: 94 BP: 141/74 Weight: 310 lbs Height: 5' 11\" BMI: 43.39 kg/m^2  The patient is a well-developed, well-nourished male in no acute distress, alert and oriented x3, pleasant and afebrile. The flanks are nontender without mass. The abdomen is soft, nontender and benign. Liver and spleen are without organomegaly. Hemoccult not indicated. Examination of the groins reveal no adenopathy or hernias. The scrotum is normal without lesions. The testes and epididymides are nontender, without masses. The urethral meatus is normally situated without lesions or discharge. The penis is normal without lesions. Prosthesis in good position. Inspection of the anus and perineum is normal without lesions. Prostate is broad, big and smooth. Seminal vesicles are normal. Anal sphincter tone is normal. There are no rectal masses. Extremities reveal no significant peripheral edema and neurologic is grossly intact. URINALYSIS  Urine Micro not done    PSA HISTORY  3.65 ng/ml on 09/04/2019  3.92 ng/ml on 02/20/2019  5.28 ng/ml on 01/08/2019    IMPRESSION:    BPH and ED.  Will continue his watching for any change in his implant. Will start him on Uroxatral 10 mg every morning. Proper dosage and administration discussed. The patient was given a verbal/written log of Blood Pressure and recommendations. Moderate Hypertension: Instruct patient to have prompt (1-2 weeks) BP followup.       cc: Monta Leventhal, MD  transcribed by bs  [employer]  E&M Service    Upcoming Orders  Return Office Visit - with Gurdeep Greenfield MD in 1 year  PSA, UA        Electronically signed by Juliana Tomlinson on 11/03/2020 at 1:05 PM    Electronically signed by Gurdeep Greenfield MD on 11/03/2020 at 1:06 PM  ________________________________________________________________________      Subjective     Past Medical History  Past Medical History:   Diagnosis Date    Arrhythmia     atrial fib    Depression 4/24/2010    Diabetes ([facility] [address]     diet control for pre-diabetes    Dyslipidemia, goal LDL below 100 6/14/2011    Gout     HTN (hypertension) 4/24/2010    Impotence 4/24/2010    Morbid obesity ([address] 5/17/2010    VALENTE (obstructive sleep apnea) 4/24/2010    CPAP    Restless legs 4/15/2015       Past Surgical History:   Past Surgical History:   Procedure Laterality Date    COLONOSCOPY N/A 6/27/2017    [facility]  2007    HX APPENDECTOMY      HX ORTHOPAEDIC  2000    bilat TKR     HX ORTHOPAEDIC  2010    left THR    HX UROLOGICAL  03/2018    urolift    HX UROLOGICAL  03/2019    prosthesis       Medication:  Current Facility-Administered Medications   Medication Dose Route Frequency Provider Last Rate Last Admin    ampicillin (OMNIPEN) 2 g in 0.9% sodium chloride (MBP/ADV) 100 mL MBP  2 g IntraVENous Q6H Lampugnale, Cy A, PA-C 200 mL/hr at 10/20/22 0907 2 g at 10/20/22 0907    cefTRIAXone (ROCEPHIN) 1 g in 0.9% sodium chloride 10 mL IV syringe  1 g IntraVENous Q24H Lampugnale, Cy A, PA-C        allopurinoL (ZYLOPRIM) tablet 100 mg  100 mg Oral DAILY Harman Valle MD 100 mg at 10/20/22 8345    apixaban (ELIQUIS) tablet 5 mg  5 mg Oral BID Kassie Borrero MD   5 mg at 10/20/22 8621    atorvastatin (LIPITOR) tablet 80 mg  80 mg Oral QHS Kassie Borrero MD   80 mg at 10/19/22 2223    famotidine (PEPCID) tablet 20 mg  20 mg Oral Q12H Kassie Borrero MD   20 mg at 10/20/22 0902    gabapentin (NEURONTIN) capsule 300 mg  300 mg Oral QHS Kassie Borrero MD   300 mg at 10/19/22 2223    tamsulosin (FLOMAX) capsule 0.4 mg  0.4 mg Oral DAILY Kassie Borrero MD   0.4 mg at 10/20/22 2261    ergocalciferol capsule 50,000 Units  50,000 Units Oral Q7D Lampugnale, Cy A, PA-C   50,000 Units at 10/20/22 0438    metoprolol succinate (TOPROL-XL) XL tablet 75 mg  75 mg Oral DAILY Lampugnale, Cy A, PA-C   75 mg at 10/20/22 0903    dilTIAZem ER (CARDIZEM CD) capsule 180 mg  180 mg Oral DAILY Lampugnale, Cy A, PA-C   180 mg at 10/20/22 2229    balsam peru-castor oiL (VENELEX) ointment   Topical BID Lampugnale, Cy A, PA-C   Given at 10/20/22 0904    sodium chloride (NS) flush 5-40 mL  5-40 mL IntraVENous Q8H Kassie Borrero MD   10 mL at 10/20/22 0631    sodium chloride (NS) flush 5-40 mL  5-40 mL IntraVENous PRN Kassie Borrero MD        acetaminophen (TYLENOL) tablet 650 mg  650 mg Oral Q6H PRN Kassie Borrero MD   650 mg at 10/19/22 2223    Or    acetaminophen (TYLENOL) suppository 650 mg  650 mg Rectal Q6H PRN Kassie Borrero MD        ondansetron (ZOFRAN ODT) tablet 4 mg  4 mg Oral Q8H PRN Kassie Borrero MD        Or    ondansetron [facility]) injection 4 mg  4 mg IntraVENous Q6H PRN Kassie Borrero MD           Allergies:  No Known Allergies    Social History:  Social History     Tobacco Use    Smoking status: Former     Packs/day: 0.50     Years: 5.00     Pack years: 2.50     Types: Cigarettes     Quit date: 1990     Years since quittin.4    Smokeless tobacco: Never   Substance Use Topics    Alcohol use:  Yes     Alcohol/week: 1.7 standard drinks     Types: 2 Standard drinks or equivalent per week     Comment: 2-3 drinks per week    Drug use: No       Family History  Family History   Problem Relation Age of Onset    Cancer Father         leukemia    Cancer Paternal Grandfather     Diabetes Sister     Diabetes Brother     Cancer Maternal Aunt     Cancer Maternal Uncle        Review of Systems  ROS from attending provider note from Dr. Katherine Farooq reviewed and changes (other than per HPI) include : none. Objective:     Vital signs in last 24 hours:  Visit Vitals  /82 (BP 1 Location: Left arm, BP Patient Position: At rest)   Pulse 72   Temp 97.3 °F (36.3 °C)   Resp 16   SpO2 100%       Intake/Output last 3 shifts:  Date 10/19/22 0700 - 10/20/22 0659 10/20/22 0700 - 10/21/22 0659   Shift 7190-0087 8898-2527 24 Hour Total 3508-6458 1430-1357 24 Hour Total   INTAKE   P.O.    400  400     P. O.    400  400   Shift Total    400  400   OUTPUT   Urine 850  850 4800  4800     Urine Output (mL) ([REMOVED] Urinary Catheter 10/18/22 Sexton) 850  850 2400  2400     Urine Output (mL) (Urinary Catheter 10/20/22 Sexton)    2400  2400   Shift Total 850  850 4800  4800   NET -850  -850 -4400  -4400   Weight (kg)               Physical Exam  General: NAD, A&O,   HEENT: lids normal, no tracheal deviation, no lesions or deformities  Pulmonary: Normal work of breathing   Abdomen: soft, NTTP, nondistended, No suprapubic fullness or tenderness  : penile implant firm normal testicular lie , no CVA tenderness  SILVIO: deferred  Gait: not observed  Neuro: Appropriate, no focal neurological deficits  Mood/Affect: normal    Lab/Imaging Review:       Most Recent Labs:  Lab Results   Component Value Date/Time    WBC 10.6 10/20/2022 04:13 AM    Hemoglobin (POC) 5.7 06/14/2011 03:00 PM    HGB 10.8 (L) 10/20/2022 04:13 AM    HCT 33.2 (L) 10/20/2022 04:13 AM    PLATELET 857 59/76/4657 04:13 AM    MCV 87.8 10/20/2022 04:13 AM        Lab Results   Component Value Date/Time    Sodium 134 (L) 10/20/2022 04:13 AM    Potassium 4.4 10/20/2022 04:13 AM    Chloride 102 10/20/2022 04:13 AM    CO2 25 10/20/2022 04:13 AM    Anion gap 7 10/20/2022 04:13 AM    Glucose 103 (H) 10/20/2022 04:13 AM    BUN 17 10/20/2022 04:13 AM    Creatinine 0.52 (L) 10/20/2022 04:13 AM    BUN/Creatinine ratio 33 (H) 10/20/2022 04:13 AM    GFR est AA >60 09/28/2022 06:00 AM    GFR est non-AA >60 09/28/2022 06:00 AM    Calcium 8.9 10/20/2022 04:13 AM    Bilirubin, total 2.5 (H) 09/14/2022 10:10 PM    Alk. phosphatase 74 09/14/2022 10:10 PM    Protein, total 8.4 (H) 09/14/2022 10:10 PM    Albumin 3.8 09/14/2022 10:10 PM    Globulin 4.6 (H) 09/14/2022 10:10 PM    A-G Ratio 0.8 (L) 09/14/2022 10:10 PM    ALT (SGPT) 54 09/14/2022 10:10 PM        Lab Results   Component Value Date/Time    Prostate Specific Ag 1.5 01/14/2014 08:54 AM    Prostate Specific Ag 1.3 01/24/2011 02:28 AM    Prostate Specific Ag 1.0 05/17/2010 04:25 PM        COAGS:  No results found for: APTT, PTP, INR, INREXT    Lab Results   Component Value Date/Time    Hemoglobin A1c 5.8 (H) 09/15/2022 02:40 AM    Hemoglobin A1c (POC) 6.0 06/24/2021 08:47 AM        Lab Results   Component Value Date/Time     (H) 09/17/2022 04:28 AM          Urine/Blood Cultures:  Results       Procedure Component Value Units Date/Time    COVID-19 RAPID TEST [[id number]]  (Abnormal) Collected: 10/18/22 1705    Order Status: Completed Specimen: Nasopharyngeal Updated: 10/18/22 1808     Specimen source Nasopharyngeal        COVID-19 rapid test Detected        Comment: Rapid Abbott ID Now       The specimen is POSITIVE for SARS-CoV-2, the novel coronavirus associated with COVID-19. This test has been authorized by the FDA under an Emergency Use Authorization (EUA) for use by authorized laboratories.         Fact sheet for Healthcare Providers:  http://www.nai-lily.alina/  Fact sheet for Patients: http://www.nai-lily.alina/       Methodology: Isothermal Nucleic Acid Amplification  CALLED TO AND READ BACK BY  Sundeep Manzo RN @ 1808/RH         CULTURE, URINE [[id number]]  (Abnormal) Collected: 10/18/22 0101    Order Status: Completed Specimen: Urine Updated: 10/19/22 0905     Special Requests: --        NO SPECIAL REQUESTS  Reflexed from [id number]       Gilbert Count --        >100,000  COLONIES/mL       Culture result:       PROBABLE ENTEROCOCCUS SPECIES (PREDOMINATING)                  OXIDASE Positive GRAM NEGATIVE RODS                     IMAGING:  No results found. D/w Dr Vasiliy Dias    Signed By: Joel Canales.  Mitali Morillo NP  - October 20, 2022

## 2022-10-20 NOTE — PROGRESS NOTES
LOREN: anticipate d/c to SNF, referrals pending family choice;     [facility] SNF had originally accepted, however pt they have now declined as pt is Covid positive    [facility] IPR pending and following     Pt is Covid positive, test resulted 10/18/22, pt is on RA  PCR Covid test pending, as per family's request/Attending in agreement with this     [facility]     Primary family contacts: Srikanth Chase [phone]  Rene Vargas [phone], Luc Arts [phone]     RUR: 16%  -History of L MCA Infarction with hemorrhagic conversion with baseline Right hemiplegia  -Covid-19  -Pt has indwelling vazquez catheter, on IV ABX for UTI  -ID consulted for UTI treatment  -PT/OT recs. IPR  -1030-CM reviewed pt chart & spoke with Attending in regards to pt's case, as per report, he has consulted ID for abnormal urine culture result. Hence, pt is not medically stable for d/c at this time.   Dionisio Tomlinson RN BSN CCM

## 2022-10-20 NOTE — PROGRESS NOTES
Problem: Self Care Deficits Care Plan (Adult)  Goal: *Acute Goals and Plan of Care (Insert Text)  Description: FUNCTIONAL STATUS PRIOR TO ADMISSION: At baseline pt was highly independent, active, and not using DME. Per pt and friends at bedside, pt owns several acres and frequently works outside, is a , and enjoys renovating [employer]. Immediately PTA, pt was at [facility] for OT/PT services to address deficits from L MCA stroke. Pt was d/c 10/17/22 and within 1 hour of being home was admitted to [facility] ED.     HOME SUPPORT: The patient lived alone with limited local support. Occupational Therapy Goals  Initiated 10/18/2022   1. Patient will perform seated grooming task with moderate assistance within 7 day(s). 2.  Patient will perform seated upper body dressing, using compensatory strategies, with moderate assistance within 7 day(s). 3.  Patient will perform BSC transfers with moderate assistance  within 7 day(s). 4.  Patient will perform all aspects of toileting with moderate assistance  within 7 day(s). 5.  Patient will participate in upper extremity therapeutic exercise/activities with supervision/set-up within 7 day(s). 6.  Patient will improve their Fugl Campbell score by 5 points in prep for ADLs within 7 days. Outcome: Progressing Towards Goal   OCCUPATIONAL THERAPY TREATMENT  Patient: Cherrie Wilkinson (7 y.o. male)  Date: 10/20/2022  Diagnosis: COVID [U07.1] <principal problem not specified>      Precautions: Fall  Chart, occupational therapy assessment, plan of care, and goals were reviewed. ASSESSMENT  Patient continues with skilled OT services and is progressing towards goals. Patient participated well in today's activity and is eager to improve. 2-/5 strength noted in RUE bicep/tricep, RLE plantarflexion, hip flexion and extension (in leg press).   Exercises completed in supine with manual techniques and positioning strategies utilized to elicit optimal muscular response as able. Patient is significantly impaired by non functional use of RUE and RLE during ADL and related mobility. Continued therapy services are recommended. IRF if able    Current Level of Function Impacting Discharge (ADLs): max to total assist for bathing, dressing, toileting. Other factors to consider for discharge:          PLAN :  Patient continues to benefit from skilled intervention to address the above impairments. Continue treatment per established plan of care to address goals. Recommend with staff: RUE shoulder joint protection     Recommend next OT session: RUE neuro re-ed, sitting balance, transfer training (2 person assist required)    Recommendation for discharge: (in order for the patient to meet his/her long term goals)  Therapy 3 hours per day 5-7 days per week    This discharge recommendation:  Has been made in collaboration with the attending provider and/or case management    IF patient discharges home will need the following DME:  TBD       SUBJECTIVE:   Patient stated I want to do this every day.   Referring to exercises of RUE/RLE     OBJECTIVE DATA SUMMARY:   Cognitive/Behavioral Status:  Neurologic State: Alert  Orientation Level: Oriented X4  Cognition: Appropriate decision making; Follows commands                        Therapeutic Exercises/Neuromuscular Re-education:    Provided proprioceptive input, manual techniques including tapping  to elecit muscular activation during exercises. RUE- provided joint approximation at humeral head into glenohumeral space for all PROM. Pain and limited ROM with external rotation. No active movement in shoulder flexion, or at wrist and hand/fingers. Consistent 2-/5 strength at bicep/tricep and 3 sets 10 reps with AAROM exercise  RLE- ankle dorsi/plantar flexion- restive plantar flexion, AAROM hip flexion, manual resistance to leg press. 3 sets of 10 of each exercises.  PROM completed prior to initiation of active exercises    Pain:  None at rest    Activity Tolerance:   Good    After treatment patient left in no apparent distress:   Supine in bed, Heels elevated for pressure relief, Call bell within reach, and Side rails x 3    COMMUNICATION/COLLABORATION:   The patients plan of care was discussed with: Physical therapist and Registered nurse.      Selvin Varela OT  Time Calculation: 38 mins

## 2022-10-20 NOTE — PROGRESS NOTES
[address] [facility] Adult  Hospitalist Group                                                                                          Hospitalist Progress Note  Manuel Alexis PA-C  Answering service: [phone] from in house phone        Date of Service:  10/20/2022  NAME:  Irvin Yo  :  1950  MRN:  [id number]      Admission Summary:   Irvin Yo is a 71 y.o. 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 He discharged from [facility] on 10/18/22 but then presented to ED on 10/19/22 with no acute complaints. Work-up revealing c/f UTI with indwelling vazquez (probable Enterococcus, oxidase positive GNR, ID consulted) and incidentally COVID+. Working toward [address] [place] [address] placement. Interval history / Subjective:   Patient reports feeling well today and offers no acute complaints. Reviewed the plans and all questions and concerns addressed. 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 No indication for ASA, LDL Goal < 70  -- Was started on Eliquis 5mg BID on 22. Continued on admission  -- Continue Atorvastatin 80mg nightly. Recheck cholesterol panel as an outpatient  -- Needs to follow-up outpatient with Neurology  -- Completed [facility] IPR on 10/18.  Needs LTC     COVID-19  -- Incidental positive on 10/18  -- PCT < 0.05  -- No respiratory distress or oxygen requirement  -- COVID PCR ordered     A-Fib  -- Cardiology consulted during previous admission  -- Admitted on Metoprolol Tartrate 37.5mg BID and Diltiazem IR 60mg q8h and converted to XL dosing on 10/20/22 of both medications  -- On Eliquis for stroke prevention     Urinary Retention with indwelling vazquez  UTI  -- Continue Flomax  -- Urine Culture 10/18: Probable Enterococcus (predominating) and oxidase + GNR.  -- Abx: CTX (D1 = 10/18) and with Enterococcus added Ampicillin (D1 = 10/20). Will ask ID for assistance with antimicrobial management  -- Removed vazquez 10/19 and failed void trial. Vazquez replaced 10/20AM. Urology consulted and Proscar added     Vitamin D Deficiency  -- 25(OH)D 27.8 on 10/19. Start Ergocalciferol weekly x 8 doses. R Shoulder subluxation  -- Appreciated by PT on 10/19  -- Xray R Shoulder 10/19 with no dislocation or abnormalities     Elevated D-dimer  -- D-dimer 3.80 on 10/19. No tachycardia/tachypnea/O2 requirement c/f PE. Likely elevated due to immobile state and COVID-19     Code status: Full code  Prophylaxis: [address] discussed with: Pt, RN, Care Coordinator  Anticipated Disposition: TBD     Hospital Problems  Date Reviewed: 10/12/2022            Codes Class Noted POA    COVID ICD-10-CM: U07.1  ICD-9-CM: 079.89  10/18/2022 Unknown             Review of Systems:   A comprehensive review of systems was negative except for that written in the HPI. Vital Signs:    Last 24hrs VS reviewed since prior progress note. Most recent are:  Visit Vitals  /82 (BP 1 Location: Left arm, BP Patient Position: At rest)   Pulse 72   Temp 97.3 °F (36.3 °C)   Resp 16   SpO2 100%         Intake/Output Summary (Last 24 hours) at 10/20/2022 1413  Last data filed at 10/20/2022 1131  Gross per 24 hour   Intake 400 ml   Output 4800 ml   Net -4400 ml        Physical Examination:     I had a face to face encounter with this patient and independently examined them on 10/20/2022 as outlined below:    Constitutional:  No acute distress, cooperative, pleasant    ENT:  Oral mucosa moist, oropharynx benign. Voice is soft   Resp:  CTA bilaterally.  No wheezing/rhonchi/rales. No accessory muscle use. CV:  Regular rhythm, normal rate, no murmurs    GI:  Soft, non distended, non tender. Musculoskeletal:  No edema, warm    Neurologic:  R hemiplegia with right facial asymmetry. AAO to name, location being hospital, month/year  : [place] with clear/yellow urine            Data Review:    Review and/or order of clinical lab test  Review and/or order of tests in the radiology section of CPT  Review and/or order of tests in the medicine section of CPT      Labs:     Recent Labs     10/20/22  0413 10/17/22  1933   WBC 10.6 13.3*   HGB 10.8* 10.8*   HCT 33.2* 33.2*    321     Recent Labs     10/20/22  0413 10/19/22  0105 10/17/22  1933   * 133* 132*   K 4.4 3.7 4.3    102 99   CO2 25 25 28   BUN 17 13 16   CREA 0.52* 0.50* 0.67*   * 98 116*   CA 8.9 8.8 8.9   MG 2.0  --   --    PHOS 4.1  --   --      No results for input(s): ALT, AP, TBIL, TBILI, TP, ALB, GLOB, GGT, AML, LPSE in the last 72 hours. No lab exists for component: SGOT, GPT, AMYP, HLPSE  Recent Labs     10/19/22  0105   INR 1.2*   PTP 12.4*   APTT 31.9*      Recent Labs     10/19/22  0105   TIBC 160*   PSAT 23   FERR 969*      No results found for: FOL, RBCF   No results for input(s): PH, PCO2, PO2 in the last 72 hours. No results for input(s): CPK, CKNDX, TROIQ in the last 72 hours.     No lab exists for component: CPKMB  Lab Results   Component Value Date/Time    Cholesterol, total 146 09/15/2022 02:40 AM    HDL Cholesterol 53 09/15/2022 02:40 AM    LDL, calculated 65 09/15/2022 02:40 AM    Triglyceride 140 09/15/2022 02:40 AM    CHOL/HDL Ratio 2.8 09/15/2022 02:40 AM     Lab Results   Component Value Date/Time    Glucose (POC) 134 (H) 09/28/2022 11:46 AM    Glucose (POC) 116 09/28/2022 06:08 AM    Glucose (POC) 121 (H) 09/28/2022 12:34 AM    Glucose (POC) 124 (H) 09/27/2022 05:28 PM    Glucose (POC) 176 (H) 09/27/2022 11:27 AM     Lab Results   Component Value Date/Time Color YELLOW/STRAW 10/18/2022 01:01 AM    Appearance TURBID (A) 10/18/2022 01:01 AM    Specific gravity 1.013 10/18/2022 01:01 AM    pH (UA) 6.5 10/18/2022 01:01 AM    Protein TRACE (A) 10/18/2022 01:01 AM    Glucose Negative 10/18/2022 01:01 AM    Ketone Negative 10/18/2022 01:01 AM    Bilirubin Negative 10/18/2022 01:01 AM    Urobilinogen 4.0 (H) 10/18/2022 01:01 AM    Nitrites Positive (A) 10/18/2022 01:01 AM    Leukocyte Esterase MODERATE (A) 10/18/2022 01:01 AM    Epithelial cells FEW 10/18/2022 01:01 AM    Bacteria 4+ (A) 10/18/2022 01:01 AM    WBC 20-50 10/18/2022 01:01 AM    RBC 0-5 10/18/2022 01:01 AM         Medications Reviewed:     Current Facility-Administered Medications   Medication Dose Route Frequency    ampicillin (OMNIPEN) 2 g in 0.9% sodium chloride (MBP/ADV) 100 mL MBP  2 g IntraVENous Q6H    cefTRIAXone (ROCEPHIN) 1 g in 0.9% sodium chloride 10 mL IV syringe  1 g IntraVENous Q24H    [START ON 10/21/2022] finasteride (PROSCAR) tablet 5 mg  5 mg Oral DAILY    allopurinoL (ZYLOPRIM) tablet 100 mg  100 mg Oral DAILY    apixaban (ELIQUIS) tablet 5 mg  5 mg Oral BID    atorvastatin (LIPITOR) tablet 80 mg  80 mg Oral QHS    famotidine (PEPCID) tablet 20 mg  20 mg Oral Q12H    gabapentin (NEURONTIN) capsule 300 mg  300 mg Oral QHS    tamsulosin (FLOMAX) capsule 0.4 mg  0.4 mg Oral DAILY    ergocalciferol capsule 50,000 Units  50,000 Units Oral Q7D    metoprolol succinate (TOPROL-XL) XL tablet 75 mg  75 mg Oral DAILY    dilTIAZem ER (CARDIZEM CD) capsule 180 mg  180 mg Oral DAILY    balsam peru-castor oiL (VENELEX) ointment   Topical BID    sodium chloride (NS) flush 5-40 mL  5-40 mL IntraVENous Q8H    sodium chloride (NS) flush 5-40 mL  5-40 mL IntraVENous PRN    acetaminophen (TYLENOL) tablet 650 mg  650 mg Oral Q6H PRN    Or    acetaminophen (TYLENOL) suppository 650 mg  650 mg Rectal Q6H PRN    ondansetron (ZOFRAN ODT) tablet 4 mg  4 mg Oral Q8H PRN    Or    ondansetron (ZOFRAN) injection 4 mg  4 mg IntraVENous Q6H PRN     ______________________________________________________________________  EXPECTED LENGTH OF STAY: 4d 0h  ACTUAL LENGTH OF STAY:          2                 Manuel Alexis PA-C

## 2022-10-20 NOTE — PROGRESS NOTES
Day #1 of ampicillin  Indication:  complicated UTI  Current regimen:  ampicillin 2000mg Q6H  Abx regimen: ceftriaxone last dose today, ampicillin  Recent Labs     10/20/22  0413 10/19/22  0105 10/17/22  1933   WBC 10.6  --  13.3*   CREA 0.52* 0.50* 0.67*   BUN 17 13 16     Est CrCl: >100 ml/min; UO: --- ml/kg/hr  Temp (24hrs), Av.7 °F (36.5 °C), Min:97.5 °F (36.4 °C), Max:97.8 °F (36.6 °C)    Cultures:   10/18 urine: enterococcus    Plan: Continue current regimen

## 2022-10-21 LAB
ANION GAP SERPL CALC-SCNC: 7 MMOL/L (ref 5–15)
BASOPHILS # BLD: 0 K/UL (ref 0–0.1)
BASOPHILS NFR BLD: 0 % (ref 0–1)
BUN SERPL-MCNC: 18 MG/DL (ref 6–20)
BUN/CREAT SERPL: 33 (ref 12–20)
CALCIUM SERPL-MCNC: 8.2 MG/DL (ref 8.5–10.1)
CHLORIDE SERPL-SCNC: 101 MMOL/L (ref 97–108)
CO2 SERPL-SCNC: 25 MMOL/L (ref 21–32)
CREAT SERPL-MCNC: 0.55 MG/DL (ref 0.7–1.3)
DIFFERENTIAL METHOD BLD: ABNORMAL
EOSINOPHIL # BLD: 0.3 K/UL (ref 0–0.4)
EOSINOPHIL NFR BLD: 3 % (ref 0–7)
ERYTHROCYTE [DISTWIDTH] IN BLOOD BY AUTOMATED COUNT: 13.6 % (ref 11.5–14.5)
GLUCOSE SERPL-MCNC: 94 MG/DL (ref 65–100)
HCT VFR BLD AUTO: 31.7 % (ref 36.6–50.3)
HGB BLD-MCNC: 10.3 G/DL (ref 12.1–17)
IMM GRANULOCYTES # BLD AUTO: 0.1 K/UL (ref 0–0.04)
IMM GRANULOCYTES NFR BLD AUTO: 1 % (ref 0–0.5)
LYMPHOCYTES # BLD: 1.9 K/UL (ref 0.8–3.5)
LYMPHOCYTES NFR BLD: 18 % (ref 12–49)
MAGNESIUM SERPL-MCNC: 1.8 MG/DL (ref 1.6–2.4)
MCH RBC QN AUTO: 28.5 PG (ref 26–34)
MCHC RBC AUTO-ENTMCNC: 32.5 G/DL (ref 30–36.5)
MCV RBC AUTO: 87.8 FL (ref 80–99)
MONOCYTES # BLD: 0.9 K/UL (ref 0–1)
MONOCYTES NFR BLD: 9 % (ref 5–13)
NEUTS SEG # BLD: 7.1 K/UL (ref 1.8–8)
NEUTS SEG NFR BLD: 69 % (ref 32–75)
NRBC # BLD: 0 K/UL (ref 0–0.01)
NRBC BLD-RTO: 0 PER 100 WBC
PHOSPHATE SERPL-MCNC: 3.8 MG/DL (ref 2.6–4.7)
PLATELET # BLD AUTO: 323 K/UL (ref 150–400)
PMV BLD AUTO: 9.6 FL (ref 8.9–12.9)
POTASSIUM SERPL-SCNC: 4.2 MMOL/L (ref 3.5–5.1)
RBC # BLD AUTO: 3.61 M/UL (ref 4.1–5.7)
SARS-COV-2, XPLCVT: DETECTED
SODIUM SERPL-SCNC: 133 MMOL/L (ref 136–145)
SOURCE, COVRS: ABNORMAL
WBC # BLD AUTO: 10.2 K/UL (ref 4.1–11.1)

## 2022-10-21 PROCEDURE — 99232 SBSQ HOSP IP/OBS MODERATE 35: CPT | Performed by: INTERNAL MEDICINE

## 2022-10-21 PROCEDURE — 97530 THERAPEUTIC ACTIVITIES: CPT

## 2022-10-21 PROCEDURE — 74011250637 HC RX REV CODE- 250/637: Performed by: FAMILY MEDICINE

## 2022-10-21 PROCEDURE — 97110 THERAPEUTIC EXERCISES: CPT

## 2022-10-21 PROCEDURE — 97112 NEUROMUSCULAR REEDUCATION: CPT

## 2022-10-21 PROCEDURE — 65270000029 HC RM PRIVATE

## 2022-10-21 PROCEDURE — 74011000250 HC RX REV CODE- 250: Performed by: FAMILY MEDICINE

## 2022-10-21 PROCEDURE — 83735 ASSAY OF MAGNESIUM: CPT

## 2022-10-21 PROCEDURE — 74011250637 HC RX REV CODE- 250/637: Performed by: NURSE PRACTITIONER

## 2022-10-21 PROCEDURE — 36415 COLL VENOUS BLD VENIPUNCTURE: CPT

## 2022-10-21 PROCEDURE — 74011250636 HC RX REV CODE- 250/636: Performed by: FAMILY MEDICINE

## 2022-10-21 PROCEDURE — 74011250637 HC RX REV CODE- 250/637: Performed by: PHYSICIAN ASSISTANT

## 2022-10-21 PROCEDURE — 85025 COMPLETE CBC W/AUTO DIFF WBC: CPT

## 2022-10-21 PROCEDURE — 74011000258 HC RX REV CODE- 258: Performed by: FAMILY MEDICINE

## 2022-10-21 PROCEDURE — 84100 ASSAY OF PHOSPHORUS: CPT

## 2022-10-21 PROCEDURE — 80048 BASIC METABOLIC PNL TOTAL CA: CPT

## 2022-10-21 RX ADMIN — PIPERACILLIN AND TAZOBACTAM 3.38 G: 3; .375 INJECTION, POWDER, FOR SOLUTION INTRAVENOUS at 22:40

## 2022-10-21 RX ADMIN — DILTIAZEM HYDROCHLORIDE 180 MG: 180 CAPSULE, COATED, EXTENDED RELEASE ORAL at 10:19

## 2022-10-21 RX ADMIN — PIPERACILLIN AND TAZOBACTAM 3.38 G: 3; .375 INJECTION, POWDER, FOR SOLUTION INTRAVENOUS at 14:59

## 2022-10-21 RX ADMIN — APIXABAN 5 MG: 5 TABLET, FILM COATED ORAL at 10:18

## 2022-10-21 RX ADMIN — Medication 3 MG: at 02:13

## 2022-10-21 RX ADMIN — Medication: at 17:31

## 2022-10-21 RX ADMIN — GABAPENTIN 300 MG: 300 CAPSULE ORAL at 22:39

## 2022-10-21 RX ADMIN — ALLOPURINOL 100 MG: 100 TABLET ORAL at 10:19

## 2022-10-21 RX ADMIN — Medication 3 MG: at 20:32

## 2022-10-21 RX ADMIN — Medication: at 10:19

## 2022-10-21 RX ADMIN — FAMOTIDINE 20 MG: 20 TABLET ORAL at 10:18

## 2022-10-21 RX ADMIN — APIXABAN 5 MG: 5 TABLET, FILM COATED ORAL at 17:31

## 2022-10-21 RX ADMIN — ATORVASTATIN CALCIUM 80 MG: 40 TABLET, FILM COATED ORAL at 22:39

## 2022-10-21 RX ADMIN — METOPROLOL SUCCINATE 75 MG: 25 TABLET, FILM COATED, EXTENDED RELEASE ORAL at 10:18

## 2022-10-21 RX ADMIN — SODIUM CHLORIDE, PRESERVATIVE FREE 10 ML: 5 INJECTION INTRAVENOUS at 22:40

## 2022-10-21 RX ADMIN — FAMOTIDINE 20 MG: 20 TABLET ORAL at 22:39

## 2022-10-21 RX ADMIN — SODIUM CHLORIDE, PRESERVATIVE FREE 10 ML: 5 INJECTION INTRAVENOUS at 06:57

## 2022-10-21 RX ADMIN — TAMSULOSIN HYDROCHLORIDE 0.4 MG: 0.4 CAPSULE ORAL at 10:19

## 2022-10-21 RX ADMIN — FINASTERIDE 5 MG: 5 TABLET, FILM COATED ORAL at 10:19

## 2022-10-21 RX ADMIN — PIPERACILLIN AND TAZOBACTAM 3.38 G: 3; .375 INJECTION, POWDER, FOR SOLUTION INTRAVENOUS at 06:57

## 2022-10-21 NOTE — PROGRESS NOTES
[address] [facility] Adult  Hospitalist Group                                                                                          Hospitalist Progress Note  Manuel Alexis PA-C  Answering service: [phone] from in house phone        Date of Service:  10/21/2022  NAME:  Lacey Pal  :  1950  MRN:  [id number]      Admission Summary:   Lacey Pal is a 71 y.o. male with recent admission from - where he was admitted for acute L MCA CVA with hemorrhagic conversion with hospitalization complicated by urinary retention (discharged with Vazquez) and A-Fib with RVR (on Metop and Diltiazem) who was discharged to [facility]. He discharged from [facility] on 10/18/22 but then presented to ED on 10/19/22 with no acute complaints. Work-up revealing c/f UTI with indwelling vazquez (Enterococcus and Pseudomonas, ID consulted, on Zosyn) and incidentally COVID+. Working toward [address] [place] [address] placement. Interval history / Subjective:   Mr. Gala Cunningham reports feeling well and offers no acute complaints. Discussed the ID teams recommendation for him. All questions and concerns addressed. 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 No indication for ASA, LDL Goal < 70  -- Was started on Eliquis 5mg BID on 22. Continued on admission  -- Continue Atorvastatin 80mg nightly. Recheck cholesterol panel as an outpatient  -- Needs to follow-up outpatient with Neurology  -- Completed [facility] on 10/18.  Needs LTC     COVID-19  -- Incidental positive on rapid test 10/18  -- PCT < 0.05  -- No respiratory distress or oxygen requirement  -- COVID PCR pending 10/20    A-Fib  -- Cardiology consulted during previous admission  -- Admitted on Metoprolol Tartrate 37.5mg BID and Diltiazem IR 60mg q8h and converted to XL dosing on 10/20/22 of both medications  -- On Eliquis for stroke prevention     Urinary Retention with indwelling vazquez  UTI, Enterococcus and Pseudomonas  -- Continue Flomax  -- Urine Culture 10/18: Enterococcus and Pseudomonas   -- Abx: CTX (10/18-10/20) and Ampicillin (10/20). ID consulted 10/20 and recommended Zosyn x 5-7 days. If discharge plans can potentially complete antibiotic with Fosfomycin  -- Removed vazquez 10/19 and failed void trial. Vazquez replaced 10/20AM. Urology consulted and Proscar added. Will follow-up outpatient     Vitamin D Deficiency  -- 25(OH)D 27.8 on 10/19. Start Ergocalciferol weekly x 8 doses. R Shoulder subluxation  -- Appreciated by PT on 10/19  -- Xray R Shoulder 10/19 with no dislocation or abnormalities     Elevated D-dimer  -- D-dimer 3.80 on 10/19. No tachycardia/tachypnea/O2 requirement c/f PE. Likely elevated due to immobile state and COVID-19     Code status: Full code  Prophylaxis: [address] discussed with: Pt, RN, Care Coordinator  Anticipated Disposition: TBD     Hospital Problems  Date Reviewed: 10/12/2022            Codes Class Noted POA    COVID ICD-10-CM: U07.1  ICD-9-CM: 079.89  10/18/2022 Unknown             Review of Systems:   A comprehensive review of systems was negative except for that written in the HPI. Vital Signs:    Last 24hrs VS reviewed since prior progress note.  Most recent are:  Visit Vitals  /63 (BP 1 Location: Left arm, BP Patient Position: At rest)   Pulse 74   Temp 97.4 °F (36.3 °C)   Resp 16   SpO2 99%         Intake/Output Summary (Last 24 hours) at 10/21/2022 1304  Last data filed at 10/20/2022 2325  Gross per 24 hour   Intake 450 ml   Output 790 ml   Net -340 ml        Physical Examination:     I had a face to face encounter with this patient and independently examined them on 10/21/2022 as outlined below:    Constitutional:  No acute distress, cooperative, pleasant    ENT:  Oral mucosa moist, oropharynx benign. Voice is soft   Resp:  CTA bilaterally. No wheezing/rhonchi/rales. No accessory muscle use. CV:  Regular rhythm, normal rate, no murmurs    GI:  Soft, non distended, non tender. Musculoskeletal:  No edema, warm    Neurologic:  R hemiplegia with right facial asymmetry.  AAO to name, location being hospital, month/year  : [place] with clear/yellow urine              Data Review:    Review and/or order of clinical lab test  Review and/or order of tests in the radiology section of CPT  Review and/or order of tests in the medicine section of CPT      Labs:     Recent Labs     10/21/22  0212 10/20/22  0413   WBC 10.2 10.6   HGB 10.3* 10.8*   HCT 31.7* 33.2*    334     Recent Labs     10/21/22  0212 10/20/22  0413 10/19/22  0105   * 134* 133*   K 4.2 4.4 3.7    102 102   CO2 25 25 25   BUN 18 17 13   CREA 0.55* 0.52* 0.50*   GLU 94 103* 98   CA 8.2* 8.9 8.8   MG 1.8 2.0  --    PHOS 3.8 4.1  --      Medications Reviewed:     Current Facility-Administered Medications   Medication Dose Route Frequency    finasteride (PROSCAR) tablet 5 mg  5 mg Oral DAILY    piperacillin-tazobactam (ZOSYN) 3.375 g in 0.9% sodium chloride (MBP/ADV) 100 mL MBP  3.375 g IntraVENous Q8H    melatonin tablet 3 mg  3 mg Oral QHS PRN    allopurinoL (ZYLOPRIM) tablet 100 mg  100 mg Oral DAILY    apixaban (ELIQUIS) tablet 5 mg  5 mg Oral BID    atorvastatin (LIPITOR) tablet 80 mg  80 mg Oral QHS    famotidine (PEPCID) tablet 20 mg  20 mg Oral Q12H    gabapentin (NEURONTIN) capsule 300 mg  300 mg Oral QHS    tamsulosin (FLOMAX) capsule 0.4 mg  0.4 mg Oral DAILY    ergocalciferol capsule 50,000 Units  50,000 Units Oral Q7D    metoprolol succinate (TOPROL-XL) XL tablet 75 mg  75 mg Oral DAILY    dilTIAZem ER (CARDIZEM CD) capsule 180 mg  180 mg Oral DAILY    balsam peru-castor oiL (VENELEX) ointment   Topical BID    sodium chloride (NS) flush 5-40 mL  5-40 mL IntraVENous Q8H    sodium chloride (NS) flush 5-40 mL  5-40 mL IntraVENous PRN    acetaminophen (TYLENOL) tablet 650 mg  650 mg Oral Q6H PRN    Or    acetaminophen (TYLENOL) suppository 650 mg  650 mg Rectal Q6H PRN    ondansetron (ZOFRAN ODT) tablet 4 mg  4 mg Oral Q8H PRN    Or    ondansetron (ZOFRAN) injection 4 mg  4 mg IntraVENous Q6H PRN     ______________________________________________________________________  EXPECTED LENGTH OF STAY: 4d 0h  ACTUAL LENGTH OF STAY:          3                 Manuel Alexis PA-C

## 2022-10-21 NOTE — PROGRESS NOTES
[address] [place] [address] [facility] Progress Note           Ronan Oakley DO    [phone] Office  [phone]  Fax    10/21/2022      Assessment & Plan: [place] catheter related UTI. [place] catheter removed 10/19 however patient failed voiding trial and [place] catheter replaced. Cultures growing Enterococcus and Pseudomonas. Continue piperacillin-tazobactam.  Pseudomonas ALIE to piperacillin-tazobactam mildly elevated however this antibiotic should achieve adequate levels in the urine to treat this infection. Plan 5 to 7-day course of therapy depending on clinical course. If discharge planned prior to completion of IV antibiotics may give fosfomycin 3 g p.o. x1 dose at the time of discharge to complete antibiotic therapy   COVID-19 infection. Asymptomatic. No treatment indicated. Continue isolation  Leukocytosis. May be a chronic component however has improved with antibiotics. Follow trend  Failure to thrive. Admitted to [facility]. Recent left MCA infarct with baseline right hemiplegia     ID service will sign off. Please call with questions          Subjective:     No complaints    Objective:     Vitals: Visit Vitals  /70 (BP 1 Location: Left arm, BP Patient Position: At rest)   Pulse 70   Temp 97.3 °F (36.3 °C)   Resp 16   SpO2 95%        Tmax:  Temp (24hrs), Av.4 °F (36.3 °C), Min:97.2 °F (36.2 °C), Max:97.5 °F (36.4 °C)      Exam:   Patient is intubated:  no    Physical Examination:   General:  Alert, cooperative, no distress   Head:  Normocephalic, atraumatic. Eyes:  Conjunctivae clear   Neck: Supple       Lungs:   No distress. Chest wall:     Heart:     Abdomen:   non-distended. [place] catheter with straw-colored urine   Extremities: Moves all.     Skin:  No rash   Neurologic: CNII-XII intact     Labs:        No lab exists for component: ITNL   No results for input(s): CPK, CKMB, TROIQ in the last 72 hours.   Recent Labs     10/21/22  0212 10/20/22  0413 10/19/22  0105   * 134* 133*   K 4.2 4.4 3.7    102 102   CO2 25 25 25   BUN 18 17 13   CREA 0.55* 0.52* 0.50*   GLU 94 103* 98   PHOS 3.8 4.1  --    MG 1.8 2.0  --    WBC 10.2 10.6  --    HGB 10.3* 10.8*  --    HCT 31.7* 33.2*  --     334  --      Recent Labs     10/19/22  0105   INR 1.2*   PTP 12.4*   APTT 31.9*     Needs: urine analysis, urine sodium, protein and creatinine  Lab Results   Component Value Date/Time    Creatinine, urine random 156.00 08/23/2021 02:10 PM         Cultures:     No results found for: SDES  Lab Results   Component Value Date/Time    Culture result: ENTEROCOCCUS FAECALIS (PREDOMINATING) (A) 10/18/2022 01:01 AM    Culture result: PSEUDOMONAS AERUGINOSA (A) 10/18/2022 01:01 AM    Culture result: STAPHYLOCOCCUS EPIDERMIDIS (A) 09/21/2022 10:33 AM       Radiology:     Medications       Current Facility-Administered Medications   Medication Dose Route Frequency Last Admin    finasteride (PROSCAR) tablet 5 mg  5 mg Oral DAILY 5 mg at 10/21/22 1019    piperacillin-tazobactam (ZOSYN) 3.375 g in 0.9% sodium chloride (MBP/ADV) 100 mL MBP  3.375 g IntraVENous Q8H 3.375 g at 10/21/22 1459    melatonin tablet 3 mg  3 mg Oral QHS PRN 3 mg at 10/21/22 0213    allopurinoL (ZYLOPRIM) tablet 100 mg  100 mg Oral DAILY 100 mg at 10/21/22 1019    apixaban (ELIQUIS) tablet 5 mg  5 mg Oral BID 5 mg at 10/21/22 1731    atorvastatin (LIPITOR) tablet 80 mg  80 mg Oral QHS 80 mg at 10/20/22 2312    famotidine (PEPCID) tablet 20 mg  20 mg Oral Q12H 20 mg at 10/21/22 1018    gabapentin (NEURONTIN) capsule 300 mg  300 mg Oral  mg at 10/20/22 2312    tamsulosin (FLOMAX) capsule 0.4 mg  0.4 mg Oral DAILY 0.4 mg at 10/21/22 1019    ergocalciferol capsule 50,000 Units  50,000 Units Oral Q7D 50,000 Units at 10/20/22 0903    metoprolol succinate (TOPROL-XL) XL tablet 75 mg  75 mg Oral DAILY 75 mg at 10/21/22 1018    dilTIAZem ER (CARDIZEM CD) capsule 180 mg  180 mg Oral DAILY 180 mg at 10/21/22 1019    balsam peru-castor oiL (VENELEX) ointment   Topical BID Given at 10/21/22 1731    sodium chloride (NS) flush 5-40 mL  5-40 mL IntraVENous Q8H 10 mL at 10/21/22 0657    sodium chloride (NS) flush 5-40 mL  5-40 mL IntraVENous PRN      acetaminophen (TYLENOL) tablet 650 mg  650 mg Oral Q6H  mg at 10/19/22 2223    Or    acetaminophen (TYLENOL) suppository 650 mg  650 mg Rectal Q6H PRN      ondansetron (ZOFRAN ODT) tablet 4 mg  4 mg Oral Q8H PRN      Or    ondansetron (ZOFRAN) injection 4 mg  4 mg IntraVENous Q6H PRN             Case discussed with:      Rnoan Oakley DO

## 2022-10-21 NOTE — PROGRESS NOTES
Physician Progress Note      Lizett Velarde  CSN #:                  [id number]  :                       1950  ADMIT DATE:       10/17/2022 9:15 PM  DISCH DATE:  RESPONDING  PROVIDER #:        JANIS LANCASTER PA-C          QUERY TEXT:    Pt admitted with UTI. Pt noted to have indwelling urinary catheter. If possible, please document in the progress notes and discharge summary if you are evaluating and/or treating any of the following: The medical record reflects the following:  Risk Factors: UTI    Clinical Indicators:  PN 10/19  Urinary Retention with indwelling vazquez  GNR UTI    UA 10/18/22 01:01  Protein: TRACE ! Blood: TRACE ! Urobilinogen: 4.0 (H)  Nitrites: Positive ! Leukocyte Esterase: MODERATE ! Epithelial cells: FEW  WBC: 20-50  RBC: 0-5  Bacteria: 4+    urine cx  >100,000  COLONIES/mL  P    Culture result:   PROBABLE ENTEROCOCCUS SPECIES (PREDOMINATING) Abnormal  P  Culture result:   OXIDASE Positive GRAM NEGATIVE RODS Abnormal        Treatment:  Remove vazquez and complete void trial (has not completed void trial to date). Straight cath x 2, and if unable to void, replace vazquez and consult urology. Needs vazquez replaced for infection as well  ampicillin (OMNIPEN) 2 g in 0.9% sodium chloride (MBP/ADV) 100 mL MBP  tamsulosin (FLOMAX) capsule 0.4 mg  cefTRIAXone (ROCEPHIN) 1 g in 0.9% sodium chloride 10 mL IV syringe    Thank you,  Yancy Rosales RN [facility]  Clinical Documentation  [phone] or via [address] [place] [address] provided:  -- UTI due to indwelling urinary catheter POA  -- UTI not due to indwelling urinary catheter  -- Other - I will add my own diagnosis  -- Disagree - Not applicable / Not valid  -- Disagree - Clinically unable to determine / Unknown  -- Refer to Clinical Documentation Reviewer    PROVIDER RESPONSE TEXT:    UTI is due to the indwelling urinary catheter POA.     Query created by: Joanie Patton on 10/20/2022 12:55 PM      Electronically signed by: JANIS Nevarez PA-C 10/21/2022 12:26 PM

## 2022-10-21 NOTE — PROGRESS NOTES
Problem: Mobility Impaired (Adult and Pediatric)  Goal: *Acute Goals and Plan of Care (Insert Text)  Description: FUNCTIONAL STATUS PRIOR TO ADMISSION:  Prior to 9/14/22, patient was independent and active without use of DME. He was admitted to acute care hosp 9/14/22-9/28/22 s/p acute CVA f/b 21 days of IPR. His insurance company denied additional IPR and SNF resulting in pt discharging home alone with home health services. Patient was not able to manage in his home alone, presented to the ED for assistance. Per IPR notes, at the time of discharge patient's functional mobility: Supv/ CGA sitting,  Max A sit<->stand, Mod A bed<->chair slide board transfer,  Non ambulatory, Max A to propel the WC 5 ft. HOME SUPPORT PRIOR TO ADMISSION: The patient lived alone and required no assistance prior to his CVA  ,  Physical Therapy Goals  Initiated 10/18/2022  1. Patient will move from supine to sit and sit to supine in bed with minimal assistance within 7 day(s). 2.  Patient will transfer from bed to chair and chair to bed with minimum assistance  using the least restrictive device within 7 day(s). 3.  Patient will perform sit to stand with minimal assistance within 7 day(s). 4.  Patient will be supervision sitting EOB 15 mins within 7 days. Outcome: Progressing Towards Goal   PHYSICAL THERAPY TREATMENT  Patient: Cherrie Wilkinson (89 y.o. male)  Date: 10/21/2022  Diagnosis: COVID [U07.1] <principal problem not specified>      Precautions: Fall  Chart, physical therapy assessment, plan of care and goals were reviewed. ASSESSMENT  Patient continues with skilled PT services and is progressing towards goals. Patient received in bed and most agreeable to therapy. Worked going to his right side today therefore requiring increased assist to go supine to sit. Once sitting, tolerated well and worked on sitting posture.   Sit to stand improved and demonstrating increased R knee control, requiring VC for weight shift.  Stood 3 times with 2 times holding to back handle of chair. Able to take 3 side steps to head of bed with max assist to  manage RLE but able to weight shift and move LLE. Instructed on isometric quad sets, gluts. Patient continues to remains very motivated to progress and demonstrating progress. Will assess with geena -walker next visit. .     Current Level of Function Impacting Discharge (mobility/balance): mod to max assist for upright mobility; limited standing    Other factors to consider for discharge: was independent prior to CVA;          PLAN :  Patient continues to benefit from skilled intervention to address the above impairments. Continue treatment per established plan of care. to address goals. Recommendation for discharge: (in order for the patient to meet his/her long term goals)  Therapy 3 hours per day 5-7 days per week; if unable to return to [facility], then SNF    This discharge recommendation:  Has been made in collaboration with the attending provider and/or case management    IF patient discharges home will need the following DME: bedside commode, transfer bench, wheelchair, and sliding/transfer board and near 24/7 care. SUBJECTIVE:   Patient stated I did something.  regarding stepping to [facility]    OBJECTIVE DATA SUMMARY:   Critical Behavior:  Neurologic State: Alert  Orientation Level: Oriented X4  Cognition: Appropriate for age attention/concentration  Safety/Judgement: Awareness of environment, Decreased awareness of need for assistance, Decreased awareness of environment, Decreased insight into deficits, Fall prevention  Functional Mobility Training:  Bed Mobility:     Supine to Sit: Maximum assistance (going to right side)  Sit to Supine: Moderate assistance  Scooting: Moderate assistance        Transfers:  Sit to Stand: Moderate assistance;Minimum assistance;Assist x2  Stand to Sit: Moderate assistance                             Balance:  Sitting: Impaired; Without support  Sitting - Static: Good (unsupported)  Sitting - Dynamic: Good (unsupported)  Standing: Impaired; With support  Standing - Static: Constant support;Poor  Standing - Dynamic : Constant support;Poor  Ambulation/Gait Training:   Stood at bedside and holding to back handle of chair, side stepped to [facility] with mod assist of 2 for weight shift and managing RLE             Therapeutic Exercises:   Heel slides, hip abd/add, quad set  Pain Ratin    Activity Tolerance:   Good and SpO2 stable on RA    After treatment patient left in no apparent distress:   Supine in bed, Call bell within reach, and Side rails x 3    COMMUNICATION/COLLABORATION:   The patients plan of care was discussed with: Occupational therapist and Registered nurse.      Vidhya Molina, PT   Time Calculation: 35 mins

## 2022-10-21 NOTE — PROGRESS NOTES
Problem: Self Care Deficits Care Plan (Adult)  Goal: *Acute Goals and Plan of Care (Insert Text)  Description: FUNCTIONAL STATUS PRIOR TO ADMISSION: At baseline pt was highly independent, active, and not using DME. Per pt and friends at bedside, pt owns several acres and frequently works outside, is a , and enjoys renovating [employer]. Immediately PTA, pt was at [facility] for OT/PT services to address deficits from L MCA stroke. Pt was d/c 10/17/22 and within 1 hour of being home was admitted to [facility] ED.     HOME SUPPORT: The patient lived alone with limited local support. Occupational Therapy Goals  Initiated 10/18/2022   1. Patient will perform seated grooming task with moderate assistance within 7 day(s). 2.  Patient will perform seated upper body dressing, using compensatory strategies, with moderate assistance within 7 day(s). 3.  Patient will perform BSC transfers with moderate assistance  within 7 day(s). 4.  Patient will perform all aspects of toileting with moderate assistance  within 7 day(s). 5.  Patient will participate in upper extremity therapeutic exercise/activities with supervision/set-up within 7 day(s). 6.  Patient will improve their Fugl Campbell score by 5 points in prep for ADLs within 7 days. Outcome: Progressing Towards Goal   OCCUPATIONAL THERAPY TREATMENT  Patient: Cecilia Cruz (89 y.o. male)  Date: 10/21/2022  Diagnosis: COVID [U07.1] <principal problem not specified>      Precautions: Fall, COVID +  Chart, occupational therapy assessment, plan of care, and goals were reviewed. ASSESSMENT  Patient continues with skilled OT services and is progressing towards goals. Patient semi supine in bed and motivated/agreeable to participation with therapy.  Patient presents this session with muscle activation for shoulder flexion/extension, and elbow flexion/extension, no noted muscle activation in hand or wrist all on right upper extremity, impaired but improving balance, improved weight shifting for side stepping, poor lower extremity access, and right lower extremity tone worsened in standing. Patient transferred towards right side of bed this session with increased assist as patient able to assist more with upper body when transferring to left. Patient attempted initial sit to stand and able to complete with mod A x2, for additional trials recliner locked in front of patient so he could pull up on bar on the back, improving to min A x2 for standing. Requires moderate assist to weight shift to left side and assisted for stepping out to right, able to shift weight to right and step with left and taking several side steps at edge of bed towards head. Patient completed three stands in total with fair activity tolerance. Initially on 2L of oxygen but removed prior to mobility and maintaining oxygen saturation above 95% throughout on room air. Patient returned to semi supine in bed and instructed on attempting right upper extremity and right lower extremity movement throughout day and to visualize extremity when attempting to move for improved visual feedback, verbalized understanding. Participated in upper extremity therapeutic exercise while semi supine, see grid below for exercise details. Patient remains highly motivated for participation with therapy and is an excellent rehab candidate and would benefit from high intensity therapy for maximal NM return due to recent CVA. Patient is unsafe for discharge home at this time, unable to care for self and lives alone, limited social supports. Current Level of Function Impacting Discharge (ADLs): min to max A x1-2 for mobility/transfers, total A to don/doff sling and don/doff socks    Other factors to consider for discharge: prior level of function independent, lives alone, recent CVA         PLAN :  Patient continues to benefit from skilled intervention to address the above impairments.   Continue treatment per established plan of care to address goals. Recommend with staff: bed in modified chair position for meals, bedpan for toileting    Recommend next OT session: transfer to chair as able, grooming sitting in chair, geena techniques for ADLs    Recommendation for discharge: (in order for the patient to meet his/her long term goals)  Therapy 3 hours per day 5-7 days per week    This discharge recommendation:  Has been made in collaboration with the attending provider and/or case management    IF patient discharges home will need the following DME: unsafe for discharge home, limited social supports and lives alone, would require total care at this time       SUBJECTIVE:   Patient stated I want to get better, I'll do whatever you ask.     OBJECTIVE DATA SUMMARY:   Cognitive/Behavioral Status:  Neurologic State: Alert  Orientation Level: Oriented X4                Functional Mobility and Transfers for ADLs:  Bed Mobility:  Supine to Sit: Maximum assistance (going to right side)  Sit to Supine: Moderate assistance    Transfers:  Sit to Stand: Moderate assistance;Minimum assistance;Assist x2          Balance:  Sitting: Impaired; Without support  Sitting - Static: Good (unsupported)  Sitting - Dynamic: Good (unsupported)  Standing: Impaired; With support  Standing - Static: Constant support;Poor  Standing - Dynamic : Constant support;Poor    ADL Intervention:                           Upper Body Dressing Assistance  Orthotics(Brace): Total assistance (dependent) (to don and doff sling on RUE)    Lower Body Dressing Assistance  Socks: Total assistance (dependent)  Leg Crossed Method Used: No  Position Performed: Supine              Neuro Re-Education:   Reorientation to midline in standing with up to mod A for weight shifting to left  Sling on right upper extremity during out of bed activity due to significant subluxation          Therapeutic Exercises:   Exercise: Sets: Reps:  Active range of motion: Active assist range of motion: Passive range of motion: Self range of motion: Comments:   Shoulder flexion/extension 1 3 [] [x] [] []    Elbow flexion/extension 1 5 [] [x] [] []         Pain:  Reports no pain throughout    Activity Tolerance:   Good, SpO2 stable on RA, and requires rest breaks    After treatment patient left in no apparent distress:   Supine in bed, Call bell within reach, Bed / chair alarm activated, and Side rails x 3    COMMUNICATION/COLLABORATION:   The patients plan of care was discussed with: Physical therapist and Registered nurse. Patient was educated regarding His deficit(s) of right sided weakness and impaired balance as this relates to His diagnosis of recent CVA (now diagnosed with COVID). He demonstrated Good understanding as evidenced by insight into deficits.     Kayley Rios OTR/L  Time Calculation: 44 mins

## 2022-10-21 NOTE — PROGRESS NOTES
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 IPR  -1400-CM reviewed pt chart & discussed pt in rounds. As per report, ID is following. PCR Covid test remains pending. CM spoke to [facility] 2 [phone] and they will continue to follow pt for appropriateness for IPR. CM to follow.   Rafaela Ennis RN BSN CCM

## 2022-10-22 PROCEDURE — 74011250637 HC RX REV CODE- 250/637: Performed by: PHYSICIAN ASSISTANT

## 2022-10-22 PROCEDURE — 74011000250 HC RX REV CODE- 250: Performed by: FAMILY MEDICINE

## 2022-10-22 PROCEDURE — 74011000258 HC RX REV CODE- 258: Performed by: FAMILY MEDICINE

## 2022-10-22 PROCEDURE — 74011250637 HC RX REV CODE- 250/637: Performed by: NURSE PRACTITIONER

## 2022-10-22 PROCEDURE — 94760 N-INVAS EAR/PLS OXIMETRY 1: CPT

## 2022-10-22 PROCEDURE — 74011250636 HC RX REV CODE- 250/636: Performed by: FAMILY MEDICINE

## 2022-10-22 PROCEDURE — 74011250637 HC RX REV CODE- 250/637: Performed by: FAMILY MEDICINE

## 2022-10-22 PROCEDURE — 65270000029 HC RM PRIVATE

## 2022-10-22 RX ADMIN — PIPERACILLIN AND TAZOBACTAM 3.38 G: 3; .375 INJECTION, POWDER, FOR SOLUTION INTRAVENOUS at 06:27

## 2022-10-22 RX ADMIN — ATORVASTATIN CALCIUM 80 MG: 40 TABLET, FILM COATED ORAL at 22:49

## 2022-10-22 RX ADMIN — TAMSULOSIN HYDROCHLORIDE 0.4 MG: 0.4 CAPSULE ORAL at 11:56

## 2022-10-22 RX ADMIN — Medication: at 11:56

## 2022-10-22 RX ADMIN — GABAPENTIN 300 MG: 300 CAPSULE ORAL at 22:49

## 2022-10-22 RX ADMIN — SODIUM CHLORIDE, PRESERVATIVE FREE 10 ML: 5 INJECTION INTRAVENOUS at 22:50

## 2022-10-22 RX ADMIN — APIXABAN 5 MG: 5 TABLET, FILM COATED ORAL at 18:29

## 2022-10-22 RX ADMIN — FAMOTIDINE 20 MG: 20 TABLET ORAL at 22:49

## 2022-10-22 RX ADMIN — ALLOPURINOL 100 MG: 100 TABLET ORAL at 11:56

## 2022-10-22 RX ADMIN — PIPERACILLIN AND TAZOBACTAM 3.38 G: 3; .375 INJECTION, POWDER, FOR SOLUTION INTRAVENOUS at 22:48

## 2022-10-22 RX ADMIN — FINASTERIDE 5 MG: 5 TABLET, FILM COATED ORAL at 11:55

## 2022-10-22 RX ADMIN — DILTIAZEM HYDROCHLORIDE 180 MG: 180 CAPSULE, COATED, EXTENDED RELEASE ORAL at 11:56

## 2022-10-22 RX ADMIN — APIXABAN 5 MG: 5 TABLET, FILM COATED ORAL at 11:55

## 2022-10-22 RX ADMIN — METOPROLOL SUCCINATE 75 MG: 25 TABLET, FILM COATED, EXTENDED RELEASE ORAL at 11:55

## 2022-10-22 RX ADMIN — FAMOTIDINE 20 MG: 20 TABLET ORAL at 11:56

## 2022-10-22 RX ADMIN — SODIUM CHLORIDE, PRESERVATIVE FREE 10 ML: 5 INJECTION INTRAVENOUS at 18:30

## 2022-10-22 RX ADMIN — Medication 3 MG: at 18:29

## 2022-10-22 RX ADMIN — PIPERACILLIN AND TAZOBACTAM 3.38 G: 3; .375 INJECTION, POWDER, FOR SOLUTION INTRAVENOUS at 18:29

## 2022-10-22 RX ADMIN — SODIUM CHLORIDE, PRESERVATIVE FREE 10 ML: 5 INJECTION INTRAVENOUS at 06:00

## 2022-10-22 NOTE — PROGRESS NOTES
Problem: Patient Education: Go to Patient Education Activity  Goal: Patient/Family Education  Outcome: Progressing Towards Goal     Problem: Pressure Injury - Risk of  Goal: *Prevention of pressure injury  Description: Document Pepe Scale and appropriate interventions in the flowsheet.   Outcome: Progressing Towards Goal  Note: Pressure Injury Interventions:  Sensory Interventions: Assess changes in LOC, Check visual cues for pain, Discuss PT/OT consult with provider, Float heels    Moisture Interventions: Absorbent underpads, Apply protective barrier, creams and emollients    Activity Interventions: Increase time out of bed, Pressure redistribution bed/mattress(bed type), PT/OT evaluation    Mobility Interventions: Pressure redistribution bed/mattress (bed type), PT/OT evaluation    Nutrition Interventions: Document food/fluid/supplement intake, Offer support with meals,snacks and hydration    Friction and Shear Interventions: Apply protective barrier, creams and emollients, HOB 30 degrees or less

## 2022-10-22 NOTE — PROGRESS NOTES
[facility] Adult  Hospitalist Group                                                                                          Hospitalist Progress Note  Marcos[place]isel [place]  Answering service: [phone] or 36 from in house phone        Date of Service:  10/22/2022  NAME:  Fuad Boothe  :  1950  MRN:  [id number]      Admission Summary:   Fuad Boothe is a 71 y.o. male with recent admission from - where he was admitted for acute L MCA CVA with hemorrhagic conversion with hospitalization complicated by urinary retention (discharged with Vazquez) and A-Fib with RVR (on Metop and Diltiazem) who was discharged to [facility]. He discharged from [facility] on 10/18/22 but then presented to ED on 10/19/22 with no acute complaints. Work-up revealing c/f UTI with indwelling vazquez (Enterococcus and Pseudomonas, ID consulted, on Zosyn) and incidentally COVID+. Working toward [address] [place] [address] placement. Interval history / Subjective:   Saw the patient on rounds, he has no complaints this morning. Discussed his COVID PCR result and disposition planning with him. All questions and concerns were adressed     Assessment & Plan:     Hx of L MCA Infarct with hemorrhagic conversion with baseline R hemiplegia  HLD  -- Admitted from - for issue  -- Neurology from previous admission (): Recommended repeat Head CT in 2 weeks (unclear if done) and if no new hemorrhage start Eliquis. No indication for ASA, LDL Goal < 70  -- Was started on Eliquis 5mg BID on 22. Continued on admission  -- Continue Atorvastatin 80mg nightly. Recheck cholesterol panel as an outpatient  -- Needs to follow-up outpatient with Neurology  -- Completed [facility] on 10/18.  Needs LTC     COVID-19  -- Incidental positive on rapid test 10/18  -- PCT < 0.05  -- No respiratory distress or oxygen requirement  -- COVID PCR 10/20: positive     A-Fib  -- Cardiology consulted during previous admission  -- Admitted on Metoprolol Tartrate 37.5mg BID and Diltiazem IR 60mg q8h and converted to XL dosing on 10/20/22 of both medications  -- On Eliquis for stroke prevention     Urinary Retention with indwelling vazquez  UTI, Enterococcus and Pseudomonas  -- Continue Flomax  -- Urine Culture 10/18: Enterococcus and Pseudomonas   -- Abx: CTX (10/18-10/20) and Ampicillin (10/20). - ID consulted 10/20 and recommended Zosyn x 5-7 days. If patient is to be discharged before IV ABX completed can give 1x dose fosfomycin to complete course  -- Removed vazquez 10/19 and failed void trial. Vazquez replaced 10/20AM. Urology consulted and Proscar added. Will follow-up outpatient     Vitamin D Deficiency  -- 25(OH)D 27.8 on 10/19.   - Ergocalciferol weekly x 8 doses. R Shoulder subluxation  -- Appreciated by PT on 10/19  -- Xray R Shoulder 10/19 with prominent degenerative changes, but no dislocation or abnormalities     Elevated D-dimer  -- D-dimer 3.80 on 10/19. No tachycardia/tachypnea/O2 requirement c/f PE. Likely elevated due to immobile state and COVID-19     Code status: Full code  Prophylaxis: [address] discussed with: Pt  Anticipated Disposition:TBD, awaiting LTC placement     Hospital Problems  Date Reviewed: 10/12/2022            Codes Class Noted POA    COVID ICD-10-CM: U07.1  ICD-9-CM: 079.89  10/18/2022 Unknown             Review of Systems:   A comprehensive review of systems was negative except for that written in the HPI. Vital Signs:    Last 24hrs VS reviewed since prior progress note.  Most recent are:  Visit Vitals  /77 (BP 1 Location: Left upper arm, BP Patient Position: At rest;Lying)   Pulse 78   Temp 97.3 °F (36.3 °C)   Resp 16   SpO2 97%         Intake/Output Summary (Last 24 hours) at 10/22/2022 0846  Last data filed at 10/21/2022 1539  Gross per 24 hour   Intake --   Output 550 ml   Net -550 ml        Physical Examination:     I had a face to face encounter with this patient and independently examined them on 10/22/2022 as outlined below:          Constitutional:  No acute distress, cooperative, pleasant    ENT:  Oral mucosa moist, oropharynx benign. Resp:  CTA bilaterally. No wheezing/rhonchi/rales. No accessory muscle use. CV:  Regular rhythm, normal rate, no murmurs, gallops, rubs    GI:  Soft, non distended, non tender. normoactive bowel sounds, no hepatosplenomegaly     Musculoskeletal:  No edema, warm, 2+ pulses throughout    Neurologic:  Ride sided hemiplegia, right sided facial asymmetry, diminished sensation and 0/5 strength against gravity in RUE and RLE, strength 5/5 in LUE and LLE AAOx3, CN II-XII reviewed            Data Review:    Review and/or order of clinical lab test  Review and/or order of tests in the radiology section of CPT  Review and/or order of tests in the medicine section of CPT      Labs:     Recent Labs     10/21/22  0212 10/20/22  0413   WBC 10.2 10.6   HGB 10.3* 10.8*   HCT 31.7* 33.2*    334     Recent Labs     10/21/22  0212 10/20/22  0413   * 134*   K 4.2 4.4    102   CO2 25 25   BUN 18 17   CREA 0.55* 0.52*   GLU 94 103*   CA 8.2* 8.9   MG 1.8 2.0   PHOS 3.8 4.1     No results for input(s): ALT, AP, TBIL, TBILI, TP, ALB, GLOB, GGT, AML, LPSE in the last 72 hours. No lab exists for component: SGOT, GPT, AMYP, HLPSE  No results for input(s): INR, PTP, APTT, INREXT in the last 72 hours. No results for input(s): FE, TIBC, PSAT, FERR in the last 72 hours. No results found for: FOL, RBCF   No results for input(s): PH, PCO2, PO2 in the last 72 hours. No results for input(s): CPK, CKNDX, TROIQ in the last 72 hours.     No lab exists for component: CPKMB  Lab Results   Component Value Date/Time    Cholesterol, total 146 09/15/2022 02:40 AM    HDL Cholesterol 53 09/15/2022 02:40 AM    LDL, calculated 65 09/15/2022 02:40 AM    Triglyceride 140 09/15/2022 02:40 AM    CHOL/HDL Ratio 2.8 09/15/2022 02:40 AM     Lab Results   Component Value Date/Time    Glucose (POC) 134 (H) 09/28/2022 11:46 AM    Glucose (POC) 116 09/28/2022 06:08 AM    Glucose (POC) 121 (H) 09/28/2022 12:34 AM    Glucose (POC) 124 (H) 09/27/2022 05:28 PM    Glucose (POC) 176 (H) 09/27/2022 11:27 AM     Lab Results   Component Value Date/Time    Color YELLOW/STRAW 10/18/2022 01:01 AM    Appearance TURBID (A) 10/18/2022 01:01 AM    Specific gravity 1.013 10/18/2022 01:01 AM    pH (UA) 6.5 10/18/2022 01:01 AM    Protein TRACE (A) 10/18/2022 01:01 AM    Glucose Negative 10/18/2022 01:01 AM    Ketone Negative 10/18/2022 01:01 AM    Bilirubin Negative 10/18/2022 01:01 AM    Urobilinogen 4.0 (H) 10/18/2022 01:01 AM    Nitrites Positive (A) 10/18/2022 01:01 AM    Leukocyte Esterase MODERATE (A) 10/18/2022 01:01 AM    Epithelial cells FEW 10/18/2022 01:01 AM    Bacteria 4+ (A) 10/18/2022 01:01 AM    WBC 20-50 10/18/2022 01:01 AM    RBC 0-5 10/18/2022 01:01 AM         Medications Reviewed:     Current Facility-Administered Medications   Medication Dose Route Frequency    finasteride (PROSCAR) tablet 5 mg  5 mg Oral DAILY    piperacillin-tazobactam (ZOSYN) 3.375 g in 0.9% sodium chloride (MBP/ADV) 100 mL MBP  3.375 g IntraVENous Q8H    melatonin tablet 3 mg  3 mg Oral QHS PRN    allopurinoL (ZYLOPRIM) tablet 100 mg  100 mg Oral DAILY    apixaban (ELIQUIS) tablet 5 mg  5 mg Oral BID    atorvastatin (LIPITOR) tablet 80 mg  80 mg Oral QHS    famotidine (PEPCID) tablet 20 mg  20 mg Oral Q12H    gabapentin (NEURONTIN) capsule 300 mg  300 mg Oral QHS    tamsulosin (FLOMAX) capsule 0.4 mg  0.4 mg Oral DAILY    ergocalciferol capsule 50,000 Units  50,000 Units Oral Q7D    metoprolol succinate (TOPROL-XL) XL tablet 75 mg  75 mg Oral DAILY    dilTIAZem ER (CARDIZEM CD) capsule 180 mg  180 mg Oral DAILY    balsam peru-castor oiL (VENELEX) ointment   Topical BID    sodium chloride (NS) flush 5-40 mL  5-40 mL IntraVENous Q8H    sodium chloride (NS) flush 5-40 mL  5-40 mL IntraVENous PRN    acetaminophen (TYLENOL) tablet 650 mg 650 mg Oral Q6H PRN    Or    acetaminophen (TYLENOL) suppository 650 mg  650 mg Rectal Q6H PRN    ondansetron (ZOFRAN ODT) tablet 4 mg  4 mg Oral Q8H PRN    Or    ondansetron (ZOFRAN) injection 4 mg  4 mg IntraVENous Q6H PRN     ______________________________________________________________________  EXPECTED LENGTH OF STAY: 4d 0h  ACTUAL LENGTH OF STAY:          4                 Kandi Orts Mill[place]ram, PA-C

## 2022-10-23 PROCEDURE — 74011250637 HC RX REV CODE- 250/637: Performed by: FAMILY MEDICINE

## 2022-10-23 PROCEDURE — 74011250637 HC RX REV CODE- 250/637: Performed by: NURSE PRACTITIONER

## 2022-10-23 PROCEDURE — 74011000250 HC RX REV CODE- 250: Performed by: FAMILY MEDICINE

## 2022-10-23 PROCEDURE — 74011000258 HC RX REV CODE- 258: Performed by: FAMILY MEDICINE

## 2022-10-23 PROCEDURE — 74011250637 HC RX REV CODE- 250/637: Performed by: PHYSICIAN ASSISTANT

## 2022-10-23 PROCEDURE — 65270000029 HC RM PRIVATE

## 2022-10-23 PROCEDURE — 74011250636 HC RX REV CODE- 250/636: Performed by: FAMILY MEDICINE

## 2022-10-23 PROCEDURE — 94760 N-INVAS EAR/PLS OXIMETRY 1: CPT

## 2022-10-23 PROCEDURE — 51798 US URINE CAPACITY MEASURE: CPT

## 2022-10-23 RX ADMIN — PIPERACILLIN AND TAZOBACTAM 3.38 G: 3; .375 INJECTION, POWDER, FOR SOLUTION INTRAVENOUS at 22:45

## 2022-10-23 RX ADMIN — FINASTERIDE 5 MG: 5 TABLET, FILM COATED ORAL at 10:43

## 2022-10-23 RX ADMIN — APIXABAN 5 MG: 5 TABLET, FILM COATED ORAL at 10:43

## 2022-10-23 RX ADMIN — DILTIAZEM HYDROCHLORIDE 180 MG: 180 CAPSULE, COATED, EXTENDED RELEASE ORAL at 10:43

## 2022-10-23 RX ADMIN — FAMOTIDINE 20 MG: 20 TABLET ORAL at 10:43

## 2022-10-23 RX ADMIN — PIPERACILLIN AND TAZOBACTAM 3.38 G: 3; .375 INJECTION, POWDER, FOR SOLUTION INTRAVENOUS at 18:29

## 2022-10-23 RX ADMIN — METOPROLOL SUCCINATE 75 MG: 25 TABLET, FILM COATED, EXTENDED RELEASE ORAL at 10:43

## 2022-10-23 RX ADMIN — GABAPENTIN 300 MG: 300 CAPSULE ORAL at 22:45

## 2022-10-23 RX ADMIN — TAMSULOSIN HYDROCHLORIDE 0.4 MG: 0.4 CAPSULE ORAL at 10:43

## 2022-10-23 RX ADMIN — SODIUM CHLORIDE, PRESERVATIVE FREE 10 ML: 5 INJECTION INTRAVENOUS at 22:45

## 2022-10-23 RX ADMIN — Medication: at 18:31

## 2022-10-23 RX ADMIN — ALLOPURINOL 100 MG: 100 TABLET ORAL at 10:43

## 2022-10-23 RX ADMIN — APIXABAN 5 MG: 5 TABLET, FILM COATED ORAL at 18:17

## 2022-10-23 RX ADMIN — ATORVASTATIN CALCIUM 80 MG: 40 TABLET, FILM COATED ORAL at 22:45

## 2022-10-23 RX ADMIN — SODIUM CHLORIDE, PRESERVATIVE FREE 10 ML: 5 INJECTION INTRAVENOUS at 18:30

## 2022-10-23 RX ADMIN — SODIUM CHLORIDE, PRESERVATIVE FREE 10 ML: 5 INJECTION INTRAVENOUS at 07:26

## 2022-10-23 RX ADMIN — PIPERACILLIN AND TAZOBACTAM 3.38 G: 3; .375 INJECTION, POWDER, FOR SOLUTION INTRAVENOUS at 07:26

## 2022-10-23 RX ADMIN — FAMOTIDINE 20 MG: 20 TABLET ORAL at 22:45

## 2022-10-23 RX ADMIN — Medication: at 10:44

## 2022-10-23 NOTE — PROGRESS NOTES
Problem: Pressure Injury - Risk of  Goal: *Prevention of pressure injury  Description: Document Pepe Scale and appropriate interventions in the flowsheet.   Outcome: Not Progressing Towards Goal  Note: Pressure Injury Interventions:  Sensory Interventions: Assess changes in LOC, Check visual cues for pain    Moisture Interventions: Absorbent underpads, Apply protective barrier, creams and emollients    Activity Interventions: PT/OT evaluation    Mobility Interventions: Pressure redistribution bed/mattress (bed type)    Nutrition Interventions: Document food/fluid/supplement intake    Friction and Shear Interventions: Apply protective barrier, creams and emollients, HOB 30 degrees or less

## 2022-10-23 NOTE — PROGRESS NOTES
[address] [facility] Adult  Hospitalist Group                                                                                          Hospitalist Progress Note  Karinjaquelin [place], [place]  Answering service: [phone] from in house phone        Date of Service:  10/23/2022  NAME:  Morrie Cockayne  :  1950  MRN:  [id number]      Admission Summary:   Morrie Cockayne is a 71 y.o. 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 He discharged from [facility] on 10/18/22 but then presented to ED on 10/19/22 with no acute complaints. Work-up revealing c/f UTI with indwelling vazquez (Enterococcus and Pseudomonas, ID consulted, on Zosyn) and incidentally COVID+. Working toward [address] [place] [address] placement. Interval history / Subjective:   Saw the patient on rounds, he has no complaints this morning. 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 No indication for ASA, LDL Goal < 70  -- Was started on Eliquis 5mg BID on 22. Continued on admission  -- Continue Atorvastatin 80mg nightly. Recheck cholesterol panel as an outpatient  -- Needs to follow-up outpatient with Neurology  -- Completed [facility] on 10/18.  Needs LTC     COVID-19  -- Incidental positive on rapid test 10/18  -- PCT < 0.05  -- No respiratory distress or oxygen requirement  -- COVID PCR 10/20: positive     A-Fib  -- Cardiology consulted during previous admission  -- Admitted on Metoprolol Tartrate 37.5mg BID and Diltiazem IR 60mg q8h and converted to XL dosing on 10/20/22 of both medications  -- On Eliquis for stroke prevention     Urinary Retention with indwelling vazquez  UTI, Enterococcus and Pseudomonas  -- Continue Flomax  -- Urine Culture 10/18: Enterococcus and Pseudomonas   -- Abx: CTX (10/18-10/20) and Ampicillin (10/20). - ID consulted 10/20 and recommended Zosyn x 5-7 days. If patient is to be discharged before IV ABX completed can give 1x dose fosfomycin to complete course  -- Removed vazquez 10/19 and failed void trial. Vazquez replaced 10/20AM. Urology consulted and Proscar added. Will follow-up outpatient     Vitamin D Deficiency  -- 25(OH)D 27.8 on 10/19.   - Ergocalciferol weekly x 8 doses. R Shoulder subluxation  -- Appreciated by PT on 10/19  -- Xray R Shoulder 10/19 with prominent degenerative changes, but no dislocation or abnormalities     Elevated D-dimer  -- D-dimer 3.80 on 10/19. No tachycardia/tachypnea/O2 requirement c/f PE. Likely elevated due to immobile state and COVID-19     Code status: Full code  Prophylaxis: [address] discussed with: Pt  Anticipated Disposition:TBD, awaiting LTC placement     Hospital Problems  Date Reviewed: 10/12/2022            Codes Class Noted POA    COVID ICD-10-CM: U07.1  ICD-9-CM: 079.89  10/18/2022 Unknown           Review of Systems:   A comprehensive review of systems was negative except for that written in the HPI. Vital Signs:    Last 24hrs VS reviewed since prior progress note.  Most recent are:  Visit Vitals  /86 (BP 1 Location: Left upper arm, BP Patient Position: At rest)   Pulse 90   Temp 97.8 °F (36.6 °C)   Resp 16   Wt 115.2 kg (253 lb 15.5 oz)   SpO2 98%   BMI 31.74 kg/m²         Intake/Output Summary (Last 24 hours) at 10/23/2022 1059  Last data filed at 10/22/2022 1544  Gross per 24 hour   Intake --   Output 500 ml   Net -500 ml          Physical Examination:     I had a face to face encounter with this patient and independently examined them on 10/23/2022 as outlined below:          Constitutional:  No acute distress, cooperative, pleasant    ENT:  Oral mucosa moist, oropharynx benign. Resp:  CTA bilaterally. No wheezing/rhonchi/rales. No accessory muscle use. CV:  Regular rhythm, normal rate, no murmurs, gallops, rubs    GI:  Soft, non distended, non tender. normoactive bowel sounds, no hepatosplenomegaly     Musculoskeletal:  No edema, warm, 2+ pulses throughout    Neurologic:  Ride sided hemiplegia, right sided facial asymmetry, diminished sensation and 0/5 strength against gravity in RUE and RLE, strength 5/5 in LUE and LLE. Sensation grossly intact in RLE. AAOx3            Data Review:    Review and/or order of clinical lab test  Review and/or order of tests in the radiology section of CPT  Review and/or order of tests in the medicine section of CPT      Labs:     Recent Labs     10/21/22  0212   WBC 10.2   HGB 10.3*   HCT 31.7*          Recent Labs     10/21/22  0212   *   K 4.2      CO2 25   BUN 18   CREA 0.55*   GLU 94   CA 8.2*   MG 1.8   PHOS 3.8       No results for input(s): ALT, AP, TBIL, TBILI, TP, ALB, GLOB, GGT, AML, LPSE in the last 72 hours. No lab exists for component: SGOT, GPT, AMYP, HLPSE  No results for input(s): INR, PTP, APTT, INREXT, INREXT in the last 72 hours. No results for input(s): FE, TIBC, PSAT, FERR in the last 72 hours. No results found for: FOL, RBCF   No results for input(s): PH, PCO2, PO2 in the last 72 hours. No results for input(s): CPK, CKNDX, TROIQ in the last 72 hours.     No lab exists for component: CPKMB  Lab Results   Component Value Date/Time    Cholesterol, total 146 09/15/2022 02:40 AM    HDL Cholesterol 53 09/15/2022 02:40 AM    LDL, calculated 65 09/15/2022 02:40 AM    Triglyceride 140 09/15/2022 02:40 AM    CHOL/HDL Ratio 2.8 09/15/2022 02:40 AM     Lab Results   Component Value Date/Time    Glucose (POC) 134 (H) 09/28/2022 11:46 AM    Glucose (POC) 116 09/28/2022 06:08 AM    Glucose (POC) 121 (H) 09/28/2022 12:34 AM    Glucose (POC) 124 (H) 09/27/2022 05:28 PM    Glucose (POC) 176 (H) 09/27/2022 11:27 AM     Lab Results   Component Value Date/Time    Color YELLOW/STRAW 10/18/2022 01:01 AM    Appearance TURBID (A) 10/18/2022 01:01 AM    Specific gravity 1.013 10/18/2022 01:01 AM    pH (UA) 6.5 10/18/2022 01:01 AM    Protein TRACE (A) 10/18/2022 01:01 AM    Glucose Negative 10/18/2022 01:01 AM    Ketone Negative 10/18/2022 01:01 AM    Bilirubin Negative 10/18/2022 01:01 AM    Urobilinogen 4.0 (H) 10/18/2022 01:01 AM    Nitrites Positive (A) 10/18/2022 01:01 AM    Leukocyte Esterase MODERATE (A) 10/18/2022 01:01 AM    Epithelial cells FEW 10/18/2022 01:01 AM    Bacteria 4+ (A) 10/18/2022 01:01 AM    WBC 20-50 10/18/2022 01:01 AM    RBC 0-5 10/18/2022 01:01 AM         Medications Reviewed:     Current Facility-Administered Medications   Medication Dose Route Frequency    finasteride (PROSCAR) tablet 5 mg  5 mg Oral DAILY    piperacillin-tazobactam (ZOSYN) 3.375 g in 0.9% sodium chloride (MBP/ADV) 100 mL MBP  3.375 g IntraVENous Q8H    melatonin tablet 3 mg  3 mg Oral QHS PRN    allopurinoL (ZYLOPRIM) tablet 100 mg  100 mg Oral DAILY    apixaban (ELIQUIS) tablet 5 mg  5 mg Oral BID    atorvastatin (LIPITOR) tablet 80 mg  80 mg Oral QHS    famotidine (PEPCID) tablet 20 mg  20 mg Oral Q12H    gabapentin (NEURONTIN) capsule 300 mg  300 mg Oral QHS    tamsulosin (FLOMAX) capsule 0.4 mg  0.4 mg Oral DAILY    ergocalciferol capsule 50,000 Units  50,000 Units Oral Q7D    metoprolol succinate (TOPROL-XL) XL tablet 75 mg  75 mg Oral DAILY    dilTIAZem ER (CARDIZEM CD) capsule 180 mg  180 mg Oral DAILY    balsam peru-castor oiL (VENELEX) ointment   Topical BID    sodium chloride (NS) flush 5-40 mL  5-40 mL IntraVENous Q8H    sodium chloride (NS) flush 5-40 mL  5-40 mL IntraVENous PRN    acetaminophen (TYLENOL) tablet 650 mg  650 mg Oral Q6H PRN    Or    acetaminophen (TYLENOL) suppository 650 mg  650 mg Rectal Q6H PRN    ondansetron (ZOFRAN ODT) tablet 4 mg  4 mg Oral Q8H PRN    Or    ondansetron (ZOFRAN) injection 4 mg  4 mg IntraVENous Q6H PRN     ______________________________________________________________________  EXPECTED LENGTH OF STAY: 4d 0h  ACTUAL LENGTH OF STAY:          5                 [place] Milligram, PA-C

## 2022-10-24 LAB
GLUCOSE BLD STRIP.AUTO-MCNC: 123 MG/DL (ref 65–117)
SERVICE CMNT-IMP: ABNORMAL

## 2022-10-24 PROCEDURE — 97112 NEUROMUSCULAR REEDUCATION: CPT

## 2022-10-24 PROCEDURE — 74011000258 HC RX REV CODE- 258: Performed by: FAMILY MEDICINE

## 2022-10-24 PROCEDURE — 97535 SELF CARE MNGMENT TRAINING: CPT

## 2022-10-24 PROCEDURE — 74011250637 HC RX REV CODE- 250/637: Performed by: NURSE PRACTITIONER

## 2022-10-24 PROCEDURE — 65270000029 HC RM PRIVATE

## 2022-10-24 PROCEDURE — 82962 GLUCOSE BLOOD TEST: CPT

## 2022-10-24 PROCEDURE — 97530 THERAPEUTIC ACTIVITIES: CPT

## 2022-10-24 PROCEDURE — 74011250637 HC RX REV CODE- 250/637: Performed by: PHYSICIAN ASSISTANT

## 2022-10-24 PROCEDURE — 74011250636 HC RX REV CODE- 250/636: Performed by: FAMILY MEDICINE

## 2022-10-24 PROCEDURE — 97110 THERAPEUTIC EXERCISES: CPT

## 2022-10-24 PROCEDURE — 74011250637 HC RX REV CODE- 250/637: Performed by: FAMILY MEDICINE

## 2022-10-24 PROCEDURE — 74011000250 HC RX REV CODE- 250: Performed by: FAMILY MEDICINE

## 2022-10-24 RX ADMIN — APIXABAN 5 MG: 5 TABLET, FILM COATED ORAL at 18:55

## 2022-10-24 RX ADMIN — TAMSULOSIN HYDROCHLORIDE 0.4 MG: 0.4 CAPSULE ORAL at 11:24

## 2022-10-24 RX ADMIN — APIXABAN 5 MG: 5 TABLET, FILM COATED ORAL at 11:24

## 2022-10-24 RX ADMIN — SODIUM CHLORIDE, PRESERVATIVE FREE 10 ML: 5 INJECTION INTRAVENOUS at 14:48

## 2022-10-24 RX ADMIN — FINASTERIDE 5 MG: 5 TABLET, FILM COATED ORAL at 11:24

## 2022-10-24 RX ADMIN — DILTIAZEM HYDROCHLORIDE 180 MG: 180 CAPSULE, COATED, EXTENDED RELEASE ORAL at 11:24

## 2022-10-24 RX ADMIN — SODIUM CHLORIDE, PRESERVATIVE FREE 10 ML: 5 INJECTION INTRAVENOUS at 07:24

## 2022-10-24 RX ADMIN — ALLOPURINOL 100 MG: 100 TABLET ORAL at 11:24

## 2022-10-24 RX ADMIN — PIPERACILLIN AND TAZOBACTAM 3.38 G: 3; .375 INJECTION, POWDER, FOR SOLUTION INTRAVENOUS at 07:24

## 2022-10-24 RX ADMIN — Medication: at 18:56

## 2022-10-24 RX ADMIN — Medication: at 11:26

## 2022-10-24 RX ADMIN — FAMOTIDINE 20 MG: 20 TABLET ORAL at 22:19

## 2022-10-24 RX ADMIN — METOPROLOL SUCCINATE 75 MG: 25 TABLET, FILM COATED, EXTENDED RELEASE ORAL at 11:24

## 2022-10-24 RX ADMIN — GABAPENTIN 300 MG: 300 CAPSULE ORAL at 22:19

## 2022-10-24 RX ADMIN — SODIUM CHLORIDE, PRESERVATIVE FREE 10 ML: 5 INJECTION INTRAVENOUS at 22:23

## 2022-10-24 RX ADMIN — Medication 3 MG: at 22:34

## 2022-10-24 RX ADMIN — PIPERACILLIN AND TAZOBACTAM 3.38 G: 3; .375 INJECTION, POWDER, FOR SOLUTION INTRAVENOUS at 14:48

## 2022-10-24 RX ADMIN — FAMOTIDINE 20 MG: 20 TABLET ORAL at 11:24

## 2022-10-24 RX ADMIN — PIPERACILLIN AND TAZOBACTAM 3.38 G: 3; .375 INJECTION, POWDER, FOR SOLUTION INTRAVENOUS at 22:19

## 2022-10-24 RX ADMIN — ATORVASTATIN CALCIUM 80 MG: 40 TABLET, FILM COATED ORAL at 22:19

## 2022-10-24 NOTE — PROGRESS NOTES
Problem: Pressure Injury - Risk of  Goal: *Prevention of pressure injury  Description: Document Pepe Scale and appropriate interventions in the flowsheet.   Outcome: Progressing Towards Goal  Note: Pressure Injury Interventions:  Sensory Interventions: Assess changes in LOC, Check visual cues for pain    Moisture Interventions: Apply protective barrier, creams and emollients    Activity Interventions: PT/OT evaluation    Mobility Interventions: HOB 30 degrees or less, Pressure redistribution bed/mattress (bed type), PT/OT evaluation    Nutrition Interventions: Document food/fluid/supplement intake, Offer support with meals,snacks and hydration    Friction and Shear Interventions: Apply protective barrier, creams and emollients

## 2022-10-24 NOTE — PROGRESS NOTES
Problem: Mobility Impaired (Adult and Pediatric)  Goal: *Acute Goals and Plan of Care (Insert Text)  Description: FUNCTIONAL STATUS PRIOR TO ADMISSION:  Prior to 9/14/22, patient was independent and active without use of DME. He was admitted to acute care hosp 9/14/22-9/28/22 s/p acute CVA f/b 21 days of IPR. His insurance company denied additional IPR and SNF resulting in pt discharging home alone with home health services. Patient was not able to manage in his home alone, presented to the ED for assistance. Per IPR notes, at the time of discharge patient's functional mobility: Supv/ CGA sitting,  Max A sit<->stand, Mod A bed<->chair slide board transfer,  Non ambulatory, Max A to propel the WC 5 ft. HOME SUPPORT PRIOR TO ADMISSION: The patient lived alone and required no assistance prior to his CVA  ,  Physical Therapy Goals  Initiated 10/18/2022  1. Patient will move from supine to sit and sit to supine in bed with minimal assistance within 7 day(s). 2.  Patient will transfer from bed to chair and chair to bed with minimum assistance  using the least restrictive device within 7 day(s). 3.  Patient will perform sit to stand with minimal assistance within 7 day(s). 4.  Patient will be supervision sitting EOB 15 mins within 7 days. Outcome: Progressing Towards Goal   PHYSICAL THERAPY TREATMENT  Patient: Rito Russo (27 y.o. male)  Date: 10/24/2022  Diagnosis: COVID [U07.1] <principal problem not specified>      Precautions: Fall  Chart, physical therapy assessment, plan of care and goals were reviewed. ASSESSMENT  Patient continues with skilled PT services and is progressing towards goals. Patient received in bed and most willing to work with therapy. States did his exercises over the weekend. Is having right knee pain with full extension and knee is slightly swollen. Moved to sit EOB  and sat with OT assisting with ADLS activity. Sitting balance good.   Worked on sit to stand with geena-walker. More of heavy lean to the left today requiring max assist of  2 to maintain stand while cleaning up from bowel movement standing for at least 5 minutes. Requiring assist to get to full stand, support right knee and lean more to right. Unable to take steps today. Continue to feel patient will benefit from further rehab to maximize his mobility to decrease burden of care on caregivers. Current Level of Function Impacting Discharge (mobility/balance): mod to max assist of 2    Other factors to consider for discharge: far below baseline         PLAN :  Patient continues to benefit from skilled intervention to address the above impairments. Continue treatment per established plan of care. to address goals. Recommendation for discharge: (in order for the patient to meet his/her long term goals)  Therapy 3 hours per day 5-7 days per week; if unable then SNF    This discharge recommendation:  Has been made in collaboration with the attending provider and/or case management    IF patient discharges home will need the following DME: bedside commode, gait belt, hospital bed, mechanical lift, transfer bench, and wheelchair       SUBJECTIVE:   Patient stated I did my exercises.     OBJECTIVE DATA SUMMARY:   Critical Behavior:  Neurologic State: Alert  Orientation Level: Oriented X4  Cognition: Follows commands  Safety/Judgement: Awareness of environment, Decreased awareness of need for assistance, Decreased awareness of environment, Decreased insight into deficits, Fall prevention  Functional Mobility Training:  Bed Mobility:  Rolling: Moderate assistance; Additional time  Supine to Sit: Maximum assistance;Assist x2  Sit to Supine: Moderate assistance;Assist x2           Transfers:  Sit to Stand: Maximum assistance;Assist x2  Stand to Sit: Moderate assistance                             Balance:  Sitting: Impaired; Without support  Sitting - Static: Good (unsupported)  Sitting - Dynamic: Good (unsupported)  Standing: Impaired; With support  Standing - Static: Constant support;Poor           Therapeutic Exercises:   Assisted right heel slides, hip abd/add    Activity Tolerance:   Good    After treatment patient left in no apparent distress:   Supine in bed, Heels elevated for pressure relief, Call bell within reach, Caregiver / family present, and Side rails x 3    COMMUNICATION/COLLABORATION:   The patients plan of care was discussed with: Occupational therapist and Registered nurse.      Gayle Adler PT   Time Calculation: 40 mins

## 2022-10-24 NOTE — PROGRESS NOTES
Transition of Care    RUR: 13%    Physician Assistant asked about the discharge plan. Saúl Chen was called at [facility] She asked what the discharge plan would be from [facility] She was informed that he would probably need to go either to home or to a long-term care facility. She was informed that the Medicaid application was being initiated. Mr. Carlos Nguyen was called. He was informed that [facility] was looking at his father. He stated that he wanted his father to go to [facility] He was asked if his father had funds to pay for long-term care. He stated that his father did not have funds. Carlos Nguyen was informed that a Medicaid application would need to be initiated to long-term care if his father could not return to his father's home. He was informed that [facility] would be referred to to start the process. He was agreeable with this plan. A message was sent to [facility] to start the Medicaid long-term care application. A UAI was requested to be completed. A message was sent to Saúl Chen to see if the can get authorization by noon on 10/25/2022. We will see if the insurance would authorize SNF if IPR is not approved. A message was sent to [facility] informing that the discharge plan is to [facility] Will continue to follow for discharge planning.   Signed By: Cain Cox LCSW     October 24, 2022

## 2022-10-24 NOTE — PROGRESS NOTES
Problem: Self Care Deficits Care Plan (Adult)  Goal: *Acute Goals and Plan of Care (Insert Text)  Description: FUNCTIONAL STATUS PRIOR TO ADMISSION: At baseline pt was highly independent, active, and not using DME. 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 Immediately PTA, pt was at [facility] for OT/PT services to address deficits from L MCA stroke. Pt was d/c 10/17/22 and within 1 hour of being home was admitted to [address] [place] [address] ED.     HOME SUPPORT: The patient lived alone with limited local support. Occupational Therapy Goals  Initiated 10/18/2022   1. Patient will perform seated grooming task with moderate assistance within 7 day(s). 2.  Patient will perform seated upper body dressing, using compensatory strategies, with moderate assistance within 7 day(s). 3.  Patient will perform BSC transfers with moderate assistance  within 7 day(s). 4.  Patient will perform all aspects of toileting with moderate assistance  within 7 day(s). 5.  Patient will participate in upper extremity therapeutic exercise/activities with supervision/set-up within 7 day(s). 6.  Patient will improve their Fugl Campbell score by 5 points in prep for ADLs within 7 days. Outcome: Progressing Towards Goal   OCCUPATIONAL THERAPY TREATMENT  Patient: Carla Puckett (42 y.o. male)  Date: 10/24/2022  Diagnosis: COVID [U07.1] <principal problem not specified>      Precautions: Fall  Chart, occupational therapy assessment, plan of care, and goals were reviewed. ASSESSMENT  Patient continues with skilled OT services and is slowly progressing towards goals. Received in bed and highly motivated for therapy. Supine > sit with max AX2 with R knee pain and swelling noted. Good static sitting at EOB to participate in UB dressing training and don sling with total A to prepare for standing balance training.   Sit > stand with max Ax2 with geena-walker, noted L lateral lean and total A for BM hygiene(incontinent) approx 5 mins in standing. Second attempt at standing, <1 mins with max A X 2 as patient with increased fatigue. Returned to bed with max Ax2 with good participation to attempt with repositioning with bridging. Current Level of Function Impacting Discharge (ADLs): washing face with L hand set up and supervision, supine <> sit max Ax2, UB dressing mod A, sling total A    Other factors to consider for discharge: Patient with continued deficits from CVA. He is highly motivated and would benefit from additional rehab services and specialized DME fitting. Recommend IPR once medically stable. R shoulder sublux and R hand edema, patient education on positioning while in bed with good understanding. PLAN :  Patient continues to benefit from skilled intervention to address the above impairments. Continue treatment per established plan of care to address goals. Recommend with staff: repositioning every two hours, have patient participate in as much as possible with ADL tasks    Recommend next OT session: progressing POC    Recommendation for discharge: (in order for the patient to meet his/her long term goals)  Therapy 3 hours per day 5-7 days per week    This discharge recommendation:  Has not yet been discussed the attending provider and/or case management    IF patient discharges home will need the following DME: bedside commode, hospital bed, mechanical lift, and geena wheelchair       SUBJECTIVE:   Patient stated I am ready.     OBJECTIVE DATA SUMMARY:   Cognitive/Behavioral Status:                      Functional Mobility and Transfers for ADLs:  Bed Mobility:  Rolling: Moderate assistance; Additional time  Supine to Sit: Maximum assistance;Assist x2  Sit to Supine: Moderate assistance;Assist x2    Transfers:  Sit to Stand: Maximum assistance;Assist x2          Balance:  Sitting: Intact; Without support  Sitting - Static: Good (unsupported)  Sitting - Dynamic: Good (unsupported)  Standing: Impaired; With support  Standing - Static: Constant support;Poor    ADL Intervention:       Grooming  Position Performed:  (semi smith)  Washing Face: Set-up; Supervision (with LUE)         Type of Bath: Chlorhexidine (CHG); Full         Upper Body [address] Gown: Compensatory technique training; Moderate assistance (geena dressing technique to don gown)    Lower Body Dressing Assistance  Socks: Total assistance (dependent)    Toileting  Bowel Hygiene: Total assistance (dependent)  Clothing Management: Total assistance (dependent)       Pain:  R knee and R shoulder with bed mobility and sit <> stand, did not quantify and reports relief at rest    Activity Tolerance:   Fair    After treatment patient left in no apparent distress:   Supine in bed, Heels elevated for pressure relief, Call bell within reach, and Bed / chair alarm activated, R elbow supported for shoulder stability, hand/wrist elevated for edema    COMMUNICATION/COLLABORATION:   The patients plan of care was discussed with: Physical therapist and Registered nurse.      Hugo Hardin OT  Time Calculation: 41 mins

## 2022-10-24 NOTE — PROGRESS NOTES
[facility] [facility] Adult  Hospitalist Group                                                                                          Hospitalist Progress Note  Manuel Alexis PA-C  Answering service: [phone] from in house phone        Date of Service:  10/24/2022  NAME:  Alexandro Vega  :  1950  MRN:  [id number]      Admission Summary:   Alexandro Vega is a 71 y.o. 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 He discharged from [facility] on 10/18/22 but then presented to ED on 10/19/22 with no acute complaints. Work-up revealing c/f UTI with indwelling vazquez (Enterococcus and Pseudomonas, ID consulted, on Zosyn and completing 10/25) and incidentally COVID+ (Asx). Working toward [address] [place] [address] placement. Interval history / Subjective:   Patient with no complaints. He is hopeful for discharge as soon as possible. Reviewed that the only barrier is placement with the asx COVID test. He expresses understanding. 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 No indication for ASA, LDL Goal < 70  -- Was started on Eliquis 5mg BID on 22. Continued on admission  -- Continue Atorvastatin 80mg nightly. Recheck cholesterol panel as an outpatient  -- Needs to follow-up outpatient with Neurology  -- Completed [facility] on 10/18.  Needs LTC     COVID-19  -- Incidental positive on rapid test 10/18 and PCR positive 10/20/22  -- PCT < 0.05  -- No respiratory distress or oxygen requirement     A-Fib  -- Cardiology consulted during previous admission  -- Admitted on Metoprolol Tartrate 37.5mg BID and Diltiazem IR 60mg q8h and converted to XL dosing on 10/20/22 of both medications. No issues  -- On Eliquis for stroke prevention     Urinary Retention with indwelling vazquez  UTI, Enterococcus and Pseudomonas  -- Continue Flomax  -- Urine Culture 10/18: Enterococcus and Pseudomonas   -- Abx: CTX (10/18-10/20) and Ampicillin (10/20). ID consulted 10/20 and recommended Zosyn x 5 (10/20-10/25)  -- Removed vazquez 10/19 and failed void trial. Vazquez replaced 10/20AM. Urology consulted and Proscar added. Will follow-up outpatient     Vitamin D Deficiency  -- 25(OH)D 27.8 on 10/19. Start Ergocalciferol weekly x 8 doses. R Shoulder subluxation  -- Appreciated by PT on 10/19  -- Xray R Shoulder 10/19 with no dislocation or abnormalities     Elevated D-dimer  -- D-dimer 3.80 on 10/19. No tachycardia/tachypnea/O2 requirement c/f PE. Likely elevated due to immobile state and COVID-19     Code status: Full code  Prophylaxis: Apixaban  Care Plan discussed with: Pt, RN, Care Coordinator  Anticipated Disposition: TBD. Medically ready for discharge. Will do every other day labs with medical stability     Hospital Problems  Date Reviewed: 10/12/2022            Codes Class Noted POA    COVID ICD-10-CM: U07.1  ICD-9-CM: 079.89  10/18/2022 Unknown             Review of Systems:   A comprehensive review of systems was negative except for that written in the HPI. Vital Signs:    Last 24hrs VS reviewed since prior progress note.  Most recent are:  Visit Vitals  BP (!) 143/85 (BP 1 Location: Left arm, BP Patient Position: At rest)   Pulse 83   Temp 98.7 °F (37.1 °C)   Resp 16   Wt 115.2 kg (253 lb 15.5 oz)   SpO2 96%   BMI 31.74 kg/m²         Intake/Output Summary (Last 24 hours) at 10/24/2022 1428  Last data filed at 10/23/2022 1829  Gross per 24 hour   Intake --   Output 700 ml   Net -700 ml        Physical Examination:     I had a face to face encounter with this patient and independently examined them on 10/24/2022 as outlined below:          Constitutional:  No acute distress, cooperative, pleasant    ENT:  Oral mucosa moist, oropharynx benign. Voice is soft   Resp:  CTA bilaterally. No wheezing/rhonchi/rales. No accessory muscle use. CV:  Regular rhythm, normal rate, no murmurs    GI:  Soft, non distended, non tender. Musculoskeletal:  No edema, warm    Neurologic:  R hemiplegia with right facial asymmetry.  AAO to name, location being hospital, month/year  : [place] with clear/yellow urine          Data Review:    Review and/or order of clinical lab test  Review and/or order of tests in the radiology section of CPT  Review and/or order of tests in the medicine section of CPT      Labs:   No new labs    Medications Reviewed:     Current Facility-Administered Medications   Medication Dose Route Frequency    finasteride (PROSCAR) tablet 5 mg  5 mg Oral DAILY    piperacillin-tazobactam (ZOSYN) 3.375 g in 0.9% sodium chloride (MBP/ADV) 100 mL MBP  3.375 g IntraVENous Q8H    melatonin tablet 3 mg  3 mg Oral QHS PRN    allopurinoL (ZYLOPRIM) tablet 100 mg  100 mg Oral DAILY    apixaban (ELIQUIS) tablet 5 mg  5 mg Oral BID    atorvastatin (LIPITOR) tablet 80 mg  80 mg Oral QHS    famotidine (PEPCID) tablet 20 mg  20 mg Oral Q12H    gabapentin (NEURONTIN) capsule 300 mg  300 mg Oral QHS    tamsulosin (FLOMAX) capsule 0.4 mg  0.4 mg Oral DAILY    ergocalciferol capsule 50,000 Units  50,000 Units Oral Q7D    metoprolol succinate (TOPROL-XL) XL tablet 75 mg  75 mg Oral DAILY    dilTIAZem ER (CARDIZEM CD) capsule 180 mg  180 mg Oral DAILY    balsam peru-castor oiL (VENELEX) ointment   Topical BID    sodium chloride (NS) flush 5-40 mL  5-40 mL IntraVENous Q8H    sodium chloride (NS) flush 5-40 mL  5-40 mL IntraVENous PRN    acetaminophen (TYLENOL) tablet 650 mg  650 mg Oral Q6H PRN    Or    acetaminophen (TYLENOL) suppository 650 mg  650 mg Rectal Q6H PRN    ondansetron (ZOFRAN ODT) tablet 4 mg  4 mg Oral Q8H PRN    Or    ondansetron (ZOFRAN) injection 4 mg  4 mg IntraVENous Q6H PRN     ______________________________________________________________________  EXPECTED LENGTH OF STAY: 4d 0h  ACTUAL LENGTH OF STAY:          6                 Manuel Alexis PA-C

## 2022-10-24 NOTE — PROGRESS NOTES
Problem: Patient Education: Go to Patient Education Activity  Goal: Patient/Family Education  Outcome: Progressing Towards Goal     Problem: Patient Education: Go to Patient Education Activity  Goal: Patient/Family Education  Outcome: Progressing Towards Goal     Problem: Pressure Injury - Risk of  Goal: *Prevention of pressure injury  Description: Document Pepe Scale and appropriate interventions in the flowsheet. Outcome: Progressing Towards Goal  Note: Pressure Injury Interventions:  Sensory Interventions: Assess changes in LOC, Check visual cues for pain    Moisture Interventions: Absorbent underpads, Apply protective barrier, creams and emollients    Activity Interventions: PT/OT evaluation    Mobility Interventions: Pressure redistribution bed/mattress (bed type)    Nutrition Interventions: Document food/fluid/supplement intake    Friction and Shear Interventions: Apply protective barrier, creams and emollients, HOB 30 degrees or less                Problem: Patient Education: Go to Patient Education Activity  Goal: Patient/Family Education  Outcome: Progressing Towards Goal     Problem: Airway Clearance - Ineffective  Goal: Achieve or maintain patent airway  Outcome: Progressing Towards Goal     Problem: Gas Exchange - Impaired  Goal: Absence of hypoxia  Outcome: Progressing Towards Goal  Goal: Promote optimal lung function  Outcome: Progressing Towards Goal     Problem: Breathing Pattern - Ineffective  Goal: Ability to achieve and maintain a regular respiratory rate  Outcome: Progressing Towards Goal     Problem:  Body Temperature -  Risk of, Imbalanced  Goal: Ability to maintain a body temperature within defined limits  Outcome: Progressing Towards Goal  Goal: Will regain or maintain usual level of consciousness  Outcome: Progressing Towards Goal  Goal: Complications related to the disease process, condition or treatment will be avoided or minimized  Outcome: Progressing Towards Goal     Problem: Isolation Precautions - Risk of Spread of Infection  Goal: Prevent transmission of infectious organism to others  Outcome: Progressing Towards Goal     Problem: Nutrition Deficits  Goal: Optimize nutrtional status  Outcome: Progressing Towards Goal     Problem: Risk for Fluid Volume Deficit  Goal: Maintain normal heart rhythm  Outcome: Progressing Towards Goal  Goal: Maintain absence of muscle cramping  Outcome: Progressing Towards Goal  Goal: Maintain normal serum potassium, sodium, calcium, phosphorus, and pH  Outcome: Progressing Towards Goal     Problem: Loneliness or Risk for Loneliness  Goal: Demonstrate positive use of time alone when socialization is not possible  Outcome: Progressing Towards Goal     Problem: Fatigue  Goal: Verbalize increase energy and improved vitality  Outcome: Progressing Towards Goal     Problem: Patient Education: Go to Patient Education Activity  Goal: Patient/Family Education  Outcome: Progressing Towards Goal     Problem: Falls - Risk of  Goal: *Absence of Falls  Description: Document Kasi Fall Risk and appropriate interventions in the flowsheet.   Outcome: Progressing Towards Goal     Problem: Patient Education: Go to Patient Education Activity  Goal: Patient/Family Education  Outcome: Progressing Towards Goal

## 2022-10-25 LAB
ALBUMIN SERPL-MCNC: 2.2 G/DL (ref 3.5–5)
ALBUMIN/GLOB SERPL: 0.5 {RATIO} (ref 1.1–2.2)
ALP SERPL-CCNC: 90 U/L (ref 45–117)
ALT SERPL-CCNC: 28 U/L (ref 12–78)
ANION GAP SERPL CALC-SCNC: 6 MMOL/L (ref 5–15)
AST SERPL-CCNC: 17 U/L (ref 15–37)
BASOPHILS # BLD: 0 K/UL (ref 0–0.1)
BASOPHILS NFR BLD: 0 % (ref 0–1)
BILIRUB SERPL-MCNC: 0.9 MG/DL (ref 0.2–1)
BUN SERPL-MCNC: 15 MG/DL (ref 6–20)
BUN/CREAT SERPL: 27 (ref 12–20)
CALCIUM SERPL-MCNC: 8.9 MG/DL (ref 8.5–10.1)
CHLORIDE SERPL-SCNC: 100 MMOL/L (ref 97–108)
CO2 SERPL-SCNC: 27 MMOL/L (ref 21–32)
CREAT SERPL-MCNC: 0.55 MG/DL (ref 0.7–1.3)
DIFFERENTIAL METHOD BLD: ABNORMAL
EOSINOPHIL # BLD: 0.2 K/UL (ref 0–0.4)
EOSINOPHIL NFR BLD: 2 % (ref 0–7)
ERYTHROCYTE [DISTWIDTH] IN BLOOD BY AUTOMATED COUNT: 14.1 % (ref 11.5–14.5)
GLOBULIN SER CALC-MCNC: 4.8 G/DL (ref 2–4)
GLUCOSE SERPL-MCNC: 110 MG/DL (ref 65–100)
HCT VFR BLD AUTO: 32 % (ref 36.6–50.3)
HGB BLD-MCNC: 10.3 G/DL (ref 12.1–17)
IMM GRANULOCYTES # BLD AUTO: 0.1 K/UL (ref 0–0.04)
IMM GRANULOCYTES NFR BLD AUTO: 1 % (ref 0–0.5)
LYMPHOCYTES # BLD: 1.6 K/UL (ref 0.8–3.5)
LYMPHOCYTES NFR BLD: 17 % (ref 12–49)
MAGNESIUM SERPL-MCNC: 2 MG/DL (ref 1.6–2.4)
MCH RBC QN AUTO: 27.8 PG (ref 26–34)
MCHC RBC AUTO-ENTMCNC: 32.2 G/DL (ref 30–36.5)
MCV RBC AUTO: 86.3 FL (ref 80–99)
MONOCYTES # BLD: 1 K/UL (ref 0–1)
MONOCYTES NFR BLD: 10 % (ref 5–13)
NEUTS SEG # BLD: 6.7 K/UL (ref 1.8–8)
NEUTS SEG NFR BLD: 70 % (ref 32–75)
NRBC # BLD: 0 K/UL (ref 0–0.01)
NRBC BLD-RTO: 0 PER 100 WBC
PHOSPHATE SERPL-MCNC: 3.7 MG/DL (ref 2.6–4.7)
PLATELET # BLD AUTO: 327 K/UL (ref 150–400)
PMV BLD AUTO: 9.8 FL (ref 8.9–12.9)
POTASSIUM SERPL-SCNC: 4.1 MMOL/L (ref 3.5–5.1)
PROT SERPL-MCNC: 7 G/DL (ref 6.4–8.2)
RBC # BLD AUTO: 3.71 M/UL (ref 4.1–5.7)
SODIUM SERPL-SCNC: 133 MMOL/L (ref 136–145)
WBC # BLD AUTO: 9.5 K/UL (ref 4.1–11.1)

## 2022-10-25 PROCEDURE — 97535 SELF CARE MNGMENT TRAINING: CPT

## 2022-10-25 PROCEDURE — 74011250637 HC RX REV CODE- 250/637: Performed by: PHYSICIAN ASSISTANT

## 2022-10-25 PROCEDURE — 80053 COMPREHEN METABOLIC PANEL: CPT

## 2022-10-25 PROCEDURE — 97530 THERAPEUTIC ACTIVITIES: CPT

## 2022-10-25 PROCEDURE — 74011000250 HC RX REV CODE- 250: Performed by: FAMILY MEDICINE

## 2022-10-25 PROCEDURE — 85025 COMPLETE CBC W/AUTO DIFF WBC: CPT

## 2022-10-25 PROCEDURE — 36415 COLL VENOUS BLD VENIPUNCTURE: CPT

## 2022-10-25 PROCEDURE — 97110 THERAPEUTIC EXERCISES: CPT

## 2022-10-25 PROCEDURE — 84100 ASSAY OF PHOSPHORUS: CPT

## 2022-10-25 PROCEDURE — 74011000258 HC RX REV CODE- 258: Performed by: FAMILY MEDICINE

## 2022-10-25 PROCEDURE — 74011250636 HC RX REV CODE- 250/636: Performed by: FAMILY MEDICINE

## 2022-10-25 PROCEDURE — 83735 ASSAY OF MAGNESIUM: CPT

## 2022-10-25 PROCEDURE — 74011250637 HC RX REV CODE- 250/637: Performed by: FAMILY MEDICINE

## 2022-10-25 PROCEDURE — 74011250637 HC RX REV CODE- 250/637: Performed by: NURSE PRACTITIONER

## 2022-10-25 PROCEDURE — 65270000029 HC RM PRIVATE

## 2022-10-25 RX ADMIN — FAMOTIDINE 20 MG: 20 TABLET ORAL at 22:04

## 2022-10-25 RX ADMIN — FAMOTIDINE 20 MG: 20 TABLET ORAL at 09:36

## 2022-10-25 RX ADMIN — PIPERACILLIN AND TAZOBACTAM 3.38 G: 3; .375 INJECTION, POWDER, FOR SOLUTION INTRAVENOUS at 14:38

## 2022-10-25 RX ADMIN — TAMSULOSIN HYDROCHLORIDE 0.4 MG: 0.4 CAPSULE ORAL at 09:36

## 2022-10-25 RX ADMIN — SODIUM CHLORIDE, PRESERVATIVE FREE 10 ML: 5 INJECTION INTRAVENOUS at 06:47

## 2022-10-25 RX ADMIN — GABAPENTIN 300 MG: 300 CAPSULE ORAL at 22:03

## 2022-10-25 RX ADMIN — APIXABAN 5 MG: 5 TABLET, FILM COATED ORAL at 09:36

## 2022-10-25 RX ADMIN — PIPERACILLIN AND TAZOBACTAM 3.38 G: 3; .375 INJECTION, POWDER, FOR SOLUTION INTRAVENOUS at 06:47

## 2022-10-25 RX ADMIN — DILTIAZEM HYDROCHLORIDE 180 MG: 180 CAPSULE, COATED, EXTENDED RELEASE ORAL at 09:36

## 2022-10-25 RX ADMIN — FINASTERIDE 5 MG: 5 TABLET, FILM COATED ORAL at 09:36

## 2022-10-25 RX ADMIN — METOPROLOL SUCCINATE 75 MG: 25 TABLET, FILM COATED, EXTENDED RELEASE ORAL at 09:35

## 2022-10-25 RX ADMIN — ATORVASTATIN CALCIUM 80 MG: 40 TABLET, FILM COATED ORAL at 22:04

## 2022-10-25 RX ADMIN — Medication: at 18:17

## 2022-10-25 RX ADMIN — Medication: at 09:36

## 2022-10-25 RX ADMIN — APIXABAN 5 MG: 5 TABLET, FILM COATED ORAL at 18:17

## 2022-10-25 RX ADMIN — ALLOPURINOL 100 MG: 100 TABLET ORAL at 09:36

## 2022-10-25 RX ADMIN — SODIUM CHLORIDE, PRESERVATIVE FREE 10 ML: 5 INJECTION INTRAVENOUS at 22:10

## 2022-10-25 NOTE — PROGRESS NOTES
[facility] [facility] Adult  Hospitalist Group                                                                                          Hospitalist Progress Note  Manuel Alexis PA-C  Answering service: [phone] from in house phone        Date of Service:  10/25/2022  NAME:  Starla Lal  :  1950  MRN:  [id number]      Admission Summary:   Starla Lal is a 71 y.o. 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. He discharged from [facility] on 10/18/22 but then presented to ED on 10/19/22 with no acute complaints. Work-up revealing c/f UTI with indwelling vazquez (Enterococcus and Pseudomonas, ID consulted, on Zosyn and completing 10/25) and incidentally COVID+ (Asx, rapid test+ 10/18, PCR+ 10/20). Working toward placement. Interval history / Subjective:   Patient reports feeling well at this time and with no questions or complaints. He is very motivated to get out of the hospital. Reviewed ongoing barriers with placement/insurance. 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 No indication for ASA, LDL Goal < 70  -- Was started on Eliquis 5mg BID on 22. Continued on admission  -- Continue Atorvastatin 80mg nightly. Recheck cholesterol panel as an outpatient  -- Needs to follow-up outpatient with Neurology  -- Completed [facility] on 10/18.  Needs LTC     COVID-  -- Incidental positive on rapid test 10/18 and PCR positive 10/20/22  -- PCT < 0.05  -- No respiratory distress or oxygen requirement     A-Fib  -- Cardiology consulted during previous admission  -- Admitted on Metoprolol Tartrate 37.5mg BID and Diltiazem IR 60mg q8h and converted to XL dosing on 10/20/22 of both medications. No issues  -- On Eliquis for stroke prevention     Urinary Retention with indwelling vazquez  UTI, Enterococcus and Pseudomonas  -- Continue Flomax  -- Urine Culture 10/18: Enterococcus and Pseudomonas   -- Abx: CTX (10/18-10/20) and Ampicillin (10/20). ID consulted 10/20 and recommended Zosyn x 5 (10/20-10/25)  -- Removed vazquez 10/19 and failed void trial. Vazquez replaced 10/20AM. Urology consulted and Proscar added. Will follow-up outpatient     Vitamin D Deficiency  -- 25(OH)D 27.8 on 10/19. Start Ergocalciferol weekly x 8 doses. R Shoulder subluxation  -- Appreciated by PT on 10/19  -- Xray R Shoulder 10/19 with no dislocation or abnormalities     Elevated D-dimer  -- D-dimer 3.80 on 10/19. No tachycardia/tachypnea/O2 requirement c/f PE. Likely elevated due to immobile state and COVID-19     Code status: Full code  Prophylaxis: Apixaban  Care Plan discussed with: Pt, RN, Care Coordinator  Anticipated Disposition: TBD. Medically ready for discharge. Will do every other day labs with medical stability     Hospital Problems  Date Reviewed: 10/12/2022            Codes Class Noted POA    COVID ICD-10-CM: U07.1  ICD-9-CM: 079.89  10/18/2022 Unknown             Review of Systems:   A comprehensive review of systems was negative except for that written in the HPI. Vital Signs:    Last 24hrs VS reviewed since prior progress note.  Most recent are:  Visit Vitals  /66   Pulse 77   Temp 97.6 °F (36.4 °C)   Resp 16   Wt 115.2 kg (253 lb 15.5 oz)   SpO2 97%   BMI 31.74 kg/m²         Intake/Output Summary (Last 24 hours) at 10/25/2022 1405  Last data filed at 10/25/2022 0446  Gross per 24 hour   Intake --   Output 1800 ml   Net -1800 ml        Physical Examination:     I had a face to face encounter with this patient and independently examined them on 10/25/2022 as outlined below:    Constitutional:  No acute distress, cooperative, pleasant    ENT:  Oral mucosa moist, oropharynx benign. Resp:  CTA bilaterally. No wheezing/rhonchi/rales. No accessory muscle use. CV:  Regular rhythm, normal rate, no murmurs appreciated    GI:  Soft, non distended, non tender. Musculoskeletal:  No edema, warm    Neurologic:  R hemiplegia with right facial asymmetry.  AAO x 3  : [place] with clear/yellow urine           Data Review:    Review and/or order of clinical lab test  Review and/or order of tests in the radiology section of CPT  Review and/or order of tests in the medicine section of CPT      Labs:     Recent Labs     10/25/22  0423   WBC 9.5   HGB 10.3*   HCT 32.0*        Recent Labs     10/25/22  0423   *   K 4.1      CO2 27   BUN 15   CREA 0.55*   *   CA 8.9   MG 2.0   PHOS 3.7     Recent Labs     10/25/22  0423   ALT 28   AP 90   TBILI 0.9   TP 7.0   ALB 2.2*   GLOB 4.8*     Lab Results   Component Value Date/Time    Glucose (POC) 123 (H) 10/24/2022 12:15 PM    Glucose (POC) 134 (H) 09/28/2022 11:46 AM    Glucose (POC) 116 09/28/2022 06:08 AM    Glucose (POC) 121 (H) 09/28/2022 12:34 AM    Glucose (POC) 124 (H) 09/27/2022 05:28 PM     Medications Reviewed:     Current Facility-Administered Medications   Medication Dose Route Frequency    finasteride (PROSCAR) tablet 5 mg  5 mg Oral DAILY    piperacillin-tazobactam (ZOSYN) 3.375 g in 0.9% sodium chloride (MBP/ADV) 100 mL MBP  3.375 g IntraVENous Q8H    melatonin tablet 3 mg  3 mg Oral QHS PRN    allopurinoL (ZYLOPRIM) tablet 100 mg  100 mg Oral DAILY    apixaban (ELIQUIS) tablet 5 mg  5 mg Oral BID    atorvastatin (LIPITOR) tablet 80 mg  80 mg Oral QHS    famotidine (PEPCID) tablet 20 mg  20 mg Oral Q12H    gabapentin (NEURONTIN) capsule 300 mg  300 mg Oral QHS    tamsulosin (FLOMAX) capsule 0.4 mg  0.4 mg Oral DAILY    ergocalciferol capsule 50,000 Units  50,000 Units Oral Q7D metoprolol succinate (TOPROL-XL) XL tablet 75 mg  75 mg Oral DAILY    dilTIAZem ER (CARDIZEM CD) capsule 180 mg  180 mg Oral DAILY    balsam peru-castor oiL (VENELEX) ointment   Topical BID    sodium chloride (NS) flush 5-40 mL  5-40 mL IntraVENous Q8H    sodium chloride (NS) flush 5-40 mL  5-40 mL IntraVENous PRN    acetaminophen (TYLENOL) tablet 650 mg  650 mg Oral Q6H PRN    Or    acetaminophen (TYLENOL) suppository 650 mg  650 mg Rectal Q6H PRN    ondansetron (ZOFRAN ODT) tablet 4 mg  4 mg Oral Q8H PRN    Or    ondansetron (ZOFRAN) injection 4 mg  4 mg IntraVENous Q6H PRN     ______________________________________________________________________  EXPECTED LENGTH OF STAY: 4d 0h  ACTUAL LENGTH OF STAY:          7                 Manuel Alexis PA-C

## 2022-10-25 NOTE — PROGRESS NOTES
LOREN:  1. RUR-17%  2. [facility] vs [facility] [place] started 10/24 with [facility]. 3. BLS transport. CM noted patient has been accepted at [facility], auth started,  patient is also accepted at [facility] [place]. UAI was submitted on 10/24 by . CM will continue to follow. Per attending team, patient is medically clear for discharged. Barrier is authorization. [id number]- CM spoke with patient and his sister, Mrs. Remington Elliott [phone] about [facility] acceptance and auth pending. They are both agreeable to this plan, and Mrs. Remington Elliott requested updates to her when available.      Shahla Rivera, [facility]

## 2022-10-25 NOTE — PROGRESS NOTES
Patient's vazquez insertion area showing white drainage. Cleansed area. Informed night nurse the need to CHG his vazquez tonight.

## 2022-10-25 NOTE — PROGRESS NOTES
Verbal shift change report given to Bill Evans RN (oncoming nurse) by Luh Orozco RN (offgoing nurse). Report included the following information SBAR.

## 2022-10-25 NOTE — PROGRESS NOTES
Problem: Mobility Impaired (Adult and Pediatric)  Goal: *Acute Goals and Plan of Care (Insert Text)  Description: FUNCTIONAL STATUS PRIOR TO ADMISSION:  Prior to 9/14/22, patient was independent and active without use of DME. He was admitted to acute care hosp 9/14/22-9/28/22 s/p acute CVA f/b 21 days of IPR. His insurance company denied additional IPR and SNF resulting in pt discharging home alone with home health services. Patient was not able to manage in his home alone, presented to the ED for assistance. Per IPR notes, at the time of discharge patient's functional mobility: Supv/ CGA sitting,  Max A sit<->stand, Mod A bed<->chair slide board transfer,  Non ambulatory, Max A to propel the WC 5 ft. HOME SUPPORT PRIOR TO ADMISSION: The patient lived alone and required no assistance prior to his CVA  ,  Physical Therapy Goals  Weekly re-assessment 10/25/2022  Goals remain appropriate   Initiated 10/18/2022  1. Patient will move from supine to sit and sit to supine in bed with minimal assistance within 7 day(s). 2.  Patient will transfer from bed to chair and chair to bed with minimum assistance  using the least restrictive device within 7 day(s). 3.  Patient will perform sit to stand with minimal assistance within 7 day(s). 4.  Patient will be supervision sitting EOB 15 mins within 7 days. Outcome: Progressing Towards Goal   PHYSICAL THERAPY TREATMENT: WEEKLY REASSESSMENT  Patient: Brad Scott (2 y.o. male)  Date: 10/25/2022  Primary Diagnosis: COVID [U07.1]       Precautions:   Fall      ASSESSMENT  Patient continues with skilled PT services and is progressing towards goals. Patient maintains very motivated but frustrated with lack of sleep. Continues to demonstrate improvement in sitting balance, attending to RUE, and was able to transfer to chair today. Continues with significant decreased  motor control of R side, standing balance and non ambulatory.   Limited standing balance with having difficulty getting to full upright posture. Continue to recommend IPR for maximum chance to improve functional mobility and decrease caregiver burden. Patient highly motiviated. Patient's progression toward goals since last assessment: sitting balance; progressing to standing, transfers    Current Level of Function Impacting Discharge (mobility/balance): mod to max assist of 1-2           PLAN :  Goals have been updated based on progression since last assessment. Patient continues to benefit from skilled intervention to address the above impairments. Recommendations and Planned Interventions: bed mobility training, transfer training, gait training, therapeutic exercises, neuromuscular re-education, patient and family training/education, and therapeutic activities      Frequency/Duration: Patient will be followed by physical therapy:  5 times a week to address goals.     Recommendation for discharge: (in order for the patient to meet his/her long term goals)  Therapy 3 hours per day 5-7 days per week    This discharge recommendation:  Has been made in collaboration with the attending provider and/or case management    IF patient discharges home will need the following DME: bedside commode, gait belt, hospital bed, transfer bench, and wheelchair         SUBJECTIVE:   Patient stated .    OBJECTIVE DATA SUMMARY:   HISTORY:    Past Medical History:   Diagnosis Date    Arrhythmia     atrial fib    Depression 4/24/2010    Diabetes ([facility] [address]     diet control for pre-diabetes    Dyslipidemia, goal LDL below 100 6/14/2011    Gout     HTN (hypertension) 4/24/2010    Impotence 4/24/2010    Morbid obesity ([address] 5/17/2010    VALENTE (obstructive sleep apnea) 4/24/2010    CPAP    Restless legs 4/15/2015     Past Surgical History:   Procedure Laterality Date    COLONOSCOPY N/A 6/27/2017    COLONOSCOPY performed by Keon Beckman MD at [facility], DIAGNOSTIC  2007    HX APPENDECTOMY      HX ORTHOPAEDIC  2000    bilat TKR     HX ORTHOPAEDIC  2010    left THR    HX UROLOGICAL  03/2018    urolift    HX UROLOGICAL  03/2019    prosthesis       Personal factors and/or comorbidities impacting plan of care:     Home Situation  Home Environment: Private residence  # Steps to Enter: 5  Rails to Enter: Yes  One/Two Story Residence: One story  Living Alone: Yes  Support Systems: Child(alexandru)  Patient Expects to be Discharged to[de-identified] Rehab hospital/unit acute  Current DME Used/Available at Home: None  Tub or Shower Type: Tub/Shower combination    EXAMINATION/PRESENTATION/DECISION MAKING:   Critical Behavior:  Neurologic State: Alert  Orientation Level: Oriented X4  Cognition: Appropriate for age attention/concentration, Follows commands  Safety/Judgement: Awareness of environment, Fall prevention, Insight into deficits  Hearing: Auditory  Auditory Impairment: Hard of hearing, bilateral                Functional Mobility:  Bed Mobility:     Supine to Sit: Moderate assistance;Assist x2     Scooting: Minimum assistance  Transfers:  Sit to Stand: Maximum assistance;Minimum assistance;Assist x2; Additional time  Stand to Sit: Minimum assistance;Maximum assistance; Additional time;Assist x2        Bed to Chair: Minimum assistance;Maximum assistance; Additional time;Assist x2              Balance:   Sitting: Intact  Sitting - Static: Good (unsupported)  Sitting - Dynamic: Good (unsupported)  Standing: Impaired; With support  Standing - Static: Constant support;Poor  Standing - Dynamic : Constant support;Poor    Therapeutic Exercises:   Knee extension, hip flexion      Activity Tolerance:   Fair and SpO2 stable on RA    After treatment patient left in no apparent distress:   Sitting in chair and Call bell within reach    COMMUNICATION/EDUCATION:   The patients plan of care was discussed with: Occupational therapist and Registered nurse.      Fall prevention education was provided and the patient/caregiver indicated understanding., Patient/family have participated as able in goal setting and plan of care. , and Patient/family agree to work toward stated goals and plan of care.     Thank you for this referral.  Robin Mcleod, PT   Time Calculation: 39 mins

## 2022-10-25 NOTE — PROGRESS NOTES
Physical Therapy  Returned to assist back to bed. Patient sat up in chair ~4 hours. Transfer back to bed going to right side, stand pivot with mod/max assist of 2. Sit to supine to right with mod assist x2. Assisted for positioning and elevation of R UE on pillows. All needs met. Patient remains motivated. Carmen Clark, PT  Total time 15 minutes.

## 2022-10-25 NOTE — PROGRESS NOTES
Problem: Self Care Deficits Care Plan (Adult)  Goal: *Acute Goals and Plan of Care (Insert Text)  Description: FUNCTIONAL STATUS PRIOR TO ADMISSION: At baseline pt was highly independent, active, and not using DME. Per pt and friends at bedside, pt owns several acres and frequently works outside, is a , and enjoys renovating [employer]. Immediately PTA, pt was at [facility] for OT/PT services to address deficits from L MCA stroke. Pt was d/c 10/17/22 and within 1 hour of being home was admitted to [facility] ED.     HOME SUPPORT: The patient lived alone with limited local support. Occupational Therapy Goals  Initiated 10/18/2022, weekly re-assessment 10/25/2022  1. Patient will perform seated grooming task with moderate assistance within 7 day(s). MET - upgrade to set-up  2. Patient will perform seated upper body dressing, using compensatory strategies, with moderate assistance within 7 day(s). Continue  3. Patient will perform BSC transfers with moderate assistance within 7 day(s). Continue  4. Patient will perform all aspects of toileting with moderate assistance within 7 day(s). Continue  5. Patient will participate in upper extremity therapeutic exercise/activities with supervision/set-up within 7 day(s). Continue  6. Patient will improve their Fugl Campbell score by 5 points in prep for ADLs within 7 days. Outcome: Progressing Towards Goal    OCCUPATIONAL THERAPY TREATMENT/WEEKLY RE-ASSESSMENT  Patient: Mignon Mast (5 y.o. male)  Date: 10/25/2022  Diagnosis: COVID [U07.1] <principal problem not specified>      Precautions: Fall  Chart, occupational therapy assessment, plan of care, and goals were reviewed. ASSESSMENT  Patient continues with skilled OT services and is progressing towards goals with 1/6 achieved this week. Patient ADLs continue to be limited by impaired balance, R hemiparesis, decreased functional activity tolerance, limited lower body access and generalized weakness.  Patient appearing down today, reports feeling frustrated about not getting to rehab faster. Patient completed bed mobility with mod A x2 to come to sitting EOB with HOB elevated and rails used. Once sitting, patient required total A to kristin socks. Patient completed sit to stand using geena walker in prep for transfer with mod-max A x2 - noted patient incontinent of bowel. Patient required max A to stand and total A for bowel hygiene. Patient with max A x1 and min A x1 to stand and transfer to chair. Once in chair, patient with setup to wash face and brush teeth using LUE. Patient left in chair with call bell in reach, RN aware, all needs met. Will continue to follow, continue to recommend IPR once cleared as patient is highly motivated to participate, is well below baseline, and lives alone. Current Level of Function Impacting Discharge (ADLs): setup-mod A for upper body ADLs, max-total A for lower body, A x2 for mobility    Other factors to consider for discharge: lives alone, excellent candidate         PLAN :  Goals have been updated based on progression since last assessment. Patient continues to benefit from skilled intervention to address the above impairments. Continue to follow patient 5 times a week to address goals. Recommend with staff: Recommend with nursing, ADLs with supervision/setup, OOB to chair 2x/day via mechanical lift and toileting via bedpan. Thank you for completing as able in order to maintain patient strength, endurance and independence.        Recommend next OT session: BSC transfer and toileting on [address]    Recommendation for discharge: (in order for the patient to meet his/her long term goals)  Therapy 3 hours per day 5-7 days per week    This discharge recommendation:  Has been made in collaboration with the attending provider and/or case management    IF patient discharges home will need the following DME: bedside commode, hospital bed, mechanical lift, and wheelchair and skilled assist x2 for ADLs and mobility       SUBJECTIVE:   Patient stated Arabella Goyal keep telling me I'm going to leave but I'm still here.     OBJECTIVE DATA SUMMARY:   Cognitive/Behavioral Status:  Neurologic State: Alert  Orientation Level: Oriented X4  Cognition: Appropriate for age attention/concentration; Follows commands  Perception: Cues to attend to right side of body;Cues to maintain midline in standing; Tactile;Verbal;Visual  Perseveration: No perseveration noted  Safety/Judgement: Awareness of environment; Fall prevention; Insight into deficits    Functional Mobility and Transfers for ADLs:  Bed Mobility:  Supine to Sit: Moderate assistance;Assist x2  Scooting: Minimum assistance    Transfers:  Sit to Stand: Maximum assistance;Minimum assistance;Assist x2; Additional time  Bed to Chair: Minimum assistance;Maximum assistance; Additional time;Assist x2    Balance:  Sitting: Intact  Sitting - Static: Good (unsupported)  Sitting - Dynamic: Good (unsupported)  Standing: Impaired; With support  Standing - Static: Constant support;Poor  Standing - Dynamic : Constant support;Poor    ADL Intervention:     Grooming  Washing Face: Set-up (using LUE)  Brushing Teeth: Set-up (using LUE)  Cues: Verbal cues provided    Lower Body Dressing Assistance  Socks: Total assistance (dependent)  Leg Crossed Method Used: No  Position Performed: Supine    Toileting  Bowel Hygiene: Total assistance (dependent)    Cognitive Retraining  Safety/Judgement: Awareness of environment; Fall prevention; Insight into deficits    Pain:  Reporting no pain    Activity Tolerance:   Good    After treatment patient left in no apparent distress:   Sitting in chair, Call bell within reach, and RN aware    COMMUNICATION/COLLABORATION:   The patients plan of care was discussed with: Physical therapist and Registered nurse.      Luis Manuel Fernandez OT  Time Calculation: 42 mins

## 2022-10-26 PROCEDURE — 74011250637 HC RX REV CODE- 250/637: Performed by: NURSE PRACTITIONER

## 2022-10-26 PROCEDURE — 74011250637 HC RX REV CODE- 250/637: Performed by: PHYSICIAN ASSISTANT

## 2022-10-26 PROCEDURE — 97530 THERAPEUTIC ACTIVITIES: CPT

## 2022-10-26 PROCEDURE — 97110 THERAPEUTIC EXERCISES: CPT

## 2022-10-26 PROCEDURE — 74011250637 HC RX REV CODE- 250/637: Performed by: FAMILY MEDICINE

## 2022-10-26 PROCEDURE — 65270000029 HC RM PRIVATE

## 2022-10-26 PROCEDURE — 74011000250 HC RX REV CODE- 250: Performed by: FAMILY MEDICINE

## 2022-10-26 RX ORDER — TRAZODONE HYDROCHLORIDE 50 MG/1
50 TABLET ORAL
Status: DISCONTINUED | OUTPATIENT
Start: 2022-10-26 | End: 2022-10-27

## 2022-10-26 RX ADMIN — SODIUM CHLORIDE, PRESERVATIVE FREE 10 ML: 5 INJECTION INTRAVENOUS at 22:35

## 2022-10-26 RX ADMIN — GABAPENTIN 300 MG: 300 CAPSULE ORAL at 22:32

## 2022-10-26 RX ADMIN — ACETAMINOPHEN 650 MG: 325 TABLET ORAL at 06:02

## 2022-10-26 RX ADMIN — FAMOTIDINE 20 MG: 20 TABLET ORAL at 22:32

## 2022-10-26 RX ADMIN — ATORVASTATIN CALCIUM 80 MG: 40 TABLET, FILM COATED ORAL at 22:32

## 2022-10-26 RX ADMIN — DILTIAZEM HYDROCHLORIDE 180 MG: 180 CAPSULE, COATED, EXTENDED RELEASE ORAL at 10:09

## 2022-10-26 RX ADMIN — FAMOTIDINE 20 MG: 20 TABLET ORAL at 10:09

## 2022-10-26 RX ADMIN — Medication 3 MG: at 23:09

## 2022-10-26 RX ADMIN — Medication: at 10:11

## 2022-10-26 RX ADMIN — APIXABAN 5 MG: 5 TABLET, FILM COATED ORAL at 10:09

## 2022-10-26 RX ADMIN — FINASTERIDE 5 MG: 5 TABLET, FILM COATED ORAL at 10:09

## 2022-10-26 RX ADMIN — Medication: at 17:59

## 2022-10-26 RX ADMIN — METOPROLOL SUCCINATE 75 MG: 25 TABLET, FILM COATED, EXTENDED RELEASE ORAL at 10:09

## 2022-10-26 RX ADMIN — ALLOPURINOL 100 MG: 100 TABLET ORAL at 10:10

## 2022-10-26 RX ADMIN — TAMSULOSIN HYDROCHLORIDE 0.4 MG: 0.4 CAPSULE ORAL at 10:09

## 2022-10-26 RX ADMIN — APIXABAN 5 MG: 5 TABLET, FILM COATED ORAL at 17:59

## 2022-10-26 NOTE — PROGRESS NOTES
Comprehensive Nutrition Assessment    Type and Reason for Visit: Initial    Nutrition Recommendations/Plan:   Continue regular diet  Added ensure enlive BID  Please obtain updated weight       Malnutrition Assessment:  Malnutrition Status:  Insufficient data (10/26/22 1123)           Nutrition Assessment:    Past Medical History:   Diagnosis Date    Arrhythmia     atrial fib    Depression 4/24/2010    Diabetes ([facility]     diet control for pre-diabetes    Dyslipidemia, goal LDL below 100 6/14/2011    Gout     HTN (hypertension) 4/24/2010    Impotence 4/24/2010    Morbid obesity ([facility] 5/17/2010    VALENTE (obstructive sleep apnea) 4/24/2010    CPAP    Restless legs 4/15/2015     71 y.o. male admitted with UTI, COVID19. Neuro following for hx of L MCA infarct with hemorrhagic conversion with baseline R hemiplegia which pt was admitted for last month. Plans for OP follow up with neuro. Noted pt is medically stable and awaiting negative COVID test for SNF placement. Pt screened for LOS- spoke with pt via telephone d/t COVID19 precautions. Pt stated he's been eating >75% of his meals since admission which meal intake records confirm. He denied hunger between meals, denied any changes in appetite. When asked about recent changes in wt he stated, \"I sure hope I've lost some\" but was unsure if he actually had and unsure of UBW. Noted in EMR wt history that pt appears to have lost 37# x 3 weeks- ?accuracy as this is a big drop in a short time frame. He was unable to give me any food preferences but was agreeable to chocolate ensure. Will ask for updated wt, ensure BID. , Na 133 yesterday.     Recent Labs     10/25/22  0423   *   BUN 15   CREA 0.55*   *   K 4.1      CO2 27   CA 8.9   PHOS 3.7   MG 2.0       Recent Labs     10/24/22  1215   GLUCPOC 123*       Lab Results   Component Value Date/Time    Hemoglobin A1c 5.8 (H) 09/15/2022 02:40 AM    Hemoglobin A1c 6.2 (H) 02/21/2022 03:51 PM    Hemoglobin A1c 6.3 (H) 02/07/2020 12:00 PM    Hemoglobin A1c (POC) 6.0 06/24/2021 08:47 AM         Nutritionally Significant Medications:  Vit D2, pepcid, flomax, zosyn      Estimated Daily Nutrient Needs:  Energy Requirements Based On: Formula  Weight Used for Energy Requirements: Current  Energy (kcal/day): 2190 (MSJx1.1)  Weight Used for Protein Requirements: Current  Protein (g/day): 115 (1 g/kg)     Fluid (ml/day): 1 ml/kcal    Nutrition Related Findings:   Edema: LLE: 2+; Pitting (10/26/2022 10:13 AM)  LUE: No Edema (10/26/2022 10:13 AM)  RLE: 2+; Pitting (10/26/2022 10:13 AM)  RUE: No Edema (10/26/2022 10:13 AM)    Last BM: 10/25/22, Soft    Wounds: None      Current Nutrition Therapies:  Diet: regular  Supplements: none  Meal intake: Patient Vitals for the past 168 hrs:   % Diet Eaten   10/26/22 1013 76 - 100%   10/23/22 1829 51 - 75%   10/22/22 1157 51 - 75%   10/20/22 1543 76 - 100%   10/20/22 1131 51 - 75%     Supplement intake: No data found. Nutrition Support: none      Anthropometric Measures:  Height: 6' 3\" (190.5 cm)  Ideal Body Weight (IBW): 196 lbs (89 kg)     Current Body Wt:  115.2 kg (253 lb 15.5 oz), 129.6 % IBW. Bed scale  Current BMI (kg/m2): 31.7  Usual Body Weight: 131.5 kg (290 lb)  % Weight Change (Calculated): -12.4  Weight Adjustment: No adjustment        BMI Category: Obese class 1 (BMI 30.0-34. 9)    Wt Readings from Last 10 Encounters:   10/23/22 115.2 kg (253 lb 15.5 oz)   09/28/22 131.7 kg (290 lb 5.5 oz)   07/28/22 131.5 kg (290 lb)   06/14/22 134.3 kg (296 lb)   04/18/22 142.2 kg (313 lb 9.6 oz)   02/21/22 139.7 kg (308 lb)   02/10/22 138.8 kg (306 lb)   11/14/21 143 kg (315 lb 4.1 oz)   08/23/21 144.2 kg (318 lb)   07/22/21 143.6 kg (316 lb 9.3 oz)           Nutrition Diagnosis:   Predicted inadequate energy intake related to  (?etiology) as evidenced by weight loss    Nutrition Interventions:   Food and/or Nutrient Delivery: Continue current diet, Start oral nutrition supplement  Nutrition Education/Counseling: No recommendations at this time  Coordination of Nutrition Care: Continue to monitor while inpatient       Goals:     Goals: PO intake 50% or greater, by next RD assessment       Nutrition Monitoring and Evaluation:   Behavioral-Environmental Outcomes: None identified  Food/Nutrient Intake Outcomes: Food and nutrient intake, Supplement intake  Physical Signs/Symptoms Outcomes: Biochemical data, Meal time behavior, Weight    Discharge Planning:    Continue oral nutrition supplement    Edith Ivory RD  Available via [address] [place] [address]

## 2022-10-26 NOTE — PROGRESS NOTES
Problem: Mobility Impaired (Adult and Pediatric)  Goal: *Acute Goals and Plan of Care (Insert Text)  Description: FUNCTIONAL STATUS PRIOR TO ADMISSION:  Prior to 9/14/22, patient was independent and active without use of DME. He was admitted to acute care hosp 9/14/22-9/28/22 s/p acute CVA f/b 21 days of IPR. His insurance company denied additional IPR and SNF resulting in pt discharging home alone with home health services. Patient was not able to manage in his home alone, presented to the ED for assistance. Per IPR notes, at the time of discharge patient's functional mobility: Supv/ CGA sitting,  Max A sit<->stand, Mod A bed<->chair slide board transfer,  Non ambulatory, Max A to propel the WC 5 ft. HOME SUPPORT PRIOR TO ADMISSION: The patient lived alone and required no assistance prior to his CVA  ,  Physical Therapy Goals  Weekly re-assessment 10/25/2022  Goals remain appropriate   Initiated 10/18/2022  1. Patient will move from supine to sit and sit to supine in bed with minimal assistance within 7 day(s). 2.  Patient will transfer from bed to chair and chair to bed with minimum assistance  using the least restrictive device within 7 day(s). 3.  Patient will perform sit to stand with minimal assistance within 7 day(s). 4.  Patient will be supervision sitting EOB 15 mins within 7 days. Outcome: Progressing Towards Goal   PHYSICAL THERAPY TREATMENT  Patient: Mignon Mast (26 y.o. male)  Date: 10/26/2022  Diagnosis: COVID [U07.1] <principal problem not specified>      Precautions: Fall  Chart, physical therapy assessment, plan of care and goals were reviewed. ASSESSMENT  Patient continues with skilled PT services and is progressing towards goals. Patient received in bed and most agreeable to therapy. Continues to report lack of sleep which is impacting mobility, endurance. PA in room during session and to order additional sleep med.   Patient is moving better today with less assist to get to EOB. In standing, demonstrating difficulty extending L knee vs R and requiring max assist to get into extension. Stood three different times during first session and transferred to chair. Returned to bed with max assist of one. Continues with significant increased tone in RLE with some spasms noted. .     Current Level of Function Impacting Discharge (mobility/balance): mod to max assist of 1-2    Other factors to consider for discharge:        PLAN :  Patient continues to benefit from skilled intervention to address the above impairments. Continue treatment per established plan of care. to address goals. Recommendation for discharge: (in order for the patient to meet his/her long term goals)  Therapy 3 hours per day 5-7 days per week; if unable to go to [facility], SNF for rehab to maximize mobility and decrease burden of care for caregivers    This discharge recommendation:  Has been made in collaboration with the attending provider and/or case management    IF patient discharges home will need the following DME: bedside commode, gait belt, transfer bench, and wheelchair       SUBJECTIVE:   Patient stated Do I look as happy as I did yesterday? Albin Hurtado    OBJECTIVE DATA SUMMARY:   Critical Behavior:  Neurologic State: Alert, Appropriate for age  Orientation Level: Oriented X4  Cognition: Follows commands  Safety/Judgement: Awareness of environment  Functional Mobility Training:  Bed Mobility:     Supine to Sit: Minimum assistance;Assist x2  Sit to Supine: Moderate assistance;Assist x2  Scooting: Minimum assistance; Additional time        Transfers:  Sit to Stand: Maximum assistance  Stand to Sit: Moderate assistance        Bed to Chair: Moderate assistance;Assist x2                    Balance:  Sitting: Intact; Without support  Sitting - Static: Good (unsupported)  Sitting - Dynamic: Good (unsupported)  Standing: Impaired; With support  Standing - Static: Constant support; Fair  Standing - Dynamic : Constant support;Poor                 Therapeutic Exercises: Active assist heel slides, hip abd/add    Activity Tolerance:   Fair    After treatment patient left in no apparent distress:   Supine in bed, Heels elevated for pressure relief, Call bell within reach, and Side rails x 3    COMMUNICATION/COLLABORATION:   The patients plan of care was discussed with: Occupational therapist and Registered nurse.      Janet Llamas, PT   Time Calculation: 14 mins

## 2022-10-26 NOTE — PROGRESS NOTES
[address] [facility]  Hospitalist Group                                                                                          Hospitalist Progress Note  Chana [place]w [place]  Answering service: [phone] from in house phone        Date of Service:  10/26/2022  NAME:  Ethan Masterson  :  1950  MRN:  [id number]      Admission Summary:   Ethan Masterson is a 71 y.o. male with recent admission from - where he was admitted for acute L MCA CVA with hemorrhagic conversion with hospitalization complicated by urinary retention (discharged with Vazquez) and A-Fib with RVR (on Metop and Diltiazem) who was discharged to [facility]. He discharged from [facility] on 10/18/22 but then presented to ED on 10/19/22 with no acute complaints. Work-up revealing c/f UTI with indwelling vazquez (Enterococcus and Pseudomonas, ID consulted, on Zosyn and completing 10/25) and incidentally COVID+ (Asx, rapid test+ 10/18, PCR+ 10/20). Awaiting insurance auth for facility placement       Interval history / Subjective:   Saw the patient on rounds this morning, he was working with PT. The physical therapist raised concerns that the patient was suffering from insomnia and the patient endorses he has not slept in 6 nights. Patient otherwise reports no complaints and had no questions     Assessment & Plan:     Hx of L MCA Infarct with hemorrhagic conversion with baseline R hemiplegia  HLD  -- Admitted from - for issue  -- Neurology from previous admission (): Recommended repeat Head CT in 2 weeks (unclear if done) and if no new hemorrhage start Eliquis. No indication for ASA, LDL Goal < 70  -- Was started on Eliquis 5mg BID on 22. Continued on admission  -- Continue Atorvastatin 80mg nightly. Recheck cholesterol panel as an outpatient  -- Needs to follow-up outpatient with Neurology  -- Completed [facility] on 10/18.  Needs LTC     COVID-19  -- Incidental positive on rapid test 10/18 and PCR positive 10/20/22  -- PCT < 0.05  -- No respiratory distress or oxygen requirement     A-Fib  -- Cardiology consulted during previous admission  -- Admitted on Metoprolol Tartrate 37.5mg BID and Diltiazem IR 60mg q8h and converted to XL dosing on 10/20/22 of both medications. No issues  -- On Eliquis for stroke prevention     Urinary Retention with indwelling vazquez  UTI, Enterococcus and Pseudomonas  -- Continue Flomax  -- Urine Culture 10/18: Enterococcus and Pseudomonas   -- Abx: CTX (10/18-10/20) and Ampicillin (10/20). ID consulted 10/20 and recommended Zosyn x 5 (10/20-10/25)  -- Removed vazquez 10/19 and failed void trial. Vazquez replaced 10/20AM. Urology consulted and Proscar added. Will follow-up outpatient     Vitamin D Deficiency  -- 25(OH)D 27.8 on 10/19.   - Ergocalciferol weekly x 8 doses. R Shoulder subluxation  -- Appreciated by PT on 10/19  -- Xray R Shoulder 10/19 with no dislocation or abnormalities     Elevated D-dimer  -- D-dimer 3.80 on 10/19. No tachycardia/tachypnea/O2 requirement c/f PE. Likely elevated due to immobile state and COVID-19     Insomnia  - PRN melatonin  - Added PRN trazodone    Code status: Full code  Prophylaxis: Apixaban  Care Plan discussed with: Pt, care coordinator  Anticipated Disposition: Per CM notes, pt has been accepted to [facility] but is awaiting auth. Medically ready for discharge. Will do every other day labs with medical stability     Hospital Problems  Date Reviewed: 10/12/2022            Codes Class Noted POA    COVID ICD-10-CM: U07.1  ICD-9-CM: 079.89  10/18/2022 Unknown             Review of Systems:   A comprehensive review of systems was negative except for that written in the HPI. Vital Signs:    Last 24hrs VS reviewed since prior progress note.  Most recent are:  Visit Vitals  /77 (BP 1 Location: Left upper arm, BP Patient Position: At rest)   Pulse 79   Temp 97.3 °F (36.3 °C)   Resp 18   Ht 6' 3\" (1.905 m)   Wt 115.2 kg (253 lb 15.5 oz)   SpO2 99% BMI 31.74 kg/m²         Intake/Output Summary (Last 24 hours) at 10/26/2022 0949  Last data filed at 10/26/2022 0530  Gross per 24 hour   Intake 120 ml   Output 2190 ml   Net -2070 ml        Physical Examination:     I had a face to face encounter with this patient and independently examined them on 10/26/2022 as outlined below:          Constitutional:  No acute distress, cooperative, pleasant    ENT:  Oral mucosa moist, oropharynx benign. Resp:  CTA bilaterally. No wheezing/rhonchi/rales. No accessory muscle use. CV:  Regular rhythm, normal rate, no murmurs, gallops, rubs    GI:    :  Soft, non distended, non tender. normoactive bowel sounds, no hepatosplenomegaly    [place] in place, bag with yellow/clear urine output    Musculoskeletal:  No edema, warm, RUE in sling    Neurologic:  Ride sided hemiplegia, right sided facial asymmetry, diminished sensation and 0/5 strength against gravity in RUE and RLE, strength 5/5 in LUE and LLE. Sensation grossly intact in RLE. AAOx3            Data Review:    Review and/or order of clinical lab test  Review and/or order of tests in the radiology section of CPT  Review and/or order of tests in the medicine section of CPT      Labs:     Recent Labs     10/25/22  0423   WBC 9.5   HGB 10.3*   HCT 32.0*        Recent Labs     10/25/22  0423   *   K 4.1      CO2 27   BUN 15   CREA 0.55*   *   CA 8.9   MG 2.0   PHOS 3.7     Recent Labs     10/25/22  0423   ALT 28   AP 90   TBILI 0.9   TP 7.0   ALB 2.2*   GLOB 4.8*     No results for input(s): INR, PTP, APTT, INREXT in the last 72 hours. No results for input(s): FE, TIBC, PSAT, FERR in the last 72 hours. No results found for: FOL, RBCF   No results for input(s): PH, PCO2, PO2 in the last 72 hours. No results for input(s): CPK, CKNDX, TROIQ in the last 72 hours.     No lab exists for component: CPKMB  Lab Results   Component Value Date/Time    Cholesterol, total 146 09/15/2022 02:40 AM    HDL Cholesterol 53 09/15/2022 02:40 AM    LDL, calculated 65 09/15/2022 02:40 AM    Triglyceride 140 09/15/2022 02:40 AM    CHOL/HDL Ratio 2.8 09/15/2022 02:40 AM     Lab Results   Component Value Date/Time    Glucose (POC) 123 (H) 10/24/2022 12:15 PM    Glucose (POC) 134 (H) 09/28/2022 11:46 AM    Glucose (POC) 116 09/28/2022 06:08 AM    Glucose (POC) 121 (H) 09/28/2022 12:34 AM    Glucose (POC) 124 (H) 09/27/2022 05:28 PM     Lab Results   Component Value Date/Time    Color YELLOW/STRAW 10/18/2022 01:01 AM    Appearance TURBID (A) 10/18/2022 01:01 AM    Specific gravity 1.013 10/18/2022 01:01 AM    pH (UA) 6.5 10/18/2022 01:01 AM    Protein TRACE (A) 10/18/2022 01:01 AM    Glucose Negative 10/18/2022 01:01 AM    Ketone Negative 10/18/2022 01:01 AM    Bilirubin Negative 10/18/2022 01:01 AM    Urobilinogen 4.0 (H) 10/18/2022 01:01 AM    Nitrites Positive (A) 10/18/2022 01:01 AM    Leukocyte Esterase MODERATE (A) 10/18/2022 01:01 AM    Epithelial cells FEW 10/18/2022 01:01 AM    Bacteria 4+ (A) 10/18/2022 01:01 AM    WBC 20-50 10/18/2022 01:01 AM    RBC 0-5 10/18/2022 01:01 AM         Medications Reviewed:     Current Facility-Administered Medications   Medication Dose Route Frequency    finasteride (PROSCAR) tablet 5 mg  5 mg Oral DAILY    melatonin tablet 3 mg  3 mg Oral QHS PRN    allopurinoL (ZYLOPRIM) tablet 100 mg  100 mg Oral DAILY    apixaban (ELIQUIS) tablet 5 mg  5 mg Oral BID    atorvastatin (LIPITOR) tablet 80 mg  80 mg Oral QHS    famotidine (PEPCID) tablet 20 mg  20 mg Oral Q12H    gabapentin (NEURONTIN) capsule 300 mg  300 mg Oral QHS    tamsulosin (FLOMAX) capsule 0.4 mg  0.4 mg Oral DAILY    ergocalciferol capsule 50,000 Units  50,000 Units Oral Q7D    metoprolol succinate (TOPROL-XL) XL tablet 75 mg  75 mg Oral DAILY    dilTIAZem ER (CARDIZEM CD) capsule 180 mg  180 mg Oral DAILY    balsam peru-castor oiL (VENELEX) ointment   Topical BID    sodium chloride (NS) flush 5-40 mL  5-40 mL IntraVENous Q8H sodium chloride (NS) flush 5-40 mL  5-40 mL IntraVENous PRN    acetaminophen (TYLENOL) tablet 650 mg  650 mg Oral Q6H PRN    Or    acetaminophen (TYLENOL) suppository 650 mg  650 mg Rectal Q6H PRN    ondansetron (ZOFRAN ODT) tablet 4 mg  4 mg Oral Q8H PRN    Or    ondansetron (ZOFRAN) injection 4 mg  4 mg IntraVENous Q6H PRN     ______________________________________________________________________  EXPECTED LENGTH OF STAY: 4d 0h  ACTUAL LENGTH OF STAY:          8                 Kenna Gula Milligram, PA-C

## 2022-10-26 NOTE — PROGRESS NOTES
Problem: Self Care Deficits Care Plan (Adult)  Goal: *Acute Goals and Plan of Care (Insert Text)  Description: FUNCTIONAL STATUS PRIOR TO ADMISSION: At baseline pt was highly independent, active, and not using DME. Per pt and friends at bedside, pt owns several acres and frequently works outside, is a , and enjoys renovating [employer]. Immediately PTA, pt was at [facility] for OT/PT services to address deficits from L MCA stroke. Pt was d/c 10/17/22 and within 1 hour of being home was admitted to [facility] ED.     HOME SUPPORT: The patient lived alone with limited local support. Occupational Therapy Goals  Initiated 10/18/2022, weekly re-assessment 10/25/2022  1. Patient will perform seated grooming task with moderate assistance within 7 day(s). MET - upgrade to set-up  2. Patient will perform seated upper body dressing, using compensatory strategies, with moderate assistance within 7 day(s). Continue  3. Patient will perform BSC transfers with moderate assistance within 7 day(s). Continue  4. Patient will perform all aspects of toileting with moderate assistance within 7 day(s). Continue  5. Patient will participate in upper extremity therapeutic exercise/activities with supervision/set-up within 7 day(s). Continue  6. Patient will improve their Fugl Campbell score by 5 points in prep for ADLs within 7 days. Outcome: Progressing Towards Goal     OCCUPATIONAL THERAPY TREATMENT  Patient: Brad Scott (63 y.o. male)  Date: 10/26/2022  Diagnosis: COVID [U07.1] <principal problem not specified>      Precautions: Fall  Chart, occupational therapy assessment, plan of care, and goals were reviewed. ASSESSMENT  Patient continues with skilled OT services and is progressing towards goals. Patient is limited by non-functional RUE/RLE, generalized weakness, decreased endurance, lack of sleep, and decreased sensation.  He reports he has not slept in 6 days and \"this is a lot of work\" at the end of OT/PT session. Patient continues to require max A x2 for functional transfers initially, then progressing to min A x2 with max multimodal cues for geena walker use in static standing. He tolerated standing with max A x1 for 2 minutes for OT to provide total A for bowel hygiene and linen change. Declined additional ADLs at this time as he is fatigue and requesting a rest break. Plan of care impacted by emotional health, social support, and dispo planning. He would benefit from continued skilled OT intervention to maximize independence and restore PLOF. Current Level of Function Impacting Discharge (ADLs): total A - set up     Other factors to consider for discharge: severity of deficits, high fall risk, lives alone         PLAN :  Patient continues to benefit from skilled intervention to address the above impairments. Continue treatment per established plan of care to address goals. Recommend with staff: bed to chair position for meals    Recommend next OT session: functional transfers OOB, ADLs, therex, RUE positioning    Recommendation for discharge: (in order for the patient to meet his/her long term goals)  Therapy 3 hours per day 5-7 days per week    This discharge recommendation:  Has been made in collaboration with the attending provider and/or case management    IF patient discharges home will need the following DME: TBD       SUBJECTIVE:   Patient stated I don't want to do anything else right now. I'm tired from all that work.     OBJECTIVE DATA SUMMARY:   Cognitive/Behavioral Status:  Neurologic State: Alert; Appropriate for age  Orientation Level: Oriented X4  Cognition: Follows commands  Perception: Cues to attend to right side of body;Cues to maintain midline in standing  Perseveration: No perseveration noted  Safety/Judgement: Awareness of environment    Functional Mobility and Transfers for ADLs:  Bed Mobility:  Supine to Sit: Minimum assistance;Assist x2  Scooting: Minimum assistance; Additional time    Transfers:  Sit to Stand: Maximum assistance  Bed to Chair: Moderate assistance;Assist x2    Balance:  Sitting: Intact; Without support  Sitting - Static: Good (unsupported)  Sitting - Dynamic: Good (unsupported)  Standing: Impaired; With support  Standing - Static: Constant support; Fair  Standing - Dynamic : Constant support;Poor    ADL Intervention:  Feeding  Utensil Management: Modified independent  Food to Mouth: Modified independent  Drink to Mouth: Modified independent    Toileting  Toileting Assistance: Total assistance(dependent)  Bladder Hygiene: Total assistance (dependent)  Clothing Management: Total assistance (dependent)    Cognitive Retraining  Safety/Judgement: Awareness of environment    Therapeutic Exercises: Tolerated 2 sit>stand>sit, stand pivot transfer to chair, and a final sit>stand>sit. Pain:  Denies pain. Activity Tolerance:   Fair and requires rest breaks    After treatment patient left in no apparent distress:   Sitting in chair, Call bell within reach, Bed / chair alarm activated, and RN in room.      COMMUNICATION/COLLABORATION:   The patients plan of care was discussed with: Physical therapist, Registered nurse, and Hospitalist .     Haresh Wen OT  Time Calculation: 40 mins

## 2022-10-26 NOTE — PROGRESS NOTES
LOREN:  1. RUR-18%  2. [facility] SNF accepted and will need insurance auth for SNF. 3. S transport. Patient is COVID positive. CECILIA spoke with Katherine Yo at [facility], she said insurance denied for IPR and recommending SNF. CECILIA spoke with patient and his sister Jai Cowan via telephone. They are in agreement with [facility], however would like to see if [facility] (choice given by son Aracelis Heath on 10/18) if they are able to accept. Patient son, Aracelis Heath lives in [place] and is a . Patient sister, Jai Cowan is local and would like updates as they are available. CECILIA spoke with Aminta at [facility], [phone]. She is starting [facility], but aware they other facilities are considering.       Dread Garza, [facility]

## 2022-10-27 LAB
ALBUMIN SERPL-MCNC: 2.2 G/DL (ref 3.5–5)
ALBUMIN/GLOB SERPL: 0.4 {RATIO} (ref 1.1–2.2)
ALP SERPL-CCNC: 84 U/L (ref 45–117)
ALT SERPL-CCNC: 29 U/L (ref 12–78)
ANION GAP SERPL CALC-SCNC: 4 MMOL/L (ref 5–15)
AST SERPL-CCNC: 21 U/L (ref 15–37)
BASOPHILS # BLD: 0 K/UL (ref 0–0.1)
BASOPHILS NFR BLD: 0 % (ref 0–1)
BILIRUB SERPL-MCNC: 0.7 MG/DL (ref 0.2–1)
BUN SERPL-MCNC: 16 MG/DL (ref 6–20)
BUN/CREAT SERPL: 31 (ref 12–20)
CALCIUM SERPL-MCNC: 9.2 MG/DL (ref 8.5–10.1)
CHLORIDE SERPL-SCNC: 101 MMOL/L (ref 97–108)
CO2 SERPL-SCNC: 26 MMOL/L (ref 21–32)
CREAT SERPL-MCNC: 0.51 MG/DL (ref 0.7–1.3)
DIFFERENTIAL METHOD BLD: ABNORMAL
EOSINOPHIL # BLD: 0.3 K/UL (ref 0–0.4)
EOSINOPHIL NFR BLD: 3 % (ref 0–7)
ERYTHROCYTE [DISTWIDTH] IN BLOOD BY AUTOMATED COUNT: 14.1 % (ref 11.5–14.5)
GLOBULIN SER CALC-MCNC: 5.4 G/DL (ref 2–4)
GLUCOSE SERPL-MCNC: 106 MG/DL (ref 65–100)
HCT VFR BLD AUTO: 30 % (ref 36.6–50.3)
HGB BLD-MCNC: 9.8 G/DL (ref 12.1–17)
IMM GRANULOCYTES # BLD AUTO: 0 K/UL (ref 0–0.04)
IMM GRANULOCYTES NFR BLD AUTO: 0 % (ref 0–0.5)
LYMPHOCYTES # BLD: 1.8 K/UL (ref 0.8–3.5)
LYMPHOCYTES NFR BLD: 21 % (ref 12–49)
MAGNESIUM SERPL-MCNC: 1.8 MG/DL (ref 1.6–2.4)
MCH RBC QN AUTO: 28.6 PG (ref 26–34)
MCHC RBC AUTO-ENTMCNC: 32.7 G/DL (ref 30–36.5)
MCV RBC AUTO: 87.5 FL (ref 80–99)
MONOCYTES # BLD: 0.9 K/UL (ref 0–1)
MONOCYTES NFR BLD: 10 % (ref 5–13)
NEUTS SEG # BLD: 5.9 K/UL (ref 1.8–8)
NEUTS SEG NFR BLD: 66 % (ref 32–75)
NRBC # BLD: 0 K/UL (ref 0–0.01)
NRBC BLD-RTO: 0 PER 100 WBC
PHOSPHATE SERPL-MCNC: 3.3 MG/DL (ref 2.6–4.7)
PLATELET # BLD AUTO: 333 K/UL (ref 150–400)
PMV BLD AUTO: 9.7 FL (ref 8.9–12.9)
POTASSIUM SERPL-SCNC: 4 MMOL/L (ref 3.5–5.1)
PROT SERPL-MCNC: 7.6 G/DL (ref 6.4–8.2)
RBC # BLD AUTO: 3.43 M/UL (ref 4.1–5.7)
SODIUM SERPL-SCNC: 131 MMOL/L (ref 136–145)
WBC # BLD AUTO: 9 K/UL (ref 4.1–11.1)

## 2022-10-27 PROCEDURE — 97535 SELF CARE MNGMENT TRAINING: CPT | Performed by: OCCUPATIONAL THERAPIST

## 2022-10-27 PROCEDURE — 97112 NEUROMUSCULAR REEDUCATION: CPT | Performed by: OCCUPATIONAL THERAPIST

## 2022-10-27 PROCEDURE — 74011250637 HC RX REV CODE- 250/637: Performed by: FAMILY MEDICINE

## 2022-10-27 PROCEDURE — 65270000029 HC RM PRIVATE

## 2022-10-27 PROCEDURE — 74011250637 HC RX REV CODE- 250/637: Performed by: NURSE PRACTITIONER

## 2022-10-27 PROCEDURE — 84100 ASSAY OF PHOSPHORUS: CPT

## 2022-10-27 PROCEDURE — 74011000250 HC RX REV CODE- 250: Performed by: FAMILY MEDICINE

## 2022-10-27 PROCEDURE — 74011250637 HC RX REV CODE- 250/637: Performed by: PHYSICIAN ASSISTANT

## 2022-10-27 PROCEDURE — 83735 ASSAY OF MAGNESIUM: CPT

## 2022-10-27 PROCEDURE — 80053 COMPREHEN METABOLIC PANEL: CPT

## 2022-10-27 PROCEDURE — 97530 THERAPEUTIC ACTIVITIES: CPT

## 2022-10-27 PROCEDURE — 85025 COMPLETE CBC W/AUTO DIFF WBC: CPT

## 2022-10-27 PROCEDURE — 36415 COLL VENOUS BLD VENIPUNCTURE: CPT

## 2022-10-27 RX ORDER — TRAZODONE HYDROCHLORIDE 100 MG/1
100 TABLET ORAL
Status: DISCONTINUED | OUTPATIENT
Start: 2022-10-27 | End: 2022-10-28

## 2022-10-27 RX ADMIN — ALLOPURINOL 100 MG: 100 TABLET ORAL at 10:49

## 2022-10-27 RX ADMIN — Medication 3 MG: at 22:15

## 2022-10-27 RX ADMIN — FAMOTIDINE 20 MG: 20 TABLET ORAL at 22:15

## 2022-10-27 RX ADMIN — Medication: at 17:48

## 2022-10-27 RX ADMIN — FINASTERIDE 5 MG: 5 TABLET, FILM COATED ORAL at 10:49

## 2022-10-27 RX ADMIN — SODIUM CHLORIDE, PRESERVATIVE FREE 10 ML: 5 INJECTION INTRAVENOUS at 22:15

## 2022-10-27 RX ADMIN — Medication: at 10:49

## 2022-10-27 RX ADMIN — APIXABAN 5 MG: 5 TABLET, FILM COATED ORAL at 10:49

## 2022-10-27 RX ADMIN — FAMOTIDINE 20 MG: 20 TABLET ORAL at 10:49

## 2022-10-27 RX ADMIN — TAMSULOSIN HYDROCHLORIDE 0.4 MG: 0.4 CAPSULE ORAL at 10:49

## 2022-10-27 RX ADMIN — DILTIAZEM HYDROCHLORIDE 180 MG: 180 CAPSULE, COATED, EXTENDED RELEASE ORAL at 10:49

## 2022-10-27 RX ADMIN — SODIUM CHLORIDE, PRESERVATIVE FREE 10 ML: 5 INJECTION INTRAVENOUS at 05:50

## 2022-10-27 RX ADMIN — GABAPENTIN 300 MG: 300 CAPSULE ORAL at 22:15

## 2022-10-27 RX ADMIN — SODIUM CHLORIDE, PRESERVATIVE FREE 10 ML: 5 INJECTION INTRAVENOUS at 14:00

## 2022-10-27 RX ADMIN — METOPROLOL SUCCINATE 75 MG: 25 TABLET, FILM COATED, EXTENDED RELEASE ORAL at 10:48

## 2022-10-27 RX ADMIN — ATORVASTATIN CALCIUM 80 MG: 40 TABLET, FILM COATED ORAL at 22:15

## 2022-10-27 RX ADMIN — ERGOCALCIFEROL 50000 UNITS: 1.25 CAPSULE ORAL at 10:49

## 2022-10-27 RX ADMIN — APIXABAN 5 MG: 5 TABLET, FILM COATED ORAL at 17:48

## 2022-10-27 NOTE — PROGRESS NOTES
Problem: Self Care Deficits Care Plan (Adult)  Goal: *Acute Goals and Plan of Care (Insert Text)  Description: FUNCTIONAL STATUS PRIOR TO ADMISSION: At baseline pt was highly independent, active, and not using DME. Per pt and friends at bedside, pt owns several acres and frequently works outside, is a , and enjoys renovating [employer]. Immediately PTA, pt was at [facility] for OT/PT services to address deficits from L MCA stroke. Pt was d/c 10/17/22 and within 1 hour of being home was admitted to [facility] ED.     HOME SUPPORT: The patient lived alone with limited local support. Occupational Therapy Goals  Initiated 10/18/2022, weekly re-assessment 10/25/2022  1. Patient will perform seated grooming task with moderate assistance within 7 day(s). MET - upgrade to set-up  2. Patient will perform seated upper body dressing, using compensatory strategies, with moderate assistance within 7 day(s). Continue  3. Patient will perform BSC transfers with moderate assistance within 7 day(s). Continue  4. Patient will perform all aspects of toileting with moderate assistance within 7 day(s). Continue  5. Patient will participate in upper extremity therapeutic exercise/activities with supervision/set-up within 7 day(s). Continue  6. Patient will improve their Fugl Campbell score by 5 points in prep for ADLs within 7 days. Outcome: Progressing Towards Goal     OCCUPATIONAL THERAPY TREATMENT  Patient: Collette Ferguson (6 y.o. male)  Date: 10/27/2022  Diagnosis: COVID [U07.1] <principal problem not specified>      Precautions: Fall  Chart, occupational therapy assessment, plan of care, and goals were reviewed. ASSESSMENT  Patient continues with skilled OT services and is progressing towards goals. Pt demonstrated improved standing tolerance with toileting tasks this session. He remains limited by R hemiparesis, decreased strength, endurance, mobility, coordination, balance, safety and impaired cognition.   He requires cognitive and physical assist to perform ADLs and functional mobility. Recommend rehab at discharge. Current Level of Function Impacting Discharge (ADLs): mod A UE ADLs, total A LE ADLs and toileting, mod-max A x2 functional mobility    Other factors to consider for discharge: see above         PLAN :  Patient continues to benefit from skilled intervention to address the above impairments. Continue treatment per established plan of care to address goals. Recommend with staff: sarah lift for OOB mobility with nursing    Recommend next OT session: LE ADLs bed level and sitting    Recommendation for discharge: (in order for the patient to meet his/her long term goals)  Therapy 3 hours per day 5-7 days per week    This discharge recommendation:  Has been made in collaboration with the attending provider and/or case management    IF patient discharges home will need the following DME: TBD       SUBJECTIVE:   Patient stated I am trying, but I can't do it.     OBJECTIVE DATA SUMMARY:   Cognitive/Behavioral Status:  Neurologic State: Alert  Orientation Level: Oriented to person;Oriented to place;Oriented to time (\"hospital\")  Cognition: Follows commands  Perception: Tactile;Verbal;Visual;Cues to attend to right side of body  Perseveration: No perseveration noted  Safety/Judgement: Awareness of environment;Decreased awareness of need for assistance;Decreased awareness of need for safety;Decreased insight into deficits; Fall prevention    Neuro Re-education and Functional Mobility and Transfers for ADLs: Co-tx with PT as pt required the knowledge and skill of 2 therapist for muscle faciliation of RUE, LE and trunk, cognitive instruction and safe, successful mobility. Bed Mobility:  Rolling: Maximum assistance; Additional time;Assist x1  Supine to Sit: Maximum assistance; Additional time;Assist x1  Sit to Supine: Moderate assistance;Assist x2  Scooting: Maximum assistance; Additional time;Assist x1 (A for forward wt shift)- performed 20 trial up/down side of bed    Transfers:  Sit to Stand: Maximum assistance; Moderate assistance          Balance:  Sitting: Impaired; Without support  Sitting - Static: Good (unsupported)  Sitting - Dynamic: Good (unsupported)  Standing: Impaired; With support  Standing - Static: Constant support;Poor    ADL Intervention:  Patient instructed and indicated understanding energy conservation techniques to increase independence and safety during ADLs. Lower Body Dressing Assistance  Socks: Total assistance (dependent) (no active participation)  Position Performed: Long sitting on bed  Cues: Don;Physical assistance; Tactile cues provided;Verbal cues provided;Visual cues provided    Toileting x 2  Toileting Assistance: Total assistance(dependent) (pt incontinent of bowel x2)  Bladder Hygiene: Total assistance (dependent) (vazquez)  Bowel Hygiene: Total assistance (dependent)  Clothing Management: Total assistance (dependent)  Cues: Physical assistance for pants down;Physical assistance for pants up; Tactile cues provided;Verbal cues provided;Visual cues provided  Adaptive Equipment:  (recliner chair turned backwards to assist with standing)    Cognitive Retraining  Safety/Judgement: Awareness of environment;Decreased awareness of need for assistance;Decreased awareness of need for safety;Decreased insight into deficits; Fall prevention      Pain:  R shoulder 7/10 when not supported    Activity Tolerance:   Fair and requires frequent rest breaks    After treatment patient left in no apparent distress:   Supine in bed and Call bell within reach    COMMUNICATION/COLLABORATION:   The patients plan of care was discussed with: Physical therapist, Registered nurse, and Certified nursing assistant/patient care technician.      Laura Mohan OT  Time Calculation: 71 mins

## 2022-10-27 NOTE — PROGRESS NOTES
10/27/2022; 1745 -   CM received call from [place] on behalf of [facility] Diane Brown) identifying that the ins company's Medical Director has requested for a Peer to Peer Review. P: [phone], Option 5  Member ID: [id number]    DEADLINE IS 10/28 at 1300    Attending is aware of the above.     CRM: Christ Reynolds, MPH, [facility] [phone] or [phone]

## 2022-10-27 NOTE — PROGRESS NOTES
Problem: Patient Education: Go to Patient Education Activity  Goal: Patient/Family Education  Outcome: Progressing Towards Goal     Problem: Patient Education: Go to Patient Education Activity  Goal: Patient/Family Education  Outcome: Progressing Towards Goal     Problem: Pressure Injury - Risk of  Goal: *Prevention of pressure injury  Description: Document Pepe Scale and appropriate interventions in the flowsheet. Outcome: Progressing Towards Goal  Note: Pressure Injury Interventions:  Sensory Interventions: Assess changes in LOC    Moisture Interventions: Apply protective barrier, creams and emollients    Activity Interventions: PT/OT evaluation    Mobility Interventions: Turn and reposition approx.  every two hours(pillow and wedges)    Nutrition Interventions: Offer support with meals,snacks and hydration    Friction and Shear Interventions: Apply protective barrier, creams and emollients

## 2022-10-27 NOTE — PROGRESS NOTES
Problem: Mobility Impaired (Adult and Pediatric)  Goal: *Acute Goals and Plan of Care (Insert Text)  Description: FUNCTIONAL STATUS PRIOR TO ADMISSION:  Prior to 9/14/22, patient was independent and active without use of DME. He was admitted to acute care hosp 9/14/22-9/28/22 s/p acute CVA f/b 21 days of IPR. His insurance company denied additional IPR and SNF resulting in pt discharging home alone with home health services. Patient was not able to manage in his home alone, presented to the ED for assistance. Per IPR notes, at the time of discharge patient's functional mobility: Supv/ CGA sitting,  Max A sit<->stand, Mod A bed<->chair slide board transfer,  Non ambulatory, Max A to propel the WC 5 ft. HOME SUPPORT PRIOR TO ADMISSION: The patient lived alone and required no assistance prior to his CVA  ,  Physical Therapy Goals  Weekly re-assessment 10/25/2022  Goals remain appropriate   Initiated 10/18/2022  1. Patient will move from supine to sit and sit to supine in bed with minimal assistance within 7 day(s). 2.  Patient will transfer from bed to chair and chair to bed with minimum assistance  using the least restrictive device within 7 day(s). 3.  Patient will perform sit to stand with minimal assistance within 7 day(s). 4.  Patient will be supervision sitting EOB 15 mins within 7 days. Outcome: Progressing Towards Goal   PHYSICAL THERAPY TREATMENT  Patient: Bess Martínez (16 y.o. male)  Date: 10/27/2022  Diagnosis: COVID [U07.1] <principal problem not specified>      Precautions: Fall  Chart, physical therapy assessment, plan of care and goals were reviewed. ASSESSMENT  Patient continues with skilled PT services and is progressing towards goals. Patient working on standing at bedside. Remains limited by poor right sided motor control, limited strength, balance. Requiring mod to max assist of two.   Did stand 3-4 times using back of chair to hold to and max assist to get full hip extension , having difficulty maintaining extension. Deferred out of bed to day due to fatigue and multiple stands during session. .     Current Level of Function Impacting Discharge (mobility/balance): mod to  max assist of 1-2    Other factors to consider for discharge: was independent prior to CVA         PLAN :  Patient continues to benefit from skilled intervention to address the above impairments. Continue treatment per established plan of care. to address goals. Recommendation for discharge: (in order for the patient to meet his/her long term goals)  Therapy 3 hours per day 5-7 days per week; if unable then SNF rehab    This discharge recommendation:  Has been made in collaboration with the attending provider and/or case management    IF patient discharges home will need the following DME: bedside commode, hospital bed, mechanical lift, transfer bench, and wheelchair       SUBJECTIVE:   Patient stated Still no sleep.     OBJECTIVE DATA SUMMARY:   Critical Behavior:  Neurologic State: Alert  Orientation Level: Oriented to person, Oriented to place, Oriented to time (\"hospital\")  Cognition: Follows commands  Safety/Judgement: Awareness of environment, Decreased awareness of need for assistance, Decreased awareness of need for safety, Decreased insight into deficits, Fall prevention  Functional Mobility Training:  Bed Mobility:  Rolling: Maximum assistance; Additional time;Assist x1  Supine to Sit: Maximum assistance; Additional time;Assist x1  Sit to Supine: Moderate assistance;Assist x2  Scooting: Maximum assistance; Additional time;Assist x1 (A for forward wt shift)        Transfers:  Sit to Stand: Maximum assistance; Moderate assistance  Stand to Sit: Moderate assistance                             Balance:  Sitting: Impaired; Without support  Sitting - Static: Good (unsupported)  Sitting - Dynamic: Good (unsupported)  Standing: Impaired; With support  Standing - Static: Constant support;Poor Activity Tolerance:   Fair    After treatment patient left in no apparent distress:   Supine in bed, Call bell within reach, and Side rails x 3    COMMUNICATION/COLLABORATION:   The patients plan of care was discussed with: Occupational therapist, Registered nurse, and Certified nursing assistant/patient care technician.      Jerrod Nash, PT   Time Calculation: 37 mins

## 2022-10-27 NOTE — CONSULTS
PSYCHIATRY CONSULT NOTE    REASON FOR CONSULT: depression      HISTORY OF PRESENTING COMPLAINT:  Vania Salazar is a 71 y.o. WHITE/NON- male who is currently admitted to the medical floor at [facility]. Patient presented to the ED due to placement issue. 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 Psychiatry was asked to see patient for depression. Patient is calm and cooperative. He is alert and oriented with intermittent confusion. He reports he is at [facility]. He reports the date, month and year correctly. He reports being down and depressed due to his medical issues and being in the hospital. He denies suicidal/homicidal thoughts and VA hallucinations. He reports sleep concerns and denies appetite issues. No other acute behaviors reported. PAST PSYCHIATRIC HISTORY: Patient denies having any psychiatric hx but per chart, he has a hx of depression. SUBSTANCE ABUSE HISTORY:Patient denies. PAST MEDICAL HISTORY:    Please see H&P for details. Past Medical History:   Diagnosis Date    Arrhythmia     atrial fib    Depression 4/24/2010    Diabetes ([facility] [address]     diet control for pre-diabetes    Dyslipidemia, goal LDL below 100 6/14/2011    Gout     HTN (hypertension) 4/24/2010    Impotence 4/24/2010    Morbid obesity ([address] 5/17/2010    VALENTE (obstructive sleep apnea) 4/24/2010    CPAP    Restless legs 4/15/2015     Prior to Admission medications    Medication Sig Start Date End Date Taking? Authorizing Provider   allopurinoL (ZYLOPRIM) 100 mg tablet TAKE 1 TABLET BY MOUTH EVERY DAY 10/17/22  Yes Nikolay Villalba MD   apixaban (ELIQUIS) 5 mg tablet Take 1 Tablet by mouth two (2) times a day. 9/29/22  Yes Judd Schaefer MD   gabapentin (NEURONTIN) 300 mg capsule Take 1 Capsule by mouth nightly. Max Daily Amount: 300 mg. 9/28/22  Yes Judd Schaefer MD   atorvastatin (LIPITOR) 80 mg tablet Take 1 Tablet by mouth nightly.  9/26/22  Yes MD Jolie Benton peru-castor oiL (VENELEX) ointment Apply  to affected area two (2) times a day. APPLY TO all bony prominences, abrasions and ecchymosis to right side, posterior head Nursing, document site in comments 9/26/22  Yes Ciara Joseph MD   dilTIAZem IR (CARDIZEM) 60 mg tablet Take 1 Tablet by mouth every eight (8) hours. 9/26/22  Yes Ciara Joseph MD   docusate sodium (COLACE) 100 mg capsule Take 1 Capsule by mouth two (2) times daily as needed for Constipation for up to 90 days. 9/26/22 12/25/22 Yes Ciara Joseph MD   famotidine (PEPCID) 20 mg tablet Take 1 Tablet by mouth every twelve (12) hours. 9/26/22  Yes Ciara Joseph MD   metoprolol tartrate 37.5 mg tab Take 37.5 mg by mouth every twelve (12) hours. 9/26/22  Yes Ciara Joseph MD   tamsulosin (FLOMAX) 0.4 mg capsule Take 1 Capsule by mouth daily. 9/27/22  Yes Ciara Joseph MD     Vitals:    10/26/22 1127 10/26/22 1450 10/26/22 1930 10/27/22 0242   BP:  127/80 119/70 120/65   Pulse:  88 85 70   Resp:  17 17 16   Temp:  97.6 °F (36.4 °C) 98.4 °F (36.9 °C) 97.4 °F (36.3 °C)   SpO2:  99% 96% 97%   Weight:       Height: 6' 3\" (1.905 m)        Lab Results   Component Value Date/Time    WBC 9.0 10/27/2022 01:13 AM    Hemoglobin (POC) 5.7 06/14/2011 03:00 PM    HGB 9.8 (L) 10/27/2022 01:13 AM    HCT 30.0 (L) 10/27/2022 01:13 AM    PLATELET 796 51/32/7754 01:13 AM    MCV 87.5 10/27/2022 01:13 AM     Lab Results   Component Value Date/Time    Sodium 131 (L) 10/27/2022 01:13 AM    Potassium 4.0 10/27/2022 01:13 AM    Chloride 101 10/27/2022 01:13 AM    CO2 26 10/27/2022 01:13 AM    Anion gap 4 (L) 10/27/2022 01:13 AM    Glucose 106 (H) 10/27/2022 01:13 AM    BUN 16 10/27/2022 01:13 AM    Creatinine 0.51 (L) 10/27/2022 01:13 AM    BUN/Creatinine ratio 31 (H) 10/27/2022 01:13 AM    GFR est AA >60 09/28/2022 06:00 AM    GFR est non-AA >60 09/28/2022 06:00 AM    Calcium 9.2 10/27/2022 01:13 AM    Bilirubin, total 0.7 10/27/2022 01:13 AM    Alk.  phosphatase 84 10/27/2022 01:13 AM    Protein, total 7.6 10/27/2022 01:13 AM    Albumin 2.2 (L) 10/27/2022 01:13 AM    Globulin 5.4 (H) 10/27/2022 01:13 AM    A-G Ratio 0.4 (L) 10/27/2022 01:13 AM    ALT (SGPT) 29 10/27/2022 01:13 AM    AST (SGOT) 21 10/27/2022 01:13 AM     No results found for: VALF2, VALAC, VALP, VALPR, DS6, CRBAM, CRBAMP, CARB2, XCRBAM  No results found for: LITHM  RADIOLOGY REPORTS:(reviewed/updated 10/27/2022)  XR CHEST SNGL V    Result Date: 10/17/2022  INDICATION:  leukocytosis EXAM: Chest single view. COMPARISON: 9/19/2022. FINDINGS: A single frontal view of the chest at 2119 hours shows stable low lung volumes with no focal infiltrate but crowded lung markings in the lung bases. . The heart, mediastinum and pulmonary vasculature are stable . The bony thorax is unremarkable for age. .     Low lung volumes with crowded lung markings. No focal infiltrate. .  . XR SHOULDER RT AP/LAT MIN 2 V    Result Date: 10/19/2022  EXAM: XR SHOULDER RT AP/LAT MIN 2 V INDICATION: subluxation of shoulder, concern for dislocation. COMPARISON: None. FINDINGS: Three views of the right shoulder demonstrate no fracture, dislocation or other acute abnormality. There are prominent degenerative findings. No acute abnormality. Prominent degenerative findings. XR ELBOW RT AP/LAT    Result Date: 9/15/2022  EXAM: XR ELBOW RT AP/LAT INDICATION: swollen warm. COMPARISON: None. FINDINGS: Two views of the right elbow are obtained with portable technique. There is no apparent fracture, dislocation or effusion. There is mild osteoarthritis. Soft tissue swelling is present without soft tissue gas or foreign body. No fracture. Soft tissue swelling. XR ANKLE LT AP/LAT    Result Date: 9/27/2022  EXAM: XR ANKLE LT AP/LAT INDICATION: Left ankle pain. COMPARISON: None. FINDINGS: Two views of the left ankle demonstrate no fracture or disruption of the ankle mortise. There is osteoarthritis of the ankle joint.  No significant effusion is seen. There is irregularity of the os associated with the posterior talus. There is a moderate plantar spur without blurring of the plantar fascia. Left ankle osteoarthritis. Possible impingement syndrome. XR ABD (KUB)    Result Date: 9/22/2022  INDICATION: Dobbhoff placement. Supine view of the upper abdomen demonstrates a weighted feeding tube to overlie the expected location of the GE junction. There is a nonspecific bowel gas pattern. XR ABD (KUB)    Result Date: 9/22/2022  INDICATION: Dobbhoff placement. Single AP supine view of the upper abdomen. Weighted feeding tube tip overlies the diaphragm. There is a nonspecific bowel gas pattern. It should be noted that the lateral right abdomen is excluded from the exam.    XR ABD (KUB)    Result Date: 9/22/2022  INDICATION: Dobhoff placement. EXAM: SINGLE VIEW ABDOMEN RADIOGRAPH. COMPARISON: None. FINDINGS: A supine radiograph of the lower chest and upper abdomen shows a nonspecific bowel gas pattern, with small amounts of gas scattered throughout small and large bowel. No soft tissue masses or pathologic calcifications are identified. The bones and soft tissues are within normal limits. Inspiratory effort is poor with bibasilar patchy atelectasis suspected. A radiopaque feeding tube is coiled over the upper mediastinum suggesting an intraluminal location in the esophagus. Its tip is not visualized. .     Non-obstructive bowel gas pattern. . Feeding tube is felt to be coiled in the proximal esophagus. XR ABD (KUB)    Result Date: 9/22/2022  INDICATION: dobhoff placement COMPARISON: 1218 hours radiograph EXAM: Portable frontal images of the lower chest and upper abdomen FINDINGS: A transesophageal tube is shown with its tip in the distal esophagus, probably just proximal to the level of the gastroesophageal junction. Transesophageal tube tip at level of GE junction. Tube should be advanced.      MRI BRAIN WO CONT    Result Date: 9/15/2022  INDICATION:   follow up ischemic cva EXAMINATION:  MRI BRAIN WO CONTRAST COMPARISON:  CT September 14, 2022 TECHNIQUE:  Multiplanar multisequence acquisition without contrast of the brain. FINDINGS:  There is a moderate to large area of acute infarction in the left middle cerebral artery territory, involving the left basal ganglia and corona radiata, left anterior and mid temporal lobe, left insula and left frontal operculum. There is associated susceptibility artifact predominantly within the basal ganglia and corona radiata portions of the infarction, with slight mass effect upon the left lateral ventricle similar to recent CT. There is no hydrocephalus or midline shift. Major intracranial vascular flow-voids are patent. Subcutaneous edema throughout the scalp right greater than left. .     Moderate to large acute left MCA territory infarction, with associated susceptibility artifact predominantly in the basal ganglia/corona radiata consistent with blood product/petechial hemorrhage. Overall appearance unchanged compared to recent CT, with no hydrocephalus or midline shift. CT HEAD WO CONT    Result Date: 9/15/2022  EXAM: CT HEAD WO CONT INDICATION: hemorrhagic conversion? COMPARISON: CT dose prior to this exam. CONTRAST: None. TECHNIQUE: Unenhanced CT of the head was performed using 5 mm images. Brain and bone windows were generated. Coronal and sagittal reformats. CT dose reduction was achieved through use of a standardized protocol tailored for this examination and automatic exposure control for dose modulation. FINDINGS: Left anterolateral temporal lobe hypodensity with loss of gray-white differentiation is redemonstrated with no evident interval hemorrhage or other significant interval change with localized mass effect and effacement of the anterior horn of the right lateral ventricle. The ventricles and sulci are otherwise normal in size, shape and configuration.  There is no significant white matter disease. There is no new intracranial hemorrhage, extra-axial collection, or mass effect. The basilar cisterns are open. The bone windows demonstrate no abnormalities. The visualized portions of the paranasal sinuses and mastoid air cells are clear. There is a soft tissue collection overlying the right frontoparietal skull, not significant change. Left MCA territory left temporal lobe infarct is stable appearance without interval hemorrhagic transformation. Right frontoparietal scalp collection unchanged. CT CHEST W CONT    Result Date: 9/14/2022  INDICATION: Trauma with right-sided bruising COMPARISON: 4/29/2019 TECHNIQUE:  Following the uneventful intravenous administration of 100 cc Isovue-370, 5 mm axial images were obtained through the chest, abdomen, and pelvis. Oral contrast was not administered. Sagittal and coronal reformats were performed. CT dose reduction was achieved through use of a standardized protocol tailored for this examination and automatic exposure control for dose modulation. FINDINGS: THYROID: No nodule. MEDIASTINUM: No mass or lymphadenopathy. PAVEL: No mass or lymphadenopathy. THORACIC AORTA: No dissection or aneurysm. There are vascular calcifications MAIN PULMONARY ARTERY: Normal in caliber. TRACHEA/BRONCHI: Patent. ESOPHAGUS: No wall thickening or dilatation. HEART: Normal in size. There are coronary artery calcifications PLEURA: No effusion or pneumothorax. LUNGS: Right basilar atelectasis LIVER: No mass or biliary dilatation. GALLBLADDER: Unremarkable. SPLEEN: No mass. PANCREAS: No mass or ductal dilatation. ADRENALS: Unremarkable. KIDNEYS: Bilateral renal cysts. No hydronephrosis. Contrast within the collecting systems makes evaluation for stone difficult STOMACH: Unremarkable. SMALL BOWEL: No dilatation or wall thickening. COLON: Left-sided diverticulosis without evidence of acute diverticulitis APPENDIX: Not identified PERITONEUM: No ascites or pneumoperitoneum. RETROPERITONEUM: There are vascular calcifications REPRODUCTIVE ORGANS: Enlarged URINARY BLADDER: No mass or calculus. BONES: Left hip is been replaced. Mild degenerative changes of the right hip. There are degenerative changes of the lumbar spine. A displaced fracture is not identified ADDITIONAL COMMENTS: Minimal subcutaneous edema right chest wall     1. Minimal subcutaneous edema right chest wall. No acute abnormality of the chest abdomen or pelvis     CT SPINE CERV WO CONT    Result Date: 9/14/2022  INDICATION: trauma, neck pain. Exam: Noncontrast CT of the cervical spine is performed with 2.5 mm collimation. Sagittal and coronal reformatted images were also performed. CT dose reduction was achieved through use of a standardized protocol tailored for this examination and automatic exposure control for dose modulation. FINDINGS: There is no acute fracture or subluxation. The prevertebral soft tissues are within normal limits. Bones are diffusely demineralized. Remaining visualized soft tissues are normal. Multilevel cervical degenerative changes are noted. No acute fracture or subluxation. CT ABD PELV W CONT    Result Date: 9/14/2022  INDICATION: Trauma with right-sided bruising COMPARISON: 4/29/2019 TECHNIQUE:  Following the uneventful intravenous administration of 100 cc Isovue-370, 5 mm axial images were obtained through the chest, abdomen, and pelvis. Oral contrast was not administered. Sagittal and coronal reformats were performed. CT dose reduction was achieved through use of a standardized protocol tailored for this examination and automatic exposure control for dose modulation. FINDINGS: THYROID: No nodule. MEDIASTINUM: No mass or lymphadenopathy. PAVEL: No mass or lymphadenopathy. THORACIC AORTA: No dissection or aneurysm. There are vascular calcifications MAIN PULMONARY ARTERY: Normal in caliber. TRACHEA/BRONCHI: Patent. ESOPHAGUS: No wall thickening or dilatation. HEART: Normal in size.  There are coronary artery calcifications PLEURA: No effusion or pneumothorax. LUNGS: Right basilar atelectasis LIVER: No mass or biliary dilatation. GALLBLADDER: Unremarkable. SPLEEN: No mass. PANCREAS: No mass or ductal dilatation. ADRENALS: Unremarkable. KIDNEYS: Bilateral renal cysts. No hydronephrosis. Contrast within the collecting systems makes evaluation for stone difficult STOMACH: Unremarkable. SMALL BOWEL: No dilatation or wall thickening. COLON: Left-sided diverticulosis without evidence of acute diverticulitis APPENDIX: Not identified PERITONEUM: No ascites or pneumoperitoneum. RETROPERITONEUM: There are vascular calcifications REPRODUCTIVE ORGANS: Enlarged URINARY BLADDER: No mass or calculus. BONES: Left hip is been replaced. Mild degenerative changes of the right hip. There are degenerative changes of the lumbar spine. A displaced fracture is not identified ADDITIONAL COMMENTS: Minimal subcutaneous edema right chest wall     1. Minimal subcutaneous edema right chest wall. No acute abnormality of the chest abdomen or pelvis     US RETROPERITONEUM COMP    Result Date: 9/22/2022  RENAL AND BLADDER ULTRASOUND INDICATION: DOMINICK w prior L hydronephrosis COMPARISON: 9/20/2022 TECHNIQUE: Sonography of the kidneys, retroperitoneum, and bladder was performed. FINDINGS: RIGHT KIDNEY: measures 13.1 cm in length, normal for age. Echogenicity is upper normal. No hydronephrosis, large shadowing calculus, or solid contour-deforming renal mass. Right renal cyst lower pole 2.7 cm. LEFT KIDNEY: measures 13.8 cm in length, normal for age. Renal echogenicity is upper normal. No hydronephrosis, large shadowing calculus, or solid contour-deforming renal mass. Left renal cortical cyst interpolar, 2.3 cm. AORTA: Normal caliber in its visualized portions. COMMON ILIAC ARTERIES: Normal caliber proximally. IVC: Normal caliber in its visualized portions. BLADDER: Decompressed by Sexton balloon catheter and not assessed.      1. Resolution of left renal hydronephrosis since prior study. 2. Stable bilateral renal cortical cysts. 3. Urinary bladder not assessed. US RETROPERITONEUM COMP    Result Date: 9/20/2022  EXAM:  US RETROPERITONEUM COMP INDICATION: DOMINICK COMPARISON: CT chest abdomen pelvis 9/14/2022. TECHNIQUE: Real-time sonography of the kidneys, retroperitoneum and bladder was performed with multiple static images obtained. FINDINGS: RIGHT KIDNEY: The right kidney measures 12.5 cm. The right kidney has normal echogenicity. There is a simple cyst in the interpolar region measuring 2.0 cm. No hydronephrosis. LEFT KIDNEY: The left kidney measures 12.7 cm. The left kidney has normal echogenicity. There is a simple cyst in the lower pole measuring 2.4 cm. There is mild hydronephrosis. RETROPERITONEUM: The abdominal aorta is not well seen due to overlying bowel gas. The IVC is patent. BLADDER: The urinary bladder is normal.     1. Mild left hydronephrosis. 2. Bilateral simple renal cysts. XR CHEST PORT    Result Date: 9/19/2022  Indication: Shortness of breath Comparison: 9/14/2022 Portable exam of the chest obtained at 1531 demonstrates normal heart size. Low lung volumes again demonstrated without focal infiltrate. The osseous structures are unremarkable. Low lung volumes without focal infiltrate. XR CHEST PORT    Result Date: 9/14/2022  EXAM: XR CHEST PORT INDICATION: CVA COMPARISON: CT 9/14/2022, CT thorax 4/29/2019. FINDINGS: A portable AP radiograph of the chest was obtained at 22:52 hours. The patient is on a cardiac monitor. The lungs are clear for degree of hypoplasia. The cardiac and mediastinal contours and pulmonary vascularity are normal.  The chest wall structures and visualized upper abdomen show no acute findings with incidental note of degenerative spine and shoulder changes. No acute findings. Hypoinflation.     CTA CODE NEURO HEAD AND NECK W CONT    Result Date: 9/14/2022  Clinical history: stroke INDICATION:   stroke COMPARISON:  9/14/2022 CONTRAST: 100ml Isovue 370 TECHNIQUE:  CT dose reduction was achieved through use of a standardized protocol tailored for this examination and automatic exposure control for dose modulation. Following the uneventful administration of Isovue-370 contrast material, axial CT angiography of the head and neck was performed. Coronal and sagittal reconstructions were obtained. 3D MAXIMAL INTENSITY PROJECTION reconstructed imaging of the cranial vasculature in both the coronal and sagittal plane was performed. CTA perfusion imaging was also obtained. Reconstructions were created with color coding of axial time to peak, axial blood volume, axial blood flow, axial mean transit time and axial T Max. The study was analyzed by the [employer] [address] [employer]. Algorithm FINDINGS: There is loss of gray-white differentiation in the left MCA territory with subacute infarct in the left temporal lobe. .  There is no significant superimposed hemorrhage, midline shift or mass effect. No evidence of acute intracranial hemorrhage. . The paranasal sinuses are clear. CTA NECK: Common origin of the innominate and left common carotid arteries. Left vertebral artery is dominant. There is atherosclerotic change the origin of the ICAs on the right. There is mild aortic atherosclerotic change. There is no pulmonary mass or nodule. There is  less than 20%stenosis in the right internal carotid artery utilizing NASCET criteria. There is  less than 20%stenosis in the left internal carotid artery utilizing NASCET criteria. CTA HEAD: Short segment occlusion/near occlusive thrombus of the distal left MCA M1. The left vertebral artery is dominant. Right vertebral artery likely terminates at. Severe multifocal stenoses of the right vertebral artery. There is a diminutive basilar artery. The basilar artery and its branches are normal. M1 M2 junction occlusion/near occlusion on the left is best appreciated on sagittal images.  Mild multifocal M2 segment stenoses. . Patient was positioned with the right hypoplastic. There is bilateral persistent fetal circulation to the posterior cerebral arteries. . CT PERFUSION: Area of reversible ischemia is suggested in the left temporal lobe. 30 mL of perfusion/left lobe mismatch. . R CBF<30% :5 Tmax >6s: 35 mL    Severe stenosis/near occlusive thrombus of the distal left MCA M1. Large area of subacute infarction in the left MCA territory. Small area of reversible ischemia in the left temporal lobe anteriorly and inferiorly measures approximately 30 mL. There is no superimposed hemorrhage, midline shift or mass effect There is no  aneurysm or dissection identified. The findings were called to Dr. Eva Pablo on 9/14/2022 at 10:48 PM by Dr. Awa Cueva [id number] . CT CODE NEURO HEAD WO CONTRAST    Result Date: 9/14/2022  EXAM: CT CODE NEURO HEAD WO CONTRAST INDICATION: Code Stroke COMPARISON: 8/17/2017. CONTRAST: None. TECHNIQUE: Unenhanced CT of the head was performed using 5 mm images. Brain and bone windows were generated. Coronal and sagittal reformats. CT dose reduction was achieved through use of a standardized protocol tailored for this examination and automatic exposure control for dose modulation. FINDINGS: The ventricles and sulci are normal in size, shape and configuration. . There is no significant white matter disease. There is no intracranial hemorrhage, extra-axial collection, or mass effect. The basilar cisterns are open. Acute left MCA territory infarct involving the anterior left temporal lobe and adjacent frontal lobe. . Loss of the gray-white differentiation in the left basal ganglia. The bone windows demonstrate no abnormalities. The visualized portions of the paranasal sinuses and mastoid air cells are clear. There is thickening of the right-sided scalp. 1. Large acute left MCA territory infarct 2. Scalp thickening along the right.  An acute intracranial hemorrhage is not identified     ECHO ADULT COMPLETE    Result Date: 9/15/2022  Formatting of this result is different from the original.   Left Ventricle: Normal left ventricular systolic function with a visually estimated EF of 50 - 55%. Left ventricle size is normal. Normal wall thickness. Right Ventricle: Right ventricle is mildly dilated. Normal wall thickness. Mildly reduced systolic function. Left Atrium: Left atrium is mildly dilated. Technical qualifiers: Echo study was technically difficult with poor endocardial visualization and technically difficult due to patient's body habitus. Contrast used: Definity. Bubble study is not adequate to rule out shunt     CT CODE NEURO PERF W CBF    Result Date: 9/14/2022  Clinical history: stroke INDICATION:   stroke COMPARISON:  9/14/2022 CONTRAST: 100ml Isovue 370 TECHNIQUE:  CT dose reduction was achieved through use of a standardized protocol tailored for this examination and automatic exposure control for dose modulation. Following the uneventful administration of Isovue-370 contrast material, axial CT angiography of the head and neck was performed. Coronal and sagittal reconstructions were obtained. 3D MAXIMAL INTENSITY PROJECTION reconstructed imaging of the cranial vasculature in both the coronal and sagittal plane was performed. CTA perfusion imaging was also obtained. Reconstructions were created with color coding of axial time to peak, axial blood volume, axial blood flow, axial mean transit time and axial T Max. The study was analyzed by the [address]. Algorithm FINDINGS: There is loss of gray-white differentiation in the left MCA territory with subacute infarct in the left temporal lobe. .  There is no significant superimposed hemorrhage, midline shift or mass effect. No evidence of acute intracranial hemorrhage. . The paranasal sinuses are clear. CTA NECK: Common origin of the innominate and left common carotid arteries. Left vertebral artery is dominant.  There is atherosclerotic change the origin of the ICAs on the right. There is mild aortic atherosclerotic change. There is no pulmonary mass or nodule. There is  less than 20%stenosis in the right internal carotid artery utilizing NASCET criteria. There is  less than 20%stenosis in the left internal carotid artery utilizing NASCET criteria. CTA HEAD: Short segment occlusion/near occlusive thrombus of the distal left MCA M1. The left vertebral artery is dominant. Right vertebral artery likely terminates at. Severe multifocal stenoses of the right vertebral artery. There is a diminutive basilar artery. The basilar artery and its branches are normal. M1 M2 junction occlusion/near occlusion on the left is best appreciated on sagittal images. Mild multifocal M2 segment stenoses. . Patient was positioned with the right hypoplastic. There is bilateral persistent fetal circulation to the posterior cerebral arteries. . CT PERFUSION: Area of reversible ischemia is suggested in the left temporal lobe. 30 mL of perfusion/left lobe mismatch. . R CBF<30% :5 Tmax >6s: 35 mL    Severe stenosis/near occlusive thrombus of the distal left MCA M1. Large area of subacute infarction in the left MCA territory. Small area of reversible ischemia in the left temporal lobe anteriorly and inferiorly measures approximately 30 mL. There is no superimposed hemorrhage, midline shift or mass effect There is no  aneurysm or dissection identified. The findings were called to Dr. Austin Georges on 9/14/2022 at 10:48 PM by Dr. Zhang Melara [id number] . No results found for: Francisca Thomas [id number], [id number], [id number], PREGU, POCHCG, MHCGN, HCGQR, THCGA1, SHCG, HCGN, HCGSERUM, HCGURQLPOC    PSYCHOSOCIAL HISTORY: Patient reports he lives alone and has 4  children and per chart, he is a retired [employer]. MENTAL STATUS EXAM:  General appearance: moderately   groomed, psychomotor activity is wnl  Eye contact: good eye contact  Speech: Spontaneous, soft, decreased output.    Affect : Depressed, decreased range  Mood: \"good \"  Thought Process: Logical with intermittent confusion  Perception: Denies AH or VH. Thought Content: denies SI/HI or Plan  Insight: fair  Judgement: Fair  Cognition: Alert and awake    ASSESSMENT AND PLAN:  Kenyatta Davies meets criteria for a diagnosis of depression, unspecified . Will recommend to start remeron 7.5mg at bed time to help with depressed mood and sleep. He denies SI/HI/plan and intent and there is no safety concerns at this time. Psych admission is not recommended. Thank your your consult. Please feel free to consult us again as needed.

## 2022-10-27 NOTE — PROGRESS NOTES
LOREN:  1. RUR-17%  2. [facility] accepted with auth starting on 10/26. Referral sent to Delio Ramirez , referral pending. 3. S transport. CM received call from patient sister, George Echeverria [phone]. She is requesting from her and son decision to send a referral to [address] because she lives close to the facility. Referral sent, response pending.        Marga Jimenez [facility]

## 2022-10-27 NOTE — BSMART NOTE
Initial BSMART Liaison Assessment Form     Section I - Integrated Summary    Chief Complaint is According to pt's chart, he presented to the ED due to placement issue. 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 Hx: history of depression, diabetes, hypertension, obesity with large MCA territory stroke     LOS:  9     Presenting problem/Summary:    Pt is received lying in an upright position in bed. He is alert and oriented to person, place, and situation, with mild to moderate confusion, blunted affect, poor eye contact. He denies SI/HI/AVH. Pt reports his mood is \"OK. \"  When asked if he is feeling depressed he states, \"Only when I try to get up. \"  This writer asked pt several questions pertaining to his mental health and family, however, he is a poor historian at this time, due to his confusion. Pt reports he has no support system and no family, however, according to pt's chart, he has a sister, 2 adult children, and 2 close friends (Ivett Dunn and Efren Meeks -  approved by pt's son to receive information for the patient's family). 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 The information is given by the patient and past medical records. Current Psychiatrist and/or  is none reported. Previous Hospitalizations/Treatment: no psych tx reported    Lethality Assessment:  The potential for suicide noted by the following: not noted . The potential for homicide is not noted. The patient has not been a perpetrator of sexual or physical abuse. There are not pending charges.   The patient is not felt to be at risk for self-harm or harm to others. Section II - Psychosocial  The patient's overall mood and attitude is \"OK\", cooperative. Feelings of helplessness and hopelessness are not observed. Generalized anxiety is not observed. Panic is not observed. Phobias are not observed. Obsessive compulsive tendencies are not observed. Section III - Mental Status Exam  The patient's appearance shows no evidence of impairment. The patient's behavior shows retardation and shows poor eye contact. The patient is only aware of  place, person, and situation. The patient's speech is slurred. The patient's mood displays anhedonia. The range of affect is flat. The patient's thought content demonstrates no evidence of impairment. The thought process is blocking with confusion. The patient's perception shows no evidence of impairment. The patient's memory is impaired. The patient's appetite is decreased and shows signs of weight loss. The patient's sleep has evidence of insomnia. The patient shows no insight. The patient's judgement is cognitively impaired. The patient has not demonstrated mental capacity to provide informed consent. Section IV - Substance Abuse  The patient is not using substances. Section V - Medical  Past Medical History:   Diagnosis Date    Arrhythmia     atrial fib    Depression 4/24/2010    Diabetes ([facility] [address]     diet control for pre-diabetes    Dyslipidemia, goal LDL below 100 6/14/2011    Gout     HTN (hypertension) 4/24/2010    Impotence 4/24/2010    Morbid obesity ([address] 5/17/2010    VALENTE (obstructive sleep apnea) 4/24/2010    CPAP    Restless legs 4/15/2015       Section VI - Living Arrangements  The patient is single. The patient lives alone. The patient has 2 adult children. The patient does plan to return home upon discharge. The patient's source of income comes from disability.     The patient's greatest support comes from sister Chelo Bansal, and friends, Dicky Hamman and Michelle Gonzalez and this person will be involved with the treatment. The patient has not been in an event described as horrible or outside the realm of ordinary life experience either currently or in the past. The patient has not been a victim of sexual/physical abuse. Section VII - Other Areas of Clinical Concern    The highest grade achieved is NA with the overall quality of school experience being described as NA. The patient is currently disabled and speaks [place] as a primary language. The patient has no communication impairments affecting communication. The patient's preference for learning can be described as: can read and write adequately.   The patient's hearing is normal.  The patient's vision is normal.    The patient reports coping skills include: Octaviano Enriquez 75, LSW

## 2022-10-27 NOTE — PROGRESS NOTES
Problem: Patient Education: Go to Patient Education Activity  Goal: Patient/Family Education  Outcome: Progressing Towards Goal     Problem: Patient Education: Go to Patient Education Activity  Goal: Patient/Family Education  Outcome: Progressing Towards Goal     Problem: Pressure Injury - Risk of  Goal: *Prevention of pressure injury  Description: Document Pepe Scale and appropriate interventions in the flowsheet. Outcome: Progressing Towards Goal  Note: Pressure Injury Interventions:  Sensory Interventions: Assess changes in LOC, Chair cushion, Check visual cues for pain, Avoid rigorous massage over bony prominences, Assess need for specialty bed, Discuss PT/OT consult with provider, Float heels, Keep linens dry and wrinkle-free, Maintain/enhance activity level, Monitor skin under medical devices, Minimize linen layers, Turn and reposition approx. every two hours (pillows and wedges if needed)    Moisture Interventions: Minimize layers, Maintain skin hydration (lotion/cream), Limit adult briefs, Internal/External urinary devices, Contain wound drainage, Check for incontinence Q2 hours and as needed, Assess need for specialty bed, Apply protective barrier, creams and emollients, Absorbent underpads    Activity Interventions: PT/OT evaluation, Pressure redistribution bed/mattress(bed type), Increase time out of bed, Chair cushion, Assess need for specialty bed    Mobility Interventions: Turn and reposition approx.  every two hours(pillow and wedges), Pressure redistribution bed/mattress (bed type), PT/OT evaluation, HOB 30 degrees or less, Float heels, Chair cushion, Assess need for specialty bed    Nutrition Interventions: Document food/fluid/supplement intake, Discuss nutritional consult with provider, Offer support with meals,snacks and hydration    Friction and Shear Interventions: Transfer aides:transfer board/Bernadette lift/ceiling lift, Minimize layers, Lift sheet, HOB 30 degrees or less, Foam dressings/transparent film/skin sealants, Feet elevated on foot rest, Apply protective barrier, creams and emollients                Problem: Patient Education: Go to Patient Education Activity  Goal: Patient/Family Education  Outcome: Progressing Towards Goal     Problem: Isolation Precautions - Risk of Spread of Infection  Goal: Prevent transmission of infectious organism to others  Outcome: Progressing Towards Goal     Problem: Nutrition Deficits  Goal: Optimize nutrtional status  Outcome: Progressing Towards Goal     Problem: Risk for Fluid Volume Deficit  Goal: Maintain normal heart rhythm  Outcome: Progressing Towards Goal  Goal: Maintain absence of muscle cramping  Outcome: Progressing Towards Goal  Goal: Maintain normal serum potassium, sodium, calcium, phosphorus, and pH  Outcome: Progressing Towards Goal     Problem: Fatigue  Goal: Verbalize increase energy and improved vitality  Outcome: Progressing Towards Goal     Problem: Patient Education: Go to Patient Education Activity  Goal: Patient/Family Education  Outcome: Progressing Towards Goal     Problem: Falls - Risk of  Goal: *Absence of Falls  Description: Document Kasi Fall Risk and appropriate interventions in the flowsheet.   Outcome: Progressing Towards Goal  Note: Fall Risk Interventions:  Mobility Interventions: Bed/chair exit alarm, Communicate number of staff needed for ambulation/transfer, OT consult for ADLs, Patient to call before getting OOB, PT Consult for mobility concerns, PT Consult for assist device competence, Strengthening exercises (ROM-active/passive), Utilize walker, cane, or other assistive device, Utilize gait belt for transfers/ambulation         Medication Interventions: Bed/chair exit alarm, Evaluate medications/consider consulting pharmacy, Patient to call before getting OOB, Teach patient to arise slowly, Utilize gait belt for transfers/ambulation    Elimination Interventions: Bed/chair exit alarm, Call light in reach, Elevated toilet seat, Patient to call for help with toileting needs, Stay With Me (per policy), Toilet paper/wipes in reach, Toileting schedule/hourly rounds    History of Falls Interventions: Bed/chair exit alarm, Consult care management for discharge planning, Door open when patient unattended, Evaluate medications/consider consulting pharmacy, Investigate reason for fall, Room close to nurse's station, Utilize gait belt for transfer/ambulation, Assess for delayed presentation/identification of injury for 48 hrs (comment for end date)         Problem: Patient Education: Go to Patient Education Activity  Goal: Patient/Family Education  Outcome: Progressing Towards Goal

## 2022-10-27 NOTE — PROGRESS NOTES
[address] [facility] Adult  Hospitalist Group                                                                                          Hospitalist Progress Note  [place], [place]  Answering service: [phone] OR [phone] from in house phone        Date of Service:  10/27/2022  NAME:  Cecilia Cruz  :  1950  MRN:  [id number]      Admission Summary:   Cecilia Cruz is a 71 y.o. 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 He discharged from [facility] on 10/18/22 but then presented to ED on 10/19/22 with no acute complaints. Work-up revealing c/f UTI with indwelling vazquez (Enterococcus and Pseudomonas, ID consulted, on Zosyn and completing 10/25) and incidentally COVID+ (Asx, rapid test+ 10/18, PCR+ 10/20). Awaiting insurance auth for facility placement       Interval history / Subjective:   Saw the patient on rounds this morning, he reports that the trazodone did not help his insomnia. He also reports feeling depressed and that he was previously on antidepressants but cannot remember what medication or dosage. Discussed plan with patient to increase PM trazodone for insomnia and to consult psychiatry to begin antidepressant therapy      Assessment & Plan:     Hx of L MCA Infarct with hemorrhagic conversion with baseline R hemiplegia  HLD  -- Admitted from - for issue  -- Neurology from previous admission (): Recommended repeat Head CT in 2 weeks (unclear if done) and if no new hemorrhage start Eliquis. No indication for ASA, LDL Goal < 70  -- Was started on Eliquis 5mg BID on 22. Continued on admission  -- Continue Atorvastatin 80mg nightly.  Recheck cholesterol panel as an outpatient  -- Needs to follow-up outpatient with Neurology  -- Completed [facility] on 10/18. Needs LTC     COVID-19  -- Incidental positive on rapid test 10/18 and PCR positive 10/20/22  -- PCT < 0.05  -- No respiratory distress or oxygen requirement     A-Fib  -- Cardiology consulted during previous admission  -- Admitted on Metoprolol Tartrate 37.5mg BID and Diltiazem IR 60mg q8h and converted to XL dosing on 10/20/22 of both medications. No issues  -- On Eliquis for stroke prevention     Urinary Retention with indwelling vazquez  UTI, Enterococcus and Pseudomonas  -- Continue Flomax  -- Urine Culture 10/18: Enterococcus and Pseudomonas   -- Abx: CTX (10/18-10/20) and Ampicillin (10/20). ID consulted 10/20 and recommended Zosyn x 5 (10/20-10/25)  -- Removed vazquez 10/19 and failed void trial. Vazquez replaced 10/20AM. Urology consulted and Proscar added. Will follow-up outpatient     Vitamin D Deficiency  -- 25(OH)D 27.8 on 10/19.   - Ergocalciferol weekly x 8 doses. R Shoulder subluxation  -- Appreciated by PT on 10/19  -- Xray R Shoulder 10/19 with no dislocation or abnormalities     Elevated D-dimer  -- D-dimer 3.80 on 10/19. No tachycardia/tachypnea/O2 requirement c/f PE. Likely elevated due to immobile state and COVID-19     Insomnia  - PRN melatonin  - PRN trazpone 100mg     Depression  - Psych consulted for medication recs. Appreciate assistance  - will start Remeron at bedtime    Code status: Full code  Prophylaxis: Apixaban  Care Plan discussed with: Pt, care coordinator  Anticipated Disposition: Per CM notes, pt has been accepted to [facility] but is awaiting auth. Medically ready for discharge. Will do every other day labs with medical stability     Hospital Problems  Date Reviewed: 10/12/2022            Codes Class Noted POA    COVID ICD-10-CM: U07.1  ICD-9-CM: 079.89  10/18/2022 Unknown           Review of Systems:   A comprehensive review of systems was negative except for that written in the HPI. Vital Signs:    Last 24hrs VS reviewed since prior progress note.  Most recent are:  Visit Vitals  /65 (BP 1 Location: Left upper arm, BP Patient Position: At rest)   Pulse 70   Temp 97.4 °F (36.3 °C)   Resp 16   Ht 6' 3\" (1.905 m)   Wt 115.2 kg (253 lb 15.5 oz)   SpO2 97%   BMI 31.74 kg/m²         Intake/Output Summary (Last 24 hours) at 10/27/2022 2268  Last data filed at 10/27/2022 2607  Gross per 24 hour   Intake --   Output 2075 ml   Net -2075 ml          Physical Examination:     I had a face to face encounter with this patient and independently examined them on 10/27/2022 as outlined below:          Constitutional:  No acute distress, cooperative, pleasant    ENT:  Oral mucosa moist, oropharynx benign. Resp:  CTA bilaterally. No wheezing/rhonchi/rales. No accessory muscle use. CV:  Regular rhythm, normal rate, no murmurs, gallops, rubs    GI:    :  Soft, non distended, non tender. normoactive bowel sounds, no hepatosplenomegaly    [place] in place, bag with yellow/clear urine output    Musculoskeletal:  No edema, warm, RUE in sling    Neurologic:  Ride sided hemiplegia, right sided facial asymmetry, diminished sensation and 0/5 strength against gravity in RUE and RLE, strength 5/5 in LUE and LLE. Sensation grossly intact in RLE. AAOx3            Data Review:    Review and/or order of clinical lab test  Review and/or order of tests in the radiology section of CPT  Review and/or order of tests in the medicine section of CPT      Labs:     Recent Labs     10/27/22  0113 10/25/22  0423   WBC 9.0 9.5   HGB 9.8* 10.3*   HCT 30.0* 32.0*    327       Recent Labs     10/27/22  0113 10/25/22  0423   * 133*   K 4.0 4.1    100   CO2 26 27   BUN 16 15   CREA 0.51* 0.55*   * 110*   CA 9.2 8.9   MG 1.8 2.0   PHOS 3.3 3.7       Recent Labs     10/27/22  0113 10/25/22  0423   ALT 29 28   AP 84 90   TBILI 0.7 0.9   TP 7.6 7.0   ALB 2.2* 2.2*   GLOB 5.4* 4.8*       No results for input(s): INR, PTP, APTT, INREXT, INREXT in the last 72 hours.    No results for input(s): FE, TIBC, PSAT, FERR in the last 72 hours. No results found for: FOL, RBCF   No results for input(s): PH, PCO2, PO2 in the last 72 hours. No results for input(s): CPK, CKNDX, TROIQ in the last 72 hours.     No lab exists for component: CPKMB  Lab Results   Component Value Date/Time    Cholesterol, total 146 09/15/2022 02:40 AM    HDL Cholesterol 53 09/15/2022 02:40 AM    LDL, calculated 65 09/15/2022 02:40 AM    Triglyceride 140 09/15/2022 02:40 AM    CHOL/HDL Ratio 2.8 09/15/2022 02:40 AM     Lab Results   Component Value Date/Time    Glucose (POC) 123 (H) 10/24/2022 12:15 PM    Glucose (POC) 134 (H) 09/28/2022 11:46 AM    Glucose (POC) 116 09/28/2022 06:08 AM    Glucose (POC) 121 (H) 09/28/2022 12:34 AM    Glucose (POC) 124 (H) 09/27/2022 05:28 PM     Lab Results   Component Value Date/Time    Color YELLOW/STRAW 10/18/2022 01:01 AM    Appearance TURBID (A) 10/18/2022 01:01 AM    Specific gravity 1.013 10/18/2022 01:01 AM    pH (UA) 6.5 10/18/2022 01:01 AM    Protein TRACE (A) 10/18/2022 01:01 AM    Glucose Negative 10/18/2022 01:01 AM    Ketone Negative 10/18/2022 01:01 AM    Bilirubin Negative 10/18/2022 01:01 AM    Urobilinogen 4.0 (H) 10/18/2022 01:01 AM    Nitrites Positive (A) 10/18/2022 01:01 AM    Leukocyte Esterase MODERATE (A) 10/18/2022 01:01 AM    Epithelial cells FEW 10/18/2022 01:01 AM    Bacteria 4+ (A) 10/18/2022 01:01 AM    WBC 20-50 10/18/2022 01:01 AM    RBC 0-5 10/18/2022 01:01 AM         Medications Reviewed:     Current Facility-Administered Medications   Medication Dose Route Frequency    traZODone (DESYREL) tablet 50 mg  50 mg Oral QHS PRN    finasteride (PROSCAR) tablet 5 mg  5 mg Oral DAILY    melatonin tablet 3 mg  3 mg Oral QHS PRN    allopurinoL (ZYLOPRIM) tablet 100 mg  100 mg Oral DAILY    apixaban (ELIQUIS) tablet 5 mg  5 mg Oral BID    atorvastatin (LIPITOR) tablet 80 mg  80 mg Oral QHS    famotidine (PEPCID) tablet 20 mg  20 mg Oral Q12H    gabapentin (NEURONTIN) capsule 300 mg 300 mg Oral QHS    tamsulosin (FLOMAX) capsule 0.4 mg  0.4 mg Oral DAILY    ergocalciferol capsule 50,000 Units  50,000 Units Oral Q7D    metoprolol succinate (TOPROL-XL) XL tablet 75 mg  75 mg Oral DAILY    dilTIAZem ER (CARDIZEM CD) capsule 180 mg  180 mg Oral DAILY    balsam peru-castor oiL (VENELEX) ointment   Topical BID    sodium chloride (NS) flush 5-40 mL  5-40 mL IntraVENous Q8H    sodium chloride (NS) flush 5-40 mL  5-40 mL IntraVENous PRN    acetaminophen (TYLENOL) tablet 650 mg  650 mg Oral Q6H PRN    Or    acetaminophen (TYLENOL) suppository 650 mg  650 mg Rectal Q6H PRN    ondansetron (ZOFRAN ODT) tablet 4 mg  4 mg Oral Q8H PRN    Or    ondansetron (ZOFRAN) injection 4 mg  4 mg IntraVENous Q6H PRN     ______________________________________________________________________  EXPECTED LENGTH OF STAY: 4d 0h  ACTUAL LENGTH OF STAY:          9                 David M Milligram, PA-C

## 2022-10-27 NOTE — WOUND CARE
WOCN Note:      Follow up assessment of right elbow, right lateral buttock, back and sacrum. Chart reviewed. Assessed in room 623. Admitted DX: COVID       Past Medical History:   Diagnosis Date    Arrhythmia       atrial fib    Depression 4/24/2010    Diabetes ([facility] [address]       diet control for pre-diabetes    Dyslipidemia, goal LDL below 100 6/14/2011    Gout      HTN (hypertension) 4/24/2010    Impotence 4/24/2010    Morbid obesity ([facility] [address] 5/17/2010    VALENTE (obstructive sleep apnea) 4/24/2010     CPAP    Restless legs 4/15/2015      Assessment:   Patient is alert, communicative and requires assist with repositioning. Bed: prius air mattress  Patient reports no pain. Heels offloaded with pillows. Heels intact without erythema. POA Sacrum intact with blanching pink erythema. 2.  POA Right lateral buttock, skin tear:  resolved  3. POA Right elbow, partial thickness wound: resolved     Wound, Pressure Prevention & Skin Care Recommendations:    Minimize layers of linen/pads under patient to optimize support surface. 2.  Turn/reposition approximately every 2 hours and offload heels. Patient mobility team to assist with q2 turns. 3.  Manage moisture/ Keep skin folds clean and dry/minimize brief usage. 4.  Specialty bed: Prius air mattress. Use only flat sheet and one incontinence pad.  5.  Sacrum, back and heels/feet:  Venelex BID. Discussed above plan with patient.    Transition of Care:   Plan to follow as needed while admitted to hospital.     QIAN Navarro RN [facility]patient [facility]  Available on [employer]  Office [phone]

## 2022-10-28 PROCEDURE — 74011250637 HC RX REV CODE- 250/637: Performed by: NURSE PRACTITIONER

## 2022-10-28 PROCEDURE — 74011250637 HC RX REV CODE- 250/637: Performed by: PHYSICIAN ASSISTANT

## 2022-10-28 PROCEDURE — 74011000250 HC RX REV CODE- 250: Performed by: FAMILY MEDICINE

## 2022-10-28 PROCEDURE — 65270000029 HC RM PRIVATE

## 2022-10-28 PROCEDURE — 74011250637 HC RX REV CODE- 250/637: Performed by: FAMILY MEDICINE

## 2022-10-28 RX ORDER — MIRTAZAPINE 15 MG/1
7.5 TABLET, FILM COATED ORAL
Status: DISCONTINUED | OUTPATIENT
Start: 2022-10-28 | End: 2022-10-29 | Stop reason: HOSPADM

## 2022-10-28 RX ORDER — TRAZODONE HYDROCHLORIDE 50 MG/1
50 TABLET ORAL
Status: DISCONTINUED | OUTPATIENT
Start: 2022-10-28 | End: 2022-10-29 | Stop reason: HOSPADM

## 2022-10-28 RX ADMIN — MIRTAZAPINE 7.5 MG: 15 TABLET, FILM COATED ORAL at 21:23

## 2022-10-28 RX ADMIN — GABAPENTIN 300 MG: 300 CAPSULE ORAL at 21:23

## 2022-10-28 RX ADMIN — Medication: at 17:25

## 2022-10-28 RX ADMIN — SODIUM CHLORIDE, PRESERVATIVE FREE 10 ML: 5 INJECTION INTRAVENOUS at 14:00

## 2022-10-28 RX ADMIN — FAMOTIDINE 20 MG: 20 TABLET ORAL at 10:17

## 2022-10-28 RX ADMIN — ATORVASTATIN CALCIUM 80 MG: 40 TABLET, FILM COATED ORAL at 21:23

## 2022-10-28 RX ADMIN — ALLOPURINOL 100 MG: 100 TABLET ORAL at 10:17

## 2022-10-28 RX ADMIN — DILTIAZEM HYDROCHLORIDE 180 MG: 180 CAPSULE, COATED, EXTENDED RELEASE ORAL at 10:17

## 2022-10-28 RX ADMIN — APIXABAN 5 MG: 5 TABLET, FILM COATED ORAL at 17:25

## 2022-10-28 RX ADMIN — FINASTERIDE 5 MG: 5 TABLET, FILM COATED ORAL at 10:17

## 2022-10-28 RX ADMIN — TAMSULOSIN HYDROCHLORIDE 0.4 MG: 0.4 CAPSULE ORAL at 10:17

## 2022-10-28 RX ADMIN — FAMOTIDINE 20 MG: 20 TABLET ORAL at 21:23

## 2022-10-28 RX ADMIN — Medication: at 10:17

## 2022-10-28 RX ADMIN — METOPROLOL SUCCINATE 75 MG: 25 TABLET, FILM COATED, EXTENDED RELEASE ORAL at 10:17

## 2022-10-28 RX ADMIN — APIXABAN 5 MG: 5 TABLET, FILM COATED ORAL at 10:17

## 2022-10-28 RX ADMIN — TRAZODONE HYDROCHLORIDE 50 MG: 50 TABLET ORAL at 19:13

## 2022-10-28 NOTE — PROGRESS NOTES
LOREN:  1. RUR-17%  2. [facility], authorization approved 10/28/22. D/c on Saturday to [facility]. 3. BLS transport. CECILIA spoke with Luisito Gamboa at [facility], she confirmed that patient P2P was approved and she will contact [facility] with approval information. CECILIA spoke with Aminta she will call back with approval information and plan for patient to come tomorrow. CM updated patient son of this plan. Son requested CM to call him on discharge day with transport time.     Elroy Chin [facility]

## 2022-10-28 NOTE — PROGRESS NOTES
Occupational Therapy Contact Note  10/28/22  4702    Attempted to work with Pt for OT, Hospitalist and other services at bedside speaking with patient as entering room, will follow up for OT tx as able.     Monie Caldwell, OTD, OTR/L

## 2022-10-28 NOTE — PROGRESS NOTES
[address] [facility] Adult  Hospitalist Group                                                                                          Hospitalist Progress Note  Chelita Maher NP  Answering service: [phone] from in house phone        Date of Service:  10/28/2022  NAME:  Rito Russo  :  1950  MRN:  [id number]      Admission Summary:   Rito Russo is a 71 y.o. 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 He discharged from [facility] on 10/18/22 but then presented to ED on 10/19/22 with no acute complaints. Work-up revealing c/f UTI with indwelling vazquez (Enterococcus and Pseudomonas, ID consulted, on Zosyn and completing 10/25) and incidentally COVID+ (Asx, rapid test+ 10/18, PCR+ 10/20). Awaiting insurance auth for facility placement       Interval history / Subjective:   Saw the patient on rounds this morning, he reports that the trazodone did not help his insomnia- it was increased to 100 mg PRN yesterday however per the [facility] report he has never been given trazadone, even when it was 50 mg. He was seen by psychiatry who recommended starting Remeron 7.5 mg QHS for depression, this was started today. Trazadone was also scheduled ( 50mg) to start this evening. Will follow up with patient in AM regarding this. He has been accepted to SNF and insurance company has given [place] November. He will discharge tomorrow. 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  No indication for ASA, LDL Goal < 70  -- Was started on Eliquis 5mg BID on 9/29/22. Continued on admission  -- Continue Atorvastatin 80mg nightly. Recheck cholesterol panel as an outpatient  -- Needs to follow-up outpatient with Neurology  -- Completed [facility] on 10/18. Needs LTC     COVID-19  -- Incidental positive on rapid test 10/18 and PCR positive 10/20/22  -- PCT < 0.05  -- No respiratory distress or oxygen requirement     A-Fib  -- Cardiology consulted during previous admission  -- Admitted on Metoprolol Tartrate 37.5mg BID and Diltiazem IR 60mg q8h and converted to XL dosing on 10/20/22 of both medications. No issues  -- On Eliquis for stroke prevention     Urinary Retention with indwelling vazquez  UTI, Enterococcus and Pseudomonas  -- Continue Flomax  -- Urine Culture 10/18: Enterococcus and Pseudomonas   -- Abx: CTX (10/18-10/20) and Ampicillin (10/20). ID consulted 10/20 and recommended Zosyn x 5 (10/20-10/25)  -- Removed vazquez 10/19 and failed void trial. Vazquez replaced 10/20AM. Urology consulted and Proscar added. Will follow-up outpatient     Vitamin D Deficiency  -- 25(OH)D 27.8 on 10/19.   - Ergocalciferol weekly x 8 doses. R Shoulder subluxation  -- Appreciated by PT on 10/19  -- Xray R Shoulder 10/19 with no dislocation or abnormalities     Elevated D-dimer  -- D-dimer 3.80 on 10/19. No tachycardia/tachypnea/O2 requirement c/f PE. Likely elevated due to immobile state and COVID-19     Insomnia  - PRN melatonin  - scheduled trazadone 50 mg QHS to start on 10/28    Depression  - Psych consulted for medication recs.  Appreciate assistance  - will start Remeron at bedtime    Code status: Full code  Prophylaxis: Apixaban  Care Plan discussed with: Pt, care coordinator  Anticipated Disposition: Per CM notes, pt has been accepted SNF, insurance has given [place] [facility], will discharge tomorrow        Hospital Problems  Date Reviewed: 10/12/2022            Codes Class Noted POA    COVID ICD-10-CM: U07.1  ICD-9-CM: 079.89  10/18/2022 Unknown         Review of Systems:   A comprehensive review of systems was negative except for that written in the HPI. Vital Signs:    Last 24hrs VS reviewed since prior progress note. Most recent are:  Visit Vitals  /73 (BP 1 Location: Left arm, BP Patient Position: At rest)   Pulse 72   Temp 98.6 °F (37 °C)   Resp 18   Ht 6' 3\" (1.905 m)   Wt 115.2 kg (253 lb 15.5 oz)   SpO2 97%   BMI 31.74 kg/m²         Intake/Output Summary (Last 24 hours) at 10/28/2022 1722  Last data filed at 10/28/2022 1000  Gross per 24 hour   Intake --   Output 3900 ml   Net -3900 ml          Physical Examination:     I had a face to face encounter with this patient and independently examined them on 10/28/2022 as outlined below:          Constitutional:  No acute distress, cooperative, pleasant    ENT:  Oral mucosa moist, oropharynx benign. Resp:  CTA bilaterally. No wheezing/rhonchi/rales. No accessory muscle use. CV:  Regular rhythm, normal rate, no murmurs, gallops, rubs    GI:    :  Soft, non distended, non tender. normoactive bowel sounds, no hepatosplenomegaly    [place] in place, bag with yellow/clear urine output    Musculoskeletal:  No edema, warm, RUE in sling    Neurologic:  Ride sided hemiplegia, right sided facial asymmetry, diminished sensation and 0/5 strength against gravity in RUE and RLE, strength 5/5 in LUE and LLE. Sensation grossly intact in RLE. AAOx3            Data Review:    Review and/or order of clinical lab test  Review and/or order of tests in the radiology section of CPT  Review and/or order of tests in the medicine section of CPT      Labs:     Recent Labs     10/27/22  0113   WBC 9.0   HGB 9.8*   HCT 30.0*          Recent Labs     10/27/22  0113   *   K 4.0      CO2 26   BUN 16   CREA 0.51*   *   CA 9.2   MG 1.8   PHOS 3.3       Recent Labs     10/27/22  0113   ALT 29   AP 84   TBILI 0.7   TP 7.6   ALB 2.2*   GLOB 5.4*       No results for input(s): INR, PTP, APTT, INREXT, INREXT in the last 72 hours. No results for input(s): FE, TIBC, PSAT, FERR in the last 72 hours. No results found for: FOL, RBCF   No results for input(s): PH, PCO2, PO2 in the last 72 hours. No results for input(s): CPK, CKNDX, TROIQ in the last 72 hours.     No lab exists for component: CPKMB  Lab Results   Component Value Date/Time    Cholesterol, total 146 09/15/2022 02:40 AM    HDL Cholesterol 53 09/15/2022 02:40 AM    LDL, calculated 65 09/15/2022 02:40 AM    Triglyceride 140 09/15/2022 02:40 AM    CHOL/HDL Ratio 2.8 09/15/2022 02:40 AM     Lab Results   Component Value Date/Time    Glucose (POC) 123 (H) 10/24/2022 12:15 PM    Glucose (POC) 134 (H) 09/28/2022 11:46 AM    Glucose (POC) 116 09/28/2022 06:08 AM    Glucose (POC) 121 (H) 09/28/2022 12:34 AM    Glucose (POC) 124 (H) 09/27/2022 05:28 PM     Lab Results   Component Value Date/Time    Color YELLOW/STRAW 10/18/2022 01:01 AM    Appearance TURBID (A) 10/18/2022 01:01 AM    Specific gravity 1.013 10/18/2022 01:01 AM    pH (UA) 6.5 10/18/2022 01:01 AM    Protein TRACE (A) 10/18/2022 01:01 AM    Glucose Negative 10/18/2022 01:01 AM    Ketone Negative 10/18/2022 01:01 AM    Bilirubin Negative 10/18/2022 01:01 AM    Urobilinogen 4.0 (H) 10/18/2022 01:01 AM    Nitrites Positive (A) 10/18/2022 01:01 AM    Leukocyte Esterase MODERATE (A) 10/18/2022 01:01 AM    Epithelial cells FEW 10/18/2022 01:01 AM    Bacteria 4+ (A) 10/18/2022 01:01 AM    WBC 20-50 10/18/2022 01:01 AM    RBC 0-5 10/18/2022 01:01 AM         Medications Reviewed:     Current Facility-Administered Medications   Medication Dose Route Frequency    mirtazapine (REMERON) tablet 7.5 mg  7.5 mg Oral QHS    traZODone (DESYREL) tablet 50 mg  50 mg Oral QHS    finasteride (PROSCAR) tablet 5 mg  5 mg Oral DAILY    melatonin tablet 3 mg  3 mg Oral QHS PRN    allopurinoL (ZYLOPRIM) tablet 100 mg  100 mg Oral DAILY    apixaban (ELIQUIS) tablet 5 mg  5 mg Oral BID    atorvastatin (LIPITOR) tablet 80 mg  80 mg Oral QHS    famotidine (PEPCID) tablet 20 mg  20 mg Oral Q12H    gabapentin (NEURONTIN) capsule 300 mg  300 mg Oral QHS    tamsulosin (FLOMAX) capsule 0.4 mg  0.4 mg Oral DAILY    ergocalciferol capsule 50,000 Units  50,000 Units Oral Q7D    metoprolol succinate (TOPROL-XL) XL tablet 75 mg  75 mg Oral DAILY    dilTIAZem ER (CARDIZEM CD) capsule 180 mg  180 mg Oral DAILY    balsam peru-castor oiL (VENELEX) ointment   Topical BID    sodium chloride (NS) flush 5-40 mL  5-40 mL IntraVENous Q8H    sodium chloride (NS) flush 5-40 mL  5-40 mL IntraVENous PRN    acetaminophen (TYLENOL) tablet 650 mg  650 mg Oral Q6H PRN    Or    acetaminophen (TYLENOL) suppository 650 mg  650 mg Rectal Q6H PRN    ondansetron (ZOFRAN ODT) tablet 4 mg  4 mg Oral Q8H PRN    Or    ondansetron (ZOFRAN) injection 4 mg  4 mg IntraVENous Q6H PRN     ______________________________________________________________________  EXPECTED LENGTH OF STAY: 4d 0h  ACTUAL LENGTH OF STAY:          Robert Mari NP

## 2022-10-29 ENCOUNTER — APPOINTMENT (OUTPATIENT)
Dept: CT IMAGING | Age: 72
DRG: 056 | End: 2022-10-29
Attending: NURSE PRACTITIONER
Payer: MEDICARE

## 2022-10-29 VITALS
SYSTOLIC BLOOD PRESSURE: 129 MMHG | HEART RATE: 72 BPM | WEIGHT: 253.97 LBS | DIASTOLIC BLOOD PRESSURE: 71 MMHG | BODY MASS INDEX: 31.58 KG/M2 | HEIGHT: 75 IN | RESPIRATION RATE: 18 BRPM | OXYGEN SATURATION: 97 % | TEMPERATURE: 97.7 F

## 2022-10-29 LAB
ALBUMIN SERPL-MCNC: 2.4 G/DL (ref 3.5–5)
ALBUMIN/GLOB SERPL: 0.4 {RATIO} (ref 1.1–2.2)
ALP SERPL-CCNC: 98 U/L (ref 45–117)
ALT SERPL-CCNC: 31 U/L (ref 12–78)
ANION GAP SERPL CALC-SCNC: 4 MMOL/L (ref 5–15)
AST SERPL-CCNC: 26 U/L (ref 15–37)
BASOPHILS # BLD: 0 K/UL (ref 0–0.1)
BASOPHILS NFR BLD: 0 % (ref 0–1)
BILIRUB SERPL-MCNC: 0.7 MG/DL (ref 0.2–1)
BUN SERPL-MCNC: 13 MG/DL (ref 6–20)
BUN/CREAT SERPL: 22 (ref 12–20)
CALCIUM SERPL-MCNC: 9.8 MG/DL (ref 8.5–10.1)
CHLORIDE SERPL-SCNC: 101 MMOL/L (ref 97–108)
CO2 SERPL-SCNC: 30 MMOL/L (ref 21–32)
CREAT SERPL-MCNC: 0.6 MG/DL (ref 0.7–1.3)
DIFFERENTIAL METHOD BLD: ABNORMAL
EOSINOPHIL # BLD: 0.2 K/UL (ref 0–0.4)
EOSINOPHIL NFR BLD: 3 % (ref 0–7)
ERYTHROCYTE [DISTWIDTH] IN BLOOD BY AUTOMATED COUNT: 13.9 % (ref 11.5–14.5)
GLOBULIN SER CALC-MCNC: 6 G/DL (ref 2–4)
GLUCOSE BLD STRIP.AUTO-MCNC: 118 MG/DL (ref 65–117)
GLUCOSE SERPL-MCNC: 88 MG/DL (ref 65–100)
HCT VFR BLD AUTO: 34.8 % (ref 36.6–50.3)
HGB BLD-MCNC: 10.9 G/DL (ref 12.1–17)
IMM GRANULOCYTES # BLD AUTO: 0 K/UL (ref 0–0.04)
IMM GRANULOCYTES NFR BLD AUTO: 0 % (ref 0–0.5)
LYMPHOCYTES # BLD: 1.7 K/UL (ref 0.8–3.5)
LYMPHOCYTES NFR BLD: 21 % (ref 12–49)
MAGNESIUM SERPL-MCNC: 1.7 MG/DL (ref 1.6–2.4)
MCH RBC QN AUTO: 27.8 PG (ref 26–34)
MCHC RBC AUTO-ENTMCNC: 31.3 G/DL (ref 30–36.5)
MCV RBC AUTO: 88.8 FL (ref 80–99)
MONOCYTES # BLD: 0.7 K/UL (ref 0–1)
MONOCYTES NFR BLD: 9 % (ref 5–13)
NEUTS SEG # BLD: 5.3 K/UL (ref 1.8–8)
NEUTS SEG NFR BLD: 67 % (ref 32–75)
NRBC # BLD: 0 K/UL (ref 0–0.01)
NRBC BLD-RTO: 0 PER 100 WBC
PHOSPHATE SERPL-MCNC: 4 MG/DL (ref 2.6–4.7)
PLATELET # BLD AUTO: 378 K/UL (ref 150–400)
PMV BLD AUTO: 9.9 FL (ref 8.9–12.9)
POTASSIUM SERPL-SCNC: 4 MMOL/L (ref 3.5–5.1)
PROT SERPL-MCNC: 8.4 G/DL (ref 6.4–8.2)
RBC # BLD AUTO: 3.92 M/UL (ref 4.1–5.7)
SERVICE CMNT-IMP: ABNORMAL
SODIUM SERPL-SCNC: 135 MMOL/L (ref 136–145)
WBC # BLD AUTO: 8 K/UL (ref 4.1–11.1)

## 2022-10-29 PROCEDURE — APPNB30 APP NON BILLABLE TIME 0-30 MINS: Performed by: NURSE PRACTITIONER

## 2022-10-29 PROCEDURE — 83735 ASSAY OF MAGNESIUM: CPT

## 2022-10-29 PROCEDURE — 70496 CT ANGIOGRAPHY HEAD: CPT

## 2022-10-29 PROCEDURE — 36415 COLL VENOUS BLD VENIPUNCTURE: CPT

## 2022-10-29 PROCEDURE — 74011250637 HC RX REV CODE- 250/637: Performed by: PHYSICIAN ASSISTANT

## 2022-10-29 PROCEDURE — 85025 COMPLETE CBC W/AUTO DIFF WBC: CPT

## 2022-10-29 PROCEDURE — 84100 ASSAY OF PHOSPHORUS: CPT

## 2022-10-29 PROCEDURE — 80053 COMPREHEN METABOLIC PANEL: CPT

## 2022-10-29 PROCEDURE — 82962 GLUCOSE BLOOD TEST: CPT

## 2022-10-29 PROCEDURE — 74011000636 HC RX REV CODE- 636: Performed by: RADIOLOGY

## 2022-10-29 PROCEDURE — 74011250637 HC RX REV CODE- 250/637: Performed by: FAMILY MEDICINE

## 2022-10-29 PROCEDURE — 0042T CT CODE NEURO PERF W CBF: CPT

## 2022-10-29 PROCEDURE — 70450 CT HEAD/BRAIN W/O DYE: CPT

## 2022-10-29 PROCEDURE — 74011250637 HC RX REV CODE- 250/637: Performed by: NURSE PRACTITIONER

## 2022-10-29 RX ORDER — LANOLIN ALCOHOL/MO/W.PET/CERES
3 CREAM (GRAM) TOPICAL
Qty: 30 TABLET | Refills: 0 | Status: SHIPPED | OUTPATIENT
Start: 2022-10-29

## 2022-10-29 RX ORDER — ONDANSETRON 4 MG/1
4 TABLET, ORALLY DISINTEGRATING ORAL
Qty: 30 TABLET | Refills: 0 | Status: SHIPPED | OUTPATIENT
Start: 2022-10-29

## 2022-10-29 RX ORDER — GABAPENTIN 300 MG/1
300 CAPSULE ORAL
Qty: 30 CAPSULE | Refills: 0 | Status: SHIPPED | OUTPATIENT
Start: 2022-10-29

## 2022-10-29 RX ORDER — FINASTERIDE 5 MG/1
5 TABLET, FILM COATED ORAL DAILY
Qty: 30 TABLET | Refills: 0 | Status: SHIPPED | OUTPATIENT
Start: 2022-10-30

## 2022-10-29 RX ORDER — METOPROLOL SUCCINATE 25 MG/1
75 TABLET, EXTENDED RELEASE ORAL DAILY
Qty: 30 TABLET | Refills: 0 | Status: SHIPPED | OUTPATIENT
Start: 2022-10-30

## 2022-10-29 RX ORDER — ERGOCALCIFEROL 1.25 MG/1
50000 CAPSULE ORAL
Qty: 4 CAPSULE | Refills: 1 | Status: SHIPPED | OUTPATIENT
Start: 2022-11-03 | End: 2022-12-09

## 2022-10-29 RX ORDER — MIRTAZAPINE 7.5 MG/1
7.5 TABLET, FILM COATED ORAL
Qty: 30 TABLET | Refills: 0 | Status: SHIPPED | OUTPATIENT
Start: 2022-10-29

## 2022-10-29 RX ADMIN — METOPROLOL SUCCINATE 75 MG: 25 TABLET, FILM COATED, EXTENDED RELEASE ORAL at 09:11

## 2022-10-29 RX ADMIN — IOPAMIDOL 40 ML: 755 INJECTION, SOLUTION INTRAVENOUS at 11:30

## 2022-10-29 RX ADMIN — ALLOPURINOL 100 MG: 100 TABLET ORAL at 09:12

## 2022-10-29 RX ADMIN — IOPAMIDOL 80 ML: 755 INJECTION, SOLUTION INTRAVENOUS at 11:31

## 2022-10-29 RX ADMIN — DILTIAZEM HYDROCHLORIDE 180 MG: 180 CAPSULE, COATED, EXTENDED RELEASE ORAL at 09:12

## 2022-10-29 RX ADMIN — APIXABAN 5 MG: 5 TABLET, FILM COATED ORAL at 09:12

## 2022-10-29 RX ADMIN — FINASTERIDE 5 MG: 5 TABLET, FILM COATED ORAL at 09:12

## 2022-10-29 RX ADMIN — FAMOTIDINE 20 MG: 20 TABLET ORAL at 09:12

## 2022-10-29 RX ADMIN — TAMSULOSIN HYDROCHLORIDE 0.4 MG: 0.4 CAPSULE ORAL at 09:12

## 2022-10-29 NOTE — PROGRESS NOTES
Patient is being discharged to [facility] and [facility] is set up for a 1pm transport time today. Discharge packet on patients chart. No additional questions at this time.

## 2022-10-29 NOTE — PROGRESS NOTES
Neurocritical Care Code Stroke Documentation      Symptoms:   Expressive aphasia and confusion   Last Known Well: Unclear   Medical hx: Active Problems:    COVID (10/18/2022)    The patient was admitted 10/18 for acute cystitis. He was found to be positive for COVID. The patient has baseline right-sided hemiparesis from a recent left MCA stroke with hemorrhagic conversion in September. He was restarted back on his Eliquis. Per review of notes, he initially had expressive aphasia with his stroke in September. Hospitalist NP reported today he has been more confused today with word finding difficulty which is a change from her previous exam yesterday at 1500. The patient did received trazodone yesterday evening for insomnia. The patient reported that his speech has not completely come back to normal after his stroke. Code stroke called due to expressive aphasia and confusion. Past Medical History:   Diagnosis Date    Arrhythmia     atrial fib    Depression 2010    Diabetes ([facility] [address]     diet control for pre-diabetes    Dyslipidemia, goal LDL below 100 2011    Gout     HTN (hypertension) 2010    Impotence 2010    Morbid obesity ([facility] [address] 2010    VALENTE (obstructive sleep apnea) 2010    CPAP    Restless legs 4/15/2015      Anticoagulation: On Eliquis 5 mg BID   VAN:   Positive   NIHSS:   1a-LOC:0    1b-Month/Age:2    1c-Open/Close Hand:0    2-Best Gaze:0    3-Visual Fields:0    4-Facial Palsy:1    5a-Left Arm:0    5b-Right Arm:4    6a-Left Le    6b-Right Leg:3    7-Limb Ataxia:0    8-Sensory:2    9-Best Language:1    10-Dysarthria:0    11-Extinction/Inattention:0  TOTAL SCORE:13   Imaging:   CT: IMPRESSION  Subacute left MCA infarct. CTA/CTP: CT Perfusion:  Normal CT perfusion. IMPRESSION  1. Sequela of prior left MCA territory infarct with encephalomalacia in the  left basal ganglia and left temporal lobe. 2.  No acute large vessel occlusion or perfusion abnormality.   3.  No hemodynamically significant carotid stenosis. Plan:   TNK Candidate: NO    Mechanical thrombectomy Candidate: NO   I spoke with Dr. Ashu Ramirez with teleneuro and he recommends assessing for any underlying metabolic/infectious abnormalities contributing to patient's symptoms. He also recommends repeating an MRI of Brain if these symptoms are new and not the patient's baseline to assess for any new ischemia. May consider EEG if repeat MRI shows no new event after discussion with Dr. Ashu Ramirez. The patient has reportedly been treated for his UTI after discussion with Hospitalist NP. I informed Hospitalist NP on teleneuro recommendations. Discussed with Nilda Jaime Hospitalist NP and Cristina Ramirez. Arrival time: 1037  Time spent: 25 minutes.      Evan Hernandez NP

## 2022-10-29 NOTE — DISCHARGE SUMMARY
Discharge Summary       PATIENT ID: Mario Baca  MRN: [id number]   YOB: 1950    DATE OF ADMISSION: 10/17/2022  9:15 PM    DATE OF DISCHARGE: 10/23/2022  PRIMARY CARE PROVIDER: Justine Bills MD     ATTENDING PHYSICIAN: Dr. Faina De   DISCHARGING PROVIDER: Marilyn Lombardi NP    To contact this individual call [phone] and ask the  to page. If unavailable ask to be transferred the Adult Hospitalist Department. CONSULTATIONS: IP CONSULT TO HOSPITALIST  IP CONSULT TO INFECTIOUS DISEASES  IP CONSULT TO UROLOGY  IP CONSULT TO PSYCHIATRY    PROCEDURES/SURGERIES: * No surgery found *    ADMITTING [facility] COURSE:     Mario Baca is a 71 y.o. 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 He discharged from [facility] on 10/18/22 but then presented to ED on 10/19/22 with no acute complaints. Work-up revealing c/f UTI with indwelling vazquez (Enterococcus and Pseudomonas, ID consulted, on Zosyn and completing 10/25) and incidentally COVID+ (Asx, rapid test+ 10/18, PCR+ 10/20). He was on isolation here for 11 days and is now out of isolation. He remained asymptomatic during course. Voiding trial was attempted on 10/19 and was unsuccessful, vazquez reinserted, patient now on two agents ( Flomax and Proscar). Will folow up with urology on 11/21 as noted in the follow up appointments. Patient needs long term care placement, he has been accepted at [facility] and will transfer there today. Today, 10/29 I rounded and examined the patient this morning. He received his first dose of Remeron and Trazodone last night at bedtime. This morning he is having some mild increased confusion and word finding difficulties.  Code stroke initiated and repeat CT imaging obtained with stable findings. Was recommended for repeat MRI as well as EEG by teleneuro. Clinically the findings were likely pharmacologic effects of Remeron and Trazodone leading to recrudescence of stroke symptoms. The patient appears to now be back to baseline. I do not think MRI would significantly  and our in house rounding neurologist is in agreement ( Dr. Mima Clay). If patient has repeated unprovoked events , can consider EEG or MRI in the future. 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 No indication for ASA, LDL Goal < 70  -- Was started on Eliquis 5mg BID on 9/29/22. Continued on admission  -- Continue Atorvastatin 80mg nightly. Recheck cholesterol panel as an outpatient  -- Needs to follow-up outpatient with Neurology  -- Completed [facility] on 10/18. Needs LTC     COVID-19  -- Incidental positive on rapid test 10/18 and PCR positive 10/20/22  -- PCT < 0.05  -- No respiratory distress or oxygen requirement     A-Fib  -- Cardiology consulted during previous admission  -- Admitted on Metoprolol Tartrate 37.5mg BID and Diltiazem IR 60mg q8h and converted to XL dosing on 10/20/22 of both medications. No issues  -- On Eliquis for stroke prevention     Urinary Retention with indwelling vazquez  UTI, Enterococcus and Pseudomonas  -- Continue Flomax  -- Urine Culture 10/18: Enterococcus and Pseudomonas   -- Abx: CTX (10/18-10/20) and Ampicillin (10/20). ID consulted 10/20 and recommended Zosyn x 5 (10/20-10/25)  -- Removed vazquez 10/19 and failed void trial. Vazquez replaced 10/20AM. Urology consulted and Proscar added.  Will follow-up outpatient     Vitamin D Deficiency  -- 25(OH)D 27.8 on 10/19.   - Ergocalciferol weekly x 8 doses- order prescribed at discharge      R Shoulder subluxation  -- Appreciated by PT on 10/19  -- Xray R Shoulder 10/19 with no dislocation or abnormalities     Elevated D-dimer  -- D-dimer 3.80 on 10/19. No tachycardia/tachypnea/O2 requirement c/f PE. Likely elevated due to immobile state and COVID-19     Insomnia  - PRN melatonin  - scheduled trazadone 50 mg QHS to start on 10/28- patient received and had increased confusion/expressive aphasia this morning prompting a code stroke workup. Will discontinue this at discharge. Depression  - Psych consulted for medication recs. Appreciate assistance  - will start Remeron at bedtime on 10/28           PENDING TEST RESULTS:   At the time of discharge the following test results are still pending: None     FOLLOW UP APPOINTMENTS:    Follow-up Information       Follow up With Specialties Details Why Contact Info    [address] [place] [address] Seun Singer Tito 950 [id number] [phone]    [facility] on 11/21/2022 Dr Miguel Ledbetter at [address] [facility] / [facility], [address]  [address] [place]  [phone]    Sasha Lizarraga MD Internal Medicine Physician   [address] [address]  [place] [address], [facility] [address], [address] Neurology Follow up in 1 month(s) For stroke follow up appointment, please call to [address] [facility] [phone]               ADDITIONAL CARE RECOMMENDATIONS:     Sexton catheter should be changed once every 30 days, was last changed on 10/20/22   Patient has baseline expressive aphasia at times and right sided weakness from previous stroke in September.    Evaluate for depression, started on Remeron at low dose on 10/28, will likely need to be increased in a few weeks   Please ensure he gets to his urology follow up appointment for voiding trial   Patient needs neurology follow up appointment scheduled for Stroke follow up. DIET: Regular Diet      ACTIVITY: Activity as tolerated  PT Note from 10/27/22     ASSESSMENT  Patient continues with skilled PT services and is progressing towards goals. Patient working on standing at bedside. Remains limited by poor right sided motor control, limited strength, balance. Requiring mod to max assist of two. Did stand 3-4 times using back of chair to hold to and max assist to get full hip extension , having difficulty maintaining extension. Deferred out of bed to day due to fatigue and multiple stands during session. .      Current Level of Function Impacting Discharge (mobility/balance): mod to  max assist of 1-2     Other factors to consider for discharge: was independent prior to CVA          PLAN :  Patient continues to benefit from skilled intervention to address the above impairments. Continue treatment per established plan of care. to address goals. Recommendation for discharge: (in order for the patient to meet his/her long term goals)  Therapy 3 hours per day 5-7 days per week; if unable then SNF rehab     This discharge recommendation:  Has been made in collaboration with the attending provider and/or case management     IF patient discharges home will need the following DME: bedside commode, hospital bed, mechanical lift, transfer bench, and wheelchair       WOUND CARE: None     EQUIPMENT needed: bedside commode, mechanical lift, transfer bench, wheel chair       DISCHARGE MEDICATIONS:  Current Discharge Medication List        START taking these medications    Details   ergocalciferol (ERGOCALCIFEROL) 1,250 mcg (50,000 unit) capsule Take 1 Capsule by mouth every seven (7) days for 6 doses. Qty: 4 Capsule, Refills: 1  Start date: 11/3/2022, End date: 12/9/2022      finasteride (PROSCAR) 5 mg tablet Take 1 Tablet by mouth daily.   Qty: 30 Tablet, Refills: 0  Start date: 10/30/2022      melatonin 3 mg tablet Take 1 Tablet by mouth nightly as needed for Insomnia. Qty: 30 Tablet, Refills: 0  Start date: 10/29/2022      metoprolol succinate (TOPROL-XL) 25 mg XL tablet Take 3 Tablets by mouth daily. Qty: 30 Tablet, Refills: 0  Start date: 10/30/2022      mirtazapine (REMERON) 7.5 mg tablet Take 1 Tablet by mouth nightly. Qty: 30 Tablet, Refills: 0  Start date: 10/29/2022      ondansetron (ZOFRAN ODT) 4 mg disintegrating tablet Take 1 Tablet by mouth every eight (8) hours as needed for Nausea or Vomiting. Qty: 30 Tablet, Refills: 0  Start date: 10/29/2022           CONTINUE these medications which have CHANGED    Details   gabapentin (NEURONTIN) 300 mg capsule Take 1 Capsule by mouth nightly. Max Daily Amount: 300 mg. 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 Qty: 60 Tablet, Refills: 0      atorvastatin (LIPITOR) 80 mg tablet Take 1 Tablet by mouth nightly. Qty: 30 Tablet, Refills: 0      balsam peru-castor oiL (VENELEX) ointment Apply  to affected area two (2) times a day. APPLY TO all bony prominences, abrasions and ecchymosis to right side, posterior head Nursing, document site in comments  Qty: 5 g, Refills: 0      dilTIAZem IR (CARDIZEM) 60 mg tablet Take 1 Tablet by mouth every eight (8) hours. Qty: 30 Tablet, Refills: 0      docusate sodium (COLACE) 100 mg capsule Take 1 Capsule by mouth two (2) times daily as needed for Constipation for up to 90 days.   Qty: 60 Capsule, Refills: 0      famotidine (PEPCID) 20 mg tablet Take 1 Tablet by mouth every twelve (12) hours. Qty: 30 Tablet, Refills: 0      tamsulosin (FLOMAX) 0.4 mg capsule Take 1 Capsule by mouth daily. Qty: 30 Capsule, Refills: 0           STOP taking these medications       metoprolol tartrate 37.5 mg tab Comments:   Reason for Stopping:                 NOTIFY YOUR PHYSICIAN FOR ANY OF THE FOLLOWING:   Fever over 101 degrees for 24 hours. Chest pain, shortness of breath, fever, chills, nausea, vomiting, diarrhea, change in mentation, falling, weakness, bleeding. Severe pain or pain not relieved by medications. Or, any other signs or symptoms that you may have questions about. DISPOSITION:    Home With:   OT  PT  HH  RN      X Long term SNF/Inpatient Rehab    Independent/assisted living    Hospice    Other:       PATIENT CONDITION AT DISCHARGE:     Functional status   X Poor     Deconditioned     Independent      Cognition    X Lucid     Forgetful     Dementia      Catheters/lines (plus indication)   X Sexton ( last changed on 10/20/22)     PICC     PEG     None      Code status    X Full code     DNR      PHYSICAL EXAMINATION AT DISCHARGE:    Constitutional:  No acute distress, cooperative, pleasant    ENT:  Oral mucosa moist, oropharynx benign. Resp:  CTA bilaterally. No wheezing/rhonchi/rales. No accessory muscle use. CV:  Regular rhythm, normal rate, no murmurs, gallops, rubs    GI:     :  Soft, non distended, non tender. normoactive bowel sounds, no hepatosplenomegaly    Sexton in place, bag with yellow/clear urine output    Musculoskeletal:  No edema, warm, RUE in sling    Neurologic:  Ride sided hemiplegia, right sided facial asymmetry, diminished sensation and 0/5 strength against gravity in RUE and RLE, strength 5/5 in LUE and LLE. Sensation grossly intact in RLE. AAOx3, occasional expressive aphasia.                                   CHRONIC MEDICAL DIAGNOSES:  Problem List as of 10/29/2022 Date Reviewed: 10/12/2022            Codes Class Noted - Resolved    COVID ICD-10-CM: U07.1  ICD-9-CM: 079.89  10/18/2022 - Present        Ischemic cerebrovascular accident (CVA) ([facility] ICD-10-CM: I63.9  ICD-9-CM: 434.91  9/14/2022 - Present        Diabetes mellitus type 2, diet-controlled ([facility].) ICD-10-CM: E11.9  ICD-9-CM: 250.00  5/2/2018 - Present        Benign non-nodular prostatic hyperplasia with lower urinary tract symptoms ICD-10-CM: N40.1  ICD-9-CM: 600.91  4/25/2017 - Present    Overview Signed 4/25/2017  9:10 AM by Luz Peters MD     Retention had catheter 3 months             Urinary retention due to benign prostatic hyperplasia ICD-10-CM: N40.1, R33.8  ICD-9-CM: 600.91, 788.20  10/25/2016 - Present        Diabetes mellitus type 2, controlled ([facility] ICD-10-CM: E11.9  ICD-9-CM: 250.00  10/15/2015 - Present        Restless legs ICD-10-CM: G25.81  ICD-9-CM: 333.94  4/15/2015 - Present        VALENTE (obstructive sleep apnea) ICD-10-CM: O47.23  ICD-9-CM: 327.23  6/19/2012 - Present    Overview Signed 6/19/2012 10:57 AM by Lisa Nieves MD     Bi-Level: 19/15 cmH2O. Dyslipidemia, goal LDL below 100 ICD-10-CM: E78.5  ICD-9-CM: 272.4  6/14/2011 - Present        A-fib ([facility] ICD-10-CM: I48.91  ICD-9-CM: 427.31  2/7/2011 - Present    Overview Addendum 2/9/2011  3:42 PM by Jaun Mina NP     Assymptomatic, noted on ekg 2/11; found when had sleep study. Saw cardiologist 2/8/11.              Diabetes ([facility] ICD-10-CM: E11.9  ICD-9-CM: 250.00  1/28/2011 - Present    Overview Addendum 2/9/2011  3:37 PM by Jaun Mina NP     Recent diagnosis; just started diabetes education  HgA1C 6.5%             Morbid obesity ([facility] ICD-10-CM: E66.01  ICD-9-CM: 278.01  5/17/2010 - Present    Overview Signed 2/9/2011  3:35 PM by Jaun Mina NP     OBESE 20 + YEARS; HIGH WEIGHT 362 LBS PAST MONTH; LOW WEIGHT 190 LBS 30 YEARS AGO             VALENTE (obstructive sleep apnea) ICD-10-CM: S52.04  ICD-9-CM: 327.23  4/24/2010 - Present        Erectile dysfunction due to arterial insufficiency ICD-10-CM: N52.01  ICD-9-CM: 607.84  4/24/2010 - Present        HTN (hypertension) ICD-10-CM: I10  ICD-9-CM: 401.9  4/24/2010 - Present    Overview Signed 4/24/2010  9:31 AM by Raya Newell     borderline             Depression ICD-10-CM: Shannan.Begun. A  ICD-9-CM: 669  4/24/2010 - Present           Greater than 75 minutes were spent with the patient on counseling and coordination of care    Signed:   Kush Zavala NP  10/29/2022  12:39 PM

## 2022-10-29 NOTE — PROGRESS NOTES
Discharge summary and instructions reviewed wit Fabiana Child RN at [place]. RN has no questions at this time and will contact unit if any arise. PIVs removed with tips intact. Pt's son and sister updated on patient status. Belongings sent with pt via stretcher by MALIHA.

## 2022-10-29 NOTE — PROGRESS NOTES
Rounded and examined patient this AM prior to planned discharge at 1 pm. Patient did receive dose of Trazodone 50 mg yesterday evening at 715pm for the first time for complaints of insomnia. This morning the nurse noted the patient having word finding difficulty, unable to come up with the word \"creamer\". Patient with history of large left MCA territory stroke with hemorrhagic conversion in September of this year. He was restarted on Eliquis after clearance via head CT on 9/29. The patient initially had expressive aphasia with stroke in September per documentation. Today on my exam, he is more confused, having increasing word finding difficulty. He is able to identify a \"pen\" but not a \"badge\" or a \"stethoscope\". He recognizes that the word he is saying is incorrect. This is overall a change from my exam yesterday afternoon at 1500. It is possible that the trazodone has caused a recrudescence of previous stroke symptoms. Patient states that his speech has not been completely back to normal since the time of his stroke. CODE stroke initiated to ensure that new stroke not present prior to sending patient out of the facility today. NIH as calculated by the [facility] is 13 - most of which is chronic.      JAUN Gomez  Hospitalist

## 2022-10-29 NOTE — PROGRESS NOTES
1032 Code Stroke called d/t expressive aphasia. NP at bedside. [address] traveled with pt to CT. 1150 Pt and RN returned to unit.

## 2022-10-29 NOTE — PROGRESS NOTES
Brief Neurology Note:    Was asked to review events of last 24 hours without formal consultation requested. Mr. Zoraida Layton is a 71year old male with large cardioembolic stroke (left MCA, with hemorraghic conversion) with right hemiparesis, aphasia which improved. Yesterday when the patient is reported of both been started on trazodone for insomnia as well as Remeron for depression. This morning the patient was ported to be more encephalopathic/with worsening language function for which repeat CT scan was obtained with stable findings. Was recommended for repeat MRI as well as EEG by teleneurology. On discussion with primary team reasonable to consider pharmacologic effects of both Remeron and trazodone as leading to recrudescence of stroke symptoms. Agree that it not clear that MRI would significantly . Regarding EEG patient certainly is reasonably at risk for seizure though given the possibility/relationship to sedating medications would consider this only if patient has repeated unprovoked events.   Once again was not asked to see patient in person please feel free to reconsult as needed

## 2022-10-31 ENCOUNTER — PATIENT OUTREACH (OUTPATIENT)
Dept: CASE MANAGEMENT | Age: 72
End: 2022-10-31

## 2022-11-03 LAB — HBA1C MFR BLD HPLC: 5.3 %

## 2022-11-04 NOTE — PROGRESS NOTES
Physician Progress Note      Deja Davidson  [facility] #:                  [id number]  :                       1950  ADMIT DATE:       10/17/2022 9:15 PM  [address] [place] DATE:        10/29/2022 2:20 PM  RESPONDING  PROVIDER #:        Beatrice TAN NP          QUERY TEXT:    Pt admitted with weakness. Pt noted to have Covid and CAUTI. If possible, please document in progress notes and discharge summary the relationship, if any, between weakness and Covid/CAUTI. The medical record reflects the following:  Risk Factors: Weakness    Clinical Indicators:  Patient presents with weakness, and is being admitted for acute cystitis. n the ED, BP was elevated  to 161/107. patient have UTI is due to the indwelling urinary catheter POA-Continue  Flomax. COVID-19-Incidental positive on 10/18. MD notes noted as Sexton catheter related UTI. Sexton catheter removed 10/19 however patient failed voiding trial and Sexton catheter  replaced. Cultures growing Enterococcus and Pseudomonas. COVID-19 infection. Asymptomatic. No treatment indicated. Continue isolation    Treatment:  flomax  Isolation      Thank you,  Cordell Marroquin RN [facility]  Clinical Documentation  [phone] or via [employer]  Options provided:  -- weakness due to Covid  -- weakness due to CAUTI  -- weakness due to ##, . -- Other - I will add my own diagnosis  -- Disagree - Not applicable / Not valid  -- Disagree - Clinically unable to determine / Unknown  -- Refer to Clinical Documentation Reviewer    PROVIDER RESPONSE TEXT:    This patient has weakness due to Recent large left MCA stroke rendering him geena-plegic on the right side. Query created by: Marietta Sanchez on 2022 7:03 AM      QUERY TEXT:    Patient admitted with weakness, noted to have subacute CVA on 10/29 CT. If possible, please document in progress notes and discharge summary if you are evaluating and/or treating any of the following:       The medical record reflects the following:  Risk Factors: abnormal ct of the head    Clinical Indicators:  10/29  CT of the Head  Subacute left MCA infarct. Treatment:  CT of the head  PT/OT    Thank you,  Emily Valverde RN [facility]  Clinical Documentation  [phone] or via [address] [facility] provided:  -- subacute CVA ruled in but not POA  -- subacute CVA ruled out  -- Other - I will add my own diagnosis  -- Disagree - Not applicable / Not valid  -- Disagree - Clinically unable to determine / Unknown  -- Refer to Clinical Documentation Reviewer    PROVIDER RESPONSE TEXT:    Provider disagreed with this query. The patient was treated for hemorrhagic stroke in September 2022 in a seperate hospitalization. The patient was admitted this time for COVID 19 and CAUTI. There were no additional treatment plan changes or management that changed related to the sub acute stroke which was already a known diagnosis from previous admission.     Query created by: Alicja Blum on 11/2/2022 7:42 AM      Electronically signed by:  Kadeem Farris NP 11/4/2022 11:41 AM

## 2022-11-08 ENCOUNTER — TELEPHONE (OUTPATIENT)
Dept: NEUROLOGY | Age: 72
End: 2022-11-08

## 2022-11-08 NOTE — TELEPHONE ENCOUNTER
Called Ms. Adam Shearer from [facility]. Informed her that Dr. John Chen did not prescribe that medication to the patient. Dr. John Chen did a consultation in the hospital on 10/29/22 but did not see him in person so he could not comment on the medication question. Informed her that I do see it was prescribed on 09/29/22 by Genie Weeks.  Informed her that it was probably prescribed while he was in the hospital.

## 2022-11-08 NOTE — TELEPHONE ENCOUNTER
Special Crocker from [address] [place] [address] called stating that there was blood in the patients urine and wants to know if patient should continue the Eliquis. Please contact [phone].

## 2022-11-14 ENCOUNTER — PATIENT OUTREACH (OUTPATIENT)
Dept: CASE MANAGEMENT | Age: 72
End: 2022-11-14

## 2022-11-30 ENCOUNTER — OFFICE VISIT (OUTPATIENT)
Dept: SLEEP MEDICINE | Age: 72
End: 2022-11-30
Payer: MEDICARE

## 2022-11-30 VITALS
HEIGHT: 75 IN | DIASTOLIC BLOOD PRESSURE: 67 MMHG | SYSTOLIC BLOOD PRESSURE: 109 MMHG | TEMPERATURE: 97.6 F | BODY MASS INDEX: 30.46 KG/M2 | RESPIRATION RATE: 18 BRPM | WEIGHT: 245 LBS | HEART RATE: 64 BPM | OXYGEN SATURATION: 96 %

## 2022-11-30 DIAGNOSIS — I69.30 H/O: STROKE WITH RESIDUAL EFFECTS: ICD-10-CM

## 2022-11-30 DIAGNOSIS — G47.33 OSA (OBSTRUCTIVE SLEEP APNEA): Primary | ICD-10-CM

## 2022-11-30 PROCEDURE — 1123F ACP DISCUSS/DSCN MKR DOCD: CPT | Performed by: NURSE PRACTITIONER

## 2022-11-30 PROCEDURE — 3078F DIAST BP <80 MM HG: CPT | Performed by: NURSE PRACTITIONER

## 2022-11-30 PROCEDURE — 3017F COLORECTAL CA SCREEN DOC REV: CPT | Performed by: NURSE PRACTITIONER

## 2022-11-30 PROCEDURE — 3074F SYST BP LT 130 MM HG: CPT | Performed by: NURSE PRACTITIONER

## 2022-11-30 PROCEDURE — G8754 DIAS BP LESS 90: HCPCS | Performed by: NURSE PRACTITIONER

## 2022-11-30 PROCEDURE — G8428 CUR MEDS NOT DOCUMENT: HCPCS | Performed by: NURSE PRACTITIONER

## 2022-11-30 PROCEDURE — 99214 OFFICE O/P EST MOD 30 MIN: CPT | Performed by: NURSE PRACTITIONER

## 2022-11-30 PROCEDURE — 1101F PT FALLS ASSESS-DOCD LE1/YR: CPT | Performed by: NURSE PRACTITIONER

## 2022-11-30 PROCEDURE — G8536 NO DOC ELDER MAL SCRN: HCPCS | Performed by: NURSE PRACTITIONER

## 2022-11-30 PROCEDURE — G8752 SYS BP LESS 140: HCPCS | Performed by: NURSE PRACTITIONER

## 2022-11-30 PROCEDURE — G9717 DOC PT DX DEP/BP F/U NT REQ: HCPCS | Performed by: NURSE PRACTITIONER

## 2022-11-30 PROCEDURE — G8417 CALC BMI ABV UP PARAM F/U: HCPCS | Performed by: NURSE PRACTITIONER

## 2022-11-30 NOTE — PROGRESS NOTES
217 [facility] [place]. [place], [address]   Tel.  [phone]   Fax. 100 [facility] 60   [place], [address] [facility]   Tel.  [phone]   Fax. [phone] 9250 [facility] Criselda Mustafa   Tel.  [phone]   Fax. 8414 [facility], [place], operated by [facility] (: 1950) is a 71 y.o. male, established patient, seen for positive airway pressure follow-up and evaluation. He was last seen by me on 2022, previously seen by Dr. Omar Lyon on 2021, prior notes and sleep tests reviewed in detail. In lab sleep test 2000 showed an AHI of 30.7/hr with a lowest SpO2 of 82%. Repeat Split-Night Testing 2011 indicated an AHI of 30.7/hr with a lowest SpO2 of 89%. Patient was adequately treated on CPAP level of 17 cmH2O. A repeat Split-Night test performed on 2015 was indicative of an AHI of 65.3/hr with a lowest SpO2 of 89%. Patient was adequately treated on CPAP level of 14 cmH2O. He returned for a Bi-Level PAP titration on 2015 and was adequately titrated on 13/06 cmH2O. Most recent titration 2021 adequate on BiLevel - pressure change to Max IPAP 20, Min EPAP 12, PS 6. ASSESSMENT/PLAN:    ICD-10-CM ICD-9-CM    1. VALENTE (obstructive sleep apnea)  G47.33 327.23       2. H/O: stroke with residual effects  I69.30 438.9       3. Adult BMI 30.0-30.9 kg/sq m  Z68.30 V85.30           AHI: 65.3(2015). On ResMed:  AirCurve 10 VAuto: Max IPAP 20, Min EPAP 12, PS 6 cmh2O. Set up 2022. Compliance not met due to hospitalization. he is not compliant with PAP therapy although when used PAP shows benefit to the patient and remains necessary for control of his sleep apnea. There is continued clinical benefit from the hours of use demonstrated by AHI reduced from 65.3/hr to 0.8/hr. 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   Device recently brought to rehab center, but power source is room is not close to bedside so device cannot be used. We discussed that he may need repeat testing for insurance qualification, if he is able to meet compliance in next 30 days this may be adequate. Follow-up and Dispositions    Return in about 5 weeks (around 1/4/2023) for compliance follow up. Sleep Apnea -  Continue on current pressures. Rehab facility to enable PAP use by providing adequate power source for device. Patient reports he has supplies and was using device without issue prior to stroke. * He was asked to contact our office for any problems regarding PAP therapy. 2. H/O Stroke - continue on his current regimen. He continues to have right sided paralysis and is unable to use his right arm, he will need assistance with placing PAP mask on and using PAP device. 3. Encouraged continued weight management program through appropriate diet and exercise regimen as significant weight reduction has been shown to reduce severity of obstructive sleep apnea. SUBJECTIVE/OBJECTIVE:    He  is seen today for follow up on PAP device and reports problems using the device, it has not been with him during his hospitalization. The following concerns identified:    Drowsiness no Problems exhaling no   Snoring no Forget to put on no   Mask Comfortable yes Can't fall asleep no   Dry Mouth no Mask falls off no   Air Leaking no Frequent awakenings no       He admits that his sleep has improved on PAP therapy using full face mask and heated tubing. He now has the new PAP with him at the rehab facility and would like to start using but needs an extension cord to reach the power source in his room. He reports that he collapsed outside his home in September and remained there for 2 days until he was found by friends. Review of chart shows multiple admissions since stroke for complications and recent transfer to rehab facility.   He is seen today on a stretcher due to right sided paralysis from the stoke. Review of device download indicates initial usage prior to hospitalization ([place] Auto BiLevel pressure: Max IPAP 20, Min EPAP 12, PS 6 cmH2O; Max Pressure: IPAP 18.7, EPAP 14.7 cmH2O;  95th percentile Pressure: IPAP 17.6, EPAP 13.6 cmH2O   95th Percentile Leak: 64.9 L/Min     % Used Days >= 4 hours: 63.  Avg hours used:  6:51. Therapy Apnea Index averaged over PAP use: 0.8/hr which reflects improved sleep breathing condition. [place] Sleepiness Score: 5 and Modified F.O.S.Q. Score Total / 2: 19.5 which reflects improved sleep quality over therapy time. Sleep Review of Systems: notable for Negative difficulty falling asleep; Positive awakenings at night; Negative early morning headaches; Negative memory problems; Negative concentration issues; Negative chest pain; Negative shortness of breath; Negative significant joint pain at night; Negative significant muscle pain at night; Negative rashes or itching; Negative heartburn; Negative significant mood issues; 0 afternoon naps per week      Visit Vitals  /67 (BP 1 Location: Left upper arm, BP Patient Position: Lying, BP Cuff Size: Adult) Comment: Done @ 3:04 pm _ 11/30/22   Pulse 64   Temp 97.6 °F (36.4 °C) (Temporal)   Resp 18   Ht 6' 3\" (1.905 m)   Wt 245 lb (111.1 kg)   SpO2 96%   BMI 30.62 kg/m²          General:   Alert, oriented, not in acute distress   Eyes:  Anicteric Sclerae; no obvious strabismus   Nose:  No obvious nasal septum deviation    Neck:   Midline trachea   Chest/Lungs:  Symmetrical lung expansion, clear lung fields on auscultation    CVS:  Normal rate, regular rhythm,  no JVD   Extremities:  No obvious rashes, no edema    Neuro:  No focal deficits; No obvious tremor    Psych:  Normal affect,  normal countenance     Patient's phone number [phone] (home)  was reviewed and confirmed for accuracy.   He gives permission for messages regarding results and appointments to be left at that number. On this date 11/30/2022 I have spent 30 minutes reviewing previous notes, test results and face to face with the patient discussing the diagnosis and importance of compliance with the treatment plan as well as documenting on the day of the visit. An electronic signature was used to authenticate this note.     -- Caryn Mckeon NP, [facility]  11/30/22

## 2022-11-30 NOTE — PATIENT INSTRUCTIONS
217 [facility] [place]. [place], [address]  Tel.  [phone]  Fax. 100 [facility] 60  [place], [address] [facility]  Tel.  [phone]  Fax. [phone] [address] Crisel[address] Vallecillo  Tel.  [phone]  Fax. [phone]     Learning About CPAP for Sleep Apnea  What is CPAP? CPAP is a small machine that you use at home every night while you sleep. It increases air pressure in your throat to keep your airway open. When you have sleep apnea, this can help you sleep better so you feel much better. CPAP stands for \"continuous positive airway pressure. \"  The CPAP machine will have one of the following:  A mask that covers your nose and mouth  Prongs that fit into your nose  A mask that covers your nose only, the most common type. This type is called NCPAP. The N stands for \"nasal.\"  Why is it done? CPAP is usually the best treatment for obstructive sleep apnea. It is the first treatment choice and the most widely used. Your doctor may suggest CPAP if you have: Moderate to severe sleep apnea. Sleep apnea and coronary artery disease (CAD) or heart failure. How does it help? CPAP can help you have more normal sleep, so you feel less sleepy and more alert during the daytime. CPAP may help keep heart failure or other heart problems from getting worse. NCPAP may help lower your blood pressure. If you use CPAP, your bed partner may also sleep better because you are not snoring or restless. What are the side effects? Some people who use CPAP have:  A dry or stuffy nose and a sore throat. Irritated skin on the face. Sore eyes. Bloating. If you have any of these problems, work with your doctor to fix them. Here are some things you can try:  Be sure the mask or nasal prongs fit well. See if your doctor can adjust the pressure of your CPAP. If your nose is dry, try a humidifier.   If your nose is runny or stuffy, try decongestant medicine or a steroid nasal spray. If these things do not help, you might try a different type of machine. Some machines have air pressure that adjusts on its own. Others have air pressures that are different when you breathe in than when you breathe out. This may reduce discomfort caused by too much pressure in your nose. Where can you learn more? Go to itzbig.be  Enter Nguyễn Ivory in the search box to learn more about \"Learning About CPAP for Sleep Apnea. \"   © 4918-2654 Healthwise, Incorporated. Care instructions adapted under license by [facility] (which disclaims liability or warranty for this information). This care instruction is for use with your licensed healthcare professional. If you have questions about a medical condition or this instruction, always ask your healthcare professional. [address] any warranty or liability for your use of this information. Content Version: 7.1.29015; Last Revised: January 11, 2010  PROPER SLEEP HYGIENE    What to avoid  Do not have drinks with caffeine, such as coffee or black tea, for 8 hours before bed. Do not smoke or use other types of tobacco near bedtime. Nicotine is a stimulant and can keep you awake. Avoid drinking alcohol late in the evening, because it can cause you to wake in the middle of the night. Do not eat a big meal close to bedtime. If you are hungry, eat a light snack. Do not drink a lot of water close to bedtime, because the need to urinate may wake you up during the night. Do not read or watch TV in bed. Use the bed only for sleeping and sexual activity. What to try  Go to bed at the same time every night, and wake up at the same time every morning. Do not take naps during the day. Keep your bedroom quiet, dark, and cool. Get regular exercise, but not within 3 to 4 hours of your bedtime. .  Sleep on a comfortable pillow and mattress.   If watching the clock makes you anxious, turn it facing away from you so you cannot see the time. If you worry when you lie down, start a worry book. Well before bedtime, write down your worries, and then set the book and your concerns aside. Try meditation or other relaxation techniques before you go to bed. If you cannot fall asleep, get up and go to another room until you feel sleepy. Do something relaxing. Repeat your bedtime routine before you go to bed again. Make your house quiet and calm about an hour before bedtime. Turn down the lights, turn off the TV, log off the computer, and turn down the volume on music. This can help you relax after a busy day. Drowsy Driving: The [place] [address] cites drowsiness as a causing factor in more than 975,518 police reported crashes annually, resulting in 76,000 injuries and 1,500 deaths. Other surveys suggest 55% of people polled have driven while drowsy in the past year, 23% had fallen asleep but not crashed, 3% crashed, and 2% had and accident due to drowsy driving. Who is at risk? Young Drivers: One study of drowsy driving accidents states that 55% of the drivers were under 25 years. Of those, 75% were male. Shift Workers and Travelers: People who work overnight or travel across time zones frequently are at higher risk of experiencing Circadian Rhythm Disorders. They are trying to work and function when their body is programed to sleep. Sleep Deprived: Lack of sleep has a serious impact on your ability to pay attention or focus on a task. Consistently getting less than the average of 8 hours your body needs creates partial or cumulative sleep deprivation. Untreated Sleep Disorders: Sleep Apnea, Narcolepsy, R.L.S., and other sleep disorders (untreated) prevent a person from getting enough restful sleep. This leads to excessive daytime sleepiness and increases the risk for drowsy driving accidents by up to 7 times.   Medications / Alcohol: Even over the counter medications can cause drowsiness. Medications that impair a drivers attention should have a warning label. Alcohol naturally makes you sleepy and on its own can cause accidents. Combined with excessive drowsiness its effects are amplified. Signs of Drowsy Driving:   * You don't remember driving the last few miles   * You may drift out of your natalee   * You are unable to focus and your thoughts wander   * You may yawn more often than normal   * You have difficulty keeping your eyes open / nodding off   * Missing traffic signs, speeding, or tailgating  Prevention-   Good sleep hygiene, lifestyle and behavioral choices have the most impact on drowsy driving. There is no substitute for sleep and the average person requires 8 hours nightly. If you find yourself driving drowsy, stop and sleep. Consider the sleep hygiene tips provided during your visit as well. Medication Refill Policy: Refills for all medications require 1 week advance notice. Please have your pharmacy fax a refill request. We are unable to fax, or call in \"controled substance\" medications and you will need to pick these prescriptions up from our office. [employer] Activation    Thank you for requesting access to [employer]. Please follow the instructions below to securely access and download your online medical record. [employer] allows you to send messages to your doctor, view your test results, renew your prescriptions, schedule appointments, and more. How Do I Sign Up? In your internet browser, go to [email or web address]. Click on the First Time User? Click Here link in the Sign In box. You will see the New Member Sign Up page. Enter your [employer] Access Code exactly as it appears below. You will not need to use this code after youve completed the sign-up process. If you do not sign up before the expiration date, you must request a new code. [employer] Access Code:  Activation code not generated  Current [employer] Status: Active (This is the date your [employer] access code will )    Enter the last four digits of your Social Security Number (xxxx) and Date of Birth (mm/dd/yyyy) as indicated and click Submit. You will be taken to the next sign-up page. Create a [employer] ID. This will be your [employer] login ID and cannot be changed, so think of one that is secure and easy to remember. Create a [employer] password. You can change your password at any time. Enter your Password Reset Question and Answer. This can be used at a later time if you forget your password. Enter your e-mail address. You will receive e-mail notification when new information is available in [address] Click Sign Up. You can now view and download portions of your medical record. Click the [employer] link to download a portable copy of your medical information. Additional Information    If you have questions, please call [phone]. Remember, [employer] is NOT to be used for urgent needs. For medical emergencies, dial 911.

## 2022-12-08 ENCOUNTER — APPOINTMENT (OUTPATIENT)
Dept: CT IMAGING | Age: 72
DRG: 674 | End: 2022-12-08
Attending: NURSE PRACTITIONER
Payer: MEDICARE

## 2022-12-08 ENCOUNTER — HOSPITAL ENCOUNTER (INPATIENT)
Age: 72
LOS: 12 days | Discharge: NURSING, REHAB OR LONG-TERM CARE | DRG: 674 | End: 2022-12-20
Attending: EMERGENCY MEDICINE | Admitting: STUDENT IN AN ORGANIZED HEALTH CARE EDUCATION/TRAINING PROGRAM
Payer: MEDICARE

## 2022-12-08 DIAGNOSIS — T83.61XA: ICD-10-CM

## 2022-12-08 DIAGNOSIS — G47.33 OSA (OBSTRUCTIVE SLEEP APNEA): ICD-10-CM

## 2022-12-08 DIAGNOSIS — E66.01 MORBID OBESITY (HCC): ICD-10-CM

## 2022-12-08 DIAGNOSIS — I48.91 ATRIAL FIBRILLATION, UNSPECIFIED TYPE (HCC): ICD-10-CM

## 2022-12-08 DIAGNOSIS — T83.410D FAILURE OF PENILE IMPLANT, SUBSEQUENT ENCOUNTER: Primary | ICD-10-CM

## 2022-12-08 DIAGNOSIS — A49.8 PSEUDOMONAS INFECTION: ICD-10-CM

## 2022-12-08 DIAGNOSIS — I10 HYPERTENSION, UNSPECIFIED TYPE: ICD-10-CM

## 2022-12-08 PROBLEM — T83.410A PENILE IMPLANT FAILURE (HCC): Status: ACTIVE | Noted: 2022-12-08

## 2022-12-08 PROBLEM — R31.9 HEMATURIA: Status: ACTIVE | Noted: 2022-12-08

## 2022-12-08 LAB
ALBUMIN SERPL-MCNC: 2.9 G/DL (ref 3.5–5)
ALBUMIN/GLOB SERPL: 0.5 {RATIO} (ref 1.1–2.2)
ALP SERPL-CCNC: 138 U/L (ref 45–117)
ALT SERPL-CCNC: 18 U/L (ref 12–78)
ANION GAP SERPL CALC-SCNC: 4 MMOL/L (ref 5–15)
APTT PPP: 33.3 SEC (ref 22.1–31)
AST SERPL-CCNC: 12 U/L (ref 15–37)
BASOPHILS # BLD: 0 K/UL (ref 0–0.1)
BASOPHILS NFR BLD: 0 % (ref 0–1)
BILIRUB SERPL-MCNC: 0.6 MG/DL (ref 0.2–1)
BUN SERPL-MCNC: 18 MG/DL (ref 6–20)
BUN/CREAT SERPL: 26 (ref 12–20)
CALCIUM SERPL-MCNC: 9.4 MG/DL (ref 8.5–10.1)
CHLORIDE SERPL-SCNC: 98 MMOL/L (ref 97–108)
CO2 SERPL-SCNC: 29 MMOL/L (ref 21–32)
COMMENT, HOLDF: NORMAL
CREAT SERPL-MCNC: 0.68 MG/DL (ref 0.7–1.3)
DIFFERENTIAL METHOD BLD: ABNORMAL
EOSINOPHIL # BLD: 0.3 K/UL (ref 0–0.4)
EOSINOPHIL NFR BLD: 3 % (ref 0–7)
ERYTHROCYTE [DISTWIDTH] IN BLOOD BY AUTOMATED COUNT: 15.5 % (ref 11.5–14.5)
GLOBULIN SER CALC-MCNC: 5.5 G/DL (ref 2–4)
GLUCOSE BLD STRIP.AUTO-MCNC: 102 MG/DL (ref 65–117)
GLUCOSE SERPL-MCNC: 113 MG/DL (ref 65–100)
HCT VFR BLD AUTO: 32.9 % (ref 36.6–50.3)
HCT VFR BLD AUTO: 34.9 % (ref 36.6–50.3)
HGB BLD-MCNC: 10.4 G/DL (ref 12.1–17)
HGB BLD-MCNC: 11.3 G/DL (ref 12.1–17)
IMM GRANULOCYTES # BLD AUTO: 0 K/UL (ref 0–0.04)
IMM GRANULOCYTES NFR BLD AUTO: 0 % (ref 0–0.5)
INR PPP: 1.2 (ref 0.9–1.1)
LACTATE SERPL-SCNC: 1.1 MMOL/L (ref 0.4–2)
LYMPHOCYTES # BLD: 1.4 K/UL (ref 0.8–3.5)
LYMPHOCYTES NFR BLD: 14 % (ref 12–49)
MCH RBC QN AUTO: 27.6 PG (ref 26–34)
MCHC RBC AUTO-ENTMCNC: 32.4 G/DL (ref 30–36.5)
MCV RBC AUTO: 85.3 FL (ref 80–99)
MONOCYTES # BLD: 0.8 K/UL (ref 0–1)
MONOCYTES NFR BLD: 8 % (ref 5–13)
NEUTS SEG # BLD: 7.3 K/UL (ref 1.8–8)
NEUTS SEG NFR BLD: 75 % (ref 32–75)
NRBC # BLD: 0 K/UL (ref 0–0.01)
NRBC BLD-RTO: 0 PER 100 WBC
PLATELET # BLD AUTO: 314 K/UL (ref 150–400)
PMV BLD AUTO: 10 FL (ref 8.9–12.9)
POTASSIUM SERPL-SCNC: 3.9 MMOL/L (ref 3.5–5.1)
PROT SERPL-MCNC: 8.4 G/DL (ref 6.4–8.2)
PROTHROMBIN TIME: 12.1 SEC (ref 9–11.1)
RBC # BLD AUTO: 4.09 M/UL (ref 4.1–5.7)
SAMPLES BEING HELD,HOLD: NORMAL
SERVICE CMNT-IMP: NORMAL
SODIUM SERPL-SCNC: 131 MMOL/L (ref 136–145)
THERAPEUTIC RANGE,PTTT: ABNORMAL SECS (ref 58–77)
WBC # BLD AUTO: 9.8 K/UL (ref 4.1–11.1)

## 2022-12-08 PROCEDURE — 85610 PROTHROMBIN TIME: CPT

## 2022-12-08 PROCEDURE — 74011000636 HC RX REV CODE- 636: Performed by: EMERGENCY MEDICINE

## 2022-12-08 PROCEDURE — 80053 COMPREHEN METABOLIC PANEL: CPT

## 2022-12-08 PROCEDURE — 74011250636 HC RX REV CODE- 250/636: Performed by: STUDENT IN AN ORGANIZED HEALTH CARE EDUCATION/TRAINING PROGRAM

## 2022-12-08 PROCEDURE — 74011250637 HC RX REV CODE- 250/637: Performed by: STUDENT IN AN ORGANIZED HEALTH CARE EDUCATION/TRAINING PROGRAM

## 2022-12-08 PROCEDURE — 82962 GLUCOSE BLOOD TEST: CPT

## 2022-12-08 PROCEDURE — 99285 EMERGENCY DEPT VISIT HI MDM: CPT

## 2022-12-08 PROCEDURE — 36415 COLL VENOUS BLD VENIPUNCTURE: CPT

## 2022-12-08 PROCEDURE — 83605 ASSAY OF LACTIC ACID: CPT

## 2022-12-08 PROCEDURE — 74177 CT ABD & PELVIS W/CONTRAST: CPT

## 2022-12-08 PROCEDURE — 85730 THROMBOPLASTIN TIME PARTIAL: CPT

## 2022-12-08 PROCEDURE — 85025 COMPLETE CBC W/AUTO DIFF WBC: CPT

## 2022-12-08 PROCEDURE — 74011000250 HC RX REV CODE- 250: Performed by: STUDENT IN AN ORGANIZED HEALTH CARE EDUCATION/TRAINING PROGRAM

## 2022-12-08 PROCEDURE — 74011250636 HC RX REV CODE- 250/636: Performed by: EMERGENCY MEDICINE

## 2022-12-08 PROCEDURE — 65270000046 HC RM TELEMETRY

## 2022-12-08 PROCEDURE — 85018 HEMOGLOBIN: CPT

## 2022-12-08 RX ORDER — INSULIN LISPRO 100 [IU]/ML
INJECTION, SOLUTION INTRAVENOUS; SUBCUTANEOUS
Status: DISCONTINUED | OUTPATIENT
Start: 2022-12-08 | End: 2022-12-20 | Stop reason: HOSPADM

## 2022-12-08 RX ORDER — MAGNESIUM SULFATE 100 %
4 CRYSTALS MISCELLANEOUS AS NEEDED
Status: DISCONTINUED | OUTPATIENT
Start: 2022-12-08 | End: 2022-12-20 | Stop reason: HOSPADM

## 2022-12-08 RX ORDER — SODIUM CHLORIDE 0.9 % (FLUSH) 0.9 %
5-40 SYRINGE (ML) INJECTION EVERY 8 HOURS
Status: DISCONTINUED | OUTPATIENT
Start: 2022-12-08 | End: 2022-12-20 | Stop reason: HOSPADM

## 2022-12-08 RX ORDER — DILTIAZEM HYDROCHLORIDE 60 MG/1
60 CAPSULE, EXTENDED RELEASE ORAL EVERY 8 HOURS
COMMUNITY

## 2022-12-08 RX ORDER — ACETAMINOPHEN 325 MG/1
650 TABLET ORAL
Status: DISCONTINUED | OUTPATIENT
Start: 2022-12-08 | End: 2022-12-20 | Stop reason: HOSPADM

## 2022-12-08 RX ORDER — BUPROPION HYDROCHLORIDE 100 MG/1
100 TABLET, EXTENDED RELEASE ORAL 2 TIMES DAILY
COMMUNITY

## 2022-12-08 RX ORDER — MIRTAZAPINE 15 MG/1
7.5 TABLET, FILM COATED ORAL
Status: DISCONTINUED | OUTPATIENT
Start: 2022-12-08 | End: 2022-12-20 | Stop reason: HOSPADM

## 2022-12-08 RX ORDER — ATORVASTATIN CALCIUM 40 MG/1
80 TABLET, FILM COATED ORAL
Status: DISCONTINUED | OUTPATIENT
Start: 2022-12-08 | End: 2022-12-20 | Stop reason: HOSPADM

## 2022-12-08 RX ORDER — POLYETHYLENE GLYCOL 3350 17 G/17G
17 POWDER, FOR SOLUTION ORAL DAILY PRN
Status: DISCONTINUED | OUTPATIENT
Start: 2022-12-08 | End: 2022-12-20 | Stop reason: HOSPADM

## 2022-12-08 RX ORDER — GABAPENTIN 300 MG/1
300 CAPSULE ORAL
Status: DISCONTINUED | OUTPATIENT
Start: 2022-12-08 | End: 2022-12-10

## 2022-12-08 RX ORDER — TRAMADOL HYDROCHLORIDE 50 MG/1
50 TABLET ORAL
COMMUNITY

## 2022-12-08 RX ORDER — ONDANSETRON 4 MG/1
4 TABLET, ORALLY DISINTEGRATING ORAL
Status: DISCONTINUED | OUTPATIENT
Start: 2022-12-08 | End: 2022-12-20 | Stop reason: HOSPADM

## 2022-12-08 RX ORDER — HEPARIN SODIUM 10000 [USP'U]/100ML
8-25 INJECTION, SOLUTION INTRAVENOUS
Status: DISPENSED | OUTPATIENT
Start: 2022-12-08 | End: 2022-12-12

## 2022-12-08 RX ORDER — LANOLIN ALCOHOL/MO/W.PET/CERES
400 CREAM (GRAM) TOPICAL
COMMUNITY

## 2022-12-08 RX ORDER — FINASTERIDE 5 MG/1
5 TABLET, FILM COATED ORAL DAILY
Status: DISCONTINUED | OUTPATIENT
Start: 2022-12-09 | End: 2022-12-20 | Stop reason: HOSPADM

## 2022-12-08 RX ORDER — TAMSULOSIN HYDROCHLORIDE 0.4 MG/1
0.4 CAPSULE ORAL DAILY
Status: DISCONTINUED | OUTPATIENT
Start: 2022-12-09 | End: 2022-12-20 | Stop reason: HOSPADM

## 2022-12-08 RX ORDER — LANOLIN ALCOHOL/MO/W.PET/CERES
325 CREAM (GRAM) TOPICAL 2 TIMES DAILY
COMMUNITY

## 2022-12-08 RX ORDER — DILTIAZEM HYDROCHLORIDE 60 MG/1
60 TABLET, FILM COATED ORAL EVERY 8 HOURS
Status: DISCONTINUED | OUTPATIENT
Start: 2022-12-08 | End: 2022-12-20 | Stop reason: HOSPADM

## 2022-12-08 RX ORDER — ALLOPURINOL 100 MG/1
100 TABLET ORAL DAILY
Status: DISCONTINUED | OUTPATIENT
Start: 2022-12-09 | End: 2022-12-20 | Stop reason: HOSPADM

## 2022-12-08 RX ORDER — ONDANSETRON 2 MG/ML
4 INJECTION INTRAMUSCULAR; INTRAVENOUS
Status: DISCONTINUED | OUTPATIENT
Start: 2022-12-08 | End: 2022-12-20 | Stop reason: HOSPADM

## 2022-12-08 RX ORDER — TEMAZEPAM 7.5 MG/1
7.5 CAPSULE ORAL
COMMUNITY

## 2022-12-08 RX ORDER — FAMOTIDINE 20 MG/1
20 TABLET, FILM COATED ORAL EVERY 12 HOURS
Status: DISCONTINUED | OUTPATIENT
Start: 2022-12-08 | End: 2022-12-20 | Stop reason: HOSPADM

## 2022-12-08 RX ORDER — METOPROLOL SUCCINATE 50 MG/1
75 TABLET, EXTENDED RELEASE ORAL DAILY
Status: DISCONTINUED | OUTPATIENT
Start: 2022-12-09 | End: 2022-12-20 | Stop reason: HOSPADM

## 2022-12-08 RX ORDER — ACETAMINOPHEN 650 MG/1
650 SUPPOSITORY RECTAL
Status: DISCONTINUED | OUTPATIENT
Start: 2022-12-08 | End: 2022-12-20 | Stop reason: HOSPADM

## 2022-12-08 RX ORDER — SODIUM CHLORIDE 0.9 % (FLUSH) 0.9 %
5-40 SYRINGE (ML) INJECTION AS NEEDED
Status: DISCONTINUED | OUTPATIENT
Start: 2022-12-08 | End: 2022-12-20 | Stop reason: HOSPADM

## 2022-12-08 RX ADMIN — GABAPENTIN 300 MG: 300 CAPSULE ORAL at 21:24

## 2022-12-08 RX ADMIN — VANCOMYCIN HYDROCHLORIDE 2500 MG: 10 INJECTION, POWDER, LYOPHILIZED, FOR SOLUTION INTRAVENOUS at 17:15

## 2022-12-08 RX ADMIN — HEPARIN SODIUM 8 UNITS/KG/HR: 10000 INJECTION, SOLUTION INTRAVENOUS at 21:29

## 2022-12-08 RX ADMIN — MIRTAZAPINE 7.5 MG: 15 TABLET, FILM COATED ORAL at 21:24

## 2022-12-08 RX ADMIN — ATORVASTATIN CALCIUM 80 MG: 40 TABLET, FILM COATED ORAL at 21:24

## 2022-12-08 RX ADMIN — CEFEPIME 2 G: 2 INJECTION, POWDER, FOR SOLUTION INTRAVENOUS at 17:23

## 2022-12-08 RX ADMIN — SODIUM CHLORIDE, PRESERVATIVE FREE 10 ML: 5 INJECTION INTRAVENOUS at 21:36

## 2022-12-08 RX ADMIN — SODIUM CHLORIDE, PRESERVATIVE FREE 10 ML: 5 INJECTION INTRAVENOUS at 17:16

## 2022-12-08 RX ADMIN — IOPAMIDOL 100 ML: 755 INJECTION, SOLUTION INTRAVENOUS at 17:03

## 2022-12-08 RX ADMIN — DILTIAZEM HYDROCHLORIDE 60 MG: 60 TABLET, FILM COATED ORAL at 23:26

## 2022-12-08 RX ADMIN — FAMOTIDINE 20 MG: 20 TABLET, FILM COATED ORAL at 21:24

## 2022-12-08 NOTE — PROGRESS NOTES
Pharmacy renal dose protocol  Day #1 of cefepime  Indication:  uti  Current regimen:  1g q12h    Recent Labs     22  1530   WBC 9.8   CREA 0.68*   BUN 18     Est CrCl: >100 ml/min; UO: n/a ml/kg/hr  Temp (24hrs), Av.2 °F (36.8 °C), Min:98.2 °F (36.8 °C), Max:98.2 °F (36.8 °C)    Cultures: pend    Plan: Change to 2g IV extended infusion q12h for UTI and CrCl > 60 ml/min

## 2022-12-08 NOTE — ED PROVIDER NOTES
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 He also has some device implanted in the anterior urethra. He presents today with some blood in the urine noted in the Sexton catheter. Patient states that this blood is no different than what he has had over the last several weeks. No fever no chill. Has been no shortness of breath, cough or congestion and no chest pain or abdominal pain. Patient voices no other acute complaint. Is unclear why he is here. He thinks he is here because the device is beginning to protrude from the penile meatus. There is been no fever or chill.        Past Medical History:   Diagnosis Date    Arrhythmia     atrial fib    Depression 4/24/2010    Diabetes ([facility]     diet control for pre-diabetes    Dyslipidemia, goal LDL below 100 6/14/2011    Gout     HTN (hypertension) 4/24/2010    Impotence 4/24/2010    Morbid obesity ([address] 5/17/2010    VALENTE (obstructive sleep apnea) 4/24/2010    CPAP    Restless legs 4/15/2015       Past Surgical History:   Procedure Laterality Date    COLONOSCOPY N/A 6/27/2017    COLONOSCOPY performed by Kristie Drummond MD at [facility], COLON, DIAGNOSTIC  2007    HX APPENDECTOMY      HX ORTHOPAEDIC  2000    bilat TKR     HX ORTHOPAEDIC  2010    left THR    HX UROLOGICAL  03/2018    urolift    HX UROLOGICAL  03/2019    prosthesis         Family History:   Problem Relation Age of Onset    Cancer Father         leukemia    Cancer Paternal Grandfather     Diabetes Sister     Diabetes Brother     Cancer Maternal Aunt     Cancer Maternal Uncle        Social History     Socioeconomic History    Marital status:      Spouse name: Not on file    Number of children: Not on file    Years of education: Not on file    Highest education level: Not on file   Occupational History    Not on file   Tobacco Use    Smoking status: Former     Packs/day: 0.50     Years: 5.00     Pack years: 2.50     Types: Cigarettes     Quit date: 1990     Years since quittin.5    Smokeless tobacco: Never   Substance and Sexual Activity    Alcohol use: Yes     Alcohol/week: 1.7 standard drinks     Types: 2 Standard drinks or equivalent per week     Comment: 2-3 drinks per week    Drug use: No    Sexual activity: Not Currently   Other Topics Concern    Not on file   Social History Narrative    Not on file     Social Determinants of Health     Financial Resource Strain: Not on file   Food Insecurity: Not on file   Transportation Needs: Not on file   Physical Activity: Not on file   Stress: Not on file   Social Connections: Not on file   Intimate Partner Violence: Not on file   Housing Stability: Not on file         ALLERGIES: Patient has no known allergies. Review of Systems   Constitutional:  Negative for activity change, appetite change and fatigue. HENT:  Negative for ear pain, facial swelling, sore throat and trouble swallowing. Eyes:  Negative for pain, discharge and visual disturbance. Respiratory:  Negative for chest tightness, shortness of breath and wheezing. Cardiovascular:  Negative for chest pain and palpitations. Gastrointestinal:  Negative for abdominal pain, blood in stool, nausea and vomiting. Genitourinary:  Positive for hematuria. Negative for difficulty urinating and flank pain. Musculoskeletal:  Negative for arthralgias, joint swelling, myalgias and neck pain. Skin:  Negative for color change and rash. Neurological:  Negative for dizziness, weakness, numbness and headaches. Hematological:  Negative for adenopathy. Does not bruise/bleed easily. Psychiatric/Behavioral:  Negative for behavioral problems, confusion and sleep disturbance. All other systems reviewed and are negative.     Vitals:    22 1339   BP: 119/83   Pulse: 66   Resp: 18   Temp: 98.2 °F (36.8 °C)   SpO2: 96%   Weight: 111.1 kg (245 lb)   Height: 6' 3\" (1.905 m)            Physical Exam  Vitals and nursing note reviewed. Constitutional:       General: He is not in acute distress. Appearance: He is well-developed. He is not ill-appearing or diaphoretic. Comments: This is a 67-year-old male in no acute distress with vital signs as noted. HENT:      Head: Normocephalic and atraumatic. Nose: Nose normal.      Mouth/Throat:      Mouth: Mucous membranes are moist.   Eyes:      General: No scleral icterus. Conjunctiva/sclera: Conjunctivae normal.      Pupils: Pupils are equal, round, and reactive to light. Neck:      Thyroid: No thyromegaly. Vascular: No JVD. Trachea: No tracheal deviation. Comments: No carotid bruits noted. Cardiovascular:      Rate and Rhythm: Normal rate and regular rhythm. Heart sounds: Normal heart sounds. No murmur heard. No friction rub. No gallop. Pulmonary:      Effort: Pulmonary effort is normal. No respiratory distress. Breath sounds: Normal breath sounds. No wheezing or rales. Chest:      Chest wall: No tenderness. Abdominal:      General: Bowel sounds are normal. There is no distension. Palpations: Abdomen is soft. There is no mass. Tenderness: There is no abdominal tenderness. There is no guarding or rebound. Genitourinary:     Comments: Patient appears to have some hypospadias with a catheter in place. There is also a penile device in place which appears to be extruding from the meatus. It is unclear which urologist has been managing this patient. Musculoskeletal:         General: No tenderness. Normal range of motion. Cervical back: Normal range of motion and neck supple. Lymphadenopathy:      Cervical: No cervical adenopathy. Skin:     General: Skin is warm and dry. Capillary Refill: Capillary refill takes 2 to 3 seconds. Coloration: Skin is pale.       Findings: No erythema or rash. Neurological:      Mental Status: He is alert and oriented to person, place, and time. Cranial Nerves: No cranial nerve deficit. Sensory: Sensory deficit (Sided old stroke) present. Motor: Weakness present. Coordination: Coordination normal.      Gait: Abnormal gait: Right-sided from his old stroke. Deep Tendon Reflexes: Reflexes are normal and symmetric. Comments: Patient has right-sided residual weakness. This is from an old stroke. He is alert but slightly confused. His findings are consistent with his old CVA. Psychiatric:         Mood and Affect: Mood normal.         Behavior: Behavior normal.         Thought Content: Thought content normal.         Judgment: Judgment normal.        MDM  Number of Diagnoses or Management Options  Atrial fibrillation, unspecified type ([facility] [I48.91 (ICD-10-CM)]: established and worsening  Failure of penile implant, subsequent encounter: new and requires workup  Hypertension, unspecified type [I10 (ICD-10-CM)]: established and improving  Infection of penile implant, initial encounter ([facility] [C69. 61XA (ICD-10-CM)]: new and requires workup  Morbid obesity ([facility] [E66.01 (ICD-10-CM)]: established and worsening     Amount and/or Complexity of Data Reviewed  Clinical lab tests: ordered and reviewed  Decide to obtain previous medical records or to obtain history from someone other than the patient: yes  Review and summarize past medical records: yes  Discuss the patient with other providers: yes    Risk of Complications, Morbidity, and/or Mortality  Presenting problems: high  Diagnostic procedures: high  Management options: high    Patient Progress  Patient progress: stable         Procedures    Is a 52-year-old male who presents to the ED with reported blood in the urinary catheter which is been there for 5 or 6 weeks according to the patient. He is unclear why he is here in the emergency department. Patient voices no acute complaints.   He does have right-sided weakness status post old CVA. Attempting to obtain information from home with regard to who has been managing the patient from urological standpoint. Basic labs and urine are being obtained. It is noted that the patient is on Eliquis following the stroke. Spoak with Dr. Yolande Boyd who knows this patient. He stated that he had tried to get the patient transferred last night from the nursing home to this facility. Unsuccessful in doing so as the patient just showed up this afternoon. He will have on-call urology see the patient. He would like gentamicin and vancomycin started at this time. The patient has  complicated medical issues and is on Eliquis and would like the hospitalist to admit and help manage the non surgical parts. Consult will be placed with the hospitalist.    [address] [facility] for Admission  3:56 PM    ED Room Number: G/HG  Patient Name and age:  Martha Marroquin 71 y.o.  male  Working Diagnosis:   1. Failure of penile implant, subsequent encounter        COVID-19 Suspicion:  no  Sepsis present:  no  Reassessment needed: no  Code Status:  Full Code  Readmission: no  Isolation Requirements:  no  Recommended Level of Care:  med/surg  Department:[facility] Adult ED - [phone]  Other:  Urology has seen and requesting medicine to admit with his coagulopathy 9On eliquis), need for surgical removal of implant, DM and other medical complication. Patient was seen by urology in the ED and they requested medicine to admit. Will need to hold Elliquis for several days to allow surgical removal of the penile implant. Which is failing. Addendum 12/31/2022: I did speak with urology and the urologic nurse practitioner came and saw the patient. She talked directly to urology as well and the on-call urologist will see the patient. He has an infected penile implant and likely sepsis. We will need to remove this device surgically.   However, he is on Eliquis and is not a surgical candidate presently. We will have the hospitalist admit for management of the sepsis and infection and urology will also follow and when the patient is medically clear, will undergo surgical removal of the implanted device. Patient is being treated with dual antibiotics for urosepsis with the penile device.     Total critical care time (not including time spent performing separately reportable procedures): 50 minutes

## 2022-12-08 NOTE — H&P
[address] [facility] Adult  Hospitalist Group  History and Physical    Date of Service:  12/8/2022  Primary Care Provider: Kimberley Mark MD  Source of information: The patient and Chart review    Chief Complaint: Blood in Urine      History of Presenting Illness:   Mignon Mast is a 71 y.o. male who presents with hematuria. 42-year-old  male with past medical history of atrial fibrillation on Xarelto, depression, diabetes, dyslipidemia, hypertension, CVA, VALENTE on CPAP, restless leg syndrome, penile implant, who comes in for the above. Patient is apparently sent over by his facility for hematuria of about 2 weeks duration with his penile implant not working anymore. In the ED, his penile implant was found to be protruding and Sexton catheter was inserted with pink-colored urine draining. He denies any fever, chills. He reports on and off pain in the penile area. He says that this is his second penile implant. The ER spoke to urology and they recommended antibiotics. Urology has seen in the ER and will plan for surgery on Monday. Of note, the patient has previous Pseudomonas in urine as well as Enterococcus. Initial recommendations were vancomycin and gentamicin, but at that time patient is not symptomatic or septic. The patient is not quite sure as to why he was sent to the hospital.  Per the ER note, the patient's neurologist had apparently spoken to the patient SNF and wanted him transferred here yesterday. I cannot find any medical record of the purpose of this device, the patient says that it was placed for sexual purposes.     The patient denies any headache, blurry vision, sore throat, trouble swallowing, trouble with speech, chest pain, SOB, cough, fever, chills, N/V/D, abd pain, urinary symptoms, constipation, recent travels, sick contacts, focal or generalized neurological symptoms, falls, injuries, rashes, contact with COVID-19 diagnosed patients, hematemesis, melena, hemoptysis, hematuria, rashes, denies starting any new medications and denies any other concerns or problems besides as mentioned above. REVIEW OF SYSTEMS:  A comprehensive review of systems was negative except for that written in the History of Present Illness. Past Medical History:   Diagnosis Date    Arrhythmia     atrial fib    Depression 4/24/2010    Diabetes ([facility] [address]     diet control for pre-diabetes    Dyslipidemia, goal LDL below 100 6/14/2011    Gout     HTN (hypertension) 4/24/2010    Impotence 4/24/2010    Morbid obesity ([facility] 5/17/2010    VALENTE (obstructive sleep apnea) 4/24/2010    CPAP    Restless legs 4/15/2015      Past Surgical History:   Procedure Laterality Date    COLONOSCOPY N/A 6/27/2017    COLONOSCOPY performed by Obed Gardiner MD at [place] [address], [address] [place] [address]  2007    HX APPENDECTOMY      HX ORTHOPAEDIC  2000    bilat TKR     HX ORTHOPAEDIC  2010    left THR    HX UROLOGICAL  03/2018    urolift    HX UROLOGICAL  03/2019    prosthesis     Prior to Admission medications    Medication Sig Start Date End Date Taking? Authorizing Provider   ergocalciferol (ERGOCALCIFEROL) 1,250 mcg (50,000 unit) capsule Take 1 Capsule by mouth every seven (7) days for 6 doses. 11/3/22 12/9/22  Guy Ill, NP   finasteride (PROSCAR) 5 mg tablet Take 1 Tablet by mouth daily. 10/30/22   Guy Ill, NP   melatonin 3 mg tablet Take 1 Tablet by mouth nightly as needed for Insomnia. 10/29/22   Guy Davila, NP   metoprolol succinate (TOPROL-XL) 25 mg XL tablet Take 3 Tablets by mouth daily. 10/30/22   Guy Ill, NP   mirtazapine (REMERON) 7.5 mg tablet Take 1 Tablet by mouth nightly. 10/29/22   Guy Ill, NP   ondansetron (ZOFRAN ODT) 4 mg disintegrating tablet Take 1 Tablet by mouth every eight (8) hours as needed for Nausea or Vomiting. 10/29/22   Guy Ill, NP   gabapentin (NEURONTIN) 300 mg capsule Take 1 Capsule by mouth nightly.  Max Daily Amount: 300 mg. 10/29/22   Guy Davila NP   allopurinoL (ZYLOPRIM) 100 mg tablet TAKE 1 TABLET BY MOUTH EVERY DAY 10/17/22   Becky Plascencia MD   apixaban (ELIQUIS) 5 mg tablet Take 1 Tablet by mouth two (2) times a day. 9/29/22   Jerardo Zimmer MD   atorvastatin (LIPITOR) 80 mg tablet Take 1 Tablet by mouth nightly. 9/26/22   Jerardo Zimmer MD   balsam peru-castor oiL (VENELEX) ointment Apply  to affected area two (2) times a day. APPLY TO all bony prominences, abrasions and ecchymosis to right side, posterior head Nursing, document site in comments 9/26/22   Jerardo Zimmer MD   dilTIAZem IR (CARDIZEM) 60 mg tablet Take 1 Tablet by mouth every eight (8) hours. 9/26/22   Jreardo Zimmer MD   docusate sodium (COLACE) 100 mg capsule Take 1 Capsule by mouth two (2) times daily as needed for Constipation for up to 90 days. 9/26/22 12/25/22  Jerardo Zimmer MD   famotidine (PEPCID) 20 mg tablet Take 1 Tablet by mouth every twelve (12) hours. 9/26/22   Jerardo Zimmer MD   tamsulosin (FLOMAX) 0.4 mg capsule Take 1 Capsule by mouth daily. 9/27/22   Jerardo Zimmer MD     No Known Allergies   Family History   Problem Relation Age of Onset    Cancer Father         leukemia    Cancer Paternal Grandfather     Diabetes Sister     Diabetes Brother     Cancer Maternal Aunt     Cancer Maternal Uncle       Social History:  reports that he quit smoking about 32 years ago. His smoking use included cigarettes. He has a 2.50 pack-year smoking history. He has never used smokeless tobacco. He reports current alcohol use of about 1.7 standard drinks per week. He reports that he does not use drugs. Family and social history were personally reviewed, all pertinent and relevant details are outlined as above.     Objective:   Visit Vitals  /83 (BP 1 Location: Left upper arm, BP Patient Position: At rest)   Pulse 66   Temp 98.2 °F (36.8 °C)   Resp 18   Ht 6' 3\" (1.905 m)   Wt 111.1 kg (245 lb)   SpO2 96%   BMI 30.62 kg/m² O2 Device: None (Room air)    PHYSICAL EXAM:   General: Alert awake, no acute distress, resting in bed, pleasant male, appears to be stated age  [de-identified]: PEERL, EOMI, moist mucus membranes  Neck: Supple, no JVD, no meningeal signs  Chest: Clear to auscultation bilaterally   CVS: RRR, S1 S2 heard, no murmurs/rubs/gallops  Abd: Soft, non-tender, non-distended, +bowel sounds   Ext: No clubbing, no cyanosis, no edema  Neuro/Psych: Pleasant mood and affect, sensory grossly within normal limit, right lower extremity 1-2/5. Right upper extremity 0/5. Cap refill: Brisk, less than 3 seconds  Pulses: 2+, symmetric in all extremities  Skin: Warm, dry, without rashes or lesions  : Patient has a prosthetic that is protruding from the urethra. Some secretion which looks seropurulent. No blood at this time. Data Review: All diagnostic labs and studies have been reviewed. Abnormal Labs Reviewed   CBC WITH AUTOMATED DIFF - Abnormal; Notable for the following components:       Result Value    RBC 4.09 (*)     HGB 11.3 (*)     HCT 34.9 (*)     RDW 15.5 (*)     All other components within normal limits   METABOLIC PANEL, COMPREHENSIVE - Abnormal; Notable for the following components:    Sodium 131 (*)     Anion gap 4 (*)     Glucose 113 (*)     Creatinine 0.68 (*)     BUN/Creatinine ratio 26 (*)     AST (SGOT) 12 (*)     Alk. phosphatase 138 (*)     Protein, total 8.4 (*)     Albumin 2.9 (*)     Globulin 5.5 (*)     A-G Ratio 0.5 (*)     All other components within normal limits   PTT - Abnormal; Notable for the following components:    aPTT 33.3 (*)     All other components within normal limits   PROTHROMBIN TIME + INR - Abnormal; Notable for the following components:    INR 1.2 (*)     Prothrombin time 12.1 (*)     All other components within normal limits       All Micro Results       None            IMAGING:   CT ABD PELV W CONT   Final Result      1. No acute process on CT. 2. CT constipation pattern.    3. Mesenteric and renal artery stenosis. No vascular occlusion at this time. ECG/ECHO:    Results for orders placed or performed during the hospital encounter of 09/14/22   EKG, 12 LEAD, INITIAL   Result Value Ref Range    Ventricular Rate 123 BPM    Atrial Rate 133 BPM    QRS Duration 86 ms    Q-T Interval 312 ms    QTC Calculation (Bezet) 446 ms    Calculated R Axis 31 degrees    Calculated T Axis -102 degrees    Diagnosis         Atrial fibrillation with rapid ventricular response with premature   ventricular or aberrantly conducted complexes  ST & T wave abnormality, consider inferior ischemia  When compared with ECG of 17-AUG-2017 13:09,  Vent. rate has increased BY  50 BPM  Non-specific change in ST segment in Inferior leads  ST now depressed in Anterior leads  Nonspecific T wave abnormality now evident in Anterolateral leads  Confirmed by Dannie Coe M.D., Val Sawyer ([id number]) on 9/19/2022 4:05:18 PM          Assessment:   Given the patient's current clinical presentation, there is a high level of concern for decompensation if discharged from the emergency department. Complex decision making was performed, which includes reviewing the patient's available past medical records, laboratory results, and imaging studies. Active Problems:    Hematuria (12/8/2022)      Penile implant failure ([address] (12/8/2022)      Plan:     Hematuria  Monitor with H&H  No signs of active bleeding at this time  Will hold anticoagulation mostly due to surgery    Eroding penile implant, 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   Urology on board  Vancomycin and cefepime  Plan to go to the OR on 12/12  Monitor  History of Pseudomonas and Enterococcus UTI, previously on ceftriaxone, ampicillin, Zosyn.   Pending UA and culture    Atrial fibrillation on Eliquis  Continue diltiazem and metoprolol  Holding Eliquis for now due to surgery planned for Monday  Cardiology consulted for recommendations on anticoagulation  [id number] is 5 and he has had a recent stroke. Due to these risk factors, I will start a heparin drip for now. Hypertension  Continue home medications    Diabetes  Sliding scale insulin    BPH  Continue home tamsulosin and finasteride    History of recent CVA with residual right-sided weakness  Has baseline  Monitor  Continue atorvastatin    Depression  Home Remeron    DIET: ADULT DIET Regular; 5 carb choices (75 gm/meal)   ISOLATION PRECAUTIONS: There are currently no Active Isolations  CODE STATUS: Full Code   DVT PROPHYLAXIS: SCDs  FUNCTIONAL STATUS PRIOR TO HOSPITALIZATION: Capable of only limited self-care; confined to bed or chair likely more than 50% of waking hours. Ambulatory status/function: By self   EARLY MOBILITY ASSESSMENT: Recommend an assessment from physical therapy and/or occupational therapy  ANTICIPATED DISCHARGE: Greater than 48 hours. ANTICIPATED DISPOSITION: SNF  EMERGENCY CONTACT/SURROGATE DECISION MAKER: Son    CRITICAL CARE WAS PERFORMED FOR THIS ENCOUNTER: NO.      Signed By: Jorge Tim MD     December 8, 2022         Please note that this dictation may have been completed with Dragon, the computer voice recognition software. Quite often unanticipated grammatical, syntax, homophones, and other interpretive errors are inadvertently transcribed by the computer software. Please disregard these errors. Please excuse any errors that have escaped final proofreading.

## 2022-12-08 NOTE — CONSULTS
New Urology Consult Note    Patient: Noe Laws MRN: [id number]  SSN: [id number]    YOB: 1950  Age: 71 y.o. Sex: male            Assessment:     Noe Laws is a 71 y.o. male with history of CVA and a modified penile implant now protruding through meatus on chronic AC     Recommendations:     1. Eroding penile implant -removal not urgent at this time patient is without fever or signs of sepsis   -Recommend cardiology opinion on how long to hold Eliquis prior to surgery or can bridge patient with heparin throughout the weekend and then turning it off 12 hours prior to surgery on Monday  -Treatment. With empiric antibiotics antibiotics until culture results  -We will schedule surgery for December 12th    Thank you for this consult. Please contact [facility] with any further questions/concerns. Dr Mima Munoz     History of Present Illness:     Reason for Consult:  Eroding penile implant     Noe Laws is seen in consultation for reasons noted above at the request of Farida Guevara MD.    This is a 71 y.o. 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 patient presented for SNF for gross hematuria of 2-week duration. Patient is on chronic AC for atrial fibrillation. He also presented to the ED with protruding penile prosthesis with Sexton catheter inserted. Catheter is draining pink-colored urine. Urology has been consulted to see patient         Patient known to [facility] followed by Dr. Mima Munoz for ED, BPH with obstruction, urinary retention. In 2019 patient had a failed penile prosthesis placed. The prosthesis at some point was explanted and  replacement with modified Roney washout.   Patient states that the implant has been protruding for some time he denies any pain or discomfort with it. He notes gross hematuria more so than usual over a period of time. He denies any fever chills nausea vomiting. Discussed with the patient  surgical intervention for removal of implant however pt on chronic AC . Discussed with the ED provider need for hospital admission and start of antibiotics patient with history of known Pseudomonas in urine. Subjective     Past Medical History  Past Medical History:   Diagnosis Date    Arrhythmia     atrial fib    Depression 4/24/2010    Diabetes ([address]     diet control for pre-diabetes    Dyslipidemia, goal LDL below 100 6/14/2011    Gout     HTN (hypertension) 4/24/2010    Impotence 4/24/2010    Morbid obesity ([address] 5/17/2010    VALENTE (obstructive sleep apnea) 4/24/2010    CPAP    Restless legs 4/15/2015       Past Surgical History:   Past Surgical History:   Procedure Laterality Date    COLONOSCOPY N/A 6/27/2017    COLONOSCOPY performed by Ho Meraz MD at [facility], COLON, DIAGNOSTIC  2007    HX APPENDECTOMY      HX ORTHOPAEDIC  2000    bilat TKR     HX ORTHOPAEDIC  2010    left THR    HX UROLOGICAL  03/2018    urolift    HX UROLOGICAL  03/2019    prosthesis       Medication:  Current Facility-Administered Medications   Medication Dose Route Frequency Provider Last Rate Last Admin    gentamicin (GARAMYCIN) 222.4 mg in 0.9% sodium chloride 100 mL ivpb  2 mg/kg IntraVENous NOW Sonja Villa MD        vancomycin (VANCOCIN) 2500 mg in  mL infusion  2,500 mg IntraVENous NOW Sonja Villa MD         Current Outpatient Medications   Medication Sig Dispense Refill    ergocalciferol (ERGOCALCIFEROL) 1,250 mcg (50,000 unit) capsule Take 1 Capsule by mouth every seven (7) days for 6 doses. 4 Capsule 1    finasteride (PROSCAR) 5 mg tablet Take 1 Tablet by mouth daily. 30 Tablet 0    melatonin 3 mg tablet Take 1 Tablet by mouth nightly as needed for Insomnia.  30 Tablet 0    metoprolol succinate (TOPROL-XL) 25 mg XL tablet Take 3 Tablets by mouth daily. 30 Tablet 0    mirtazapine (REMERON) 7.5 mg tablet Take 1 Tablet by mouth nightly. 30 Tablet 0    ondansetron (ZOFRAN ODT) 4 mg disintegrating tablet Take 1 Tablet by mouth every eight (8) hours as needed for Nausea or Vomiting. 30 Tablet 0    gabapentin (NEURONTIN) 300 mg capsule Take 1 Capsule by mouth nightly. Max Daily Amount: 300 mg. 30 Capsule 0    allopurinoL (ZYLOPRIM) 100 mg tablet TAKE 1 TABLET BY MOUTH EVERY DAY 90 Tablet 1    apixaban (ELIQUIS) 5 mg tablet Take 1 Tablet by mouth two (2) times a day. 60 Tablet 0    atorvastatin (LIPITOR) 80 mg tablet Take 1 Tablet by mouth nightly. 30 Tablet 0    balsam peru-castor oiL (VENELEX) ointment Apply  to affected area two (2) times a day. APPLY TO all bony prominences, abrasions and ecchymosis to right side, posterior head Nursing, document site in comments 5 g 0    dilTIAZem IR (CARDIZEM) 60 mg tablet Take 1 Tablet by mouth every eight (8) hours. 30 Tablet 0    docusate sodium (COLACE) 100 mg capsule Take 1 Capsule by mouth two (2) times daily as needed for Constipation for up to 90 days. 60 Capsule 0    famotidine (PEPCID) 20 mg tablet Take 1 Tablet by mouth every twelve (12) hours. 30 Tablet 0    tamsulosin (FLOMAX) 0.4 mg capsule Take 1 Capsule by mouth daily. 30 Capsule 0       Allergies:  No Known Allergies    Social History:  Social History     Tobacco Use    Smoking status: Former     Packs/day: 0.50     Years: 5.00     Pack years: 2.50     Types: Cigarettes     Quit date: 1990     Years since quittin.5    Smokeless tobacco: Never   Substance Use Topics    Alcohol use:  Yes     Alcohol/week: 1.7 standard drinks     Types: 2 Standard drinks or equivalent per week     Comment: 2-3 drinks per week    Drug use: No       Family History  Family History   Problem Relation Age of Onset    Cancer Father         leukemia    Cancer Paternal Grandfather     Diabetes Sister     Diabetes Brother     Cancer Maternal Aunt     Cancer Maternal Uncle        Review of Systems  ROS from attending provider note from ED provider reviewed and changes (other than per HPI) include : none. Objective:     Vital signs in last 24 hours:  Visit Vitals  /83 (BP 1 Location: Left upper arm, BP Patient Position: At rest)   Pulse 66   Temp 98.2 °F (36.8 °C)   Resp 18   Ht 6' 3\" (1.905 m)   Wt 111.1 kg (245 lb)   SpO2 96%   BMI 30.62 kg/m²       Intake/Output last 3 shifts:        Physical Exam  General: NAD, A&O,   HEENT: lids normal, no tracheal deviation, no lesions or deformities  Pulmonary: Normal work of breathing   Abdomen: soft, NTTP, nondistended, no suprapubic fullness or tenderness  : vazquez , implant protruding through urethra , No  CVA tenderness  Gait: not observed  Neuro: Appropriate, R sided weakness no focal neurological deficits  Mood/Affect: normal    Lab/Imaging Review:       Most Recent Labs:  Lab Results   Component Value Date/Time    WBC 9.8 12/08/2022 03:30 PM    Hemoglobin (POC) 5.7 06/14/2011 03:00 PM    HGB 11.3 (L) 12/08/2022 03:30 PM    HCT 34.9 (L) 12/08/2022 03:30 PM    PLATELET 633 58/05/5743 03:30 PM    MCV 85.3 12/08/2022 03:30 PM        Lab Results   Component Value Date/Time    Sodium 135 (L) 10/29/2022 01:19 AM    Potassium 4.0 10/29/2022 01:19 AM    Chloride 101 10/29/2022 01:19 AM    CO2 30 10/29/2022 01:19 AM    Anion gap 4 (L) 10/29/2022 01:19 AM    Glucose 88 10/29/2022 01:19 AM    BUN 13 10/29/2022 01:19 AM    Creatinine 0.60 (L) 10/29/2022 01:19 AM    BUN/Creatinine ratio 22 (H) 10/29/2022 01:19 AM    GFR est AA >60 09/28/2022 06:00 AM    GFR est non-AA >60 09/28/2022 06:00 AM    Calcium 9.8 10/29/2022 01:19 AM    Bilirubin, total 0.7 10/29/2022 01:19 AM    Alk.  phosphatase 98 10/29/2022 01:19 AM    Protein, total 8.4 (H) 10/29/2022 01:19 AM    Albumin 2.4 (L) 10/29/2022 01:19 AM    Globulin 6.0 (H) 10/29/2022 01:19 AM    A-G Ratio 0.4 (L) 10/29/2022 01:19 AM    ALT (SGPT) 31 10/29/2022 01:19 AM        Lab Results   Component Value Date/Time    Prostate Specific Ag 1.5 01/14/2014 08:54 AM    Prostate Specific Ag 1.3 01/24/2011 02:28 AM    Prostate Specific Ag 1.0 05/17/2010 04:25 PM        COAGS:  No results found for: APTT, PTP, INR, INREXT    Lab Results   Component Value Date/Time    Hemoglobin A1c 5.8 (H) 09/15/2022 02:40 AM    Hemoglobin A1c (POC) 6.0 06/24/2021 08:47 AM        Lab Results   Component Value Date/Time     (H) 09/17/2022 04:28 AM          Urine/Blood Cultures:  Results       ** No results found for the last 336 hours. **               IMAGING:  No results found. D/w Dr Yennifer Stewart   Signed By: Joel Canales. Mitali Morillo NP  - December 8, 2022  He had successful 16cm implant with 75cc reservoir placement 5 years ago. 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 Meatus has traumatically been ripped to below coronal sulcus. Catheter was downsized but erosion still occurred into corporal body. Dr Chadd Johnson has time around 1:30 Monday for planned explant. If heparin drip bridges him per cards, would do so until 4 hours prior.

## 2022-12-08 NOTE — PROGRESS NOTES
Heparin Infusion Monitoring Note:  Due to recent anticoagulant use, the heparin infusion will be monitoring using an aPTT-based algorithm. Please refer to the [facility] for adjustment details of the heparin infusion. Details of prior anticoagulant use:    Name of anticoagulant: apixaban  Last dose: 12/8 in AM    At 72 hours following the last anticoagulant administration, an anti-Xa based nomogram will be ordered to replace the aPTT nomogram.  This will occur on 12/11 @ 0900.

## 2022-12-08 NOTE — ED NOTES
Has a final transfer review been performed? Yes    Reason for Admission: hematuria; failure of penile implant  Patient comes from: [address] [place] [address] Dr Status: Alert and oriented  ADL:total assistance  Ambulation: bedbound  Pertinent Info/Safety Concerns: patient has vazquez from facility, I have not replaced it due to the penile implant next to it and urology has not come to see yet.; atiya has hx of stroke which has left him with lower extremity weakness and unable to lift right arm. Patient is oriented however he has moments of forgetfulness.    COVID Status: Not Tested  MEWS Score: 1     Vitals:    12/08/22 1339 12/08/22 1832   BP: 119/83 126/73   Pulse: 66 76   Resp: 18 18   Temp: 98.2 °F (36.8 °C) 97.7 °F (36.5 °C)   SpO2: 96% 98%   Weight: 111.1 kg (245 lb)    Height: 6' 3\" (1.905 m)      Lines:   Peripheral IV 12/08/22 Left Antecubital (Active)   Site Assessment Clean, dry, & intact 12/08/22 1544   Phlebitis Assessment 0 12/08/22 1544   Infiltration Assessment 0 12/08/22 1544   Dressing Status Clean, dry, & intact 12/08/22 1544   Dressing Type Transparent 12/08/22 1544   Hub Color/Line Status Pink 12/08/22 1544      Transport mode:ED stretcher    Mignon Mast  being transferred to (unit) for routine progression of care     \"Notification of etransfer note given to (Name) [address] (Title) Tech

## 2022-12-08 NOTE — ED TRIAGE NOTES
Pt arrives via EMS from [facility] [place] for hematuria in catheter. Staff noticed bleeding for 2 weeks and states his penile device has now stopped working.

## 2022-12-09 LAB
ANION GAP SERPL CALC-SCNC: 6 MMOL/L (ref 5–15)
APTT PPP: 38.7 SEC (ref 22.1–31)
APTT PPP: 39 SEC (ref 22.1–31)
APTT PPP: 85.7 SEC (ref 22.1–31)
BASOPHILS # BLD: 0 K/UL (ref 0–0.1)
BASOPHILS NFR BLD: 0 % (ref 0–1)
BUN SERPL-MCNC: 15 MG/DL (ref 6–20)
BUN/CREAT SERPL: 33 (ref 12–20)
CALCIUM SERPL-MCNC: 9 MG/DL (ref 8.5–10.1)
CHLORIDE SERPL-SCNC: 103 MMOL/L (ref 97–108)
CO2 SERPL-SCNC: 27 MMOL/L (ref 21–32)
CREAT SERPL-MCNC: 0.46 MG/DL (ref 0.7–1.3)
DIFFERENTIAL METHOD BLD: ABNORMAL
EOSINOPHIL # BLD: 0.3 K/UL (ref 0–0.4)
EOSINOPHIL NFR BLD: 3 % (ref 0–7)
ERYTHROCYTE [DISTWIDTH] IN BLOOD BY AUTOMATED COUNT: 15.4 % (ref 11.5–14.5)
GLUCOSE BLD STRIP.AUTO-MCNC: 115 MG/DL (ref 65–117)
GLUCOSE BLD STRIP.AUTO-MCNC: 144 MG/DL (ref 65–117)
GLUCOSE BLD STRIP.AUTO-MCNC: 91 MG/DL (ref 65–117)
GLUCOSE BLD STRIP.AUTO-MCNC: 92 MG/DL (ref 65–117)
GLUCOSE SERPL-MCNC: 77 MG/DL (ref 65–100)
HCT VFR BLD AUTO: 34.7 % (ref 36.6–50.3)
HGB BLD-MCNC: 10.8 G/DL (ref 12.1–17)
IMM GRANULOCYTES # BLD AUTO: 0 K/UL (ref 0–0.04)
IMM GRANULOCYTES NFR BLD AUTO: 0 % (ref 0–0.5)
LYMPHOCYTES # BLD: 1.9 K/UL (ref 0.8–3.5)
LYMPHOCYTES NFR BLD: 21 % (ref 12–49)
MCH RBC QN AUTO: 27.8 PG (ref 26–34)
MCHC RBC AUTO-ENTMCNC: 31.1 G/DL (ref 30–36.5)
MCV RBC AUTO: 89.2 FL (ref 80–99)
MONOCYTES # BLD: 0.9 K/UL (ref 0–1)
MONOCYTES NFR BLD: 10 % (ref 5–13)
NEUTS SEG # BLD: 5.8 K/UL (ref 1.8–8)
NEUTS SEG NFR BLD: 66 % (ref 32–75)
NRBC # BLD: 0 K/UL (ref 0–0.01)
NRBC BLD-RTO: 0 PER 100 WBC
PLATELET # BLD AUTO: 284 K/UL (ref 150–400)
PMV BLD AUTO: 10.2 FL (ref 8.9–12.9)
POTASSIUM SERPL-SCNC: 3.7 MMOL/L (ref 3.5–5.1)
RBC # BLD AUTO: 3.89 M/UL (ref 4.1–5.7)
SERVICE CMNT-IMP: ABNORMAL
SERVICE CMNT-IMP: NORMAL
SODIUM SERPL-SCNC: 136 MMOL/L (ref 136–145)
THERAPEUTIC RANGE,PTTT: ABNORMAL SECS (ref 58–77)
WBC # BLD AUTO: 9 K/UL (ref 4.1–11.1)

## 2022-12-09 PROCEDURE — 80048 BASIC METABOLIC PNL TOTAL CA: CPT

## 2022-12-09 PROCEDURE — 36415 COLL VENOUS BLD VENIPUNCTURE: CPT

## 2022-12-09 PROCEDURE — 97530 THERAPEUTIC ACTIVITIES: CPT

## 2022-12-09 PROCEDURE — 65270000046 HC RM TELEMETRY

## 2022-12-09 PROCEDURE — 74011250636 HC RX REV CODE- 250/636: Performed by: STUDENT IN AN ORGANIZED HEALTH CARE EDUCATION/TRAINING PROGRAM

## 2022-12-09 PROCEDURE — 97166 OT EVAL MOD COMPLEX 45 MIN: CPT

## 2022-12-09 PROCEDURE — 74011000258 HC RX REV CODE- 258: Performed by: STUDENT IN AN ORGANIZED HEALTH CARE EDUCATION/TRAINING PROGRAM

## 2022-12-09 PROCEDURE — 85025 COMPLETE CBC W/AUTO DIFF WBC: CPT

## 2022-12-09 PROCEDURE — 82962 GLUCOSE BLOOD TEST: CPT

## 2022-12-09 PROCEDURE — 85730 THROMBOPLASTIN TIME PARTIAL: CPT

## 2022-12-09 PROCEDURE — 97162 PT EVAL MOD COMPLEX 30 MIN: CPT

## 2022-12-09 PROCEDURE — 74011250637 HC RX REV CODE- 250/637: Performed by: STUDENT IN AN ORGANIZED HEALTH CARE EDUCATION/TRAINING PROGRAM

## 2022-12-09 PROCEDURE — 74011000250 HC RX REV CODE- 250: Performed by: STUDENT IN AN ORGANIZED HEALTH CARE EDUCATION/TRAINING PROGRAM

## 2022-12-09 RX ORDER — HEPARIN SODIUM 1000 [USP'U]/ML
4000 INJECTION, SOLUTION INTRAVENOUS; SUBCUTANEOUS ONCE
Status: COMPLETED | OUTPATIENT
Start: 2022-12-09 | End: 2022-12-09

## 2022-12-09 RX ADMIN — GABAPENTIN 300 MG: 300 CAPSULE ORAL at 22:07

## 2022-12-09 RX ADMIN — DILTIAZEM HYDROCHLORIDE 60 MG: 60 TABLET, FILM COATED ORAL at 22:07

## 2022-12-09 RX ADMIN — SODIUM CHLORIDE, PRESERVATIVE FREE 10 ML: 5 INJECTION INTRAVENOUS at 06:53

## 2022-12-09 RX ADMIN — CEFEPIME 2 G: 2 INJECTION, POWDER, FOR SOLUTION INTRAVENOUS at 22:02

## 2022-12-09 RX ADMIN — ATORVASTATIN CALCIUM 80 MG: 40 TABLET, FILM COATED ORAL at 22:06

## 2022-12-09 RX ADMIN — HEPARIN SODIUM 4000 UNITS: 1000 INJECTION INTRAVENOUS; SUBCUTANEOUS at 22:43

## 2022-12-09 RX ADMIN — VANCOMYCIN HYDROCHLORIDE 1250 MG: 1.25 INJECTION, POWDER, LYOPHILIZED, FOR SOLUTION INTRAVENOUS at 19:20

## 2022-12-09 RX ADMIN — HEPARIN SODIUM 4000 UNITS: 1000 INJECTION INTRAVENOUS; SUBCUTANEOUS at 06:52

## 2022-12-09 RX ADMIN — ALLOPURINOL 100 MG: 100 TABLET ORAL at 09:16

## 2022-12-09 RX ADMIN — HEPARIN SODIUM 11 UNITS/KG/HR: 10000 INJECTION, SOLUTION INTRAVENOUS at 11:50

## 2022-12-09 RX ADMIN — SODIUM CHLORIDE, PRESERVATIVE FREE 10 ML: 5 INJECTION INTRAVENOUS at 15:53

## 2022-12-09 RX ADMIN — DILTIAZEM HYDROCHLORIDE 60 MG: 60 TABLET, FILM COATED ORAL at 06:52

## 2022-12-09 RX ADMIN — HEPARIN SODIUM 12 UNITS/KG/HR: 10000 INJECTION, SOLUTION INTRAVENOUS at 05:26

## 2022-12-09 RX ADMIN — CEFEPIME 2 G: 2 INJECTION, POWDER, FOR SOLUTION INTRAVENOUS at 09:15

## 2022-12-09 RX ADMIN — FAMOTIDINE 20 MG: 20 TABLET, FILM COATED ORAL at 11:54

## 2022-12-09 RX ADMIN — SODIUM CHLORIDE, PRESERVATIVE FREE 10 ML: 5 INJECTION INTRAVENOUS at 22:08

## 2022-12-09 RX ADMIN — FINASTERIDE 5 MG: 5 TABLET, FILM COATED ORAL at 09:16

## 2022-12-09 RX ADMIN — DILTIAZEM HYDROCHLORIDE 60 MG: 60 TABLET, FILM COATED ORAL at 15:53

## 2022-12-09 RX ADMIN — VANCOMYCIN HYDROCHLORIDE 1250 MG: 1.25 INJECTION, POWDER, LYOPHILIZED, FOR SOLUTION INTRAVENOUS at 05:39

## 2022-12-09 RX ADMIN — MIRTAZAPINE 7.5 MG: 15 TABLET, FILM COATED ORAL at 22:07

## 2022-12-09 RX ADMIN — FAMOTIDINE 20 MG: 20 TABLET, FILM COATED ORAL at 22:07

## 2022-12-09 RX ADMIN — TAMSULOSIN HYDROCHLORIDE 0.4 MG: 0.4 CAPSULE ORAL at 09:16

## 2022-12-09 RX ADMIN — HEPARIN SODIUM 15 UNITS/KG/HR: 10000 INJECTION, SOLUTION INTRAVENOUS at 22:19

## 2022-12-09 RX ADMIN — METOPROLOL SUCCINATE 75 MG: 50 TABLET, EXTENDED RELEASE ORAL at 09:16

## 2022-12-09 NOTE — PROGRESS NOTES
Problem: Falls - Risk of  Goal: *Absence of Falls  Description: Document Rachell Duverney Fall Risk and appropriate interventions in the flowsheet.   Outcome: Progressing Towards Goal  Note: Fall Risk Interventions:                                Problem: Patient Education: Go to Patient Education Activity  Goal: Patient/Family Education  Outcome: Progressing Towards Goal

## 2022-12-09 NOTE — PROGRESS NOTES
[facility]  Hospitalist Group                                                                                          Hospitalist Progress Note  Jorge Tim MD  Answering service: [phone] OR [phone] from in house phone        Date of Service:  2022  NAME:  Patti Miles  :  1950  MRN:  [id number]      Admission Summary:   Patti Miles is a 71 y.o. male who presents with hematuria. 71-year-old  male with past medical history of atrial fibrillation on Xarelto, depression, diabetes, dyslipidemia, hypertension, CVA, VALENTE on CPAP, restless leg syndrome, penile implant, who comes in for the above. Patient is apparently sent over by his facility for hematuria of about 2 weeks duration with his penile implant not working anymore. In the ED, his penile implant was found to be protruding and Sexton catheter was inserted with pink-colored urine draining. He denies any fever, chills. He reports on and off pain in the penile area. He says that this is his second penile implant. The ER spoke to urology and they recommended antibiotics. Urology has seen in the ER and will plan for surgery on Monday. Of note, the patient has previous Pseudomonas in urine as well as Enterococcus. Initial recommendations were vancomycin and gentamicin, but at that time patient is not symptomatic or septic. The patient is not quite sure as to why he was sent to the hospital.  Per the ER note, the patient's neurologist had apparently spoken to the patient SNF and wanted him transferred here yesterday. I cannot find any medical record of the purpose of this device, the patient says that it was placed for sexual purposes. Interval history / Subjective:   Patient seen and examined earlier this morning by me for follow-up of regarding penile implant. Patient denies any pain. Patient has not had any hematuria.   No acute complaints at the time of my exam.     Assessment & Plan:     Hematuria  Monitor with H&H  No signs of active bleeding at this time  Will hold anticoagulation mostly due to surgery  12/9-no signs of bleeding at this time  Continue to monitor H&H     Eroding penile implant, 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   Urology on board  Vancomycin and cefepime  Plan to go to the OR on 12/12  Monitor  History of Pseudomonas and Enterococcus UTI, previously on ceftriaxone, ampicillin, Zosyn. Pending UA and culture  12/9-pending UA culture  Continue antibiotics  Urology seen     Atrial fibrillation on Eliquis  Continue diltiazem and metoprolol  Holding Eliquis for now due to surgery planned for Monday  Cardiology consulted for recommendations on anticoagulation  [id number] is 5 and he has had a recent stroke. Due to these risk factors, I will start a heparin drip for now. 12/9-continue heparin drip and will DC 8 to 12 hours before surgery on Monday  No signs of bleeding at this time     Hypertension  Continue home medications     Diabetes  Sliding scale insulin     BPH  Continue home tamsulosin and finasteride     History of recent CVA with residual right-sided weakness  Has baseline  Monitor  Continue atorvastatin     Depression  Home Remeron     Code status: Full  Prophylaxis: Heparin drip  Care Plan discussed with: Patient, nurse  Anticipated Disposition: Greater than 48 hours     Hospital Problems  Date Reviewed: 10/12/2022            Codes Class Noted POA    Hematuria ICD-10-CM: R31.9  ICD-9-CM: 599.70  12/8/2022 Unknown        Penile implant failure [facility]) ICD-10-CM: T83.410A  ICD-9-CM: 996.76  12/8/2022 Unknown             Review of Systems:   A comprehensive review of systems was negative except for that written in the HPI. Vital Signs:    Last 24hrs VS reviewed since prior progress note.  Most recent are:  Visit Vitals  /81 (BP 1 Location: Right arm, BP Patient Position: At rest)   Pulse 73   Temp 98.6 °F (37 °C)   Resp 16   Ht 6' 3\" (1.905 m)   Wt 111.1 kg (245 lb)   SpO2 96%   BMI 30.62 kg/m²         Intake/Output Summary (Last 24 hours) at 12/9/2022 1313  Last data filed at 12/8/2022 2359  Gross per 24 hour   Intake 22.25 ml   Output --   Net 22.25 ml        Physical Examination:     I had a face to face encounter with this patient and independently examined them on 12/9/2022 as outlined below:    General: Alert awake, no acute distress, resting in bed, pleasant male, appears to be stated age  HEENT: PEERL, EOMI, moist mucus membranes  Neck: Supple, no JVD, no meningeal signs  Chest: Clear to auscultation bilaterally   CVS: RRR, S1 S2 heard, no murmurs/rubs/gallops  Abd: Soft, non-tender, non-distended, +bowel sounds   Ext: No clubbing, no cyanosis, no edema  Neuro/Psych: Pleasant mood and affect, sensory grossly within normal limit, right lower extremity 1-2/5. Right upper extremity 0/5. Cap refill: Brisk, less than 3 seconds  Pulses: 2+, symmetric in all extremities  Skin: Warm, dry, without rashes or lesions  : Patient has a prosthetic that is protruding from the urethra. Some secretion which looks seropurulent. No blood at this time. Data Review:    Review and/or order of clinical lab test  Review and/or order of tests in the radiology section of CPT  Review and/or order of tests in the medicine section of CPT      Labs:     Recent Labs     12/09/22  0347 12/08/22  2227 12/08/22  1530   WBC 9.0  --  9.8   HGB 10.8* 10.4* 11.3*   HCT 34.7* 32.9* 34.9*     --  314     Recent Labs     12/09/22  0347 12/08/22  1530    131*   K 3.7 3.9    98   CO2 27 29   BUN 15 18   CREA 0.46* 0.68*   GLU 77 113*   CA 9.0 9.4     Recent Labs     12/08/22  1530   ALT 18   *   TBILI 0.6   TP 8.4*   ALB 2.9*   GLOB 5.5*     Recent Labs     12/09/22  0923 12/09/22  0347 12/08/22  1530   INR  --   --  1.2*   PTP  --   --  12.1*   APTT 85.7* 38.7* 33.3*      No results for input(s): FE, TIBC, PSAT, FERR in the last 72 hours.    No results found for: FOL, RBCF   No results for input(s): PH, PCO2, PO2 in the last 72 hours. No results for input(s): CPK, CKNDX, TROIQ in the last 72 hours.     No lab exists for component: CPKMB  Lab Results   Component Value Date/Time    Cholesterol, total 146 09/15/2022 02:40 AM    HDL Cholesterol 53 09/15/2022 02:40 AM    LDL, calculated 65 09/15/2022 02:40 AM    Triglyceride 140 09/15/2022 02:40 AM    CHOL/HDL Ratio 2.8 09/15/2022 02:40 AM     Lab Results   Component Value Date/Time    Glucose (POC) 144 (H) 12/09/2022 11:25 AM    Glucose (POC) 91 12/09/2022 06:47 AM    Glucose (POC) 102 12/08/2022 09:22 PM    Glucose (POC) 118 (H) 10/29/2022 10:38 AM    Glucose (POC) 123 (H) 10/24/2022 12:15 PM     Lab Results   Component Value Date/Time    Color YELLOW/STRAW 10/18/2022 01:01 AM    Appearance TURBID (A) 10/18/2022 01:01 AM    Specific gravity 1.013 10/18/2022 01:01 AM    pH (UA) 6.5 10/18/2022 01:01 AM    Protein TRACE (A) 10/18/2022 01:01 AM    Glucose Negative 10/18/2022 01:01 AM    Ketone Negative 10/18/2022 01:01 AM    Bilirubin Negative 10/18/2022 01:01 AM    Urobilinogen 4.0 (H) 10/18/2022 01:01 AM    Nitrites Positive (A) 10/18/2022 01:01 AM    Leukocyte Esterase MODERATE (A) 10/18/2022 01:01 AM    Epithelial cells FEW 10/18/2022 01:01 AM    Bacteria 4+ (A) 10/18/2022 01:01 AM    WBC 20-50 10/18/2022 01:01 AM    RBC 0-5 10/18/2022 01:01 AM         Medications Reviewed:     Current Facility-Administered Medications   Medication Dose Route Frequency    [START ON 12/10/2022] Vancomycin level 12/10 with AM labs   Other ONCE    sodium chloride (NS) flush 5-40 mL  5-40 mL IntraVENous Q8H    sodium chloride (NS) flush 5-40 mL  5-40 mL IntraVENous PRN    acetaminophen (TYLENOL) tablet 650 mg  650 mg Oral Q6H PRN    Or    acetaminophen (TYLENOL) suppository 650 mg  650 mg Rectal Q6H PRN    polyethylene glycol (MIRALAX) packet 17 g  17 g Oral DAILY PRN    ondansetron (ZOFRAN ODT) tablet 4 mg  4 mg Oral Q8H PRN    Or    ondansetron (ZOFRAN) injection 4 mg  4 mg IntraVENous Q6H PRN    insulin lispro (HUMALOG) injection   SubCUTAneous AC&HS    glucose chewable tablet 16 g  4 Tablet Oral PRN    glucagon (GLUCAGEN) injection 1 mg  1 mg IntraMUSCular PRN    dextrose 10 % infusion 0-250 mL  0-250 mL IntraVENous PRN    allopurinoL (ZYLOPRIM) tablet 100 mg  100 mg Oral DAILY    atorvastatin (LIPITOR) tablet 80 mg  80 mg Oral QHS    dilTIAZem IR (CARDIZEM) tablet 60 mg  60 mg Oral Q8H    famotidine (PEPCID) tablet 20 mg  20 mg Oral Q12H    finasteride (PROSCAR) tablet 5 mg  5 mg Oral DAILY    gabapentin (NEURONTIN) capsule 300 mg  300 mg Oral QHS    metoprolol succinate (TOPROL-XL) XL tablet 75 mg  75 mg Oral DAILY    tamsulosin (FLOMAX) capsule 0.4 mg  0.4 mg Oral DAILY    cefepime (MAXIPIME) 2 g in 0.9% sodium chloride (MBP/ADV) 100 mL MBP  2 g IntraVENous Q12H    mirtazapine (REMERON) tablet 7.5 mg  7.5 mg Oral QHS    heparin 25,000 units in D5W 250 ml infusion  8-25 Units/kg/hr IntraVENous TITRATE    Vancomycin - Pharmacy to Dose   Other Rx Dosing/Monitoring    vancomycin (VANCOCIN) 1,250 mg in 0.9% sodium chloride 250 mL ([id number])  1,250 mg IntraVENous Q12H     ______________________________________________________________________  EXPECTED LENGTH OF STAY: - - -  ACTUAL LENGTH OF STAY:          Steven [address] Padmini De MD

## 2022-12-09 NOTE — PROGRESS NOTES
Admission Medication Reconciliation:    Information obtained from:  medication list from [address] [place] [address]:  YES    Comments/Recommendations: Updated PTA meds/reviewed patient's allergies. 1)  Medication changes (since last review): Added  - bupropion ER 100mg, tramadol, ferrous sulfate, mag ox, temazepam,     Adjusted  - diltiazem IR to ER 60 mg       ¹[employer] pharmacy benefit data reflects medications filled and processed through the patient's insurance, however   this data does NOT capture whether the medication was picked up or is currently being taken by the patient. Allergies:  Patient has no known allergies. Significant PMH/Disease States:   Past Medical History:   Diagnosis Date    Arrhythmia     atrial fib    Depression 4/24/2010    Diabetes ([address]     diet control for pre-diabetes    Dyslipidemia, goal LDL below 100 6/14/2011    Gout     HTN (hypertension) 4/24/2010    Impotence 4/24/2010    Morbid obesity ([address] 5/17/2010    VALENTE (obstructive sleep apnea) 4/24/2010    CPAP    Restless legs 4/15/2015     Chief Complaint for this Admission:    Chief Complaint   Patient presents with    Blood in Urine     Prior to Admission Medications:   Prior to Admission Medications   Prescriptions Last Dose Informant Taking?   allopurinoL (ZYLOPRIM) 100 mg tablet   Yes   Sig: TAKE 1 TABLET BY MOUTH EVERY DAY   apixaban (ELIQUIS) 5 mg tablet   Yes   Sig: Take 1 Tablet by mouth two (2) times a day. atorvastatin (LIPITOR) 80 mg tablet   Yes   Sig: Take 1 Tablet by mouth nightly. balsam peru-castor oiL (VENELEX) ointment   Yes   Sig: Apply  to affected area two (2) times a day. APPLY TO all bony prominences, abrasions and ecchymosis to right side, posterior head Nursing, document site in comments   buPROPion SR (WELLBUTRIN SR) 100 mg SR tablet   Yes   Sig: Take 100 mg by mouth two (2) times a day.    dilTIAZem ER (CARDIZEM SR) 60 mg capsule   Yes   Sig: Take 60 mg by mouth every eight (8) hours. docusate sodium (COLACE) 100 mg capsule   Yes   Sig: Take 1 Capsule by mouth two (2) times daily as needed for Constipation for up to 90 days. ergocalciferol (ERGOCALCIFEROL) 1,250 mcg (50,000 unit) capsule   No   Sig: Take 1 Capsule by mouth every seven (7) days for 6 doses. famotidine (PEPCID) 20 mg tablet   Yes   Sig: Take 1 Tablet by mouth every twelve (12) hours. ferrous sulfate 325 mg (65 mg iron) tablet   Yes   Sig: Take 325 mg by mouth two (2) times a day. finasteride (PROSCAR) 5 mg tablet   Yes   Sig: Take 1 Tablet by mouth daily. gabapentin (NEURONTIN) 300 mg capsule   Yes   Sig: Take 1 Capsule by mouth nightly. Max Daily Amount: 300 mg.   magnesium oxide (MAG-OX) 400 mg tablet   Yes   Sig: Take 400 mg by mouth nightly. melatonin 3 mg tablet   Yes   Sig: Take 1 Tablet by mouth nightly as needed for Insomnia.   metoprolol succinate (TOPROL-XL) 25 mg XL tablet   Yes   Sig: Take 3 Tablets by mouth daily. mirtazapine (REMERON) 7.5 mg tablet   No   Sig: Take 1 Tablet by mouth nightly. ondansetron (ZOFRAN ODT) 4 mg disintegrating tablet   Yes   Sig: Take 1 Tablet by mouth every eight (8) hours as needed for Nausea or Vomiting.   tamsulosin (FLOMAX) 0.4 mg capsule   Yes   Sig: Take 1 Capsule by mouth daily. temazepam (RESTORIL) 7.5 mg capsule   Yes   Sig: Take 7.5 mg by mouth nightly. traMADoL (ULTRAM) 50 mg tablet   Yes   Sig: Take 50 mg by mouth every six to eight (6-8) hours as needed for Pain. Facility-Administered Medications: None     Please contact the main inpatient pharmacy with any questions or concerns at [phone] and we will direct you to the clinical pharmacist covering this patient's care while in-house.    Clarice Max, [facility]

## 2022-12-09 NOTE — PROGRESS NOTES
Problem: Mobility Impaired (Adult and Pediatric)  Goal: *Acute Goals and Plan of Care (Insert Text)  Description: FUNCTIONAL STATUS PRIOR TO ADMISSION: pt admitted from SNF. Pt reported he stays in the bed primarily and requires 2 person assistance for transfers. 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 Patient will move from supine to sit and sit to supine  and roll side to side in bed with maximal assistance x 1 within 7 day(s). 2.  Patient will transfer from bed to chair and chair to bed with maximal assistance x 2 using the least restrictive device within 7 day(s). 3.  Patient will perform sit to stand with maximal assistance x 2 within 7 day(s). 4.  Patient will tolerate seated EOB with mod A x 10 minutes within 7 days    Outcome: Not Progressing Towards Goal   PHYSICAL THERAPY EVALUATION  Patient: Mario Baca (32 y.o. 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 Pt received supine in bed and agreeable to therapy. Pt tolerated evaluation fairly well. Pt required up to max A x 2 supine to sit EOB. Pt required constant max A to maintain sitting EOB due to L lateral and posterior leaning. Pt assisted back to supine position with all needs met. Recommend pt return to SNF upon discharge to continue therapy efforts. Current Level of Function Impacting Discharge (mobility/balance): max A x 2 supine<>it, max A maintain sitting balance    Functional Outcome Measure:   The patient scored Total: 10/100 on the Barthel Index outcome measure which is indicative of being totally dependent in basic self-care. Other factors to consider for discharge: admitted from SNF     Patient will benefit from skilled therapy intervention to address the above noted impairments. PLAN :  Recommendations and Planned Interventions: bed mobility training, transfer training, gait training, therapeutic exercises, patient and family training/education, and therapeutic activities      Frequency/Duration: Patient will be followed by physical therapy:  3 times a week to address goals. Recommendation for discharge: (in order for the patient to meet his/her long term goals)  Therapy up to 5 days/week in SNF setting    This discharge recommendation:  Has been made in collaboration with the attending provider and/or case management    IF patient discharges home will need the following DME: to be determined (TBD)         SUBJECTIVE:   Patient stated Maryann Wong don't get me up very often.     OBJECTIVE DATA SUMMARY:   HISTORY:    Past Medical History:   Diagnosis Date    Arrhythmia     atrial fib    Depression 4/24/2010    Diabetes ([address]     diet control for pre-diabetes    Dyslipidemia, goal LDL below 100 6/14/2011    Gout     HTN (hypertension) 4/24/2010    Impotence 4/24/2010    Morbid obesity ([address] 5/17/2010    VALENTE (obstructive sleep apnea) 4/24/2010    CPAP    Restless legs 4/15/2015     Past Surgical History:   Procedure Laterality Date    COLONOSCOPY N/A 6/27/2017    COLONOSCOPY performed by Ronita Hamman, MD at [facility], [facility]  2007    HX APPENDECTOMY      HX ORTHOPAEDIC  2000    bilat TKR     HX ORTHOPAEDIC  2010    left THR    HX UROLOGICAL  03/2018    urolift    HX UROLOGICAL  03/2019    prosthesis       Personal factors and/or comorbidities impacting plan of care:     Home Situation  Home Environment: Skilled nursing facility  Support Systems: Child(alexandru)  Patient Expects to be Discharged to[de-identified] Skilled nursing facility    EXAMINATION/PRESENTATION/DECISION MAKING:     Range Of Motion:  AROM: Grossly decreased, non-functional (R sided hemiplegia)                       Strength:    Strength: Grossly decreased, non-functional (R sided hemiplegia)                    Tone & Sensation:   Tone: Abnormal (RUE flaccid)              Sensation: Impaired               Coordination:  Coordination: Grossly decreased, non-functional  Vision:      Functional Mobility:  Bed Mobility:     Supine to Sit: Maximum assistance;Assist x2  Sit to Supine: Maximum assistance;Assist x2  Scooting: Maximum assistance       Balance:   Sitting: Impaired  Sitting - Static: Poor (constant support)  Sitting - Dynamic: Poor (constant support)      Functional Measure:  Barthel Index:    Bathin  Bladder: 0  Bowels: 5  Groomin  Dressin  Feedin  Mobility: 0  Stairs: 0  Toilet Use: 0  Transfer (Bed to Chair and Back): 0  Total: 10/100       The Barthel ADL Index: Guidelines  1. The index should be used as a record of what a patient does, not as a record of what a patient could do. 2. The main aim is to establish degree of independence from any help, physical or verbal, however minor and for whatever reason. 3. The need for supervision renders the patient not independent. 4. A patient's performance should be established using the best available evidence. Asking the patient, friends/relatives and nurses are the usual sources, but direct observation and common sense are also important. However direct testing is not needed. 5. Usually the patient's performance over the preceding 24-48 hours is important, but occasionally longer periods will be relevant. 6. Middle categories imply that the patient supplies over 50 per cent of the effort. 7. Use of aids to be independent is allowed.     Score Interpretation (from [address] [address])    Independent   60-79 Minimally independent   40-59 Partially dependent   20-39 Very dependent   <20 Totally dependent     -Moisés Mosley, Barthel, D.W. (1636). Functional evaluation: the Barthel Index. [address] ([address] (1997). The Barthel activities of daily living index: self-reporting versus actual performance in the old (> or = 75 years). Journal of [facility]4 [facility] 45(7), 14 [facility], CELESTINE, Alejandrina Henderson., Estelatyree Zambrano. (1999). Measuring the change in disability after inpatient rehabilitation; comparison of the responsiveness of the Barthel Index and Functional Logan Measure. Journal of Neurology, Neurosurgery, and Psychiatry, 66(4), 889-922. Jah Abreu, N.[place].MARY, AMY Gamino, & Ana Ring MVANESSA. (2004) Assessment of post-stroke quality of life in cost-effectiveness studies: The usefulness of the Barthel Index and the EuroQoL-5D. Quality of Life Research, 13, 342-72           Based on the above components, the patient evaluation is determined to be of the following complexity level: MEDIUM    Pain Rating:  Pt denied pain    Activity Tolerance:   Fair    After treatment patient left in no apparent distress:   Supine in bed, Call bell within reach, and Side rails x 3    COMMUNICATION/EDUCATION:   The patients plan of care was discussed with: Occupational therapist and Registered nurse. Fall prevention education was provided and the patient/caregiver indicated understanding., Patient/family have participated as able in goal setting and plan of care., Patient/family agree to work toward stated goals and plan of care. , and Patient is unable to participate in goal setting and plan of care.     Thank you for this referral.  Luz Rodarte, PT, DPT   Time Calculation: 15 mins

## 2022-12-09 NOTE — PROGRESS NOTES
LOREN- Pending referral to [facility],[address] [place] [address] (formerly [facility])    Reason for Admission:   hematuria                 RUR Score:   22%        PCP: First and Last name:  Luz Peters MD     Name of Practice:   Are you a current patient: Yes/No:   Approximate date of last visit:    Can you do a virtual visit with your PCP:              Resources/supports as identified by patient/family:   Pt son, Libra Hawkins and friends, the [employer] facing patient (as identified by patient/family and CM): Finances/Medication cost?     no               Transportation? no              Support system or lack thereof?  son                     Living arrangements? Pt was in rehab             Self-care/ADLs/Cognition? Waiting for PT/OT          Current Advanced Directive/Advance Care Plan:  Full Code      Healthcare Decision Maker:   Click here to complete HealthCare Decision Makers including selection of the Healthcare Decision Maker Relationship (ie \"Primary\")      Primary Decision MakerAgatha Necessary - [phone]    Payor Source Payor: Ned Bal / Plan: Knowthena / Product Type: Managed Care Medicare /                             Plan for utilizing home health:   TBD                  Transition of Care Plan:    CM reviewed this chart and the last admission. CM also called this pt's son, Libra Hawkins ([phone]) to complete initial assessment. He did not answer the phone and CM had to leave him a voice mail message asking him to return the call. CM noted that this pt went to [facility] (formerly [facility]) for rehab at the end of October. CM also reached out to [address] [place] [address] ([phone]) and left a message for admissions to please call this CM. CM went to meet with pt, but he was asleep. Referral sent to [address] [place] [address] via Sukh Mobley. CARROLL Villanueva. ACM-    Care Management Interventions  PCP Verified by CM: Yes  Palliative Care Criteria Met (RRAT>21 & CHF Dx)?: No  Transition of Care Consult (CM Consult): Discharge Planning  MyChart Signup: No  Discharge Durable Medical Equipment: No  Physical Therapy Consult: Yes  Occupational Therapy Consult: Yes  Speech Therapy Consult: No  Support Systems: Child(alexandru)  Confirm Follow Up Transport: Family  The Plan for Transition of Care is Related to the Following Treatment Goals : safe d/c  The Patient and/or Patient Representative was Provided with a Choice of Provider and Agrees with the Discharge Plan?: Yes  Freedom of Choice List was Provided with Basic Dialogue that Supports the Patient's Individualized Plan of Care/Goals, Treatment Preferences and Shares the Quality Data Associated with the Providers?: Yes  The [employer] Information Provided?: No

## 2022-12-09 NOTE — PROGRESS NOTES
Patient: Mundo Taylor MRN: [id number]  SSN: [id number]    YOB: 1950  Age: 71 y.o. Sex: male        ADMITTED: 2022 to [facility], * by Darlene Lopez MD for Hematuria [R31.9]  Penile implant failure [facility]) Lucy Comment is doing well. Denies pain, fevers, or chills. Remains AF, VSS. WBC wnl. +Maxipime, Vanc. Hgb 10.8. Eliquis held, on heparin bridge. Vitals: Temp (24hrs), Av °F (36.7 °C), Min:97.6 °F (36.4 °C), Max:98.6 °F (37 °C)    Blood pressure 123/81, pulse 77, temperature 98.6 °F (37 °C), resp. rate 16, height 6' 3\" (1.905 m), weight 111.1 kg (245 lb), SpO2 96 %. Intake and Output:   1901 -  0700  In: 22.3 [I.V.:22.3]  Out: -   No intake/output data recorded. JESSICA Output lats 24 hrs: No data found. JESSICA Output last 8 hrs: No data found. BM over last 24 hrs: No data found. Physical Exam  General: NAD, pleasant  Respiratory: no distress, breathing easily   Abdomen: soft, no distention; non-tender to palpation  : no CVA tenderness, vazquez yellow UA, implant protruding through urethra   Neuro: Appropriate, no focal neurological deficits  Skin: warm, dry  Extremities: moves all, full ROM    Labs:  CBC:   Lab Results   Component Value Date/Time    WBC 9.0 2022 03:47 AM    HCT 34.7 (L) 2022 03:47 AM    PLATELET 515  03:47 AM     BMP:   Lab Results   Component Value Date/Time    Glucose 77 2022 03:47 AM    Sodium 136 2022 03:47 AM    Potassium 3.7 2022 03:47 AM    Chloride 103 2022 03:47 AM    CO2 27 2022 03:47 AM    BUN 15 2022 03:47 AM    Creatinine 0.46 (L) 2022 03:47 AM    Calcium 9.0 2022 03:47 AM     Assessment/Plan:   Eroding penile implant   Hx of atrial fibrillation on Eliquis     - The patient remains stable without signs of sepsis. Continue empiric abx, pending culture. Continue to hold Eliquis, on heparin bridge.  Scheduled for penile prosthesis explant on 12/12/22 at 2 PM with Dr. Kishan Healy. All questions answered. Will need to hold heparin 12 hrs prior to surgery. Please call over the weekend with questions, will follow up on Monday.      D/W Dr J Carlos Beach By: Ashley Hassan, LAUREEN - December 9, 2022

## 2022-12-09 NOTE — PROGRESS NOTES
Pharmacist Note - Vancomycin Dosing    Consult provided for this 71 y.o. male for indication of SSTI, s/p penile implant presenting with hematuria.  - H/o Pseudomonas and Enterococcus  Antibiotic regimen(s): cefepime  Patient on vancomycin PTA? NO     Recent Labs     22  1530   WBC 9.8   CREA 0.68*   BUN 18     Frequency of BMP: Daily x 3  Height: 190.5 cm  Weight: 111.1 kg  Est CrCl: >100 ml/min; UO: --- ml/kg/hr  Temp (24hrs), Av °F (36.7 °C), Min:97.7 °F (36.5 °C), Max:98.2 °F (36.8 °C)    Cultures:  10/18 Urine - >100k cfu/mL E.faecalis (S to Vanc), Pseudomonas (S to cefepime) - Final    MRSA Swab ordered (if applicable)? NO    The plan below is expected to result in a target range of AUC/ALIE 400-500    Therapy was initiated with a loading dose of 2500 mg IV x 1 which will be followed by a maintenance dose of 1250 mg IV every 12 hours. Pharmacy to follow patient daily and order levels / make dose adjustments as appropriate. Dante Hankins, Geraldine  Clinical Pharmacist, Orthopedics and [facility] ()      *Vancomycin has been dosed used Bayesian kinetics software to target an AUC/ALIE of 400-600, which provides adequate exposure for an assumed infection due to MRSA with an ALIE of 1 or less while reducing the risk of nephrotoxicity as seen with traditional trough based dosing goals.

## 2022-12-09 NOTE — PROGRESS NOTES
Problem: Self Care Deficits Care Plan (Adult)  Goal: *Acute Goals and Plan of Care (Insert Text)  Description: FUNCTIONAL STATUS PRIOR TO ADMISSION: Patient reports being primarily bedbound since discharge to SNF, has gotten up to a wheelchair a couple of times. Patient requiring assisted for all ADLs but is able to feed self with setup. HOME SUPPORT: The patient lived at a SNF since last discharge from hospital. Prior to initial CVA, patient was living alone with limited local supports. Occupational Therapy Goals  Initiated 12/9/2022  1. Patient will perform grooming sitting edge of bed with minimal assistance/contact guard assist within 7 day(s). 2.  Patient will perform lower body dressing with maximal assistance within 7 day(s). 3.  Patient will perform anterior bathing from neck to thighs with minimal assistance/contact guard assist within 7 day(s). 4.  Patient will perform toilet transfers with maximal assistance within 7 day(s). 5.  Patient will perform all aspects of toileting with maximal assistance within 7 day(s). 6.  Patient will participate in upper extremity therapeutic exercise/activities with minimal assistance/contact guard assist for 10 minutes within 7 day(s). Outcome: Not Met   OCCUPATIONAL THERAPY EVALUATION  Patient: Irvin Yo (55 y.o. 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 Patient received semi supine in bed and agreeable to working with therapy.  Patient assisted to transfer to sitting edge of bed and impaired overall balance, cued for balance training at edge of bed but continues to require constant support with left lean if supports slowly down graded. Out of bed activities not completed at this time due to poor sitting balance and right sided deficits, limited out of bed mobility while at SNF per patient report. Noted in chart that patient scheduled for penile explant 12/12/2022 per MD notes. Overall patient presenting close to reported functional baseline but patient is a limited historian. Patient would benefit from skilled OT services during admission to improve independence with self care and functional mobility/transfers. Recommend discharge to SNF at this time. Current Level of Function Impacting Discharge (ADLs/self-care): max A x2 for mobility/transfers, setup to min A upper extremity activities of daily living, max to total A lower extremity activities of daily living     Functional Outcome Measure: The patient scored Total: 10/100 on the Barthel Index outcome measure which is indicative of being total dependence in basic self-care. Other factors to consider for discharge: prior level of function requires assist, limited therapy services at SNF per pt report, primarily bedbound      Patient will benefit from skilled therapy intervention to address the above noted impairments. PLAN :  Recommendations and Planned Interventions: self care training, functional mobility training, therapeutic exercise, balance training, therapeutic activities, endurance activities, neuromuscular re-education, patient education, and family training/education    Frequency/Duration: Patient will be followed by occupational therapy 3 times a week to address goals.     Recommendation for discharge: (in order for the patient to meet his/her long term goals)  Therapy up to 5 days/week in SNF setting    This discharge recommendation:  Has not yet been discussed the attending provider and/or case management    IF patient discharges home will need the following DME: TBD pending progress, would require total care       SUBJECTIVE:   Patient stated I was in the bed all day long.     OBJECTIVE DATA SUMMARY:   HISTORY:   Past Medical History:   Diagnosis Date    Arrhythmia     atrial fib    Depression 4/24/2010    Diabetes ([facility] [address]     diet control for pre-diabetes    Dyslipidemia, goal LDL below 100 6/14/2011    Gout     HTN (hypertension) 4/24/2010    Impotence 4/24/2010    Morbid obesity ([address] 5/17/2010    VALENTE (obstructive sleep apnea) 4/24/2010    CPAP    Restless legs 4/15/2015     Past Surgical History:   Procedure Laterality Date    COLONOSCOPY N/A 6/27/2017    COLONOSCOPY performed by Mariana Weiss MD at [facility], [address], [facility]  2007    HX APPENDECTOMY      HX ORTHOPAEDIC  2000    bilat TKR     HX ORTHOPAEDIC  2010    left THR    HX UROLOGICAL  03/2018    urolift    HX UROLOGICAL  03/2019    prosthesis       Expanded or extensive additional review of patient history:     Home Situation  Home Environment: [address] [place] [address] Name: [address] [place] [address] and rehab ([facility] previously)  Support Systems: Child(alexandru)  Patient Expects to be Discharged to[de-identified] Skilled nursing facility    Hand dominance: Left S/p CVA, was right handed prior    EXAMINATION OF PERFORMANCE DEFICITS:  Cognitive/Behavioral Status:  Neurologic State: Alert;Confused  Orientation Level: Oriented to person;Oriented to place; Disoriented to time  Cognition: Follows commands; Impaired decision making  Perception: Cues to attend to right side of body     Safety/Judgement: Decreased insight into deficits    Skin: drainage around groin area    Edema: right upper extremity swelling, no pitting noted    Hearing:   Auditory  Auditory Impairment: None    Vision/Perceptual:                                     Range of Motion:  AROM: Grossly decreased, non-functional (R sided hemiplegia)                         Strength:  Strength: Grossly decreased, non-functional (R sided hemiplegia) Coordination:  Coordination: Grossly decreased, non-functional            Tone & Sensation:  Tone: Abnormal (RUE flaccid)  Sensation: Impaired                      Balance:  Sitting: Impaired  Sitting - Static: Poor (constant support)  Sitting - Dynamic: Poor (constant support)    Functional Mobility and Transfers for ADLs:  Bed Mobility:  Supine to Sit: Maximum assistance;Assist x2  Sit to Supine: Maximum assistance;Assist x2  Scooting: Maximum assistance    Transfers:       ADL Assessment:  Feeding: Setup (per pt report)    Oral Facial Hygiene/Grooming: Minimum assistance (infer assist for bimanual tasks)    Bathing: Maximum assistance (infer from functional reach and LE access)    Type of Bath: Chlorhexidine (CHG)    Upper Body Dressing: Minimum assistance (infer)    Lower Body Dressing: Total assistance    Toileting: Total assistance (infer bed level)                ADL Intervention and task modifications:                 Type of Bath: Chlorhexidine (CHG)              Lower Body Dressing Assistance  Socks: Total assistance (dependent)  Leg Crossed Method Used: No  Position Performed: Supine         Cognitive Retraining  Safety/Judgement: Decreased insight into deficits     Functional Measure:    Barthel Index:  Bathin  Bladder: 0  Bowels: 5  Groomin  Dressin  Feedin  Mobility: 0  Stairs: 0  Toilet Use: 0  Transfer (Bed to Chair and Back): 0  Total: 10/100      The Barthel ADL Index: Guidelines  1. The index should be used as a record of what a patient does, not as a record of what a patient could do. 2. The main aim is to establish degree of independence from any help, physical or verbal, however minor and for whatever reason. 3. The need for supervision renders the patient not independent. 4. A patient's performance should be established using the best available evidence.  Asking the patient, friends/relatives and nurses are the usual sources, but direct observation and common sense are also important. However direct testing is not needed. 5. Usually the patient's performance over the preceding 24-48 hours is important, but occasionally longer periods will be relevant. 6. Middle categories imply that the patient supplies over 50 per cent of the effort. 7. Use of aids to be independent is allowed. Score Interpretation (from [facility])    Independent   60-79 Minimally independent   40-59 Partially dependent   20-39 Very dependent   <20 Totally dependent     -Kristen Mosley., Barthel, DGriffinW. (1965). Functional evaluation: the Barthel Index. [address] ([address] (1997). The Barthel activities of daily living index: self-reporting versus actual performance in the old (> or = 75 years). Journal [address] 45(7), [facility], J.ALISSONF, Dionna Solo., Susan Tomlin. (1999). Measuring the change in disability after inpatient rehabilitation; comparison of the responsiveness of the Barthel Index and Functional Elliott Measure. Journal of Neurology, Neurosurgery, and Psychiatry, 66(4), 933-145. Manasa Johnson, N.J.A, AMY Gamino, & Michelle Askew, LILLYA. (2004) Assessment of post-stroke quality of life in cost-effectiveness studies: The usefulness of the Barthel Index and the EuroQoL-5D. Quality of Life Research, 15, 403-72     Occupational Therapy Evaluation Charge Determination   History Examination Decision-Making   MEDIUM Complexity : Expanded review of history including physical, cognitive and psychosocial  history  MEDIUM Complexity : 3-5 performance deficits relating to physical, cognitive , or psychosocial skils that result in activity limitations and / or participation restrictions MEDIUM Complexity : Patient may present with comorbidities that affect occupational performnce.  Miniml to moderate modification of tasks or assistance (eg, physical or verbal ) with assesment(s) is necessary to enable patient to complete evaluation       Based on the above components, the patient evaluation is determined to be of the following complexity level: MEDIUM  Pain Rating:  None reported    Activity Tolerance:   Poor and requires frequent rest breaks    After treatment patient left in no apparent distress:    Supine in bed, Call bell within reach, and Side rails x 3    COMMUNICATION/EDUCATION:   The patients plan of care was discussed with: Physical therapist.     Patient/family agree to work toward stated goals and plan of care. This patients plan of care is appropriate for delegation to [facility].     Thank you for this referral.  MARIELLE Clemente/L  Time Calculation: 16 mins

## 2022-12-10 LAB
ANION GAP SERPL CALC-SCNC: 5 MMOL/L (ref 5–15)
APTT PPP: 56.6 SEC (ref 22.1–31)
APTT PPP: 99.2 SEC (ref 22.1–31)
BUN SERPL-MCNC: 14 MG/DL (ref 6–20)
BUN/CREAT SERPL: 27 (ref 12–20)
CALCIUM SERPL-MCNC: 8.8 MG/DL (ref 8.5–10.1)
CHLORIDE SERPL-SCNC: 104 MMOL/L (ref 97–108)
CO2 SERPL-SCNC: 26 MMOL/L (ref 21–32)
CREAT SERPL-MCNC: 0.51 MG/DL (ref 0.7–1.3)
GLUCOSE BLD STRIP.AUTO-MCNC: 104 MG/DL (ref 65–117)
GLUCOSE BLD STRIP.AUTO-MCNC: 109 MG/DL (ref 65–117)
GLUCOSE BLD STRIP.AUTO-MCNC: 83 MG/DL (ref 65–117)
GLUCOSE BLD STRIP.AUTO-MCNC: 98 MG/DL (ref 65–117)
GLUCOSE SERPL-MCNC: 83 MG/DL (ref 65–100)
HCT VFR BLD AUTO: 33.9 % (ref 36.6–50.3)
HGB BLD-MCNC: 10.7 G/DL (ref 12.1–17)
POTASSIUM SERPL-SCNC: 3.6 MMOL/L (ref 3.5–5.1)
SERVICE CMNT-IMP: NORMAL
SODIUM SERPL-SCNC: 135 MMOL/L (ref 136–145)
THERAPEUTIC RANGE,PTTT: ABNORMAL SECS (ref 58–77)
THERAPEUTIC RANGE,PTTT: ABNORMAL SECS (ref 58–77)
VANCOMYCIN SERPL-MCNC: 13.7 UG/ML

## 2022-12-10 PROCEDURE — 65270000032 HC RM SEMIPRIVATE

## 2022-12-10 PROCEDURE — 82962 GLUCOSE BLOOD TEST: CPT

## 2022-12-10 PROCEDURE — 85730 THROMBOPLASTIN TIME PARTIAL: CPT

## 2022-12-10 PROCEDURE — 74011250636 HC RX REV CODE- 250/636: Performed by: STUDENT IN AN ORGANIZED HEALTH CARE EDUCATION/TRAINING PROGRAM

## 2022-12-10 PROCEDURE — 80048 BASIC METABOLIC PNL TOTAL CA: CPT

## 2022-12-10 PROCEDURE — 36415 COLL VENOUS BLD VENIPUNCTURE: CPT

## 2022-12-10 PROCEDURE — 80202 ASSAY OF VANCOMYCIN: CPT

## 2022-12-10 PROCEDURE — 85018 HEMOGLOBIN: CPT

## 2022-12-10 PROCEDURE — 74011000250 HC RX REV CODE- 250: Performed by: STUDENT IN AN ORGANIZED HEALTH CARE EDUCATION/TRAINING PROGRAM

## 2022-12-10 PROCEDURE — 74011250637 HC RX REV CODE- 250/637: Performed by: STUDENT IN AN ORGANIZED HEALTH CARE EDUCATION/TRAINING PROGRAM

## 2022-12-10 PROCEDURE — 74011000258 HC RX REV CODE- 258: Performed by: STUDENT IN AN ORGANIZED HEALTH CARE EDUCATION/TRAINING PROGRAM

## 2022-12-10 RX ORDER — HEPARIN SODIUM 1000 [USP'U]/ML
2000 INJECTION, SOLUTION INTRAVENOUS; SUBCUTANEOUS ONCE
Status: COMPLETED | OUTPATIENT
Start: 2022-12-10 | End: 2022-12-10

## 2022-12-10 RX ORDER — LANOLIN ALCOHOL/MO/W.PET/CERES
3 CREAM (GRAM) TOPICAL
Status: DISCONTINUED | OUTPATIENT
Start: 2022-12-10 | End: 2022-12-20 | Stop reason: HOSPADM

## 2022-12-10 RX ORDER — GABAPENTIN 300 MG/1
600 CAPSULE ORAL
Status: DISCONTINUED | OUTPATIENT
Start: 2022-12-10 | End: 2022-12-20 | Stop reason: HOSPADM

## 2022-12-10 RX ADMIN — SODIUM CHLORIDE, PRESERVATIVE FREE 10 ML: 5 INJECTION INTRAVENOUS at 05:23

## 2022-12-10 RX ADMIN — ACETAMINOPHEN 650 MG: 325 TABLET ORAL at 10:19

## 2022-12-10 RX ADMIN — FINASTERIDE 5 MG: 5 TABLET, FILM COATED ORAL at 10:19

## 2022-12-10 RX ADMIN — FAMOTIDINE 20 MG: 20 TABLET, FILM COATED ORAL at 23:11

## 2022-12-10 RX ADMIN — VANCOMYCIN HYDROCHLORIDE 1250 MG: 1.25 INJECTION, POWDER, LYOPHILIZED, FOR SOLUTION INTRAVENOUS at 05:23

## 2022-12-10 RX ADMIN — HEPARIN SODIUM 13 UNITS/KG/HR: 10000 INJECTION, SOLUTION INTRAVENOUS at 16:50

## 2022-12-10 RX ADMIN — HEPARIN SODIUM 13 UNITS/KG/HR: 10000 INJECTION, SOLUTION INTRAVENOUS at 07:10

## 2022-12-10 RX ADMIN — DILTIAZEM HYDROCHLORIDE 60 MG: 60 TABLET, FILM COATED ORAL at 15:19

## 2022-12-10 RX ADMIN — TAMSULOSIN HYDROCHLORIDE 0.4 MG: 0.4 CAPSULE ORAL at 10:19

## 2022-12-10 RX ADMIN — VANCOMYCIN HYDROCHLORIDE 1250 MG: 1.25 INJECTION, POWDER, LYOPHILIZED, FOR SOLUTION INTRAVENOUS at 17:13

## 2022-12-10 RX ADMIN — MIRTAZAPINE 7.5 MG: 15 TABLET, FILM COATED ORAL at 23:11

## 2022-12-10 RX ADMIN — ALLOPURINOL 100 MG: 100 TABLET ORAL at 10:19

## 2022-12-10 RX ADMIN — CEFEPIME 2 G: 2 INJECTION, POWDER, FOR SOLUTION INTRAVENOUS at 10:19

## 2022-12-10 RX ADMIN — SODIUM CHLORIDE, PRESERVATIVE FREE 10 ML: 5 INJECTION INTRAVENOUS at 15:20

## 2022-12-10 RX ADMIN — FAMOTIDINE 20 MG: 20 TABLET, FILM COATED ORAL at 10:19

## 2022-12-10 RX ADMIN — SODIUM CHLORIDE, PRESERVATIVE FREE 10 ML: 5 INJECTION INTRAVENOUS at 23:12

## 2022-12-10 RX ADMIN — CEFEPIME 2 G: 2 INJECTION, POWDER, FOR SOLUTION INTRAVENOUS at 23:11

## 2022-12-10 RX ADMIN — GABAPENTIN 600 MG: 300 CAPSULE ORAL at 23:11

## 2022-12-10 RX ADMIN — Medication 3 MG: at 23:11

## 2022-12-10 RX ADMIN — DILTIAZEM HYDROCHLORIDE 60 MG: 60 TABLET, FILM COATED ORAL at 05:23

## 2022-12-10 RX ADMIN — ATORVASTATIN CALCIUM 80 MG: 40 TABLET, FILM COATED ORAL at 23:11

## 2022-12-10 RX ADMIN — HEPARIN SODIUM 2000 UNITS: 1000 INJECTION INTRAVENOUS; SUBCUTANEOUS at 17:06

## 2022-12-10 RX ADMIN — DILTIAZEM HYDROCHLORIDE 60 MG: 60 TABLET, FILM COATED ORAL at 23:11

## 2022-12-10 RX ADMIN — METOPROLOL SUCCINATE 75 MG: 50 TABLET, EXTENDED RELEASE ORAL at 10:19

## 2022-12-10 NOTE — PROGRESS NOTES
Critical lab: aPTT pf 99.2. Patient is on a heparin gtt. Per protocol gtt decreased by 2 units/kg/hr. Repeat aPTT at 1300.

## 2022-12-10 NOTE — PROGRESS NOTES
Pharmacist Note - Vancomycin Dosing  Therapy day 3  Indication: concern for infected penile implant  Current regimen: 1250 mg IV Q 12 hours    Recent Labs     12/10/22  0447 12/09/22  0347 12/08/22  1530   WBC  --  9.0 9.8   CREA 0.51* 0.46* 0.68*   BUN 14 15 18       A random vancomycin level of 13.7 mcg/mL was obtained and from this level, the patient's AUC24 is calculated to be 440 with the current regimen. Goal target range AUC/ALIE 400-500      Plan: Continue current regimen. Pharmacy will continue to monitor this patient daily for changes in clinical status and renal function. *Random vancomycin levels are used to calculate AUC/ALIE, this level should not be interpreted as a trough. Vancomycin has been dosed using Bayesian kinetics software to target an AUC24:ALIE of 400-600, which provides adequate exposure for as assumed infection due to MRSA with an ALIE of 1 or less while reducing the risk of nephrotoxicity as seen with traditional trough based dosing goals.

## 2022-12-10 NOTE — PROGRESS NOTES
[facility] Adult  Hospitalist Group                                                                                          Hospitalist Progress Note  Sruthi Ching MD  Answering service: [phone] or [phone] from in house phone        Date of Service:  12/10/2022  NAME:  Nilda Cotton  :  1950  MRN:  [id number]      Admission Summary:   Nilda Cotton is a 71 y.o. male who presents with hematuria. 44-year-old  male with past medical history of atrial fibrillation on Xarelto, depression, diabetes, dyslipidemia, hypertension, CVA, VALENTE on CPAP, restless leg syndrome, penile implant, who comes in for the above. Patient is apparently sent over by his facility for hematuria of about 2 weeks duration with his penile implant not working anymore. In the ED, his penile implant was found to be protruding and Sexton catheter was inserted with pink-colored urine draining. He denies any fever, chills. He reports on and off pain in the penile area. He says that this is his second penile implant. The ER spoke to urology and they recommended antibiotics. Urology has seen in the ER and will plan for surgery on Monday. Of note, the patient has previous Pseudomonas in urine as well as Enterococcus. Initial recommendations were vancomycin and gentamicin, but at that time patient is not symptomatic or septic. The patient is not quite sure as to why he was sent to the hospital.  Per the ER note, the patient's neurologist had apparently spoken to the patient SNF and wanted him transferred here yesterday. I cannot find any medical record of the purpose of this device, the patient says that it was placed for sexual purposes. Interval history / Subjective:   Patient seen and examined earlier this morning by me for follow-up of regarding penile implant. Patient denies any pain. No acute complaints at the time my exam.  Patient has been having bleeding from his IV site. Assessment & Plan:     Hematuria  Monitor with H&H  No signs of active bleeding at this time  Will hold anticoagulation mostly due to surgery  12/9-no signs of bleeding at this time  Continue to monitor H&H  12/10-H&H has been stable     Eroding penile implant, 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   Urology on board  Vancomycin and cefepime  Plan to go to the OR on 12/12  Monitor  History of Pseudomonas and Enterococcus UTI, previously on ceftriaxone, ampicillin, Zosyn. Pending UA and culture  12/9-pending UA culture  Continue antibiotics  Urology seen  12/10-plan for OR Monday     Atrial fibrillation on Eliquis  Continue diltiazem and metoprolol  Holding Eliquis for now due to surgery planned for Monday  Cardiology consulted for recommendations on anticoagulation  [id number] is 5 and he has had a recent stroke. Due to these risk factors, I will start a heparin drip for now. 12/9-continue heparin drip and will DC 8 to 12 hours before surgery on Monday  No signs of bleeding at this time  12/10-continue current management  Patient has had bleeding from his IV site  Will monitor H&H     Hypertension  Continue home medications     Diabetes  Sliding scale insulin     BPH  Continue home tamsulosin and finasteride     History of recent CVA with residual right-sided weakness  Has baseline  Monitor  Continue atorvastatin     Depression  Home Remeron     Code status: Full  Prophylaxis: Heparin drip  Care Plan discussed with: Patient, nurse  Anticipated Disposition: Greater than 48 hours     Hospital Problems  Date Reviewed: 10/12/2022            Codes Class Noted POA    Hematuria ICD-10-CM: R31.9  ICD-9-CM: 599.70  12/8/2022 Unknown        Penile implant failure [facility]) ICD-10-CM: T83.410A  ICD-9-CM: 996.76  12/8/2022 Unknown           Review of Systems:   A comprehensive review of systems was negative except for that written in the HPI. Vital Signs:    Last 24hrs VS reviewed since prior progress note.  Most recent are:  Visit Vitals  /65 (BP 1 Location: Right upper arm, BP Patient Position: At rest)   Pulse 91   Temp 97.5 °F (36.4 °C)   Resp 17   Ht 6' 3\" (1.905 m)   Wt 111.1 kg (245 lb)   SpO2 98%   BMI 30.62 kg/m²         Intake/Output Summary (Last 24 hours) at 12/10/2022 1239  Last data filed at 12/9/2022 2220  Gross per 24 hour   Intake --   Output 400 ml   Net -400 ml          Physical Examination:     I had a face to face encounter with this patient and independently examined them on 12/10/2022 as outlined below:    General: Alert awake, no acute distress, resting in bed, pleasant male, appears to be stated age  HEENT: PEERL, EOMI, moist mucus membranes  Neck: Supple, no JVD, no meningeal signs  Chest: Clear to auscultation bilaterally   CVS: RRR, S1 S2 heard, no murmurs/rubs/gallops  Abd: Soft, non-tender, non-distended, +bowel sounds   Ext: No clubbing, no cyanosis, no edema  Neuro/Psych: Pleasant mood and affect, sensory grossly within normal limit, right lower extremity 1-2/5. Right upper extremity 0/5. Pulses: 2+, symmetric in all extremities  Skin: Warm, dry, without rashes or lesions  : Patient has a prosthetic that is protruding from the urethra. Some secretion which looks seropurulent. No blood at this time. Data Review:    Review and/or order of clinical lab test  Review and/or order of tests in the radiology section of CPT  Review and/or order of tests in the medicine section of CPT      Labs:     Recent Labs     12/10/22  0447 12/09/22  0347 12/08/22  2227 12/08/22  1530   WBC  --  9.0  --  9.8   HGB 10.7* 10.8*   < > 11.3*   HCT 33.9* 34.7*   < > 34.9*   PLT  --  284  --  314    < > = values in this interval not displayed.        Recent Labs     12/10/22  0447 12/09/22  0347 12/08/22  1530   * 136 131*   K 3.6 3.7 3.9    103 98   CO2 26 27 29   BUN 14 15 18   CREA 0.51* 0.46* 0.68*   GLU 83 77 113*   CA 8.8 9.0 9.4       Recent Labs     12/08/22  1530   ALT 18   *   TBILI 0.6   TP 8.4*   ALB 2.9*   GLOB 5.5*       Recent Labs     12/10/22  0447 12/09/22  1843 12/09/22  0923 12/09/22  0347 12/08/22  1530   INR  --   --   --   --  1.2*   PTP  --   --   --   --  12.1*   APTT 99.2* 39.0* 85.7*   < > 33.3*    < > = values in this interval not displayed. No results for input(s): FE, TIBC, PSAT, FERR in the last 72 hours. No results found for: FOL, RBCF   No results for input(s): PH, PCO2, PO2 in the last 72 hours. No results for input(s): CPK, CKNDX, TROIQ in the last 72 hours.     No lab exists for component: CPKMB  Lab Results   Component Value Date/Time    Cholesterol, total 146 09/15/2022 02:40 AM    HDL Cholesterol 53 09/15/2022 02:40 AM    LDL, calculated 65 09/15/2022 02:40 AM    Triglyceride 140 09/15/2022 02:40 AM    CHOL/HDL Ratio 2.8 09/15/2022 02:40 AM     Lab Results   Component Value Date/Time    Glucose (POC) 109 12/10/2022 11:07 AM    Glucose (POC) 83 12/10/2022 06:55 AM    Glucose (POC) 92 12/09/2022 10:06 PM    Glucose (POC) 115 12/09/2022 04:16 PM    Glucose (POC) 144 (H) 12/09/2022 11:25 AM     Lab Results   Component Value Date/Time    Color YELLOW/STRAW 10/18/2022 01:01 AM    Appearance TURBID (A) 10/18/2022 01:01 AM    Specific gravity 1.013 10/18/2022 01:01 AM    pH (UA) 6.5 10/18/2022 01:01 AM    Protein TRACE (A) 10/18/2022 01:01 AM    Glucose Negative 10/18/2022 01:01 AM    Ketone Negative 10/18/2022 01:01 AM    Bilirubin Negative 10/18/2022 01:01 AM    Urobilinogen 4.0 (H) 10/18/2022 01:01 AM    Nitrites Positive (A) 10/18/2022 01:01 AM    Leukocyte Esterase MODERATE (A) 10/18/2022 01:01 AM    Epithelial cells FEW 10/18/2022 01:01 AM    Bacteria 4+ (A) 10/18/2022 01:01 AM    WBC 20-50 10/18/2022 01:01 AM    RBC 0-5 10/18/2022 01:01 AM         Medications Reviewed:     Current Facility-Administered Medications   Medication Dose Route Frequency    gabapentin (NEURONTIN) capsule 600 mg  600 mg Oral QHS    melatonin tablet 3 mg  3 mg Oral QHS    sodium chloride (NS) flush 5-40 mL  5-40 mL IntraVENous Q8H    sodium chloride (NS) flush 5-40 mL  5-40 mL IntraVENous PRN    acetaminophen (TYLENOL) tablet 650 mg  650 mg Oral Q6H PRN    Or    acetaminophen (TYLENOL) suppository 650 mg  650 mg Rectal Q6H PRN    polyethylene glycol (MIRALAX) packet 17 g  17 g Oral DAILY PRN    ondansetron (ZOFRAN ODT) tablet 4 mg  4 mg Oral Q8H PRN    Or    ondansetron (ZOFRAN) injection 4 mg  4 mg IntraVENous Q6H PRN    insulin lispro (HUMALOG) injection   SubCUTAneous AC&HS    glucose chewable tablet 16 g  4 Tablet Oral PRN    glucagon (GLUCAGEN) injection 1 mg  1 mg IntraMUSCular PRN    dextrose 10 % infusion 0-250 mL  0-250 mL IntraVENous PRN    allopurinoL (ZYLOPRIM) tablet 100 mg  100 mg Oral DAILY    atorvastatin (LIPITOR) tablet 80 mg  80 mg Oral QHS    dilTIAZem IR (CARDIZEM) tablet 60 mg  60 mg Oral Q8H    famotidine (PEPCID) tablet 20 mg  20 mg Oral Q12H    finasteride (PROSCAR) tablet 5 mg  5 mg Oral DAILY    metoprolol succinate (TOPROL-XL) XL tablet 75 mg  75 mg Oral DAILY    tamsulosin (FLOMAX) capsule 0.4 mg  0.4 mg Oral DAILY    cefepime (MAXIPIME) 2 g in 0.9% sodium chloride (MBP/ADV) 100 mL MBP  2 g IntraVENous Q12H    mirtazapine (REMERON) tablet 7.5 mg  7.5 mg Oral QHS    heparin 25,000 units in D5W 250 ml infusion  8-25 Units/kg/hr IntraVENous TITRATE    Vancomycin - Pharmacy to Dose   Other Rx Dosing/Monitoring    vancomycin (VANCOCIN) 1,250 mg in 0.9% sodium chloride 250 mL ([id number])  1,250 mg IntraVENous Q12H     ______________________________________________________________________  EXPECTED LENGTH OF STAY: 3d 7h  ACTUAL LENGTH OF STAY:          2                 Roshni Gomez MD

## 2022-12-11 LAB
ANION GAP SERPL CALC-SCNC: 5 MMOL/L (ref 5–15)
APTT PPP: 57.4 SEC (ref 22.1–31)
APTT PPP: 66 SEC (ref 22.1–31)
BASOPHILS # BLD: 0 K/UL (ref 0–0.1)
BASOPHILS NFR BLD: 0 % (ref 0–1)
BUN SERPL-MCNC: 14 MG/DL (ref 6–20)
BUN/CREAT SERPL: 31 (ref 12–20)
CALCIUM SERPL-MCNC: 8.5 MG/DL (ref 8.5–10.1)
CHLORIDE SERPL-SCNC: 102 MMOL/L (ref 97–108)
CO2 SERPL-SCNC: 26 MMOL/L (ref 21–32)
CREAT SERPL-MCNC: 0.45 MG/DL (ref 0.7–1.3)
DIFFERENTIAL METHOD BLD: ABNORMAL
EOSINOPHIL # BLD: 0.5 K/UL (ref 0–0.4)
EOSINOPHIL NFR BLD: 5 % (ref 0–7)
ERYTHROCYTE [DISTWIDTH] IN BLOOD BY AUTOMATED COUNT: 15.2 % (ref 11.5–14.5)
GLUCOSE BLD STRIP.AUTO-MCNC: 105 MG/DL (ref 65–117)
GLUCOSE BLD STRIP.AUTO-MCNC: 151 MG/DL (ref 65–117)
GLUCOSE BLD STRIP.AUTO-MCNC: 83 MG/DL (ref 65–117)
GLUCOSE BLD STRIP.AUTO-MCNC: 93 MG/DL (ref 65–117)
GLUCOSE SERPL-MCNC: 93 MG/DL (ref 65–100)
HCT VFR BLD AUTO: 31.5 % (ref 36.6–50.3)
HCT VFR BLD AUTO: 32.5 % (ref 36.6–50.3)
HGB BLD-MCNC: 10 G/DL (ref 12.1–17)
HGB BLD-MCNC: 10.3 G/DL (ref 12.1–17)
IMM GRANULOCYTES # BLD AUTO: 0.1 K/UL (ref 0–0.04)
IMM GRANULOCYTES NFR BLD AUTO: 1 % (ref 0–0.5)
LYMPHOCYTES # BLD: 2 K/UL (ref 0.8–3.5)
LYMPHOCYTES NFR BLD: 18 % (ref 12–49)
MCH RBC QN AUTO: 27.5 PG (ref 26–34)
MCHC RBC AUTO-ENTMCNC: 31.7 G/DL (ref 30–36.5)
MCV RBC AUTO: 86.5 FL (ref 80–99)
MONOCYTES # BLD: 0.9 K/UL (ref 0–1)
MONOCYTES NFR BLD: 9 % (ref 5–13)
NEUTS SEG # BLD: 7.2 K/UL (ref 1.8–8)
NEUTS SEG NFR BLD: 67 % (ref 32–75)
NRBC # BLD: 0 K/UL (ref 0–0.01)
NRBC BLD-RTO: 0 PER 100 WBC
PLATELET # BLD AUTO: 282 K/UL (ref 150–400)
PMV BLD AUTO: 10.2 FL (ref 8.9–12.9)
POTASSIUM SERPL-SCNC: 3.8 MMOL/L (ref 3.5–5.1)
RBC # BLD AUTO: 3.64 M/UL (ref 4.1–5.7)
SERVICE CMNT-IMP: ABNORMAL
SERVICE CMNT-IMP: NORMAL
SODIUM SERPL-SCNC: 133 MMOL/L (ref 136–145)
THERAPEUTIC RANGE,PTTT: ABNORMAL SECS (ref 58–77)
THERAPEUTIC RANGE,PTTT: ABNORMAL SECS (ref 58–77)
WBC # BLD AUTO: 10.7 K/UL (ref 4.1–11.1)

## 2022-12-11 PROCEDURE — 74011636637 HC RX REV CODE- 636/637: Performed by: STUDENT IN AN ORGANIZED HEALTH CARE EDUCATION/TRAINING PROGRAM

## 2022-12-11 PROCEDURE — 85025 COMPLETE CBC W/AUTO DIFF WBC: CPT

## 2022-12-11 PROCEDURE — 74011000250 HC RX REV CODE- 250: Performed by: STUDENT IN AN ORGANIZED HEALTH CARE EDUCATION/TRAINING PROGRAM

## 2022-12-11 PROCEDURE — 36600 WITHDRAWAL OF ARTERIAL BLOOD: CPT

## 2022-12-11 PROCEDURE — 85018 HEMOGLOBIN: CPT

## 2022-12-11 PROCEDURE — 85730 THROMBOPLASTIN TIME PARTIAL: CPT

## 2022-12-11 PROCEDURE — 74011000258 HC RX REV CODE- 258: Performed by: STUDENT IN AN ORGANIZED HEALTH CARE EDUCATION/TRAINING PROGRAM

## 2022-12-11 PROCEDURE — 36415 COLL VENOUS BLD VENIPUNCTURE: CPT

## 2022-12-11 PROCEDURE — 74011250637 HC RX REV CODE- 250/637: Performed by: STUDENT IN AN ORGANIZED HEALTH CARE EDUCATION/TRAINING PROGRAM

## 2022-12-11 PROCEDURE — 65270000032 HC RM SEMIPRIVATE

## 2022-12-11 PROCEDURE — 82962 GLUCOSE BLOOD TEST: CPT

## 2022-12-11 PROCEDURE — 74011250636 HC RX REV CODE- 250/636: Performed by: STUDENT IN AN ORGANIZED HEALTH CARE EDUCATION/TRAINING PROGRAM

## 2022-12-11 PROCEDURE — 80048 BASIC METABOLIC PNL TOTAL CA: CPT

## 2022-12-11 RX ORDER — HEPARIN SODIUM 1000 [USP'U]/ML
30 INJECTION, SOLUTION INTRAVENOUS; SUBCUTANEOUS ONCE
Status: COMPLETED | OUTPATIENT
Start: 2022-12-11 | End: 2022-12-11

## 2022-12-11 RX ADMIN — VANCOMYCIN HYDROCHLORIDE 1250 MG: 1.25 INJECTION, POWDER, LYOPHILIZED, FOR SOLUTION INTRAVENOUS at 19:11

## 2022-12-11 RX ADMIN — ALLOPURINOL 100 MG: 100 TABLET ORAL at 10:48

## 2022-12-11 RX ADMIN — DILTIAZEM HYDROCHLORIDE 60 MG: 60 TABLET, FILM COATED ORAL at 13:21

## 2022-12-11 RX ADMIN — TAMSULOSIN HYDROCHLORIDE 0.4 MG: 0.4 CAPSULE ORAL at 10:48

## 2022-12-11 RX ADMIN — FINASTERIDE 5 MG: 5 TABLET, FILM COATED ORAL at 10:48

## 2022-12-11 RX ADMIN — SODIUM CHLORIDE, PRESERVATIVE FREE 10 ML: 5 INJECTION INTRAVENOUS at 13:22

## 2022-12-11 RX ADMIN — HEPARIN SODIUM 17 UNITS/KG/HR: 10000 INJECTION, SOLUTION INTRAVENOUS at 22:56

## 2022-12-11 RX ADMIN — CEFEPIME 2 G: 2 INJECTION, POWDER, FOR SOLUTION INTRAVENOUS at 10:49

## 2022-12-11 RX ADMIN — FAMOTIDINE 20 MG: 20 TABLET, FILM COATED ORAL at 10:48

## 2022-12-11 RX ADMIN — MIRTAZAPINE 7.5 MG: 15 TABLET, FILM COATED ORAL at 22:58

## 2022-12-11 RX ADMIN — GABAPENTIN 600 MG: 300 CAPSULE ORAL at 22:58

## 2022-12-11 RX ADMIN — HEPARIN SODIUM 17 UNITS/KG/HR: 10000 INJECTION, SOLUTION INTRAVENOUS at 08:33

## 2022-12-11 RX ADMIN — METOPROLOL SUCCINATE 75 MG: 50 TABLET, EXTENDED RELEASE ORAL at 10:48

## 2022-12-11 RX ADMIN — SODIUM CHLORIDE, PRESERVATIVE FREE 10 ML: 5 INJECTION INTRAVENOUS at 04:57

## 2022-12-11 RX ADMIN — SODIUM CHLORIDE, PRESERVATIVE FREE 10 ML: 5 INJECTION INTRAVENOUS at 23:00

## 2022-12-11 RX ADMIN — HEPARIN SODIUM 3330 UNITS: 1000 INJECTION INTRAVENOUS; SUBCUTANEOUS at 03:49

## 2022-12-11 RX ADMIN — CEFEPIME 2 G: 2 INJECTION, POWDER, FOR SOLUTION INTRAVENOUS at 22:55

## 2022-12-11 RX ADMIN — DILTIAZEM HYDROCHLORIDE 60 MG: 60 TABLET, FILM COATED ORAL at 22:59

## 2022-12-11 RX ADMIN — DILTIAZEM HYDROCHLORIDE 60 MG: 60 TABLET, FILM COATED ORAL at 07:42

## 2022-12-11 RX ADMIN — VANCOMYCIN HYDROCHLORIDE 1250 MG: 1.25 INJECTION, POWDER, LYOPHILIZED, FOR SOLUTION INTRAVENOUS at 04:53

## 2022-12-11 RX ADMIN — Medication 3 MG: at 22:59

## 2022-12-11 RX ADMIN — Medication 2 UNITS: at 13:21

## 2022-12-11 RX ADMIN — FAMOTIDINE 20 MG: 20 TABLET, FILM COATED ORAL at 22:59

## 2022-12-11 RX ADMIN — ATORVASTATIN CALCIUM 80 MG: 40 TABLET, FILM COATED ORAL at 22:59

## 2022-12-11 NOTE — PROGRESS NOTES
Problem: Falls - Risk of  Goal: *Absence of Falls  Description: Document Jason Nowak Fall Risk and appropriate interventions in the flowsheet.   Outcome: Progressing Towards Goal  Note: Fall Risk Interventions:  Mobility Interventions: Patient to call before getting OOB, Bed/chair exit alarm         Medication Interventions: Assess postural VS orthostatic hypotension, Bed/chair exit alarm, Evaluate medications/consider consulting pharmacy, Patient to call before getting OOB, Teach patient to arise slowly    Elimination Interventions: Bed/chair exit alarm, Call light in reach, Patient to call for help with toileting needs              Problem: Patient Education: Go to Patient Education Activity  Goal: Patient/Family Education  Outcome: Progressing Towards Goal     Problem: Patient Education: Go to Patient Education Activity  Goal: Patient/Family Education  Outcome: Progressing Towards Goal     Problem: Patient Education: Go to Patient Education Activity  Goal: Patient/Family Education  Outcome: Progressing Towards Goal

## 2022-12-11 NOTE — PROGRESS NOTES
[facility]  Hospitalist Group                                                                                          Hospitalist Progress Note  Susan Eckert MD  Answering service: [phone] OR [phone] from in house phone        Date of Service:  2022  NAME:  Denisse Marquez  :  1950  MRN:  [id number]      Admission Summary:   Denisse Marquez is a 71 y.o. male who presents with hematuria. 76-year-old  male with past medical history of atrial fibrillation on Xarelto, depression, diabetes, dyslipidemia, hypertension, CVA, VALENTE on CPAP, restless leg syndrome, penile implant, who comes in for the above. Patient is apparently sent over by his facility for hematuria of about 2 weeks duration with his penile implant not working anymore. In the ED, his penile implant was found to be protruding and Sexton catheter was inserted with pink-colored urine draining. He denies any fever, chills. He reports on and off pain in the penile area. He says that this is his second penile implant. The ER spoke to urology and they recommended antibiotics. Urology has seen in the ER and will plan for surgery on Monday. Of note, the patient has previous Pseudomonas in urine as well as Enterococcus. Initial recommendations were vancomycin and gentamicin, but at that time patient is not symptomatic or septic. The patient is not quite sure as to why he was sent to the hospital.  Per the ER note, the patient's neurologist had apparently spoken to the patient SNF and wanted him transferred here yesterday. I cannot find any medical record of the purpose of this device, the patient says that it was placed for sexual purposes. Interval history / Subjective:   Patient seen and examined earlier this morning by me for follow-up of regarding penile implant. Patient denies any acute complaints. Lying comfortably in bed.      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 Pending UA and culture  12/9-pending UA culture  Continue antibiotics  Urology seen  12/11-penile implant is protruding much more today than yesterday     Atrial fibrillation on Eliquis  Continue diltiazem and metoprolol  Holding Eliquis for now due to surgery planned for Monday  Cardiology consulted for recommendations on anticoagulation  [id number] is 5 and he has had a recent stroke. Due to these risk factors, I will start a heparin drip for now. 12/9-continue heparin drip and will DC 8 to 12 hours before surgery on Monday  No signs of bleeding at this time  12/11-Heparin drip to stop tomorrow morning around 1-2 in the morning     Hypertension  Continue home medications     Diabetes  Sliding scale insulin     BPH  Continue home tamsulosin and finasteride     History of recent CVA with residual right-sided weakness  Has baseline  Monitor  Continue atorvastatin     Depression  Home Remeron    Apparent issues with IV access for labs. At this time patient only needs to be on a drip until about 2 in the morning. Code status: Full  Prophylaxis: Heparin drip  Care Plan discussed with: Patient, nurse  Anticipated Disposition: Greater than 48 hours     Hospital Problems  Date Reviewed: 10/12/2022            Codes Class Noted POA    Hematuria ICD-10-CM: R31.9  ICD-9-CM: 599.70  12/8/2022 Unknown        Penile implant failure [facility]) ICD-10-CM: T83.410A  ICD-9-CM: 996.76  12/8/2022 Unknown           Review of Systems:   A comprehensive review of systems was negative except for that written in the HPI.        Vital Signs:    Last 24hrs VS reviewed since prior progress note. Most recent are:  Visit Vitals  /75 (BP 1 Location: Right upper arm, BP Patient Position: At rest)   Pulse 62   Temp 97.5 °F (36.4 °C)   Resp 16   Ht 6' 3\" (1.905 m)   Wt 111.1 kg (245 lb)   SpO2 97%   BMI 30.62 kg/m²         Intake/Output Summary (Last 24 hours) at 12/11/2022 1252  Last data filed at 12/11/2022 2046  Gross per 24 hour   Intake --   Output 1200 ml   Net -1200 ml          Physical Examination:     I had a face to face encounter with this patient and independently examined them on 12/11/2022 as outlined below:    General: Alert awake, no acute distress, resting in bed, pleasant male, appears to be stated age  HEENT: PEERL, EOMI, moist mucus membranes  Neck: Supple, no JVD, no meningeal signs  Chest: Clear to auscultation bilaterally   CVS: RRR, S1 S2 heard, no murmurs/rubs/gallops  Abd: Soft, non-tender, non-distended, +bowel sounds   Ext: No clubbing, no cyanosis, no edema  Neuro/Psych: Pleasant mood and affect, sensory grossly within normal limit, right lower extremity 1-2/5. Right upper extremity 0/5. Cap refill: Brisk, less than 3 seconds  Pulses: 2+, symmetric in all extremities  Skin: Warm, dry, without rashes or lesions  : Patient has a prosthetic that is protruding from the urethra. Much more than yesterday. Some secretion which looks seropurulent. No blood at this time.            Data Review:    Review and/or order of clinical lab test  Review and/or order of tests in the radiology section of CPT  Review and/or order of tests in the medicine section of CPT      Labs:     Recent Labs     12/11/22  0025 12/10/22  0447 12/09/22  0347   WBC 10.7  --  9.0   HGB 10.0*  10.3* 10.7* 10.8*   HCT 31.5*  32.5* 33.9* 34.7*     --  284       Recent Labs     12/11/22  0025 12/10/22  0447 12/09/22  0347   * 135* 136   K 3.8 3.6 3.7    104 103   CO2 26 26 27   BUN 14 14 15   CREA 0.45* 0.51* 0.46*   GLU 93 83 77   CA 8.5 8.8 9.0 Recent Labs     12/08/22  1530   ALT 18   *   TBILI 0.6   TP 8.4*   ALB 2.9*   GLOB 5.5*       Recent Labs     12/11/22  0025 12/10/22  1425 12/10/22  0447 12/09/22  0347 12/08/22  1530   INR  --   --   --   --  1.2*   PTP  --   --   --   --  12.1*   APTT 57.4* 56.6* 99.2*   < > 33.3*    < > = values in this interval not displayed. No results for input(s): FE, TIBC, PSAT, FERR in the last 72 hours. No results found for: FOL, RBCF   No results for input(s): PH, PCO2, PO2 in the last 72 hours. No results for input(s): CPK, CKNDX, TROIQ in the last 72 hours.     No lab exists for component: CPKMB  Lab Results   Component Value Date/Time    Cholesterol, total 146 09/15/2022 02:40 AM    HDL Cholesterol 53 09/15/2022 02:40 AM    LDL, calculated 65 09/15/2022 02:40 AM    Triglyceride 140 09/15/2022 02:40 AM    CHOL/HDL Ratio 2.8 09/15/2022 02:40 AM     Lab Results   Component Value Date/Time    Glucose (POC) 151 (H) 12/11/2022 11:23 AM    Glucose (POC) 93 12/11/2022 07:45 AM    Glucose (POC) 104 12/10/2022 09:19 PM    Glucose (POC) 98 12/10/2022 04:17 PM    Glucose (POC) 109 12/10/2022 11:07 AM     Lab Results   Component Value Date/Time    Color YELLOW/STRAW 10/18/2022 01:01 AM    Appearance TURBID (A) 10/18/2022 01:01 AM    Specific gravity 1.013 10/18/2022 01:01 AM    pH (UA) 6.5 10/18/2022 01:01 AM    Protein TRACE (A) 10/18/2022 01:01 AM    Glucose Negative 10/18/2022 01:01 AM    Ketone Negative 10/18/2022 01:01 AM    Bilirubin Negative 10/18/2022 01:01 AM    Urobilinogen 4.0 (H) 10/18/2022 01:01 AM    Nitrites Positive (A) 10/18/2022 01:01 AM    Leukocyte Esterase MODERATE (A) 10/18/2022 01:01 AM    Epithelial cells FEW 10/18/2022 01:01 AM    Bacteria 4+ (A) 10/18/2022 01:01 AM    WBC 20-50 10/18/2022 01:01 AM    RBC 0-5 10/18/2022 01:01 AM         Medications Reviewed:     Current Facility-Administered Medications   Medication Dose Route Frequency    gabapentin (NEURONTIN) capsule 600 mg  600 mg Oral QHS    melatonin tablet 3 mg  3 mg Oral QHS    sodium chloride (NS) flush 5-40 mL  5-40 mL IntraVENous Q8H    sodium chloride (NS) flush 5-40 mL  5-40 mL IntraVENous PRN    acetaminophen (TYLENOL) tablet 650 mg  650 mg Oral Q6H PRN    Or    acetaminophen (TYLENOL) suppository 650 mg  650 mg Rectal Q6H PRN    polyethylene glycol (MIRALAX) packet 17 g  17 g Oral DAILY PRN    ondansetron (ZOFRAN ODT) tablet 4 mg  4 mg Oral Q8H PRN    Or    ondansetron (ZOFRAN) injection 4 mg  4 mg IntraVENous Q6H PRN    insulin lispro (HUMALOG) injection   SubCUTAneous AC&HS    glucose chewable tablet 16 g  4 Tablet Oral PRN    glucagon (GLUCAGEN) injection 1 mg  1 mg IntraMUSCular PRN    dextrose 10 % infusion 0-250 mL  0-250 mL IntraVENous PRN    allopurinoL (ZYLOPRIM) tablet 100 mg  100 mg Oral DAILY    atorvastatin (LIPITOR) tablet 80 mg  80 mg Oral QHS    dilTIAZem IR (CARDIZEM) tablet 60 mg  60 mg Oral Q8H    famotidine (PEPCID) tablet 20 mg  20 mg Oral Q12H    finasteride (PROSCAR) tablet 5 mg  5 mg Oral DAILY    metoprolol succinate (TOPROL-XL) XL tablet 75 mg  75 mg Oral DAILY    tamsulosin (FLOMAX) capsule 0.4 mg  0.4 mg Oral DAILY    cefepime (MAXIPIME) 2 g in 0.9% sodium chloride (MBP/ADV) 100 mL MBP  2 g IntraVENous Q12H    mirtazapine (REMERON) tablet 7.5 mg  7.5 mg Oral QHS    heparin 25,000 units in D5W 250 ml infusion  8-25 Units/kg/hr IntraVENous TITRATE    Vancomycin - Pharmacy to Dose   Other Rx Dosing/Monitoring    vancomycin (VANCOCIN) 1,250 mg in 0.9% sodium chloride 250 mL ([id number])  1,250 mg IntraVENous Q12H     ______________________________________________________________________  EXPECTED LENGTH OF STAY: 3d 7h  ACTUAL LENGTH OF STAY:          3                 Neena Frankel MD

## 2022-12-11 NOTE — PROGRESS NOTES
0947 Bedside report given to Braden Calvert, RN by Leno Hansen, RN. Report included SBAR, ED Report, Labs, and Cardiac Rhythm Afib  . Patient in bed resting well. Vitals are stable. 0900 Struggling to get blood for Ptt. I attempted 3x, called Rapid response nurse and she stated she would come to try.    1200  Rapid response nurse got labs    1300 Lab called related to patient's Ptt results being questionable. Discarded and awaiting Respiratory to come and complete an ART stick because Rapid Response cornelio stated she struggled as well and could not attempt again. Made MD aware. 65 Made MD aware I am still waiting on Respiratory to come. Dr Yasmin Tavera gave guidance to hold heparin gtt if respiratory did not come within the next hour.       1940   Passed on to oncoming nurse heparin gtt d/c at 1 am.

## 2022-12-12 ENCOUNTER — ANESTHESIA EVENT (OUTPATIENT)
Dept: SURGERY | Age: 72
DRG: 674 | End: 2022-12-12
Payer: MEDICARE

## 2022-12-12 ENCOUNTER — ANESTHESIA (OUTPATIENT)
Dept: SURGERY | Age: 72
DRG: 674 | End: 2022-12-12
Payer: MEDICARE

## 2022-12-12 LAB
ANION GAP SERPL CALC-SCNC: 7 MMOL/L (ref 5–15)
BASOPHILS # BLD: 0.1 K/UL (ref 0–0.1)
BASOPHILS NFR BLD: 1 % (ref 0–1)
BUN SERPL-MCNC: 15 MG/DL (ref 6–20)
BUN/CREAT SERPL: 34 (ref 12–20)
CALCIUM SERPL-MCNC: 8.6 MG/DL (ref 8.5–10.1)
CHLORIDE SERPL-SCNC: 103 MMOL/L (ref 97–108)
CO2 SERPL-SCNC: 24 MMOL/L (ref 21–32)
CREAT SERPL-MCNC: 0.44 MG/DL (ref 0.7–1.3)
DIFFERENTIAL METHOD BLD: ABNORMAL
EOSINOPHIL # BLD: 0.4 K/UL (ref 0–0.4)
EOSINOPHIL NFR BLD: 5 % (ref 0–7)
ERYTHROCYTE [DISTWIDTH] IN BLOOD BY AUTOMATED COUNT: 15.3 % (ref 11.5–14.5)
GLUCOSE BLD STRIP.AUTO-MCNC: 100 MG/DL (ref 65–117)
GLUCOSE BLD STRIP.AUTO-MCNC: 117 MG/DL (ref 65–117)
GLUCOSE BLD STRIP.AUTO-MCNC: 92 MG/DL (ref 65–117)
GLUCOSE BLD STRIP.AUTO-MCNC: 99 MG/DL (ref 65–117)
GLUCOSE SERPL-MCNC: 102 MG/DL (ref 65–100)
HCT VFR BLD AUTO: 31.9 % (ref 36.6–50.3)
HCT VFR BLD AUTO: 33.3 % (ref 36.6–50.3)
HGB BLD-MCNC: 10.2 G/DL (ref 12.1–17)
HGB BLD-MCNC: 10.4 G/DL (ref 12.1–17)
IMM GRANULOCYTES # BLD AUTO: 0 K/UL (ref 0–0.04)
IMM GRANULOCYTES NFR BLD AUTO: 0 % (ref 0–0.5)
LYMPHOCYTES # BLD: 1.8 K/UL (ref 0.8–3.5)
LYMPHOCYTES NFR BLD: 18 % (ref 12–49)
MCH RBC QN AUTO: 27.9 PG (ref 26–34)
MCHC RBC AUTO-ENTMCNC: 32 G/DL (ref 30–36.5)
MCV RBC AUTO: 87.4 FL (ref 80–99)
MONOCYTES # BLD: 0.8 K/UL (ref 0–1)
MONOCYTES NFR BLD: 8 % (ref 5–13)
NEUTS SEG # BLD: 6.6 K/UL (ref 1.8–8)
NEUTS SEG NFR BLD: 68 % (ref 32–75)
NRBC # BLD: 0 K/UL (ref 0–0.01)
NRBC BLD-RTO: 0 PER 100 WBC
PLATELET # BLD AUTO: 291 K/UL (ref 150–400)
PMV BLD AUTO: 10.2 FL (ref 8.9–12.9)
POTASSIUM SERPL-SCNC: 3.7 MMOL/L (ref 3.5–5.1)
RBC # BLD AUTO: 3.65 M/UL (ref 4.1–5.7)
SERVICE CMNT-IMP: NORMAL
SODIUM SERPL-SCNC: 134 MMOL/L (ref 136–145)
UFH PPP CHRO-ACNC: 0.36 IU/ML
WBC # BLD AUTO: 9.7 K/UL (ref 4.1–11.1)

## 2022-12-12 PROCEDURE — 77030040831 HC BAG URINE DRNG MDII -A: Performed by: UROLOGY

## 2022-12-12 PROCEDURE — 88300 SURGICAL PATH GROSS: CPT

## 2022-12-12 PROCEDURE — 74011250636 HC RX REV CODE- 250/636: Performed by: STUDENT IN AN ORGANIZED HEALTH CARE EDUCATION/TRAINING PROGRAM

## 2022-12-12 PROCEDURE — 77030019927 HC TBNG IRR CYSTO BAXT -A: Performed by: UROLOGY

## 2022-12-12 PROCEDURE — 2709999900 HC NON-CHARGEABLE SUPPLY: Performed by: UROLOGY

## 2022-12-12 PROCEDURE — 87075 CULTR BACTERIA EXCEPT BLOOD: CPT

## 2022-12-12 PROCEDURE — 74011250636 HC RX REV CODE- 250/636: Performed by: ANESTHESIOLOGY

## 2022-12-12 PROCEDURE — 74011000258 HC RX REV CODE- 258: Performed by: STUDENT IN AN ORGANIZED HEALTH CARE EDUCATION/TRAINING PROGRAM

## 2022-12-12 PROCEDURE — 74011250636 HC RX REV CODE- 250/636: Performed by: NURSE ANESTHETIST, CERTIFIED REGISTERED

## 2022-12-12 PROCEDURE — 77030010507 HC ADH SKN DERMBND J&J -B: Performed by: UROLOGY

## 2022-12-12 PROCEDURE — 74011000250 HC RX REV CODE- 250: Performed by: STUDENT IN AN ORGANIZED HEALTH CARE EDUCATION/TRAINING PROGRAM

## 2022-12-12 PROCEDURE — 82962 GLUCOSE BLOOD TEST: CPT

## 2022-12-12 PROCEDURE — 0VPS0JZ REMOVAL OF SYNTHETIC SUBSTITUTE FROM PENIS, OPEN APPROACH: ICD-10-PCS | Performed by: UROLOGY

## 2022-12-12 PROCEDURE — 76210000017 HC OR PH I REC 1.5 TO 2 HR: Performed by: UROLOGY

## 2022-12-12 PROCEDURE — 87205 SMEAR GRAM STAIN: CPT

## 2022-12-12 PROCEDURE — 76010000131 HC OR TIME 2 TO 2.5 HR: Performed by: UROLOGY

## 2022-12-12 PROCEDURE — 77030011992 HC AIRWY NASOPHGL TELE -A: Performed by: ANESTHESIOLOGY

## 2022-12-12 PROCEDURE — 85018 HEMOGLOBIN: CPT

## 2022-12-12 PROCEDURE — 77030008684 HC TU ET CUF COVD -B: Performed by: ANESTHESIOLOGY

## 2022-12-12 PROCEDURE — 87077 CULTURE AEROBIC IDENTIFY: CPT

## 2022-12-12 PROCEDURE — 85025 COMPLETE CBC W/AUTO DIFF WBC: CPT

## 2022-12-12 PROCEDURE — 74011000250 HC RX REV CODE- 250: Performed by: UROLOGY

## 2022-12-12 PROCEDURE — 85520 HEPARIN ASSAY: CPT

## 2022-12-12 PROCEDURE — 76060000036 HC ANESTHESIA 2.5 TO 3 HR: Performed by: UROLOGY

## 2022-12-12 PROCEDURE — 87186 SC STD MICRODIL/AGAR DIL: CPT

## 2022-12-12 PROCEDURE — 36415 COLL VENOUS BLD VENIPUNCTURE: CPT

## 2022-12-12 PROCEDURE — 65270000046 HC RM TELEMETRY

## 2022-12-12 PROCEDURE — 74011000250 HC RX REV CODE- 250: Performed by: NURSE ANESTHETIST, CERTIFIED REGISTERED

## 2022-12-12 PROCEDURE — 74011250637 HC RX REV CODE- 250/637: Performed by: STUDENT IN AN ORGANIZED HEALTH CARE EDUCATION/TRAINING PROGRAM

## 2022-12-12 PROCEDURE — 77030002986 HC SUT PROL J&J -A: Performed by: UROLOGY

## 2022-12-12 PROCEDURE — 80048 BASIC METABOLIC PNL TOTAL CA: CPT

## 2022-12-12 RX ORDER — FENTANYL CITRATE 50 UG/ML
50 INJECTION, SOLUTION INTRAMUSCULAR; INTRAVENOUS AS NEEDED
Status: COMPLETED | OUTPATIENT
Start: 2022-12-12 | End: 2022-12-12

## 2022-12-12 RX ORDER — ROPIVACAINE HYDROCHLORIDE 5 MG/ML
30 INJECTION, SOLUTION EPIDURAL; INFILTRATION; PERINEURAL AS NEEDED
Status: DISCONTINUED | OUTPATIENT
Start: 2022-12-12 | End: 2022-12-12 | Stop reason: HOSPADM

## 2022-12-12 RX ORDER — SODIUM CHLORIDE 9 MG/ML
25 INJECTION, SOLUTION INTRAVENOUS CONTINUOUS
Status: DISCONTINUED | OUTPATIENT
Start: 2022-12-12 | End: 2022-12-12 | Stop reason: HOSPADM

## 2022-12-12 RX ORDER — SODIUM CHLORIDE, SODIUM LACTATE, POTASSIUM CHLORIDE, CALCIUM CHLORIDE 600; 310; 30; 20 MG/100ML; MG/100ML; MG/100ML; MG/100ML
100 INJECTION, SOLUTION INTRAVENOUS CONTINUOUS
Status: DISCONTINUED | OUTPATIENT
Start: 2022-12-12 | End: 2022-12-12 | Stop reason: HOSPADM

## 2022-12-12 RX ORDER — PROPOFOL 10 MG/ML
INJECTION, EMULSION INTRAVENOUS AS NEEDED
Status: DISCONTINUED | OUTPATIENT
Start: 2022-12-12 | End: 2022-12-12 | Stop reason: HOSPADM

## 2022-12-12 RX ORDER — SODIUM CHLORIDE 0.9 % (FLUSH) 0.9 %
5-40 SYRINGE (ML) INJECTION EVERY 8 HOURS
Status: DISCONTINUED | OUTPATIENT
Start: 2022-12-12 | End: 2022-12-12 | Stop reason: HOSPADM

## 2022-12-12 RX ORDER — FENTANYL CITRATE 50 UG/ML
25 INJECTION, SOLUTION INTRAMUSCULAR; INTRAVENOUS
Status: DISCONTINUED | OUTPATIENT
Start: 2022-12-12 | End: 2022-12-12 | Stop reason: HOSPADM

## 2022-12-12 RX ORDER — HYDROMORPHONE HYDROCHLORIDE 2 MG/ML
INJECTION, SOLUTION INTRAMUSCULAR; INTRAVENOUS; SUBCUTANEOUS AS NEEDED
Status: DISCONTINUED | OUTPATIENT
Start: 2022-12-12 | End: 2022-12-12 | Stop reason: HOSPADM

## 2022-12-12 RX ORDER — ROCURONIUM BROMIDE 10 MG/ML
INJECTION, SOLUTION INTRAVENOUS AS NEEDED
Status: DISCONTINUED | OUTPATIENT
Start: 2022-12-12 | End: 2022-12-12 | Stop reason: HOSPADM

## 2022-12-12 RX ORDER — ONDANSETRON 2 MG/ML
4 INJECTION INTRAMUSCULAR; INTRAVENOUS AS NEEDED
Status: DISCONTINUED | OUTPATIENT
Start: 2022-12-12 | End: 2022-12-12 | Stop reason: HOSPADM

## 2022-12-12 RX ORDER — LIDOCAINE HYDROCHLORIDE 20 MG/ML
INJECTION, SOLUTION EPIDURAL; INFILTRATION; INTRACAUDAL; PERINEURAL AS NEEDED
Status: DISCONTINUED | OUTPATIENT
Start: 2022-12-12 | End: 2022-12-12 | Stop reason: HOSPADM

## 2022-12-12 RX ORDER — DEXMEDETOMIDINE HYDROCHLORIDE 100 UG/ML
INJECTION, SOLUTION INTRAVENOUS AS NEEDED
Status: DISCONTINUED | OUTPATIENT
Start: 2022-12-12 | End: 2022-12-12 | Stop reason: HOSPADM

## 2022-12-12 RX ORDER — SODIUM CHLORIDE 0.9 % (FLUSH) 0.9 %
5-40 SYRINGE (ML) INJECTION AS NEEDED
Status: DISCONTINUED | OUTPATIENT
Start: 2022-12-12 | End: 2022-12-12 | Stop reason: HOSPADM

## 2022-12-12 RX ORDER — ACETAMINOPHEN 325 MG/1
650 TABLET ORAL ONCE
Status: DISCONTINUED | OUTPATIENT
Start: 2022-12-12 | End: 2022-12-12 | Stop reason: HOSPADM

## 2022-12-12 RX ORDER — LIDOCAINE HYDROCHLORIDE 10 MG/ML
0.1 INJECTION, SOLUTION EPIDURAL; INFILTRATION; INTRACAUDAL; PERINEURAL AS NEEDED
Status: DISCONTINUED | OUTPATIENT
Start: 2022-12-12 | End: 2022-12-12 | Stop reason: HOSPADM

## 2022-12-12 RX ORDER — DIPHENHYDRAMINE HYDROCHLORIDE 50 MG/ML
12.5 INJECTION, SOLUTION INTRAMUSCULAR; INTRAVENOUS AS NEEDED
Status: DISCONTINUED | OUTPATIENT
Start: 2022-12-12 | End: 2022-12-12 | Stop reason: HOSPADM

## 2022-12-12 RX ORDER — SODIUM CHLORIDE, SODIUM LACTATE, POTASSIUM CHLORIDE, CALCIUM CHLORIDE 600; 310; 30; 20 MG/100ML; MG/100ML; MG/100ML; MG/100ML
INJECTION, SOLUTION INTRAVENOUS
Status: DISCONTINUED | OUTPATIENT
Start: 2022-12-12 | End: 2022-12-12 | Stop reason: HOSPADM

## 2022-12-12 RX ORDER — PHENYLEPHRINE HCL IN 0.9% NACL 0.4MG/10ML
SYRINGE (ML) INTRAVENOUS AS NEEDED
Status: DISCONTINUED | OUTPATIENT
Start: 2022-12-12 | End: 2022-12-12 | Stop reason: HOSPADM

## 2022-12-12 RX ORDER — BUPIVACAINE HYDROCHLORIDE 5 MG/ML
INJECTION, SOLUTION EPIDURAL; INTRACAUDAL AS NEEDED
Status: DISCONTINUED | OUTPATIENT
Start: 2022-12-12 | End: 2022-12-12 | Stop reason: HOSPADM

## 2022-12-12 RX ORDER — ONDANSETRON 2 MG/ML
INJECTION INTRAMUSCULAR; INTRAVENOUS AS NEEDED
Status: DISCONTINUED | OUTPATIENT
Start: 2022-12-12 | End: 2022-12-12 | Stop reason: HOSPADM

## 2022-12-12 RX ADMIN — HYDROMORPHONE HYDROCHLORIDE 0.5 MG: 2 INJECTION, SOLUTION INTRAMUSCULAR; INTRAVENOUS; SUBCUTANEOUS at 16:16

## 2022-12-12 RX ADMIN — HYDROMORPHONE HYDROCHLORIDE 0.5 MG: 2 INJECTION, SOLUTION INTRAMUSCULAR; INTRAVENOUS; SUBCUTANEOUS at 15:30

## 2022-12-12 RX ADMIN — FENTANYL CITRATE 50 MCG: 50 INJECTION, SOLUTION INTRAMUSCULAR; INTRAVENOUS at 14:22

## 2022-12-12 RX ADMIN — CEFEPIME 2 G: 2 INJECTION, POWDER, FOR SOLUTION INTRAVENOUS at 14:26

## 2022-12-12 RX ADMIN — GABAPENTIN 600 MG: 300 CAPSULE ORAL at 21:02

## 2022-12-12 RX ADMIN — SODIUM CHLORIDE, PRESERVATIVE FREE 10 ML: 5 INJECTION INTRAVENOUS at 21:03

## 2022-12-12 RX ADMIN — VANCOMYCIN HYDROCHLORIDE 1250 MG: 1.25 INJECTION, POWDER, LYOPHILIZED, FOR SOLUTION INTRAVENOUS at 05:24

## 2022-12-12 RX ADMIN — Medication 3 MG: at 21:02

## 2022-12-12 RX ADMIN — SODIUM CHLORIDE, POTASSIUM CHLORIDE, SODIUM LACTATE AND CALCIUM CHLORIDE: 600; 310; 30; 20 INJECTION, SOLUTION INTRAVENOUS at 14:10

## 2022-12-12 RX ADMIN — PROPOFOL 100 MG: 10 INJECTION, EMULSION INTRAVENOUS at 14:22

## 2022-12-12 RX ADMIN — DILTIAZEM HYDROCHLORIDE 60 MG: 60 TABLET, FILM COATED ORAL at 05:24

## 2022-12-12 RX ADMIN — SODIUM CHLORIDE, POTASSIUM CHLORIDE, SODIUM LACTATE AND CALCIUM CHLORIDE 100 ML/HR: 600; 310; 30; 20 INJECTION, SOLUTION INTRAVENOUS at 17:24

## 2022-12-12 RX ADMIN — FAMOTIDINE 20 MG: 20 TABLET, FILM COATED ORAL at 10:06

## 2022-12-12 RX ADMIN — LIDOCAINE HYDROCHLORIDE 40 MG: 20 INJECTION, SOLUTION EPIDURAL; INFILTRATION; INTRACAUDAL; PERINEURAL at 14:22

## 2022-12-12 RX ADMIN — DILTIAZEM HYDROCHLORIDE 60 MG: 60 TABLET, FILM COATED ORAL at 21:02

## 2022-12-12 RX ADMIN — ALLOPURINOL 100 MG: 100 TABLET ORAL at 10:06

## 2022-12-12 RX ADMIN — SODIUM CHLORIDE, PRESERVATIVE FREE 10 ML: 5 INJECTION INTRAVENOUS at 14:00

## 2022-12-12 RX ADMIN — PROPOFOL 100 MG: 10 INJECTION, EMULSION INTRAVENOUS at 16:14

## 2022-12-12 RX ADMIN — FENTANYL CITRATE 50 MCG: 50 INJECTION, SOLUTION INTRAMUSCULAR; INTRAVENOUS at 14:51

## 2022-12-12 RX ADMIN — TAMSULOSIN HYDROCHLORIDE 0.4 MG: 0.4 CAPSULE ORAL at 10:06

## 2022-12-12 RX ADMIN — PHENYLEPHRINE HYDROCHLORIDE 50 MCG/MIN: 10 INJECTION INTRAVENOUS at 14:32

## 2022-12-12 RX ADMIN — Medication 80 MCG: at 14:31

## 2022-12-12 RX ADMIN — FENTANYL CITRATE 50 MCG: 50 INJECTION, SOLUTION INTRAMUSCULAR; INTRAVENOUS at 15:03

## 2022-12-12 RX ADMIN — DEXMEDETOMIDINE HYDROCHLORIDE 10 MCG: 100 INJECTION, SOLUTION, CONCENTRATE INTRAVENOUS at 15:44

## 2022-12-12 RX ADMIN — ACETAMINOPHEN 650 MG: 325 TABLET ORAL at 03:20

## 2022-12-12 RX ADMIN — ATORVASTATIN CALCIUM 80 MG: 40 TABLET, FILM COATED ORAL at 21:02

## 2022-12-12 RX ADMIN — Medication 120 MCG: at 14:22

## 2022-12-12 RX ADMIN — SODIUM CHLORIDE, POTASSIUM CHLORIDE, SODIUM LACTATE AND CALCIUM CHLORIDE: 600; 310; 30; 20 INJECTION, SOLUTION INTRAVENOUS at 15:46

## 2022-12-12 RX ADMIN — HYDROMORPHONE HYDROCHLORIDE 0.5 MG: 2 INJECTION, SOLUTION INTRAMUSCULAR; INTRAVENOUS; SUBCUTANEOUS at 14:55

## 2022-12-12 RX ADMIN — ONDANSETRON HYDROCHLORIDE 4 MG: 2 INJECTION, SOLUTION INTRAMUSCULAR; INTRAVENOUS at 14:38

## 2022-12-12 RX ADMIN — MIRTAZAPINE 7.5 MG: 15 TABLET, FILM COATED ORAL at 21:03

## 2022-12-12 RX ADMIN — CEFEPIME 2 G: 2 INJECTION, POWDER, FOR SOLUTION INTRAVENOUS at 21:45

## 2022-12-12 RX ADMIN — FAMOTIDINE 20 MG: 20 TABLET, FILM COATED ORAL at 21:03

## 2022-12-12 RX ADMIN — ROCURONIUM BROMIDE 20 MG: 10 SOLUTION INTRAVENOUS at 14:22

## 2022-12-12 RX ADMIN — CEFEPIME 2 G: 2 INJECTION, POWDER, FOR SOLUTION INTRAVENOUS at 10:06

## 2022-12-12 RX ADMIN — VANCOMYCIN HYDROCHLORIDE 1250 MG: 1.25 INJECTION, POWDER, LYOPHILIZED, FOR SOLUTION INTRAVENOUS at 18:53

## 2022-12-12 RX ADMIN — Medication 80 MCG: at 14:26

## 2022-12-12 RX ADMIN — FENTANYL CITRATE 50 MCG: 50 INJECTION, SOLUTION INTRAMUSCULAR; INTRAVENOUS at 16:14

## 2022-12-12 RX ADMIN — FINASTERIDE 5 MG: 5 TABLET, FILM COATED ORAL at 10:09

## 2022-12-12 RX ADMIN — SODIUM CHLORIDE, PRESERVATIVE FREE 10 ML: 5 INJECTION INTRAVENOUS at 05:24

## 2022-12-12 RX ADMIN — DEXMEDETOMIDINE HYDROCHLORIDE 10 MCG: 100 INJECTION, SOLUTION, CONCENTRATE INTRAVENOUS at 14:58

## 2022-12-12 RX ADMIN — METOPROLOL SUCCINATE 75 MG: 50 TABLET, EXTENDED RELEASE ORAL at 10:06

## 2022-12-12 NOTE — PROGRESS NOTES
[facility] [facility] Adult  Hospitalist Group                                                                                          Hospitalist Progress Note  Sepideh Rossi MD  Answering service: [phone] or [phone] from in house phone        Date of Service:  2022  NAME:  Ethan Masterson  :  1950  MRN:  [id number]      Admission Summary:   Ethan Masterson is a 71 y.o. male who presents with hematuria. 78-year-old  male with past medical history of atrial fibrillation on Xarelto, depression, diabetes, dyslipidemia, hypertension, CVA, VALENTE on CPAP, restless leg syndrome, penile implant, who comes in for the above. Patient is apparently sent over by his facility for hematuria of about 2 weeks duration with his penile implant not working anymore. In the ED, his penile implant was found to be protruding and Sexton catheter was inserted with pink-colored urine draining. He denies any fever, chills. He reports on and off pain in the penile area. He says that this is his second penile implant. The ER spoke to urology and they recommended antibiotics. Urology has seen in the ER and will plan for surgery on Monday. Of note, the patient has previous Pseudomonas in urine as well as Enterococcus. Initial recommendations were vancomycin and gentamicin, but at that time patient is not symptomatic or septic. The patient is not quite sure as to why he was sent to the hospital.  Per the ER note, the patient's neurologist had apparently spoken to the patient SNF and wanted him transferred here yesterday. I cannot find any medical record of the purpose of this device, the patient says that it was placed for sexual purposes. Interval history / Subjective:   Patient seen and examined earlier this morning by me for follow-up of regarding penile implant. Patient laying comfortably in bed. No acute events overnight.      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 Pending UA and culture  12/9-pending UA culture  Continue antibiotics  Urology seen  12/11-penile implant is protruding much more today than yesterday  12/12-surgery scheduled for today at 2 PM     Atrial fibrillation on Eliquis  Continue diltiazem and metoprolol  Holding Eliquis for now due to surgery planned for Monday  Cardiology consulted for recommendations on anticoagulation  [id number] is 5 and he has had a recent stroke. Due to these risk factors, I will start a heparin drip for now.    12/9-continue heparin drip and will DC 8 to 12 hours before surgery on Monday  No signs of bleeding at this time  12/11-Heparin drip to stop tomorrow morning around 1-2 in the morning  12/12-Heparin drip was stopped overnight  We will confer with urology about when to restart patient's Eliquis after surgery     Hypertension  Continue home medications     Diabetes  Sliding scale insulin     BPH  Continue home tamsulosin and finasteride     History of recent CVA with residual right-sided weakness  Has baseline  Monitor  Continue atorvastatin     Depression  Home Remeron     Code status: Full  Prophylaxis: Heparin drip  Care Plan discussed with: Patient, nurse  Anticipated Disposition: Greater than 48 hours     Hospital Problems  Date Reviewed: 10/12/2022            Codes Class Noted POA    Hematuria ICD-10-CM: R31.9  ICD-9-CM: 599.70  12/8/2022 Unknown        Penile implant failure [facility]) ICD-10-CM: T83.410A  ICD-9-CM: 996.76  12/8/2022 Unknown         Review of Systems:   A comprehensive review of systems was negative except for that written in the HPI. Vital Signs:    Last 24hrs VS reviewed since prior progress note. Most recent are:  Visit Vitals  /80   Pulse 78   Temp 98.4 °F (36.9 °C)   Resp 16   Ht 6' 3\" (1.905 m)   Wt 111.1 kg (245 lb)   SpO2 95%   BMI 30.62 kg/m²         Intake/Output Summary (Last 24 hours) at 12/12/2022 1310  Last data filed at 12/12/2022 1017  Gross per 24 hour   Intake --   Output 500 ml   Net -500 ml          Physical Examination:     I had a face to face encounter with this patient and independently examined them on 12/12/2022 as outlined below:    General: Alert awake, no acute distress, resting in bed, pleasant male, appears to be stated age  HEENT: PEERL, EOMI, moist mucus membranes  Neck: Supple, no JVD, no meningeal signs  Chest: Clear to auscultation bilaterally   CVS: RRR, S1 S2 heard, no murmurs/rubs/gallops  Abd: Soft, non-tender, non-distended, +bowel sounds   Ext: No clubbing, no cyanosis, no edema  Neuro/Psych: Pleasant mood and affect, sensory grossly within normal limit, right lower extremity 1-2/5. Right upper extremity 0/5. Cap refill: Brisk, less than 3 seconds  Pulses: 2+, symmetric in all extremities  Skin: Warm, dry, without rashes or lesions  : Patient has a prosthetic that is protruding from the urethra. Much more than yesterday. Some secretion which looks seropurulent. No blood at this time.            Data Review:    Review and/or order of clinical lab test  Review and/or order of tests in the radiology section of CPT  Review and/or order of tests in the medicine section of CPT      Labs:     Recent Labs     12/12/22  0236 12/11/22  0025   WBC 9.7 10.7   HGB 10.2*  10.4* 10.0*  10.3*   HCT 31.9*  33.3* 31.5*  32.5*    282       Recent Labs     12/12/22  0236 12/11/22  0025 12/10/22  0447   * 133* 135*   K 3.7 3.8 3.6    102 104   CO2 24 26 26   BUN 15 14 14   CREA 0.44* 0.45* 0.51*   * 93 83   CA 8.6 8.5 8.8       No results for input(s): ALT, AP, TBIL, TBILI, TP, ALB, GLOB, GGT, AML, LPSE in the last 72 hours. No lab exists for component: SGOT, GPT, AMYP, HLPSE    Recent Labs     12/11/22  1940 12/11/22  0025 12/10/22  1425   APTT 66.0* 57.4* 56.6*        No results for input(s): FE, TIBC, PSAT, FERR in the last 72 hours. No results found for: FOL, RBCF   No results for input(s): PH, PCO2, PO2 in the last 72 hours. No results for input(s): CPK, CKNDX, TROIQ in the last 72 hours.     No lab exists for component: CPKMB  Lab Results   Component Value Date/Time    Cholesterol, total 146 09/15/2022 02:40 AM    HDL Cholesterol 53 09/15/2022 02:40 AM    LDL, calculated 65 09/15/2022 02:40 AM    Triglyceride 140 09/15/2022 02:40 AM    CHOL/HDL Ratio 2.8 09/15/2022 02:40 AM     Lab Results   Component Value Date/Time    Glucose (POC) 92 12/12/2022 11:33 AM    Glucose (POC) 100 12/12/2022 06:39 AM    Glucose (POC) 105 12/11/2022 09:42 PM    Glucose (POC) 83 12/11/2022 04:40 PM    Glucose (POC) 151 (H) 12/11/2022 11:23 AM     Lab Results   Component Value Date/Time    Color YELLOW/STRAW 10/18/2022 01:01 AM    Appearance TURBID (A) 10/18/2022 01:01 AM    Specific gravity 1.013 10/18/2022 01:01 AM    pH (UA) 6.5 10/18/2022 01:01 AM    Protein TRACE (A) 10/18/2022 01:01 AM    Glucose Negative 10/18/2022 01:01 AM    Ketone Negative 10/18/2022 01:01 AM    Bilirubin Negative 10/18/2022 01:01 AM    Urobilinogen 4.0 (H) 10/18/2022 01:01 AM    Nitrites Positive (A) 10/18/2022 01:01 AM    Leukocyte Esterase MODERATE (A) 10/18/2022 01:01 AM    Epithelial cells FEW 10/18/2022 01:01 AM    Bacteria 4+ (A) 10/18/2022 01:01 AM    WBC 20-50 10/18/2022 01:01 AM    RBC 0-5 10/18/2022 01:01 AM         Medications Reviewed:     Current Facility-Administered Medications   Medication Dose Route Frequency    [START ON 12/13/2022] Vancomycin level 12/13 with AM labs   Other ONCE    gabapentin (NEURONTIN) capsule 600 mg  600 mg Oral QHS    melatonin tablet 3 mg  3 mg Oral QHS sodium chloride (NS) flush 5-40 mL  5-40 mL IntraVENous Q8H    sodium chloride (NS) flush 5-40 mL  5-40 mL IntraVENous PRN    acetaminophen (TYLENOL) tablet 650 mg  650 mg Oral Q6H PRN    Or    acetaminophen (TYLENOL) suppository 650 mg  650 mg Rectal Q6H PRN    polyethylene glycol (MIRALAX) packet 17 g  17 g Oral DAILY PRN    ondansetron (ZOFRAN ODT) tablet 4 mg  4 mg Oral Q8H PRN    Or    ondansetron (ZOFRAN) injection 4 mg  4 mg IntraVENous Q6H PRN    insulin lispro (HUMALOG) injection   SubCUTAneous AC&HS    glucose chewable tablet 16 g  4 Tablet Oral PRN    glucagon (GLUCAGEN) injection 1 mg  1 mg IntraMUSCular PRN    dextrose 10 % infusion 0-250 mL  0-250 mL IntraVENous PRN    allopurinoL (ZYLOPRIM) tablet 100 mg  100 mg Oral DAILY    atorvastatin (LIPITOR) tablet 80 mg  80 mg Oral QHS    dilTIAZem IR (CARDIZEM) tablet 60 mg  60 mg Oral Q8H    famotidine (PEPCID) tablet 20 mg  20 mg Oral Q12H    finasteride (PROSCAR) tablet 5 mg  5 mg Oral DAILY    metoprolol succinate (TOPROL-XL) XL tablet 75 mg  75 mg Oral DAILY    tamsulosin (FLOMAX) capsule 0.4 mg  0.4 mg Oral DAILY    cefepime (MAXIPIME) 2 g in 0.9% sodium chloride (MBP/ADV) 100 mL MBP  2 g IntraVENous Q12H    mirtazapine (REMERON) tablet 7.5 mg  7.5 mg Oral QHS    Vancomycin - Pharmacy to Dose   Other Rx Dosing/Monitoring    vancomycin (VANCOCIN) 1,250 mg in 0.9% sodium chloride 250 mL ([id number])  1,250 mg IntraVENous Q12H     ______________________________________________________________________  EXPECTED LENGTH OF STAY: 3d 7h  ACTUAL LENGTH OF STAY:          [phone] MARY Romero MD

## 2022-12-12 NOTE — BRIEF OP NOTE
Brief Postoperative Note    Patient: Nilda Cotton  YOB: 1950  MRN: [id number]    Date of Procedure: 12/12/2022     Pre-Op Diagnosis: EROSION PENILE IMPLANT    Post-Op Diagnosis: Same as preoperative diagnosis. Procedure(s):  REMOVAL PENILE PROSTHESIS    Surgeon(s):  Daria Enamorado MD    Surgical Assistant: Surg Asst-1: Sheldon HERNANDEZ    Anesthesia: General     Estimated Blood Loss (mL): 03ID    Complications: None    Specimens:   ID Type Source Tests Collected by Time Destination   1 : Penile prosthesis Fresh Penis  Daria Enamorado MD 12/12/2022 1536 Pathology   1 : penile prosthesis Wound Penis ANAEROBIC/AEROBIC/GRAM STAIN Daria Enamorado MD 12/12/2022 1454 Microbiology        Implants: * No implants in log *    Drains: * No LDAs found *    Findings: urethral erosion in proximal penile urethra from implant. 2 rear tip extenders on left, only one on right, unable to identify or palpate second rear tip extender on right even with cystoscopy or right corpus.     Electronically Signed by Joyce Elliott MD on 12/12/2022 at 4:24 PM    Dictation#: [id number]

## 2022-12-12 NOTE — PROGRESS NOTES
LOREN- Dc to [facility] or [facility]. CM never received a call back from pt's son, Geovani Cabrera. CM did speak with Aminta in admissions at [facility] ([facility] She said that she can accept this pt back, but the family would like him to go to [facility] which is closer to his sister, Lee Venegas' home. Naroomi called this CM and confirmed this. CM sent a referral to [facility] via [employer].  Javier Abernathy

## 2022-12-12 NOTE — PROGRESS NOTES
TRANSFER - IN REPORT:    Verbal report received from LASHAWN TORRES(name) on Denisse Marquez  being received from 6E(unit) for ordered procedure      Report consisted of patients Situation, Background, Assessment and   Recommendations(SBAR). Information from the following report(s) SBAR, Kardex, Recent Results, and Pre Procedure Checklist was reviewed with the receiving nurse. Opportunity for questions and clarification was provided. Assessment completed upon patients arrival to unit and care assumed.

## 2022-12-12 NOTE — PROGRESS NOTES
Spoke with Dr. Jose Sotelo about patient blood pressure remaining low 35X systolic, preoperatively it was 158 systolic. Dr. Jose Sotelo agreed that it was okay to send the patient up to his room.

## 2022-12-12 NOTE — PROGRESS NOTES
I discussed with patient in the pre op area risks of surgery including but not limited to bleeding, infection, sepsis, injury to urethra (already injured) testicles bladder colon or other nearby structures, inability to replace new penile prosthesis now or in the future, retained parts of IPP, prolonged vazquez catheterization, etc. He acknowledged clear understanding and agreed to proceed.

## 2022-12-12 NOTE — PROGRESS NOTES
TRANSFER - OUT REPORT:    Verbal report given to [facility] LASHAWN JAMES(name) on Mignon Mast  being transferred to (unit) for routine post - op       Report consisted of patients Situation, Background, Assessment and   Recommendations(SBAR). Information from the following report(s) SBAR, Kardex, OR Summary, Procedure Summary, Intake/Output, and MAR was reviewed with the receiving nurse. Lines:   Peripheral IV 12/10/22 Anterior; Left Forearm (Active)   Site Assessment Clean, dry, & intact 12/12/22 1642   Phlebitis Assessment 0 12/12/22 1642   Infiltration Assessment 0 12/12/22 1642   Dressing Status Clean, dry, & intact 12/12/22 1642   Dressing Type Tape;Transparent 12/12/22 1642   Hub Color/Line Status Blue; Infusing 12/12/22 1642   Action Taken Open ports on tubing capped 12/12/22 1642   Alcohol Cap Used Yes 12/12/22 1642        Opportunity for questions and clarification was provided.       Patient transported with:   [employer]

## 2022-12-12 NOTE — PROGRESS NOTES
Physical Therapy  Chart reviewed, pt is having surgery for penile prosthesis explant, will follow up tomorrow to resume PT intervention  Tg Zhong PT

## 2022-12-12 NOTE — ANESTHESIA PREPROCEDURE EVALUATION
Relevant Problems   No relevant active problems       Anesthetic History   No history of anesthetic complications            Review of Systems / Medical History  Patient summary reviewed, nursing notes reviewed and pertinent labs reviewed    Pulmonary  Within defined limits      Sleep apnea: CPAP           Neuro/Psych   Within defined limits    CVA  Psychiatric history     Cardiovascular  Within defined limits  Hypertension        Dysrhythmias       Exercise tolerance: <4 METS  Comments: 2018- a questionable fixed inferior defect with an ejection fraction of 58%   GI/Hepatic/Renal  Within defined limits              Endo/Other  Within defined limits  Diabetes    Anemia     Other Findings              Physical Exam    Airway  Mallampati: II  TM Distance: > 6 cm  Neck ROM: normal range of motion   Mouth opening: Normal     Cardiovascular  Regular rate and rhythm,  S1 and S2 normal,  no murmur, click, rub, or gallop             Dental    Dentition: Poor dentition     Pulmonary  Breath sounds clear to auscultation               Abdominal  GI exam deferred       Other Findings            Anesthetic Plan    ASA: 3  Anesthesia type: general          Induction: Intravenous  Anesthetic plan and risks discussed with: Patient

## 2022-12-12 NOTE — PROGRESS NOTES
Patient: Arely Soto MRN: [id number]  SSN: [id number]    YOB: 1950  Age: 71 y.o. Sex: male        ADMITTED: 2022 to Sven Rolle, * by Morales Stone MD for Hematuria [R31.9]  Penile implant failure [facility]) [A62.067W]  POD# * No surgery date entered * Procedure(s):  REMOVAL PENILE PROSTHESIS    Arely Soto is doing well. Denies fevers or chills. Admits penile/ scrotal tenderness. Remains AF, VSS. WBC wnl. +Maxipime, Vanc. Vitals: Temp (24hrs), Av.8 °F (36.6 °C), Min:97.2 °F (36.2 °C), Max:98.5 °F (36.9 °C)    Blood pressure 110/71, pulse 68, temperature 97.2 °F (36.2 °C), resp. rate 18, height 6' 3\" (1.905 m), weight 111.1 kg (245 lb), SpO2 100 %. Intake and Output:  12/10 1901 -  0700  In: -   Out: 1200 [Urine:1200]  No intake/output data recorded. JESSICA Output lats 24 hrs: No data found. JESSICA Output last 8 hrs: No data found. BM over last 24 hrs: No data found.     Physical Exam  General: NAD, pleasant  Respiratory: no distress, breathing easily   Abdomen: soft, no distention; non-tender to palpation  : no CVA tenderness, vazquez, dark yellow UA, right implant protruding through urethra   Neuro: Appropriate, no focal neurological deficits  Skin: warm, dry  Extremities: moves all, full ROM    Labs:  CBC:   Lab Results   Component Value Date/Time    WBC 9.7 2022 02:36 AM    HCT 33.3 (L) 2022 02:36 AM    HCT 31.9 (L) 2022 02:36 AM    PLATELET 722  02:36 AM     BMP:   Lab Results   Component Value Date/Time    Glucose 102 (H) 2022 02:36 AM    Sodium 134 (L) 2022 02:36 AM    Potassium 3.7 2022 02:36 AM    Chloride 103 2022 02:36 AM    CO2 24 2022 02:36 AM    BUN 15 2022 02:36 AM    Creatinine 0.44 (L) 2022 02:36 AM    Calcium 8.6 2022 02:36 AM        Assessment/Plan:   Eroding penile implant   Hx of atrial fibrillation on Eliquis      - NPO for penile prosthesis explant today at 2 PM with Dr. Isrrael Galvez. All questions answered. Heparin gtt held at 0100. Stable without s/sx of sepsis on empiric abx.  Following     Supervising MD, Dr. Latisha Moses By: Marcos Richardson NP - December 12, 2022

## 2022-12-13 ENCOUNTER — APPOINTMENT (OUTPATIENT)
Dept: CT IMAGING | Age: 72
DRG: 674 | End: 2022-12-13
Attending: UROLOGY
Payer: MEDICARE

## 2022-12-13 LAB
ANION GAP SERPL CALC-SCNC: 7 MMOL/L (ref 5–15)
BASOPHILS # BLD: 0 K/UL (ref 0–0.1)
BASOPHILS NFR BLD: 0 % (ref 0–1)
BUN SERPL-MCNC: 12 MG/DL (ref 6–20)
BUN/CREAT SERPL: 29 (ref 12–20)
CALCIUM SERPL-MCNC: 8.3 MG/DL (ref 8.5–10.1)
CHLORIDE SERPL-SCNC: 105 MMOL/L (ref 97–108)
CO2 SERPL-SCNC: 23 MMOL/L (ref 21–32)
CREAT SERPL-MCNC: 0.41 MG/DL (ref 0.7–1.3)
DIFFERENTIAL METHOD BLD: ABNORMAL
EOSINOPHIL # BLD: 0.2 K/UL (ref 0–0.4)
EOSINOPHIL NFR BLD: 2 % (ref 0–7)
ERYTHROCYTE [DISTWIDTH] IN BLOOD BY AUTOMATED COUNT: 15.4 % (ref 11.5–14.5)
GLUCOSE BLD STRIP.AUTO-MCNC: 105 MG/DL (ref 65–117)
GLUCOSE BLD STRIP.AUTO-MCNC: 111 MG/DL (ref 65–117)
GLUCOSE BLD STRIP.AUTO-MCNC: 135 MG/DL (ref 65–117)
GLUCOSE BLD STRIP.AUTO-MCNC: 84 MG/DL (ref 65–117)
GLUCOSE SERPL-MCNC: 82 MG/DL (ref 65–100)
HCT VFR BLD AUTO: 29.4 % (ref 36.6–50.3)
HGB BLD-MCNC: 9.3 G/DL (ref 12.1–17)
IMM GRANULOCYTES # BLD AUTO: 0 K/UL (ref 0–0.04)
IMM GRANULOCYTES NFR BLD AUTO: 0 % (ref 0–0.5)
LYMPHOCYTES # BLD: 1.5 K/UL (ref 0.8–3.5)
LYMPHOCYTES NFR BLD: 16 % (ref 12–49)
MCH RBC QN AUTO: 28 PG (ref 26–34)
MCHC RBC AUTO-ENTMCNC: 31.6 G/DL (ref 30–36.5)
MCV RBC AUTO: 88.6 FL (ref 80–99)
MONOCYTES # BLD: 0.9 K/UL (ref 0–1)
MONOCYTES NFR BLD: 9 % (ref 5–13)
NEUTS SEG # BLD: 6.7 K/UL (ref 1.8–8)
NEUTS SEG NFR BLD: 73 % (ref 32–75)
NRBC # BLD: 0 K/UL (ref 0–0.01)
NRBC BLD-RTO: 0 PER 100 WBC
PLATELET # BLD AUTO: 240 K/UL (ref 150–400)
PMV BLD AUTO: 10.1 FL (ref 8.9–12.9)
POTASSIUM SERPL-SCNC: 3.9 MMOL/L (ref 3.5–5.1)
RBC # BLD AUTO: 3.32 M/UL (ref 4.1–5.7)
SERVICE CMNT-IMP: ABNORMAL
SERVICE CMNT-IMP: NORMAL
SODIUM SERPL-SCNC: 135 MMOL/L (ref 136–145)
VANCOMYCIN SERPL-MCNC: 19 UG/ML
WBC # BLD AUTO: 9.3 K/UL (ref 4.1–11.1)

## 2022-12-13 PROCEDURE — 74011250637 HC RX REV CODE- 250/637: Performed by: STUDENT IN AN ORGANIZED HEALTH CARE EDUCATION/TRAINING PROGRAM

## 2022-12-13 PROCEDURE — 74011250636 HC RX REV CODE- 250/636: Performed by: STUDENT IN AN ORGANIZED HEALTH CARE EDUCATION/TRAINING PROGRAM

## 2022-12-13 PROCEDURE — 80202 ASSAY OF VANCOMYCIN: CPT

## 2022-12-13 PROCEDURE — 36415 COLL VENOUS BLD VENIPUNCTURE: CPT

## 2022-12-13 PROCEDURE — 74011000250 HC RX REV CODE- 250: Performed by: STUDENT IN AN ORGANIZED HEALTH CARE EDUCATION/TRAINING PROGRAM

## 2022-12-13 PROCEDURE — 80048 BASIC METABOLIC PNL TOTAL CA: CPT

## 2022-12-13 PROCEDURE — 72192 CT PELVIS W/O DYE: CPT

## 2022-12-13 PROCEDURE — 85025 COMPLETE CBC W/AUTO DIFF WBC: CPT

## 2022-12-13 PROCEDURE — 82962 GLUCOSE BLOOD TEST: CPT

## 2022-12-13 PROCEDURE — 65270000046 HC RM TELEMETRY

## 2022-12-13 RX ADMIN — GABAPENTIN 600 MG: 300 CAPSULE ORAL at 21:34

## 2022-12-13 RX ADMIN — SODIUM CHLORIDE, PRESERVATIVE FREE 10 ML: 5 INJECTION INTRAVENOUS at 14:30

## 2022-12-13 RX ADMIN — FAMOTIDINE 20 MG: 20 TABLET, FILM COATED ORAL at 09:20

## 2022-12-13 RX ADMIN — FINASTERIDE 5 MG: 5 TABLET, FILM COATED ORAL at 09:20

## 2022-12-13 RX ADMIN — Medication 3 MG: at 21:34

## 2022-12-13 RX ADMIN — DILTIAZEM HYDROCHLORIDE 60 MG: 60 TABLET, FILM COATED ORAL at 21:34

## 2022-12-13 RX ADMIN — DILTIAZEM HYDROCHLORIDE 60 MG: 60 TABLET, FILM COATED ORAL at 05:34

## 2022-12-13 RX ADMIN — SODIUM CHLORIDE, PRESERVATIVE FREE 10 ML: 5 INJECTION INTRAVENOUS at 21:34

## 2022-12-13 RX ADMIN — ALLOPURINOL 100 MG: 100 TABLET ORAL at 09:21

## 2022-12-13 RX ADMIN — SODIUM CHLORIDE, PRESERVATIVE FREE 10 ML: 5 INJECTION INTRAVENOUS at 05:34

## 2022-12-13 RX ADMIN — VANCOMYCIN HYDROCHLORIDE 1250 MG: 1.25 INJECTION, POWDER, LYOPHILIZED, FOR SOLUTION INTRAVENOUS at 17:11

## 2022-12-13 RX ADMIN — TAMSULOSIN HYDROCHLORIDE 0.4 MG: 0.4 CAPSULE ORAL at 09:20

## 2022-12-13 RX ADMIN — ATORVASTATIN CALCIUM 80 MG: 40 TABLET, FILM COATED ORAL at 21:34

## 2022-12-13 RX ADMIN — METOPROLOL SUCCINATE 75 MG: 50 TABLET, EXTENDED RELEASE ORAL at 09:21

## 2022-12-13 RX ADMIN — VANCOMYCIN HYDROCHLORIDE 1250 MG: 1.25 INJECTION, POWDER, LYOPHILIZED, FOR SOLUTION INTRAVENOUS at 05:34

## 2022-12-13 RX ADMIN — FAMOTIDINE 20 MG: 20 TABLET, FILM COATED ORAL at 21:34

## 2022-12-13 RX ADMIN — DILTIAZEM HYDROCHLORIDE 60 MG: 60 TABLET, FILM COATED ORAL at 14:27

## 2022-12-13 RX ADMIN — MIRTAZAPINE 7.5 MG: 15 TABLET, FILM COATED ORAL at 21:34

## 2022-12-13 RX ADMIN — ACETAMINOPHEN 650 MG: 325 TABLET ORAL at 23:51

## 2022-12-13 NOTE — PROGRESS NOTES
Problem: Falls - Risk of  Goal: *Absence of Falls  Description: Document Nori White Fall Risk and appropriate interventions in the flowsheet. Outcome: Progressing Towards Goal  Note: Fall Risk Interventions:  Mobility Interventions: Bed/chair exit alarm         Medication Interventions: Assess postural VS orthostatic hypotension, Bed/chair exit alarm, Evaluate medications/consider consulting pharmacy, Patient to call before getting OOB, Teach patient to arise slowly    Elimination Interventions: Bed/chair exit alarm, Call light in reach, Elevated toilet seat, Patient to call for help with toileting needs, Toilet paper/wipes in reach, Toileting schedule/hourly rounds              Problem: Patient Education: Go to Patient Education Activity  Goal: Patient/Family Education  Outcome: Progressing Towards Goal     Problem: Patient Education: Go to Patient Education Activity  Goal: Patient/Family Education  Outcome: Progressing Towards Goal     Problem: Patient Education: Go to Patient Education Activity  Goal: Patient/Family Education  Outcome: Progressing Towards Goal     Problem: Pressure Injury - Risk of  Goal: *Prevention of pressure injury  Description: Document Pepe Scale and appropriate interventions in the flowsheet. Outcome: Progressing Towards Goal  Note: Pressure Injury Interventions:  Sensory Interventions: Assess changes in LOC, Avoid rigorous massage over bony prominences, Chair cushion, Check visual cues for pain, Discuss PT/OT consult with provider, Float heels, Keep linens dry and wrinkle-free, Maintain/enhance activity level, Minimize linen layers, Turn and reposition approx.  every two hours (pillows and wedges if needed), Pressure redistribution bed/mattress (bed type)    Moisture Interventions: Absorbent underpads, Apply protective barrier, creams and emollients, Assess need for specialty bed, Check for incontinence Q2 hours and as needed, Internal/External urinary devices, Limit adult briefs, Maintain skin hydration (lotion/cream), Minimize layers    Activity Interventions: Assess need for specialty bed, Chair cushion, Increase time out of bed, Pressure redistribution bed/mattress(bed type), PT/OT evaluation    Mobility Interventions: Assess need for specialty bed, Chair cushion, Float heels, HOB 30 degrees or less, Pressure redistribution bed/mattress (bed type), PT/OT evaluation, Turn and reposition approx.  every two hours(pillow and wedges)    Nutrition Interventions: Document food/fluid/supplement intake, Discuss nutritional consult with provider, Offer support with meals,snacks and hydration    Friction and Shear Interventions: Apply protective barrier, creams and emollients, Foam dressings/transparent film/skin sealants, HOB 30 degrees or less, Lift sheet, Minimize layers, Transfer aides:transfer board/Bernadette lift/ceiling lift                Problem: Patient Education: Go to Patient Education Activity  Goal: Patient/Family Education  Outcome: Progressing Towards Goal

## 2022-12-13 NOTE — PROGRESS NOTES
Progress Note    Patient: Carla Puckett MRN: [id number]  SSN: [id number]    YOB: 1950  Age: 71 y.o. Sex: male          ADMITTED:  2022 to Renee Lomeli, *  for Hematuria [R31.9]  Penile implant failure [facility]) [W22.406X]           Carla Puckett is 1 Day Post-Op Procedure(s):  REMOVAL PENILE PROSTHESIS. He is doing well. He has mild scrotal pain which is controlled. Purulent drainage noted intra-op, Wound cultures pending, AF, WBC wnl, on Vanc. In the OR, two rear-tip extenders found on the left cylinder, only one on the right. Unable to identify or palpate the second rear-tip extender on the right even after using a cystoscope to scope the right corporotomy. CT pelvis obtained this AM to evaluate for retained extender, images reviewed, retained root tip extender noted in the right pelvic soft tissue. The remainder of the penile prosthesis has been removed. Reviewed findings with the patient. Vitals:  Temp (24hrs), Av °F (36.7 °C), Min:97.3 °F (36.3 °C), Max:98.6 °F (37 °C)     Blood pressure 127/79, pulse 82, temperature 98.4 °F (36.9 °C), resp. rate 14, height 6' 3\" (1.905 m), weight 111.1 kg (245 lb), SpO2 97 %. I&O's:   1901 -  0700  In: 2300 [I.V.:2300]  Out: 1300 [Urine:1300]   No intake/output data recorded.      Exam:   Physical Exam  General: NAD, pleasant  Respiratory: no distress, breathing easily, room air  Abdomen: soft, no distention; non-tender to palpation  : no CVA tenderness, vazquez, yellow UA, scrotal midline incision C/D/I   Neuro: Appropriate, alert  Skin: warm, dry  Extremities: moves all, full ROM     Labs:   Recent Labs     22  0329 22  0236 22  0025   WBC 9.3 9.7 10.7   HGB 9.3* 10.2*  10.4* 10.0*  10.3*   HCT 29.4* 31.9*  33.3* 31.5*  32.5*    291 282     Recent Labs     22  0329 22  0236 22  0025   * 134* 133*   K 3.9 3.7 3.8    103 102   CO2 23 24 26   GLU 82 102* 93   BUN 12 15 14   CREA 0.41* 0.44* 0.45*   CA 8.3* 8.6 8.5        Cultures:        Imaging:       Assessment:     - 1 Day Post-Op Procedure(s):  REMOVAL PENILE PROSTHESIS    Active Problems:    Hematuria (12/8/2022)      Penile implant failure ([address] (12/8/2022)        Plan:     - Pelvic CT shows retained right root tip extender noted in the right pelvic soft tissue. Will need to return to the OR for removal. Continue antibiotics given the purulence drained upon removing the device, wound culture pending. The catheter is to remain at least for 2 to 3 weeks until the urethra can close up. Hold off on resuming AC for now if able.      12:44 PM Addendum - We will return to the OR on 12/16 at 1 PM.     D/W Dr. Sapphire Duran By: Carmen Fernández NP - December 13, 2022

## 2022-12-13 NOTE — PROGRESS NOTES
Physician Progress Note      Chinedu Matt  [facility] #:                  [id number]  :                       1950  ADMIT DATE:       2022 1:20 PM  [address] [place] DATE:  RESPONDING  PROVIDER #:        Gladys Carter MD          QUERY TEXT:    Patient noted to have chronic atrial fibrillation and was maintained on Eliquis prior to admission. Hospitalist noted that the pt's [id number] was 5 and he had a recent stroke. Pt has had surgical procedures and is receiving heparin during admission. If possible, please document if you are evaluating and/or treating any of the following: The medical record reflects the following:    Risk Factors: 60-year-old  male with past medical history of atrial fibrillation on Eliquis, depression, diabetes, dyslipidemia, hypertension, CVA, VALENTE on CPAP, restless leg syndrome, penile implant    Clinical Indicators:    -- Hospitalist documented:  Atrial fibrillation on Eliquis  [id number] is 5 and he has had a recent stroke    Treatment: Cardiology consult, Eliquis PTA, heparin during admission    Thank-you,  Gina Dennison RN, [facility]  Clinical   [email or web address]  [phone]  You can also contact me via [facility]. Options provided:  -- Secondary hypercoagulable state in a patient with atrial fibrillation  -- Other - I will add my own diagnosis  -- Disagree - Not applicable / Not valid  -- Disagree - Clinically unable to determine / Unknown  -- Refer to Clinical Documentation Reviewer    PROVIDER RESPONSE TEXT:    Provider disagreed with this query.     Query created by: Carl Ballard on 2022 5:41 PM      Electronically signed by:  Gladys Carter MD 2022 6:28 PM

## 2022-12-13 NOTE — PROGRESS NOTES
Care Management:    Transition of Care Plan:     RUR: 21%  Disposition: return to [facility] or [facility] in [facility]: TBD    Received call from patient's son Eliverto Essex (work cell [phone]). He is calling to request an update on patient's medical condition. He is a  and drives a lot during the day and is not allowed to answer the phone while driving. The best number to reach him during the day (or anytime) is his work cell phone a [phone]. Sent attending MD a message via [employer] to ask that he call son to update him on patient's medical condition. Son confirmed that they would prefer patient to go to [facility] in [address] [place] [address] instead of returning to [facility] if possible. CM sent referral to [facility] via [address] [place] [address],Ground Floor yesterday.     ANAHI Arrieta

## 2022-12-13 NOTE — OP NOTES
[address]   OPERATIVE REPORT    Name:  Santiago Vanegas  MR#:  [id number]  :  1950  ACCOUNT #:  [de-identified]  DATE OF SERVICE:  2022    PREOPERATIVE DIAGNOSIS:  Erosion of penile implant. POSTOPERATIVE DIAGNOSIS:  Erosion of penile implant. PROCEDURE PERFORMED:  Removal of all parts of penile prosthesis. SURGEON:  Pepe Goodson MD.    ASSISTANT:  Darwin Nixon. ANESTHESIA:  General.    COMPLICATIONS:  None. SPECIMENS REMOVED:  1. Culture of wound, sent for culture. 2.  Penile prosthesis, sent for gross examination. IMPLANTS:  None. DRAINS:  None. ESTIMATED BLOOD LOSS:  50 mL. FINDINGS:  Urethral erosion in proximal penile urethra from implant. Two rear-tip extenders on the left cylinder, only one on the right. Unable to identify or palpate the second rear-tip extender on the right even after using a cystoscope to scope the right corporotomy. INDICATIONS:  The patient is a 81-year-old male patient known to Dr. Philomena Rosario, who previously placed a penile prosthesis in years passed. The patent had a stroke more recently with subsequent erosion of penile prosthesis. He is being brought to the operating room today for removal of penile prosthesis. PROCEDURE:  After obtaining informed consent, the patient was brought back to the operating room and placed on the operating table in supine position. Anesthesia was induced and the airway was established without complication. The patient was prepped and draped in the usual aseptic fashion. After a final time-out, a fresh 23-MRSLEA silicone catheter was inserted into the bladder with 10 mL in the balloon. It drained cloudy yellow urine. Next, the pump was noticed to be adhered to the skin and electrocautery was used to make an incision on the skin over the pump. After opening the capsule of the pump, purulent flow was seen, and it was swabbed and sent for culture.   The pump was held on gentle traction and then facing tubing to the right cylinder. Holding the tubing on lateral traction, I saw the Sexton catheter through a defect in the urethra. My dissection was lateral to the tubing away from the urethra, so this defect in the urethra was from erosion of the device into the urethra proximally as well. This was what caused the purulent fluid drainage that had drained just earlier. I then cut the tubing to the right cylinder, which was removed from the patient. Prior to starting the case, the entire right cylinder was outside of the patient. So, after cutting the tubing, the cylinder removed easily. On inspection, there was only one rear-tip extender on this right cylinder. The tubing was then held on gentle traction, and I dissected lateral tubing out to the left corpus. I dissected all the way down, made a corporotomy, and was able to remove the cylinder without difficulty. This cylinder had two rear-tip extenders. Concerned about the possibility of a contained rear-tip extender, I extended my right corporotomy and placed a nasal speculum to visualize the corporotomy. I did not identify any rear-tip extender. To improve my visualization, I obtained a cystoscope and scoped the right corpus multiple times to try to find a secondary rear-tip extender. I also palpated thoroughly the perineum where the nata would be, and I did not palpate any rear-tip extender or any mass, especially when comparing to the patient's left side. After this thorough examination, it was felt that the rear-tip extender could have fallen off as the right cylinder was already completely extruded from the patient. Rear-tip extender was also not in the penile shaft of the corpus. Next, the pump was used to fully deflate the reservoir. Gentle traction was held on the tubing down to the reservoir, which was traced with electrocautery. The reservoir was removed. All parts of the wound were thoroughly irrigated. The pseudocapsule on the pump was partially excised as well as cauterized with electrocautery. I used 3-0 Vicryl suture to close the deep dartos to try to overlay the proximal urethral erosion. Because of the amount of urethral erosion, I did not feel that I could adequately close the urethra, as this would compromise urethral lumen. It was also in a deep location that I was not able to be easily suture. I closed the tissue superficial to that with the 3-0 Vicryl. I then closed superficial dartos with a 2-0 Vicryl and the skin with 3-0 chromic. The erosion site was closed with 2-0 Vicryl. Local was injected to the base for satisfactory penile block. The patient was awoken from anesthesia and taken to Recovery in healthy and stable condition having tolerated the procedure well. DISPOSITION:  The patient is to be readmitted to the inpatient room with continued antibiotics given the purulence drained upon removing the device. The catheter is to remain at least for 2 to 3 weeks until the urethra can close up. The patient will need a noncontrast pelvic CT scan to valuate for any rear-tip extender.       Amirah Mart MD    [id number]  D:  12/12/2022 16:36  T:  12/13/2022 3:21  JOB #:  [id number]

## 2022-12-13 NOTE — ANESTHESIA POSTPROCEDURE EVALUATION
Procedure(s):  REMOVAL PENILE PROSTHESIS.    general    Anesthesia Post Evaluation        Patient location during evaluation: PACU  Note status: Adequate. Level of consciousness: responsive to verbal stimuli and sleepy but conscious  Pain management: satisfactory to patient  Airway patency: patent  Anesthetic complications: no  Cardiovascular status: acceptable  Respiratory status: acceptable  Hydration status: acceptable  Comments: +Post-Anesthesia Evaluation and Assessment    Patient: Da Jenkins MRN: [id number]  SSN: [id number]   YOB: 1950  Age: 71 y.o. Sex: male          Cardiovascular Function/Vital Signs    /68 (BP 1 Location: Right upper arm, BP Patient Position: At rest)   Pulse 69   Temp 36.3 °C (97.4 °F)   Resp 14   Ht 6' 3\" (1.905 m)   Wt 111.1 kg (245 lb)   SpO2 99%   BMI 30.62 kg/m²     Patient is status post Procedure(s):  REMOVAL PENILE PROSTHESIS. Nausea/Vomiting: Controlled. Postoperative hydration reviewed and adequate. Pain:  Pain Scale 1: Numeric (0 - 10) (12/12/22 2156)  Pain Intensity 1: 0 (12/12/22 2156)   Managed. Neurological Status:   Neuro (WDL): Within Defined Limits (12/12/22 1752)   At baseline. Mental Status and Level of Consciousness: Arousable. Pulmonary Status:   O2 Device: None (Room air) (12/12/22 1814)   Adequate oxygenation and airway patent. Complications related to anesthesia: None    Post-anesthesia assessment completed. No concerns. I have evaluated the patient and the patient is stable and ready to be discharged from PACU . Signed By: Amita Grey MD    12/13/2022        INITIAL Post-op Vital signs:   Vitals Value Taken Time   BP 98/66 12/12/22 1815   Temp 36.6 °C (97.9 °F) 12/12/22 1814   Pulse 80 12/12/22 1826   Resp 13 12/12/22 1826   SpO2 100 % 12/12/22 1826   Vitals shown include unvalidated device data.

## 2022-12-13 NOTE — PROGRESS NOTES
[address] [facility] Adult  Hospitalist Group                                                                                          Hospitalist Progress Note  Nayeli Baeza MD  Answering service: [phone] OR [phone] from in house phone        Date of Service:  2022  NAME:  Corwin Corral  :  1950  MRN:  [id number]      Admission Summary:   Corwin Corral is a 71 y.o. male who presents with hematuria. 59-year-old  male with past medical history of atrial fibrillation on Xarelto, depression, diabetes, dyslipidemia, hypertension, CVA, VALENTE on CPAP, restless leg syndrome, penile implant, who comes in for the above. Patient is apparently sent over by his facility for hematuria of about 2 weeks duration with his penile implant not working anymore. In the ED, his penile implant was found to be protruding and Sexton catheter was inserted with pink-colored urine draining. He denies any fever, chills. He reports on and off pain in the penile area. He says that this is his second penile implant. The ER spoke to urology and they recommended antibiotics. Urology has seen in the ER and will plan for surgery on Monday. Of note, the patient has previous Pseudomonas in urine as well as Enterococcus. Initial recommendations were vancomycin and gentamicin, but at that time patient is not symptomatic or septic. The patient is not quite sure as to why he was sent to the hospital.  Per the ER note, the patient's neurologist had apparently spoken to the patient SNF and wanted him transferred here yesterday. I cannot find any medical record of the purpose of this device, the patient says that it was placed for sexual purposes. Interval history / Subjective:   Patient seen and examined earlier this morning by me for follow-up of regarding penile implant. Patient reports left upper extremity weakness at the time my exam.  Patient is moving the extremity without any issues. 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 Pending UA and culture  12/9-pending UA culture  Continue antibiotics  Urology seen  12/11-penile implant is protruding much more today than yesterday  12/12-surgery scheduled for today at 2 PM  12/13-patient had surgery yesterday. Will need to go back to the OR due to retention of another part of the prosthesis. Unclear when he would have to have surgery. Atrial fibrillation on Eliquis  Continue diltiazem and metoprolol  Holding Eliquis for now due to surgery planned for Monday  Cardiology consulted for recommendations on anticoagulation  [id number] is 5 and he has had a recent stroke. Due to these risk factors, I will start a heparin drip for now. 12/9-continue heparin drip and will DC 8 to 12 hours before surgery on Monday  No signs of bleeding at this time  12/11-Heparin drip to stop tomorrow morning around 1-2 in the morning  12/12-Heparin drip was stopped overnight  We will confer with urology about when to restart patient's Eliquis after surgery  12/13-at this time I will continue to hold anticoagulation as patient is postop from yesterday  Unclear at this time when his next procedure will be.   Restart AC per Urology recommendations    Hypertension  Continue home medications     Diabetes  Sliding scale insulin     BPH  Continue home tamsulosin and finasteride     History of recent CVA with residual right-sided weakness  Has baseline  Monitor  Continue atorvastatin     Depression  Home Remeron     Code status: Full  Prophylaxis: Heparin drip  Care Plan discussed with: Patient, nurse  Anticipated Disposition: Greater than 48 hours     Hospital Problems  Date Reviewed: 10/12/2022            Codes Class Noted POA    Hematuria ICD-10-CM: R31.9  ICD-9-CM: 599.70  12/8/2022 Unknown        Penile implant failure [facility]) ICD-10-CM: T83.410A  ICD-9-CM: 996.76  12/8/2022 Unknown         Review of Systems:   A comprehensive review of systems was negative except for that written in the HPI. Vital Signs:    Last 24hrs VS reviewed since prior progress note. Most recent are:  Visit Vitals  /65 (BP 1 Location: Right upper arm, BP Patient Position: At rest)   Pulse 81   Temp 99.8 °F (37.7 °C)   Resp 14   Ht 6' 3\" (1.905 m)   Wt 111.1 kg (245 lb)   SpO2 95%   BMI 30.62 kg/m²         Intake/Output Summary (Last 24 hours) at 12/13/2022 1548  Last data filed at 12/13/2022 9540  Gross per 24 hour   Intake 1300 ml   Output 800 ml   Net 500 ml          Physical Examination:     I had a face to face encounter with this patient and independently examined them on 12/13/2022 as outlined below:    General: Alert awake, no acute distress, resting in bed, pleasant male, appears to be stated age  HEENT: PEERL, EOMI, moist mucus membranes  Neck: Supple, no JVD, no meningeal signs  Chest: Clear to auscultation bilaterally   CVS: RRR, S1 S2 heard, no murmurs/rubs/gallops  Abd: Soft, non-tender, non-distended, +bowel sounds   Ext: No clubbing, no cyanosis, no edema  Neuro/Psych: Pleasant mood and affect, sensory grossly within normal limit, right lower extremity 1-2/5. Right upper extremity 0/5. Cap refill: Brisk, less than 3 seconds  Pulses: 2+, symmetric in all extremities  Skin: Warm, dry, without rashes or lesions  : Patient has a prosthetic that is protruding from the urethra. Much more than yesterday. Some secretion which looks seropurulent. No blood at this time.            Data Review:    Review and/or order of clinical lab test  Review and/or order of tests in the radiology section of CPT  Review and/or order of tests in the medicine section of CPT      Labs:     Recent Labs     12/13/22 0329 12/12/22 0236   WBC 9.3 9.7   HGB 9.3* 10.2*  10.4*   HCT 29.4* 31.9*  33.3*    291       Recent Labs     12/13/22 0329 12/12/22 0236 12/11/22  0025   * 134* 133*   K 3.9 3.7 3.8    103 102   CO2 23 24 26   BUN 12 15 14   CREA 0.41* 0.44* 0.45*   GLU 82 102* 93   CA 8.3* 8.6 8.5       No results for input(s): ALT, AP, TBIL, TBILI, TP, ALB, GLOB, GGT, AML, LPSE in the last 72 hours. No lab exists for component: SGOT, GPT, AMYP, HLPSE    Recent Labs     12/11/22 1940 12/11/22  0025   APTT 66.0* 57.4*        No results for input(s): FE, TIBC, PSAT, FERR in the last 72 hours. No results found for: FOL, RBCF   No results for input(s): PH, PCO2, PO2 in the last 72 hours. No results for input(s): CPK, CKNDX, TROIQ in the last 72 hours.     No lab exists for component: CPKMB  Lab Results   Component Value Date/Time    Cholesterol, total 146 09/15/2022 02:40 AM    HDL Cholesterol 53 09/15/2022 02:40 AM    LDL, calculated 65 09/15/2022 02:40 AM    Triglyceride 140 09/15/2022 02:40 AM    CHOL/HDL Ratio 2.8 09/15/2022 02:40 AM     Lab Results   Component Value Date/Time    Glucose (POC) 105 12/13/2022 03:43 PM    Glucose (POC) 111 12/13/2022 11:24 AM    Glucose (POC) 84 12/13/2022 06:57 AM    Glucose (POC) 99 12/12/2022 09:39 PM    Glucose (POC) 117 12/12/2022 05:02 PM     Lab Results   Component Value Date/Time    Color YELLOW/STRAW 10/18/2022 01:01 AM    Appearance TURBID (A) 10/18/2022 01:01 AM    Specific gravity 1.013 10/18/2022 01:01 AM    pH (UA) 6.5 10/18/2022 01:01 AM    Protein TRACE (A) 10/18/2022 01:01 AM    Glucose Negative 10/18/2022 01:01 AM    Ketone Negative 10/18/2022 01:01 AM    Bilirubin Negative 10/18/2022 01:01 AM    Urobilinogen 4.0 (H) 10/18/2022 01:01 AM    Nitrites Positive (A) 10/18/2022 01:01 AM    Leukocyte Esterase MODERATE (A) 10/18/2022 01:01 AM    Epithelial cells FEW 10/18/2022 01:01 AM    Bacteria 4+ (A) 10/18/2022 01:01 AM    WBC 20-50 10/18/2022 01:01 AM    RBC 0-5 10/18/2022 01:01 AM         Medications Reviewed:     Current Facility-Administered Medications   Medication Dose Route Frequency    gabapentin (NEURONTIN) capsule 600 mg  600 mg Oral QHS    melatonin tablet 3 mg  3 mg Oral QHS    sodium chloride (NS) flush 5-40 mL  5-40 mL IntraVENous Q8H    sodium chloride (NS) flush 5-40 mL  5-40 mL IntraVENous PRN    acetaminophen (TYLENOL) tablet 650 mg  650 mg Oral Q6H PRN    Or    acetaminophen (TYLENOL) suppository 650 mg  650 mg Rectal Q6H PRN    polyethylene glycol (MIRALAX) packet 17 g  17 g Oral DAILY PRN    ondansetron (ZOFRAN ODT) tablet 4 mg  4 mg Oral Q8H PRN    Or    ondansetron (ZOFRAN) injection 4 mg  4 mg IntraVENous Q6H PRN    insulin lispro (HUMALOG) injection   SubCUTAneous AC&HS    glucose chewable tablet 16 g  4 Tablet Oral PRN    glucagon (GLUCAGEN) injection 1 mg  1 mg IntraMUSCular PRN    dextrose 10 % infusion 0-250 mL  0-250 mL IntraVENous PRN    allopurinoL (ZYLOPRIM) tablet 100 mg  100 mg Oral DAILY    atorvastatin (LIPITOR) tablet 80 mg  80 mg Oral QHS    dilTIAZem IR (CARDIZEM) tablet 60 mg  60 mg Oral Q8H    famotidine (PEPCID) tablet 20 mg  20 mg Oral Q12H    finasteride (PROSCAR) tablet 5 mg  5 mg Oral DAILY    metoprolol succinate (TOPROL-XL) XL tablet 75 mg  75 mg Oral DAILY    tamsulosin (FLOMAX) capsule 0.4 mg  0.4 mg Oral DAILY    mirtazapine (REMERON) tablet 7.5 mg  7.5 mg Oral QHS    Vancomycin - Pharmacy to Dose   Other Rx Dosing/Monitoring    vancomycin (VANCOCIN) 1,250 mg in 0.9% sodium chloride 250 mL ([id number])  1,250 mg IntraVENous Q12H     ______________________________________________________________________  EXPECTED LENGTH OF STAY: 3d 7h  ACTUAL LENGTH OF STAY:          [address] Eliu Hearn MD

## 2022-12-13 NOTE — PROGRESS NOTES
A Spiritual Care Partner Volunteer visited patient in Room 614 on 12/13/2022.   Documented by:  Chaplain Christianson MDiv, MS, [facility]

## 2022-12-13 NOTE — PROGRESS NOTES
Spoke to the patient's son on the phone and gave updates on condition and plan for further surgery. Discussed anticoagulation and risk of bleeding post procedure vs risk of stroke.

## 2022-12-13 NOTE — PROGRESS NOTES
Pharmacist Note - Vancomycin Dosing  Therapy day 5  Indication: SSTI, potentially infected penile implant with hematuria  Current regimen: 1250mg IV every 12 hours    Recent Labs     12/13/22  0329 12/12/22  0236 12/11/22  0025   WBC 9.3 9.7 10.7   CREA 0.41* 0.44* 0.45*   BUN 12 15 14       A random vancomycin level of 19.0 mcg/mL was obtained and from this level, the patient's AUC24 is calculated to be 476 with the current regimen. Goal target range AUC/ALIE 400-500      Plan: Continue current regimen. Pharmacy will continue to monitor this patient daily for changes in clinical status and renal function. *Random vancomycin levels are used to calculate AUC/ALIE, this level should not be interpreted as a trough. Vancomycin has been dosed using Bayesian kinetics software to target an AUC24:ALIE of 400-600, which provides adequate exposure for as assumed infection due to MRSA with an ALIE of 1 or less while reducing the risk of nephrotoxicity as seen with traditional trough based dosing goals.

## 2022-12-14 ENCOUNTER — APPOINTMENT (OUTPATIENT)
Dept: GENERAL RADIOLOGY | Age: 72
DRG: 674 | End: 2022-12-14
Attending: STUDENT IN AN ORGANIZED HEALTH CARE EDUCATION/TRAINING PROGRAM
Payer: MEDICARE

## 2022-12-14 LAB
ANION GAP SERPL CALC-SCNC: 6 MMOL/L (ref 5–15)
BACTERIA SPEC CULT: NORMAL
BASOPHILS # BLD: 0 K/UL (ref 0–0.1)
BASOPHILS NFR BLD: 0 % (ref 0–1)
BUN SERPL-MCNC: 14 MG/DL (ref 6–20)
BUN/CREAT SERPL: 33 (ref 12–20)
CALCIUM SERPL-MCNC: 8.4 MG/DL (ref 8.5–10.1)
CHLORIDE SERPL-SCNC: 105 MMOL/L (ref 97–108)
CO2 SERPL-SCNC: 25 MMOL/L (ref 21–32)
CREAT SERPL-MCNC: 0.42 MG/DL (ref 0.7–1.3)
DIFFERENTIAL METHOD BLD: ABNORMAL
EOSINOPHIL # BLD: 0.5 K/UL (ref 0–0.4)
EOSINOPHIL NFR BLD: 5 % (ref 0–7)
ERYTHROCYTE [DISTWIDTH] IN BLOOD BY AUTOMATED COUNT: 15.4 % (ref 11.5–14.5)
GLUCOSE BLD STRIP.AUTO-MCNC: 101 MG/DL (ref 65–117)
GLUCOSE BLD STRIP.AUTO-MCNC: 107 MG/DL (ref 65–117)
GLUCOSE BLD STRIP.AUTO-MCNC: 108 MG/DL (ref 65–117)
GLUCOSE BLD STRIP.AUTO-MCNC: 117 MG/DL (ref 65–117)
GLUCOSE SERPL-MCNC: 107 MG/DL (ref 65–100)
HCT VFR BLD AUTO: 28.3 % (ref 36.6–50.3)
HGB BLD-MCNC: 8.8 G/DL (ref 12.1–17)
IMM GRANULOCYTES # BLD AUTO: 0 K/UL (ref 0–0.04)
IMM GRANULOCYTES NFR BLD AUTO: 1 % (ref 0–0.5)
LYMPHOCYTES # BLD: 1.9 K/UL (ref 0.8–3.5)
LYMPHOCYTES NFR BLD: 22 % (ref 12–49)
MCH RBC QN AUTO: 27.5 PG (ref 26–34)
MCHC RBC AUTO-ENTMCNC: 31.1 G/DL (ref 30–36.5)
MCV RBC AUTO: 88.4 FL (ref 80–99)
MONOCYTES # BLD: 1 K/UL (ref 0–1)
MONOCYTES NFR BLD: 12 % (ref 5–13)
NEUTS SEG # BLD: 5 K/UL (ref 1.8–8)
NEUTS SEG NFR BLD: 60 % (ref 32–75)
NRBC # BLD: 0 K/UL (ref 0–0.01)
NRBC BLD-RTO: 0 PER 100 WBC
PLATELET # BLD AUTO: 237 K/UL (ref 150–400)
PMV BLD AUTO: 10.4 FL (ref 8.9–12.9)
POTASSIUM SERPL-SCNC: 3.4 MMOL/L (ref 3.5–5.1)
RBC # BLD AUTO: 3.2 M/UL (ref 4.1–5.7)
SERVICE CMNT-IMP: NORMAL
SODIUM SERPL-SCNC: 136 MMOL/L (ref 136–145)
WBC # BLD AUTO: 8.3 K/UL (ref 4.1–11.1)

## 2022-12-14 PROCEDURE — 73090 X-RAY EXAM OF FOREARM: CPT

## 2022-12-14 PROCEDURE — 73060 X-RAY EXAM OF HUMERUS: CPT

## 2022-12-14 PROCEDURE — 74011250636 HC RX REV CODE- 250/636: Performed by: STUDENT IN AN ORGANIZED HEALTH CARE EDUCATION/TRAINING PROGRAM

## 2022-12-14 PROCEDURE — 97110 THERAPEUTIC EXERCISES: CPT

## 2022-12-14 PROCEDURE — 85025 COMPLETE CBC W/AUTO DIFF WBC: CPT

## 2022-12-14 PROCEDURE — 74011250637 HC RX REV CODE- 250/637: Performed by: STUDENT IN AN ORGANIZED HEALTH CARE EDUCATION/TRAINING PROGRAM

## 2022-12-14 PROCEDURE — 74011000250 HC RX REV CODE- 250: Performed by: STUDENT IN AN ORGANIZED HEALTH CARE EDUCATION/TRAINING PROGRAM

## 2022-12-14 PROCEDURE — 36415 COLL VENOUS BLD VENIPUNCTURE: CPT

## 2022-12-14 PROCEDURE — 97530 THERAPEUTIC ACTIVITIES: CPT

## 2022-12-14 PROCEDURE — 82962 GLUCOSE BLOOD TEST: CPT

## 2022-12-14 PROCEDURE — 73080 X-RAY EXAM OF ELBOW: CPT

## 2022-12-14 PROCEDURE — 65270000046 HC RM TELEMETRY

## 2022-12-14 PROCEDURE — 80048 BASIC METABOLIC PNL TOTAL CA: CPT

## 2022-12-14 RX ORDER — OXYCODONE HYDROCHLORIDE 5 MG/1
5 TABLET ORAL
Status: DISCONTINUED | OUTPATIENT
Start: 2022-12-14 | End: 2022-12-20 | Stop reason: HOSPADM

## 2022-12-14 RX ORDER — MORPHINE SULFATE 2 MG/ML
1 INJECTION, SOLUTION INTRAMUSCULAR; INTRAVENOUS
Status: DISCONTINUED | OUTPATIENT
Start: 2022-12-14 | End: 2022-12-14

## 2022-12-14 RX ADMIN — ALLOPURINOL 100 MG: 100 TABLET ORAL at 08:39

## 2022-12-14 RX ADMIN — DILTIAZEM HYDROCHLORIDE 60 MG: 60 TABLET, FILM COATED ORAL at 15:37

## 2022-12-14 RX ADMIN — MIRTAZAPINE 7.5 MG: 15 TABLET, FILM COATED ORAL at 22:15

## 2022-12-14 RX ADMIN — DILTIAZEM HYDROCHLORIDE 60 MG: 60 TABLET, FILM COATED ORAL at 06:31

## 2022-12-14 RX ADMIN — SODIUM CHLORIDE, PRESERVATIVE FREE 10 ML: 5 INJECTION INTRAVENOUS at 06:40

## 2022-12-14 RX ADMIN — Medication 3 MG: at 22:15

## 2022-12-14 RX ADMIN — FAMOTIDINE 20 MG: 20 TABLET, FILM COATED ORAL at 08:39

## 2022-12-14 RX ADMIN — ATORVASTATIN CALCIUM 80 MG: 40 TABLET, FILM COATED ORAL at 22:15

## 2022-12-14 RX ADMIN — GABAPENTIN 600 MG: 300 CAPSULE ORAL at 22:15

## 2022-12-14 RX ADMIN — OXYCODONE 5 MG: 5 TABLET ORAL at 10:28

## 2022-12-14 RX ADMIN — FINASTERIDE 5 MG: 5 TABLET, FILM COATED ORAL at 08:39

## 2022-12-14 RX ADMIN — VANCOMYCIN HYDROCHLORIDE 1250 MG: 1.25 INJECTION, POWDER, LYOPHILIZED, FOR SOLUTION INTRAVENOUS at 06:31

## 2022-12-14 RX ADMIN — TAMSULOSIN HYDROCHLORIDE 0.4 MG: 0.4 CAPSULE ORAL at 08:39

## 2022-12-14 RX ADMIN — VANCOMYCIN HYDROCHLORIDE 1250 MG: 1.25 INJECTION, POWDER, LYOPHILIZED, FOR SOLUTION INTRAVENOUS at 16:53

## 2022-12-14 RX ADMIN — METOPROLOL SUCCINATE 75 MG: 50 TABLET, EXTENDED RELEASE ORAL at 08:39

## 2022-12-14 RX ADMIN — SODIUM CHLORIDE, PRESERVATIVE FREE 10 ML: 5 INJECTION INTRAVENOUS at 15:37

## 2022-12-14 RX ADMIN — FAMOTIDINE 20 MG: 20 TABLET, FILM COATED ORAL at 22:15

## 2022-12-14 NOTE — PROGRESS NOTES
[facility] [facility] Adult  Hospitalist Group                                                                                          Hospitalist Progress Note  Kaia Zhang MD  Answering service: [phone] OR [phone] from in house phone        Date of Service:  2022  NAME:  Aneesh Lewis  :  1950  MRN:  [id number]      Admission Summary:   Aneesh Lewis is a 71 y.o. male who presents with hematuria. 81-year-old  male with past medical history of atrial fibrillation on Xarelto, depression, diabetes, dyslipidemia, hypertension, CVA, VALENTE on CPAP, restless leg syndrome, penile implant, who comes in for the above. Patient is apparently sent over by his facility for hematuria of about 2 weeks duration with his penile implant not working anymore. In the ED, his penile implant was found to be protruding and Sexton catheter was inserted with pink-colored urine draining. He denies any fever, chills. He reports on and off pain in the penile area. He says that this is his second penile implant. The ER spoke to urology and they recommended antibiotics. Urology has seen in the ER and will plan for surgery on Monday. Of note, the patient has previous Pseudomonas in urine as well as Enterococcus. Initial recommendations were vancomycin and gentamicin, but at that time patient is not symptomatic or septic. The patient is not quite sure as to why he was sent to the hospital.  Per the ER note, the patient's neurologist had apparently spoken to the patient SNF and wanted him transferred here yesterday. I cannot find any medical record of the purpose of this device, the patient says that it was placed for sexual purposes. Interval history / Subjective:   Patient seen and examined earlier this morning by me for follow-up of regarding penile implant. Patient reports left arm pain. Seems to be between the shoulder and elbow. He has painful ROM. No acute events overnight. 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 Pending UA and culture  12/9-pending UA culture  Continue antibiotics  Urology seen  12/11-penile implant is protruding much more today than yesterday  12/12-surgery scheduled for today at 2 PM  12/13-patient had surgery yesterday. Will need to go back to the OR due to retention of another part of the prosthesis. Unclear when he would have to have surgery. 12/14- patient to go back to surgery on 12/16     Atrial fibrillation on Eliquis  Continue diltiazem and metoprolol  Holding Eliquis for now due to surgery planned for Monday  Cardiology consulted for recommendations on anticoagulation  [id number] is 5 and he has had a recent stroke. Due to these risk factors, I will start a heparin drip for now. 12/9-continue heparin drip and will DC 8 to 12 hours before surgery on Monday  No signs of bleeding at this time  12/11-Heparin drip to stop tomorrow morning around 1-2 in the morning  12/12-Heparin drip was stopped overnight  We will confer with urology about when to restart patient's Eliquis after surgery  12/13-at this time I will continue to hold anticoagulation as patient is postop from yesterday  Unclear at this time when his next procedure will be.   Restart AC per Urology recommendations  12/14- Hold anticoagulation per Urology recommendations    Hypertension  Continue home medications     Diabetes  Sliding scale insulin     BPH  Continue home tamsulosin and finasteride     History of recent CVA with residual right-sided weakness  Has baseline  Monitor  Continue atorvastatin     Depression  Home Remeron    Left arm pain/left elbow pain  XRs  Unclear cause      Code status: Full  Prophylaxis: Heparin drip  Care Plan discussed with: Patient, nurse  Anticipated Disposition: Greater than 48 hours     Hospital Problems  Date Reviewed: 10/12/2022            Codes Class Noted POA    Hematuria ICD-10-CM: R31.9  ICD-9-CM: 599.70  12/8/2022 Unknown        Penile implant failure [facility]) ICD-10-CM: T83.410A  ICD-9-CM: 996.76  12/8/2022 Unknown       Review of Systems:   A comprehensive review of systems was negative except for that written in the HPI. Vital Signs:    Last 24hrs VS reviewed since prior progress note. Most recent are:  Visit Vitals  BP (!) 118/56 (BP 1 Location: Right upper arm, BP Patient Position: At rest)   Pulse (!) 57   Temp 97.8 °F (36.6 °C)   Resp 19   Ht 6' 3\" (1.905 m)   Wt 111.1 kg (245 lb)   SpO2 97%   BMI 30.62 kg/m²         Intake/Output Summary (Last 24 hours) at 12/14/2022 1319  Last data filed at 12/14/2022 1057  Gross per 24 hour   Intake --   Output 1450 ml   Net -1450 ml          Physical Examination:     I had a face to face encounter with this patient and independently examined them on 12/14/2022 as outlined below:    General: Alert awake, no acute distress, resting in bed, pleasant male, appears to be stated age  HEENT: PEERL, EOMI, moist mucus membranes  Neck: Supple, no JVD, no meningeal signs  Chest: Clear to auscultation bilaterally   CVS: RRR, S1 S2 heard, no murmurs/rubs/gallops  Abd: Soft, non-tender, non-distended, +bowel sounds   Ext: No clubbing, no cyanosis, no edema  Neuro/Psych: Pleasant mood and affect, sensory grossly within normal limit, right lower extremity 1-2/5. Right upper extremity 0/5. Cap refill: Brisk, less than 3 seconds  Pulses: 2+, symmetric in all extremities  Skin: Warm, dry, without rashes or lesions  : Patient has a prosthetic that is protruding from the urethra.  Much more than yesterday. Some secretion which looks seropurulent. No blood at this time. Data Review:    Review and/or order of clinical lab test  Review and/or order of tests in the radiology section of CPT  Review and/or order of tests in the medicine section of CPT      Labs:     Recent Labs     12/14/22 0444 12/13/22 0329   WBC 8.3 9.3   HGB 8.8* 9.3*   HCT 28.3* 29.4*    240       Recent Labs     12/14/22 0444 12/13/22 0329 12/12/22  0236    135* 134*   K 3.4* 3.9 3.7    105 103   CO2 25 23 24   BUN 14 12 15   CREA 0.42* 0.41* 0.44*   * 82 102*   CA 8.4* 8.3* 8.6       No results for input(s): ALT, AP, TBIL, TBILI, TP, ALB, GLOB, GGT, AML, LPSE in the last 72 hours. No lab exists for component: SGOT, GPT, AMYP, HLPSE    Recent Labs     12/11/22 1940   APTT 66.0*        No results for input(s): FE, TIBC, PSAT, FERR in the last 72 hours. No results found for: FOL, RBCF   No results for input(s): PH, PCO2, PO2 in the last 72 hours. No results for input(s): CPK, CKNDX, TROIQ in the last 72 hours.     No lab exists for component: CPKMB  Lab Results   Component Value Date/Time    Cholesterol, total 146 09/15/2022 02:40 AM    HDL Cholesterol 53 09/15/2022 02:40 AM    LDL, calculated 65 09/15/2022 02:40 AM    Triglyceride 140 09/15/2022 02:40 AM    CHOL/HDL Ratio 2.8 09/15/2022 02:40 AM     Lab Results   Component Value Date/Time    Glucose (POC) 117 12/14/2022 11:11 AM    Glucose (POC) 108 12/14/2022 06:25 AM    Glucose (POC) 135 (H) 12/13/2022 08:52 PM    Glucose (POC) 105 12/13/2022 03:43 PM    Glucose (POC) 111 12/13/2022 11:24 AM     Lab Results   Component Value Date/Time    Color YELLOW/STRAW 10/18/2022 01:01 AM    Appearance TURBID (A) 10/18/2022 01:01 AM    Specific gravity 1.013 10/18/2022 01:01 AM    pH (UA) 6.5 10/18/2022 01:01 AM    Protein TRACE (A) 10/18/2022 01:01 AM    Glucose Negative 10/18/2022 01:01 AM    Ketone Negative 10/18/2022 01:01 AM    Bilirubin Negative 10/18/2022 01:01 AM    Urobilinogen 4.0 (H) 10/18/2022 01:01 AM    Nitrites Positive (A) 10/18/2022 01:01 AM    Leukocyte Esterase MODERATE (A) 10/18/2022 01:01 AM    Epithelial cells FEW 10/18/2022 01:01 AM    Bacteria 4+ (A) 10/18/2022 01:01 AM    WBC 20-50 10/18/2022 01:01 AM    RBC 0-5 10/18/2022 01:01 AM         Medications Reviewed:     Current Facility-Administered Medications   Medication Dose Route Frequency    oxyCODONE IR (ROXICODONE) tablet 5 mg  5 mg Oral Q4H PRN    gabapentin (NEURONTIN) capsule 600 mg  600 mg Oral QHS    melatonin tablet 3 mg  3 mg Oral QHS    sodium chloride (NS) flush 5-40 mL  5-40 mL IntraVENous Q8H    sodium chloride (NS) flush 5-40 mL  5-40 mL IntraVENous PRN    acetaminophen (TYLENOL) tablet 650 mg  650 mg Oral Q6H PRN    Or    acetaminophen (TYLENOL) suppository 650 mg  650 mg Rectal Q6H PRN    polyethylene glycol (MIRALAX) packet 17 g  17 g Oral DAILY PRN    ondansetron (ZOFRAN ODT) tablet 4 mg  4 mg Oral Q8H PRN    Or    ondansetron (ZOFRAN) injection 4 mg  4 mg IntraVENous Q6H PRN    insulin lispro (HUMALOG) injection   SubCUTAneous AC&HS    glucose chewable tablet 16 g  4 Tablet Oral PRN    glucagon (GLUCAGEN) injection 1 mg  1 mg IntraMUSCular PRN    dextrose 10 % infusion 0-250 mL  0-250 mL IntraVENous PRN    allopurinoL (ZYLOPRIM) tablet 100 mg  100 mg Oral DAILY    atorvastatin (LIPITOR) tablet 80 mg  80 mg Oral QHS    dilTIAZem IR (CARDIZEM) tablet 60 mg  60 mg Oral Q8H    famotidine (PEPCID) tablet 20 mg  20 mg Oral Q12H    finasteride (PROSCAR) tablet 5 mg  5 mg Oral DAILY    metoprolol succinate (TOPROL-XL) XL tablet 75 mg  75 mg Oral DAILY    tamsulosin (FLOMAX) capsule 0.4 mg  0.4 mg Oral DAILY    mirtazapine (REMERON) tablet 7.5 mg  7.5 mg Oral QHS    Vancomycin - Pharmacy to Dose   Other Rx Dosing/Monitoring    vancomycin (VANCOCIN) 1,250 mg in 0.9% sodium chloride 250 mL ([id number])  1,250 mg IntraVENous Q12H ______________________________________________________________________  EXPECTED LENGTH OF STAY: 5d 21h  ACTUAL LENGTH OF STAY:          [address] [facility] Lieutenant Ashly MD

## 2022-12-14 NOTE — PROGRESS NOTES
Problem: Self Care Deficits Care Plan (Adult)  Goal: *Acute Goals and Plan of Care (Insert Text)  Description: FUNCTIONAL STATUS PRIOR TO ADMISSION: Patient reports being primarily bedbound since discharge to SNF, has gotten up to a wheelchair a couple of times. Patient requiring assisted for all ADLs but is able to feed self with setup. HOME SUPPORT: The patient lived at a SNF since last discharge from hospital. Prior to initial CVA, patient was living alone with limited local supports. Occupational Therapy Goals  Initiated 12/9/2022  1. Patient will perform grooming sitting edge of bed with minimal assistance/contact guard assist within 7 day(s). 2.  Patient will perform lower body dressing with maximal assistance within 7 day(s). 3.  Patient will perform anterior bathing from neck to thighs with minimal assistance/contact guard assist within 7 day(s). 4.  Patient will perform toilet transfers with maximal assistance within 7 day(s). 5.  Patient will perform all aspects of toileting with maximal assistance within 7 day(s). 6.  Patient will participate in upper extremity therapeutic exercise/activities with minimal assistance/contact guard assist for 10 minutes within 7 day(s). Outcome: Progressing Towards Goal   OCCUPATIONAL THERAPY TREATMENT  Patient: Mundo Taylor (89 y.o. male)  Date: 12/14/2022  Diagnosis: Hematuria [R31.9]  Penile implant failure ([facility] [address] [T83.410A] <principal problem not specified>  Procedure(s) (LRB):  REMOVAL PENILE PROSTHESIS (N/A) 2 Days Post-Op  Precautions: Fall  Chart, occupational therapy assessment, plan of care, and goals were reviewed. ASSESSMENT  Patient continues with skilled OT services and is progressing towards goals. Patient presents this session with left elbow pain, continued right sided deficits from prior CVA, impaired left upper extremity range of motion due to pain, limited activity tolerance, and generalized deconditioning.  Patient received semi supine in bed and agreeable to working with therapy. Patient underwent removal of penile prosthesis performed on 12/12/2022 and seen today post procedure. Patient also with new left upper extremity elbow pain, worsens with any movement, patient had imaging done which showed no acute abnormality. Moderately severe glenohumeral osteoarthritis. Pain in elbow limiting patient's ability to participate in therapeutic exercise, patient provided with yellow theraband tied to bed rail for shoulder and elbow flexion/extension if pain improves. Instructed to complete active range of motion of left elbow in pain free range and to complete wrist and finger flexion/extension to maintain joint mobility and strength. Wound care present and assisted for bed mobility during change of wound dressing for rolling and repositioning in bed, patient left with right side propped on pillow. Patient would benefit from skilled OT services during admission to improve independence with self care and functional mobility/transfers. Recommend discharge to SNF at this time. Current Level of Function Impacting Discharge (ADLs): max to total A x2 for mobility/transfers, total A lower extremity dressing to pull down and up protective undergarment at bed level    Other factors to consider for discharge: prior level of function, severity of deficits, lives alone prior to admission          PLAN :  Patient continues to benefit from skilled intervention to address the above impairments. Continue treatment per established plan of care to address goals.     Recommend with staff: bed in modified chair position at mealtimes, bedpan for toileting if able to communicate need    Recommend next OT session: continue upper extremity therapeutic exercise, pending elbow pain    Recommendation for discharge: (in order for the patient to meet his/her long term goals)  Therapy up to 5 days/week in SNF setting    This discharge recommendation:  Has been made in collaboration with the attending provider and/or case management    IF patient discharges home will need the following DME: TBD, rec is SNF at this time       SUBJECTIVE:   Patient stated it hurts when my elbow moves.     OBJECTIVE DATA SUMMARY:   Cognitive/Behavioral Status:  Neurologic State: Alert                   Functional Mobility and Transfers for ADLs:  Bed Mobility:  Rolling: Maximum assistance; Total assistance (max to roll left, total to roll right)  Scooting: Total assistance;Assist x2    Transfers:             Balance:       ADL Intervention:                 Type of Bath: Chlorhexidine (CHG); Basin/Soap/Water (line cleaned w/CHG)              Lower Body Dressing Assistance  Protective Undergarmet: Total assistance (dependent) (bed level)              Therapeutic Exercises:   Exercise: Sets: Reps: Active range of motion: Active assist range of motion: Passive range of motion: Self range of motion: Comments:   Shoulder flexion/extension 1 5 [] [] [x] [] On right upper extremity, unable on left due pain   Elbow flexion/extension 1 5 [] [] [x] [] On right upper extremity, unable on left due pain   Wrist flexion/extension 1 10 [x] [] [x] [] Active on left, passive on right   Finger flexion/extension 1 10 [x] [] [x] [] Active on left, passive on right    Patient provided with yellow theraband but unable to utilize on left upper extremity at this time due to pain at left elbow. Pain:  Significant pain at left elbow    Activity Tolerance:   Fair    After treatment patient left in no apparent distress:   Supine in bed, Call bell within reach, Bed / chair alarm activated, and Side rails x 3    COMMUNICATION/COLLABORATION:   The patients plan of care was discussed with: Registered nurse and wound care nurse .      MARIELLE Huynh/L  Time Calculation: 30 mins

## 2022-12-14 NOTE — PROGRESS NOTES
Problem: Falls - Risk of  Goal: *Absence of Falls  Description: Document Shahab Nicolas Fall Risk and appropriate interventions in the flowsheet. Outcome: Progressing Towards Goal  Note: Fall Risk Interventions:  Mobility Interventions: Bed/chair exit alarm, Communicate number of staff needed for ambulation/transfer, OT consult for ADLs, Patient to call before getting OOB, PT Consult for mobility concerns, PT Consult for assist device competence         Medication Interventions: Evaluate medications/consider consulting pharmacy, Patient to call before getting OOB, Teach patient to arise slowly    Elimination Interventions: Call light in reach, Elevated toilet seat, Patient to call for help with toileting needs, Stay With Me (per policy), Toilet paper/wipes in reach, Toileting schedule/hourly rounds              Problem: Patient Education: Go to Patient Education Activity  Goal: Patient/Family Education  Outcome: Progressing Towards Goal     Problem: Pressure Injury - Risk of  Goal: *Prevention of pressure injury  Description: Document Pepe Scale and appropriate interventions in the flowsheet.   Outcome: Progressing Towards Goal  Note: Pressure Injury Interventions:  Sensory Interventions: Assess changes in LOC, Assess need for specialty bed, Avoid rigorous massage over bony prominences, Keep linens dry and wrinkle-free, Float heels, Maintain/enhance activity level, Minimize linen layers, Monitor skin under medical devices, Pad between skin to skin    Moisture Interventions: Internal/External urinary devices, Absorbent underpads, Apply protective barrier, creams and emollients, Assess need for specialty bed    Activity Interventions: Increase time out of bed, Pressure redistribution bed/mattress(bed type), PT/OT evaluation    Mobility Interventions: Pressure redistribution bed/mattress (bed type), PT/OT evaluation, HOB 30 degrees or less    Nutrition Interventions: Document food/fluid/supplement intake, Discuss nutritional consult with provider    Friction and Shear Interventions: HOB 30 degrees or less, Lift sheet, Minimize layers                Problem: Patient Education: Go to Patient Education Activity  Goal: Patient/Family Education  Outcome: Progressing Towards Goal

## 2022-12-14 NOTE — PROGRESS NOTES
Progress Note    Patient: Rito Russo MRN: [id number]  SSN: [id number]    YOB: 1950  Age: 71 y.o. Sex: male          ADMITTED:  2022 to [facility], *  for Hematuria [R31.9]  Penile implant failure [facility]) Cuong Correia is 2 Days Post-Op Procedure(s):  REMOVAL PENILE PROSTHESIS. He is doing well. No penile or scrotal pain. Wound cultures growing probable pseudomonas & enterococcus, AFVSS, WBC wnl. Previously on ceftriaxone, ampicillin, Zosyn. Now on Vanc. Vitals:  Temp (24hrs), Av.3 °F (36.8 °C), Min:97.4 °F (36.3 °C), Max:99.8 °F (37.7 °C)     Blood pressure (!) 118/56, pulse 77, temperature 97.8 °F (36.6 °C), resp. rate 19, height 6' 3\" (1.905 m), weight 111.1 kg (245 lb), SpO2 97 %. I&O's:   1901 -  0700  In: -   Out: 2175 [Urine:2175]   No intake/output data recorded.      Exam:   Physical Exam  General: NAD, pleasant  Respiratory: no distress, breathing easily, room air  Abdomen: soft, no distention; non-tender to palpation  : no CVA tenderness, scant purulent penile discharge, vazquez w/ yellow UA, scrotal midline incision C/D/I   Neuro: Appropriate, alert  Skin: warm, dry  Extremities: moves all, limited ROM right side baseline      Labs:   Recent Labs     22  0444 22  0329 22  0236   WBC 8.3 9.3 9.7   HGB 8.8* 9.3* 10.2*  10.4*   HCT 28.3* 29.4* 31.9*  33.3*    240 291     Recent Labs     22  0444 22  0329 22  0236    135* 134*   K 3.4* 3.9 3.7    105 103   CO2 25 23 24   * 82 102*   BUN 14 12 15   CREA 0.42* 0.41* 0.44*   CA 8.4* 8.3* 8.6        Cultures:        Imaging:       Assessment:     - 2 Days Post-Op Procedure(s):  REMOVAL PENILE PROSTHESIS    Active Problems:    Hematuria (2022)      Penile implant failure ([facility] [address] (2022)        Plan:     - Planning to return to the OR for removal of retained prosthesis on 12/16/22 at 10:30 with Dr. Louise Barrera. Reviewed with the patient and he is agreeable to proceed. Continue to hold [facility] at this time. - Preliminary Wound Cx growing probable pseudomonas & enterococcus. Continue empiric antibiotics given the purulence drained upon removing the device.  Follow up final C/S.    - The catheter is to remain at least for 2 to 3 weeks until the urethra can close up.     - Daily wound care      Supervising MD, Dr. Terrell Wallace By: Elba Shaver NP - December 14, 2022

## 2022-12-14 NOTE — PROGRESS NOTES
Bedside and Verbal shift change report given to Liana Marshall (oncoming nurse) by LASHAWN Pepper (offgoing nurse). Report included the following information SBAR, Kardex, ED Summary, Intake/Output, MAR, Recent Results, and Cardiac Rhythm a-fib .

## 2022-12-15 LAB
ANION GAP SERPL CALC-SCNC: 4 MMOL/L (ref 5–15)
BACTERIA SPEC CULT: ABNORMAL
BASOPHILS # BLD: 0 K/UL (ref 0–0.1)
BASOPHILS NFR BLD: 0 % (ref 0–1)
BUN SERPL-MCNC: 13 MG/DL (ref 6–20)
BUN/CREAT SERPL: 33 (ref 12–20)
CALCIUM SERPL-MCNC: 8.4 MG/DL (ref 8.5–10.1)
CHLORIDE SERPL-SCNC: 103 MMOL/L (ref 97–108)
CO2 SERPL-SCNC: 27 MMOL/L (ref 21–32)
CREAT SERPL-MCNC: 0.4 MG/DL (ref 0.7–1.3)
DIFFERENTIAL METHOD BLD: ABNORMAL
EOSINOPHIL # BLD: 0.4 K/UL (ref 0–0.4)
EOSINOPHIL NFR BLD: 5 % (ref 0–7)
ERYTHROCYTE [DISTWIDTH] IN BLOOD BY AUTOMATED COUNT: 15.4 % (ref 11.5–14.5)
GLUCOSE BLD STRIP.AUTO-MCNC: 100 MG/DL (ref 65–117)
GLUCOSE BLD STRIP.AUTO-MCNC: 110 MG/DL (ref 65–117)
GLUCOSE BLD STRIP.AUTO-MCNC: 198 MG/DL (ref 65–117)
GLUCOSE BLD STRIP.AUTO-MCNC: 98 MG/DL (ref 65–117)
GLUCOSE SERPL-MCNC: 92 MG/DL (ref 65–100)
GRAM STN SPEC: ABNORMAL
GRAM STN SPEC: ABNORMAL
HCT VFR BLD AUTO: 27.7 % (ref 36.6–50.3)
HGB BLD-MCNC: 8.7 G/DL (ref 12.1–17)
IMM GRANULOCYTES # BLD AUTO: 0.1 K/UL (ref 0–0.04)
IMM GRANULOCYTES NFR BLD AUTO: 1 % (ref 0–0.5)
LYMPHOCYTES # BLD: 1.7 K/UL (ref 0.8–3.5)
LYMPHOCYTES NFR BLD: 20 % (ref 12–49)
MCH RBC QN AUTO: 27.7 PG (ref 26–34)
MCHC RBC AUTO-ENTMCNC: 31.4 G/DL (ref 30–36.5)
MCV RBC AUTO: 88.2 FL (ref 80–99)
MONOCYTES # BLD: 0.9 K/UL (ref 0–1)
MONOCYTES NFR BLD: 11 % (ref 5–13)
NEUTS SEG # BLD: 5.1 K/UL (ref 1.8–8)
NEUTS SEG NFR BLD: 63 % (ref 32–75)
NRBC # BLD: 0 K/UL (ref 0–0.01)
NRBC BLD-RTO: 0 PER 100 WBC
PLATELET # BLD AUTO: 249 K/UL (ref 150–400)
PMV BLD AUTO: 10.5 FL (ref 8.9–12.9)
POTASSIUM SERPL-SCNC: 3.7 MMOL/L (ref 3.5–5.1)
RBC # BLD AUTO: 3.14 M/UL (ref 4.1–5.7)
SERVICE CMNT-IMP: ABNORMAL
SERVICE CMNT-IMP: ABNORMAL
SERVICE CMNT-IMP: NORMAL
SODIUM SERPL-SCNC: 134 MMOL/L (ref 136–145)
WBC # BLD AUTO: 8.2 K/UL (ref 4.1–11.1)

## 2022-12-15 PROCEDURE — 74011250636 HC RX REV CODE- 250/636: Performed by: STUDENT IN AN ORGANIZED HEALTH CARE EDUCATION/TRAINING PROGRAM

## 2022-12-15 PROCEDURE — 65270000046 HC RM TELEMETRY

## 2022-12-15 PROCEDURE — 74011250636 HC RX REV CODE- 250/636: Performed by: INTERNAL MEDICINE

## 2022-12-15 PROCEDURE — 99223 1ST HOSP IP/OBS HIGH 75: CPT | Performed by: INTERNAL MEDICINE

## 2022-12-15 PROCEDURE — 74011000250 HC RX REV CODE- 250: Performed by: INTERNAL MEDICINE

## 2022-12-15 PROCEDURE — 74011250637 HC RX REV CODE- 250/637: Performed by: STUDENT IN AN ORGANIZED HEALTH CARE EDUCATION/TRAINING PROGRAM

## 2022-12-15 PROCEDURE — 36415 COLL VENOUS BLD VENIPUNCTURE: CPT

## 2022-12-15 PROCEDURE — 74011000250 HC RX REV CODE- 250: Performed by: STUDENT IN AN ORGANIZED HEALTH CARE EDUCATION/TRAINING PROGRAM

## 2022-12-15 PROCEDURE — 82962 GLUCOSE BLOOD TEST: CPT

## 2022-12-15 PROCEDURE — 74011636637 HC RX REV CODE- 636/637: Performed by: STUDENT IN AN ORGANIZED HEALTH CARE EDUCATION/TRAINING PROGRAM

## 2022-12-15 PROCEDURE — 85025 COMPLETE CBC W/AUTO DIFF WBC: CPT

## 2022-12-15 PROCEDURE — 80048 BASIC METABOLIC PNL TOTAL CA: CPT

## 2022-12-15 PROCEDURE — 74011000258 HC RX REV CODE- 258: Performed by: INTERNAL MEDICINE

## 2022-12-15 RX ADMIN — FAMOTIDINE 20 MG: 20 TABLET, FILM COATED ORAL at 09:10

## 2022-12-15 RX ADMIN — Medication 2 UNITS: at 18:01

## 2022-12-15 RX ADMIN — SODIUM CHLORIDE, PRESERVATIVE FREE 10 ML: 5 INJECTION INTRAVENOUS at 14:08

## 2022-12-15 RX ADMIN — FAMOTIDINE 20 MG: 20 TABLET, FILM COATED ORAL at 21:29

## 2022-12-15 RX ADMIN — OXYCODONE 5 MG: 5 TABLET ORAL at 09:10

## 2022-12-15 RX ADMIN — SODIUM CHLORIDE, PRESERVATIVE FREE 10 ML: 5 INJECTION INTRAVENOUS at 06:10

## 2022-12-15 RX ADMIN — ATORVASTATIN CALCIUM 80 MG: 40 TABLET, FILM COATED ORAL at 21:29

## 2022-12-15 RX ADMIN — METOPROLOL SUCCINATE 75 MG: 50 TABLET, EXTENDED RELEASE ORAL at 09:10

## 2022-12-15 RX ADMIN — Medication 3 MG: at 21:29

## 2022-12-15 RX ADMIN — VANCOMYCIN HYDROCHLORIDE 1250 MG: 1.25 INJECTION, POWDER, LYOPHILIZED, FOR SOLUTION INTRAVENOUS at 16:15

## 2022-12-15 RX ADMIN — DILTIAZEM HYDROCHLORIDE 60 MG: 60 TABLET, FILM COATED ORAL at 06:09

## 2022-12-15 RX ADMIN — DILTIAZEM HYDROCHLORIDE 60 MG: 60 TABLET, FILM COATED ORAL at 21:29

## 2022-12-15 RX ADMIN — DILTIAZEM HYDROCHLORIDE 60 MG: 60 TABLET, FILM COATED ORAL at 14:08

## 2022-12-15 RX ADMIN — ALLOPURINOL 100 MG: 100 TABLET ORAL at 09:10

## 2022-12-15 RX ADMIN — MIRTAZAPINE 7.5 MG: 15 TABLET, FILM COATED ORAL at 21:29

## 2022-12-15 RX ADMIN — GABAPENTIN 600 MG: 300 CAPSULE ORAL at 21:29

## 2022-12-15 RX ADMIN — SODIUM CHLORIDE 1 G: 9 INJECTION, SOLUTION INTRAMUSCULAR; INTRAVENOUS; SUBCUTANEOUS at 14:08

## 2022-12-15 RX ADMIN — VANCOMYCIN HYDROCHLORIDE 1250 MG: 1.25 INJECTION, POWDER, LYOPHILIZED, FOR SOLUTION INTRAVENOUS at 04:02

## 2022-12-15 RX ADMIN — TAMSULOSIN HYDROCHLORIDE 0.4 MG: 0.4 CAPSULE ORAL at 09:10

## 2022-12-15 RX ADMIN — FINASTERIDE 5 MG: 5 TABLET, FILM COATED ORAL at 09:10

## 2022-12-15 RX ADMIN — MEROPENEM 1 G: 1 INJECTION, POWDER, FOR SOLUTION INTRAVENOUS at 21:30

## 2022-12-15 NOTE — PROGRESS NOTES
Transition of Care Plan  RUR 21%    Surgery 12/16/22    Disposition  SNF  Referral sent to [facility] [phone]  [address]  [address] [place]   [phone]  Patient will need authorization  [employer] [place]    Medical follow up   PCP and specialist    Contact  Donte Thomas  cell  [phone]    CECILIA talked with Chang Gallagher, at [facility], to inquire as to the status of acceptance to the facility when medically stable. She said medical updates will be needed after 2nd surgery is done tomorrow  (penile implant removal)  and the status of the need for IV ABX when discharged before a decision can be made for acceptance. CM on the floor will send medical updates via [facility] after surgery is completed and ID recommendations. Chang Gallagher said  the updated medicals  after surgery 12/16/22   and ID recommendations that are sent via [facility],Ground Floor will be reviewed-. She said will start on Monday 12/19/22 if patient accepted. Patient will be provided with 2nd medicare letter prior to discharge. Note:  CM sent medical updates today to the facility as the last medical updates were sent 12/9/22.

## 2022-12-15 NOTE — PROGRESS NOTES
Verbal shift change report given to Puja Murray RN (oncoming nurse) by Denise Julian RN (offgoing nurse). Report included the following information SBAR.

## 2022-12-15 NOTE — PROGRESS NOTES
[address] Adult  Hospitalist Group                                                                                          Hospitalist Progress Note  Reuben Pink MD  Answering service: [phone] from in house phone        Date of Service:  12/15/2022  NAME:  Lawrence Arias  :  1950  MRN:  [id number]      Admission Summary:   Lawrence Arias is a 71 y.o. male who presents with hematuria. 67-year-old  male with past medical history of atrial fibrillation on Xarelto, depression, diabetes, dyslipidemia, hypertension, CVA, VALENTE on CPAP, restless leg syndrome, penile implant, who comes in for the above. Patient is apparently sent over by his facility for hematuria of about 2 weeks duration with his penile implant not working anymore. In the ED, his penile implant was found to be protruding and Sexton catheter was inserted with pink-colored urine draining. He denies any fever, chills. He reports on and off pain in the penile area. He says that this is his second penile implant. The ER spoke to urology and they recommended antibiotics. Urology has seen in the ER and will plan for surgery on Monday. Of note, the patient has previous Pseudomonas in urine as well as Enterococcus. Initial recommendations were vancomycin and gentamicin, but at that time patient is not symptomatic or septic. The patient is not quite sure as to why he was sent to the hospital.  Per the ER note, the patient's neurologist had apparently spoken to the patient SNF and wanted him transferred here yesterday. I cannot find any medical record of the purpose of this device, the patient says that it was placed for sexual purposes. Interval history / Subjective:   Patient seen and examined earlier .  Plan for OR tomorrow to remove remnant, no acute complaints patient's friend was at bedside      Assessment & Plan:       Eroding penile implant, 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   Urology on board  Vancomycin and cefepime IV  S/p OR on 12/12 retention of prosthesis   Plan for OR 12/16 to remove remaining part of prosthesis  - ID consulted appreciate recs switch to IV Merrem continue vanco, further adjustments per ID     Hematuria  Monitor with H&H  No signs of active bleeding at this time  Will hold anticoagulation mostly due to surgery  Transfuse hemoglobin less than 7     Atrial fibrillation on Eliquis  Continue diltiazem and metoprolol  Holding Eliquis for now due to surgery   Cardiology consulted appreciate recs     Hypertension  Continue home medications     Diabetes  Sliding scale insulin     BPH  Continue home tamsulosin and finasteride     History of recent CVA with residual right-sided weakness  Has baseline  Monitor  Continue atorvastatin     Depression  Home Remeron    Left arm pain/left elbow pain  XRs  Unclear cause      Updated sister over the phone this afternoon    Code status: Full  Prophylaxis: Eliquis on hold  Care Plan discussed with: Patient, nurse  Anticipated Disposition: Greater than 48 hours, snf when ready      Hospital Problems  Date Reviewed: 10/12/2022            Codes Class Noted POA    Hematuria ICD-10-CM: R31.9  ICD-9-CM: 599.70  12/8/2022 Unknown        Penile implant failure [facility]) ICD-10-CM: T83.410A  ICD-9-CM: 996.76  12/8/2022 Unknown       Review of Systems:   A comprehensive review of systems was negative except for that written in the HPI. Vital Signs:    Last 24hrs VS reviewed since prior progress note.  Most recent are:  Visit Vitals  /73 (BP 1 Location: Right upper arm, BP Patient Position: At rest)   Pulse 75   Temp 98.1 °F (36.7 °C)   Resp 18   Ht 6' 3\" (1.905 m)   Wt 111.1 kg (245 lb)   SpO2 95%   BMI 30.62 kg/m²       No intake or output data in the 24 hours ending 12/15/22 1345       Physical Examination:     I had a face to face encounter with this patient and independently examined them on 12/15/2022 as outlined below:    General: Alert awake, no acute distress, resting in bed, pleasant male, appears to be stated age  [de-identified]: PEERL, EOMI, moist mucus membranes  Neck: Supple, no JVD, no meningeal signs  Chest: Clear to auscultation bilaterally   CVS: RRR, S1 S2 heard, no murmurs/rubs/gallops  Abd: Soft, non-tender, non-distended, +bowel sounds   Ext: No clubbing, no cyanosis, no edema  Neuro/Psych: Pleasant mood and affect, sensory grossly within normal limit, right lower extremity 1-2/5. Right upper extremity 0/5. Cap refill: Brisk, less than 3 seconds  Pulses: 2+, symmetric in all extremities  Skin: Warm, dry, without rashes or lesions  : Patient has a prosthetic that is protruding from the urethra. No blood at this time. Sexton in place with efe urine            Data Review:    Review and/or order of clinical lab test  Review and/or order of tests in the radiology section of CPT  Review and/or order of tests in the medicine section of CPT      Labs:     Recent Labs     12/15/22  0346 12/14/22  0444   WBC 8.2 8.3   HGB 8.7* 8.8*   HCT 27.7* 28.3*    237       Recent Labs     12/15/22  0346 12/14/22  0444 12/13/22  0329   * 136 135*   K 3.7 3.4* 3.9    105 105   CO2 27 25 23   BUN 13 14 12   CREA 0.40* 0.42* 0.41*   GLU 92 107* 82   CA 8.4* 8.4* 8.3*       No results for input(s): ALT, AP, TBIL, TBILI, TP, ALB, GLOB, GGT, AML, LPSE in the last 72 hours. No lab exists for component: SGOT, GPT, AMYP, HLPSE    No results for input(s): INR, PTP, APTT, INREXT, INREXT in the last 72 hours. No results for input(s): FE, TIBC, PSAT, FERR in the last 72 hours. No results found for: FOL, RBCF   No results for input(s): PH, PCO2, PO2 in the last 72 hours. No results for input(s): CPK, CKNDX, TROIQ in the last 72 hours.     No lab exists for component: CPKMB  Lab Results   Component Value Date/Time    Cholesterol, total 146 09/15/2022 02:40 AM    HDL Cholesterol 53 09/15/2022 02:40 AM    LDL, calculated 65 09/15/2022 02:40 AM Triglyceride 140 09/15/2022 02:40 AM    CHOL/HDL Ratio 2.8 09/15/2022 02:40 AM     Lab Results   Component Value Date/Time    Glucose (POC) 110 12/15/2022 11:24 AM    Glucose (POC) 98 12/15/2022 06:08 AM    Glucose (POC) 107 12/14/2022 09:10 PM    Glucose (POC) 101 12/14/2022 04:30 PM    Glucose (POC) 117 12/14/2022 11:11 AM     Lab Results   Component Value Date/Time    Color YELLOW/STRAW 10/18/2022 01:01 AM    Appearance TURBID (A) 10/18/2022 01:01 AM    Specific gravity 1.013 10/18/2022 01:01 AM    pH (UA) 6.5 10/18/2022 01:01 AM    Protein TRACE (A) 10/18/2022 01:01 AM    Glucose Negative 10/18/2022 01:01 AM    Ketone Negative 10/18/2022 01:01 AM    Bilirubin Negative 10/18/2022 01:01 AM    Urobilinogen 4.0 (H) 10/18/2022 01:01 AM    Nitrites Positive (A) 10/18/2022 01:01 AM    Leukocyte Esterase MODERATE (A) 10/18/2022 01:01 AM    Epithelial cells FEW 10/18/2022 01:01 AM    Bacteria 4+ (A) 10/18/2022 01:01 AM    WBC 20-50 10/18/2022 01:01 AM    RBC 0-5 10/18/2022 01:01 AM         Medications Reviewed:     Current Facility-Administered Medications   Medication Dose Route Frequency    meropenem (MERREM) 1 g in 0.9% sodium chloride (MBP/ADV) 50 mL MBP  1 g IntraVENous Q8H    meropenem (MERREM) 1 g in 0.9% sodium chloride 20 mL IV syringe  1 g IntraVENous ONCE    oxyCODONE IR (ROXICODONE) tablet 5 mg  5 mg Oral Q4H PRN    gabapentin (NEURONTIN) capsule 600 mg  600 mg Oral QHS    melatonin tablet 3 mg  3 mg Oral QHS    sodium chloride (NS) flush 5-40 mL  5-40 mL IntraVENous Q8H    sodium chloride (NS) flush 5-40 mL  5-40 mL IntraVENous PRN    acetaminophen (TYLENOL) tablet 650 mg  650 mg Oral Q6H PRN    Or    acetaminophen (TYLENOL) suppository 650 mg  650 mg Rectal Q6H PRN    polyethylene glycol (MIRALAX) packet 17 g  17 g Oral DAILY PRN    ondansetron (ZOFRAN ODT) tablet 4 mg  4 mg Oral Q8H PRN    Or    ondansetron (ZOFRAN) injection 4 mg  4 mg IntraVENous Q6H PRN    insulin lispro (HUMALOG) injection SubCUTAneous AC&HS    glucose chewable tablet 16 g  4 Tablet Oral PRN    glucagon (GLUCAGEN) injection 1 mg  1 mg IntraMUSCular PRN    dextrose 10 % infusion 0-250 mL  0-250 mL IntraVENous PRN    allopurinoL (ZYLOPRIM) tablet 100 mg  100 mg Oral DAILY    atorvastatin (LIPITOR) tablet 80 mg  80 mg Oral QHS    dilTIAZem IR (CARDIZEM) tablet 60 mg  60 mg Oral Q8H    famotidine (PEPCID) tablet 20 mg  20 mg Oral Q12H    finasteride (PROSCAR) tablet 5 mg  5 mg Oral DAILY    metoprolol succinate (TOPROL-XL) XL tablet 75 mg  75 mg Oral DAILY    tamsulosin (FLOMAX) capsule 0.4 mg  0.4 mg Oral DAILY    mirtazapine (REMERON) tablet 7.5 mg  7.5 mg Oral QHS    Vancomycin - Pharmacy to Dose   Other Rx Dosing/Monitoring    vancomycin (VANCOCIN) 1,250 mg in 0.9% sodium chloride 250 mL ([id number])  1,250 mg IntraVENous Q12H     ______________________________________________________________________  EXPECTED LENGTH OF STAY: 5d 21h  ACTUAL LENGTH OF STAY:          7                 Chyna Mejia MD

## 2022-12-15 NOTE — PROGRESS NOTES
Patient: Everardo France MRN: [id number]  SSN: [id number]    YOB: 1950  Age: 71 y.o. Sex: male        ADMITTED: 2022 to Cristiana Georges MD by Ally Fall MD for Hematuria [R31.9]  Penile implant failure ([address] [Z34.067I]  POD# 3 Days Post-Op Procedure(s):  REMOVAL PENILE PROSTHESIS    Everardo France is doing fair . Discussed schedule surgery for tomorrow morning patient is agreeable to procedure Vazquez draining clear yellow urine. Orders in for n.p.o. tonight    Vitals: Temp (24hrs), Av.1 °F (36.7 °C), Min:97.6 °F (36.4 °C), Max:98.4 °F (36.9 °C)    Blood pressure 124/75, pulse 85, temperature 98.4 °F (36.9 °C), resp. rate 20, height 6' 3\" (1.905 m), weight 111.1 kg (245 lb), SpO2 95 %. Intake and Output:   1901 - 12/15 0700  In: -   Out: 925 [Urine:925]  No intake/output data recorded. JESSICA Output lats 24 hrs: No data found. JESSICA Output last 8 hrs: No data found. BM over last 24 hrs: No data found. Exam:   Gen: NAD  CV: extremities well perfused  Lungs: nonlabored respirations.  Symmetric chest expansion  Ext: no edema  Abdomen: soft NTND no suprapubic tenderness   : scant purulent penile discharge, vazquez w/ yellow UA, scrotal midline incision C/D/I      Labs:  CBC:   Lab Results   Component Value Date/Time    WBC 8.2 12/15/2022 03:46 AM    HCT 27.7 (L) 12/15/2022 03:46 AM    PLATELET 486  03:46 AM     BMP:   Lab Results   Component Value Date/Time    Glucose 92 12/15/2022 03:46 AM    Sodium 134 (L) 12/15/2022 03:46 AM    Potassium 3.7 12/15/2022 03:46 AM    Chloride 103 12/15/2022 03:46 AM    CO2 27 12/15/2022 03:46 AM    BUN 13 12/15/2022 03:46 AM    Creatinine 0.40 (L) 12/15/2022 03:46 AM    Calcium 8.4 (L) 12/15/2022 03:46 AM     Cultures:   Results       Procedure Component Value Units Date/Time    CULTURE, ANAEROBIC [[id number]] Collected: 22 1454    Order Status: Completed Specimen: Wound Updated: 22 1037     Special Requests: Marlea Goltz PROSTHESIS     Culture result: NO ANAEROBES ISOLATED       CULTURE, WOUND Ev Kelsey STAIN [[id number]]  (Abnormal)  (Susceptibility) Collected: 12/12/22 1416    Order Status: Completed Specimen: Wound from Perineal Updated: 12/15/22 0613     Special Requests: NO SPECIAL REQUESTS        GRAM STAIN 2+ WBCS SEEN         NO ORGANISMS SEEN        Culture result:       LIGHT PSEUDOMONAS AERUGINOSA PIP/VIVEK KB=25 SENSITIVE                  LIGHT PSEUDOMONAS AERUGINOSA (2ND COLONY TYPE/STRAIN)                  LIGHT ENTEROCOCCUS FAECALIS          Susceptibility        Pseudomonas aeruginosa (1)      ALIE      Amikacin ($) Susceptible      Cefepime ($$) Susceptible      Ceftazidime ($) Susceptible      Ciprofloxacin ($) Susceptible      Gentamicin ($) Susceptible      Levofloxacin ($) Intermediate      Meropenem ($$) Susceptible      Tobramycin ($) Susceptible                       Susceptibility        Pseudomonas aeruginosa (2)      ALIE      Amikacin ($) Susceptible      Cefepime ($$) Susceptible      Ceftazidime ($) Susceptible      Ciprofloxacin ($) Intermediate  [1]       Gentamicin ($) Susceptible      Levofloxacin ($) Intermediate  [1]       Meropenem ($$) Susceptible      Piperacillin/Tazobac ($) Susceptible      Tobramycin ($) Susceptible                   [1]  **FDA INTERPRETATION REFLECTED, REFER TO CLSI FOR ALTERNATE INTERPRETATIONS. **               Susceptibility        Enterococcus faecalis      ALIE      Ampicillin ($) Susceptible      Daptomycin ($$$$$) Susceptible      Linezolid ($$$$$) Susceptible      Vancomycin ($) Susceptible                                   Imaging: N/A  Assessment/Plan:   Continue plan as stated below nothing to add    1. Planning to return to the OR for removal of retained prosthesis on 12/16/22 at 10:30 with Dr. Willy Lay. Reviewed with the patient and he is agreeable to proceed. Continue to hold [facility] at this time. - Preliminary Wound Cx growing probable pseudomonas & enterococcus. Continue empiric antibiotics given the purulence drained upon removing the device. Follow up final C/S.    - The catheter is to remain at least for 2 to 3 weeks until the urethra can close up.     - Daily wound care   D/w Dr Salinas Nose By: Akilah Bentley.  Danica Valle, NP - December 15, 2022

## 2022-12-15 NOTE — CONSULTS
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 Patient was taken to the operating room on 12/12/2022 with removal of penile prosthesis done for erosion of penile implant. Cultures were sent of the wound with cultures positive for Pseudomonas and Enterococcus. Per operative note, \" After opening the capsule of the pump, purulent flow was seen, and it was swabbed and sent for culture\". Patient is on IV vancomycin. It appears that he was on IV cefepime earlier this admission. I received a consult request today for antibiotic recommendations. Patient is somewhat of a poor historian. He tells me he had pain for a couple of months in the penile area but not able to elaborate on this further. He denied any fevers chills night sweats weight loss to his knowledge. He denies any cough upper respiratory symptoms when asked. He denies any nausea vomiting diarrhea when asked. He denies any pain. He denies rash. He denies any headache. He denies any focal myalgia or arthralgia when asked.             Past Medical History:  Past Medical History:   Diagnosis Date    Arrhythmia     atrial fib    Depression 4/24/2010    Diabetes ([address]     diet control for pre-diabetes    Dyslipidemia, goal LDL below 100 6/14/2011    Gout     HTN (hypertension) 4/24/2010    Impotence 4/24/2010    Morbid obesity ([address] 5/17/2010    VALENTE (obstructive sleep apnea) 4/24/2010    CPAP    Restless legs 4/15/2015         Surgical History:  Past Surgical History:   Procedure Laterality Date    COLONOSCOPY N/A 6/27/2017    COLONOSCOPY performed by Charmayne Breslow, MD at [facility]COPY    ENDOSCOPY, COLON, DIAGNOSTIC  2007    HX APPENDECTOMY      HX ORTHOPAEDIC  2000    bilat TKR     HX ORTHOPAEDIC  2010    left THR    HX UROLOGICAL  03/2018    urolift    HX UROLOGICAL  03/2019    prosthesis         Family History:   Family History   Problem Relation Age of Onset    Cancer Father         leukemia    Cancer Paternal Grandfather     Diabetes Sister     Diabetes Brother     Cancer Maternal Aunt     Cancer Maternal Uncle          Social History:     Living Situation: From facility  Tobacco: Patient denied  Alcohol: Patient denied  Illicit Drugs: Patient denied  Sexual History: Patient denied  Travel History patient denied      Allergies:  No Known Allergies      Review of Systems:  10 point review of systems per HPI. All others negative      Medications:  No current facility-administered medications on file prior to encounter. Current Outpatient Medications on File Prior to Encounter   Medication Sig Dispense Refill    dilTIAZem ER (CARDIZEM SR) 60 mg capsule Take 60 mg by mouth every eight (8) hours. buPROPion SR (WELLBUTRIN SR) 100 mg SR tablet Take 100 mg by mouth two (2) times a day. ferrous sulfate 325 mg (65 mg iron) tablet Take 325 mg by mouth two (2) times a day. magnesium oxide (MAG-OX) 400 mg tablet Take 400 mg by mouth nightly. temazepam (RESTORIL) 7.5 mg capsule Take 7.5 mg by mouth nightly. traMADoL (ULTRAM) 50 mg tablet Take 50 mg by mouth every six to eight (6-8) hours as needed for Pain. finasteride (PROSCAR) 5 mg tablet Take 1 Tablet by mouth daily. 30 Tablet 0    melatonin 3 mg tablet Take 1 Tablet by mouth nightly as needed for Insomnia. 30 Tablet 0    metoprolol succinate (TOPROL-XL) 25 mg XL tablet Take 3 Tablets by mouth daily.  30 Tablet 0    ondansetron (ZOFRAN ODT) 4 mg disintegrating tablet Take 1 Tablet by mouth every eight (8) hours as needed for Nausea or Vomiting. 30 Tablet 0    gabapentin (NEURONTIN) 300 mg capsule Take 1 Capsule by mouth nightly. Max Daily Amount: 300 mg. 30 Capsule 0    allopurinoL (ZYLOPRIM) 100 mg tablet TAKE 1 TABLET BY MOUTH EVERY DAY 90 Tablet 1    apixaban (ELIQUIS) 5 mg tablet Take 1 Tablet by mouth two (2) times a day. 60 Tablet 0    atorvastatin (LIPITOR) 80 mg tablet Take 1 Tablet by mouth nightly. 30 Tablet 0    balsam peru-castor oiL (VENELEX) ointment Apply  to affected area two (2) times a day. APPLY TO all bony prominences, abrasions and ecchymosis to right side, posterior head Nursing, document site in comments 5 g 0    docusate sodium (COLACE) 100 mg capsule Take 1 Capsule by mouth two (2) times daily as needed for Constipation for up to 90 days. 60 Capsule 0    famotidine (PEPCID) 20 mg tablet Take 1 Tablet by mouth every twelve (12) hours. 30 Tablet 0    tamsulosin (FLOMAX) 0.4 mg capsule Take 1 Capsule by mouth daily. 30 Capsule 0    mirtazapine (REMERON) 7.5 mg tablet Take 1 Tablet by mouth nightly.  30 Tablet 0           Physical Exam:    Vitals: Patient Vitals for the past 24 hrs:   Temp Pulse Resp BP SpO2   12/15/22 1200 -- 75 -- -- --   12/15/22 1100 -- 80 -- -- --   12/15/22 0913 98.1 °F (36.7 °C) 83 18 112/73 95 %   12/15/22 0559 -- 85 -- -- --   12/15/22 0358 98.4 °F (36.9 °C) 83 20 124/75 95 %   12/15/22 0354 -- 91 -- -- --   12/15/22 0200 -- 81 -- -- --   12/14/22 2200 -- 76 -- -- --   12/14/22 2000 -- 77 -- -- --   12/14/22 1930 98.3 °F (36.8 °C) 76 18 108/67 95 %   12/14/22 1800 -- 81 -- -- --   12/14/22 1510 97.6 °F (36.4 °C) 62 18 114/67 96 %   12/14/22 1400 -- 63 -- -- --     GEN: NAD  HEENT:  no scleral icterus,   no thrush  CV: S1, S2 heard regularly,   Lungs: Clear to auscultation bilaterally  Abdomen: soft, non tender,   Genitourinary: Dressing over genital area,   vazquez  Extremities: no edema  Neuro: Alert, oriented to self, situation,  Skin: no rash   Psych: non tearful   MSK no erythema noted knees, ankles b/l         Labs:   Recent Results (from the past 24 hour(s))   GLUCOSE, POC    Collection Time: 12/14/22  4:30 PM   Result Value Ref Range    Glucose (POC) 101 65 - 117 mg/dL    Performed by Susan MYERS    GLUCOSE, POC    Collection Time: 12/14/22  9:10 PM   Result Value Ref Range    Glucose (POC) 107 65 - 117 mg/dL    Performed by [address] [place] [address], [facility]    Collection Time: 12/15/22  3:46 AM   Result Value Ref Range    Sodium 134 (L) 136 - 145 mmol/L    Potassium 3.7 3.5 - 5.1 mmol/L    Chloride 103 97 - 108 mmol/L    CO2 27 21 - 32 mmol/L    Anion gap 4 (L) 5 - 15 mmol/L    Glucose 92 65 - 100 mg/dL    BUN 13 6 - 20 MG/DL    Creatinine 0.40 (L) 0.70 - 1.30 MG/DL    BUN/Creatinine ratio 33 (H) 12 - 20      eGFR >60 >60 ml/min/1.73m2    Calcium 8.4 (L) 8.5 - 10.1 MG/DL   CBC WITH AUTOMATED DIFF    Collection Time: 12/15/22  3:46 AM   Result Value Ref Range    WBC 8.2 4.1 - 11.1 K/uL    RBC 3.14 (L) 4.10 - 5.70 M/uL    HGB 8.7 (L) 12.1 - 17.0 g/dL    HCT 27.7 (L) 36.6 - 50.3 %    MCV 88.2 80.0 - 99.0 FL    MCH 27.7 26.0 - 34.0 PG    MCHC 31.4 30.0 - 36.5 g/dL    RDW 15.4 (H) 11.5 - 14.5 %    PLATELET 635 772 - 075 K/uL    MPV 10.5 8.9 - 12.9 FL    NRBC 0.0 0  WBC    ABSOLUTE NRBC 0.00 0.00 - 0.01 K/uL    NEUTROPHILS 63 32 - 75 %    LYMPHOCYTES 20 12 - 49 %    MONOCYTES 11 5 - 13 %    EOSINOPHILS 5 0 - 7 %    BASOPHILS 0 0 - 1 %    IMMATURE GRANULOCYTES 1 (H) 0.0 - 0.5 %    ABS. NEUTROPHILS 5.1 1.8 - 8.0 K/UL    ABS. LYMPHOCYTES 1.7 0.8 - 3.5 K/UL    ABS. MONOCYTES 0.9 0.0 - 1.0 K/UL    ABS. EOSINOPHILS 0.4 0.0 - 0.4 K/UL    ABS. BASOPHILS 0.0 0.0 - 0.1 K/UL    ABS. IMM.  GRANS. 0.1 (H) 0.00 - 0.04 K/UL    DF AUTOMATED     GLUCOSE, POC    Collection Time: 12/15/22  6:08 AM   Result Value Ref Range    Glucose (POC) 98 65 - 117 mg/dL    Performed by Gloria Arambula    GLUCOSE, POC Collection Time: 12/15/22 11:24 AM   Result Value Ref Range    Glucose (POC) 110 65 - 117 mg/dL    Performed by Sandra Zamarripa        Microbiology Data:          12/12/22  CULTURE, Wendy Ramos STAIN [[id number]] ([id number])  Microbiology  Date: 12/12/2022 Department: [facility] Released By: Cristina Schulte RN (auto-released) Authorizing: Phuc Dietrich NP      Contains abnormal data CULTUREFrances  Order: [id number]  Collected 12/12/2022 14:54    Status: Final result    Specimen Information: Perineal; Wound   0 Result Notes  Component Ref Range & Units 12/12/22 1454    Special Requests:   NO SPECIAL REQUESTS    GRAM STAIN   2+ WBCS SEEN    GRAM STAIN   NO ORGANISMS SEEN    Culture result:   LIGHT PSEUDOMONAS AERUGINOSA PIP/VIVEK KB=25 SENSITIVE Abnormal     Culture result:   LIGHT PSEUDOMONAS AERUGINOSA (2ND COLONY TYPE/STRAIN) Abnormal     Culture result:   LIGHT ENTEROCOCCUS FAECALIS Abnormal     Resulting Agency  [address]        Susceptibility     Pseudomonas aeruginosa (1) Pseudomonas aeruginosa (2) Enterococcus faecalis     ALIE ALIE ALIE     Amikacin ($) Susceptible Susceptible      Ampicillin ($)   Susceptible     Cefepime ($$) Susceptible Susceptible      Ceftazidime ($) Susceptible Susceptible      Ciprofloxacin ($) Susceptible Intermediate 1      Daptomycin ($$$$$)   Susceptible     Gentamicin ($) Susceptible Susceptible      Levofloxacin ($) Intermediate Intermediate 1      Linezolid ($$$$$)   Susceptible     Meropenem ($$) Susceptible Susceptible      Piperacillin/Tazobac ($)  Susceptible      Tobramycin ($) Susceptible Susceptible      Vancomycin ($)   Susceptible                  1 **FDA INTERPRETATION REFLECTED, REFER TO CLSI FOR ALTERNATE INTERPRETATIONS.**            Specimen Collected: 12/12/22 14:54 Last Resulted: 12/15/22 06:48       Order Details     Lab and Collection Details     Routing     Result History     View Encounter Conversation           Result Care Coordination      Patient Communication     Add Comments   Not seen Back to Top           Susceptibility    Pseudomonas aeruginosa (1)    Antibiotic Interpretation Value  Method Comment   Amikacin ($) Susceptible <=2 ug/mL ALIE    Ceftazidime ($) Susceptible 8 ug/mL ALIE    Cefepime ($$) Susceptible 8 ug/mL ALIE    Ciprofloxacin ($) Susceptible 1 ug/mL ALIE    Gentamicin ($) Susceptible <=1 ug/mL ALIE    Levofloxacin ($) Intermediate 4 ug/mL ALIE    Meropenem ($$) Susceptible 1 ug/mL ALIE    Tobramycin ($) Susceptible <=1 ug/mL ALIE      Pseudomonas aeruginosa (2)    Antibiotic Interpretation Value  Method Comment   Amikacin ($) Susceptible <=2 ug/mL ALIE    Ceftazidime ($) Susceptible 2 ug/mL ALIE    Cefepime ($$) Susceptible 8 ug/mL ALIE    Ciprofloxacin ($) Intermediate 2 ug/mL ALIE **FDA INTERPRETATION REFLECTED, REFER TO CLSI FOR ALTERNATE INTERPRETATIONS.**   Gentamicin ($) Susceptible <=1 ug/mL ALIE    Levofloxacin ($) Intermediate 4 ug/mL ALIE **FDA INTERPRETATION REFLECTED, REFER TO CLSI FOR ALTERNATE INTERPRETATIONS.**   Meropenem ($$) Susceptible <=0.25 ug/mL ALIE    Piperacillin/Tazobac ($) Susceptible 8 ug/mL ALIE    Tobramycin ($) Susceptible <=1 ug/mL ALIE      Enterococcus faecalis    Antibiotic Interpretation Value  Method Comment   Ampicillin ($) Susceptible <=2 ug/mL ALIE    Daptomycin ($$$$$) Susceptible 2 ug/mL ALIE    Linezolid ($$$$$) Susceptible 2 ug/mL ALIE    Vancomycin ($) Susceptible 1 ug/mL ALIE     Condensed View         Urine    Date: 10/18/2022 Department: [facility] Released By:  (auto-released) Authorizing: Eugenio Sevilla MD      Contains abnormal data CULTURE, URINE  Order: [id number]  Collected 10/18/2022 01:01    Status: Final result    Specimen Information: Urine   0 Result Notes  Component Ref Range & Units 10/18/22 0101    Special Requests:   NO SPECIAL REQUESTS   Reflexed from [id number]    [place] Count   >100,000   COLONIES/mL    Culture result:   ENTEROCOCCUS FAECALIS (PREDOMINATING) Abnormal     Culture result:   PSEUDOMONAS AERUGINOSA Abnormal     Resulting Agency  [address]        Susceptibility     Enterococcus faecalis Pseudomonas aeruginosa     ALIE ALIE     Amikacin ($)  Susceptible     Ampicillin ($) Susceptible      Cefepime ($$)  Susceptible     Ceftazidime ($)  Susceptible     Ciprofloxacin ($) Susceptible Susceptible     Daptomycin ($$$$$) Susceptible      Gentamicin ($)  Susceptible     Levofloxacin ($) Susceptible Susceptible     Linezolid ($$$$$) Susceptible      Meropenem ($$)  Susceptible     Nitrofurantoin Susceptible      Piperacillin/Tazobac ($)  Susceptible     Tetracycline Susceptible      Tobramycin ($)  Susceptible     Vancomycin ($) Susceptible                  Pathology Results: 6/27/17  Large bowel, rectum, biopsy:        Tubular adenoma    Imaging:     CT PELV WO CONT: Patient Communication     Add Comments   Not seen     Study Result    Narrative & Impression   EXAM: CT PELV WO CONT     INDICATION: evaluate for retained rear tip extender of right proximal corpora  from removed penile implant. COMPARISON: CT dated 12/8/2022. CONTRAST: 100 mL of Isovue-370. TECHNIQUE:   Retained root tip extenders noted in the right pelvic soft tissues. The patient  has a Sexton catheter in place. There is diffuse inflammation and edema involving  the scrotum and penis. Multiple clips are noted around the prostate. The bladder  is decompressed with Sexton catheter. There is a small amount of fluid in the  right humeral canal. The remainder of the penile prosthesis is been removed. Visualized bowel is unremarkable. Visualized osseous structures are unremarkable  with exception of the left hip arthroplasty. IMPRESSION  Status post explant of the penile prosthesis. Retained right  posterior tibiofibular is present.  Diffuse inflammation and edema of the scrotum  and penis       Procedures:   12/12/22REMOVAL PENILE PROSTHESIS      Assessment / Plan:       Mr Fabio Nugent is a 60-year-old gentleman with a history of DM, atrial fibrillation, hypertension, obstructive sleep apnea, history of penile prosthesis, recent large left MCA infarct in September 2022, treated Sexton catheter related UTI in October 2022, who presented to the hospital on 12/8/2022 from [facility] for hematuria and found to have penile implant protruding through meatus per urology notes. Patient was taken to the operating room on 12/12/2022 with removal of penile prosthesis done for erosion of penile implant. Per operative note, \" After opening the capsule of the pump, purulent flow was seen, and it was swabbed and sent for culture\". 1) penile implant erosion, infection   Cultures with Pseudomonas and E faecalis   Zosyn ALIE not reported to one strain Pseudomonas yet   Cefepime ALIE 8 ( Pseudomonas)   Given there is concern for indwelling retained hardware with plans for removal on 12/16/2022, would recommend meropenem IV pending removal of all retained hardware and source control  Check blood cultures if febrile > 100.3 hypotension or clinical worsening  Continue IV vancomycin renally dosed  Final duration of antibiotics to be determined based on course   Unfortunately not very quinolone susceptible for eventual p.o. antibiotics    2)CVA  3) DM  4) afib   5) VALENTE  6) HTN  7) Obesity       Thank for the opportunity to participate in the care of this patient. Please contact with questions or concerns.      Laura Kwan,   1:30 PM

## 2022-12-16 ENCOUNTER — ANESTHESIA EVENT (OUTPATIENT)
Dept: SURGERY | Age: 72
DRG: 674 | End: 2022-12-16
Payer: MEDICARE

## 2022-12-16 ENCOUNTER — ANESTHESIA (OUTPATIENT)
Dept: SURGERY | Age: 72
DRG: 674 | End: 2022-12-16
Payer: MEDICARE

## 2022-12-16 LAB
ANION GAP SERPL CALC-SCNC: 7 MMOL/L (ref 5–15)
BASOPHILS # BLD: 0 K/UL (ref 0–0.1)
BASOPHILS NFR BLD: 0 % (ref 0–1)
BUN SERPL-MCNC: 11 MG/DL (ref 6–20)
BUN/CREAT SERPL: 35 (ref 12–20)
CALCIUM SERPL-MCNC: 8.3 MG/DL (ref 8.5–10.1)
CHLORIDE SERPL-SCNC: 101 MMOL/L (ref 97–108)
CO2 SERPL-SCNC: 25 MMOL/L (ref 21–32)
CREAT SERPL-MCNC: 0.31 MG/DL (ref 0.7–1.3)
DIFFERENTIAL METHOD BLD: ABNORMAL
EOSINOPHIL # BLD: 0.4 K/UL (ref 0–0.4)
EOSINOPHIL NFR BLD: 5 % (ref 0–7)
ERYTHROCYTE [DISTWIDTH] IN BLOOD BY AUTOMATED COUNT: 15.4 % (ref 11.5–14.5)
GLUCOSE BLD STRIP.AUTO-MCNC: 105 MG/DL (ref 65–117)
GLUCOSE BLD STRIP.AUTO-MCNC: 136 MG/DL (ref 65–117)
GLUCOSE BLD STRIP.AUTO-MCNC: 85 MG/DL (ref 65–117)
GLUCOSE BLD STRIP.AUTO-MCNC: 92 MG/DL (ref 65–117)
GLUCOSE SERPL-MCNC: 85 MG/DL (ref 65–100)
HCT VFR BLD AUTO: 28.1 % (ref 36.6–50.3)
HGB BLD-MCNC: 8.8 G/DL (ref 12.1–17)
IMM GRANULOCYTES # BLD AUTO: 0.1 K/UL (ref 0–0.04)
IMM GRANULOCYTES NFR BLD AUTO: 1 % (ref 0–0.5)
LYMPHOCYTES # BLD: 1.8 K/UL (ref 0.8–3.5)
LYMPHOCYTES NFR BLD: 19 % (ref 12–49)
MCH RBC QN AUTO: 27.9 PG (ref 26–34)
MCHC RBC AUTO-ENTMCNC: 31.3 G/DL (ref 30–36.5)
MCV RBC AUTO: 89.2 FL (ref 80–99)
MONOCYTES # BLD: 1.2 K/UL (ref 0–1)
MONOCYTES NFR BLD: 13 % (ref 5–13)
NEUTS SEG # BLD: 5.9 K/UL (ref 1.8–8)
NEUTS SEG NFR BLD: 62 % (ref 32–75)
NRBC # BLD: 0 K/UL (ref 0–0.01)
NRBC BLD-RTO: 0 PER 100 WBC
PLATELET # BLD AUTO: 277 K/UL (ref 150–400)
PMV BLD AUTO: 10.2 FL (ref 8.9–12.9)
POTASSIUM SERPL-SCNC: 3.6 MMOL/L (ref 3.5–5.1)
RBC # BLD AUTO: 3.15 M/UL (ref 4.1–5.7)
SERVICE CMNT-IMP: ABNORMAL
SERVICE CMNT-IMP: NORMAL
SODIUM SERPL-SCNC: 133 MMOL/L (ref 136–145)
WBC # BLD AUTO: 9.5 K/UL (ref 4.1–11.1)

## 2022-12-16 PROCEDURE — 82962 GLUCOSE BLOOD TEST: CPT

## 2022-12-16 PROCEDURE — 74011000250 HC RX REV CODE- 250: Performed by: NURSE ANESTHETIST, CERTIFIED REGISTERED

## 2022-12-16 PROCEDURE — 77030008684 HC TU ET CUF COVD -B: Performed by: ANESTHESIOLOGY

## 2022-12-16 PROCEDURE — 80048 BASIC METABOLIC PNL TOTAL CA: CPT

## 2022-12-16 PROCEDURE — 74011250636 HC RX REV CODE- 250/636: Performed by: NURSE ANESTHETIST, CERTIFIED REGISTERED

## 2022-12-16 PROCEDURE — 74011000250 HC RX REV CODE- 250: Performed by: STUDENT IN AN ORGANIZED HEALTH CARE EDUCATION/TRAINING PROGRAM

## 2022-12-16 PROCEDURE — 65270000046 HC RM TELEMETRY

## 2022-12-16 PROCEDURE — 77030013079 HC BLNKT BAIR HGGR 3M -A: Performed by: ANESTHESIOLOGY

## 2022-12-16 PROCEDURE — 74011000258 HC RX REV CODE- 258: Performed by: NURSE ANESTHETIST, CERTIFIED REGISTERED

## 2022-12-16 PROCEDURE — 74011250637 HC RX REV CODE- 250/637: Performed by: STUDENT IN AN ORGANIZED HEALTH CARE EDUCATION/TRAINING PROGRAM

## 2022-12-16 PROCEDURE — 85025 COMPLETE CBC W/AUTO DIFF WBC: CPT

## 2022-12-16 PROCEDURE — 74011000258 HC RX REV CODE- 258: Performed by: INTERNAL MEDICINE

## 2022-12-16 PROCEDURE — 74011250636 HC RX REV CODE- 250/636: Performed by: STUDENT IN AN ORGANIZED HEALTH CARE EDUCATION/TRAINING PROGRAM

## 2022-12-16 PROCEDURE — 0VPS0JZ REMOVAL OF SYNTHETIC SUBSTITUTE FROM PENIS, OPEN APPROACH: ICD-10-PCS | Performed by: UROLOGY

## 2022-12-16 PROCEDURE — 74011250636 HC RX REV CODE- 250/636: Performed by: INTERNAL MEDICINE

## 2022-12-16 PROCEDURE — 36415 COLL VENOUS BLD VENIPUNCTURE: CPT

## 2022-12-16 PROCEDURE — 77030021678 HC GLIDESCP STAT DISP VERT -B: Performed by: ANESTHESIOLOGY

## 2022-12-16 PROCEDURE — 77030026438 HC STYL ET INTUB CARD -A: Performed by: ANESTHESIOLOGY

## 2022-12-16 RX ORDER — PROPOFOL 10 MG/ML
INJECTION, EMULSION INTRAVENOUS AS NEEDED
Status: DISCONTINUED | OUTPATIENT
Start: 2022-12-16 | End: 2022-12-16 | Stop reason: HOSPADM

## 2022-12-16 RX ORDER — PHENYLEPHRINE HCL IN 0.9% NACL 0.4MG/10ML
SYRINGE (ML) INTRAVENOUS AS NEEDED
Status: DISCONTINUED | OUTPATIENT
Start: 2022-12-16 | End: 2022-12-16 | Stop reason: HOSPADM

## 2022-12-16 RX ORDER — ONDANSETRON 2 MG/ML
INJECTION INTRAMUSCULAR; INTRAVENOUS AS NEEDED
Status: DISCONTINUED | OUTPATIENT
Start: 2022-12-16 | End: 2022-12-16 | Stop reason: HOSPADM

## 2022-12-16 RX ORDER — SODIUM CHLORIDE, SODIUM LACTATE, POTASSIUM CHLORIDE, CALCIUM CHLORIDE 600; 310; 30; 20 MG/100ML; MG/100ML; MG/100ML; MG/100ML
INJECTION, SOLUTION INTRAVENOUS
Status: DISCONTINUED | OUTPATIENT
Start: 2022-12-16 | End: 2022-12-16 | Stop reason: HOSPADM

## 2022-12-16 RX ORDER — LIDOCAINE HYDROCHLORIDE 20 MG/ML
INJECTION, SOLUTION EPIDURAL; INFILTRATION; INTRACAUDAL; PERINEURAL AS NEEDED
Status: DISCONTINUED | OUTPATIENT
Start: 2022-12-16 | End: 2022-12-16 | Stop reason: HOSPADM

## 2022-12-16 RX ORDER — MIDAZOLAM HYDROCHLORIDE 1 MG/ML
INJECTION, SOLUTION INTRAMUSCULAR; INTRAVENOUS AS NEEDED
Status: DISCONTINUED | OUTPATIENT
Start: 2022-12-16 | End: 2022-12-16 | Stop reason: HOSPADM

## 2022-12-16 RX ORDER — FENTANYL CITRATE 50 UG/ML
INJECTION, SOLUTION INTRAMUSCULAR; INTRAVENOUS AS NEEDED
Status: DISCONTINUED | OUTPATIENT
Start: 2022-12-16 | End: 2022-12-16 | Stop reason: HOSPADM

## 2022-12-16 RX ORDER — GLYCOPYRROLATE 0.2 MG/ML
INJECTION INTRAMUSCULAR; INTRAVENOUS AS NEEDED
Status: DISCONTINUED | OUTPATIENT
Start: 2022-12-16 | End: 2022-12-16 | Stop reason: HOSPADM

## 2022-12-16 RX ORDER — NEOSTIGMINE METHYLSULFATE 1 MG/ML
INJECTION, SOLUTION INTRAVENOUS AS NEEDED
Status: DISCONTINUED | OUTPATIENT
Start: 2022-12-16 | End: 2022-12-16 | Stop reason: HOSPADM

## 2022-12-16 RX ORDER — SUCCINYLCHOLINE CHLORIDE 20 MG/ML
INJECTION INTRAMUSCULAR; INTRAVENOUS AS NEEDED
Status: DISCONTINUED | OUTPATIENT
Start: 2022-12-16 | End: 2022-12-16 | Stop reason: HOSPADM

## 2022-12-16 RX ORDER — ROCURONIUM BROMIDE 10 MG/ML
INJECTION, SOLUTION INTRAVENOUS AS NEEDED
Status: DISCONTINUED | OUTPATIENT
Start: 2022-12-16 | End: 2022-12-16 | Stop reason: HOSPADM

## 2022-12-16 RX ORDER — EPHEDRINE SULFATE/0.9% NACL/PF 50 MG/5 ML
SYRINGE (ML) INTRAVENOUS AS NEEDED
Status: DISCONTINUED | OUTPATIENT
Start: 2022-12-16 | End: 2022-12-16 | Stop reason: HOSPADM

## 2022-12-16 RX ADMIN — SUCCINYLCHOLINE CHLORIDE 160 MG: 20 INJECTION, SOLUTION INTRAMUSCULAR; INTRAVENOUS at 10:39

## 2022-12-16 RX ADMIN — FENTANYL CITRATE 50 MCG: 50 INJECTION INTRAMUSCULAR; INTRAVENOUS at 10:59

## 2022-12-16 RX ADMIN — ROCURONIUM BROMIDE 20 MG: 10 SOLUTION INTRAVENOUS at 11:02

## 2022-12-16 RX ADMIN — MEROPENEM 1 G: 1 INJECTION, POWDER, FOR SOLUTION INTRAVENOUS at 05:49

## 2022-12-16 RX ADMIN — Medication 10 MG: at 11:02

## 2022-12-16 RX ADMIN — OXYCODONE 5 MG: 5 TABLET ORAL at 21:34

## 2022-12-16 RX ADMIN — VASOPRESSIN 1 UNITS: 20 INJECTION INTRAVENOUS at 11:11

## 2022-12-16 RX ADMIN — VASOPRESSIN 1 UNITS: 20 INJECTION INTRAVENOUS at 11:07

## 2022-12-16 RX ADMIN — VANCOMYCIN HYDROCHLORIDE 1250 MG: 1.25 INJECTION, POWDER, LYOPHILIZED, FOR SOLUTION INTRAVENOUS at 03:35

## 2022-12-16 RX ADMIN — SODIUM CHLORIDE, PRESERVATIVE FREE 10 ML: 5 INJECTION INTRAVENOUS at 16:55

## 2022-12-16 RX ADMIN — ROCURONIUM BROMIDE 10 MG: 10 SOLUTION INTRAVENOUS at 10:39

## 2022-12-16 RX ADMIN — SODIUM CHLORIDE, POTASSIUM CHLORIDE, SODIUM LACTATE AND CALCIUM CHLORIDE: 600; 310; 30; 20 INJECTION, SOLUTION INTRAVENOUS at 10:27

## 2022-12-16 RX ADMIN — MIRTAZAPINE 7.5 MG: 15 TABLET, FILM COATED ORAL at 21:35

## 2022-12-16 RX ADMIN — ATORVASTATIN CALCIUM 80 MG: 40 TABLET, FILM COATED ORAL at 21:35

## 2022-12-16 RX ADMIN — FAMOTIDINE 20 MG: 20 TABLET, FILM COATED ORAL at 21:35

## 2022-12-16 RX ADMIN — MEROPENEM 1 G: 1 INJECTION, POWDER, FOR SOLUTION INTRAVENOUS at 14:12

## 2022-12-16 RX ADMIN — LIDOCAINE HYDROCHLORIDE 60 MG: 20 INJECTION, SOLUTION EPIDURAL; INFILTRATION; INTRACAUDAL; PERINEURAL at 10:39

## 2022-12-16 RX ADMIN — MIDAZOLAM 1 MG: 1 INJECTION INTRAMUSCULAR; INTRAVENOUS at 10:28

## 2022-12-16 RX ADMIN — DILTIAZEM HYDROCHLORIDE 60 MG: 60 TABLET, FILM COATED ORAL at 14:11

## 2022-12-16 RX ADMIN — PHENYLEPHRINE HYDROCHLORIDE 80 MCG/MIN: 10 INJECTION INTRAVENOUS at 11:07

## 2022-12-16 RX ADMIN — Medication 160 MCG: at 11:05

## 2022-12-16 RX ADMIN — ONDANSETRON HYDROCHLORIDE 4 MG: 2 INJECTION, SOLUTION INTRAMUSCULAR; INTRAVENOUS at 11:49

## 2022-12-16 RX ADMIN — Medication 120 MCG: at 11:02

## 2022-12-16 RX ADMIN — SODIUM CHLORIDE, PRESERVATIVE FREE 10 ML: 5 INJECTION INTRAVENOUS at 22:00

## 2022-12-16 RX ADMIN — SODIUM CHLORIDE, PRESERVATIVE FREE 10 ML: 5 INJECTION INTRAVENOUS at 05:49

## 2022-12-16 RX ADMIN — PROPOFOL 50 MG: 10 INJECTION, EMULSION INTRAVENOUS at 11:00

## 2022-12-16 RX ADMIN — MEROPENEM 1 G: 1 INJECTION, POWDER, FOR SOLUTION INTRAVENOUS at 21:34

## 2022-12-16 RX ADMIN — PROPOFOL 50 MG: 10 INJECTION, EMULSION INTRAVENOUS at 10:57

## 2022-12-16 RX ADMIN — DILTIAZEM HYDROCHLORIDE 60 MG: 60 TABLET, FILM COATED ORAL at 21:34

## 2022-12-16 RX ADMIN — VANCOMYCIN HYDROCHLORIDE 1250 MG: 1.25 INJECTION, POWDER, LYOPHILIZED, FOR SOLUTION INTRAVENOUS at 16:55

## 2022-12-16 RX ADMIN — GABAPENTIN 600 MG: 300 CAPSULE ORAL at 21:34

## 2022-12-16 RX ADMIN — Medication 3 MG: at 21:34

## 2022-12-16 RX ADMIN — MIDAZOLAM 1 MG: 1 INJECTION INTRAMUSCULAR; INTRAVENOUS at 10:22

## 2022-12-16 RX ADMIN — ACETAMINOPHEN 650 MG: 325 TABLET ORAL at 05:53

## 2022-12-16 RX ADMIN — DILTIAZEM HYDROCHLORIDE 60 MG: 60 TABLET, FILM COATED ORAL at 05:48

## 2022-12-16 RX ADMIN — PROPOFOL 120 MG: 10 INJECTION, EMULSION INTRAVENOUS at 10:39

## 2022-12-16 RX ADMIN — NEOSTIGMINE METHYLSULFATE 3 MG: 1 INJECTION, SOLUTION INTRAVENOUS at 11:40

## 2022-12-16 RX ADMIN — GLYCOPYRROLATE 0.4 MG: 0.2 INJECTION INTRAMUSCULAR; INTRAVENOUS at 11:40

## 2022-12-16 NOTE — PROGRESS NOTES
Occupational Therapy    Completed chart review and discussed with PT. Patient off the floor in OR. Will hold for OT and continue to follow.      Thank  you,    Jonathan Holbrook, OT

## 2022-12-16 NOTE — PROGRESS NOTES
Physical Therapy  Patient headed to surgery this am.  Will follow up post op when appropriate.   Matias Green, PT

## 2022-12-16 NOTE — INTERDISCIPLINARY ROUNDS
Patient had successful removal of retained right proximal reartip. Now that all foreign bodies out, I think we can aim to get him back to NH. He must keep Sexton cath in for another 3-4 weeks. Dr Shona Huntley or I should see him back then. Antibiotics per ID. Ice to area overnight. Okay to restart anticoagulation.

## 2022-12-16 NOTE — PROGRESS NOTES
[address] [facility] Adult  Hospitalist Group                                                                                          Hospitalist Progress Note  Miryam Lester MD  Answering service: [phone] from in house phone        Date of Service:  2022  NAME:  Claire Bardales  :  1950  MRN:  [id number]      Admission Summary:   Claire Bardales is a 71 y.o. male who presents with hematuria. 71-year-old  male with past medical history of atrial fibrillation on Xarelto, depression, diabetes, dyslipidemia, hypertension, CVA, VALENTE on CPAP, restless leg syndrome, penile implant, who comes in for the above. Patient is apparently sent over by his facility for hematuria of about 2 weeks duration with his penile implant not working anymore. In the ED, his penile implant was found to be protruding and Sexton catheter was inserted with pink-colored urine draining. He denies any fever, chills. He reports on and off pain in the penile area. He says that this is his second penile implant. The ER spoke to urology and they recommended antibiotics. Urology has seen in the ER and will plan for surgery on Monday. Of note, the patient has previous Pseudomonas in urine as well as Enterococcus. Initial recommendations were vancomycin and gentamicin, but at that time patient is not symptomatic or septic. The patient is not quite sure as to why he was sent to the hospital.  Per the ER note, the patient's neurologist had apparently spoken to the patient SNF and wanted him transferred here yesterday. I cannot find any medical record of the purpose of this device, the patient says that it was placed for sexual purposes. Interval history / Subjective:   Patient seen and examined earlier . OR today to to remove remnant     Assessment & Plan:       Eroding penile implant, 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   Urology on board  Vancomycin and merram IV s/p IV cefepime   S/p OR on  retention of prosthesis   OR again on 12/16 to remove remaining part of prosthesis completed, ok to resume ac per urology   - ID consulted appreciate recs switch to IV Merrem continue vanco, further adjustments per ID     Hematuria  Monitor with H&H  No signs of active bleeding at this time  Restart ac cleared by urology to do so 12/16   Transfuse hemoglobin less than 7     Atrial fibrillation on Eliquis  Continue diltiazem and metoprolol  resume Eliqui was on hold prior for  surgery   Cardiology consulted appreciate recs     Hypertension  Continue home medications     Diabetes  Sliding scale insulin     BPH  Continue home tamsulosin and finasteride     History of recent CVA with residual right-sided weakness  Has baseline  Monitor  Continue atorvastatin     Depression  Home Remeron    Left arm pain/left elbow pain  XRs  Unclear cause      Updated sister over the phone this afternoon    Code status: Full  Prophylaxis: Eliquis on hold  Care Plan discussed with: Patient, nurse  Anticipated Disposition: Greater than 48 hours, snf when ready      Hospital Problems  Date Reviewed: 10/12/2022            Codes Class Noted POA    Hematuria ICD-10-CM: R31.9  ICD-9-CM: 599.70  12/8/2022 Unknown        Penile implant failure [facility]) ICD-10-CM: T83.410A  ICD-9-CM: 996.76  12/8/2022 Unknown       Review of Systems:   A comprehensive review of systems was negative except for that written in the HPI. Vital Signs:    Last 24hrs VS reviewed since prior progress note.  Most recent are:  Visit Vitals  BP 97/68 (BP 1 Location: Right upper arm, BP Patient Position: At rest)   Pulse 82   Temp 97.5 °F (36.4 °C)   Resp 16   Ht 6' 3\" (1.905 m)   Wt 86.9 kg (191 lb 9.3 oz)   SpO2 95%   BMI 23.95 kg/m²         Intake/Output Summary (Last 24 hours) at 12/16/2022 1444  Last data filed at 12/16/2022 1145  Gross per 24 hour   Intake 500 ml   Output 1105 ml   Net -605 ml          Physical Examination:     I had a face to face encounter with this patient and independently examined them on 12/16/2022 as outlined below:    General: Alert awake, no acute distress, resting in bed, pleasant male, appears to be stated age  HEENT: PEERL, EOMI, moist mucus membranes  Neck: Supple, no JVD, no meningeal signs  Chest: Clear to auscultation bilaterally   CVS: RRR, S1 S2 heard, no murmurs/rubs/gallops  Abd: Soft, non-tender, non-distended, +bowel sounds   Ext: No clubbing, no cyanosis, no edema  Neuro/Psych: Pleasant mood and affect, sensory grossly within normal limit, right lower extremity 1-2/5. Right upper extremity 0/5. Cap refill: Brisk, less than 3 seconds  Pulses: 2+, symmetric in all extremities  Skin: Warm, dry, without rashes or lesions  : Patient has a prosthetic that is protruding from the urethra. No blood at this time. Sexton in place with efe urine            Data Review:    Review and/or order of clinical lab test  Review and/or order of tests in the radiology section of CPT  Review and/or order of tests in the medicine section of CPT      Labs:     Recent Labs     12/16/22  0334 12/15/22  0346   WBC 9.5 8.2   HGB 8.8* 8.7*   HCT 28.1* 27.7*    249       Recent Labs     12/16/22  0334 12/15/22  0346 12/14/22  0444   * 134* 136   K 3.6 3.7 3.4*    103 105   CO2 25 27 25   BUN 11 13 14   CREA 0.31* 0.40* 0.42*   GLU 85 92 107*   CA 8.3* 8.4* 8.4*       No results for input(s): ALT, AP, TBIL, TBILI, TP, ALB, GLOB, GGT, AML, LPSE in the last 72 hours. No lab exists for component: SGOT, GPT, AMYP, HLPSE    No results for input(s): INR, PTP, APTT, INREXT, INREXT in the last 72 hours. No results for input(s): FE, TIBC, PSAT, FERR in the last 72 hours. No results found for: FOL, RBCF   No results for input(s): PH, PCO2, PO2 in the last 72 hours. No results for input(s): CPK, CKNDX, TROIQ in the last 72 hours.     No lab exists for component: CPKMB  Lab Results   Component Value Date/Time    Cholesterol, total 146 09/15/2022 02:40 AM    HDL Cholesterol 53 09/15/2022 02:40 AM    LDL, calculated 65 09/15/2022 02:40 AM    Triglyceride 140 09/15/2022 02:40 AM    CHOL/HDL Ratio 2.8 09/15/2022 02:40 AM     Lab Results   Component Value Date/Time    Glucose (POC) 85 12/16/2022 10:22 AM    Glucose (POC) 92 12/16/2022 05:50 AM    Glucose (POC) 100 12/15/2022 09:28 PM    Glucose (POC) 198 (H) 12/15/2022 04:47 PM    Glucose (POC) 110 12/15/2022 11:24 AM     Lab Results   Component Value Date/Time    Color YELLOW/STRAW 10/18/2022 01:01 AM    Appearance TURBID (A) 10/18/2022 01:01 AM    Specific gravity 1.013 10/18/2022 01:01 AM    pH (UA) 6.5 10/18/2022 01:01 AM    Protein TRACE (A) 10/18/2022 01:01 AM    Glucose Negative 10/18/2022 01:01 AM    Ketone Negative 10/18/2022 01:01 AM    Bilirubin Negative 10/18/2022 01:01 AM    Urobilinogen 4.0 (H) 10/18/2022 01:01 AM    Nitrites Positive (A) 10/18/2022 01:01 AM    Leukocyte Esterase MODERATE (A) 10/18/2022 01:01 AM    Epithelial cells FEW 10/18/2022 01:01 AM    Bacteria 4+ (A) 10/18/2022 01:01 AM    WBC 20-50 10/18/2022 01:01 AM    RBC 0-5 10/18/2022 01:01 AM         Medications Reviewed:     Current Facility-Administered Medications   Medication Dose Route Frequency    [START ON 12/17/2022] apixaban (ELIQUIS) tablet 5 mg  5 mg Oral BID    meropenem (MERREM) 1 g in 0.9% sodium chloride (MBP/ADV) 50 mL MBP  1 g IntraVENous Q8H    oxyCODONE IR (ROXICODONE) tablet 5 mg  5 mg Oral Q4H PRN    gabapentin (NEURONTIN) capsule 600 mg  600 mg Oral QHS    melatonin tablet 3 mg  3 mg Oral QHS    sodium chloride (NS) flush 5-40 mL  5-40 mL IntraVENous Q8H    sodium chloride (NS) flush 5-40 mL  5-40 mL IntraVENous PRN    acetaminophen (TYLENOL) tablet 650 mg  650 mg Oral Q6H PRN    Or    acetaminophen (TYLENOL) suppository 650 mg  650 mg Rectal Q6H PRN    polyethylene glycol (MIRALAX) packet 17 g  17 g Oral DAILY PRN    ondansetron (ZOFRAN ODT) tablet 4 mg  4 mg Oral Q8H PRN    Or    ondansetron (ZOFRAN) injection 4 mg  4 mg IntraVENous Q6H PRN    insulin lispro (HUMALOG) injection   SubCUTAneous AC&HS    glucose chewable tablet 16 g  4 Tablet Oral PRN    glucagon (GLUCAGEN) injection 1 mg  1 mg IntraMUSCular PRN    dextrose 10 % infusion 0-250 mL  0-250 mL IntraVENous PRN    allopurinoL (ZYLOPRIM) tablet 100 mg  100 mg Oral DAILY    atorvastatin (LIPITOR) tablet 80 mg  80 mg Oral QHS    dilTIAZem IR (CARDIZEM) tablet 60 mg  60 mg Oral Q8H    famotidine (PEPCID) tablet 20 mg  20 mg Oral Q12H    finasteride (PROSCAR) tablet 5 mg  5 mg Oral DAILY    metoprolol succinate (TOPROL-XL) XL tablet 75 mg  75 mg Oral DAILY    tamsulosin (FLOMAX) capsule 0.4 mg  0.4 mg Oral DAILY    mirtazapine (REMERON) tablet 7.5 mg  7.5 mg Oral QHS    Vancomycin - Pharmacy to Dose   Other Rx Dosing/Monitoring    vancomycin (VANCOCIN) 1,250 mg in 0.9% sodium chloride 250 mL ([id number])  1,250 mg IntraVENous Q12H     ______________________________________________________________________  EXPECTED LENGTH OF STAY: 5d 21h  ACTUAL LENGTH OF STAY:          8                 Reuben Pink MD

## 2022-12-16 NOTE — OP NOTES
[address]   OPERATIVE REPORT    Name:  Akiko Molina  MR#:  [id number]  :  1950  ACCOUNT #:  [de-identified]  DATE OF SERVICE:  2022    PREOPERATIVE DIAGNOSIS:  Retained penile implant component. POSTOPERATIVE DIAGNOSIS:  Retained penile implant component. PROCEDURE PERFORMED:  Perineal exploration, removal of rear tip of penile prosthesis. SURGEON:  Meng Zuñiga MD    ASSISTANT:  None. ANESTHESIA:  General.    COMPLICATIONS:  None. SPECIMENS REMOVED:  Right rear tip extender. IMPLANTS:  None. ESTIMATED BLOOD LOSS:  Less than 25 mL. DRAINS:  None. HISTORY:  This is a 80-year-old  male unfortunately who suffered a catastrophic stroke. The indwelling catheter had been chosen to manage his bladder, and it has been in place for many months. He had an erosion down into a very old penile implant, which necessitated removal by one of my partners 4 days ago. However, there was discrepancy noted in rear tip extender, and it was noted that there was still a rear tip extended in the very proximal corpora on the right side. He now presents for surgical removal of the same. PROCEDURE:  He has been on scheduled parenteral antibiotics. He was brought to the operating room. We had to decontaminate him and then sterilely prepped and draped him in the dorsal lithotomy position. After proper time-out, we went ahead and palpated the perineum and did not feel any rear tip. I made a midline incision in the perineum with 15 blade. I then bluntly dissected laterally towards the right ischial tuberosity. I found the corpora and used the electrocautery to cauterize into the body. I used my Erica clamp to remove the rear tip extender. There was no sign of any perforation. There was no sign of the urethra being traversed. I now copiously irrigated him with an antibiotic spray. There was no sign of active bleeding.   I closed deeper perineal musculature with a first running 2-0 Monocryl and then interrupted 2-0 Monocryl figure-of-eight sutures. Skin is closed with interrupted 3-0 Monocryl vertical mattress sutures. Bulky dressings were applied. Initial and final sponge, needle, and instruments counts were all correct at the end of the case. The patient tolerated the procedure well. He was returned to recovery room in stable condition.       Verito Arnold MD      [id number]  D:  12/16/2022 11:35  T:  12/16/2022 18:00  JOB #:  [id number]  CC:  Mukesh Downing MD

## 2022-12-16 NOTE — ANESTHESIA PREPROCEDURE EVALUATION
Relevant Problems   RESPIRATORY SYSTEM   (+) VALENTE (obstructive sleep apnea)      NEUROLOGY   (+) Depression   (+) Ischemic cerebrovascular accident (CVA) (HCC)      CARDIOVASCULAR   (+) A-fib (HCC)   (+) HTN (hypertension)      ENDOCRINE   (+) Diabetes (HCC)   (+) Diabetes mellitus type 2, controlled ([facility] [address]   (+) Morbid obesity ([facility] [address]       Anesthetic History   No history of anesthetic complications            Review of Systems / Medical History  Patient summary reviewed, nursing notes reviewed and pertinent labs reviewed    Pulmonary  Within defined limits      Sleep apnea           Neuro/Psych   Within defined limits      TIA     Cardiovascular  Within defined limits  Hypertension        Dysrhythmias : atrial fibrillation           GI/Hepatic/Renal  Within defined limits              Endo/Other  Within defined limits  Diabetes    Morbid obesity     Other Findings              Physical Exam    Airway  Mallampati: II  TM Distance: > 6 cm  Neck ROM: normal range of motion   Mouth opening: Normal     Cardiovascular  Regular rate and rhythm,  S1 and S2 normal,  no murmur, click, rub, or gallop             Dental  No notable dental hx       Pulmonary  Breath sounds clear to auscultation               Abdominal  GI exam deferred       Other Findings            Anesthetic Plan    ASA: 3  Anesthesia type: general          Induction: Intravenous  Anesthetic plan and risks discussed with: Patient

## 2022-12-16 NOTE — PERIOP NOTES
Pt presents to unit with infiltrated IV to left arm. Painful to touch, unable to flush and hard and swollen above IV site. IV removed and warm compress placed.

## 2022-12-16 NOTE — PROGRESS NOTES
ID follow up    Patient off the floor for surgery     Continue Vancomycin and Meropenem renally dosed  IV    Follow up any new cultures if sent from today's surgery to adjust antibiotics     Laura Kwan DO  1:17 PM

## 2022-12-16 NOTE — BRIEF OP NOTE
Brief Postoperative Note    Patient: Everardo France  YOB: 1950  MRN: [id number]    Date of Procedure: 12/16/2022     Pre-Op Diagnosis: RETAINED PRENILE PROSTHESIS    Post-Op Diagnosis: Same as preoperative diagnosis. Procedure(s):  REMOVAL OF RETAINED PENILE PROSTHESIS    Surgeon(s):  Massiel Calles MD    Surgical Assistant: Surg Asst-1: Indy Borden    Anesthesia: General     Estimated Blood Loss (mL): Minimal    Complications: None    Specimens: * No specimens in log *     Implants: * No implants in log *    Drains: * No LDAs found *    Findings: Right reartip in proximal corpora palpate and removed.   Hemostasis looks good    Electronically Signed by Darlene Jimenez MD on 12/16/2022 at 11:36 AM

## 2022-12-16 NOTE — PROGRESS NOTES
Comprehensive Nutrition Assessment    Type and Reason for Visit: Initial    Nutrition Recommendations/Plan:   Continue easy to chew diet  Trial ensure HP BID, magic cup once daily  Please obtain updated weight       Malnutrition Assessment:  Malnutrition Status:  Insufficient data (12/16/22 1445)           Nutrition Assessment:    Past Medical History:   Diagnosis Date    Arrhythmia     atrial fib    Depression 4/24/2010    Diabetes ([facility] [address]     diet control for pre-diabetes    Dyslipidemia, goal LDL below 100 6/14/2011    Gout     HTN (hypertension) 4/24/2010    Impotence 4/24/2010    Morbid obesity ([address] 5/17/2010    VALENTE (obstructive sleep apnea) 4/24/2010    CPAP    Restless legs 4/15/2015     71 y.o. male admitted with eroding penile implant. Pt was in OR today for removal of retained penile prosthesis. Attempted to visit pt x2 but he was off the floor in OR. Noted wt loss in chart- it appears he was gradually losing wt for the last year (50-60#)- latest wt appears inaccurate since it's such a huge jump x 2 weeks. Unsure of intake. Will trail ensure HP and obtain history as able. Previous RD notes indicate pt likes magic cup- will also add that. Labs reviewed.       Recent Labs     12/16/22  0334 12/15/22  0346 12/14/22  0444   GLU 85 92 107*   BUN 11 13 14   CREA 0.31* 0.40* 0.42*   * 134* 136   K 3.6 3.7 3.4*    103 105   CO2 25 27 25   CA 8.3* 8.4* 8.4*       Recent Labs     12/16/22  1022 12/16/22  0550 12/15/22  2128 12/15/22  1647 12/15/22  1124 12/15/22  0608 12/14/22  2110 12/14/22  1630 12/14/22  1111 12/14/22  0625 12/13/22  2052 12/13/22  1543   GLUCPOC 85 92 100 198* 110 98 107 101 117 108 135* 105       Lab Results   Component Value Date/Time    Hemoglobin A1c 5.8 (H) 09/15/2022 02:40 AM    Hemoglobin A1c 6.2 (H) 02/21/2022 03:51 PM    Hemoglobin A1c 6.3 (H) 02/07/2020 12:00 PM    Hemoglobin A1c (POC) 6.0 06/24/2021 08:47 AM         Nutritionally Significant Medications:  Pepcid, merrem, remeron, flomax, vancocin      Estimated Daily Nutrient Needs:  Energy Requirements Based On: Kcal/kg  Weight Used for Energy Requirements: Ideal  Energy (kcal/day): 2225 (25 kcal/kg IBW)  Weight Used for Protein Requirements: Ideal  Protein (g/day): 134 (1.5 g/kg IBW)     Fluid (ml/day): 1 ml/kcal    Nutrition Related Findings:   Edema: Facial: No Edema (12/15/2022  8:15 PM)  Generalized: Trace (12/16/2022  8:15 AM)  LLE: 1+ (12/16/2022  8:15 AM)  LUE: 1+ (12/16/2022  8:15 AM)  RLE: 1+ (12/16/2022  8:15 AM)  RUE: 1+ (12/16/2022  8:15 AM)    Last BM: 12/12/22, Soft    Wounds: Surgical incision (penis)      Current Nutrition Therapies:  Diet: easy to chew  Supplements: none  Meal intake: Patient Vitals for the past 168 hrs:   % Diet Eaten   12/13/22 0900 76 - 100%   12/12/22 1006 0%     Supplement intake: No data found. Nutrition Support: none      Anthropometric Measures:  Height: 6' 3\" (190.5 cm)  Ideal Body Weight (IBW): 196 lbs (89 kg)     Current Body Wt:  86.9 kg (191 lb 9.3 oz), 97.7 % IBW. Bed scale  Current BMI (kg/m2): 23.9        Weight Adjustment: No adjustment                 BMI Category: Normal weight (BMI 18.5-24. 9)    Wt Readings from Last 10 Encounters:   12/16/22 86.9 kg (191 lb 9.3 oz)   11/30/22 111.1 kg (245 lb)   10/23/22 115.2 kg (253 lb 15.5 oz)   09/28/22 131.7 kg (290 lb 5.5 oz)   07/28/22 131.5 kg (290 lb)   06/14/22 134.3 kg (296 lb)   04/18/22 142.2 kg (313 lb 9.6 oz)   02/21/22 139.7 kg (308 lb)   02/10/22 138.8 kg (306 lb)   11/14/21 143 kg (315 lb 4.1 oz)           Nutrition Diagnosis:   Predicted inadequate energy intake related to inadequate protein-energy intake as evidenced by weight loss    Nutrition Interventions:   Food and/or Nutrient Delivery: Continue current diet, Start oral nutrition supplement  Nutrition Education/Counseling: No recommendations at this time  Coordination of Nutrition Care: Continue to monitor while inpatient       Goals:     Goals: PO intake 50% or greater, by next RD assessment       Nutrition Monitoring and Evaluation:   Behavioral-Environmental Outcomes: None identified  Food/Nutrient Intake Outcomes: Supplement intake, Food and nutrient intake  Physical Signs/Symptoms Outcomes: Biochemical data, Meal time behavior, Weight    Discharge Planning:    Continue oral nutrition supplement    Benjamin Luciano RD  Available via [facility]

## 2022-12-17 ENCOUNTER — APPOINTMENT (OUTPATIENT)
Dept: VASCULAR SURGERY | Age: 72
DRG: 674 | End: 2022-12-17
Attending: STUDENT IN AN ORGANIZED HEALTH CARE EDUCATION/TRAINING PROGRAM
Payer: MEDICARE

## 2022-12-17 LAB
ANION GAP SERPL CALC-SCNC: 6 MMOL/L (ref 5–15)
BASOPHILS # BLD: 0 K/UL (ref 0–0.1)
BASOPHILS NFR BLD: 0 % (ref 0–1)
BUN SERPL-MCNC: 9 MG/DL (ref 6–20)
BUN/CREAT SERPL: 26 (ref 12–20)
CALCIUM SERPL-MCNC: 8.7 MG/DL (ref 8.5–10.1)
CHLORIDE SERPL-SCNC: 103 MMOL/L (ref 97–108)
CO2 SERPL-SCNC: 24 MMOL/L (ref 21–32)
CREAT SERPL-MCNC: 0.35 MG/DL (ref 0.7–1.3)
DIFFERENTIAL METHOD BLD: ABNORMAL
EOSINOPHIL # BLD: 0.4 K/UL (ref 0–0.4)
EOSINOPHIL NFR BLD: 5 % (ref 0–7)
ERYTHROCYTE [DISTWIDTH] IN BLOOD BY AUTOMATED COUNT: 15.4 % (ref 11.5–14.5)
GLUCOSE BLD STRIP.AUTO-MCNC: 106 MG/DL (ref 65–117)
GLUCOSE BLD STRIP.AUTO-MCNC: 120 MG/DL (ref 65–117)
GLUCOSE BLD STRIP.AUTO-MCNC: 167 MG/DL (ref 65–117)
GLUCOSE BLD STRIP.AUTO-MCNC: 168 MG/DL (ref 65–117)
GLUCOSE BLD STRIP.AUTO-MCNC: 98 MG/DL (ref 65–117)
GLUCOSE SERPL-MCNC: 93 MG/DL (ref 65–100)
HCT VFR BLD AUTO: 29.6 % (ref 36.6–50.3)
HGB BLD-MCNC: 9.1 G/DL (ref 12.1–17)
IMM GRANULOCYTES # BLD AUTO: 0 K/UL (ref 0–0.04)
IMM GRANULOCYTES NFR BLD AUTO: 1 % (ref 0–0.5)
LYMPHOCYTES # BLD: 1.7 K/UL (ref 0.8–3.5)
LYMPHOCYTES NFR BLD: 22 % (ref 12–49)
MCH RBC QN AUTO: 27.7 PG (ref 26–34)
MCHC RBC AUTO-ENTMCNC: 30.7 G/DL (ref 30–36.5)
MCV RBC AUTO: 90 FL (ref 80–99)
MONOCYTES # BLD: 0.9 K/UL (ref 0–1)
MONOCYTES NFR BLD: 12 % (ref 5–13)
NEUTS SEG # BLD: 4.8 K/UL (ref 1.8–8)
NEUTS SEG NFR BLD: 60 % (ref 32–75)
NRBC # BLD: 0 K/UL (ref 0–0.01)
NRBC BLD-RTO: 0 PER 100 WBC
PLATELET # BLD AUTO: 277 K/UL (ref 150–400)
PMV BLD AUTO: 10.4 FL (ref 8.9–12.9)
POTASSIUM SERPL-SCNC: 4.2 MMOL/L (ref 3.5–5.1)
RBC # BLD AUTO: 3.29 M/UL (ref 4.1–5.7)
SERVICE CMNT-IMP: ABNORMAL
SERVICE CMNT-IMP: NORMAL
SERVICE CMNT-IMP: NORMAL
SODIUM SERPL-SCNC: 133 MMOL/L (ref 136–145)
WBC # BLD AUTO: 8 K/UL (ref 4.1–11.1)

## 2022-12-17 PROCEDURE — 80048 BASIC METABOLIC PNL TOTAL CA: CPT

## 2022-12-17 PROCEDURE — 65270000046 HC RM TELEMETRY

## 2022-12-17 PROCEDURE — 74011250636 HC RX REV CODE- 250/636: Performed by: INTERNAL MEDICINE

## 2022-12-17 PROCEDURE — 97530 THERAPEUTIC ACTIVITIES: CPT

## 2022-12-17 PROCEDURE — 74011636637 HC RX REV CODE- 636/637: Performed by: STUDENT IN AN ORGANIZED HEALTH CARE EDUCATION/TRAINING PROGRAM

## 2022-12-17 PROCEDURE — 74011250637 HC RX REV CODE- 250/637: Performed by: STUDENT IN AN ORGANIZED HEALTH CARE EDUCATION/TRAINING PROGRAM

## 2022-12-17 PROCEDURE — 74011250636 HC RX REV CODE- 250/636: Performed by: STUDENT IN AN ORGANIZED HEALTH CARE EDUCATION/TRAINING PROGRAM

## 2022-12-17 PROCEDURE — 74011000250 HC RX REV CODE- 250: Performed by: STUDENT IN AN ORGANIZED HEALTH CARE EDUCATION/TRAINING PROGRAM

## 2022-12-17 PROCEDURE — 93971 EXTREMITY STUDY: CPT

## 2022-12-17 PROCEDURE — 85025 COMPLETE CBC W/AUTO DIFF WBC: CPT

## 2022-12-17 PROCEDURE — 74011000258 HC RX REV CODE- 258: Performed by: INTERNAL MEDICINE

## 2022-12-17 PROCEDURE — 36415 COLL VENOUS BLD VENIPUNCTURE: CPT

## 2022-12-17 PROCEDURE — 82962 GLUCOSE BLOOD TEST: CPT

## 2022-12-17 PROCEDURE — 97164 PT RE-EVAL EST PLAN CARE: CPT

## 2022-12-17 RX ORDER — PREDNISONE 20 MG/1
20 TABLET ORAL
Status: DISCONTINUED | OUTPATIENT
Start: 2022-12-18 | End: 2022-12-20 | Stop reason: HOSPADM

## 2022-12-17 RX ADMIN — TAMSULOSIN HYDROCHLORIDE 0.4 MG: 0.4 CAPSULE ORAL at 10:33

## 2022-12-17 RX ADMIN — ALLOPURINOL 100 MG: 100 TABLET ORAL at 10:33

## 2022-12-17 RX ADMIN — FINASTERIDE 5 MG: 5 TABLET, FILM COATED ORAL at 10:33

## 2022-12-17 RX ADMIN — ATORVASTATIN CALCIUM 80 MG: 40 TABLET, FILM COATED ORAL at 23:53

## 2022-12-17 RX ADMIN — MEROPENEM 1 G: 1 INJECTION, POWDER, FOR SOLUTION INTRAVENOUS at 06:18

## 2022-12-17 RX ADMIN — METOPROLOL SUCCINATE 75 MG: 50 TABLET, EXTENDED RELEASE ORAL at 10:33

## 2022-12-17 RX ADMIN — APIXABAN 5 MG: 5 TABLET, FILM COATED ORAL at 17:30

## 2022-12-17 RX ADMIN — Medication 2 UNITS: at 12:25

## 2022-12-17 RX ADMIN — MEROPENEM 1 G: 1 INJECTION, POWDER, FOR SOLUTION INTRAVENOUS at 15:09

## 2022-12-17 RX ADMIN — VANCOMYCIN HYDROCHLORIDE 1250 MG: 1.25 INJECTION, POWDER, LYOPHILIZED, FOR SOLUTION INTRAVENOUS at 03:43

## 2022-12-17 RX ADMIN — SODIUM CHLORIDE, PRESERVATIVE FREE 10 ML: 5 INJECTION INTRAVENOUS at 23:54

## 2022-12-17 RX ADMIN — Medication 2 UNITS: at 17:30

## 2022-12-17 RX ADMIN — DILTIAZEM HYDROCHLORIDE 60 MG: 60 TABLET, FILM COATED ORAL at 23:53

## 2022-12-17 RX ADMIN — VANCOMYCIN HYDROCHLORIDE 1250 MG: 1.25 INJECTION, POWDER, LYOPHILIZED, FOR SOLUTION INTRAVENOUS at 18:34

## 2022-12-17 RX ADMIN — GABAPENTIN 600 MG: 300 CAPSULE ORAL at 23:53

## 2022-12-17 RX ADMIN — DILTIAZEM HYDROCHLORIDE 60 MG: 60 TABLET, FILM COATED ORAL at 15:09

## 2022-12-17 RX ADMIN — SODIUM CHLORIDE, PRESERVATIVE FREE 10 ML: 5 INJECTION INTRAVENOUS at 06:18

## 2022-12-17 RX ADMIN — APIXABAN 5 MG: 5 TABLET, FILM COATED ORAL at 10:33

## 2022-12-17 RX ADMIN — FAMOTIDINE 20 MG: 20 TABLET, FILM COATED ORAL at 23:53

## 2022-12-17 RX ADMIN — MIRTAZAPINE 7.5 MG: 15 TABLET, FILM COATED ORAL at 23:54

## 2022-12-17 RX ADMIN — DILTIAZEM HYDROCHLORIDE 60 MG: 60 TABLET, FILM COATED ORAL at 06:18

## 2022-12-17 RX ADMIN — FAMOTIDINE 20 MG: 20 TABLET, FILM COATED ORAL at 10:33

## 2022-12-17 RX ADMIN — MEROPENEM 1 G: 1 INJECTION, POWDER, FOR SOLUTION INTRAVENOUS at 23:45

## 2022-12-17 RX ADMIN — Medication 3 MG: at 23:54

## 2022-12-17 NOTE — PROGRESS NOTES
Problem: Mobility Impaired (Adult and Pediatric)  Goal: *Acute Goals and Plan of Care (Insert Text)  Description: FUNCTIONAL STATUS PRIOR TO ADMISSION: pt admitted from SNF. Pt reported he stays in the bed primarily and requires 2 person assistance for transfers. HOME SUPPORT PRIOR TO ADMISSION: pt admitted from SNF after CVA with R sided weakness    Physical Therapy Goals  Weekly re-assessment 12/16/22  Goals remain appropriate. Initiated 12/9/2022  1. Patient will move from supine to sit and sit to supine  and roll side to side in bed with maximal assistance x 1 within 7 day(s). 2.  Patient will transfer from bed to chair and chair to bed with maximal assistance x 2 using the least restrictive device within 7 day(s). 3.  Patient will perform sit to stand with maximal assistance x 2 within 7 day(s). 4.  Patient will tolerate seated EOB with mod A x 10 minutes within 7 days    Outcome: Not Progressing Towards Goal   PHYSICAL THERAPY TREATMENT-Weekly re-assessment  Patient: Martín Calhoun (38 y.o. male)  Date: 12/17/2022  Diagnosis: Hematuria [R31.9]  Penile implant failure ([facility] [address] [T83.410A] <principal problem not specified>  Procedure(s) (LRB):  REMOVAL OF RETAINED PENILE PROSTHESIS (N/A) 1 Day Post-Op  Precautions: Fall  Chart, physical therapy assessment, plan of care and goals were reviewed. ASSESSMENT  Patient continues with skilled PT services and is not progressing towards goals. Patient is post op day 1 from removal of penile implant. Patient is alert, cooperative and motivated for therapy. He has signficant weakness throughout following lengthy time of hospitalization and rehab post CVA, covid and now infection of penile implant requiring surgical removal.  Also to have duplex done for RUE which is swollen and red. Limited bed mobility today due to above. Reviewed and performed exercises for LE strengthening. Recommend R heel elevated /float.   May benefit from [facility] bed to assist staff with patient mobility. Continue to recommend return to rehab/SNF. .     Current Level of Function Impacting Discharge (mobility/balance): max to total assist x 1-2 for basic mobility    Other factors to consider for discharge:          PLAN :  Patient continues to benefit from skilled intervention to address the above impairments. Continue treatment per established plan of care. to address goals. Recommendation for discharge: (in order for the patient to meet his/her long term goals)  Therapy up to 5 days/week in SNF setting    This discharge recommendation:  Has been made in collaboration with the attending provider and/or case management    IF patient discharges home will need the following DME: to be determined (TBD)       SUBJECTIVE:   Patient stated I don't sleep at night.     OBJECTIVE DATA SUMMARY:   Critical Behavior:  Neurologic State: Alert  Orientation Level: Oriented X4  Cognition: Appropriate for age attention/concentration  Safety/Judgement: Decreased insight into deficits  Functional Mobility Training:  Bed Mobility:  Rolling: Maximum assistance                Therapeutic Exercises:   Glut isometrics, heel slides, quads isometrics    Activity Tolerance:   Fair    After treatment patient left in no apparent distress:   Supine in bed, Heels elevated for pressure relief, Call bell within reach, and Side rails x 3    COMMUNICATION/COLLABORATION:   The patients plan of care was discussed with: Registered nurse and Physician.      Nakia Fields, PT   Time Calculation: 17 mins

## 2022-12-17 NOTE — PROGRESS NOTES
Bedside and Verbal shift change report given to Marly Ho  (oncoming nurse) by Kirsty Azevedo  (offgoing nurse). Report included the following information SBAR.

## 2022-12-17 NOTE — PROGRESS NOTES
[facility]                                                                                          Hospitalist Progress Note  Adolfo Thornton MD  Answering service: [phone] OR 36 from in house phone        Date of Service:  2022  NAME:  Mignon Mast  :  1950  MRN:  [id number]      Admission Summary:   Mignon Mast is a 71 y.o. male who presents with hematuria. 30-year-old  male with past medical history of atrial fibrillation on Xarelto, depression, diabetes, dyslipidemia, hypertension, CVA, VALENTE on CPAP, restless leg syndrome, penile implant, who comes in for the above. Patient is apparently sent over by his facility for hematuria of about 2 weeks duration with his penile implant not working anymore. In the ED, his penile implant was found to be protruding and Sexton catheter was inserted with pink-colored urine draining. He denies any fever, chills. He reports on and off pain in the penile area. He says that this is his second penile implant. The ER spoke to urology and they recommended antibiotics. Urology has seen in the ER and will plan for surgery on Monday. Of note, the patient has previous Pseudomonas in urine as well as Enterococcus. Initial recommendations were vancomycin and gentamicin, but at that time patient is not symptomatic or septic. The patient is not quite sure as to why he was sent to the hospital.  Per the ER note, the patient's neurologist had apparently spoken to the patient SNF and wanted him transferred here yesterday. I cannot find any medical record of the purpose of this device, the patient says that it was placed for sexual purposes. Interval history / Subjective:   Patient seen and examined earlier . 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   Urology on board  Vancomycin and merram IV s/p IV cefepime   S/p OR on  retention of prosthesis   OR again on 12/16 to remove remaining part of prosthesis completed, ok to resume ac per urology   - ID consulted appreciate recs switch to IV Merrem continue vanco, further adjustments per ID     Superficial thrombophlebitis LUE  -IV induced, confirmed by US 12/17  -Instructed RN to place warm compress, complete prednisone 5 days    Hematuria  Monitor with H&H  No signs of active bleeding at this time  Restart ac cleared by urology to do so 12/16   Transfuse hemoglobin less than 7     Atrial fibrillation on Eliquis  Continue diltiazem and metoprolol  resumed Eliquis was on hold prior for  surgery   Cardiology consulted appreciate recs     Hypertension  Continue home medications     Diabetes  Sliding scale insulin     BPH  Continue home tamsulosin and finasteride     History of recent CVA with residual right-sided weakness  Has baseline  Monitor  Continue atorvastatin     Depression  Home Remeron    Left arm pain/left elbow pain  XRs  Unclear cause        Code status: Full  Prophylaxis: Eliquis on hold  Care Plan discussed with: Patient, nurse  Anticipated Disposition: 24- 48 hours, snf when ready      Hospital Problems  Date Reviewed: 10/12/2022            Codes Class Noted POA    Hematuria ICD-10-CM: R31.9  ICD-9-CM: 599.70  12/8/2022 Unknown        Penile implant failure [facility]) ICD-10-CM: T83.410A  ICD-9-CM: 996.76  12/8/2022 Unknown       Review of Systems:   A comprehensive review of systems was negative except for that written in the HPI. Vital Signs:    Last 24hrs VS reviewed since prior progress note.  Most recent are:  Visit Vitals  /74 (BP 1 Location: Right upper arm, BP Patient Position: At rest)   Pulse (!) 57   Temp 97.6 °F (36.4 °C)   Resp 18   Ht 6' 3\" (1.905 m)   Wt 84.1 kg (185 lb 6.5 oz)   SpO2 96%   BMI 23.17 kg/m²         Intake/Output Summary (Last 24 hours) at 12/17/2022 1257  Last data filed at 12/17/2022 0310  Gross per 24 hour   Intake --   Output 900 ml Net -900 ml          Physical Examination:     I had a face to face encounter with this patient and independently examined them on 12/17/2022 as outlined below:    General: Alert awake, no acute distress, resting in bed, pleasant male, appears to be stated age  HEENT: PEERL, EOMI, moist mucus membranes  Neck: Supple, no JVD, no meningeal signs  Chest: Clear to auscultation bilaterally   CVS: RRR, S1 S2 heard, no murmurs/rubs/gallops  Abd: Soft, non-tender, non-distended, +bowel sounds   Ext: No clubbing, no cyanosis, trace edema bl UE, LUE slightly more erythematous and tender to palpation   Neuro/Psych: Pleasant mood and affect, sensory grossly within normal limit, right lower extremity 1-2/5. Right upper extremity 0/5. Cap refill: Brisk, less than 3 seconds  Pulses: 2+, symmetric in all extremities  Skin: Warm, dry, without rashes or lesions  : Patient has a prosthetic that is protruding from the urethra. No blood at this time. Sexton in place with efe urine            Data Review:    Review and/or order of clinical lab test  Review and/or order of tests in the radiology section of CPT  Review and/or order of tests in the medicine section of CPT      Labs:     Recent Labs     12/17/22  0623 12/16/22  0334   WBC 8.0 9.5   HGB 9.1* 8.8*   HCT 29.6* 28.1*    277       Recent Labs     12/17/22  0623 12/16/22  0334 12/15/22  0346   * 133* 134*   K 4.2 3.6 3.7    101 103   CO2 24 25 27   BUN 9 11 13   CREA 0.35* 0.31* 0.40*   GLU 93 85 92   CA 8.7 8.3* 8.4*       No results for input(s): ALT, AP, TBIL, TBILI, TP, ALB, GLOB, GGT, AML, LPSE in the last 72 hours. No lab exists for component: SGOT, GPT, AMYP, HLPSE    No results for input(s): INR, PTP, APTT, INREXT, INREXT in the last 72 hours. No results for input(s): FE, TIBC, PSAT, FERR in the last 72 hours. No results found for: FOL, RBCF   No results for input(s): PH, PCO2, PO2 in the last 72 hours.   No results for input(s): CPK, CKNDX, TROIQ in the last 72 hours.     No lab exists for component: CPKMB  Lab Results   Component Value Date/Time    Cholesterol, total 146 09/15/2022 02:40 AM    HDL Cholesterol 53 09/15/2022 02:40 AM    LDL, calculated 65 09/15/2022 02:40 AM    Triglyceride 140 09/15/2022 02:40 AM    CHOL/HDL Ratio 2.8 09/15/2022 02:40 AM     Lab Results   Component Value Date/Time    Glucose (POC) 167 (H) 12/17/2022 11:28 AM    Glucose (POC) 98 12/17/2022 06:23 AM    Glucose (POC) 105 12/16/2022 09:25 PM    Glucose (POC) 136 (H) 12/16/2022 04:42 PM    Glucose (POC) 85 12/16/2022 10:22 AM     Lab Results   Component Value Date/Time    Color YELLOW/STRAW 10/18/2022 01:01 AM    Appearance TURBID (A) 10/18/2022 01:01 AM    Specific gravity 1.013 10/18/2022 01:01 AM    pH (UA) 6.5 10/18/2022 01:01 AM    Protein TRACE (A) 10/18/2022 01:01 AM    Glucose Negative 10/18/2022 01:01 AM    Ketone Negative 10/18/2022 01:01 AM    Bilirubin Negative 10/18/2022 01:01 AM    Urobilinogen 4.0 (H) 10/18/2022 01:01 AM    Nitrites Positive (A) 10/18/2022 01:01 AM    Leukocyte Esterase MODERATE (A) 10/18/2022 01:01 AM    Epithelial cells FEW 10/18/2022 01:01 AM    Bacteria 4+ (A) 10/18/2022 01:01 AM    WBC 20-50 10/18/2022 01:01 AM    RBC 0-5 10/18/2022 01:01 AM         Medications Reviewed:     Current Facility-Administered Medications   Medication Dose Route Frequency    [START ON 12/18/2022] vancomycin random level 12/18 with AM labs PRIOR TO dose due at same time   Other ONCE    apixaban (ELIQUIS) tablet 5 mg  5 mg Oral BID    meropenem (MERREM) 1 g in 0.9% sodium chloride (MBP/ADV) 50 mL MBP  1 g IntraVENous Q8H    oxyCODONE IR (ROXICODONE) tablet 5 mg  5 mg Oral Q4H PRN    gabapentin (NEURONTIN) capsule 600 mg  600 mg Oral QHS    melatonin tablet 3 mg  3 mg Oral QHS    sodium chloride (NS) flush 5-40 mL  5-40 mL IntraVENous Q8H    sodium chloride (NS) flush 5-40 mL  5-40 mL IntraVENous PRN    acetaminophen (TYLENOL) tablet 650 mg  650 mg Oral Q6H PRN    Or    acetaminophen (TYLENOL) suppository 650 mg  650 mg Rectal Q6H PRN    polyethylene glycol (MIRALAX) packet 17 g  17 g Oral DAILY PRN    ondansetron (ZOFRAN ODT) tablet 4 mg  4 mg Oral Q8H PRN    Or    ondansetron (ZOFRAN) injection 4 mg  4 mg IntraVENous Q6H PRN    insulin lispro (HUMALOG) injection   SubCUTAneous AC&HS    glucose chewable tablet 16 g  4 Tablet Oral PRN    glucagon (GLUCAGEN) injection 1 mg  1 mg IntraMUSCular PRN    dextrose 10 % infusion 0-250 mL  0-250 mL IntraVENous PRN    allopurinoL (ZYLOPRIM) tablet 100 mg  100 mg Oral DAILY    atorvastatin (LIPITOR) tablet 80 mg  80 mg Oral QHS    dilTIAZem IR (CARDIZEM) tablet 60 mg  60 mg Oral Q8H    famotidine (PEPCID) tablet 20 mg  20 mg Oral Q12H    finasteride (PROSCAR) tablet 5 mg  5 mg Oral DAILY    metoprolol succinate (TOPROL-XL) XL tablet 75 mg  75 mg Oral DAILY    tamsulosin (FLOMAX) capsule 0.4 mg  0.4 mg Oral DAILY    mirtazapine (REMERON) tablet 7.5 mg  7.5 mg Oral QHS    Vancomycin - Pharmacy to Dose   Other Rx Dosing/Monitoring    vancomycin (VANCOCIN) 1,250 mg in 0.9% sodium chloride 250 mL ([id number])  1,250 mg IntraVENous Q12H     ______________________________________________________________________  EXPECTED LENGTH OF STAY: 5d 21h  ACTUAL LENGTH OF STAY:          9                 Danitza Garcia MD

## 2022-12-18 LAB
ANION GAP SERPL CALC-SCNC: 4 MMOL/L (ref 5–15)
BASOPHILS # BLD: 0 K/UL (ref 0–0.1)
BASOPHILS NFR BLD: 0 % (ref 0–1)
BUN SERPL-MCNC: 7 MG/DL (ref 6–20)
BUN/CREAT SERPL: 18 (ref 12–20)
CALCIUM SERPL-MCNC: 9 MG/DL (ref 8.5–10.1)
CHLORIDE SERPL-SCNC: 102 MMOL/L (ref 97–108)
CO2 SERPL-SCNC: 30 MMOL/L (ref 21–32)
CREAT SERPL-MCNC: 0.38 MG/DL (ref 0.7–1.3)
DIFFERENTIAL METHOD BLD: ABNORMAL
EOSINOPHIL # BLD: 0.4 K/UL (ref 0–0.4)
EOSINOPHIL NFR BLD: 7 % (ref 0–7)
ERYTHROCYTE [DISTWIDTH] IN BLOOD BY AUTOMATED COUNT: 15.4 % (ref 11.5–14.5)
GLUCOSE BLD STRIP.AUTO-MCNC: 125 MG/DL (ref 65–117)
GLUCOSE BLD STRIP.AUTO-MCNC: 144 MG/DL (ref 65–117)
GLUCOSE BLD STRIP.AUTO-MCNC: 148 MG/DL (ref 65–117)
GLUCOSE BLD STRIP.AUTO-MCNC: 97 MG/DL (ref 65–117)
GLUCOSE SERPL-MCNC: 94 MG/DL (ref 65–100)
HCT VFR BLD AUTO: 30 % (ref 36.6–50.3)
HGB BLD-MCNC: 9.7 G/DL (ref 12.1–17)
IMM GRANULOCYTES # BLD AUTO: 0 K/UL (ref 0–0.04)
IMM GRANULOCYTES NFR BLD AUTO: 1 % (ref 0–0.5)
LYMPHOCYTES # BLD: 1.6 K/UL (ref 0.8–3.5)
LYMPHOCYTES NFR BLD: 24 % (ref 12–49)
MCH RBC QN AUTO: 27.6 PG (ref 26–34)
MCHC RBC AUTO-ENTMCNC: 32.3 G/DL (ref 30–36.5)
MCV RBC AUTO: 85.2 FL (ref 80–99)
MONOCYTES # BLD: 0.6 K/UL (ref 0–1)
MONOCYTES NFR BLD: 9 % (ref 5–13)
NEUTS SEG # BLD: 3.9 K/UL (ref 1.8–8)
NEUTS SEG NFR BLD: 60 % (ref 32–75)
NRBC # BLD: 0 K/UL (ref 0–0.01)
NRBC BLD-RTO: 0 PER 100 WBC
PLATELET # BLD AUTO: 301 K/UL (ref 150–400)
PMV BLD AUTO: 10.2 FL (ref 8.9–12.9)
POTASSIUM SERPL-SCNC: 3.9 MMOL/L (ref 3.5–5.1)
RBC # BLD AUTO: 3.52 M/UL (ref 4.1–5.7)
SERVICE CMNT-IMP: ABNORMAL
SERVICE CMNT-IMP: NORMAL
SODIUM SERPL-SCNC: 136 MMOL/L (ref 136–145)
VANCOMYCIN SERPL-MCNC: 13.1 UG/ML
WBC # BLD AUTO: 6.5 K/UL (ref 4.1–11.1)

## 2022-12-18 PROCEDURE — 74011250636 HC RX REV CODE- 250/636: Performed by: STUDENT IN AN ORGANIZED HEALTH CARE EDUCATION/TRAINING PROGRAM

## 2022-12-18 PROCEDURE — 74011636637 HC RX REV CODE- 636/637: Performed by: STUDENT IN AN ORGANIZED HEALTH CARE EDUCATION/TRAINING PROGRAM

## 2022-12-18 PROCEDURE — 80048 BASIC METABOLIC PNL TOTAL CA: CPT

## 2022-12-18 PROCEDURE — 85025 COMPLETE CBC W/AUTO DIFF WBC: CPT

## 2022-12-18 PROCEDURE — 36415 COLL VENOUS BLD VENIPUNCTURE: CPT

## 2022-12-18 PROCEDURE — 82962 GLUCOSE BLOOD TEST: CPT

## 2022-12-18 PROCEDURE — 74011250637 HC RX REV CODE- 250/637: Performed by: STUDENT IN AN ORGANIZED HEALTH CARE EDUCATION/TRAINING PROGRAM

## 2022-12-18 PROCEDURE — 74011000258 HC RX REV CODE- 258: Performed by: INTERNAL MEDICINE

## 2022-12-18 PROCEDURE — 74011000250 HC RX REV CODE- 250: Performed by: STUDENT IN AN ORGANIZED HEALTH CARE EDUCATION/TRAINING PROGRAM

## 2022-12-18 PROCEDURE — 65270000046 HC RM TELEMETRY

## 2022-12-18 PROCEDURE — 80202 ASSAY OF VANCOMYCIN: CPT

## 2022-12-18 PROCEDURE — 97535 SELF CARE MNGMENT TRAINING: CPT

## 2022-12-18 PROCEDURE — 74011250636 HC RX REV CODE- 250/636: Performed by: INTERNAL MEDICINE

## 2022-12-18 RX ADMIN — PREDNISONE 20 MG: 20 TABLET ORAL at 09:08

## 2022-12-18 RX ADMIN — MIRTAZAPINE 7.5 MG: 15 TABLET, FILM COATED ORAL at 21:43

## 2022-12-18 RX ADMIN — VANCOMYCIN HYDROCHLORIDE 1250 MG: 1.25 INJECTION, POWDER, LYOPHILIZED, FOR SOLUTION INTRAVENOUS at 18:34

## 2022-12-18 RX ADMIN — ATORVASTATIN CALCIUM 80 MG: 40 TABLET, FILM COATED ORAL at 21:42

## 2022-12-18 RX ADMIN — APIXABAN 5 MG: 5 TABLET, FILM COATED ORAL at 09:08

## 2022-12-18 RX ADMIN — SODIUM CHLORIDE, PRESERVATIVE FREE 10 ML: 5 INJECTION INTRAVENOUS at 06:37

## 2022-12-18 RX ADMIN — FAMOTIDINE 20 MG: 20 TABLET, FILM COATED ORAL at 21:42

## 2022-12-18 RX ADMIN — SODIUM CHLORIDE, PRESERVATIVE FREE 10 ML: 5 INJECTION INTRAVENOUS at 15:23

## 2022-12-18 RX ADMIN — MEROPENEM 1 G: 1 INJECTION, POWDER, FOR SOLUTION INTRAVENOUS at 06:37

## 2022-12-18 RX ADMIN — FAMOTIDINE 20 MG: 20 TABLET, FILM COATED ORAL at 09:07

## 2022-12-18 RX ADMIN — Medication 3 MG: at 21:43

## 2022-12-18 RX ADMIN — MEROPENEM 1 G: 1 INJECTION, POWDER, FOR SOLUTION INTRAVENOUS at 15:23

## 2022-12-18 RX ADMIN — APIXABAN 5 MG: 5 TABLET, FILM COATED ORAL at 17:30

## 2022-12-18 RX ADMIN — VANCOMYCIN HYDROCHLORIDE 1250 MG: 1.25 INJECTION, POWDER, LYOPHILIZED, FOR SOLUTION INTRAVENOUS at 07:02

## 2022-12-18 RX ADMIN — MEROPENEM 1 G: 1 INJECTION, POWDER, FOR SOLUTION INTRAVENOUS at 21:41

## 2022-12-18 RX ADMIN — METOPROLOL SUCCINATE 75 MG: 50 TABLET, EXTENDED RELEASE ORAL at 09:07

## 2022-12-18 RX ADMIN — DILTIAZEM HYDROCHLORIDE 60 MG: 60 TABLET, FILM COATED ORAL at 15:22

## 2022-12-18 RX ADMIN — DILTIAZEM HYDROCHLORIDE 60 MG: 60 TABLET, FILM COATED ORAL at 21:42

## 2022-12-18 RX ADMIN — ALLOPURINOL 100 MG: 100 TABLET ORAL at 09:08

## 2022-12-18 RX ADMIN — TAMSULOSIN HYDROCHLORIDE 0.4 MG: 0.4 CAPSULE ORAL at 09:08

## 2022-12-18 RX ADMIN — SODIUM CHLORIDE, PRESERVATIVE FREE 10 ML: 5 INJECTION INTRAVENOUS at 21:45

## 2022-12-18 RX ADMIN — GABAPENTIN 600 MG: 300 CAPSULE ORAL at 21:42

## 2022-12-18 RX ADMIN — FINASTERIDE 5 MG: 5 TABLET, FILM COATED ORAL at 09:08

## 2022-12-18 NOTE — PROGRESS NOTES
Day #10 of Vancomycin  Indication:  SSTI, infected penile implant presenting with hematuria  - H/o Pseudomonas and Enterococcus  Current regimen:  1250mg IV Q12H  Abx regimen:  vanc + meropenem  ID Following ?: YES  Concomitant nephrotoxic drugs (requires more frequent monitoring): None  Frequency of BMP?: daily    Recent Labs     22  0650 22  0623 22  0334   WBC  --  8.0 9.5   CREA 0.38* 0.35* 0.31*   BUN 7 9 11     Est CrCl: 110-120 ml/min; UO: 1.1 ml/kg/hr  Temp (24hrs), Av.9 °F (36.6 °C), Min:97.4 °F (36.3 °C), Max:98.7 °F (37.1 °C)    Cultures:    perineal wound: light pseudomonas aeruginosa (2 different colonies, both no S), light e faecalis (vanc ALIE = 1) final    MRSA Swab ordered (if applicable)? NO    Goal target range AUC/ALIE 400-500    Recent level history:  Date/Time Dose & Interval Measured Level (mcg/mL) Associated AUC/ALIE Dose Adjustment    12/10 @ 0447 1250 mg IV Q 12 hours 13.7 440 Dose continued    @ 0328 1250 mg IV Q12H 19 (8.5h p dose) 476 No change    @ 0650 Vanc 1.25g q12h 13.1 (~12h post dose) 458 No change                           Plan: Continue current regimen for extrapolated AUC/ALIE within goal range. Procedure on  did not produce cultures. Follow ID plan.

## 2022-12-18 NOTE — PROGRESS NOTES
Pt is laying in bed with even and unlabored respirations on room air. No complaints of pain at this time. No distress noted. Safety precautions are in place. Bed in low position and locked. Call bell within reach. Problem: Falls - Risk of  Goal: *Absence of Falls  Description: Document Мария Shouts Fall Risk and appropriate interventions in the flowsheet. Outcome: Progressing Towards Goal  Note: Fall Risk Interventions:  Mobility Interventions: PT Consult for mobility concerns    Mentation Interventions: Adequate sleep, hydration, pain control, Evaluate medications/consider consulting pharmacy    Medication Interventions: Patient to call before getting OOB    Elimination Interventions: Call light in reach              Problem: Patient Education: Go to Patient Education Activity  Goal: Patient/Family Education  Outcome: Progressing Towards Goal     Problem: Patient Education: Go to Patient Education Activity  Goal: Patient/Family Education  Outcome: Progressing Towards Goal     Problem: Patient Education: Go to Patient Education Activity  Goal: Patient/Family Education  Outcome: Progressing Towards Goal     Problem: Pressure Injury - Risk of  Goal: *Prevention of pressure injury  Description: Document Pepe Scale and appropriate interventions in the flowsheet.   Outcome: Progressing Towards Goal  Note: Pressure Injury Interventions:  Sensory Interventions: Assess changes in LOC, Assess need for specialty bed, Avoid rigorous massage over bony prominences    Moisture Interventions: Absorbent underpads, Apply protective barrier, creams and emollients, Assess need for specialty bed    Activity Interventions: Pressure redistribution bed/mattress(bed type)    Mobility Interventions: HOB 30 degrees or less, Float heels, Pressure redistribution bed/mattress (bed type)    Nutrition Interventions: Document food/fluid/supplement intake, Offer support with meals,snacks and hydration    Friction and Shear Interventions: Apply protective barrier, creams and emollients, Feet elevated on foot rest, Foam dressings/transparent film/skin sealants, HOB 30 degrees or less                Problem: Patient Education: Go to Patient Education Activity  Goal: Patient/Family Education  Outcome: Progressing Towards Goal

## 2022-12-18 NOTE — PROGRESS NOTES
Problem: Self Care Deficits Care Plan (Adult)  Goal: *Acute Goals and Plan of Care (Insert Text)  Description: FUNCTIONAL STATUS PRIOR TO ADMISSION: Patient reports being primarily bedbound since discharge to SNF, has gotten up to a wheelchair a couple of times. Patient requiring assisted for all ADLs but is able to feed self with setup. HOME SUPPORT: The patient lived at a SNF since last discharge from hospital. Prior to initial CVA, patient was living alone with limited local supports. Occupational Therapy Goals  Initiated 12/9/2022; Weekly Re-assessment 12/18/2022- All goals continued  1. Patient will perform grooming sitting edge of bed with minimal assistance/contact guard assist within 7 day(s). 2.  Patient will perform lower body dressing with maximal assistance within 7 day(s). 3.  Patient will perform anterior bathing from neck to thighs with minimal assistance/contact guard assist within 7 day(s). 4.  Patient will perform toilet transfers with maximal assistance within 7 day(s). 5.  Patient will perform all aspects of toileting with maximal assistance within 7 day(s). 6.  Patient will participate in upper extremity therapeutic exercise/activities with minimal assistance/contact guard assist for 10 minutes within 7 day(s). Outcome: Progressing Towards Goal    OCCUPATIONAL THERAPY TREATMENT/WEEKLY RE-ASSESSMENT  Patient: Nilda Cotton (13 y.o. male)  Date: 12/18/2022  Diagnosis: Hematuria [R31.9]  Penile implant failure ([facility] [address] [T83.410A] <principal problem not specified>  Procedure(s) (LRB):  REMOVAL OF RETAINED PENILE PROSTHESIS (N/A) 2 Days Post-Op  Precautions: Fall  Chart, occupational therapy assessment, plan of care, and goals were reviewed. ASSESSMENT  Patient continues with skilled OT services and is slowly progressing towards goals.   Pt continues to demonstrate decreased mobility/balance, activity tolerance, strength/endurance, activity tolerance, full body reaching and R hemispheric AROM, with pt noted with intermittent confusion this date, requiring Total A for bed level toileting and brief donning, with Max A to roll. Given above mentioned, all goals continued as listed above, with acute OT to continue to follow and SNF recommended at discharge. Of note, pt with c/o LUE pain with movement; with nursing notified and following. Current Level of Function Impacting Discharge (ADLs): Up to Total A    Other factors to consider for discharge: Total A, with Max A to roll         PLAN :  Goals have been updated based on progression since last assessment. Patient continues to benefit from skilled intervention to address the above impairments. Continue to follow patient 3 times a week to address goals. Recommend with staff: Frequent positional changes, bed level ADL engagement    Recommend next OT session: POC progression    Recommendation for discharge: (in order for the patient to meet his/her long term goals)  Therapy up to 5 days/week in SNF setting    This discharge recommendation:  Has been made in collaboration with the attending provider and/or case management    IF patient discharges home will need the following DME: TBD       SUBJECTIVE:   Patient stated I already brushed my teeth.     OBJECTIVE DATA SUMMARY:   Cognitive/Behavioral Status:  Neurologic State: Alert  Orientation Level: Oriented X4 (intermittent confusion)  Cognition: Follows commands  Perception: Cues to attend to right side of body  Perseveration: No perseveration noted  Safety/Judgement: Decreased insight into deficits    Functional Mobility and Transfers for ADLs:  Bed Mobility:  Rolling: Maximum assistance    ADL Intervention:  Type of Bath: Chlorhexidine (CHG) ([place])    Lower Body Bathing  Perineal  : Total assistance (dependent)    Lower Body Dressing Assistance  Protective Undergarmet:  Total assistance (dependent) (bed level)    Cognitive Retraining  Safety/Judgement: Decreased insight into deficits    Pain:  C/o LUE pain with movement, did not quantify; RN aware and following    Activity Tolerance:   Poor and requires rest breaks    After treatment patient left in no apparent distress:   Supine in bed, Call bell within reach, Bed / chair alarm activated, and Side rails x 3    COMMUNICATION/COLLABORATION:   The patients plan of care was discussed with: Registered nurse.      Nadia Rod OT  Time Calculation: 14 mins

## 2022-12-18 NOTE — PROGRESS NOTES
[address] [facility]  Hospitalist Group                                                                                          Hospitalist Progress Note  Donald Rai MD  Answering service: [phone] from in house phone        Date of Service:  2022  NAME:  Prema Allen  :  1950  MRN:  [id number]      Admission Summary:   Prema Allen is a 71 y.o. male who presents with hematuria. 60-year-old  male with past medical history of atrial fibrillation on Xarelto, depression, diabetes, dyslipidemia, hypertension, CVA, VALENTE on CPAP, restless leg syndrome, penile implant, who comes in for the above. Patient is apparently sent over by his facility for hematuria of about 2 weeks duration with his penile implant not working anymore. In the ED, his penile implant was found to be protruding and Sexton catheter was inserted with pink-colored urine draining. He denies any fever, chills. He reports on and off pain in the penile area. He says that this is his second penile implant. The ER spoke to urology and they recommended antibiotics. Urology has seen in the ER and will plan for surgery on Monday. Of note, the patient has previous Pseudomonas in urine as well as Enterococcus. Initial recommendations were vancomycin and gentamicin, but at that time patient is not symptomatic or septic. The patient is not quite sure as to why he was sent to the hospital.  Per the ER note, the patient's neurologist had apparently spoken to the patient SNF and wanted him transferred here yesterday. I cannot find any medical record of the purpose of this device, the patient says that it was placed for sexual purposes. Interval history / Subjective:   Patient seen and examined earlier . s/p total removal of prothesis yesterday      Assessment & Plan:     Eroding penile implant, 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   Urology on board  Vancomycin and merram IV s/p IV cefepime   S/p OR on  retention of prosthesis   OR again on 12/16 to remove remaining part of prosthesis completed, ok to resume ac per urology   - ID consulted appreciate recs switch to IV Merrem continue vanco, further adjustments per ID     Superficial thrombophlebitis LUE  -IV induced, confirmed by US 12/17  -Instructed RN to place warm compress, complete prednisone 5 days last dose 12/22    Hematuria  Monitor with H&H  No signs of active bleeding at this time  Restart ac cleared by urology to do so 12/16   Transfuse hemoglobin less than 7     Atrial fibrillation on Eliquis  Continue diltiazem and metoprolol  resumed Eliquis was on hold prior for  surgery   Cardiology consulted appreciate recs     Hypertension  Continue home medications     Diabetes  Sliding scale insulin     BPH  Continue home tamsulosin and finasteride     History of recent CVA with residual right-sided weakness  Has baseline  Monitor  Continue atorvastatin     Depression  Home Remeron    Left arm pain/left elbow pain  XRs  Unclear cause      Updated sister over the phone    Code status: Full  Prophylaxis: Eliquis on hold  Care Plan discussed with: Patient, nurse  Anticipated Disposition: 24- 48 hours, need ID abx recs prior to dc, snf when ready      Hospital Problems  Date Reviewed: 10/12/2022            Codes Class Noted POA    Hematuria ICD-10-CM: R31.9  ICD-9-CM: 599.70  12/8/2022 Unknown        Penile implant failure [facility]) ICD-10-CM: T83.410A  ICD-9-CM: 996.76  12/8/2022 Unknown       Review of Systems:   A comprehensive review of systems was negative except for that written in the HPI. Vital Signs:    Last 24hrs VS reviewed since prior progress note.  Most recent are:  Visit Vitals  /71 (BP 1 Location: Left lower arm, BP Patient Position: At rest;Lying)   Pulse 63   Temp 97.3 °F (36.3 °C)   Resp 18   Ht 6' 3\" (1.905 m)   Wt 87.5 kg (192 lb 14.4 oz)   SpO2 96%   BMI 24.11 kg/m²         Intake/Output Summary (Last 24 hours) at 12/18/2022 1243  Last data filed at 12/18/2022 0715  Gross per 24 hour   Intake --   Output 3275 ml   Net -3275 ml          Physical Examination:     I had a face to face encounter with this patient and independently examined them on 12/18/2022 as outlined below:    General: Alert awake, no acute distress, resting in bed, pleasant male, appears to be stated age  HEENT: PEERL, EOMI, moist mucus membranes  Neck: Supple, no JVD, no meningeal signs  Chest: Clear to auscultation bilaterally   CVS: RRR, S1 S2 heard, no murmurs/rubs/gallops  Abd: Soft, non-tender, non-distended, +bowel sounds   Ext: No clubbing, no cyanosis, trace edema bl UE, LUE slightly more erythematous and tender to palpation   Neuro/Psych: Pleasant mood and affect, sensory grossly within normal limit, right lower extremity 1-2/5. Right upper extremity 0/5. Cap refill: Brisk, less than 3 seconds  Pulses: 2+, symmetric in all extremities  Skin: Warm, dry, without rashes or lesions  : Patient has vazquez  No blood at this time. Vazquez in place with yellow urine            Data Review:    Review and/or order of clinical lab test  Review and/or order of tests in the radiology section of CPT  Review and/or order of tests in the medicine section of CPT      Labs:     Recent Labs     12/18/22  0650 12/17/22  0623   WBC 6.5 8.0   HGB 9.7* 9.1*   HCT 30.0* 29.6*    277       Recent Labs     12/18/22  0650 12/17/22  0623 12/16/22  0334    133* 133*   K 3.9 4.2 3.6    103 101   CO2 30 24 25   BUN 7 9 11   CREA 0.38* 0.35* 0.31*   GLU 94 93 85   CA 9.0 8.7 8.3*       No results for input(s): ALT, AP, TBIL, TBILI, TP, ALB, GLOB, GGT, AML, LPSE in the last 72 hours. No lab exists for component: SGOT, GPT, AMYP, HLPSE    No results for input(s): INR, PTP, APTT, INREXT, INREXT in the last 72 hours. No results for input(s): FE, TIBC, PSAT, FERR in the last 72 hours.    No results found for: FOL, RBCF   No results for input(s): PH, PCO2, PO2 in the last 72 hours. No results for input(s): CPK, CKNDX, TROIQ in the last 72 hours.     No lab exists for component: CPKMB  Lab Results   Component Value Date/Time    Cholesterol, total 146 09/15/2022 02:40 AM    HDL Cholesterol 53 09/15/2022 02:40 AM    LDL, calculated 65 09/15/2022 02:40 AM    Triglyceride 140 09/15/2022 02:40 AM    CHOL/HDL Ratio 2.8 09/15/2022 02:40 AM     Lab Results   Component Value Date/Time    Glucose (POC) 125 (H) 12/18/2022 12:14 PM    Glucose (POC) 97 12/18/2022 06:45 AM    Glucose (POC) 120 (H) 12/17/2022 11:44 PM    Glucose (POC) 106 12/17/2022 09:04 PM    Glucose (POC) 168 (H) 12/17/2022 04:26 PM     Lab Results   Component Value Date/Time    Color YELLOW/STRAW 10/18/2022 01:01 AM    Appearance TURBID (A) 10/18/2022 01:01 AM    Specific gravity 1.013 10/18/2022 01:01 AM    pH (UA) 6.5 10/18/2022 01:01 AM    Protein TRACE (A) 10/18/2022 01:01 AM    Glucose Negative 10/18/2022 01:01 AM    Ketone Negative 10/18/2022 01:01 AM    Bilirubin Negative 10/18/2022 01:01 AM    Urobilinogen 4.0 (H) 10/18/2022 01:01 AM    Nitrites Positive (A) 10/18/2022 01:01 AM    Leukocyte Esterase MODERATE (A) 10/18/2022 01:01 AM    Epithelial cells FEW 10/18/2022 01:01 AM    Bacteria 4+ (A) 10/18/2022 01:01 AM    WBC 20-50 10/18/2022 01:01 AM    RBC 0-5 10/18/2022 01:01 AM         Medications Reviewed:     Current Facility-Administered Medications   Medication Dose Route Frequency    predniSONE (DELTASONE) tablet 20 mg  20 mg Oral DAILY WITH BREAKFAST    apixaban (ELIQUIS) tablet 5 mg  5 mg Oral BID    meropenem (MERREM) 1 g in 0.9% sodium chloride (MBP/ADV) 50 mL MBP  1 g IntraVENous Q8H    oxyCODONE IR (ROXICODONE) tablet 5 mg  5 mg Oral Q4H PRN    gabapentin (NEURONTIN) capsule 600 mg  600 mg Oral QHS    melatonin tablet 3 mg  3 mg Oral QHS    sodium chloride (NS) flush 5-40 mL  5-40 mL IntraVENous Q8H    sodium chloride (NS) flush 5-40 mL  5-40 mL IntraVENous PRN    acetaminophen (TYLENOL) tablet 650 mg  650 mg Oral Q6H PRN    Or    acetaminophen (TYLENOL) suppository 650 mg  650 mg Rectal Q6H PRN    polyethylene glycol (MIRALAX) packet 17 g  17 g Oral DAILY PRN    ondansetron (ZOFRAN ODT) tablet 4 mg  4 mg Oral Q8H PRN    Or    ondansetron (ZOFRAN) injection 4 mg  4 mg IntraVENous Q6H PRN    insulin lispro (HUMALOG) injection   SubCUTAneous AC&HS    glucose chewable tablet 16 g  4 Tablet Oral PRN    glucagon (GLUCAGEN) injection 1 mg  1 mg IntraMUSCular PRN    dextrose 10 % infusion 0-250 mL  0-250 mL IntraVENous PRN    allopurinoL (ZYLOPRIM) tablet 100 mg  100 mg Oral DAILY    atorvastatin (LIPITOR) tablet 80 mg  80 mg Oral QHS    dilTIAZem IR (CARDIZEM) tablet 60 mg  60 mg Oral Q8H    famotidine (PEPCID) tablet 20 mg  20 mg Oral Q12H    finasteride (PROSCAR) tablet 5 mg  5 mg Oral DAILY    metoprolol succinate (TOPROL-XL) XL tablet 75 mg  75 mg Oral DAILY    tamsulosin (FLOMAX) capsule 0.4 mg  0.4 mg Oral DAILY    mirtazapine (REMERON) tablet 7.5 mg  7.5 mg Oral QHS    Vancomycin - Pharmacy to Dose   Other Rx Dosing/Monitoring    vancomycin (VANCOCIN) 1,250 mg in 0.9% sodium chloride 250 mL ([id number])  1,250 mg IntraVENous Q12H     ______________________________________________________________________  EXPECTED LENGTH OF STAY: 5d 21h  ACTUAL LENGTH OF STAY:          10                 Alma Rosa Garcia MD

## 2022-12-18 NOTE — PROGRESS NOTES
ID-cross coverage weekend  Patient discussed with hospitalist.  Patient is doing well, ready for discharge.   Antibiotic orders per DR Wander Carrera recommendation    Antimicrobial orders for discharge  -Vancomycin 1.25 g IV every 12 hours weeks end date 12/26  -Meropenem 1 g IV every 8 hours end date 12/26  Infusion pharmacy on consult for dosing vancomycin -600/target trough 15-20  -Pull PICC at end of therapy on/after 12/26  -Weekly CBC, CMP, vancomycin trough-  -Fax reports to Dr Marly Barajas  -Encourage adequate fluids, daily probiotic/yogurt  -If PICC malfunction occurs and home health cannot reposition  please send patient to ED immediately  -ID follow-up -call office of  -if fever present or persistent drainage occurs

## 2022-12-18 NOTE — PROGRESS NOTES
Cardiac monitor showing heart rate 60s, patient asymptomatic, denies chest pain, SOB, lightheadedness, or dizziness. Radial pulse noted to be 62. Scheduled Diltiazem held due to heart rate. Patient remains on tele monitor. Plan to make MD aware.

## 2022-12-19 ENCOUNTER — APPOINTMENT (OUTPATIENT)
Dept: GENERAL RADIOLOGY | Age: 72
DRG: 674 | End: 2022-12-19
Attending: STUDENT IN AN ORGANIZED HEALTH CARE EDUCATION/TRAINING PROGRAM
Payer: MEDICARE

## 2022-12-19 LAB
ANION GAP SERPL CALC-SCNC: 2 MMOL/L (ref 5–15)
BASOPHILS # BLD: 0 K/UL (ref 0–0.1)
BASOPHILS NFR BLD: 0 % (ref 0–1)
BUN SERPL-MCNC: 8 MG/DL (ref 6–20)
BUN/CREAT SERPL: 20 (ref 12–20)
CALCIUM SERPL-MCNC: 9.3 MG/DL (ref 8.5–10.1)
CHLORIDE SERPL-SCNC: 104 MMOL/L (ref 97–108)
CO2 SERPL-SCNC: 31 MMOL/L (ref 21–32)
COVID-19 RAPID TEST, COVR: NOT DETECTED
CREAT SERPL-MCNC: 0.41 MG/DL (ref 0.7–1.3)
DIFFERENTIAL METHOD BLD: ABNORMAL
EOSINOPHIL # BLD: 0.3 K/UL (ref 0–0.4)
EOSINOPHIL NFR BLD: 3 % (ref 0–7)
ERYTHROCYTE [DISTWIDTH] IN BLOOD BY AUTOMATED COUNT: 14.9 % (ref 11.5–14.5)
GLUCOSE BLD STRIP.AUTO-MCNC: 105 MG/DL (ref 65–117)
GLUCOSE BLD STRIP.AUTO-MCNC: 128 MG/DL (ref 65–117)
GLUCOSE BLD STRIP.AUTO-MCNC: 149 MG/DL (ref 65–117)
GLUCOSE BLD STRIP.AUTO-MCNC: 170 MG/DL (ref 65–117)
GLUCOSE SERPL-MCNC: 107 MG/DL (ref 65–100)
HCT VFR BLD AUTO: 28.5 % (ref 36.6–50.3)
HGB BLD-MCNC: 9 G/DL (ref 12.1–17)
IMM GRANULOCYTES # BLD AUTO: 0 K/UL (ref 0–0.04)
IMM GRANULOCYTES NFR BLD AUTO: 1 % (ref 0–0.5)
LYMPHOCYTES # BLD: 2 K/UL (ref 0.8–3.5)
LYMPHOCYTES NFR BLD: 25 % (ref 12–49)
MCH RBC QN AUTO: 27.4 PG (ref 26–34)
MCHC RBC AUTO-ENTMCNC: 31.6 G/DL (ref 30–36.5)
MCV RBC AUTO: 86.6 FL (ref 80–99)
MONOCYTES # BLD: 0.7 K/UL (ref 0–1)
MONOCYTES NFR BLD: 9 % (ref 5–13)
NEUTS SEG # BLD: 4.9 K/UL (ref 1.8–8)
NEUTS SEG NFR BLD: 62 % (ref 32–75)
NRBC # BLD: 0 K/UL (ref 0–0.01)
NRBC BLD-RTO: 0 PER 100 WBC
PLATELET # BLD AUTO: 308 K/UL (ref 150–400)
PMV BLD AUTO: 9.9 FL (ref 8.9–12.9)
POTASSIUM SERPL-SCNC: 3.6 MMOL/L (ref 3.5–5.1)
RBC # BLD AUTO: 3.29 M/UL (ref 4.1–5.7)
SERVICE CMNT-IMP: ABNORMAL
SERVICE CMNT-IMP: NORMAL
SODIUM SERPL-SCNC: 137 MMOL/L (ref 136–145)
SOURCE, COVRS: NORMAL
WBC # BLD AUTO: 8 K/UL (ref 4.1–11.1)

## 2022-12-19 PROCEDURE — 65270000046 HC RM TELEMETRY

## 2022-12-19 PROCEDURE — 74011250637 HC RX REV CODE- 250/637: Performed by: STUDENT IN AN ORGANIZED HEALTH CARE EDUCATION/TRAINING PROGRAM

## 2022-12-19 PROCEDURE — 74011250636 HC RX REV CODE- 250/636: Performed by: STUDENT IN AN ORGANIZED HEALTH CARE EDUCATION/TRAINING PROGRAM

## 2022-12-19 PROCEDURE — 36415 COLL VENOUS BLD VENIPUNCTURE: CPT

## 2022-12-19 PROCEDURE — 74011250636 HC RX REV CODE- 250/636: Performed by: INTERNAL MEDICINE

## 2022-12-19 PROCEDURE — 71045 X-RAY EXAM CHEST 1 VIEW: CPT

## 2022-12-19 PROCEDURE — 85025 COMPLETE CBC W/AUTO DIFF WBC: CPT

## 2022-12-19 PROCEDURE — 82962 GLUCOSE BLOOD TEST: CPT

## 2022-12-19 PROCEDURE — 74011636637 HC RX REV CODE- 636/637: Performed by: STUDENT IN AN ORGANIZED HEALTH CARE EDUCATION/TRAINING PROGRAM

## 2022-12-19 PROCEDURE — 36573 INSJ PICC RS&I 5 YR+: CPT | Performed by: STUDENT IN AN ORGANIZED HEALTH CARE EDUCATION/TRAINING PROGRAM

## 2022-12-19 PROCEDURE — C1751 CATH, INF, PER/CENT/MIDLINE: HCPCS

## 2022-12-19 PROCEDURE — 74011000250 HC RX REV CODE- 250: Performed by: STUDENT IN AN ORGANIZED HEALTH CARE EDUCATION/TRAINING PROGRAM

## 2022-12-19 PROCEDURE — 80048 BASIC METABOLIC PNL TOTAL CA: CPT

## 2022-12-19 PROCEDURE — 74011000258 HC RX REV CODE- 258: Performed by: INTERNAL MEDICINE

## 2022-12-19 PROCEDURE — 87635 SARS-COV-2 COVID-19 AMP PRB: CPT

## 2022-12-19 PROCEDURE — 76937 US GUIDE VASCULAR ACCESS: CPT

## 2022-12-19 PROCEDURE — 77030020365 HC SOL INJ SOD CL 0.9% 50ML

## 2022-12-19 RX ORDER — BALSAM PERU/CASTOR OIL
OINTMENT (GRAM) TOPICAL EVERY 12 HOURS
Status: DISCONTINUED | OUTPATIENT
Start: 2022-12-19 | End: 2022-12-20 | Stop reason: HOSPADM

## 2022-12-19 RX ORDER — PREDNISONE 20 MG/1
20 TABLET ORAL
Qty: 4 TABLET | Refills: 0 | Status: SHIPPED
Start: 2022-12-19 | End: 2022-12-20 | Stop reason: SDUPTHER

## 2022-12-19 RX ADMIN — Medication 3 MG: at 21:45

## 2022-12-19 RX ADMIN — FAMOTIDINE 20 MG: 20 TABLET, FILM COATED ORAL at 09:09

## 2022-12-19 RX ADMIN — DILTIAZEM HYDROCHLORIDE 60 MG: 60 TABLET, FILM COATED ORAL at 21:45

## 2022-12-19 RX ADMIN — GABAPENTIN 600 MG: 300 CAPSULE ORAL at 21:45

## 2022-12-19 RX ADMIN — ATORVASTATIN CALCIUM 80 MG: 40 TABLET, FILM COATED ORAL at 21:45

## 2022-12-19 RX ADMIN — APIXABAN 5 MG: 5 TABLET, FILM COATED ORAL at 09:10

## 2022-12-19 RX ADMIN — TAMSULOSIN HYDROCHLORIDE 0.4 MG: 0.4 CAPSULE ORAL at 09:10

## 2022-12-19 RX ADMIN — MEROPENEM 1 G: 1 INJECTION, POWDER, FOR SOLUTION INTRAVENOUS at 07:15

## 2022-12-19 RX ADMIN — PREDNISONE 20 MG: 20 TABLET ORAL at 09:10

## 2022-12-19 RX ADMIN — ALLOPURINOL 100 MG: 100 TABLET ORAL at 09:10

## 2022-12-19 RX ADMIN — FINASTERIDE 5 MG: 5 TABLET, FILM COATED ORAL at 09:10

## 2022-12-19 RX ADMIN — Medication: at 17:41

## 2022-12-19 RX ADMIN — VANCOMYCIN HYDROCHLORIDE 1250 MG: 1.25 INJECTION, POWDER, LYOPHILIZED, FOR SOLUTION INTRAVENOUS at 17:25

## 2022-12-19 RX ADMIN — VANCOMYCIN HYDROCHLORIDE 1250 MG: 1.25 INJECTION, POWDER, LYOPHILIZED, FOR SOLUTION INTRAVENOUS at 05:02

## 2022-12-19 RX ADMIN — FAMOTIDINE 20 MG: 20 TABLET, FILM COATED ORAL at 21:46

## 2022-12-19 RX ADMIN — Medication 2 UNITS: at 16:57

## 2022-12-19 RX ADMIN — APIXABAN 5 MG: 5 TABLET, FILM COATED ORAL at 17:36

## 2022-12-19 RX ADMIN — Medication: at 21:52

## 2022-12-19 RX ADMIN — Medication 2 UNITS: at 11:46

## 2022-12-19 RX ADMIN — MIRTAZAPINE 7.5 MG: 15 TABLET, FILM COATED ORAL at 21:46

## 2022-12-19 RX ADMIN — MEROPENEM 1 G: 1 INJECTION, POWDER, FOR SOLUTION INTRAVENOUS at 21:45

## 2022-12-19 RX ADMIN — SODIUM CHLORIDE, PRESERVATIVE FREE 10 ML: 5 INJECTION INTRAVENOUS at 13:45

## 2022-12-19 RX ADMIN — DILTIAZEM HYDROCHLORIDE 60 MG: 60 TABLET, FILM COATED ORAL at 06:56

## 2022-12-19 RX ADMIN — SODIUM CHLORIDE, PRESERVATIVE FREE 10 ML: 5 INJECTION INTRAVENOUS at 06:58

## 2022-12-19 RX ADMIN — MEROPENEM 1 G: 1 INJECTION, POWDER, FOR SOLUTION INTRAVENOUS at 13:42

## 2022-12-19 RX ADMIN — SODIUM CHLORIDE, PRESERVATIVE FREE 10 ML: 5 INJECTION INTRAVENOUS at 21:49

## 2022-12-19 NOTE — PROCEDURES
Unable to place PICC line in right arm due hx of CVA and extremity weakness. Recent ultra sound shows thrombophlebitis in left cephalic vein below and above the [facility]. Spoke with Dr. Beverly Waggoner and received clearance to place PICC in left basilic or left brachial vein.

## 2022-12-19 NOTE — PROGRESS NOTES
[facility]  [facility]                                                                                          Hospitalist Progress Note  Shadi Grimm MD  Answering service: [phone] from in house phone        Date of Service:  2022  NAME:  Everardo France  :  1950  MRN:  [id number]      Admission Summary:   Everardo France is a 71 y.o. male who presents with hematuria. 67-year-old  male with past medical history of atrial fibrillation on Xarelto, depression, diabetes, dyslipidemia, hypertension, CVA, VALENTE on CPAP, restless leg syndrome, penile implant, who comes in for the above. Patient is apparently sent over by his facility for hematuria of about 2 weeks duration with his penile implant not working anymore. In the ED, his penile implant was found to be protruding and Sexton catheter was inserted with pink-colored urine draining. He denies any fever, chills. He reports on and off pain in the penile area. He says that this is his second penile implant. The ER spoke to urology and they recommended antibiotics. Urology has seen in the ER and will plan for surgery on Monday. Of note, the patient has previous Pseudomonas in urine as well as Enterococcus. Initial recommendations were vancomycin and gentamicin, but at that time patient is not symptomatic or septic. The patient is not quite sure as to why he was sent to the hospital.  Per the ER note, the patient's neurologist had apparently spoken to the patient SNF and wanted him transferred here yesterday. I cannot find any medical record of the purpose of this device, the patient says that it was placed for sexual purposes. Interval history / Subjective:   Patient seen and examined earlier . s/p total removal of prothesis yesterday      Assessment & Plan:     Eroding penile implant, 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   Urology on board  Vancomycin and merram IV s/p IV cefepime   S/p OR on  retention of prosthesis   OR again on 12/16 to remove remaining part of prosthesis completed, ok to resume ac per urology   - ID consulted appreciate recs switch to IV Merrem continue vanco, to complete 2 weeks total IV abx orders in place and date 12/26  -PICC to be placed 12/19     Superficial thrombophlebitis LUE  -IV induced, confirmed by US 12/17  -Instructed RN to place warm compress, complete prednisone 5 days last dose 12/22    Hematuria  Monitor with H&H  No signs of active bleeding at this time  Restart ac cleared by urology to do so 12/16   Transfuse hemoglobin less than 7     Atrial fibrillation on Eliquis  Continue diltiazem and metoprolol  resumed Eliquis was on hold prior for  surgery   Cardiology consulted appreciate recs     Hypertension  Continue home medications     Diabetes  Sliding scale insulin     BPH  Continue home tamsulosin and finasteride     History of recent CVA with residual right-sided weakness  Has baseline  Monitor  Continue atorvastatin     Depression  Home Remeron    Left arm pain/left elbow pain  XRs  Unclear cause      Updated sister over the phone    Code status: Full  Prophylaxis: Eliquis on hold  Care Plan discussed with: Patient, nurse, cm   Anticipated Disposition: Medically ready for DC to SNF, needs insurance auth. CM working on it      Hospital Problems  Date Reviewed: 10/12/2022            Codes Class Noted POA    Hematuria ICD-10-CM: R31.9  ICD-9-CM: 599.70  12/8/2022 Unknown        Penile implant failure [facility]) ICD-10-CM: T83.410A  ICD-9-CM: 996.76  12/8/2022 Unknown       Review of Systems:   A comprehensive review of systems was negative except for that written in the HPI. Vital Signs:    Last 24hrs VS reviewed since prior progress note.  Most recent are:  Visit Vitals  /70 (BP 1 Location: Left upper arm, BP Patient Position: At rest)   Pulse (!) 56   Temp 97.8 °F (36.6 °C)   Resp 18   Ht 6' 3\" (1.905 m)   Wt 83.3 kg (183 lb 10.3 oz)   SpO2 98%   BMI 22.95 kg/m²         Intake/Output Summary (Last 24 hours) at 12/19/2022 1241  Last data filed at 12/19/2022 0302  Gross per 24 hour   Intake --   Output 2400 ml   Net -2400 ml          Physical Examination:     I had a face to face encounter with this patient and independently examined them on 12/19/2022 as outlined below:    General: Alert awake, no acute distress, resting in bed, pleasant male, appears to be stated age  HEENT: PEERL, EOMI, moist mucus membranes  Neck: Supple, no JVD, no meningeal signs  Chest: Clear to auscultation bilaterally   CVS: RRR, S1 S2 heard, no murmurs/rubs/gallops  Abd: Soft, non-tender, non-distended, +bowel sounds   Ext: No clubbing, no cyanosis, trace edema bl UE, LUE slightly more erythematous and tender to palpation   Neuro/Psych: Pleasant mood and affect, sensory grossly within normal limit, right lower extremity 1-2/5. Right upper extremity 0/5. Cap refill: Brisk, less than 3 seconds  Pulses: 2+, symmetric in all extremities  Skin: Warm, dry, without rashes or lesions  : Patient has vazquez  No blood at this time. Vazquez in place with yellow urine            Data Review:    Review and/or order of clinical lab test  Review and/or order of tests in the radiology section of CPT  Review and/or order of tests in the medicine section of CPT      Labs:     Recent Labs     12/19/22  0701 12/18/22  0650   WBC 8.0 6.5   HGB 9.0* 9.7*   HCT 28.5* 30.0*    301       Recent Labs     12/19/22  0701 12/18/22  0650 12/17/22  0623    136 133*   K 3.6 3.9 4.2    102 103   CO2 31 30 24   BUN 8 7 9   CREA 0.41* 0.38* 0.35*   * 94 93   CA 9.3 9.0 8.7       No results for input(s): ALT, AP, TBIL, TBILI, TP, ALB, GLOB, GGT, AML, LPSE in the last 72 hours. No lab exists for component: SGOT, GPT, AMYP, HLPSE    No results for input(s): INR, PTP, APTT, INREXT, INREXT in the last 72 hours. No results for input(s): FE, TIBC, PSAT, FERR in the last 72 hours.    No results found for: FOL, RBCF   No results for input(s): PH, PCO2, PO2 in the last 72 hours. No results for input(s): CPK, CKNDX, TROIQ in the last 72 hours.     No lab exists for component: CPKMB  Lab Results   Component Value Date/Time    Cholesterol, total 146 09/15/2022 02:40 AM    HDL Cholesterol 53 09/15/2022 02:40 AM    LDL, calculated 65 09/15/2022 02:40 AM    Triglyceride 140 09/15/2022 02:40 AM    CHOL/HDL Ratio 2.8 09/15/2022 02:40 AM     Lab Results   Component Value Date/Time    Glucose (POC) 149 (H) 12/19/2022 11:09 AM    Glucose (POC) 105 12/19/2022 06:37 AM    Glucose (POC) 148 (H) 12/18/2022 09:43 PM    Glucose (POC) 144 (H) 12/18/2022 04:34 PM    Glucose (POC) 125 (H) 12/18/2022 12:14 PM     Lab Results   Component Value Date/Time    Color YELLOW/STRAW 10/18/2022 01:01 AM    Appearance TURBID (A) 10/18/2022 01:01 AM    Specific gravity 1.013 10/18/2022 01:01 AM    pH (UA) 6.5 10/18/2022 01:01 AM    Protein TRACE (A) 10/18/2022 01:01 AM    Glucose Negative 10/18/2022 01:01 AM    Ketone Negative 10/18/2022 01:01 AM    Bilirubin Negative 10/18/2022 01:01 AM    Urobilinogen 4.0 (H) 10/18/2022 01:01 AM    Nitrites Positive (A) 10/18/2022 01:01 AM    Leukocyte Esterase MODERATE (A) 10/18/2022 01:01 AM    Epithelial cells FEW 10/18/2022 01:01 AM    Bacteria 4+ (A) 10/18/2022 01:01 AM    WBC 20-50 10/18/2022 01:01 AM    RBC 0-5 10/18/2022 01:01 AM         Medications Reviewed:     Current Facility-Administered Medications   Medication Dose Route Frequency    predniSONE (DELTASONE) tablet 20 mg  20 mg Oral DAILY WITH BREAKFAST    apixaban (ELIQUIS) tablet 5 mg  5 mg Oral BID    meropenem (MERREM) 1 g in 0.9% sodium chloride (MBP/ADV) 50 mL MBP  1 g IntraVENous Q8H    oxyCODONE IR (ROXICODONE) tablet 5 mg  5 mg Oral Q4H PRN    gabapentin (NEURONTIN) capsule 600 mg  600 mg Oral QHS    melatonin tablet 3 mg  3 mg Oral QHS    sodium chloride (NS) flush 5-40 mL  5-40 mL IntraVENous Q8H    sodium chloride (NS) flush 5-40 mL 5-40 mL IntraVENous PRN    acetaminophen (TYLENOL) tablet 650 mg  650 mg Oral Q6H PRN    Or    acetaminophen (TYLENOL) suppository 650 mg  650 mg Rectal Q6H PRN    polyethylene glycol (MIRALAX) packet 17 g  17 g Oral DAILY PRN    ondansetron (ZOFRAN ODT) tablet 4 mg  4 mg Oral Q8H PRN    Or    ondansetron (ZOFRAN) injection 4 mg  4 mg IntraVENous Q6H PRN    insulin lispro (HUMALOG) injection   SubCUTAneous AC&HS    glucose chewable tablet 16 g  4 Tablet Oral PRN    glucagon (GLUCAGEN) injection 1 mg  1 mg IntraMUSCular PRN    dextrose 10 % infusion 0-250 mL  0-250 mL IntraVENous PRN    allopurinoL (ZYLOPRIM) tablet 100 mg  100 mg Oral DAILY    atorvastatin (LIPITOR) tablet 80 mg  80 mg Oral QHS    dilTIAZem IR (CARDIZEM) tablet 60 mg  60 mg Oral Q8H    famotidine (PEPCID) tablet 20 mg  20 mg Oral Q12H    finasteride (PROSCAR) tablet 5 mg  5 mg Oral DAILY    metoprolol succinate (TOPROL-XL) XL tablet 75 mg  75 mg Oral DAILY    tamsulosin (FLOMAX) capsule 0.4 mg  0.4 mg Oral DAILY    mirtazapine (REMERON) tablet 7.5 mg  7.5 mg Oral QHS    Vancomycin - Pharmacy to Dose   Other Rx Dosing/Monitoring    vancomycin (VANCOCIN) 1,250 mg in 0.9% sodium chloride 250 mL ([id number])  1,250 mg IntraVENous Q12H     ______________________________________________________________________  EXPECTED LENGTH OF STAY: 5d 21h  ACTUAL LENGTH OF STAY:          11                 Ellie Boothe MD

## 2022-12-19 NOTE — PROCEDURES
PICC Placement Note    PRE-PROCEDURE VERIFICATION  Correct Procedure: yes  Correct Site:  yes  Temperature: Temp: 97.4 °F (36.3 °C), Temperature Source: Temp Source: Oral  Recent Labs     12/19/22  0701   BUN 8   CREA 0.41*      WBC 8.0     Allergies: Patient has no known allergies. Education materials, including PICC Booklet, for PICC Care given to patient: yes. See Patient Education activity for further details. PROCEDURE DETAIL  A double lumen PICC line was started for antibiotic therapy. The following documentation is in addition to the PICC properties in the lines/airways flowsheet :  Lot #: [id number]  Was xylocaine 1% used intradermally:  yes  Total catheter length: 45 (cm)  External catheter length:  0 (cm)  Vein Selection for PICC:left basilic  Central Line Bundle followed yes  Complication Related to Insertion: none    The placement was verified by xray and the tip is located at the mid SVC. See chest xray results for PICC tip placement. Report given to PIETRO ARMSTRONG RN. Line is okay to use.     Reinaldo Weaver RN

## 2022-12-19 NOTE — WOUND CARE
Wound Care Note:     New consult placed by nurse request for boggy pink blanchable heels    Chart shows:  Admitted for hematuria and penile implant fracture s/p removal of all parts of penile implant on 12/12/22 and perineal exploration, removal of rear tip of penile prosthesis 12/16/22  Past Medical History:   Diagnosis Date    Arrhythmia     atrial fib    Depression 4/24/2010    Diabetes ([address] [address]     diet control for pre-diabetes    Dyslipidemia, goal LDL below 100 6/14/2011    Gout     HTN (hypertension) 4/24/2010    Impotence 4/24/2010    Morbid obesity ([address] 5/17/2010    VALENTE (obstructive sleep apnea) 4/24/2010    CPAP    Restless legs 4/15/2015     WBC = 8.0 on 12/19/22  Admitted from [facility]    Assessment:   Patient is A&O x 4, communicative, incontinent with some assistance needed in repositioning. Bed: [facility]  Patient wearing briefs for incontinence and has a Sexton. Diet: Adult diet easy to chew with nutritional supplements  Patient reports no pain. Bilateral heels skin intact and with blanchable erythema. . Bilateral heels with light erythema that is blanchable; heels were floated. Venelex ointment to be ordered. 2.  Sacrum and buttocks with dyschromia, small open area approximately 0.2 cm x 0.2 cm x 0.1 cm, blanchable, wound bed is pink, probably friction related. Venelex ointment to be ordered. Spoke with Dr. Rosemarie Orozco, wound care orders obtained. Patient repositioned on right side. Heels offloaded on pillows. Recommendations:    Sacrum and bilateral heels- Every 12 hours liberally apply Venelex ointment (BPCO/B & C). Skin Care & Pressure Prevention:  Minimize layers of linen/pads under patient to optimize support surface. Turn/reposition approximately every 2 hours and offload heels.   Manage incontinence / promote continence   Nourishing Skin Cream to dry skin, minimize use of briefs when able    Discussed above plan with patient & Havemando Faria RN    Transition of Care: Plan to follow as needed while admitted to hospital.    Corine Vora BSN, RN, [facility]  Certified Wound and Ostomy Nurse  office [phone]  Best way to contact me is through [facility]

## 2022-12-19 NOTE — PROGRESS NOTES
LOREN:     RUR 21%    Disposition: SNF [facility] accepted. [phone]    CECILIA spoke to [place] [phone]. She started insurance auth today, 12/19. CM uploaded op note and ID orders into [facility].     Transportation: S    Follow up: PCP/Specialist    Primary contact: Libra Ramírez(Son) Tanner Shaikh, RN/CRM  [phone]

## 2022-12-20 VITALS
HEIGHT: 75 IN | RESPIRATION RATE: 16 BRPM | WEIGHT: 183.64 LBS | BODY MASS INDEX: 22.83 KG/M2 | TEMPERATURE: 97.4 F | SYSTOLIC BLOOD PRESSURE: 132 MMHG | DIASTOLIC BLOOD PRESSURE: 63 MMHG | OXYGEN SATURATION: 98 % | HEART RATE: 76 BPM

## 2022-12-20 LAB
ANION GAP SERPL CALC-SCNC: 7 MMOL/L (ref 5–15)
BASOPHILS # BLD: 0 K/UL (ref 0–0.1)
BASOPHILS NFR BLD: 0 % (ref 0–1)
BUN SERPL-MCNC: 10 MG/DL (ref 6–20)
BUN/CREAT SERPL: 26 (ref 12–20)
CALCIUM SERPL-MCNC: 9.3 MG/DL (ref 8.5–10.1)
CHLORIDE SERPL-SCNC: 101 MMOL/L (ref 97–108)
CO2 SERPL-SCNC: 28 MMOL/L (ref 21–32)
CREAT SERPL-MCNC: 0.38 MG/DL (ref 0.7–1.3)
DIFFERENTIAL METHOD BLD: ABNORMAL
EOSINOPHIL # BLD: 0.1 K/UL (ref 0–0.4)
EOSINOPHIL NFR BLD: 1 % (ref 0–7)
ERYTHROCYTE [DISTWIDTH] IN BLOOD BY AUTOMATED COUNT: 14.8 % (ref 11.5–14.5)
GLUCOSE BLD STRIP.AUTO-MCNC: 124 MG/DL (ref 65–117)
GLUCOSE BLD STRIP.AUTO-MCNC: 97 MG/DL (ref 65–117)
GLUCOSE SERPL-MCNC: 126 MG/DL (ref 65–100)
HCT VFR BLD AUTO: 28.2 % (ref 36.6–50.3)
HGB BLD-MCNC: 9.2 G/DL (ref 12.1–17)
IMM GRANULOCYTES # BLD AUTO: 0.1 K/UL (ref 0–0.04)
IMM GRANULOCYTES NFR BLD AUTO: 1 % (ref 0–0.5)
LYMPHOCYTES # BLD: 1.7 K/UL (ref 0.8–3.5)
LYMPHOCYTES NFR BLD: 16 % (ref 12–49)
MCH RBC QN AUTO: 27.6 PG (ref 26–34)
MCHC RBC AUTO-ENTMCNC: 32.6 G/DL (ref 30–36.5)
MCV RBC AUTO: 84.7 FL (ref 80–99)
MONOCYTES # BLD: 0.8 K/UL (ref 0–1)
MONOCYTES NFR BLD: 8 % (ref 5–13)
NEUTS SEG # BLD: 8.4 K/UL (ref 1.8–8)
NEUTS SEG NFR BLD: 74 % (ref 32–75)
NRBC # BLD: 0 K/UL (ref 0–0.01)
NRBC BLD-RTO: 0 PER 100 WBC
PLATELET # BLD AUTO: 326 K/UL (ref 150–400)
PMV BLD AUTO: 10.1 FL (ref 8.9–12.9)
POTASSIUM SERPL-SCNC: 3.9 MMOL/L (ref 3.5–5.1)
RBC # BLD AUTO: 3.33 M/UL (ref 4.1–5.7)
SERVICE CMNT-IMP: ABNORMAL
SERVICE CMNT-IMP: NORMAL
SODIUM SERPL-SCNC: 136 MMOL/L (ref 136–145)
WBC # BLD AUTO: 11.1 K/UL (ref 4.1–11.1)

## 2022-12-20 PROCEDURE — 74011000250 HC RX REV CODE- 250: Performed by: STUDENT IN AN ORGANIZED HEALTH CARE EDUCATION/TRAINING PROGRAM

## 2022-12-20 PROCEDURE — 74011250637 HC RX REV CODE- 250/637: Performed by: STUDENT IN AN ORGANIZED HEALTH CARE EDUCATION/TRAINING PROGRAM

## 2022-12-20 PROCEDURE — 74011250636 HC RX REV CODE- 250/636: Performed by: INTERNAL MEDICINE

## 2022-12-20 PROCEDURE — 74011250636 HC RX REV CODE- 250/636: Performed by: STUDENT IN AN ORGANIZED HEALTH CARE EDUCATION/TRAINING PROGRAM

## 2022-12-20 PROCEDURE — 74011000258 HC RX REV CODE- 258: Performed by: INTERNAL MEDICINE

## 2022-12-20 PROCEDURE — 82962 GLUCOSE BLOOD TEST: CPT

## 2022-12-20 PROCEDURE — 85025 COMPLETE CBC W/AUTO DIFF WBC: CPT

## 2022-12-20 PROCEDURE — 74011636637 HC RX REV CODE- 636/637: Performed by: STUDENT IN AN ORGANIZED HEALTH CARE EDUCATION/TRAINING PROGRAM

## 2022-12-20 PROCEDURE — 80048 BASIC METABOLIC PNL TOTAL CA: CPT

## 2022-12-20 PROCEDURE — 36415 COLL VENOUS BLD VENIPUNCTURE: CPT

## 2022-12-20 RX ORDER — PREDNISONE 20 MG/1
20 TABLET ORAL
Qty: 3 TABLET | Refills: 0 | Status: SHIPPED
Start: 2022-12-20 | End: 2022-12-23

## 2022-12-20 RX ADMIN — VANCOMYCIN HYDROCHLORIDE 1250 MG: 1.25 INJECTION, POWDER, LYOPHILIZED, FOR SOLUTION INTRAVENOUS at 08:02

## 2022-12-20 RX ADMIN — SODIUM CHLORIDE, PRESERVATIVE FREE 10 ML: 5 INJECTION INTRAVENOUS at 08:03

## 2022-12-20 RX ADMIN — DILTIAZEM HYDROCHLORIDE 60 MG: 60 TABLET, FILM COATED ORAL at 08:03

## 2022-12-20 RX ADMIN — APIXABAN 5 MG: 5 TABLET, FILM COATED ORAL at 09:54

## 2022-12-20 RX ADMIN — TAMSULOSIN HYDROCHLORIDE 0.4 MG: 0.4 CAPSULE ORAL at 09:53

## 2022-12-20 RX ADMIN — MEROPENEM 1 G: 1 INJECTION, POWDER, FOR SOLUTION INTRAVENOUS at 08:02

## 2022-12-20 RX ADMIN — ALLOPURINOL 100 MG: 100 TABLET ORAL at 09:54

## 2022-12-20 RX ADMIN — Medication: at 09:54

## 2022-12-20 RX ADMIN — METOPROLOL SUCCINATE 75 MG: 50 TABLET, EXTENDED RELEASE ORAL at 09:53

## 2022-12-20 RX ADMIN — FINASTERIDE 5 MG: 5 TABLET, FILM COATED ORAL at 09:53

## 2022-12-20 RX ADMIN — FAMOTIDINE 20 MG: 20 TABLET, FILM COATED ORAL at 09:53

## 2022-12-20 RX ADMIN — SODIUM CHLORIDE 1 G: 9 INJECTION, SOLUTION INTRAMUSCULAR; INTRAVENOUS; SUBCUTANEOUS at 12:49

## 2022-12-20 RX ADMIN — PREDNISONE 20 MG: 20 TABLET ORAL at 09:54

## 2022-12-20 NOTE — DISCHARGE SUMMARY
Discharge Summary       PATIENT ID: Mario Baca  MRN: [id number]   YOB: 1950    DATE OF ADMISSION: 12/8/2022  1:20 PM    DATE OF DISCHARGE: 12/20/22    PRIMARY CARE PROVIDER: Justine Bills MD     ATTENDING PHYSICIAN: Stevan Mesa MD   DISCHARGING PROVIDER: Stevan Mesa MD    To contact this individual call [phone] and ask the  to page. If unavailable ask to be transferred the Adult Hospitalist Department. CONSULTATIONS: IP CONSULT TO UROLOGY  IP CONSULT TO INFECTIOUS DISEASES    PROCEDURES/SURGERIES: Procedure(s):  REMOVAL OF RETAINED PENILE PROSTHESIS    ADMITTING DIAGNOSES & HOSPITAL COURSE:   HPI: Oamr Harrison is a 71 y.o. male who presents with hematuria. 67-year-old  male with past medical history of atrial fibrillation on Xarelto, depression, diabetes, dyslipidemia, hypertension, CVA, VALENTE on CPAP, restless leg syndrome, penile implant, who comes in for the above. Patient is apparently sent over by his facility for hematuria of about 2 weeks duration with his penile implant not working anymore. In the ED, his penile implant was found to be protruding and Sexton catheter was inserted with pink-colored urine draining. He denies any fever, chills. He reports on and off pain in the penile area. He says that this is his second penile implant. The ER spoke to urology and they recommended antibiotics. Urology has seen in the ER and will plan for surgery on Monday. Of note, the patient has previous Pseudomonas in urine as well as Enterococcus. Initial recommendations were vancomycin and gentamicin, but at that time patient is not symptomatic or septic. The patient is not quite sure as to why he was sent to the hospital.  Per the ER note, the patient's neurologist had apparently spoken to the patient SNF and wanted him transferred here yesterday.      I cannot find any medical record of the purpose of this device, the patient says that it was placed for sexual purposes. 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   Urology on board  Vancomycin and merram IV s/p IV cefepime   S/p OR on 12/12 retention of prosthesis   OR again on 12/16 to remove remaining part of prosthesis completed, ok to resume ac per urology   - ID consulted appreciate recs switch to IV Merrem continue vanco, to complete 2 weeks total IV abx orders in place and date 12/26  -PICC placed 12/19 remove picc after therapy complete      Superficial thrombophlebitis LUE  -IV induced, confirmed by US 12/17  -Instructed RN to place warm compress, complete prednisone 5 days last dose 12/22     Hematuria  Monitor with H&H  No signs of active bleeding at this time  Restart ac cleared by urology to do so 12/16   Transfuse hemoglobin less than 7     Atrial fibrillation on Eliquis  Continue diltiazem and metoprolol  resumed Eliquis was on hold prior for  surgery   Cardiology consulted appreciate recs      Hypertension  Continue home medications     Diabetes  Sliding scale insulin     BPH  Continue home tamsulosin and finasteride     History of recent CVA with residual right-sided weakness  Has baseline  Monitor  Continue atorvastatin     Depression  Home Remeron     Left arm pain/left elbow pain  XRs  Unclear cause      Fu with PCP, urology, ID op      Code status: Full  Prophylaxis: Eliquis on hold  Care Plan discussed with: Patient, nurse, cm   Anticipated Disposition: Medically ready for DC to SNF auth accepted.  DC today      FOLLOW UP APPOINTMENTS:    Follow-up Information       Follow up With Specialties Details Why [facility] Go on 1/9/2023 9:50 AM with Dr. John Garcia / Josh Olivera  [address]  [phone]    Yashira Mahoney MD Internal Medicine Physician Call in 1 day(s)  [address] Kevin Heck Nilda Miller MD Internal Medicine Physician   [address] [address]  [phone]      Josesito Lopez DO Infectious Disease Physician Call in 1 day(s)  [address] [facility]  [phone]               ADDITIONAL CARE RECOMMENDATIONS: Repeat CBC, BMP 3 to 5 days    DIET: Resume previous diet    ACTIVITY: Activity as tolerated    WOUND CARE:   \"Recommendations:    Sacrum and bilateral heels- Every 12 hours liberally apply Venelex ointment (BPCO/B & C). Skin Care & Pressure Prevention:  Minimize layers of linen/pads under patient to optimize support surface. Turn/reposition approximately every 2 hours and offload heels. Manage incontinence / promote continence   Nourishing Skin Cream to dry skin, minimize use of briefs when able\"      DISCHARGE MEDICATIONS:  Current Discharge Medication List        START taking these medications    Details   predniSONE (DELTASONE) 20 mg tablet Take 1 Tablet by mouth daily (with breakfast) for 3 doses. Qty: 3 Tablet, Refills: 0  Start date: 12/20/2022, End date: 12/23/2022      meropenem 1 g IVPB 1 g by IntraVENous route every eight (8) hours. Qty: 1 Dose, Refills: 0  Start date: 12/19/2022      vancomycin 1.25 gram 1,250 mg, [id number] 1 Device IVPB 1,250 mg by IntraVENous route every twelve (12) hours for 1 day. Qty: 1 Dose, Refills: 0  Start date: 12/19/2022, End date: 12/20/2022           CONTINUE these medications which have NOT CHANGED    Details   dilTIAZem ER (CARDIZEM SR) 60 mg capsule Take 60 mg by mouth every eight (8) hours. buPROPion SR (WELLBUTRIN SR) 100 mg SR tablet Take 100 mg by mouth two (2) times a day. ferrous sulfate 325 mg (65 mg iron) tablet Take 325 mg by mouth two (2) times a day. magnesium oxide (MAG-OX) 400 mg tablet Take 400 mg by mouth nightly. temazepam (RESTORIL) 7.5 mg capsule Take 7.5 mg by mouth nightly.       traMADoL (ULTRAM) 50 mg tablet Take 50 mg by mouth every six to eight (6-8) hours as needed for Pain. finasteride (PROSCAR) 5 mg tablet Take 1 Tablet by mouth daily. Qty: 30 Tablet, Refills: 0      melatonin 3 mg tablet Take 1 Tablet by mouth nightly as needed for Insomnia. Qty: 30 Tablet, Refills: 0      metoprolol succinate (TOPROL-XL) 25 mg XL tablet Take 3 Tablets by mouth daily. Qty: 30 Tablet, Refills: 0      ondansetron (ZOFRAN ODT) 4 mg disintegrating tablet Take 1 Tablet by mouth every eight (8) hours as needed for Nausea or Vomiting. Qty: 30 Tablet, Refills: 0      gabapentin (NEURONTIN) 300 mg capsule Take 1 Capsule by mouth nightly. Max Daily Amount: 300 mg. 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 Qty: 60 Tablet, Refills: 0      atorvastatin (LIPITOR) 80 mg tablet Take 1 Tablet by mouth nightly. Qty: 30 Tablet, Refills: 0      balsam peru-castor oiL (VENELEX) ointment Apply  to affected area two (2) times a day. APPLY TO all bony prominences, abrasions and ecchymosis to right side, posterior head Nursing, document site in comments  Qty: 5 g, Refills: 0      docusate sodium (COLACE) 100 mg capsule Take 1 Capsule by mouth two (2) times daily as needed for Constipation for up to 90 days. Qty: 60 Capsule, Refills: 0      famotidine (PEPCID) 20 mg tablet Take 1 Tablet by mouth every twelve (12) hours. Qty: 30 Tablet, Refills: 0      tamsulosin (FLOMAX) 0.4 mg capsule Take 1 Capsule by mouth daily. Qty: 30 Capsule, Refills: 0      mirtazapine (REMERON) 7.5 mg tablet Take 1 Tablet by mouth nightly.   Qty: 30 Tablet, Refills: 0               NOTIFY YOUR PHYSICIAN FOR ANY OF THE FOLLOWING:   Fever over 101 degrees for 24 hours. Chest pain, shortness of breath, fever, chills, nausea, vomiting, diarrhea, change in mentation, falling, weakness, bleeding. Severe pain or pain not relieved by medications. Or, any other signs or symptoms that you may have questions about. DISPOSITION:    Home With:   OT  PT  HH  RN      x Long term SNF/Inpatient Rehab    Independent/assisted living    Hospice    Other:       PATIENT CONDITION AT DISCHARGE:     Functional status    Poor    x Deconditioned     Independent      Cognition    x Lucid     Forgetful     Dementia      Catheters/lines (plus indication)   x Vazquez     PICC     PEG     None      Code status   x  Full code     DNR      PHYSICAL EXAMINATION AT DISCHARGE:  I had a face to face encounter with this patient and independently examined them on 12/20/2022 as outlined below:     General: Alert awake, no acute distress, resting in bed, pleasant male, appears to be stated age  HEENT: PEERL, EOMI, moist mucus membranes  Neck: Supple, no JVD, no meningeal signs  Chest: Clear to auscultation bilaterally   CVS: RRR, S1 S2 heard, no murmurs/rubs/gallops  Abd: Soft, non-tender, non-distended, +bowel sounds   Ext: No clubbing, no cyanosis, trace edema bl UE, LUE slightly more erythematous and tender to palpation   Neuro/Psych: Pleasant mood and affect, sensory grossly within normal limit, right lower extremity 1-2/5. Right upper extremity 0/5. Cap refill: Brisk, less than 3 seconds  Pulses: 2+, symmetric in all extremities  Skin: Warm, dry, without rashes or lesions  : Patient has vazquez  No blood at this time.  Vazquez in place with yellow urine       CHRONIC MEDICAL DIAGNOSES:  Problem List as of 12/20/2022 Date Reviewed: 10/12/2022            Codes Class Noted - Resolved    Hematuria ICD-10-CM: R31.9  ICD-9-CM: 599.70  12/8/2022 - Present        Penile implant failure [facility]) ICD-10-CM: T83.410A  ICD-9-CM: 996.76  12/8/2022 - Present        COVID ICD-10-CM: U07.1  ICD-9-CM: 079.89  10/18/2022 - Present        Ischemic cerebrovascular accident (CVA) ([facility] ICD-10-CM: I63.9  ICD-9-CM: 434.91  9/14/2022 - Present        Diabetes mellitus type 2, diet-controlled ([facility] ICD-10-CM: E11.9  ICD-9-CM: 250.00  5/2/2018 - Present        Benign non-nodular prostatic hyperplasia with lower urinary tract symptoms ICD-10-CM: N40.1  ICD-9-CM: 600.91  4/25/2017 - Present    Overview Signed 4/25/2017  9:10 AM by Daisha Cerda MD     Retention had catheter 3 months             Urinary retention due to benign prostatic hyperplasia ICD-10-CM: N40.1, R33.8  ICD-9-CM: 600.91, 788.20  10/25/2016 - Present        Diabetes mellitus type 2, controlled ([facility] ICD-10-CM: E11.9  ICD-9-CM: 250.00  10/15/2015 - Present        Restless legs ICD-10-CM: G25.81  ICD-9-CM: 333.94  4/15/2015 - Present        VALENTE (obstructive sleep apnea) ICD-10-CM: F20.14  ICD-9-CM: 327.23  6/19/2012 - Present    Overview Signed 6/19/2012 10:57 AM by Barney Pinto MD     Bi-Level: 19/15 cmH2O. Dyslipidemia, goal LDL below 100 ICD-10-CM: E78.5  ICD-9-CM: 272.4  6/14/2011 - Present        A-fib ([facility] ICD-10-CM: I48.91  ICD-9-CM: 427.31  2/7/2011 - Present    Overview Addendum 2/9/2011  3:42 PM by Michael Lucas NP     Assymptomatic, noted on ekg 2/11; found when had sleep study. Saw cardiologist 2/8/11.              Diabetes ([facility] ICD-10-CM: E11.9  ICD-9-CM: 250.00  1/28/2011 - Present    Overview Addendum 2/9/2011  3:37 PM by Michael Lucas NP     Recent diagnosis; just started diabetes education  HgA1C 6.5%             Morbid obesity ([facility] ICD-10-CM: E66.01  ICD-9-CM: 278.01  5/17/2010 - Present    Overview Signed 2/9/2011  3:35 PM by Michael Lucas NP     OBESE 20 + YEARS; HIGH WEIGHT 362 LBS PAST MONTH; LOW WEIGHT 190 LBS 30 YEARS AGO             VALENTE (obstructive sleep apnea) ICD-10-CM: X31.45  ICD-9-CM: 327.23  4/24/2010 - Present        Erectile dysfunction due to arterial insufficiency ICD-10-CM: N52.01  ICD-9-CM: 607.84  4/24/2010 - Present        HTN (hypertension) ICD-10-CM: I10  ICD-9-CM: 401.9  4/24/2010 - Present    Overview Signed 4/24/2010  9:31 AM by Beatriz Blackwell     borderline             Depression ICD-10-CM: [facility]. A  ICD-9-CM: 174  4/24/2010 - Present           Greater than 40 minutes were spent with the patient on counseling and coordination of care    Signed:   Darylene Keys, MD  12/20/2022  12:39 PM

## 2022-12-20 NOTE — ANESTHESIA POSTPROCEDURE EVALUATION
Procedure(s):  REMOVAL OF RETAINED PENILE PROSTHESIS.    general    Anesthesia Post Evaluation      Multimodal analgesia: multimodal analgesia used between 6 hours prior to anesthesia start to PACU discharge  Patient location during evaluation: PACU  Patient participation: complete - patient participated  Level of consciousness: awake  Pain score: 2  Pain management: adequate  Airway patency: patent  Anesthetic complications: no  Cardiovascular status: acceptable  Respiratory status: acceptable  Hydration status: acceptable  Comments: I have evaluated the patient and meets criteria for discharge from PACU.  Marcelina Abebe MD  Post anesthesia nausea and vomiting:  controlled  Final Post Anesthesia Temperature Assessment:  Normothermia (36.0-37.5 degrees C)      INITIAL Post-op Vital signs:   Vitals Value Taken Time   /61 12/16/22 1315   Temp 36.5 °C (97.7 °F) 12/16/22 1211   Pulse 94 12/16/22 1320   Resp 16 12/16/22 1320   SpO2 95 % 12/16/22 1320

## 2022-12-20 NOTE — PROGRESS NOTES
LOREN:    RUR 22%    Disposition: SNF [facility] Accepted and insurance auth obtained. CM spoke to [place] [phone]. Patient needs dose of Merrem prior to discharge.   This will be completed by 1pm.    Transportation: [facility] scheduled for 1:30pm.    RN call report to [phone]    Follow up: PCP/Specialist    Primary contact: Lianne Madden Desiree(Son)[phone]    Alli Hendrickson RN/CRM  [phone]

## 2022-12-21 ENCOUNTER — PATIENT OUTREACH (OUTPATIENT)
Dept: CASE MANAGEMENT | Age: 72
End: 2022-12-21

## 2022-12-21 NOTE — ADT AUTH CERT NOTES
Comment          Patient Demographics    Patient Name   Solis Boyce   [id number] Legal Sex   Male    1950 Address   [address] [place] [address] Phone   [phone] (Home)   [phone] (Mobile) *Preferred*     Patient Demographics    Patient Name   Solis Boyce   [id number] Legal Sex   Male    1950 Address   [address] [place] [address] Phone   [phone] (Home)   [phone] (Mobile) *Preferred*     CSN:   [id number]     Admit Date: Admit Time Room Bed   Dec 8, 2022  1:20  4320 1713     Attending Providers    Provider Pager From To   Tayler Langston MD  22   Amy Cortez MD  12/08/22 12/15/22   Zaria Potter MD  12/15/22 12/20/22     Patient Contacts    Name Relation Home Work Mobile   Silvio Ramírez  [phone]   [place]   [phone]   [place] [employer]   [phone]   [address] [facility] Sister [id number]     Utilization Reviews       Urologic Surgery or Procedure [address] Day 11 (2022) by Elliott Bhatti RN       Review Entered Review Status   2022 1248 Completed      Criteria Review      Care Day: 11 Care Date: 2022 Level of Care: Inpatient Floor    Guideline Day 3    Level Of Care    ( ) * Activity level acceptable    2022 12:48:26 EST by Abbie Garrido continues to demonstrate decreased mobility/balance, activity tolerance, strength/endurance, activity tolerance, full body reaching and R hemispheric AROM, with pt noted with intermittent confusion this date    Clinical Status    (X) * Operative site and other wounds acceptable    2022 12:48:26 EST by Elliott Bhatti      acceptable    (X) * Pain and nausea absent or adequately managed    2022 12:48:26 EST by Elliott Bhatti      absent    (X) * Temperature status acceptable    2022 12:48:26 EST by Elliott Bhatti      afebrile    (X) * Renal function acceptable    12/19/2022 12:48:26 EST by [employer]      acceptable Crea 0.35    (X) * Urinary postoperative status acceptable    12/19/2022 12:48:26 EST by [employer]      accceptable -Sexton    (X) * Obstructive uropathy or nephropathy absent, stable, or resolving    12/19/2022 12:48:26 EST by [employer]      stable    ( ) * General Discharge Criteria met    Interventions    (X) * Intake acceptable    12/19/2022 12:48:26 EST by [employer]      Adult Diet Easy to Chew   Oral Nutrition Supplement Low Ca/High Protein    ( ) * No inpatient interventions needed    12/19/2022 12:48:26 EST by [employer]      ID consulted appreciate recs switch to IV Merrem continue vanco, further adjustments per ID    * Milestone   Additional Notes   : 12/18      Hospitalist PN-      Interval history / Subjective:   Patient seen and examined earlier . s/p total removal of prothesis yesterday       Vital Signs:    Last 24hrs VS reviewed since prior progress note. Most recent are:   Visit Vitals   /71 (BP 1 Location: Left lower arm, BP Patient Position: At rest;Lying)   Pulse 63   Temp 97.3 °F (36.3 °C)   Resp 18   Ht 6' 3\" (1.905 m)   Wt 87.5 kg (192 lb 14.4 oz)   SpO2 96%      Physical Examination:          General: Alert awake, no acute distress, resting in bed, pleasant male, appears to be stated age   HEENT: PEERL, EOMI, moist mucus membranes   Neck: Supple, no JVD, no meningeal signs   Chest: Clear to auscultation bilaterally    CVS: RRR, S1 S2 heard, no murmurs/rubs/gallops   Abd: Soft, non-tender, non-distended, +bowel sounds    Ext: No clubbing, no cyanosis, trace edema bl UE, LUE slightly more erythematous and tender to palpation    Neuro/Psych: Pleasant mood and affect, sensory grossly within normal limit, right lower extremity 1-2/5. Right upper extremity 0/5.    Cap refill: Brisk, less than 3 seconds   Pulses: 2+, symmetric in all extremities   Skin: Warm, dry, without rashes or lesions   : Patient has vazquez  No blood at this time. 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    Urology on board   Vancomycin and merram IV s/p IV cefepime    S/p OR on 12/12 retention of prosthesis    OR again on 12/16 to remove remaining part of prosthesis completed, ok to resume ac per urology    - ID consulted appreciate recs switch to IV Merrem continue vanco, further adjustments per ID        Superficial thrombophlebitis LUE   -IV induced, confirmed by US 12/17   -Instructed RN to place warm compress, complete prednisone 5 days last dose 12/22       Hematuria   Monitor with H&H   No signs of active bleeding at this time   Restart ac cleared by urology to do so 12/16    Transfuse hemoglobin less than 7       Atrial fibrillation on Eliquis   Continue diltiazem and metoprolol   resumed Eliquis was on hold prior for  surgery    Cardiology consulted appreciate recs        Hypertension   Continue home medications       Diabetes   Sliding scale insulin       BPH   Continue home tamsulosin and finasteride       History of recent CVA with residual right-sided weakness   Has baseline   Monitor   Continue atorvastatin       Depression   Home Remeron       Left arm pain/left elbow pain   XRs   Unclear cause         ID-   ID-cross coverage weekend   Patient discussed with hospitalist.   Patient is doing well, ready for discharge.    Antibiotic orders per DR Sagar Vann recommendation       Antimicrobial orders for discharge   -Vancomycin 1.25 g IV every 12 hours weeks end date 12/26   -Meropenem 1 g IV every 8 hours end date 12/26   Infusion pharmacy on consult for dosing vancomycin -600/target trough 15-20   -Pull PICC at end of therapy on/after 12/26   -Weekly CBC, CMP, vancomycin trough-   -Fax reports to Dr Cheryl Landaverde   -Encourage adequate fluids, daily probiotic/yogurt   -If PICC malfunction occurs and home health cannot reposition   please send patient to ED immediately   -ID follow-up -call office of  -if fever present or persistent drainage occurs      OT-   ASSESSMENT   Patient continues with skilled OT services and is slowly progressing towards goals. Pt continues to demonstrate decreased mobility/balance, activity tolerance, strength/endurance, activity tolerance, full body reaching and R hemispheric AROM, with pt noted with intermittent confusion this date, requiring Total A for bed level toileting and brief donning, with Max A to roll. Given above mentioned, all goals continued as listed above, with acute OT to continue to follow and SNF recommended at discharge. Of note, pt with c/o LUE pain with movement; with nursing notified and following. Current Level of Function Impacting Discharge (ADLs): Up to Total A       Other factors to consider for discharge: Total A, with Max A to roll            PLAN :   Goals have been updated based on progression since last assessment. Patient continues to benefit from skilled intervention to address the above impairments. Continue to follow patient 3 times a week to address goals.        Recommend with staff: Frequent positional changes, bed level ADL engagement       Recommend next OT session: POC progression       Recommendation for discharge: (in order for the patient to meet his/her long term goals)   Therapy up to 5 days/week in SNF setting                    Urologic Surgery or Procedure GRG - Care Day 8 (12/15/2022) by Lidya Carmona RN       Review Entered Review Status   12/16/2022 1012 Completed      Criteria Review      Care Day: 8 Care Date: 12/15/2022 Level of Care: Inpatient Floor    Guideline Day 2    Level Of Care    (X) Floor    12/16/2022 10:12:32 EST by Carlee Howell Fl    Clinical Status    (X) * No ICU or intermediate care needs    12/16/2022 10:12:32 EST by Carlee Gomez      On Medical Unit    Interventions    (X) Inpatient interventions continue    12/16/2022 10:12:32 EST by Carlee Gomez      VS, POC Glucose, Maintain HOB at the lowest elevation Monitor with H&H Holding Eliquis for now due to surgery - See additional Interventions below    (X) Transition to oral routes    12/16/2022 10:12:32 EST by Carlee Gomez      Zyloprim 100mg PO QD Lipitor 80mg PO HS Cardizem 60mg PO q8h Pepcid 20mg PO q12h melatonin 3mg PO HS Toprol-XL 75mg PO qd Roxicodone 5mg PO q4h PRN (x1) Flomax 0.4mg PO qd    * Milestone   Additional Notes   : 12/15      Hospitalist PN-      Interval history / Subjective:   Patient seen and examined earlier . Plan for OR tomorrow to remove remnant, no acute complaints patient's friend was at bedside       Physical Examination:       I had a face to face encounter with this patient and independently examined them on 12/15/2022 as outlined below:       General: Alert awake, no acute distress, resting in bed, pleasant male, appears to be stated age   HEENT: PEERL, EOMI, moist mucus membranes   Neck: Supple, no JVD, no meningeal signs   Chest: Clear to auscultation bilaterally    CVS: RRR, S1 S2 heard, no murmurs/rubs/gallops   Abd: Soft, non-tender, non-distended, +bowel sounds    Ext: No clubbing, no cyanosis, no edema   Neuro/Psych: Pleasant mood and affect, sensory grossly within normal limit, right lower extremity 1-2/5. Right upper extremity 0/5. Cap refill: Brisk, less than 3 seconds   Pulses: 2+, symmetric in all extremities   Skin: Warm, dry, without rashes or lesions   : Patient has a prosthetic that is protruding from the urethra. No blood at this time.  Sexton in place with efe urine       LABS: HGB 8.7 HCT 27.7 RDW 15.4  AG 4 CREA 0.40 CA 8.4      CULTURE, WOUND Lavinia Clack STAIN [[id number]] (Abnormal) Collected: 12/12/22 1454   Order Status: Completed Specimen: Wound from Perineal Updated: 12/15/22 0666   Special Requests: NO SPECIAL REQUESTS      GRAM STAIN 2+ WBCS SEEN      NO ORGANISMS SEEN      Culture result:    LIGHT PSEUDOMONAS AERUGINOSA PIP/VIVEK KB=25 SENSITIVE Abnormal       LIGHT PSEUDOMONAS AERUGINOSA (2ND COLONY TYPE/STRAIN) Abnormal         LIGHT ENTEROCOCCUS FAECALIS Abnormal          MEDS:  Vancocin 1,250mg IV q12h Merrem 1g IV q8h Humalog SSI SC QID 2 units given      Assessment & Plan:           Eroding penile implant, 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    Urology on board   Vancomycin and cefepime IV   S/p OR on 12/12 retention of prosthesis    Plan for OR 12/16 to remove remaining part of prosthesis   - ID consulted appreciate recs switch to IV Merrem continue vanco, further adjustments per ID        Hematuria   Monitor with H&H   No signs of active bleeding at this time   Will hold anticoagulation mostly due to surgery   Transfuse hemoglobin less than 7       Atrial fibrillation on Eliquis   Continue diltiazem and metoprolol   Holding Eliquis for now due to surgery    Cardiology consulted appreciate recs        Hypertension   Continue home medications       Diabetes   Sliding scale insulin       BPH   Continue home tamsulosin and finasteride       History of recent CVA with residual right-sided weakness   Has baseline   Monitor   Continue atorvastatin       Depression   Home Remeron       Left arm pain/left elbow pain   XRs   Unclear cause            Urology PN-      Brad Scott is doing fair . Discussed schedule surgery for tomorrow morning patient is agreeable to procedure Vazquez draining clear yellow urine. Orders in for n.p.o. tonight      : scant purulent penile discharge, vazquez w/ yellow UA, scrotal midline incision C/D/I        Assessment/Plan:   Continue plan as stated below nothing to add       1. Planning to return to the OR for removal of retained prosthesis on 12/16/22 at 10:30 with Dr. Alexa Licea. Reviewed with the patient and he is agreeable to proceed. Continue to hold [facility] at this time. - Preliminary Wound Cx growing probable pseudomonas & enterococcus. Continue empiric antibiotics given the purulence drained upon removing the device. Follow up final C/S.     - The catheter is to remain at least for 2 to 3 weeks until the urethra can close up.      - Daily wound care       Infectious Dz Consult-      HPI:           Mr Abi Gutierrez is a 60-year-old gentleman with a history of DM, atrial fibrillation, hypertension, obstructive sleep apnea, history of penile prosthesis, recent large left MCA infarct in September 2022, treated Sexton catheter related UTI in October 2022, who presented to the hospital on 12/8/2022 from [facility] for hematuria and found to have penile implant protruding through meatus per urology notes. Patient was taken to the operating room on 12/12/2022 with removal of penile prosthesis done for erosion of penile implant. Cultures were sent of the wound with cultures positive for Pseudomonas and Enterococcus. Per operative note, \" After opening the capsule of the pump, purulent flow was seen, and it was swabbed and sent for culture\". Patient is on IV vancomycin. It appears that he was on IV cefepime earlier this admission. I received a consult request today for antibiotic recommendations. Patient is somewhat of a poor historian. He tells me he had pain for a couple of months in the penile area but not able to elaborate on this further. He denied any fevers chills night sweats weight loss to his knowledge. He denies any cough upper respiratory symptoms when asked. He denies any nausea vomiting diarrhea when asked. He denies any pain. He denies rash. He denies any headache. He denies any focal myalgia or arthralgia when asked.           Assessment / Plan:            Mr Abi Gutierrez is a 69-year-old gentleman with a history of DM, atrial fibrillation, hypertension, obstructive sleep apnea, history of penile prosthesis, recent large left MCA infarct in September 2022, treated Sexton catheter related UTI in October 2022, who presented to the hospital on 12/8/2022 from [facility] for hematuria and found to have penile implant protruding through meatus per urology notes. Patient was taken to the operating room on 12/12/2022 with removal of penile prosthesis done for erosion of penile implant. Per operative note, \" After opening the capsule of the pump, purulent flow was seen, and it was swabbed and sent for culture\".              1) penile implant erosion, infection    Cultures with Pseudomonas and E faecalis    Zosyn ALIE not reported to one strain Pseudomonas yet    Cefepime ALIE 8 ( Pseudomonas)    Given there is concern for indwelling retained hardware with plans for removal on 12/16/2022, would recommend meropenem IV pending removal of all retained hardware and source control   Check blood cultures if febrile > 100.3 hypotension or clinical worsening   Continue IV vancomycin renally dosed   Final duration of antibiotics to be determined based on course    Unfortunately not very quinolone susceptible for eventual p.o. antibiotics       2)CVA   3) DM   4) afib    5) VALENTE   6) HTN   7) Obesity         Urologic Surgery or Procedure GRG - Care Day 5 (12/12/2022) by Johanne See RN       Review Entered Review Status   12/13/2022 1028 Completed      Criteria Review      Care Day: 5 Care Date: 12/12/2022 Level of Care: Inpatient Floor    Guideline Day 1    Level Of Care    ( ) OR to ICU or intermediate care    12/13/2022 10:28:57 EST by Vy Stein    Clinical Status    ( ) * Clinical Indications met    12/13/2022 10:28:57 EST by Johanne See      Switched guideline    (X) * Procedure completed    12/13/2022 10:28:57 EST by Johanne See      Procedure(s):  REMOVAL PENILE PROSTHESIS    Interventions    (X) Inpatient interventions as needed    2022 10:28:57 EST by Nell Gudino      VS, Vazquez Catheter, Insertion I&O, Maintain heels off bed at all times- See add'l inteventions below    (X) Perioperative antibiotics    2022 10:28:57 EST by Kan Wallace 2g IV q12h    * Milestone   Additional Notes   :       Urology PN-      Cherrie Massing is doing well. Denies fevers or chills. Admits penile/ scrotal tenderness. Remains AF, VSS. WBC wnl. +Maxipime, Vanc. Physical Exam   General: NAD, pleasant   Respiratory: no distress, breathing easily    Abdomen: soft, no distention; non-tender to palpation   : no CVA tenderness, vazquez, dark yellow UA, right implant protruding through urethra    Neuro: Appropriate, no focal neurological deficits   Skin: warm, dry   Extremities: moves all, full ROM      Vitals: Temp (24hrs), Av.8 °F (36.6 °C), Min:97.2 °F (36.2 °C), Max:98.5 °F (36.9 °C)       Blood pressure 110/71, pulse 68, temperature 97.2 °F (36.2 °C), resp. rate 18, height 6' 3\" (1.905 m), weight 111.1 kg (245 lb), SpO2 100 %. LABS:   CREA 0.44 HGB 10.2 HCT 31.9 RDW 15.3          MEDS: LR 100mL/hr IV Cont Marcaine 0.5% 20mL injection PRN (x1)  PF 50mcg IV PRN (x4) Vancocin 1,250mg IV q12h Flomax 0.4mg PO qd Remeron 7.5mg PO HS Toprol-XL 75mg PO qd Neurontin 600mg PO HS Proscar 5mg PO qd Pepcid 20mg PO q12h Lipitor 80mg PO HS Zyloprm 100mg PO qd Tylenol 650mg PO q6h PRN (x1)      Assessment/Plan:   Eroding penile implant    Hx of atrial fibrillation on Eliquis        - NPO for penile prosthesis explant today at 2 PM with Dr. Kristyn Sweeney. All questions answered. Heparin gtt held at 0100. Stable without s/sx of sepsis on empiric abx. Following       Brief Postoperative Note          Date of Procedure: 2022        Pre-Op Diagnosis: EROSION PENILE IMPLANT       Post-Op Diagnosis: Same as preoperative diagnosis.          Procedure(s): REMOVAL PENILE PROSTHESIS          Anesthesia: General        Estimated Blood Loss (mL): 90ZM       Complications: None       Specimens:    ID Type Source Tests Collected by Time Destination   1 : Penile prosthesis Fresh Penis   Darci Mcleod MD 12/12/2022 1536 Pathology   1 : penile prosthesis Wound Penis ANAEROBIC/AEROBIC/GRAM STAIN Darci Mcleod MD 12/12/2022 3769 Microbiology           Implants: * No implants in log *       Drains: * No LDAs found *       Findings: urethral erosion in proximal penile urethra from implant. 2 rear tip extenders on left, only one on right, unable to identify or palpate second rear tip extender on right even with cystoscopy or right corpus.

## 2022-12-21 NOTE — PROGRESS NOTES
Transition of care outreach postponed for 7 days due to patient's discharge to inpatient rehab facility.    D/C to [facility] 12/20/22 follow up 7d

## 2022-12-28 ENCOUNTER — PATIENT OUTREACH (OUTPATIENT)
Dept: CASE MANAGEMENT | Age: 72
End: 2022-12-28

## 2022-12-28 NOTE — PROGRESS NOTES
Transition of care outreach postponed for 7 days due to patient's discharge to inpatient rehab facility.    D/C to [facility] 12/20/22 Still admitted follow up 7d

## 2023-01-04 ENCOUNTER — PATIENT OUTREACH (OUTPATIENT)
Dept: CASE MANAGEMENT | Age: 73
End: 2023-01-04

## 2023-01-04 ENCOUNTER — DOCUMENTATION ONLY (OUTPATIENT)
Dept: SLEEP MEDICINE | Age: 73
End: 2023-01-04

## 2023-01-04 NOTE — PROGRESS NOTES
Transition of care outreach postponed for 7 days due to patient's discharge to inpatient rehab facility.    D/C to [facility] 12/20/22 still admitted follow up 7d

## 2023-01-04 NOTE — PROGRESS NOTES
In Person No Show 2023 - office unable to confirm visit with patient. Per review of chart - patient is currently at inpatient rehab facility. Seun Ivory (: 1950) is a 71 y.o. male, established patient, who was to be seen for positive airway pressure follow-up and evaluation. He was last seen by me on 2022, previously seen by Dr. Hernan Bowers on 2021, prior notes and sleep tests reviewed in detail. In lab sleep test 2000 showed an AHI of 30.7/hr with a lowest SpO2 of 82%. Repeat Split-Night Testing 2011 indicated an AHI of 30.7/hr with a lowest SpO2 of 89%. Patient was adequately treated on CPAP level of 17 cmH2O. A repeat Split-Night test performed on 2015 was indicative of an AHI of 65.3/hr with a lowest SpO2 of 89%. Patient was adequately treated on CPAP level of 14 cmH2O. He returned for a Bi-Level PAP titration on 2015 and was adequately titrated on 13/06 cmH2O. Most recent titration 2021 adequate on BiLevel - pressure change to Max IPAP 20, Min EPAP 12, PS 6. 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 Device brought to rehab center 2022, but power source in room was not close to bedside so device could not be used. Review of device download 2023 indicates no use since 10/2022. AHI: 65.3(2015). On ResMed:  AirCurve 10 VAuto: Max IPAP 20, Min EPAP 12, PS 6 cmh2O. Set up 2022. Compliance not met due to hospitalization. Patient should be rescheduled and in lab titration likely needed to determine optimal pressure post stroke and for requalification.     -- Susan Plata NP, [facility]  23

## 2023-01-11 ENCOUNTER — PATIENT OUTREACH (OUTPATIENT)
Dept: CASE MANAGEMENT | Age: 73
End: 2023-01-11

## 2023-01-11 NOTE — PROGRESS NOTES
Transition of care outreach postponed for 7 days due to patient's discharge to inpatient rehab facility.    D/C to [facility] 12/20/22 still admitted follow up 7d

## 2023-01-18 ENCOUNTER — PATIENT OUTREACH (OUTPATIENT)
Dept: CASE MANAGEMENT | Age: 73
End: 2023-01-18

## 2023-01-18 NOTE — PROGRESS NOTES
Transition of care outreach postponed for 7 days due to patient's discharge to SNF.    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

## 2023-01-20 ENCOUNTER — PATIENT OUTREACH (OUTPATIENT)
Dept: CASE MANAGEMENT | Age: 73
End: 2023-01-20

## 2023-02-06 DIAGNOSIS — E11.42 DIABETIC POLYNEUROPATHY ASSOCIATED WITH TYPE 2 DIABETES MELLITUS (HCC): ICD-10-CM

## 2023-02-06 DIAGNOSIS — M1A.29X0 DRUG-INDUCED CHRONIC GOUT OF MULTIPLE SITES WITHOUT TOPHUS: ICD-10-CM

## 2023-02-06 RX ORDER — TEMAZEPAM 7.5 MG/1
7.5 CAPSULE ORAL
OUTPATIENT
Start: 2023-02-06

## 2023-02-06 RX ORDER — METOPROLOL SUCCINATE 25 MG/1
75 TABLET, EXTENDED RELEASE ORAL DAILY
Qty: 30 TABLET | Refills: 0 | OUTPATIENT
Start: 2023-02-06

## 2023-02-06 RX ORDER — DILTIAZEM HYDROCHLORIDE 60 MG/1
60 CAPSULE, EXTENDED RELEASE ORAL EVERY 8 HOURS
OUTPATIENT
Start: 2023-02-06

## 2023-02-06 RX ORDER — FINASTERIDE 5 MG/1
5 TABLET, FILM COATED ORAL DAILY
Qty: 30 TABLET | Refills: 0 | OUTPATIENT
Start: 2023-02-06

## 2023-02-06 RX ORDER — ATORVASTATIN CALCIUM 80 MG/1
80 TABLET, FILM COATED ORAL
Qty: 30 TABLET | Refills: 0 | OUTPATIENT
Start: 2023-02-06

## 2023-02-06 RX ORDER — TAMSULOSIN HYDROCHLORIDE 0.4 MG/1
0.4 CAPSULE ORAL DAILY
Qty: 30 CAPSULE | Refills: 0 | OUTPATIENT
Start: 2023-02-06

## 2023-02-06 RX ORDER — ALLOPURINOL 100 MG/1
100 TABLET ORAL DAILY
Qty: 90 TABLET | Refills: 1 | OUTPATIENT
Start: 2023-02-06

## 2023-02-06 RX ORDER — BUPROPION HYDROCHLORIDE 100 MG/1
100 TABLET, EXTENDED RELEASE ORAL 2 TIMES DAILY
OUTPATIENT
Start: 2023-02-06

## 2023-02-06 RX ORDER — MIRTAZAPINE 7.5 MG/1
7.5 TABLET, FILM COATED ORAL
Qty: 30 TABLET | Refills: 0 | OUTPATIENT
Start: 2023-02-06

## 2023-02-06 RX ORDER — GABAPENTIN 300 MG/1
300 CAPSULE ORAL
Qty: 30 CAPSULE | Refills: 0 | OUTPATIENT
Start: 2023-02-06

## 2023-02-07 NOTE — TELEPHONE ENCOUNTER
Calling to check on refills. I offered an appointment and she states they are not able to bring him into the office due to being paralyzed. She says he was sent to rehab with only one month supply of medications and will be due for his meds tomorrow.

## 2023-02-08 RX ORDER — FINASTERIDE 5 MG/1
5 TABLET, FILM COATED ORAL DAILY
Qty: 30 TABLET | Refills: 0 | Status: SHIPPED | OUTPATIENT
Start: 2023-02-08

## 2023-02-08 RX ORDER — ATORVASTATIN CALCIUM 80 MG/1
80 TABLET, FILM COATED ORAL
Qty: 30 TABLET | Refills: 0 | Status: SHIPPED | OUTPATIENT
Start: 2023-02-08

## 2023-02-08 RX ORDER — FAMOTIDINE 20 MG/1
20 TABLET, FILM COATED ORAL EVERY 12 HOURS
Qty: 60 TABLET | Refills: 0 | Status: SHIPPED | OUTPATIENT
Start: 2023-02-08

## 2023-02-08 RX ORDER — DILTIAZEM HYDROCHLORIDE 60 MG/1
60 CAPSULE, EXTENDED RELEASE ORAL EVERY 8 HOURS
Qty: 60 CAPSULE | Refills: 0 | Status: SHIPPED | OUTPATIENT
Start: 2023-02-08

## 2023-02-08 RX ORDER — BUPROPION HYDROCHLORIDE 100 MG/1
100 TABLET, EXTENDED RELEASE ORAL 2 TIMES DAILY
Qty: 60 TABLET | Refills: 0 | Status: SHIPPED | OUTPATIENT
Start: 2023-02-08

## 2023-02-08 RX ORDER — GABAPENTIN 300 MG/1
300 CAPSULE ORAL
Qty: 30 CAPSULE | Refills: 0 | Status: SHIPPED | OUTPATIENT
Start: 2023-02-08

## 2023-02-08 RX ORDER — ALLOPURINOL 100 MG/1
100 TABLET ORAL DAILY
Qty: 90 TABLET | Refills: 1 | Status: SHIPPED | OUTPATIENT
Start: 2023-02-08

## 2023-02-10 ENCOUNTER — PATIENT OUTREACH (OUTPATIENT)
Dept: CASE MANAGEMENT | Age: 73
End: 2023-02-10

## 2023-02-10 NOTE — PROGRESS NOTES
Ambulatory Care Management Note    Date/Time:  2/10/2023 2:31 PM    Patient Current Location: [place]     This patient was received as a referral from Provider. Ambulatory Care Manager outreached to patient today to offer care management services. Introduction to self and role of care manager provided. Patient accepted care management services at this time. Follow up call scheduled at this time. Patient has Ambulatory Care Manager's contact number for any questions or concerns.

## 2023-02-13 RX ORDER — BUPROPION HYDROCHLORIDE 100 MG/1
100 TABLET, EXTENDED RELEASE ORAL 2 TIMES DAILY
Qty: 60 TABLET | Refills: 0 | Status: SHIPPED | OUTPATIENT
Start: 2023-02-13

## 2023-02-16 ENCOUNTER — PATIENT OUTREACH (OUTPATIENT)
Dept: CASE MANAGEMENT | Age: 73
End: 2023-02-16

## 2023-02-16 NOTE — PROGRESS NOTES
Ambulatory Care Management Note    Date/Time:  2/16/2023 11:13 AM    Patient Current Location: [place]    This [facility]) reviewed and updated the following screenings during this call; general assessment and SDOH assessments. Patient declined med rec at this time. Reports that his sister has his med list and is now helping him with medications. Patient's challenges to self-management identified:   functional physical ability, lack of knowledge about disease, medication management, and support system      Medication Management:  poor adherence and poor understanding    Advance Care Planning:   Does patient have an Advance Directive:   not on file, will address     [employer], Referrals, and Durable Medical Equipment: wheelchair, CPAP      Health Maintenance Due   Topic Date Due    Eye Exam Retinal or Dilated  06/19/2015    AAA Screening 73-69 YO Male Smoking Patients  Never done    Pneumococcal 65+ years (3 - PPSV23 if available, else PCV20) 01/13/2019    Shingles Vaccine (2 of 2) 12/25/2021    COVID-19 Vaccine (5 - Booster for Moderna series) 12/25/2021    Foot Exam Q1  06/14/2022    Colorectal Cancer Screening Combo  06/27/2022    Flu Vaccine (1) 08/01/2022    Diabetic Alb to Cr ratio (uACR) test  08/23/2022    Medicare Yearly Exam  08/24/2022     Health Maintenance Reviewed    Patient was asked to consider health care goals that they would like to focus on with this ACM. ACM will follow up with patient to discuss goals and establish care plan in the next 7-14 days.        PCP/Specialist follow up:   Future Appointments   Date Time Provider Doc Hernandez   2/28/2023  3:45 PM MD SHANE Maldonado BS AMB   6/8/2023  1:40 PM DO OBED Moreno BS AMB

## 2023-03-03 ENCOUNTER — PATIENT OUTREACH (OUTPATIENT)
Dept: CASE MANAGEMENT | Age: 73
End: 2023-03-03

## 2023-03-03 NOTE — PROGRESS NOTES
Ambulatory Care Management Note      Date/Time:  3/3/2023 1:55 PM    Patient Current Location: [place]     This patient was received as a referral from [address]. 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 Has followed up with urology and is establishing care with [facility] on Monday. Needs:   -PCP and neurology follow-up  - Home health PT/OT, SN, social work        Ambulatory  contacted patient for discussion and case management of care coordination, chronic disease management. Summary of patients top problems:   Multiple admissions: CVA 9/2022, discharged to rehab. UTI 10/2022, discharged to rehab. Infected penile implant, discharged to rehab. Multiple ED visits for issues with vazquez (saw urology last week). Discharged from rehab around 1/18/23, now living with his sister in [address] [place] [address]. Has not seen PCP since 6/2022. Has not seen neurology since discharge for stroke. Wheelchair bound. Transportation limitations due to mobility status and location now far from established providers. Has no additional support other than sister and brother-in-law. Needs home health services (no services since discharging from rehab in January). No ACP. Patient's challenges to self management identified:   functional physical ability, lack of knowledge about disease, medical condition, medication management, multi health system providers, PCP relationship, support system, and transportation      Medication Management:  poor adherence and poor understanding    Advance Care Planning:   Does patient have an Advance Directive:   not on file, will address     [employer], Referrals, and Durable Medical Equipment: wheelchair. [employer] for [place] transportation. PCP/Specialist follow up:   Future Appointments   Date Time Provider Doc Cisnerosi   6/8/2023  1:40 PM DO OBED Harvey AMB           Goals Addressed                   This Visit's Progress     Attends follow-up appointments as directed. 3/3/23 - Spoke at length with patient and patient's sister. Advised that the patient needs follow-up with neurology and PCP (saw urology last week). Phone numbers provided for patient's sister to schedule appointments. Sister states that she may schedule virtual appointments due to patient's mobility status and due to distance. Patient now lives in [facility]. Advised patient to discuss home health services with PCP - will also forward message to PCP. Patient also has an appointment on Monday to establish with [facility]. [employer] providing wheelchair transportation. Discussed medications. Patient's sister reports that they had a hard time getting all of his prescriptions after he discharged from [address] [place] [address], but they now have everything on the discharge med list. Patient's sister reports that the patient is taking all medications as prescribed per discharge med list. ACM will follow-up next week regarding appointments being scheduled. CAROLYN               Patient verbalized understanding of all information discussed. Patient has this Ambulatory Care Manager's contact information for any further questions, concerns, or needs.

## 2023-03-07 ENCOUNTER — PATIENT OUTREACH (OUTPATIENT)
Dept: CASE MANAGEMENT | Age: 73
End: 2023-03-07

## 2023-03-08 ENCOUNTER — TELEPHONE (OUTPATIENT)
Dept: INTERNAL MEDICINE CLINIC | Age: 73
End: 2023-03-08

## 2023-03-08 NOTE — PROGRESS NOTES
Goals        Attends follow-up appointments as directed. 3/3/23 - Spoke at length with patient and patient's sister. Advised that the patient needs follow-up with neurology and PCP (saw urology last week). Phone numbers provided for patient's sister to schedule appointments. Sister states that she may schedule virtual appointments due to patient's mobility status and due to distance. Patient now lives in [facility]. Advised patient to discuss home health services with PCP - will also forward message to PCP. Patient also has an appointment on Monday to establish with [facility]. [facility] providing wheelchair transportation. Discussed medications. Patient's sister reports that they had a hard time getting all of his prescriptions after he discharged from [address] [place] [address], but they now have everything on the discharge med list. Patient's sister reports that the patient is taking all medications as prescribed per discharge med list. ACM will follow-up next week regarding appointments being scheduled. CAROLYN    3/8/23 - Spoke with patient and patient's sister. Patient attended appointment on Monday at the [place]. Reported that they are getting him set up with rehab services and he may be going to a facility for rehab. They were unable to give details about the facility he will be going to. Encouraged patient and sister to keep ACM updated so that this ACM can assist as needed. Neurology appointment was scheduled in June (reports that is the earliest they could get). Patient's sister will be calling this evening or tomorrow AM to set up an appointment with Dr. Tobin Zhu. Declines additional needs at this time. ACM to follow-up in 1 week for update on therapy services.  CAROLYN

## 2023-03-14 ENCOUNTER — PATIENT OUTREACH (OUTPATIENT)
Dept: CASE MANAGEMENT | Age: 73
End: 2023-03-14

## 2023-03-14 NOTE — PROGRESS NOTES
Goals        Attends follow-up appointments as directed. 3/3/23 - Spoke at length with patient and patient's sister. Advised that the patient needs follow-up with neurology and PCP (saw urology last week). Phone numbers provided for patient's sister to schedule appointments. Sister states that she may schedule virtual appointments due to patient's mobility status and due to distance. [facility] Advised patient to discuss home health services with PCP - will also forward message to PCP. Patient also has an appointment on Monday to establish with [facility]. [facility] providing wheelchair transportation. Discussed medications. Patient's sister reports that they had a hard time getting all of his prescriptions after he discharged from [address] [place] [address], but they now have everything on the discharge med list. Patient's sister reports that the patient is taking all medications as prescribed per discharge med list. ACM will follow-up next week regarding appointments being scheduled. CAROLYN    3/8/23 - Spoke with patient and patient's sister. Patient attended appointment on Monday at the [place]. Reported that they are getting him set up with rehab services and he may be going to a facility for rehab. They were unable to give details about the facility he will be going to. Encouraged patient and sister to keep ACM updated so that this ACM can assist as needed. Neurology appointment was scheduled in June (reports that is the earliest they could get). Patient's sister will be calling this evening or tomorrow AM to set up an appointment with Dr. Mary Au. Declines additional needs at this time. ACM to follow-up in 1 week for update on therapy services. CAROLYN    3/14/23 - Patient reported that he has not received any updates on [facility] services. Requested ACM reach out to , Arleen Kraus, at the [place].  ACM spoke with Arleen Kraus who reported that he was approved for skilled care 3/15/23-9/15/23 and the agency is At [facility] Care in [place]. Called At [facility] who reported that they have not received any orders yet. John Monte updated and she will reach out to the individual in charge of sending the orders. John Monte with reach out to the patient with updates. Patient also scheduled appointment with Dr. Jean Tejada on 4/4. ACM advised that Dr. Jean Tejada can order home health if needed. Patient will attend appointment.  CAROLYN

## 2023-03-15 ENCOUNTER — TELEPHONE (OUTPATIENT)
Dept: INTERNAL MEDICINE CLINIC | Age: 73
End: 2023-03-15

## 2023-03-15 DIAGNOSIS — G47.09 OTHER INSOMNIA: Primary | ICD-10-CM

## 2023-03-15 NOTE — TELEPHONE ENCOUNTER
----- Message from Roseline Kayser sent at 3/15/2023  3:44 PM EDT -----  Subject: Message to Provider    QUESTIONS  Information for Provider? pt was given a medication at the er, but was   only given a 30 day supply, pt is wanting to know if dr Giuliano Powell can   prescribe this medication. please call pt for details. thanks  ---------------------------------------------------------------------------  --------------  Antoine Heck INFO  [phone]; OK to leave message on voicemail  ---------------------------------------------------------------------------  --------------  SCRIPT ANSWERS  Relationship to Patient?  Self

## 2023-03-16 ENCOUNTER — TELEPHONE (OUTPATIENT)
Dept: INTERNAL MEDICINE CLINIC | Age: 73
End: 2023-03-16

## 2023-03-16 DIAGNOSIS — I63.9 ISCHEMIC CEREBROVASCULAR ACCIDENT (CVA) (HCC): Primary | ICD-10-CM

## 2023-03-16 RX ORDER — TEMAZEPAM 7.5 MG/1
7.5 CAPSULE ORAL
Qty: 30 CAPSULE | Refills: 0 | Status: SHIPPED | OUTPATIENT
Start: 2023-03-16

## 2023-03-16 NOTE — TELEPHONE ENCOUNTER
Pt is requesting referral for PT. He has appt scheduled for 4/4 and would be fine getting referral then but would be pleased to have it sooner if possible.

## 2023-03-17 ENCOUNTER — TELEPHONE (OUTPATIENT)
Dept: INTERNAL MEDICINE CLINIC | Age: 73
End: 2023-03-17

## 2023-03-17 NOTE — TELEPHONE ENCOUNTER
Pt's sister is reporting that pt has been having severe diarrhea last several days. She says [facility] told him he had stomach infection causing diarrhea and that he should get prescription from dr for infection. I told sister pt would most likely need appt for prescription, and offered to reschedule 4/4 appt to 3/21. Sister declined b/c they had transportation set up for 4/4 but not 3/21. She is wondering if they can still get prescription.

## 2023-03-17 NOTE — TELEPHONE ENCOUNTER
Patient advised he would need appt for abx would need assessment of symptoms and diarrhea for 2 weeks. Patient did not want to make appt, but did agree he should not wait any longer for treatment, Suggested an UC/Patient 1st and gave patient phone number for [facility].

## 2023-03-21 ENCOUNTER — DOCUMENTATION ONLY (OUTPATIENT)
Dept: INTERNAL MEDICINE CLINIC | Age: 73
End: 2023-03-21

## 2023-03-21 ENCOUNTER — PATIENT OUTREACH (OUTPATIENT)
Dept: CASE MANAGEMENT | Age: 73
End: 2023-03-21

## 2023-03-21 NOTE — PROGRESS NOTES
Goals        Attends follow-up appointments as directed. 3/3/23 - Spoke at length with patient and patient's sister. Advised that the patient needs follow-up with neurology and PCP (saw urology last week). Phone numbers provided for patient's sister to schedule appointments. Sister states that she may schedule virtual appointments due to patient's mobility status and due to distance. Patient now lives in [facility]. Advised patient to discuss home health services with PCP - will also forward message to PCP. Patient also has an appointment on Monday to establish with [facility]. [facility] providing wheelchair transportation. Discussed medications. Patient's sister reports that they had a hard time getting all of his prescriptions after he discharged from [address] [place] [address], but they now have everything on the discharge med list. Patient's sister reports that the patient is taking all medications as prescribed per discharge med list. ACM will follow-up next week regarding appointments being scheduled. CAROLYN    3/8/23 - Spoke with patient and patient's sister. Patient attended appointment on Monday at the [place]. Reported that they are getting him set up with rehab services and he may be going to a facility for rehab. They were unable to give details about the facility he will be going to. Encouraged patient and sister to keep ACM updated so that this ACM can assist as needed. Neurology appointment was scheduled in June (reports that is the earliest they could get). Patient's sister will be calling this evening or tomorrow AM to set up an appointment with Dr. Sincere Farmer. Declines additional needs at this time. ACM to follow-up in 1 week for update on therapy services. CAROLYN    3/14/23 - Patient reported that he has not received any updates on [facility] services. Requested ACM reach out to , Wale Ness, at the [place].  ACM spoke with Wale Ness who reported that he was approved for skilled care 3/15/23-9/15/23 and the agency is At [facility] Care in [place]. Called At [facility] who reported that they have not received any orders yet. Shania Villagran updated and she will reach out to the individual in charge of sending the orders. Shania Villagran with reach out to the patient with updates. Patient also scheduled appointment with Dr. Gayle Humphries on 4/4. [facility] advised that Dr. Gayle Humphries can order home health if needed. Patient will attend appointment. CAROLYN    3/21/23 - Patient receiving [facility] nursing and OT through At [facility], however, they do not have a physical therapist on staff. Discussed with patient's sister that outpatient PT may be the best option if he has transportation. Call placed to , Shania Villagran, at the [facility] to discuss options. Will also update Dr. Gayle Humphries. Patient has appointment with Dr. Gayle Humphries on 4/4. Recommended patient's sister accompany him to the appointment.  CAROLYN

## 2023-03-29 ENCOUNTER — TELEPHONE (OUTPATIENT)
Dept: INTERNAL MEDICINE CLINIC | Age: 73
End: 2023-03-29

## 2023-03-29 NOTE — TELEPHONE ENCOUNTER
Cameron Ryan  Subject: Hospital Follow Up     QUESTIONS   What hospital was the Patient Discharged from? [address]   Date of Discharge? 2023-01-04   Discharge Location? Home   Reason for hospitalization as patient stated? Stroke   What question does the patient have, if applicable? Patient is scheduled   for 5/18, but would like to reschedule to 4/14. Please give him a call to   reschedule.   ---------------------------------------------------------------------------   --------------   CALL BACK INFO   What is the best way for the office to contact you? OK to leave message on   voicemail   Preferred Call Back Phone Number? [phone]   ---------------------------------------------------------------------------   --------------   SCRIPT ANSWERS   Relationship to Patient?  Self

## 2023-04-03 ENCOUNTER — PATIENT OUTREACH (OUTPATIENT)
Dept: CASE MANAGEMENT | Age: 73
End: 2023-04-03

## 2023-04-03 NOTE — PROGRESS NOTES
Goals        Attends follow-up appointments as directed. 3/3/23 - Spoke at length with patient and patient's sister. Advised that the patient needs follow-up with neurology and PCP (saw urology last week). Phone numbers provided for patient's sister to schedule appointments. Sister states that she may schedule virtual appointments due to patient's mobility status and due to distance. Patient now lives in [facility]. Advised patient to discuss home health services with PCP - will also forward message to PCP. Patient also has an appointment on Monday to establish with [facility]. [facility] providing wheelchair transportation. Discussed medications. Patient's sister reports that they had a hard time getting all of his prescriptions after he discharged from [address] [place] [address], but they now have everything on the discharge med list. Patient's sister reports that the patient is taking all medications as prescribed per discharge med list. ACM will follow-up next week regarding appointments being scheduled. CAROLYN    3/8/23 - Spoke with patient and patient's sister. Patient attended appointment on Monday at the [place]. Reported that they are getting him set up with rehab services and he may be going to a facility for rehab. They were unable to give details about the facility he will be going to. Encouraged patient and sister to keep ACM updated so that this ACM can assist as needed. Neurology appointment was scheduled in June (reports that is the earliest they could get). Patient's sister will be calling this evening or tomorrow AM to set up an appointment with Dr. Nela Churchill. Declines additional needs at this time. ACM to follow-up in 1 week for update on therapy services. CAROLYN    3/14/23 - Patient reported that he has not received any updates on [facility] services. Requested ACM reach out to , Iliana Diallo, at the [place].  ACM spoke with Iliana Diallo who reported that he was approved for skilled care 3/15/23-9/15/23 and the agency is At [facility] Care in [place]. Called At [facility] who reported that they have not received any orders yet. Jm Peña updated and she will reach out to the individual in charge of sending the orders. Jm Peña with reach out to the patient with updates. Patient also scheduled appointment with Dr. Tor Panchal on 4/4. [facility] advised that Dr. Tor Panchal can order home health if needed. Patient will attend appointment. CAROLYN    3/21/23 - Patient receiving [facility] nursing and OT through At [facility], however, they do not have a physical therapist on staff. Discussed with patient's sister that outpatient PT may be the best option if he has transportation. Call placed to , Jm Peña, at the [place] to discuss options. Will also update Dr. Tor Panchal. Patient has appointment with Dr. Tor Panchal on 4/4. Recommended patient's sister accompany him to the appointment. CAROLYN    4/3/23 - Patient rescheduled appointment with Dr. Tor Panchal to 5/18 due to transportation. PT has been coming out to see the patient. Per patients sister, she was advised that the patient would also be getting a personal care aide 3 days a week for 4 hours. They have not heard from anyone. Contact information given for , Jm Peña, at [place]. Patient's sister will reach out for information.  CAROLYN

## 2023-04-05 ENCOUNTER — PATIENT OUTREACH (OUTPATIENT)
Dept: CASE MANAGEMENT | Age: 73
End: 2023-04-05

## 2023-04-05 NOTE — PROGRESS NOTES
Goals        Attends follow-up appointments as directed. 3/3/23 - Spoke at length with patient and patient's sister. Advised that the patient needs follow-up with neurology and PCP (saw urology last week). Phone numbers provided for patient's sister to schedule appointments. Sister states that she may schedule virtual appointments due to patient's mobility status and due to distance. Patient now lives in [facility]. Advised patient to discuss home health services with PCP - will also forward message to PCP. Patient also has an appointment on Monday to establish with [facility]. [facility] providing wheelchair [facility]. Discussed medications. Patient's sister reports that they had a hard time getting all of his prescriptions after he discharged from [address] [place] [address], but they now have everything on the discharge med list. Patient's sister reports that the patient is taking all medications as prescribed per discharge med list. ACM will follow-up next week regarding appointments being scheduled. CAROLYN    3/8/23 - Spoke with patient and patient's sister. Patient attended appointment on Monday at the [place]. Reported that they are getting him set up with rehab services and he may be going to a facility for rehab. They were unable to give details about the facility he will be going to. Encouraged patient and sister to keep ACM updated so that this ACM can assist as needed. Neurology appointment was scheduled in June (reports that is the earliest they could get). Patient's sister will be calling this evening or tomorrow AM to set up an appointment with Dr. Halle Abreu. Declines additional needs at this time. ACM to follow-up in 1 week for update on therapy services. CAROLYN    3/14/23 - Patient reported that he has not received any updates on [facility] services. Requested ACM reach out to , Robson Carter, at the [place].  ACM spoke with Robson Carter who reported that he was approved for skilled care 3/15/23-9/15/23 and the agency is At [facility] in [place]. Called At [facility] who reported that they have not received any orders yet. Angie Hernandez updated and she will reach out to the individual in charge of sending the orders. Angie Hernandez with reach out to the patient with updates. Patient also scheduled appointment with Dr. Mitra Denton on 4/4. [facility] advised that Dr. Mitra Denton can order home health if needed. Patient will attend appointment. KEMILY    3/21/23 - Patient receiving [facility] nursing and OT through At [facility], however, they do not have a physical therapist on staff. Discussed with patient's sister that outpatient PT may be the best option if he has transportation. Call placed to , Angie Hernandez, at the [place] to discuss options. Will also update Dr. Mitra Denton. Patient has appointment with Dr. Mitra Denton on 4/4. Recommended patient's sister accompany him to the appointment. KEB    4/3/23 - Patient rescheduled appointment with Dr. Mitra Denton to 5/18 due to transportation. PT has been coming out to see the patient. Per patients sister, she was advised that the patient would also be getting a personal care aide 3 days a week for 4 hours. They have not heard from anyone. Contact information given for , Angie Hernandez, at [place]. Patient's sister will reach out for information. KEB    4/5/23 - Received call from patient's sister stating that the [place] is no longer going to be providing an aide and the are not sure what to do now. Patient's sister feels that he needs placement in a LTC facility or needs a 24/7 caregiver. She and her  are not able to provide the level of care that the patient needs.  Sending  referral. Kamaljit Padilla

## 2023-04-05 NOTE — PROGRESS NOTES
Initial Contact Social Work Note - Ambulatory  4/5/2023    Date of referral: 4/5/2023   Referral received from: Daisy Avelar RN/ACM   Reason for referral: Placement options; caregiver burden     Previous SW Referral: No    If yes, brief summary and outcome:  N/A     Two Identifiers Verified: Yes    Insurance Provider: [employer]; [employer] [address]: Sibling(s) Patient has a son Elijah Connolly), who lives in [facility]. He has a daughter (Elva Adair), who lives in [place], [place]. Per chart review, patient's son drives a truck, and is not often available, and the patient is estranged from his daughter.  Status:     Community Providers:  [address] SN with [facility]. This agency serves his area [facility], but does not have any physical therapists on staff. ADL Assistance: Bathing, Dressing, Eating, Transferring, and Toileting Patient requires assistance with all ADL's and IADL's. Housing/Living Concerns or Home Modification Needs: He currently resides with his sister Zack Terri) and brother-in-law, in their home in [place], [place]. His care needs exceed his sister and brother-in-law's ability to care for him, and they are seeking alternative options. Cabrera King states that he is \"dead weight\" and reports that she and her  have both injured themselves, attempting to meet his care needs. Transportation Concern: Patient relies on family for transportation. He has also utilized [facility] [employer] for transportation assistance to appointments in the past.      Medication Cost Concern: No concerns identified today. Medication Education or Adherence Concern: No concerns identified today. Financial Concern(s): No concerns identified today. Income (only if applicable): Patient states that he receives approximately $2,000/month in income from [employer].   He has $340K in savings, along with owning property in [facility], a home, a car, and a boat, among other assets. Patient is not eligible for assistance through the [facility] or Medicaid, as a result of his savings and assets. Ability to Read/Write: Yes    Advance Care Plan:  N/A Patient does not have an Advanced Medical Directive on file. Identified Needs:     Increasing care needs  Caregiver burden  Placement vs. In-home care, in [place], [place] vs. [place]. Advanced Medical Directive     Social Work Plan:    Ongoing psychosocial support as desired by the patient. Method of Communication with Provider (if appropriate): chart routing        Goals Addressed                      This Visit's Progress      Connect with GL 2ours (pt-stated)   On track      04/05/23  Connected with the patient via phone today. He also provided permission for this  to speak with his sister regarding his health and care needs. Patient's sister, Yuliya Mendez, joined the phone call, and explained that she and her  are struggling to meet his care needs. Patient is unable to stand, pivot, or ambulate without assistance. He needs help with bathing, dressing, and toileting. He uses a catheter, and diapers. He uses a walker to stand, and can then pivot, with assistance, to a wheelchair. Yuliya Mendez and her  are suffering from physical challenges, as a result of lifting/assisting him. Patient is interested in either placement in a facility vs. [address] [place] [address] in either [place], [place] or [place]. Provided information for [employer], S#[phone], explaining that this agency assists with placements in facilities vs. assistance in finding care in the home setting. Advised patient of the average cost of an assisted living facility ($6,000/month), nursing facility ($10,000), or care in the home setting ($35-$40/hour).   Patient has significant savings; he is not eligible for assistance through the [address] or Medicaid, as a result of his savings and assets. Patient plans to start by calling friends in the [facility] area (where his home is located) to ask if anyone would be interested in being his caregiver, for a lesser fee than $35/hour. Patient states that he will determine his budget. Plan:  Ongoing psychosocial support and resource referral, as desired by the patient and family. This  will follow up with the patient and his sister next week, to check on his status, and progress with the resources provided today.      EPC               ANAHI Ronquillo/KATHERYN, [facility]   C#[phone]

## 2023-04-15 ENCOUNTER — HOSPITAL ENCOUNTER (INPATIENT)
Age: 73
LOS: 6 days | Discharge: NURSING, REHAB OR LONG-TERM CARE | End: 2023-04-21
Attending: EMERGENCY MEDICINE | Admitting: FAMILY MEDICINE
Payer: MEDICARE

## 2023-04-15 ENCOUNTER — APPOINTMENT (OUTPATIENT)
Dept: CT IMAGING | Age: 73
End: 2023-04-15
Attending: EMERGENCY MEDICINE
Payer: MEDICARE

## 2023-04-15 ENCOUNTER — APPOINTMENT (OUTPATIENT)
Dept: GENERAL RADIOLOGY | Age: 73
End: 2023-04-15
Attending: FAMILY MEDICINE
Payer: MEDICARE

## 2023-04-15 ENCOUNTER — APPOINTMENT (OUTPATIENT)
Dept: GENERAL RADIOLOGY | Age: 73
End: 2023-04-15
Attending: EMERGENCY MEDICINE
Payer: MEDICARE

## 2023-04-15 DIAGNOSIS — I69.351 HEMIPARESIS OF RIGHT DOMINANT SIDE AS LATE EFFECT OF CEREBRAL INFARCTION (HCC): Primary | ICD-10-CM

## 2023-04-15 DIAGNOSIS — Z02.2 ENCOUNTER FOR EXAMINATION FOR ADMISSION TO NURSING HOME: ICD-10-CM

## 2023-04-15 DIAGNOSIS — G47.09 OTHER INSOMNIA: ICD-10-CM
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
## 2023-04-15 PROBLEM — R53.1 WEAKNESS: Status: ACTIVE | Noted: 2023-04-15

## 2023-04-15 LAB
ANION GAP SERPL CALC-SCNC: 7 MMOL/L (ref 5–15)
APPEARANCE UR: CLEAR
BACTERIA URNS QL MICRO: ABNORMAL /HPF
BASOPHILS # BLD: 0 K/UL (ref 0–0.1)
BASOPHILS NFR BLD: 0 % (ref 0–1)
BILIRUB UR QL: NEGATIVE
BUN SERPL-MCNC: 19 MG/DL (ref 6–20)
BUN/CREAT SERPL: 20 (ref 12–20)
CALCIUM SERPL-MCNC: 9.1 MG/DL (ref 8.5–10.1)
CHLORIDE SERPL-SCNC: 101 MMOL/L (ref 97–108)
CO2 SERPL-SCNC: 28 MMOL/L (ref 21–32)
COLOR UR: ABNORMAL
CREAT SERPL-MCNC: 0.96 MG/DL (ref 0.7–1.3)
DIFFERENTIAL METHOD BLD: ABNORMAL
EOSINOPHIL # BLD: 0.3 K/UL (ref 0–0.4)
EOSINOPHIL NFR BLD: 4 % (ref 0–7)
EPITH CASTS URNS QL MICRO: ABNORMAL /LPF
ERYTHROCYTE [DISTWIDTH] IN BLOOD BY AUTOMATED COUNT: 15.3 % (ref 11.5–14.5)
GLUCOSE SERPL-MCNC: 96 MG/DL (ref 65–100)
GLUCOSE UR STRIP.AUTO-MCNC: NEGATIVE MG/DL
HCT VFR BLD AUTO: 35.3 % (ref 36.6–50.3)
HGB BLD-MCNC: 11.1 G/DL (ref 12.1–17)
HGB UR QL STRIP: ABNORMAL
IMM GRANULOCYTES # BLD AUTO: 0 K/UL (ref 0–0.04)
IMM GRANULOCYTES NFR BLD AUTO: 0 % (ref 0–0.5)
KETONES UR QL STRIP.AUTO: NEGATIVE MG/DL
LEUKOCYTE ESTERASE UR QL STRIP.AUTO: ABNORMAL
LYMPHOCYTES # BLD: 1.8 K/UL (ref 0.8–3.5)
LYMPHOCYTES NFR BLD: 19 % (ref 12–49)
MCH RBC QN AUTO: 28.2 PG (ref 26–34)
MCHC RBC AUTO-ENTMCNC: 31.4 G/DL (ref 30–36.5)
MCV RBC AUTO: 89.6 FL (ref 80–99)
MONOCYTES # BLD: 0.7 K/UL (ref 0–1)
MONOCYTES NFR BLD: 8 % (ref 5–13)
NEUTS SEG # BLD: 6.3 K/UL (ref 1.8–8)
NEUTS SEG NFR BLD: 69 % (ref 32–75)
NITRITE UR QL STRIP.AUTO: NEGATIVE
NRBC # BLD: 0 K/UL (ref 0–0.01)
NRBC BLD-RTO: 0 PER 100 WBC
PH UR STRIP: 7 (ref 5–8)
PLATELET # BLD AUTO: 251 K/UL (ref 150–400)
PMV BLD AUTO: 10.4 FL (ref 8.9–12.9)
POTASSIUM SERPL-SCNC: 4.7 MMOL/L (ref 3.5–5.1)
PROT UR STRIP-MCNC: NEGATIVE MG/DL
RBC # BLD AUTO: 3.94 M/UL (ref 4.1–5.7)
RBC #/AREA URNS HPF: ABNORMAL /HPF (ref 0–5)
SODIUM SERPL-SCNC: 136 MMOL/L (ref 136–145)
SP GR UR REFRACTOMETRY: 1.01 (ref 1–1.03)
UR CULT HOLD, URHOLD: NORMAL
UROBILINOGEN UR QL STRIP.AUTO: 1 EU/DL (ref 0.2–1)
WBC # BLD AUTO: 9.2 K/UL (ref 4.1–11.1)
WBC URNS QL MICRO: >100 /HPF (ref 0–4)

## 2023-04-15 PROCEDURE — 83605 ASSAY OF LACTIC ACID: CPT

## 2023-04-15 PROCEDURE — 87186 SC STD MICRODIL/AGAR DIL: CPT

## 2023-04-15 PROCEDURE — 73502 X-RAY EXAM HIP UNI 2-3 VIEWS: CPT

## 2023-04-15 PROCEDURE — 87077 CULTURE AEROBIC IDENTIFY: CPT

## 2023-04-15 PROCEDURE — 81001 URINALYSIS AUTO W/SCOPE: CPT

## 2023-04-15 PROCEDURE — 87040 BLOOD CULTURE FOR BACTERIA: CPT

## 2023-04-15 PROCEDURE — 74011250637 HC RX REV CODE- 250/637: Performed by: FAMILY MEDICINE

## 2023-04-15 PROCEDURE — 51702 INSERT TEMP BLADDER CATH: CPT

## 2023-04-15 PROCEDURE — 72170 X-RAY EXAM OF PELVIS: CPT

## 2023-04-15 PROCEDURE — 65270000032 HC RM SEMIPRIVATE

## 2023-04-15 PROCEDURE — 36415 COLL VENOUS BLD VENIPUNCTURE: CPT

## 2023-04-15 PROCEDURE — 74011000250 HC RX REV CODE- 250: Performed by: FAMILY MEDICINE

## 2023-04-15 PROCEDURE — 85025 COMPLETE CBC W/AUTO DIFF WBC: CPT

## 2023-04-15 PROCEDURE — 80048 BASIC METABOLIC PNL TOTAL CA: CPT

## 2023-04-15 PROCEDURE — 0T2BX0Z CHANGE DRAINAGE DEVICE IN BLADDER, EXTERNAL APPROACH: ICD-10-PCS | Performed by: EMERGENCY MEDICINE

## 2023-04-15 PROCEDURE — 74011250636 HC RX REV CODE- 250/636: Performed by: FAMILY MEDICINE

## 2023-04-15 PROCEDURE — 99285 EMERGENCY DEPT VISIT HI MDM: CPT

## 2023-04-15 PROCEDURE — 70450 CT HEAD/BRAIN W/O DYE: CPT

## 2023-04-15 PROCEDURE — 87086 URINE CULTURE/COLONY COUNT: CPT

## 2023-04-15 RX ORDER — FINASTERIDE 5 MG/1
5 TABLET, FILM COATED ORAL DAILY
Status: DISCONTINUED | OUTPATIENT
Start: 2023-04-16 | End: 2023-04-21 | Stop reason: HOSPADM

## 2023-04-15 RX ORDER — BALSAM PERU/CASTOR OIL
OINTMENT (GRAM) TOPICAL 2 TIMES DAILY
Status: DISCONTINUED | OUTPATIENT
Start: 2023-04-16 | End: 2023-04-21 | Stop reason: HOSPADM

## 2023-04-15 RX ORDER — GABAPENTIN 300 MG/1
300 CAPSULE ORAL
Status: DISCONTINUED | OUTPATIENT
Start: 2023-04-15 | End: 2023-04-21 | Stop reason: HOSPADM

## 2023-04-15 RX ORDER — ALLOPURINOL 100 MG/1
100 TABLET ORAL DAILY
Status: DISCONTINUED | OUTPATIENT
Start: 2023-04-16 | End: 2023-04-21 | Stop reason: HOSPADM

## 2023-04-15 RX ORDER — LANOLIN ALCOHOL/MO/W.PET/CERES
3 CREAM (GRAM) TOPICAL
Status: DISCONTINUED | OUTPATIENT
Start: 2023-04-15 | End: 2023-04-21 | Stop reason: HOSPADM

## 2023-04-15 RX ORDER — TAMSULOSIN HYDROCHLORIDE 0.4 MG/1
0.4 CAPSULE ORAL DAILY
Status: DISCONTINUED | OUTPATIENT
Start: 2023-04-16 | End: 2023-04-21 | Stop reason: HOSPADM

## 2023-04-15 RX ORDER — LANOLIN ALCOHOL/MO/W.PET/CERES
400 CREAM (GRAM) TOPICAL
Status: DISCONTINUED | OUTPATIENT
Start: 2023-04-15 | End: 2023-04-21 | Stop reason: HOSPADM

## 2023-04-15 RX ORDER — FAMOTIDINE 20 MG/1
20 TABLET, FILM COATED ORAL EVERY 12 HOURS
Status: DISCONTINUED | OUTPATIENT
Start: 2023-04-15 | End: 2023-04-21 | Stop reason: HOSPADM

## 2023-04-15 RX ORDER — UREA 10 %
325 LOTION (ML) TOPICAL 2 TIMES DAILY
Status: DISCONTINUED | OUTPATIENT
Start: 2023-04-16 | End: 2023-04-21 | Stop reason: HOSPADM

## 2023-04-15 RX ORDER — BUPROPION HYDROCHLORIDE 100 MG/1
100 TABLET, EXTENDED RELEASE ORAL 2 TIMES DAILY
Status: DISCONTINUED | OUTPATIENT
Start: 2023-04-16 | End: 2023-04-21 | Stop reason: HOSPADM

## 2023-04-15 RX ORDER — DILTIAZEM HYDROCHLORIDE 120 MG/1
120 CAPSULE, COATED, EXTENDED RELEASE ORAL EVERY 24 HOURS
Status: DISCONTINUED | OUTPATIENT
Start: 2023-04-15 | End: 2023-04-21 | Stop reason: HOSPADM

## 2023-04-15 RX ORDER — MIRTAZAPINE 15 MG/1
7.5 TABLET, FILM COATED ORAL
Status: DISCONTINUED | OUTPATIENT
Start: 2023-04-15 | End: 2023-04-21 | Stop reason: HOSPADM

## 2023-04-15 RX ORDER — ATORVASTATIN CALCIUM 40 MG/1
80 TABLET, FILM COATED ORAL
Status: DISCONTINUED | OUTPATIENT
Start: 2023-04-15 | End: 2023-04-21 | Stop reason: HOSPADM

## 2023-04-15 RX ADMIN — ATORVASTATIN CALCIUM 80 MG: 40 TABLET, FILM COATED ORAL at 22:25

## 2023-04-15 RX ADMIN — GABAPENTIN 300 MG: 300 CAPSULE ORAL at 22:26

## 2023-04-15 RX ADMIN — Medication 400 MG: at 22:25

## 2023-04-15 RX ADMIN — APIXABAN 5 MG: 5 TABLET, FILM COATED ORAL at 22:25

## 2023-04-15 RX ADMIN — MIRTAZAPINE 7.5 MG: 15 TABLET, FILM COATED ORAL at 22:26

## 2023-04-15 RX ADMIN — DILTIAZEM HYDROCHLORIDE 120 MG: 120 CAPSULE, EXTENDED RELEASE ORAL at 22:25

## 2023-04-15 RX ADMIN — SODIUM CHLORIDE 2 G: 9 INJECTION INTRAMUSCULAR; INTRAVENOUS; SUBCUTANEOUS at 22:26

## 2023-04-15 RX ADMIN — FAMOTIDINE 20 MG: 20 TABLET ORAL at 22:26

## 2023-04-15 NOTE — ED PROVIDER NOTES
72M w/ hx AF on eliquis, HTN, DM, HLD, VALENTE, CVA w/ residual right sided weakness p/w ambulatory dysfunction x1d. Pt had an ischemic stroke 9/2022 at [facility] w/ residual right sided weakness. Up until today was staying w/ his sister who is no longer able to care for him. Pt left her house in a taxi to go to his house. He fell from standing onto his buttock while getting out of a taxi today. This occurred while transferring out of the cab and was unable to get up on his own. He was planning to live by himself but now worries that he will be unable to care for himself. Also states that has no one to care for him. Has been walking w/ a walker over past few months. Denies any new neurologic symptoms. Denies syncope or LOC. No N/V, dyspnea, dizziness. Denies any pain anywhere. No F/C, cough or recent illnesses. Has a chronic indwelling vazquez cath.        Past Medical History:   Diagnosis Date    Arrhythmia     atrial fib    Depression 4/24/2010    Diabetes (HCC)     diet control for pre-diabetes    Dyslipidemia, goal LDL below 100 6/14/2011    Gout     HTN (hypertension) 4/24/2010    Impotence 4/24/2010    Morbid obesity (HCC) 5/17/2010    VALENTE (obstructive sleep apnea) 4/24/2010    CPAP    Restless legs 4/15/2015       Past Surgical History:   Procedure Laterality Date    COLONOSCOPY N/A 6/27/2017    COLONOSCOPY performed by Dmitry Mckinney MD at [facility] ENDOSCOPY    ENDOSCOPY, COLON, DIAGNOSTIC  2007    HX APPENDECTOMY      HX ORTHOPAEDIC  2000    bilat TKR     HX ORTHOPAEDIC  2010    left THR    HX UROLOGICAL  03/2018    urolift    HX UROLOGICAL  03/2019    prosthesis         Family History:   Problem Relation Age of Onset    Cancer Father         leukemia    Cancer Paternal Grandfather     Diabetes Sister     Diabetes Brother     Cancer Maternal Aunt     Cancer Maternal Uncle        Social History     Socioeconomic History    Marital status:      Spouse name: Not on file    Number of children: Not on file    Years of  education: Not on file    Highest education level: Not on file   Occupational History    Not on file   Tobacco Use    Smoking status: Former     Packs/day: 0.50     Years: 5.00     Pack years: 2.50     Types: Cigarettes     Quit date: 1990     Years since quittin.9    Smokeless tobacco: Never   Substance and Sexual Activity    Alcohol use: Yes     Alcohol/week: 1.7 standard drinks     Types: 2 Standard drinks or equivalent per week     Comment: 2-3 drinks per week    Drug use: No    Sexual activity: Not Currently   Other Topics Concern    Not on file   Social History Narrative    Not on file     Social Determinants of Health     Financial Resource Strain: Low Risk     Difficulty of Paying Living Expenses: Not very hard   Food Insecurity: No Food Insecurity    Worried About Running Out of Food in the Last Year: Never true    Ran Out of Food in the Last Year: Never true   Transportation Needs: No Transportation Needs    Lack of Transportation (Medical): No    Lack of Transportation (Non-Medical): No   Physical Activity: Inactive    Days of Exercise per Week: 0 days    Minutes of Exercise per Session: 0 min   Stress: Stress Concern Present    Feeling of Stress : Rather much   Social Connections: Socially Isolated    Frequency of Communication with Friends and Family: More than three times a week    Frequency of Social Gatherings with Friends and Family: More than three times a week    Attends Mormonism Services: Never    Active Member of Clubs or Organizations: No    Attends Club or Organization Meetings: Never    Marital Status:    Intimate Partner Violence: Not At Risk    Fear of Current or Ex-Partner: No    Emotionally Abused: No    Physically Abused: No    Sexually Abused: No   Housing Stability: Low Risk     Unable to Pay for Housing in the Last Year: No    Number of Places Lived in the Last Year: 2    Unstable Housing in the Last Year: No         ALLERGIES: Patient has no known  allergies.    Review of Systems   Constitutional:  Negative for chills, diaphoresis and fever.   HENT:  Negative for facial swelling, mouth sores, nosebleeds, trouble swallowing and voice change.    Eyes:  Negative for pain and visual disturbance.   Respiratory:  Negative for apnea, cough, shortness of breath, wheezing and stridor.    Cardiovascular:  Negative for chest pain, palpitations and leg swelling.   Gastrointestinal:  Negative for abdominal distention, abdominal pain, blood in stool, diarrhea, nausea and vomiting.   Genitourinary:  Negative for difficulty urinating, dysuria, flank pain, hematuria, scrotal swelling and testicular pain.   Musculoskeletal:  Negative for joint swelling.   Skin:  Negative for color change and rash.   Allergic/Immunologic: Negative for immunocompromised state.   Neurological:  Positive for weakness. Negative for dizziness, syncope and light-headedness.   Hematological:  Does not bruise/bleed easily.   Psychiatric/Behavioral:  Negative for agitation and behavioral problems.      Vitals:    04/15/23 1407 04/15/23 2039   BP: 118/66 (!) 147/91   Pulse: 77 67   Resp: 18 18   Temp: 97.8 °F (36.6 °C) 98.5 °F (36.9 °C)   SpO2: 98% 97%   Weight: 115 kg (253 lb 8.5 oz)    Height: 5' 11\" (1.803 m)             Physical Exam  Vitals and nursing note reviewed.   Constitutional:       General: He is not in acute distress.     Appearance: Normal appearance. He is not ill-appearing or toxic-appearing.   HENT:      Head: Normocephalic and atraumatic.      Right Ear: External ear normal.      Left Ear: External ear normal.      Nose: Nose normal.      Mouth/Throat:      Mouth: Mucous membranes are moist.      Pharynx: Oropharynx is clear. No oropharyngeal exudate or posterior oropharyngeal erythema.   Eyes:      General: No scleral icterus.     Extraocular Movements: Extraocular movements intact.      Conjunctiva/sclera: Conjunctivae normal.      Pupils: Pupils are equal, round, and reactive to  light.   Cardiovascular:      Rate and Rhythm: Normal rate and regular rhythm.      Pulses: Normal pulses.      Heart sounds: No murmur heard.    No friction rub. No gallop.   Pulmonary:      Effort: Pulmonary effort is normal. No respiratory distress.      Breath sounds: Normal breath sounds. No stridor. No wheezing, rhonchi or rales.   Abdominal:      General: Bowel sounds are normal. There is no distension.      Palpations: Abdomen is soft.      Tenderness: There is no abdominal tenderness. There is no guarding or rebound.   Musculoskeletal:         General: No deformity. Normal range of motion.      Cervical back: Normal range of motion and neck supple. No rigidity.      Right lower leg: No edema.      Left lower leg: No edema.   Skin:     General: Skin is warm.      Capillary Refill: Capillary refill takes less than 2 seconds.      Coloration: Skin is not jaundiced.   Neurological:      General: No focal deficit present.      Mental Status: He is alert.      Cranial Nerves: No cranial nerve deficit.      Sensory: No sensory deficit.      Motor: Weakness (chronic right arm and leg) present.      Coordination: Coordination normal.   Psychiatric:         Mood and Affect: Mood normal.         Behavior: Behavior normal.         Thought Content: Thought content normal.         Judgment: Judgment normal.      I personally reviewed and independently interpreted EKG, labs and imaging results.      LABORATORY TESTS:  Admission on 04/15/2023   Component Date Value Ref Range Status    WBC 04/15/2023 9.2  4.1 - 11.1 K/uL Final    RBC 04/15/2023 3.94 (L)  4.10 - 5.70 M/uL Final    HGB 04/15/2023 11.1 (L)  12.1 - 17.0 g/dL Final    HCT 04/15/2023 35.3 (L)  36.6 - 50.3 % Final    MCV 04/15/2023 89.6  80.0 - 99.0 FL Final    MCH 04/15/2023 28.2  26.0 - 34.0 PG Final    MCHC 04/15/2023 31.4  30.0 - 36.5 g/dL Final    RDW 04/15/2023 15.3 (H)  11.5 - 14.5 % Final    PLATELET 04/15/2023 251  150 - 400 K/uL Final    MPV 04/15/2023  10.4  8.9 - 12.9 FL Final    NRBC 04/15/2023 0.0  0  WBC Final    ABSOLUTE NRBC 04/15/2023 0.00  0.00 - 0.01 K/uL Final    NEUTROPHILS 04/15/2023 69  32 - 75 % Final    LYMPHOCYTES 04/15/2023 19  12 - 49 % Final    MONOCYTES 04/15/2023 8  5 - 13 % Final    EOSINOPHILS 04/15/2023 4  0 - 7 % Final    BASOPHILS 04/15/2023 0  0 - 1 % Final    IMMATURE GRANULOCYTES 04/15/2023 0  0.0 - 0.5 % Final    ABS. NEUTROPHILS 04/15/2023 6.3  1.8 - 8.0 K/UL Final    ABS. LYMPHOCYTES 04/15/2023 1.8  0.8 - 3.5 K/UL Final    ABS. MONOCYTES 04/15/2023 0.7  0.0 - 1.0 K/UL Final    ABS. EOSINOPHILS 04/15/2023 0.3  0.0 - 0.4 K/UL Final    ABS. BASOPHILS 04/15/2023 0.0  0.0 - 0.1 K/UL Final    ABS. IMM. GRANS. 04/15/2023 0.0  0.00 - 0.04 K/UL Final    DF 04/15/2023 AUTOMATED    Final    Sodium 04/15/2023 136  136 - 145 mmol/L Final    Potassium 04/15/2023 4.7  3.5 - 5.1 mmol/L Final    Chloride 04/15/2023 101  97 - 108 mmol/L Final    CO2 04/15/2023 28  21 - 32 mmol/L Final    Anion gap 04/15/2023 7  5 - 15 mmol/L Final    Glucose 04/15/2023 96  65 - 100 mg/dL Final    BUN 04/15/2023 19  6 - 20 MG/DL Final    Creatinine 04/15/2023 0.96  0.70 - 1.30 MG/DL Final    BUN/Creatinine ratio 04/15/2023 20  12 - 20   Final    eGFR 04/15/2023 >60  >60 ml/min/1.73m2 Final    Comment:      Pediatric calculator link: https://www.kidney.org/professionals/kdoqi/gfr_calculatorped       These results are not intended for use in patients <18 years of age.       eGFR results are calculated without a race factor using  the 2021 CKD-EPI equation. Careful clinical correlation is recommended, particularly when comparing to results calculated using previous equations.  The CKD-EPI equation is less accurate in patients with extremes of muscle mass, extra-renal metabolism of creatinine, excessive creatine ingestion, or following therapy that affects renal tubular secretion.      Calcium 04/15/2023 9.1  8.5 - 10.1 MG/DL Final    Color 04/15/2023 YELLOW/STRAW     Final    Color Reference Range: Straw, Yellow or Dark Yellow    Appearance 04/15/2023 CLEAR  CLEAR   Final    Specific gravity 04/15/2023 1.010  1.003 - 1.030   Final    pH (UA) 04/15/2023 7.0  5.0 - 8.0   Final    Protein 04/15/2023 Negative  NEG mg/dL Final    Glucose 04/15/2023 Negative  NEG mg/dL Final    Ketone 04/15/2023 Negative  NEG mg/dL Final    Bilirubin 04/15/2023 Negative  NEG   Final    Blood 04/15/2023 SMALL (A)  NEG   Final    Urobilinogen 04/15/2023 1.0  0.2 - 1.0 EU/dL Final    Nitrites 04/15/2023 Negative  NEG   Final    Leukocyte Esterase 04/15/2023 LARGE (A)  NEG   Final    WBC 04/15/2023 >100 (H)  0 - 4 /hpf Final    RBC 04/15/2023 5-10  0 - 5 /hpf Final    Epithelial cells 04/15/2023 FEW  FEW /lpf Final    Epithelial cell category consists of squamous cells and /or transitional urothelial cells. Renal tubular cells, if present, are separately identified as such.    Bacteria 04/15/2023 2+ (A)  NEG /hpf Final    Urine culture hold 04/15/2023 Urine on hold in Microbiology dept for 2 days.  If unpreserved urine is submitted, it cannot be used for addtional testing after 24 hours, recollection will be required.    Final    Special Requests: 04/15/2023 NO SPECIAL REQUESTS    Preliminary    Culture result: 04/15/2023 NO GROWTH <24 HRS    Preliminary    Lactic acid 04/15/2023 1.0  0.4 - 2.0 MMOL/L Final       IMAGING RESULTS:  XR PELV AP ONLY   Final Result   No evidence of fracture or dislocation.         XR HIP RT W OR WO PELV 2-3 VWS   Final Result   No acute finding.      CT HEAD WO CONT   Final Result   No acute intracranial finding. Chronic left cerebral infarct.              MEDICATIONS GIVEN:  Medications   cefTRIAXone (ROCEPHIN) 2 g in 0.9% sodium chloride 20 mL IV syringe (2 g IntraVENous Given 4/15/23 2226)   allopurinoL (ZYLOPRIM) tablet 100 mg (has no administration in time range)   apixaban (ELIQUIS) tablet 5 mg (5 mg Oral Given 4/15/23 2225)   atorvastatin (LIPITOR) tablet 80 mg (80  mg Oral Given 4/15/23 2225)   balsam peru-castor oiL (VENELEX) ointment (has no administration in time range)   buPROPion SR (WELLBUTRIN SR) tablet 100 mg (has no administration in time range)   dilTIAZem ER (CARDIZEM CD) capsule 120 mg (120 mg Oral Given 4/15/23 2225)   ferrous sulfate tablet 325 mg (has no administration in time range)   famotidine (PEPCID) tablet 20 mg (20 mg Oral Given 4/15/23 2226)   gabapentin (NEURONTIN) capsule 300 mg (300 mg Oral Given 4/15/23 2226)   finasteride (PROSCAR) tablet 5 mg (has no administration in time range)   magnesium oxide (MAG-OX) tablet 400 mg (400 mg Oral Given 4/15/23 2225)   melatonin tablet 3 mg (has no administration in time range)   metoprolol succinate (TOPROL-XL) XL tablet 75 mg (has no administration in time range)   mirtazapine (REMERON) tablet 7.5 mg (7.5 mg Oral Given 4/15/23 2226)   tamsulosin (FLOMAX) capsule 0.4 mg (has no administration in time range)   nystatin (MYCOSTATIN) 100,000 unit/gram powder (has no administration in time range)       IMPRESSION:  1. Hemiparesis of right dominant side as late effect of cerebral infarction (HCC)    2. Fall from ground level    3. Encounter for examination for admission to nursing home        DISPOSITION:  - Admit to hospitalist    Jose Yarbrough MD      Medical Decision Making  72M w/ hx AF on eliquis, HTN, DM, HLD, VALENTE, CVA w/ residual right sided weakness p/w ground level fall and inability to care for self. Pt had an ischemic stroke 9/2022 at [facility] w/ residual right sided weakness, no new neuro symptoms. Afebrile, hemodynamically stable. NO evidence of trauma from fall. Likely mechanical fall rather than syncope or sz. CTH obtained since on DOAC and fell, neg for acute findings. Xray hip/pelvis unremarkable. Labs reviewed. Replaced vazquez. UA +WBC but afebrile, asymptomatic, low concern for sepsis, sent urine cx but would not treat at this time. Consulted CM who recommended admission to be evaluated for SNF vs rehab  placement. Pt is agreeable to placement since unable to live on his own 2/2 chronic disability from recent devastating stroke.     Amount and/or Complexity of Data Reviewed  External Data Reviewed: notes.  Labs: ordered. Decision-making details documented in ED Course.  Radiology: ordered and independent interpretation performed. Decision-making details documented in ED Course.  ECG/medicine tests: ordered and independent interpretation performed. Decision-making details documented in ED Course.    Risk  Decision regarding hospitalization.           Procedures        Perfect Serve Consult for Admission  4:16 PM    ED Room Number: 624/02  Patient Name and age:  Christiano Ramírez 72 y.o.  male  Working Diagnosis:   1. Hemiparesis of right dominant side as late effect of cerebral infarction (HCC)    2. Fall from ground level    3. Encounter for examination for admission to nursing home        COVID-19 Suspicion:  no  Sepsis present:  no  Reassessment needed: no  Code Status:  Full Code  Readmission: no  Isolation Requirements:  no  Recommended Level of Care:  med/surg  Department:[facility] - [phone]

## 2023-04-15 NOTE — ED TRIAGE NOTES
Pt arrives with c/o of inability to care for himself following a stroke 9/15/2022 with severe right sided deficits. Pt was discharged 2 months ago from [facility] to his sisters house and recently had to leave to his own home and fell getting out of the vehicle into the house. Pt is unable to walk or care for himself. Pt denies injury from fall.

## 2023-04-16 LAB
EST. AVERAGE GLUCOSE BLD GHB EST-MCNC: 108 MG/DL
HBA1C MFR BLD: 5.4 % (ref 4–5.6)
LACTATE SERPL-SCNC: 1 MMOL/L (ref 0.4–2)

## 2023-04-16 PROCEDURE — 74011250636 HC RX REV CODE- 250/636: Performed by: FAMILY MEDICINE

## 2023-04-16 PROCEDURE — 74011000250 HC RX REV CODE- 250: Performed by: FAMILY MEDICINE

## 2023-04-16 PROCEDURE — 93005 ELECTROCARDIOGRAM TRACING: CPT

## 2023-04-16 PROCEDURE — 77030037878 HC DRSG MEPILEX >48IN BORD MOLN -B

## 2023-04-16 PROCEDURE — 65270000032 HC RM SEMIPRIVATE

## 2023-04-16 PROCEDURE — 74011250637 HC RX REV CODE- 250/637: Performed by: FAMILY MEDICINE

## 2023-04-16 PROCEDURE — 83036 HEMOGLOBIN GLYCOSYLATED A1C: CPT

## 2023-04-16 PROCEDURE — 74011000250 HC RX REV CODE- 250: Performed by: NURSE PRACTITIONER

## 2023-04-16 RX ORDER — SODIUM CHLORIDE 0.9 % (FLUSH) 0.9 %
5-40 SYRINGE (ML) INJECTION EVERY 8 HOURS
Status: DISCONTINUED | OUTPATIENT
Start: 2023-04-16 | End: 2023-04-21 | Stop reason: HOSPADM

## 2023-04-16 RX ORDER — ACETAMINOPHEN 325 MG/1
650 TABLET ORAL
Status: DISCONTINUED | OUTPATIENT
Start: 2023-04-16 | End: 2023-04-21 | Stop reason: HOSPADM

## 2023-04-16 RX ORDER — ACETAMINOPHEN 650 MG/1
650 SUPPOSITORY RECTAL
Status: DISCONTINUED | OUTPATIENT
Start: 2023-04-16 | End: 2023-04-21 | Stop reason: HOSPADM

## 2023-04-16 RX ORDER — ONDANSETRON 2 MG/ML
4 INJECTION INTRAMUSCULAR; INTRAVENOUS
Status: DISCONTINUED | OUTPATIENT
Start: 2023-04-16 | End: 2023-04-21 | Stop reason: HOSPADM

## 2023-04-16 RX ORDER — NYSTATIN 100000 [USP'U]/G
POWDER TOPICAL 2 TIMES DAILY
Status: DISCONTINUED | OUTPATIENT
Start: 2023-04-16 | End: 2023-04-21 | Stop reason: HOSPADM

## 2023-04-16 RX ORDER — SODIUM CHLORIDE 0.9 % (FLUSH) 0.9 %
5-40 SYRINGE (ML) INJECTION AS NEEDED
Status: DISCONTINUED | OUTPATIENT
Start: 2023-04-16 | End: 2023-04-21 | Stop reason: HOSPADM

## 2023-04-16 RX ORDER — POLYETHYLENE GLYCOL 3350 17 G/17G
17 POWDER, FOR SOLUTION ORAL DAILY PRN
Status: DISCONTINUED | OUTPATIENT
Start: 2023-04-16 | End: 2023-04-21 | Stop reason: HOSPADM

## 2023-04-16 RX ORDER — ONDANSETRON 4 MG/1
4 TABLET, ORALLY DISINTEGRATING ORAL
Status: DISCONTINUED | OUTPATIENT
Start: 2023-04-16 | End: 2023-04-21 | Stop reason: HOSPADM

## 2023-04-16 RX ADMIN — SODIUM CHLORIDE, PRESERVATIVE FREE 10 ML: 5 INJECTION INTRAVENOUS at 23:19

## 2023-04-16 RX ADMIN — Medication: at 09:50

## 2023-04-16 RX ADMIN — FAMOTIDINE 20 MG: 20 TABLET ORAL at 09:48

## 2023-04-16 RX ADMIN — APIXABAN 5 MG: 5 TABLET, FILM COATED ORAL at 09:49

## 2023-04-16 RX ADMIN — FERROUS SULFATE TAB 325 MG (65 MG ELEMENTAL FE) 325 MG: 325 (65 FE) TAB at 09:48

## 2023-04-16 RX ADMIN — FINASTERIDE 5 MG: 5 TABLET, FILM COATED ORAL at 09:49

## 2023-04-16 RX ADMIN — ATORVASTATIN CALCIUM 80 MG: 40 TABLET, FILM COATED ORAL at 23:15

## 2023-04-16 RX ADMIN — BUPROPION HYDROCHLORIDE 100 MG: 100 TABLET, FILM COATED, EXTENDED RELEASE ORAL at 18:03

## 2023-04-16 RX ADMIN — DILTIAZEM HYDROCHLORIDE 120 MG: 120 CAPSULE, EXTENDED RELEASE ORAL at 23:15

## 2023-04-16 RX ADMIN — FAMOTIDINE 20 MG: 20 TABLET ORAL at 23:23

## 2023-04-16 RX ADMIN — BUPROPION HYDROCHLORIDE 100 MG: 100 TABLET, FILM COATED, EXTENDED RELEASE ORAL at 09:49

## 2023-04-16 RX ADMIN — APIXABAN 5 MG: 5 TABLET, FILM COATED ORAL at 18:03

## 2023-04-16 RX ADMIN — MIRTAZAPINE 7.5 MG: 15 TABLET, FILM COATED ORAL at 23:18

## 2023-04-16 RX ADMIN — SODIUM CHLORIDE 2 G: 9 INJECTION INTRAMUSCULAR; INTRAVENOUS; SUBCUTANEOUS at 23:19

## 2023-04-16 RX ADMIN — SODIUM CHLORIDE, PRESERVATIVE FREE 10 ML: 5 INJECTION INTRAVENOUS at 13:23

## 2023-04-16 RX ADMIN — Medication 400 MG: at 23:15

## 2023-04-16 RX ADMIN — NYSTATIN: 100000 POWDER TOPICAL at 18:03

## 2023-04-16 RX ADMIN — ALLOPURINOL 100 MG: 100 TABLET ORAL at 09:48

## 2023-04-16 RX ADMIN — FERROUS SULFATE TAB 325 MG (65 MG ELEMENTAL FE) 325 MG: 325 (65 FE) TAB at 18:03

## 2023-04-16 RX ADMIN — NYSTATIN: 100000 POWDER TOPICAL at 13:23

## 2023-04-16 RX ADMIN — Medication: at 18:03

## 2023-04-16 RX ADMIN — TAMSULOSIN HYDROCHLORIDE 0.4 MG: 0.4 CAPSULE ORAL at 09:49

## 2023-04-16 RX ADMIN — SODIUM CHLORIDE, PRESERVATIVE FREE 10 ML: 5 INJECTION INTRAVENOUS at 09:48

## 2023-04-16 NOTE — PROGRESS NOTES
Orders received and chart reviewed. Pt on bedpan X2 attempts.  Pt has been living with sister who is no longer able to care for him and he has had a recent decline in function.  Will likely need LTC.  Will follow up for eval tomorrow.

## 2023-04-16 NOTE — PROGRESS NOTES
Problem: Falls - Risk of  Goal: *Absence of Falls  Description: Document Kasi Fall Risk and appropriate interventions in the flowsheet.  Outcome: Progressing Towards Goal  Note: Fall Risk Interventions:       Problem: Pressure Injury - Risk of  Goal: *Prevention of pressure injury  Description: Document Pepe Scale and appropriate interventions in the flowsheet.  Outcome: Progressing Towards Goal  Note: Pressure Injury Interventions:  Sensory Interventions: Assess changes in LOC, Keep linens dry and wrinkle-free, Maintain/enhance activity level, Minimize linen layers, Monitor skin under medical devices, Pressure redistribution bed/mattress (bed type), Turn and reposition approx. every two hours (pillows and wedges if needed)         Activity Interventions: Pressure redistribution bed/mattress(bed type)    Mobility Interventions: Pressure redistribution bed/mattress (bed type), Turn and reposition approx. every two hours(pillow and wedges)    Nutrition Interventions: Document food/fluid/supplement intake

## 2023-04-16 NOTE — H&P
History and Physical    Date of Service:  4/15/2023  Primary Care Provider: Douglas Villalba MD  Source of information: The patient and Chart review    Chief Complaint: Care Coordination      History of Presenting Illness:   Christiano Ramírez is a 72 y.o. 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  He was hospitalized 10/17/2022 to 10/23/2022 with diagnosis of Enterococcus/Pseudomonas UTI, urinary retention requiring indwelling Vazquez catheter, COVID-19 virus infection.  Patient was hospitalized 12/8/2020 to 12/20/2022 with diagnosis of eroding penile implant, possible infection, requiring removal of penile prosthesis 12/12/2022.  Patient notes he has been hospitalized since those admissions to other facilities.  Records not available for review.  He notes that he lives alone, has difficulty walking even uses a roller walker, has worsening right-sided weakness compared to his baseline, and is unable to care for himself.  Symptoms noted are severe, profound, constant, without specific alleviating factors.  There is no reported fall, head or neck trauma.  He went to [facility] ED for 15 2023.  Work-up included CT head without IV contrast which showed chronic left cerebral infarct but no acute intracranial abnormalities.  UA showed large  leukocyte esterases, > 100 WBC, 5-10 RBC, 2+ bacteria, small blood.  ED requested transfer to [facility].     REVIEW OF SYSTEMS:  Pertinent items are noted in the History of Present Illness.     Past Medical History:   Diagnosis Date    Arrhythmia     atrial fib    Depression 4/24/2010    Diabetes (HCC)     diet control for pre-diabetes    Dyslipidemia, goal LDL below 100 6/14/2011    Gout     HTN (hypertension) 4/24/2010    Impotence 4/24/2010    Morbid obesity (HCC) 5/17/2010    VALENTE (obstructive sleep apnea) 4/24/2010    CPAP    Restless legs 4/15/2015    -chronic urinary retention; chronic indwelling vazquez catheter    Past Surgical History:   Procedure Laterality Date    COLONOSCOPY N/A 6/27/2017    COLONOSCOPY performed by Dmitry Mckinney MD at [facility] ENDOSCOPY    ENDOSCOPY, COLON, DIAGNOSTIC  2007    HX APPENDECTOMY      HX ORTHOPAEDIC  2000    bilat TKR     HX ORTHOPAEDIC  2010    left THR    HX UROLOGICAL  03/2018    urolift    HX UROLOGICAL  03/2019    prosthesis     MEDICATIONS:  Prior to Admission medications    Medication Sig Start Date End Date Taking? Authorizing Provider   buPROPion SR (WELLBUTRIN SR) 100 mg SR tablet Take 1 Tablet by mouth two (2) times a day. 2/13/23  Yes Douglas Villalba MD   allopurinoL (ZYLOPRIM) 100 mg tablet Take 1 Tablet by mouth daily. 2/8/23  Yes Douglas Villalba MD   apixaban (ELIQUIS) 5 mg tablet Take 1 Tablet by mouth two (2) times a day. 2/8/23  Yes Douglas Villalba MD   atorvastatin (LIPITOR) 80 mg tablet Take 1 Tablet by mouth nightly. 2/8/23  Yes Douglas Villalba MD   dilTIAZem ER (CARDIZEM SR) 60 mg capsule Take 1 Capsule by mouth every eight (8) hours. 2/8/23  Yes Douglas Villalba MD   famotidine (PEPCID) 20 mg tablet Take 1 Tablet by mouth every twelve (12) hours. 2/8/23  Yes Douglas Villalba MD   finasteride (PROSCAR) 5 mg tablet Take 1 Tablet by mouth daily. 2/8/23  Yes Douglas Villalba MD   gabapentin (NEURONTIN) 300 mg capsule Take 1 Capsule  by mouth nightly. Max Daily Amount: 300 mg.  Patient taking differently: Take 2 Capsules by mouth nightly. Indications: restless legs syndrome, an extreme discomfort in the calf muscles when sitting or lying down 2/8/23  Yes Douglas Villalba MD   traMADoL (ULTRAM) 50 mg tablet Take 1 Tablet by mouth every six to eight (6-8) hours as needed for Pain.   Yes Provider, Historical   melatonin 3 mg tablet Take 1 Tablet by mouth nightly as needed for Insomnia.  Patient taking differently: Take 1 Tablet by mouth nightly as needed for Insomnia. Pt thinks he is taking 10 mg 10/29/22  Yes Brianna Serrano NP   metoprolol succinate (TOPROL-XL) 25 mg XL tablet Take 3 Tablets by mouth daily. 10/30/22  Yes Brianna Serrano NP   mirtazapine (REMERON) 7.5 mg tablet Take 1 Tablet by mouth nightly. 10/29/22  Yes Brianna Serrano NP   ondansetron (ZOFRAN ODT) 4 mg disintegrating tablet Take 1 Tablet by mouth every eight (8) hours as needed for Nausea or Vomiting. 10/29/22  Yes Brianna Serrano NP   tamsulosin (FLOMAX) 0.4 mg capsule Take 1 Capsule by mouth daily. 9/27/22  Yes Madala, Sushma, MD   temazepam (RESTORIL) 7.5 mg capsule Take 1 Capsule by mouth nightly. Max Daily Amount: 7.5 mg. 3/16/23   Douglas Villalba MD   meropenem 1 g IVPB 1 g by IntraVENous route every eight (8) hours. 12/19/22   Aruna Cordon MD   ferrous sulfate 325 mg (65 mg iron) tablet Take 325 mg by mouth two (2) times a day.    Provider, Historical   magnesium oxide (MAG-OX) 400 mg tablet Take 400 mg by mouth nightly.    Provider, Historical   balsam peru-castor oiL (VENELEX) ointment Apply  to affected area two (2) times a day. APPLY TO all bony prominences, abrasions and ecchymosis to right side, posterior head Nursing, document site in comments 9/26/22   Madala, Sushma, MD     ALLERGIES:  NO KNOWN DRUG ALLERGIES    FAMILY HISTORY:  Family History   Problem Relation Age of Onset    Cancer Father         leukemia    Cancer Paternal Grandfather      Diabetes Sister     Diabetes Brother     Cancer Maternal Aunt     Cancer Maternal Uncle       Social History:   Per report, quit smoking about 32 years ago. His smoking use included cigarettes. He has a 2.50 pack-year smoking history. Formerly about 1.7 standard drinks per week. does not use drugs.   Social Determinants of Health     Tobacco Use: Medium Risk    Smoking Tobacco Use: Former    Smokeless Tobacco Use: Never    Passive Exposure: Not on file   Alcohol Use: Not At Risk    Frequency of Alcohol Consumption: Never    Average Number of Drinks: Patient does not drink    Frequency of Binge Drinking: Never   Financial Resource Strain: Low Risk     Difficulty of Paying Living Expenses: Not very hard   Food Insecurity: No Food Insecurity    Worried About Running Out of Food in the Last Year: Never true    Ran Out of Food in the Last Year: Never true   Transportation Needs: No Transportation Needs    Lack of Transportation (Medical): No    Lack of Transportation (Non-Medical): No   Physical Activity: Inactive    Days of Exercise per Week: 0 days    Minutes of Exercise per Session: 0 min   Stress: Stress Concern Present    Feeling of Stress : Rather much   Social Connections: Socially Isolated    Frequency of Communication with Friends and Family: More than three times a week    Frequency of Social Gatherings with Friends and Family: More than three times a week    Attends Restorationist Services: Never    Active Member of Clubs or Organizations: No    Attends Club or Organization Meetings: Never    Marital Status:    Intimate Partner Violence: Not At Risk    Fear of Current or Ex-Partner: No    Emotionally Abused: No    Physically Abused: No    Sexually Abused: No   Depression: Not at risk    PHQ-2 Score: 0   Housing Stability: Low Risk     Unable to Pay for Housing in the Last Year: No    Number of Places Lived in the Last Year: 2    Unstable Housing in the Last Year: No        Medications were reconciled to  the best of my ability given all available resources at the time of admission. Route is PO if not otherwise noted.     Family and social history were personally reviewed, all pertinent and relevant details are outlined as above.    Objective:   Visit Vitals  BP (!) 147/91   Pulse 67   Temp 98.5 °F (36.9 °C)   Resp 18   Ht 5' 11\" (1.803 m)   Wt 115 kg (253 lb 8.5 oz)   SpO2 97%   BMI 35.36 kg/m²           PHYSICAL EXAM:   General:  Patient is in no acute respiratory distress.     Head:  Normocephalic, without obvious abnormality, atraumatic   Eyes:  Conjunctivae/corneas clear.  Pupils 2 mm reactive bilateral.   E/N/M/T: Nares normal. Septum midline. No nasal drainage or sinus tenderness  Tongue midline/ non-edematous  Clear oropharynx   Neck: Normal appearance and movements, symmetrical, trachea midline  No palpable adenopathy  No thyroid enlargement, tenderness or nodules  No carotid bruit   No JVD  Trachea midline   Lungs:   Symmetrical chest expansion and respiratory effort  Clear to auscultation bilaterally   Chest wall:  No tenderness or deformity   Heart:  Regular rate and rhythm   Normal S1 and S2; no murmur, click, rub or gallop   Abdomen:   Soft, no tenderness  No rebound, guarding, or rigidity  Non-distended   Bowel sounds normal     Back: No costovertebral angle tenderness  No step-off deformity   Extremities: Extremities normal, atraumatic  No cyanosis   Trace BLE non-pitting edema     Vascular/  Pulses: 2+ radial/ 1+DP bilateral pulses   Integument/  Skin: No rashes or ulcers  Warm and dry   Musculo-      skeletal: Gait not tested  No calf tenderness   Neuro: GCS 15.  Moves LUE/ LLE.  Right sided hemiplegia with RUE in flexion contracture.  No slurred speech.  No facial droop.  Sensation grossly intact.  No pronator drift.   Psych: Alert, oriented x 3     Geniturinary: Indwelling Sexton catheter in place.  Strong foul smell from groin region with evidence of erythema/rash        Data Review:   I have  independently reviewed and interpreted patient's lab and all other diagnostic data    Abnormal Labs Reviewed   CBC WITH AUTOMATED DIFF - Abnormal; Notable for the following components:       Result Value    RBC 3.94 (*)     HGB 11.1 (*)     HCT 35.3 (*)     RDW 15.3 (*)     All other components within normal limits   URINALYSIS W/MICROSCOPIC - Abnormal; Notable for the following components:    Blood SMALL (*)     Leukocyte Esterase LARGE (*)     WBC >100 (*)     Bacteria 2+ (*)     All other components within normal limits       All Micro Results       Procedure Component Value Units Date/Time    CULTURE, BLOOD, PAIRED [[id number]]     Order Status: Sent Specimen: Blood     CULTURE, URINE [[id number]]     Order Status: Sent Specimen: Clean catch     URINE CULTURE HOLD SAMPLE [[id number]] Collected: 04/15/23 1436    Order Status: Completed Specimen: Urine Updated: 04/15/23 1442     Urine culture hold       Urine on hold in Microbiology dept for 2 days.  If unpreserved urine is submitted, it cannot be used for addtional testing after 24 hours, recollection will be required.                  IMAGING:   XR HIP RT W OR WO PELV 2-3 VWS   Final Result   No acute finding.      CT HEAD WO CONT   Final Result   No acute intracranial finding. Chronic left cerebral infarct.          XR PELV AP ONLY    (Results Pending)        ECG/ECHO:    Results for orders placed or performed during the hospital encounter of 09/14/22   EKG, 12 LEAD, INITIAL   Result Value Ref Range    Ventricular Rate 123 BPM    Atrial Rate 133 BPM    QRS Duration 86 ms    Q-T Interval 312 ms    QTC Calculation (Bezet) 446 ms    Calculated R Axis 31 degrees    Calculated T Axis -102 degrees    Diagnosis         Atrial fibrillation with rapid ventricular response with premature   ventricular or aberrantly conducted complexes  ST & T wave abnormality, consider inferior ischemia  When compared with ECG of 17-AUG-2017 13:09,  Vent. rate has increased BY  50  BPM  Non-specific change in ST segment in Inferior leads  ST now depressed in Anterior leads  Nonspecific T wave abnormality now evident in Anterolateral leads  Confirmed by BIA Akers Massimo ([id number]) on 9/19/2022 4:05:18 PM            Notes reviewed from all clinical/nonclinical/nursing services involved in patient's clinical care. Care coordination discussions were held with appropriate clinical/nonclinical/ nursing providers based on care coordination needs.     Assessment:   Given the patient's current clinical presentation, there is a high level of concern for decompensation if discharged from the emergency department. Complex decision making was performed, which includes reviewing the patient's available past medical records, laboratory results, and imaging studies.    Active Problems:    Weakness (4/15/2023)        Plan:      UTI (urinary tract infection)  -Complicated with prior history of removal of penile prosthesis  -chronic urinary retention; chronic indwelling vazquez catheter  -patient states that vazquez catheter was changed in ED prior to transfer  -Order add on test for urine culture (collected at [facility])  -Order paired blood cultures  -Start  Ceftriaxone 2 g IV every 24 hours  -May adjust antibiotics accordingly per culture results    2.  Generalized weakness       History of stroke withl right-sided weakness  -Inability to care for self  -Patient is definite fall risk  -Consult PT/OT  -ambulate with assistance  -Consult case management for possible placement due to patient's inability to care for self at home.    3.  Fall from ground-level  -plan as above  -order pelvis xray to rule out acute fracture    4.  Atrial fibrillation (A-fib)  -On long-term anticoagulation with Eliquis-continue Eliquis  -Currently rate controlled on Diltiazem  -Order twelve-lead EKG  -Continue telemetry monitoring      5.  Normocytic normochromic anemia  -Hemoglobin 11.1  -Repeat H&H-check iron profile, B12, folate  level  -Order stool occult blood test    6.  Anxiety  -Reviewed medicine list.  Concern for polypharmacy, ie bedtime meds- Remeron, Temazepam, Gabapentin, Melatonin  -Made some adjustments tonight to prevent any adverse reactions, ie oversedation    7.  Essential hypertension  -Continue home BP medication  -Monitor blood pressure    8.  Hyperlipidemia  -Continue atorvastatin 80 mg nightly    9.  Obesity  -BMI 35.36 kg/M2  -Would encourage weight loss, reduce calorie diet, lifestyle modifications    10.  Diabetes  -listed in chart records  -last HgbA1c = 5.3% on 11/3/22  -Order Humalog low dose insulin correctional coverage, scheduled blood glucose checks and check hemoglobin A1c level.     11.  Depression  -continue Remeron    12.  Rash  -involving groin/ perineum/ proximal thighs  -order Nystatin powder, apply to rash BID      DIET:  diabetic  ISOLATION PRECAUTIONS: There are currently no Active Isolations  CODE STATUS: Full Code   DVT PROPHYLAXIS: Eliquis  FUNCTIONAL STATUS PRIOR TO HOSPITALIZATION: Ambulatory and capable of self-care but relies on assistive devices (rolling walker/cane).   Ambulatory status/function: Ambulates with assistance:  Walker   EARLY MOBILITY ASSESSMENT: Recommend an assessment from physical therapy and/or occupational therapy  ANTICIPATED DISCHARGE: Greater than 48 hours.  ANTICIPATED DISPOSITION: SNF  EMERGENCY CONTACT/SURROGATE DECISION MAKER:  Silvio Ramírez, minnie [phone].    CRITICAL CARE WAS PERFORMED FOR THIS ENCOUNTER: NO.    ADVANCED DIRECTIVE/ CODE STATUS:  FULL CODE as per discussion with patient.    Signed By: Mateo Coley MD     April 15, 2023         Please note that this dictation may have been completed with Dragon, the [employer] voice recognition software.  Quite often unanticipated grammatical, syntax, homophones, and other interpretive errors are inadvertently transcribed by the computer software.  Please disregard these errors.  Please excuse any errors that have  escaped final proofreading.

## 2023-04-16 NOTE — PROGRESS NOTES
[facility]                                                                                          Hospitalist Progress Note  Shamir Malik NP  Answering service: [phone] OR [phone] from in house phone        Date of Service:  2023  NAME:  Christiano Ramírez  :  1950  MRN:  [id number]       Admission Summary:   Per H&P, Christiano Ramírez is a 72 y.o. 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  He was hospitalized 10/17/2022 to 10/23/2022 with diagnosis of Enterococcus/Pseudomonas UTI, urinary retention requiring indwelling Vazquez catheter, COVID-19 virus infection.  Patient was hospitalized 2020 to 2022 with diagnosis of eroding penile implant, possible infection, requiring removal of penile prosthesis 2022.  Patient notes he has been hospitalized since those admissions to other facilities.  Records not available for review.  He notes that he lives alone, has difficulty walking even uses a roller walker, has worsening right-sided weakness compared to his baseline, and is unable to care for himself.  Symptoms noted are severe, profound, constant, without specific alleviating factors.  There is no reported fall, head or neck trauma.  He went to [facility] ED for 15 2023.  Work-up  included CT head without IV contrast which showed chronic left cerebral infarct but no acute intracranial abnormalities.  UA showed large leukocyte esterases, > 100 WBC, 5-10 RBC, 2+ bacteria, small blood.  ED requested transfer to [facility].       Interval history / Subjective:   I saw the patient this morning on rounds.  Feeling better today     Assessment & Plan:          UTI (urinary tract infection)  History of removal of penile prosthesis  Chronic urinary retention with chronic indwelling urinary catheter  Catheter has been changed  Urine culture pending  Blood cultures pending  Continuing ceftriaxone         2.  Generalized weakness/ground-level fall       History of stroke withl right-sided weakness  History of stroke as well as current infectious process  PT/OT  Radiographs without fracture         4.  Atrial fibrillation (A-fib)  Heart rate currently controlled  Continue diltiazem  Continue apixaban            5.  Normocytic normochromic anemia  Hemoglobin yesterday 11.1 however it appears his baseline is in the 9 range  We will check anemia work-up      6.  Anxiety  Medication list reviewed.  On  not currently on gabapentin.  Has not felt temazepam in 1 months.  Continuing melatonin and mirtazapine         7.  Essential hypertension  Blood pressure currently controlled  Continuing diltiazem  Continuing metoprolol       8.  Hyperlipidemia  Continuing atorvastatin       9.  Obesity  -BMI 35.36 kg/M2  Counseled on Healthy dietary choices       10.  Diabetes  -listed in chart records  -last HgbA1c = 5.3% on 11/3/22  Check A1c  Sugar currently controlled, will consider adding sliding scale insulin if becomes elevated         11.  Depression.  Able  Continuing mirtazapine  Continue bupropion     12.  Rash  -involving groin/ perineum/ proximal thighs  -order Nystatin powder, apply to rash BID          Code status: Full  Prophylaxis: Apixaban  Care Plan discussed with: Patient, nurse  Anticipated  Disposition: Discharge in a few days  Inpatient  Cardiac monitoring:      Hospital Problems  Date Reviewed: 10/12/2022            Codes Class Noted POA    Weakness ICD-10-CM: R53.1  ICD-9-CM: 780.79  4/15/2023 Unknown           Social Determinants of Health     Tobacco Use: Medium Risk    Smoking Tobacco Use: Former    Smokeless Tobacco Use: Never    Passive Exposure: Not on file   Alcohol Use: Not At Risk    Frequency of Alcohol Consumption: Never    Average Number of Drinks: Patient does not drink    Frequency of Binge Drinking: Never   Financial Resource Strain: Low Risk     Difficulty of Paying Living Expenses: Not very hard   Food Insecurity: No Food Insecurity    Worried About Running Out of Food in the Last Year: Never true    Ran Out of Food in the Last Year: Never true   Transportation Needs: No Transportation Needs    Lack of Transportation (Medical): No    Lack of Transportation (Non-Medical): No   Physical Activity: Inactive    Days of Exercise per Week: 0 days    Minutes of Exercise per Session: 0 min   Stress: Stress Concern Present    Feeling of Stress : Rather much   Social Connections: Socially Isolated    Frequency of Communication with Friends and Family: More than three times a week    Frequency of Social Gatherings with Friends and Family: More than three times a week    Attends Zoroastrianism Services: Never    Active Member of Clubs or Organizations: No    Attends Club or Organization Meetings: Never    Marital Status:    Intimate Partner Violence: Not At Risk    Fear of Current or Ex-Partner: No    Emotionally Abused: No    Physically Abused: No    Sexually Abused: No   Depression: Not at risk    PHQ-2 Score: 0   Housing Stability: Low Risk     Unable to Pay for Housing in the Last Year: No    Number of Places Lived in the Last Year: 2    Unstable Housing in the Last Year: No       Review of Systems:   A comprehensive review of systems was negative except for that written in the HPI.        Vital Signs:    Last 24hrs VS reviewed since prior progress note. Most recent are:  Visit Vitals  BP (!) 147/91   Pulse 67   Temp 98.5 °F (36.9 °C)   Resp 18   Ht 5' 11\" (1.803 m)   Wt 115 kg (253 lb 8.5 oz)   SpO2 97%   BMI 35.36 kg/m²       No intake or output data in the 24 hours ending 04/16/23 0846     Physical Examination:     I had a face to face encounter with this patient and independently examined them on 4/16/2023 as outlined below:          General : no acute distress,   HEENT:NCAT moist mucus membrane,  Neck: supple, no JVD, no meningeal signs  Chest: Clear to auscultation bilaterally   CVS: S1 S2 heard, Capillary refill less than 2 seconds  Abd: soft/ non tender, non distended, BS physiological,   Ext: no clubbing, no cyanosis, no edema, brisk 2+ DP pulses  Neuro/Psych: pleasant mood and affect, MOY EOMI  Skin: warm     Data Review:    Review and/or order of clinical lab test  Review and/or order of tests in the radiology section of CPT      I have personally and independently reviewed all pertinent labs, diagnostic studies, imaging, and have provided independent interpretation of the same.     Labs:     Recent Labs     04/15/23  1456   WBC 9.2   HGB 11.1*   HCT 35.3*        Recent Labs     04/15/23  1456      K 4.7      CO2 28   BUN 19   CREA 0.96   GLU 96   CA 9.1     No results for input(s): ALT, AP, TBIL, TBILI, TP, ALB, GLOB, GGT, AML, LPSE in the last 72 hours.    No lab exists for component: SGOT, GPT, AMYP, HLPSE  No results for input(s): INR, PTP, APTT, INREXT in the last 72 hours.   No results for input(s): FE, TIBC, PSAT, FERR in the last 72 hours.   No results found for: FOL, RBCF   No results for input(s): PH, PCO2, PO2 in the last 72 hours.  No results for input(s): CPK, CKNDX, TROIQ in the last 72 hours.    No lab exists for component: CPKMB  Lab Results   Component Value Date/Time    Cholesterol, total 146 09/15/2022 02:40 AM    HDL Cholesterol 53 09/15/2022  02:40 AM    LDL, calculated 65 09/15/2022 02:40 AM    Triglyceride 140 09/15/2022 02:40 AM    CHOL/HDL Ratio 2.8 09/15/2022 02:40 AM     Lab Results   Component Value Date/Time    Glucose (POC) 124 (H) 12/20/2022 11:11 AM    Glucose (POC) 97 12/20/2022 06:49 AM    Glucose (POC) 128 (H) 12/19/2022 08:51 PM    Glucose (POC) 170 (H) 12/19/2022 04:21 PM    Glucose (POC) 149 (H) 12/19/2022 11:09 AM     Lab Results   Component Value Date/Time    Color YELLOW/STRAW 04/15/2023 02:36 PM    Appearance CLEAR 04/15/2023 02:36 PM    Specific gravity 1.010 04/15/2023 02:36 PM    Specific gravity 1.013 10/18/2022 01:01 AM    pH (UA) 7.0 04/15/2023 02:36 PM    Protein Negative 04/15/2023 02:36 PM    Glucose Negative 04/15/2023 02:36 PM    Ketone Negative 04/15/2023 02:36 PM    Bilirubin Negative 04/15/2023 02:36 PM    Urobilinogen 1.0 04/15/2023 02:36 PM    Nitrites Negative 04/15/2023 02:36 PM    Leukocyte Esterase LARGE (A) 04/15/2023 02:36 PM    Epithelial cells FEW 04/15/2023 02:36 PM    Bacteria 2+ (A) 04/15/2023 02:36 PM    WBC >100 (H) 04/15/2023 02:36 PM    RBC 5-10 04/15/2023 02:36 PM       Notes reviewed from all clinical/nonclinical/nursing services involved in patient's clinical care. Care coordination discussions were held with appropriate clinical/nonclinical/ nursing providers based on care coordination needs.         Patients current active Medications were reviewed, considered, added and adjusted based on the clinical condition today.      Home Medications were reconciled to the best of my ability given all available resources at the time of admission. Route is PO if not otherwise noted.      Admission Status:[id number]:::1}      Medications Reviewed:     Current Facility-Administered Medications   Medication Dose Route Frequency    nystatin (MYCOSTATIN) 100,000 unit/gram powder   Topical BID    cefTRIAXone (ROCEPHIN) 2 g in 0.9% sodium chloride 20 mL IV syringe  2 g IntraVENous Q24H    allopurinoL (ZYLOPRIM) tablet  100 mg  100 mg Oral DAILY    apixaban (ELIQUIS) tablet 5 mg  5 mg Oral BID    atorvastatin (LIPITOR) tablet 80 mg  80 mg Oral QHS    balsam peru-castor oiL (VENELEX) ointment   Topical BID    buPROPion SR (WELLBUTRIN SR) tablet 100 mg  100 mg Oral BID    dilTIAZem ER (CARDIZEM CD) capsule 120 mg  120 mg Oral Q24H    ferrous sulfate tablet 325 mg  325 mg Oral BID    famotidine (PEPCID) tablet 20 mg  20 mg Oral Q12H    gabapentin (NEURONTIN) capsule 300 mg  300 mg Oral QHS    finasteride (PROSCAR) tablet 5 mg  5 mg Oral DAILY    magnesium oxide (MAG-OX) tablet 400 mg  400 mg Oral QHS    melatonin tablet 3 mg  3 mg Oral QHS PRN    metoprolol succinate (TOPROL-XL) XL tablet 75 mg  75 mg Oral DAILY    mirtazapine (REMERON) tablet 7.5 mg  7.5 mg Oral QHS    tamsulosin (FLOMAX) capsule 0.4 mg  0.4 mg Oral DAILY     ______________________________________________________________________  EXPECTED LENGTH OF STAY: - - -  ACTUAL LENGTH OF STAY:          1                 Shamir Malik NP

## 2023-04-16 NOTE — PROGRESS NOTES
Problem: Patient Education: Go to Patient Education Activity  Goal: Patient/Family Education  Outcome: Progressing Towards Goal     Problem: Patient Education: Go to Patient Education Activity  Goal: Patient/Family Education  Outcome: Progressing Towards Goal     Problem: Pain  Goal: *Control of Pain  Outcome: Progressing Towards Goal     Problem: Patient Education: Go to Patient Education Activity  Goal: Patient/Family Education  Outcome: Progressing Towards Goal

## 2023-04-17 ENCOUNTER — PATIENT OUTREACH (OUTPATIENT)
Dept: CASE MANAGEMENT | Age: 73
End: 2023-04-17

## 2023-04-17 LAB
ANION GAP SERPL CALC-SCNC: 5 MMOL/L (ref 5–15)
BASOPHILS # BLD: 0 K/UL (ref 0–0.1)
BASOPHILS NFR BLD: 1 % (ref 0–1)
BUN SERPL-MCNC: 16 MG/DL (ref 6–20)
BUN/CREAT SERPL: 22 (ref 12–20)
CALCIUM SERPL-MCNC: 8.5 MG/DL (ref 8.5–10.1)
CHLORIDE SERPL-SCNC: 106 MMOL/L (ref 97–108)
CO2 SERPL-SCNC: 25 MMOL/L (ref 21–32)
CREAT SERPL-MCNC: 0.73 MG/DL (ref 0.7–1.3)
DIFFERENTIAL METHOD BLD: ABNORMAL
EOSINOPHIL # BLD: 0.3 K/UL (ref 0–0.4)
EOSINOPHIL NFR BLD: 4 % (ref 0–7)
ERYTHROCYTE [DISTWIDTH] IN BLOOD BY AUTOMATED COUNT: 15.1 % (ref 11.5–14.5)
FOLATE SERPL-MCNC: 8.4 NG/ML (ref 5–21)
GLUCOSE SERPL-MCNC: 86 MG/DL (ref 65–100)
HCT VFR BLD AUTO: 31.7 % (ref 36.6–50.3)
HGB BLD-MCNC: 10 G/DL (ref 12.1–17)
IMM GRANULOCYTES # BLD AUTO: 0 K/UL (ref 0–0.04)
IMM GRANULOCYTES NFR BLD AUTO: 0 % (ref 0–0.5)
IRON SATN MFR SERPL: 18 % (ref 20–50)
IRON SERPL-MCNC: 36 UG/DL (ref 35–150)
LYMPHOCYTES # BLD: 2.2 K/UL (ref 0.8–3.5)
LYMPHOCYTES NFR BLD: 26 % (ref 12–49)
MCH RBC QN AUTO: 28.2 PG (ref 26–34)
MCHC RBC AUTO-ENTMCNC: 31.5 G/DL (ref 30–36.5)
MCV RBC AUTO: 89.5 FL (ref 80–99)
MONOCYTES # BLD: 0.7 K/UL (ref 0–1)
MONOCYTES NFR BLD: 8 % (ref 5–13)
NEUTS SEG # BLD: 5.1 K/UL (ref 1.8–8)
NEUTS SEG NFR BLD: 61 % (ref 32–75)
NRBC # BLD: 0 K/UL (ref 0–0.01)
NRBC BLD-RTO: 0 PER 100 WBC
PLATELET # BLD AUTO: 223 K/UL (ref 150–400)
PMV BLD AUTO: 10.9 FL (ref 8.9–12.9)
POTASSIUM SERPL-SCNC: 3.7 MMOL/L (ref 3.5–5.1)
RBC # BLD AUTO: 3.54 M/UL (ref 4.1–5.7)
SODIUM SERPL-SCNC: 136 MMOL/L (ref 136–145)
TIBC SERPL-MCNC: 199 UG/DL (ref 250–450)
VIT B12 SERPL-MCNC: 270 PG/ML (ref 193–986)
WBC # BLD AUTO: 8.4 K/UL (ref 4.1–11.1)

## 2023-04-17 PROCEDURE — 82746 ASSAY OF FOLIC ACID SERUM: CPT

## 2023-04-17 PROCEDURE — 74011250637 HC RX REV CODE- 250/637: Performed by: FAMILY MEDICINE

## 2023-04-17 PROCEDURE — 80048 BASIC METABOLIC PNL TOTAL CA: CPT

## 2023-04-17 PROCEDURE — 97530 THERAPEUTIC ACTIVITIES: CPT

## 2023-04-17 PROCEDURE — 97165 OT EVAL LOW COMPLEX 30 MIN: CPT

## 2023-04-17 PROCEDURE — 83540 ASSAY OF IRON: CPT

## 2023-04-17 PROCEDURE — 36415 COLL VENOUS BLD VENIPUNCTURE: CPT

## 2023-04-17 PROCEDURE — 97535 SELF CARE MNGMENT TRAINING: CPT

## 2023-04-17 PROCEDURE — 97161 PT EVAL LOW COMPLEX 20 MIN: CPT

## 2023-04-17 PROCEDURE — 74011250636 HC RX REV CODE- 250/636: Performed by: FAMILY MEDICINE

## 2023-04-17 PROCEDURE — 74011000250 HC RX REV CODE- 250: Performed by: NURSE PRACTITIONER

## 2023-04-17 PROCEDURE — 74011000250 HC RX REV CODE- 250: Performed by: FAMILY MEDICINE

## 2023-04-17 PROCEDURE — 85025 COMPLETE CBC W/AUTO DIFF WBC: CPT

## 2023-04-17 PROCEDURE — 82607 VITAMIN B-12: CPT

## 2023-04-17 PROCEDURE — 65270000032 HC RM SEMIPRIVATE

## 2023-04-17 PROCEDURE — 97116 GAIT TRAINING THERAPY: CPT

## 2023-04-17 RX ADMIN — Medication 400 MG: at 21:32

## 2023-04-17 RX ADMIN — SODIUM CHLORIDE, PRESERVATIVE FREE 10 ML: 5 INJECTION INTRAVENOUS at 06:29

## 2023-04-17 RX ADMIN — ALLOPURINOL 100 MG: 100 TABLET ORAL at 11:33

## 2023-04-17 RX ADMIN — APIXABAN 5 MG: 5 TABLET, FILM COATED ORAL at 11:33

## 2023-04-17 RX ADMIN — Medication: at 17:29

## 2023-04-17 RX ADMIN — TAMSULOSIN HYDROCHLORIDE 0.4 MG: 0.4 CAPSULE ORAL at 11:33

## 2023-04-17 RX ADMIN — MIRTAZAPINE 7.5 MG: 15 TABLET, FILM COATED ORAL at 21:32

## 2023-04-17 RX ADMIN — SODIUM CHLORIDE, PRESERVATIVE FREE 10 ML: 5 INJECTION INTRAVENOUS at 17:28

## 2023-04-17 RX ADMIN — SODIUM CHLORIDE 2 G: 9 INJECTION INTRAMUSCULAR; INTRAVENOUS; SUBCUTANEOUS at 21:31

## 2023-04-17 RX ADMIN — APIXABAN 5 MG: 5 TABLET, FILM COATED ORAL at 17:27

## 2023-04-17 RX ADMIN — BUPROPION HYDROCHLORIDE 100 MG: 100 TABLET, FILM COATED, EXTENDED RELEASE ORAL at 17:27

## 2023-04-17 RX ADMIN — NYSTATIN: 100000 POWDER TOPICAL at 11:37

## 2023-04-17 RX ADMIN — ATORVASTATIN CALCIUM 80 MG: 40 TABLET, FILM COATED ORAL at 21:32

## 2023-04-17 RX ADMIN — Medication: at 11:37

## 2023-04-17 RX ADMIN — FAMOTIDINE 20 MG: 20 TABLET ORAL at 21:32

## 2023-04-17 RX ADMIN — NYSTATIN: 100000 POWDER TOPICAL at 17:29

## 2023-04-17 RX ADMIN — FERROUS SULFATE TAB 325 MG (65 MG ELEMENTAL FE) 325 MG: 325 (65 FE) TAB at 11:33

## 2023-04-17 RX ADMIN — FAMOTIDINE 20 MG: 20 TABLET ORAL at 11:33

## 2023-04-17 RX ADMIN — FINASTERIDE 5 MG: 5 TABLET, FILM COATED ORAL at 11:33

## 2023-04-17 RX ADMIN — BUPROPION HYDROCHLORIDE 100 MG: 100 TABLET, FILM COATED, EXTENDED RELEASE ORAL at 11:33

## 2023-04-17 RX ADMIN — SODIUM CHLORIDE, PRESERVATIVE FREE 10 ML: 5 INJECTION INTRAVENOUS at 21:37

## 2023-04-17 RX ADMIN — METOPROLOL SUCCINATE 75 MG: 25 TABLET, FILM COATED, EXTENDED RELEASE ORAL at 11:33

## 2023-04-17 RX ADMIN — FERROUS SULFATE TAB 325 MG (65 MG ELEMENTAL FE) 325 MG: 325 (65 FE) TAB at 17:27

## 2023-04-17 NOTE — PROGRESS NOTES
Problem: Self Care Deficits Care Plan (Adult)  Goal: *Acute Goals and Plan of Care (Insert Text)  Description: FUNCTIONAL STATUS PRIOR TO ADMISSION: The patient was functional at the wheelchair level and required minimal assistance for stand pivot transfers to the chair. He had been living with his sister and TERESE, however unable to care for him at current level. Given prior CVA, presents with R sided weakness at baseline - progressing. He went back to his home in [place] and attempted to get out of the taxi, fell, and brought to ED. Has significant hx of recurrent hospitalizations, as well as SNF placements. HOME SUPPORT: The patient lived with his sister and TERESE and required maximal assistance for ADLs, min assistance SPT. Occupational Therapy Goals  Initiated 4/17/2023  1. Patient will perform bathing seated level with supervision/set-up within 7 day(s). 2.  Patient will perform grooming standing level with supervision/set-up within 7 day(s). 3.  Patient will perform lower body dressing with supervision/set-up within 7 day(s). 4.  Patient will perform toilet transfers with supervision/set-up within 7 day(s). 5.  Patient will perform all aspects of toileting with supervision/set-up within 7 day(s). 6.  Patient will participate in upper extremity therapeutic exercise/activities with supervision/set-up for 15 minutes within 7 day(s). 7.  Patient will utilize energy conservation techniques during functional activities with verbal cues within 7 day(s). Outcome: Not Met     OCCUPATIONAL THERAPY EVALUATION  Patient: Mary Valdez (53 y.o. male)  Date: 4/17/2023  Primary Diagnosis: Weakness [R53.1]       Precautions: falls, R geena, ? Continence        ASSESSMENT  Based on the objective data described below, the patient presents with impaired balance, generalized weakness (R>L), decreased muscular endurance, and is highly motivated. Received supine in bed, agreeable to working with therapy.  Rama Lucero assist to manage vazquez and progress RLE/RUE off bedside via HHA x1. Able to sit pivot and scoot with SBA holding onto bed rail with LUE. Patient performed simple grooming (face washing and oral hygiene) with SBA/set up using his teeth to manage cap of tooth paste. Min assist to pivot back into bed. Mod assist rolling L<>R as he is incontinent of bowels. Max assist for brief management and bowel hygiene. Min assist holding TERESE ankles and he is bridging to scoot upright in bed. He would benefit from continued acute OT to maximize independence. OT recommends IPR. Current Level of Function Impacting Discharge (ADLs/self-care): max assist LB ADLs    Functional Outcome Measure: The patient scored Total: 20/100 on the Barthel Index outcome measure which is indicative of being dependence in basic self-care. Other factors to consider for discharge: social support, PMH, fall risk, symptom severity      Patient will benefit from skilled therapy intervention to address the above noted impairments. PLAN :  Recommendations and Planned Interventions: self care training, functional mobility training, and therapeutic exercise    Frequency/Duration: Patient will be followed by occupational therapy 5 times a week to address goals. Recommendation for discharge: (in order for the patient to meet his/her long term goals)  Therapy 3 hours per day 5-7 days per week    This discharge recommendation:  Has been made in collaboration with the attending provider and/or case management    IF patient discharges home will need the following DME: AE: long handled bathing, AE: long handled dressing, bedside commode, hospital bed, mechanical lift, shower chair, transfer bench, and sliding/transfer board       SUBJECTIVE:   Patient stated I didn't know I went (bowels). Is it bad?     OBJECTIVE DATA SUMMARY:   HISTORY:   Past Medical History:   Diagnosis Date    Arrhythmia     atrial fib    Depression 4/24/2010    Diabetes ([facility] [address] diet control for pre-diabetes    Dyslipidemia, goal LDL below 100 6/14/2011    Gout     HTN (hypertension) 4/24/2010    Impotence 4/24/2010    Morbid obesity ([address] 5/17/2010    VALENTE (obstructive sleep apnea) 4/24/2010    CPAP    Restless legs 4/15/2015     Past Surgical History:   Procedure Laterality Date    COLONOSCOPY N/A 6/27/2017    COLONOSCOPY performed by Horacio Ramirez MD at [facility], [address] [facility], [facility]  2007    HX APPENDECTOMY      HX ORTHOPAEDIC  2000    bilat TKR     HX ORTHOPAEDIC  2010    left THR    HX UROLOGICAL  03/2018    urolift    HX UROLOGICAL  03/2019    prosthesis       Expanded or extensive additional review of patient history:     Home Situation  Home Environment: Private residence  # Steps to Enter: 6  Rails to Enter: Yes  Wheelchair Ramp: No  One/Two Story Residence: One story  Living Alone: Yes  Support Systems: Other Family Member(s)  Patient Expects to be Discharged to[de-identified] Home with home health  Current DME Used/Available at Home: Wheelchair, Walker, rolling  Tub or Shower Type: Tub    Hand dominance: Right    EXAMINATION OF PERFORMANCE DEFICITS:  Cognitive/Behavioral Status:  Neurologic State: Alert  Orientation Level: Oriented X4  Cognition: Follows commands  Perception: Appears intact  Perseveration: No perseveration noted  Safety/Judgement: Awareness of environment    Skin: intact    Edema: none    Hearing: Auditory  Auditory Impairment: None    Vision/Perceptual:    Tracking: Able to track stimulus in all quadrants w/o difficulty    Diplopia: No    Corrective Lenses: Glasses    Range of Motion:  AROM: Generally decreased, functional (RUE impaired, prior CVA)  PROM: Generally decreased, functional    Strength:  Strength: Generally decreased, functional (RUE impaired, prior CVA)    Coordination:  Coordination: Generally decreased, functional  Fine Motor Skills-Upper: Left Intact; Right Impaired    Gross Motor Skills-Upper: Left Intact; Right Impaired    Tone & Sensation:  Tone: Normal  Sensation: Intact    Balance:  Sitting: Intact; Without support    Functional Mobility and Transfers for ADLs:  Bed Mobility:  Rolling: Moderate assistance  Supine to Sit: Moderate assistance  Sit to Supine: Minimum assistance  Scooting: Minimum assistance      ADL Assessment:  Feeding: Stand-by assistance;Setup    Oral Facial Hygiene/Grooming: Stand-by assistance;Setup    Bathing: Moderate assistance    Type of Bath: Chlorhexidine (CHG); Basin/Soap/Water (vazquez cleaned w/CHG)    Upper Body Dressing: Minimum assistance    Lower Body Dressing: Moderate assistance    Toileting: Maximum assistance    ADL Intervention and task modifications:  Feeding  Drink to Mouth: Independent    Grooming  Grooming Assistance: Set-up  Position Performed: Seated edge of bed  Washing Face: Set-up  Brushing Teeth: Stand-by assistance (using teeth to hold cap and open tooth paste lid)    Lower Body Dressing Assistance  Underpants: Maximum assistance    Toileting  Toileting Assistance: Maximum assistance  Bowel Hygiene: Maximum assistance  Clothing Management: Maximum assistance    Cognitive Retraining  Safety/Judgement: Awareness of environment    Functional Measure:    Barthel Index:  Bathin  Bladder: 0  Bowels: 5  Groomin  Dressin  Feedin  Mobility: 0  Stairs: 0  Toilet Use: 0  Transfer (Bed to Chair and Back): 5  Total: 20/100      The Barthel ADL Index: Guidelines  1. The index should be used as a record of what a patient does, not as a record of what a patient could do. 2. The main aim is to establish degree of independence from any help, physical or verbal, however minor and for whatever reason. 3. The need for supervision renders the patient not independent. 4. A patient's performance should be established using the best available evidence. Asking the patient, friends/relatives and nurses are the usual sources, but direct observation and common sense are also important.  However direct testing is not needed. 5. Usually the patient's performance over the preceding 24-48 hours is important, but occasionally longer periods will be relevant. 6. Middle categories imply that the patient supplies over 50 per cent of the effort. 7. Use of aids to be independent is allowed. Score Interpretation (from [address])    Independent   60-79 Minimally independent   40-59 Partially dependent   20-39 Very dependent   <20 Totally dependent     -Kristen Mosley., Barthel, DMEY. (1965). Functional evaluation: the Barthel Index. [address] ([address] (1997). The Barthel activities of daily living index: self-reporting versus actual performance in the old (> or = 75 years). Journal [address] 45(7), [address] [facility], J.J.M.F, Ross Wisdom., Lobo Stanford. (1999). Measuring the change in disability after inpatient rehabilitation; comparison of the responsiveness of the Barthel Index and Functional [place] Measure. Journal of Neurology, Neurosurgery, and Psychiatry, 66(4), 173-806. Ivy Penn, N.J.A, AMY Gamino, & Evi Badillo M.A. (2004) Assessment of post-stroke quality of life in cost-effectiveness studies: The usefulness of the Barthel Index and the EuroQoL-5D. Quality of Life Research, 15, 148-     Occupational Therapy Evaluation Charge Determination   History Examination Decision-Making   LOW Complexity : Brief history review  MEDIUM Complexity : 3-5 performance deficits relating to physical, cognitive , or psychosocial skils that result in activity limitations and / or participation restrictions MEDIUM Complexity : Patient may present with comorbidities that affect occupational performnce.  Miniml to moderate modification of tasks or assistance (eg, physical or verbal ) with assesment(s) is necessary to enable patient to complete evaluation       Based on the above components, the patient evaluation is determined to be of the following complexity level: LOW   Pain Ratin/10    Activity Tolerance:   Fair    After treatment patient left in no apparent distress:    Supine in bed and Call bell within reach    COMMUNICATION/EDUCATION:   The patients plan of care was discussed with: Physical therapist and Registered nurse. Home safety education was provided and the patient/caregiver indicated understanding. This patients plan of care is appropriate for delegation to [facility].     Thank you for this referral.  Stevan Kelly OT  Time Calculation: 30 mins

## 2023-04-17 NOTE — PROGRESS NOTES
[facility] [facility] Adult  Hospitalist Group                                                                                          Hospitalist Progress Note  Griselda Urban NP  Answering service: [phone] OR [phone] from in house phone        Date of Service:  2023  NAME:  Gerardo Beltran  :  1950  MRN:  [id number]       Admission Summary:   Per H&P, Gerardo Beltran is a 71 y.o. 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 with chief complaints of generalized mostly right-sided weakness with inability to care for self. 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 He was hospitalized 10/17/2022 to 10/23/2022 with diagnosis of Enterococcus/Pseudomonas UTI, urinary retention requiring indwelling Vazquez catheter, COVID-19 virus infection. Patient was hospitalized 2020 to 2022 with diagnosis of eroding penile implant, possible infection, requiring removal of penile prosthesis 2022. Patient notes he has been hospitalized since those admissions to other facilities. Records not available for review. He notes that he lives alone, has difficulty walking even uses a roller walker, has worsening right-sided weakness compared to his baseline, and is unable to care for himself. Symptoms noted are severe, profound, constant, without specific alleviating factors. There is no reported fall, head or neck trauma. He went to [facility] ED for 15 2023.   Work-up included CT head without IV contrast which showed chronic left cerebral infarct but no acute intracranial abnormalities. UA showed large leukocyte esterases, > 100 WBC, 5-10 RBC, 2+ bacteria, small blood. ED requested transfer to [facility].       Interval history / Subjective:     I saw the patient this morning on rounds. Feeling much better today, culture still pending. Assessment & Plan:          UTI (urinary tract infection) CAUTI  History of removal of penile prosthesis  Chronic urinary retention with chronic indwelling urinary catheter  Catheter has been changed  Urine culture pending  Blood cultures NGTD  Continuing ceftriaxone         2. Generalized weakness/ground-level fall       History of stroke withl right-sided weakness  History of stroke as well as current infectious process  PT/OT  Radiographs without fracture  Patient has had old stroke with right-sided hemiparesis. Has been so far today seen by Occupational Therapy with total score of 20/100 Barthel index, recommending SNF. Physical therapy yet to see       4. Atrial fibrillation (A-fib)  Heart rate currently controlled  Continue diltiazem  Continue apixaban            5.  Normocytic normochromic anemia  Hemoglobin yesterday 11.1 however it appears his baseline is in the 9 range  10.0 today  , folate-8.4 Fe-36      6. Anxiety  Medication list reviewed. On  not currently on gabapentin. Has not filled temazepam in 1 months. Continuing melatonin and mirtazapine         7. Essential hypertension  Blood pressure currently controlled  Continuing diltiazem  Continuing metoprolol       8. Hyperlipidemia  Continuing atorvastatin       9. Obesity  -BMI 35.36 kg/M2  Counseled on Healthy dietary choices       10. Diabetes  -listed in chart records  -last HgbA1c = 5.3% on 11/3/22   A1c 5.4  Sugar currently controlled, will consider adding sliding scale insulin if becomes elevated         11. Depression.   Able  Continuing mirtazapine  Continue bupropion     12. Rash  -involving groin/ perineum/ proximal thighs  -order Nystatin powder, apply to rash BID          Code status: Full  Prophylaxis: Apixaban  Care Plan discussed with: Patient, nurse  Anticipated Disposition: Discharge in a few days, likely SNF. PT to see  Inpatient  Cardiac monitoring: na     Hospital Problems  Date Reviewed: 10/12/2022            Codes Class Noted POA    Weakness ICD-10-CM: R53.1  ICD-9-CM: 780.79  4/15/2023 Unknown         Social Determinants of Health     Tobacco Use: Medium Risk    Smoking Tobacco Use: Former    Smokeless Tobacco Use: Never    Passive Exposure: Not on file   Alcohol Use: Not At Risk    Frequency of Alcohol Consumption: Never    Average Number of Drinks: Patient does not drink    Frequency of Binge Drinking: Never   Financial Resource Strain: Low Risk     Difficulty of Paying Living Expenses: Not very hard   Food Insecurity: No Food Insecurity    Worried About Running Out of Food in the Last Year: Never true    Ran Out of Food in the Last Year: Never true   Transportation Needs: No Transportation Needs    Lack of Transportation (Medical): No    Lack of Transportation (Non-Medical):  No   Physical Activity: Inactive    Days of Exercise per Week: 0 days    Minutes of Exercise per Session: 0 min   Stress: Stress Concern Present    Feeling of Stress : Rather much   Social Connections: Socially Isolated    Frequency of Communication with Friends and Family: More than three times a week    Frequency of Social Gatherings with Friends and Family: More than three times a week    Attends Mandaen Services: Never    Active Member of Clubs or Organizations: No    Attends Club or Organization Meetings: Never    Marital Status:    Intimate Partner Violence: Not At Risk    Fear of Current or Ex-Partner: No    Emotionally Abused: No    Physically Abused: No    Sexually Abused: No   Depression: Not at risk    PHQ-2 Score: 0   Housing Stability: Low Risk     Unable to Pay for Housing in the Last Year: No    Number of Places Lived in the Last Year: 2    Unstable Housing in the Last Year: No       Review of Systems:   A comprehensive review of systems was negative except for that written in the HPI. Vital Signs:    Last 24hrs VS reviewed since prior progress note. Most recent are:  Visit Vitals  /72 (BP 1 Location: Left upper arm, BP Patient Position: At rest)   Pulse 75   Temp 97.5 °F (36.4 °C)   Resp 18   Ht 5' 11\" (1.803 m)   Wt 109.8 kg (242 lb 1 oz)   SpO2 95%   BMI 33.76 kg/m²         Intake/Output Summary (Last 24 hours) at 4/17/2023 5651  Last data filed at 4/17/2023 0229  Gross per 24 hour   Intake --   Output 2200 ml   Net -2200 ml        Physical Examination:     I had a face to face encounter with this patient and independently examined them on 4/17/2023 as outlined below:          General : no acute distress,   HEENT:NCAT moist mucus membrane,  Neck: supple, no JVD, no meningeal signs  Chest: Clear to auscultation bilaterally   CVS: S1 S2 heard, Capillary refill less than 2 seconds  Abd: soft/ non tender, non distended, BS physiological,   Ext: no clubbing, no cyanosis, no edema, brisk 2+ DP pulses  Neuro/Psych: pleasant mood and affect, MOY EOMI  Skin: warm     Data Review:    Review and/or order of clinical lab test  Review and/or order of tests in the radiology section of CPT      I have personally and independently reviewed all pertinent labs, diagnostic studies, imaging, and have provided independent interpretation of the same. Labs:     Recent Labs     04/17/23  0128 04/15/23  1456   WBC 8.4 9.2   HGB 10.0* 11.1*   HCT 31.7* 35.3*    251       Recent Labs     04/17/23  0128 04/15/23  1456    136   K 3.7 4.7    101   CO2 25 28   BUN 16 19   CREA 0.73 0.96   GLU 86 96   CA 8.5 9.1       No results for input(s): ALT, AP, TBIL, TBILI, TP, ALB, GLOB, GGT, AML, LPSE in the last 72 hours.     No lab exists for component: SGOT, GPT, AMYP, HLPSE  No results for input(s): INR, PTP, APTT, INREXT, INREXT in the last 72 hours. Recent Labs     04/17/23  0128   TIBC 199*   PSAT 18*      Lab Results   Component Value Date/Time    Folate 8.4 04/17/2023 01:28 AM      No results for input(s): PH, PCO2, PO2 in the last 72 hours. No results for input(s): CPK, CKNDX, TROIQ in the last 72 hours. No lab exists for component: CPKMB  Lab Results   Component Value Date/Time    Cholesterol, total 146 09/15/2022 02:40 AM    HDL Cholesterol 53 09/15/2022 02:40 AM    LDL, calculated 65 09/15/2022 02:40 AM    Triglyceride 140 09/15/2022 02:40 AM    CHOL/HDL Ratio 2.8 09/15/2022 02:40 AM     Lab Results   Component Value Date/Time    Glucose (POC) 124 (H) 12/20/2022 11:11 AM    Glucose (POC) 97 12/20/2022 06:49 AM    Glucose (POC) 128 (H) 12/19/2022 08:51 PM    Glucose (POC) 170 (H) 12/19/2022 04:21 PM    Glucose (POC) 149 (H) 12/19/2022 11:09 AM     Lab Results   Component Value Date/Time    Color YELLOW/STRAW 04/15/2023 02:36 PM    Appearance CLEAR 04/15/2023 02:36 PM    Specific gravity 1.010 04/15/2023 02:36 PM    Specific gravity 1.013 10/18/2022 01:01 AM    pH (UA) 7.0 04/15/2023 02:36 PM    Protein Negative 04/15/2023 02:36 PM    Glucose Negative 04/15/2023 02:36 PM    Ketone Negative 04/15/2023 02:36 PM    Bilirubin Negative 04/15/2023 02:36 PM    Urobilinogen 1.0 04/15/2023 02:36 PM    Nitrites Negative 04/15/2023 02:36 PM    Leukocyte Esterase LARGE (A) 04/15/2023 02:36 PM    Epithelial cells FEW 04/15/2023 02:36 PM    Bacteria 2+ (A) 04/15/2023 02:36 PM    WBC >100 (H) 04/15/2023 02:36 PM    RBC 5-10 04/15/2023 02:36 PM       Notes reviewed from all clinical/nonclinical/nursing services involved in patient's clinical care. Care coordination discussions were held with appropriate clinical/nonclinical/ nursing providers based on care coordination needs.          Patients current active Medications were reviewed, considered, added and adjusted based on the clinical condition today. Home Medications were reconciled to the best of my ability given all available resources at the time of admission. Route is PO if not otherwise noted.       Admission Status:[id number]:::1}      Medications Reviewed:     Current Facility-Administered Medications   Medication Dose Route Frequency    nystatin (MYCOSTATIN) 100,000 unit/gram powder   Topical BID    sodium chloride (NS) flush 5-40 mL  5-40 mL IntraVENous Q8H    sodium chloride (NS) flush 5-40 mL  5-40 mL IntraVENous PRN    acetaminophen (TYLENOL) tablet 650 mg  650 mg Oral Q6H PRN    Or    acetaminophen (TYLENOL) suppository 650 mg  650 mg Rectal Q6H PRN    polyethylene glycol (MIRALAX) packet 17 g  17 g Oral DAILY PRN    ondansetron (ZOFRAN ODT) tablet 4 mg  4 mg Oral Q8H PRN    Or    ondansetron (ZOFRAN) injection 4 mg  4 mg IntraVENous Q6H PRN    cefTRIAXone (ROCEPHIN) 2 g in 0.9% sodium chloride 20 mL IV syringe  2 g IntraVENous Q24H    allopurinoL (ZYLOPRIM) tablet 100 mg  100 mg Oral DAILY    apixaban (ELIQUIS) tablet 5 mg  5 mg Oral BID    atorvastatin (LIPITOR) tablet 80 mg  80 mg Oral QHS    balsam peru-castor oiL (VENELEX) ointment   Topical BID    buPROPion SR (WELLBUTRIN SR) tablet 100 mg  100 mg Oral BID    dilTIAZem ER (CARDIZEM CD) capsule 120 mg  120 mg Oral Q24H    ferrous sulfate tablet 325 mg  325 mg Oral BID    famotidine (PEPCID) tablet 20 mg  20 mg Oral Q12H    [Held by provider] gabapentin (NEURONTIN) capsule 300 mg  300 mg Oral QHS    finasteride (PROSCAR) tablet 5 mg  5 mg Oral DAILY    magnesium oxide (MAG-OX) tablet 400 mg  400 mg Oral QHS    melatonin tablet 3 mg  3 mg Oral QHS PRN    metoprolol succinate (TOPROL-XL) XL tablet 75 mg  75 mg Oral DAILY    mirtazapine (REMERON) tablet 7.5 mg  7.5 mg Oral QHS    tamsulosin (FLOMAX) capsule 0.4 mg  0.4 mg Oral DAILY     ______________________________________________________________________  EXPECTED LENGTH OF STAY: - - -  ACTUAL LENGTH OF STAY:          2                 Elena Toscano NP

## 2023-04-17 NOTE — PROGRESS NOTES
LOREN:  1. RUR-15%  2. Admitted from home living alone, per MD patient would need SNF or LTC. 3. PT/OT  4. BLS transport. Care Management Interventions  PCP Verified by CM: Yes  Palliative Care Criteria Met (RRAT>21 & CHF Dx)?: No  Mode of Transport at Discharge: BLS  Transition of Care Consult (CM Consult): Discharge Planning  MyChart Signup: Yes  Discharge Durable Medical Equipment: No  Health Maintenance Reviewed: Yes  Physical Therapy Consult: Yes  Occupational Therapy Consult: Yes  Speech Therapy Consult: No  Support Systems: Friend/Neighbor  Confirm Follow Up Transport: Other (see comment) (depending on dispo; self vs friend)  The Patient and/or Patient Representative was Provided with a Choice of Provider and Agrees with the Discharge Plan?: Yes  [employer] Information Provided?: No  Discharge Location  Patient Expects to be Discharged to[de-identified] Other:    Reason for Admission:   weakness, unable to care for himself. RUR Score:     15%             PCP: First and Last name:   Steve Roberts MD     Name of Practice: [facility]    Are you a current patient: Yes/No: yes   Approximate date of last visit: unknown, patient is followed by oncology    Can you participate in a virtual visit if needed: n/a    Do you (patient/family) have any concerns for transition/discharge? Living concerns, LTC needs. Plan for utilizing home health:   Patient will need SNF/LTC    Current Advanced Directive/Advance Care Plan:  Full Code      Healthcare Decision Maker:               [facility] [phone]    Secondary Decision Maker: [facility] - [phone]    Transition of Care Plan:                        Cm spoke with patient at bedside and provided him with a SNF list, he request that I call his friend Neila Crigler [phone].  CECILIA spoke with Ev Garcia he said that the patient was living with his sister, Sofi Tyson after going to [facility] for 2 weeks but then had a disagreement with her so he left and went back home. Patient recently was not able to care for himself at home, he has been  and  a few times. Patient is estranged from his son and daughter. His sister is unable to meet his care  needs at home. Prior to admission, patient was not able to stand, pivot, or ambulate without assistance. He needs help with ADLs at home, known to have a catheter and briefs. He has access to a walker and wheelchair. He recieves $2,000/month in income from [employer]. Patient has savings of $340k along with owning his property in [place] [employer] [employer], a home, care, and a boat. He would not qualify for any Medicaid. He also is unable to drive,cook, or clean. Cm will continue to follow for discharge needs.   Cm emailed his friend, Karolyn Shafer a copy of the SNF list to him at [email or web address].    [facility]

## 2023-04-17 NOTE — PROGRESS NOTES
Problem: Patient Education: Go to Patient Education Activity  Goal: Patient/Family Education  Outcome: Progressing Towards Goal     Problem: Patient Education: Go to Patient Education Activity  Goal: Patient/Family Education  Outcome: Progressing Towards Goal     Problem: Falls - Risk of  Goal: *Absence of Falls  Description: Document Verónica Cedillo Fall Risk and appropriate interventions in the flowsheet. Outcome: Progressing Towards Goal  Note: Fall Risk Interventions:                                Problem: Pressure Injury - Risk of  Goal: *Prevention of pressure injury  Description: Document Pepe Scale and appropriate interventions in the flowsheet. Outcome: Progressing Towards Goal  Note: Pressure Injury Interventions:  Sensory Interventions: Assess changes in LOC, Keep linens dry and wrinkle-free, Maintain/enhance activity level, Minimize linen layers, Monitor skin under medical devices, Pressure redistribution bed/mattress (bed type), Turn and reposition approx. every two hours (pillows and wedges if needed)         Activity Interventions: Pressure redistribution bed/mattress(bed type)    Mobility Interventions: Pressure redistribution bed/mattress (bed type), Turn and reposition approx.  every two hours(pillow and wedges)    Nutrition Interventions: Document food/fluid/supplement intake

## 2023-04-17 NOTE — PROGRESS NOTES
Ambulatory Care Management   Social Work Note    Date/Time:  4/17/2023 8:51 AM     Received notification of patient admission. Chart reviewed. Patient is open to Ambulatory Care Management -Social Work services. Patient information:  Please see full initial assessment completed by this  on 4/5/2023  Per patient's sister, patient has been  and  four times. Patient admits that he is estranged from his son and daughter. Patient most recently resided with his sister Vanna Drake) and brother-in-law, in their home in [place], [place]. His care needs exceed his sister and brother-in-law's ability to care for him, and they are seeking alternative options. Jame Hsu states that he is \"dead weight\" and reports that she and her  have both injured themselves, attempting to meet his care needs. At this point, Jame Hsu stated that he cannot return home with them, and must return to his home in [place], [place], or a facility. Patient is unable to stand, pivot, or ambulate without assistance. He needs help with bathing, dressing, and toileting. He uses a catheter, and adult diapers. He uses a walker to stand, and can then pivot, with assistance, to a wheelchair. Patient states that he receives approximately $2,000/month in income from [employer]. He has $340K in savings, along with owning property in [place], a home, a car, and a boat, among other assets. Patient is not eligible for assistance through the [facility] or Medicaid, as a result of his savings and assets. Thus far, the patient has declined the option of an assisted living or nursing facility, due to the cost.    Patient notified this  on Friday, that he plans to return home alone, to his home in [place], [place]. Patient stated that he feels like he only needs a quick visit per day, from a personal care aid or nurse, to help him change his adult diaper.   He maintains that he is unable to stand, pivot, or ambulate independently. He maintains that he is unable to cook, clean, or drive. Discussed the safety concerns of that idea, and urged him to consider transfer to a facility, to better meet his care needs. Ambulatory Social Work to monitor patient status and resume care upon discharge. Please contact P#[phone], with any questions.      ANAHI Brantley/KATHERYN, [facility]   C#[phone]

## 2023-04-17 NOTE — PROGRESS NOTES
Physician Progress Note      PATIENTRudy Headings  [facility] #:                  [id number]  :                       1950  ADMIT DATE:       4/15/2023 1:57 PM  [address] [place] DATE:  RESPONDING  PROVIDER #:        Georgie CROWELL NP          QUERY TEXT:    Pt admitted with UTI. Pt noted to have chronic indwelling urinary catheter . If possible, please document in the progress notes and discharge summary if you are evaluating and/or treating any of the following: The medical record reflects the following:  Risk Factors: vazquez cath  Clinical Indicators:  04/15/23 14:36  Nitrites: Negative  Leukocyte Esterase: LARGE ! Epithelial cells: FEW  WBC: >100 (H)  RBC: 5-10  Bacteria: 2+ ! [place] Count ?    >100,000  COLONIES/mL  P    Culture result: ? Gram negative rods? ED  Has a chronic indwelling vazquez cath    H/P  UTI (urinary tract infection)  -Complicated with prior history of removal of penile prosthesis  -chronic urinary retention; chronic indwelling vazquez catheter    Treatment: rocephin IV    Thank you,  Galina Baugh RN, CCDS, [facility]  Certified Clinical Documentation   [phone]  you can also reach me by perfect serve. Options provided:  -- UTI due to chronic indwelling urinary catheter  -- UTI not due to indwelling urinary catheter  -- Other - I will add my own diagnosis  -- Disagree - Not applicable / Not valid  -- Disagree - Clinically unable to determine / Unknown  -- Refer to Clinical Documentation Reviewer    PROVIDER RESPONSE TEXT:    UTI is due to the chronic indwelling urinary catheter. Query created by: Faby Andrew on 2023 12:37 PM      QUERY TEXT:    Patient admitted with UTI, noted to have  atrial fibrillation and is maintained on Eliquis. If possible, please document in progress notes and discharge summary if you are evaluating and/or treating any of the following:     The medical record reflects the following:  Risk Factors: a fib  Clinical Indicators:    4/15 CT  ? IMPRESSION  No acute intracranial finding. Chronic left cerebral infarct. 4/16 PN  Atrial fibrillation (A-fib)  Heart rate currently controlled    Treatment: Je    Thank you,  Tressa Shahid RN, CCDS, [facility]  Certified Clinical Documentation   [phone]  you can also reach me by perfect serve. Options provided:  -- Secondary hypercoagulable state in a patient with atrial fibrillation  -- Other - I will add my own diagnosis  -- Disagree - Not applicable / Not valid  -- Disagree - Clinically unable to determine / Unknown  -- Refer to Clinical Documentation Reviewer    PROVIDER RESPONSE TEXT:    This patient has secondary hypercoagulable state in a patient with atrial fibrillation.     Query created by: Santa García on 4/17/2023 12:39 PM      Electronically signed by:  Galo Lundberg NP 4/17/2023 12:42 PM

## 2023-04-17 NOTE — PROGRESS NOTES
Problem: Mobility Impaired (Adult and Pediatric)  Goal: *Acute Goals and Plan of Care (Insert Text)  Description: FUNCTIONAL STATUS PRIOR TO ADMISSION: Pt was independent without assistive device - living alone until CVA 9/2022. Pt initially went to [facility], also with hospital admissions over past year with SNF stays. Pt states he was able to amb with RW and CGA from sister/[id number] - short distance home only - no outside surfaces. Pt reports has not been OOB x 1 month secondary to social situation - sister no longer able to provide support/assistance. HOME SUPPORT PRIOR TO ADMISSION: As above, pt will not be able to go back to sister's home. Pt's close friend present - currently working with pt to get Medical POA - also working with [employer] regarding resources for additional rehabilitation. Physical Therapy Goals  Initiated 4/17/2023  1. Patient will move from supine to sit and sit to supine  in bed with supervision/set-up within 7 day(s). 2.  Patient will transfer from bed to chair and chair to bed - lateral scoot to drop arm chair - with supervision/set-up using the least restrictive device within 7 day(s). 3.  Patient will perform sit to stand with minimal assistance/contact guard assist within 7 day(s) - from elevated bed height. 4.  Patient will ambulate with minimal assistance/contact guard assist for > 50 feet with the least restrictive device within 7 day(s). PHYSICAL THERAPY EVALUATION  Patient: Angelica Patton (81 y.o. male)  Date: 4/17/2023  Primary Diagnosis: Weakness [R53.1]       Precautions:   Fall    ASSESSMENT  Based on the objective data described below, the patient presents with recent decline in function and generalized weakness over past month. Pt s/p CVA in 9/2022 - despite multiple admissions and SNF stays - pt was receiving Home Care RN, PT and OT from [address] [place] [address] and showing progress and potential to become functional household ambulator.   However, services were suspended - pt currently trying to appeal thru [place] system - to have home care benefits or possible SNF resources. Recommend SNF or IPR thru [facility] if possible vs. SNF. Current Level of Function Impacting Discharge (mobility/balance): Min assist supine to sit getting up from left side of bed with HOB elevated; sit to stand from high surfaces min assist x 2; sit to stand from low surfaces mod assist x 2    Functional Outcome Measure: The patient scored 9/28 on the Tinetti outcome measure . Other factors to consider for discharge: Pt working on long term plan - pt aware will not be able to return to living independently. Patient will benefit from skilled therapy intervention to address the above noted impairments. PLAN :  Recommendations and Planned Interventions: bed mobility training, transfer training, gait training, therapeutic exercises, patient and family training/education, and therapeutic activities      Frequency/Duration: Patient will be followed by physical therapy:  5 times a week to address goals.     Recommendation for discharge: (in order for the patient to meet his/her long term goals)  Physical therapy at least 2 days/week in the home     This discharge recommendation:  Has been made in collaboration with the attending provider and/or case management    IF patient discharges home will need the following DME: n/a         SUBJECTIVE:   Patient stated I think I could get much stronger - .    OBJECTIVE DATA SUMMARY:   HISTORY:    Past Medical History:   Diagnosis Date    Arrhythmia     atrial fib    Depression 4/24/2010    Diabetes ([address]     diet control for pre-diabetes    Dyslipidemia, goal LDL below 100 6/14/2011    Gout     HTN (hypertension) 4/24/2010    Impotence 4/24/2010    Morbid obesity ([address] 5/17/2010    VALENTE (obstructive sleep apnea) 4/24/2010    CPAP    Restless legs 4/15/2015     Past Surgical History:   Procedure Laterality Date    COLONOSCOPY N/A 6/27/2017    COLONOSCOPY performed by Kory David MD at [facility] ENDOSCOPY    ENDOSCOPY, COLON, DIAGNOSTIC  2007    HX APPENDECTOMY      HX ORTHOPAEDIC  2000    bilat TKR     HX ORTHOPAEDIC  2010    left THR    HX UROLOGICAL  03/2018    urolift    HX UROLOGICAL  03/2019    prosthesis       Personal factors and/or comorbidities impacting plan of care: as above    Home Situation  Home Environment: Private residence  # Steps to Enter: 6  Rails to Enter: Yes  Wheelchair Ramp: No  One/Two Story Residence: One story  Living Alone: Yes  Support Systems: Other Family Member(s)/ Friends  Patient Expects to be Discharged to[de-identified] SNF/IPR  Current DME Used/Available at Home: Wheelchair, Walker, rolling  Tub or Shower Type: Tub    EXAMINATION/PRESENTATION/DECISION MAKING:   Critical Behavior:  Neurologic State: Alert  Orientation Level: Oriented X4  Cognition: Follows commands  Safety/Judgement: Awareness of environment  Hearing: Auditory  Auditory Impairment: None  Range Of Motion:  AROM: Generally decreased, functional (limited use of right arm - proximal > distal - able to hold onto RW; right leg decreased AROM and motor control)                      Strength:    Strength: Generally decreased, functional (observed right arm/hand only functional for holding RW; unable to reach or use for rolling or support in sitting)                    Tone & Sensation:                 Sensation: Intact               Coordination:  Coordination: Generally decreased, functional (Right leg decreased - decreased control of foot placement during amb)    Functional Mobility:  Bed Mobility:    Supine to Sit: Minimum assistance from [facility] elevated  Sit to Supine: Standby guard - pt able to lift both legs into bed  Scooting: Contact guard assistance  Transfers:  Sit to Stand: Minimum assistance;Assist x2; Additional time (from high surface; mod to max assist x 2 from low surface)  Stand to Sit: Moderate assistance;Assist x2 (unable to slow descent without assistance)                       Balance:   Sitting: Impaired; Without support  Sitting - Static: Good (unsupported)  Sitting - Dynamic: Fair (occasional) (limited dynamic / reaching out of base of support)  Standing: Impaired; With support  Standing - Static: Good  Standing - Dynamic : Fair;Poor (limited trunk excursions)  Ambulation/Gait Training:  Distance (ft): 16 Feet (ft)  Assistive Device: Walker, rolling;Gait belt (attempted use of hemiwalker)  Ambulation - Level of Assistance: Contact guard assistance;Minimal assistance;Assist x1;Adaptive equipment; Additional time        Gait Abnormalities: Decreased step clearance; Hemiplegic; Step to gait        Base of Support: Center of gravity altered;Shift to left;Narrowed  Stance: Right decreased  Speed/Geovanna: Slow  Step Length: Right shortened;Left shortened  Swing Pattern: Right asymmetrical          Functional Measure:  Tinetti test:    Sitting Balance: 1  Arises: 0  Attempts to Rise: 0  Immediate Standing Balance: 1  Standing Balance: 1  Nudged: 0  Eyes Closed: 0  Turn 360 Degrees - Continuous/Discontinuous: 0  Turn 360 Degrees - Steady/Unsteady: 1  Sitting Down: 1  Balance Score: 5 Balance total score  Indication of Gait: 0  R Step Length/Height: 1  L Step Length/Height: 0  R Foot Clearance: 1  L Foot Clearance: 1  Step Symmetry: 0  Step Continuity: 0  Path: 1  Trunk: 0  Walking Time: 0  Gait Score: 4 Gait total score  Total Score: 9/28 Overall total score         Tinetti Tool Score Risk of Falls  <19 = High Fall Risk  19-24 = Moderate Fall Risk  25-28 = Low Fall Risk  Tinetti ME. Performance-Oriented Assessment of Mobility Problems in Elderly Patients. [facility]; [id number].  (Scoring Description: PT Bulletin Feb. 10, 1993)    Older adults: Karli Lugo et al, 2009; n = 1000 [facility] elderly evaluated with ABC, YOANNA, ADL, and IADL)  · Mean YOANNA score for males aged 69-68 years = 26.21(3.40)  · Mean YOANNA score for females age 69-68 years = 25.16(4.30)  · Mean YOANNA score for males over [de-identified] years = 23.29(6.02)  · Mean YOANNA score for females over 80 years = 17.20(8.32)           Physical Therapy Evaluation Charge Determination   History Examination Presentation Decision-Making   LOW Complexity : Zero comorbidities / personal factors that will impact the outcome / POC LOW Complexity : 1-2 Standardized tests and measures addressing body structure, function, activity limitation and / or participation in recreation  LOW Complexity : Stable, uncomplicated  LOW Complexity : FOTO score of       Based on the above components, the patient evaluation is determined to be of the following complexity level: LOW     Pain Rating:  Denied at rest; pt reporting some discomfort with right arm movement - passive and active    Activity Tolerance:   Fair - after 1 month of minimal activity - amb 16 feet and OOB x 2 hours    After treatment patient left in no apparent distress:   Call bell within reach, Bed / chair alarm activated, Caregiver / family present, and recliner     COMMUNICATION/EDUCATION:   The patients plan of care was discussed with: Occupational therapist, Registered nurse, and Physician. Fall prevention education was provided and the patient/caregiver indicated understanding., Patient/family have participated as able in goal setting and plan of care. , and Patient/family agree to work toward stated goals and plan of care. Thank you for this referral.  Mechelle Moreno PT   Time Calculation: 37 mins    Addendum:  Time Calculation: 1412 - 1430  Time Calculation:  18 mins     Pt up in chair from 1255 to 1414 - returned to practice transfers from low surfaces and continue gait training. Pt required cues to move forward in chair - and min to mod assist x 2 for sit to stand - required less assistance than am session. Pt ambulated 25 feet with RW with CGA and required only standby assist for sit to supine.     Mechelle Moreno PT

## 2023-04-18 LAB
BACTERIA SPEC CULT: ABNORMAL
CC UR VC: ABNORMAL
SERVICE CMNT-IMP: ABNORMAL

## 2023-04-18 PROCEDURE — 97530 THERAPEUTIC ACTIVITIES: CPT

## 2023-04-18 PROCEDURE — 74011000250 HC RX REV CODE- 250: Performed by: FAMILY MEDICINE

## 2023-04-18 PROCEDURE — 97116 GAIT TRAINING THERAPY: CPT

## 2023-04-18 PROCEDURE — 74011250636 HC RX REV CODE- 250/636: Performed by: FAMILY MEDICINE

## 2023-04-18 PROCEDURE — 74011250637 HC RX REV CODE- 250/637: Performed by: FAMILY MEDICINE

## 2023-04-18 PROCEDURE — 65270000032 HC RM SEMIPRIVATE

## 2023-04-18 PROCEDURE — 74011000250 HC RX REV CODE- 250: Performed by: NURSE PRACTITIONER

## 2023-04-18 RX ADMIN — FINASTERIDE 5 MG: 5 TABLET, FILM COATED ORAL at 08:25

## 2023-04-18 RX ADMIN — APIXABAN 5 MG: 5 TABLET, FILM COATED ORAL at 08:25

## 2023-04-18 RX ADMIN — Medication: at 18:22

## 2023-04-18 RX ADMIN — FERROUS SULFATE TAB 325 MG (65 MG ELEMENTAL FE) 325 MG: 325 (65 FE) TAB at 18:22

## 2023-04-18 RX ADMIN — BUPROPION HYDROCHLORIDE 100 MG: 100 TABLET, FILM COATED, EXTENDED RELEASE ORAL at 18:22

## 2023-04-18 RX ADMIN — SODIUM CHLORIDE, PRESERVATIVE FREE 10 ML: 5 INJECTION INTRAVENOUS at 07:20

## 2023-04-18 RX ADMIN — SODIUM CHLORIDE 2 G: 9 INJECTION INTRAMUSCULAR; INTRAVENOUS; SUBCUTANEOUS at 21:23

## 2023-04-18 RX ADMIN — NYSTATIN: 100000 POWDER TOPICAL at 08:28

## 2023-04-18 RX ADMIN — Medication 400 MG: at 21:24

## 2023-04-18 RX ADMIN — ALLOPURINOL 100 MG: 100 TABLET ORAL at 08:25

## 2023-04-18 RX ADMIN — BUPROPION HYDROCHLORIDE 100 MG: 100 TABLET, FILM COATED, EXTENDED RELEASE ORAL at 08:25

## 2023-04-18 RX ADMIN — SODIUM CHLORIDE, PRESERVATIVE FREE 10 ML: 5 INJECTION INTRAVENOUS at 21:30

## 2023-04-18 RX ADMIN — NYSTATIN: 100000 POWDER TOPICAL at 18:22

## 2023-04-18 RX ADMIN — ATORVASTATIN CALCIUM 80 MG: 40 TABLET, FILM COATED ORAL at 21:24

## 2023-04-18 RX ADMIN — Medication 3 MG: at 21:24

## 2023-04-18 RX ADMIN — MIRTAZAPINE 7.5 MG: 15 TABLET, FILM COATED ORAL at 21:24

## 2023-04-18 RX ADMIN — FERROUS SULFATE TAB 325 MG (65 MG ELEMENTAL FE) 325 MG: 325 (65 FE) TAB at 08:25

## 2023-04-18 RX ADMIN — FAMOTIDINE 20 MG: 20 TABLET ORAL at 21:24

## 2023-04-18 RX ADMIN — APIXABAN 5 MG: 5 TABLET, FILM COATED ORAL at 18:22

## 2023-04-18 RX ADMIN — TAMSULOSIN HYDROCHLORIDE 0.4 MG: 0.4 CAPSULE ORAL at 08:25

## 2023-04-18 RX ADMIN — SODIUM CHLORIDE, PRESERVATIVE FREE 10 ML: 5 INJECTION INTRAVENOUS at 15:35

## 2023-04-18 RX ADMIN — Medication: at 08:28

## 2023-04-18 RX ADMIN — FAMOTIDINE 20 MG: 20 TABLET ORAL at 08:25

## 2023-04-18 RX ADMIN — METOPROLOL SUCCINATE 75 MG: 25 TABLET, FILM COATED, EXTENDED RELEASE ORAL at 08:25

## 2023-04-18 NOTE — PROGRESS NOTES
Problem: Self Care Deficits Care Plan (Adult)  Goal: *Therapy Goal (Edit Goal, Insert Text)  Description:  FUNCTIONAL STATUS PRIOR TO ADMISSION: The patient was functional at the wheelchair level and required minimal assistance for stand pivot transfers to the chair. He had been living with his sister and TERESE, however unable to care for him at current level. Given prior CVA, presents with R sided weakness at baseline - progressing. He went back to his home in [place] and attempted to get out of the taxi, fell, and brought to ED. Has significant hx of recurrent hospitalizations, as well as SNF placements. HOME SUPPORT: The patient lived with his sister and TERESE and required maximal assistance for ADLs, min assistance SPT. Occupational Therapy Goals  Initiated 4/17/2023  1. Patient will perform bathing seated level with supervision/set-up within 7 day(s). 2.  Patient will perform grooming standing level with supervision/set-up within 7 day(s). 3.  Patient will perform lower body dressing with supervision/set-up within 7 day(s). 4.  Patient will perform toilet transfers with supervision/set-up within 7 day(s). 5.  Patient will perform all aspects of toileting with supervision/set-up within 7 day(s). 6.  Patient will participate in upper extremity therapeutic exercise/activities with supervision/set-up for 15 minutes within 7 day(s). 7.  Patient will utilize energy conservation techniques during functional activities with verbal cues within 7 day(s). 4/18/2023 1559 by Mary Valdez OT  Outcome: Progressing Towards Goal   OCCUPATIONAL THERAPY TREATMENT  Patient: Mary Valdez (70 y.o. male)  Date: 4/18/2023  Diagnosis: Weakness [R53.1] <principal problem not specified>      Precautions: Fall  Chart, occupational therapy assessment, plan of care, and goals were reviewed. ASSESSMENT  Patient continues with skilled OT services and is progressing towards goals.   Patient continues to be impacted by RUE/RLE weakness, impaired standing tolerance, impaired standing balance. Patient is very motivated to improve his safety and independence and will continue to make gains with appropriate therapy services- rehab recommended at discharge. Current Level of Function Impacting Discharge (ADLs): max assist for LB ADL     Other factors to consider for discharge:          PLAN :  Patient continues to benefit from skilled intervention to address the above impairments. Continue treatment per established plan of care to address goals. Recommend with staff: in chair for meals     Recommend next OT session: bathroom mobility, toilet transfers, bathing/dressing tasks     Recommendation for discharge: (in order for the patient to meet his/her long term goals)  Therapy up to 5 days/week in rehab setting    This discharge recommendation:  Has been made in collaboration with the attending provider and/or case management    IF patient discharges home will need the following DME: patient owns DME required for discharge       SUBJECTIVE:   Patient pleasant and cooperative     OBJECTIVE DATA SUMMARY:   Cognitive/Behavioral Status:  Neurologic State: Alert  Orientation Level: Oriented X4  Cognition: Follows commands             Functional Mobility and Transfers for ADLs:  Bed Mobility:  Supine to Sit: Contact guard assistance;Minimum assistance; Additional time  Sit to Supine: Minimum assistance  Scooting: Contact guard assistance;Minimum assistance; Additional time    Transfers:  Sit to Stand: Assist x1;Minimum assistance (from elevated surface)  Functional Transfers  Bathroom Mobility: Minimum assistance  Cues: Physical assistance; Tactile cues provided;Verbal cues provided       Balance:  Sitting: Impaired; Without support  Sitting - Static: Good (unsupported)  Sitting - Dynamic: Fair (occasional) (limited dynamic / reaching out of base of support)  Standing: Impaired; With support  Standing - Static: Good;Constant support  Standing - Dynamic : Fair;Constant support    ADL Intervention:     Patient instructed in ADL related mobility- ambulated to bathroom area, with instruction in safe RW mgmt during 360 turns and side stepping. CGA required for safety, cues for standing rest breaks and to widen stance. A great deal of additional time is required due to shuffling gait. Educated patient on home safety and fall risk reduction related to his current mobility style     Pain:  No complaints     Activity Tolerance:   Good and requires rest breaks    After treatment patient left in no apparent distress:   Supine in bed, Call bell within reach, and Side rails x 3    COMMUNICATION/COLLABORATION:   The patients plan of care was discussed with: Registered nurse.      Ayanna Whitman OT  Time Calculation: 15 mins

## 2023-04-18 NOTE — PROGRESS NOTES
[address] [facility] Adult  Hospitalist Group                                                                                          Hospitalist Progress Note  Gabriele Escudero NP  Answering service: [phone] from in house phone        Date of Service:  2023  NAME:  Cary Curry  :  1950  MRN:  [id number]       Admission Summary:   Per H&P, Cary Curry is a 71 y.o. 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 He was hospitalized 10/17/2022 to 10/23/2022 with diagnosis of Enterococcus/Pseudomonas UTI, urinary retention requiring indwelling Vazquez catheter, COVID-19 virus infection. Patient was hospitalized 2020 to 2022 with diagnosis of eroding penile implant, possible infection, requiring removal of penile prosthesis 2022. Patient notes he has been hospitalized since those admissions to other facilities. Records not available for review. He notes that he lives alone, has difficulty walking even uses a roller walker, has worsening right-sided weakness compared to his baseline, and is unable to care for himself. Symptoms noted are severe, profound, constant, without specific alleviating factors. There is no reported fall, head or neck trauma. He went to [facility] ED for 15 2023.   Work-up included CT head without IV contrast which showed chronic left cerebral infarct but no acute intracranial abnormalities. UA showed large leukocyte esterases, > 100 WBC, 5-10 RBC, 2+ bacteria, small blood. ED requested transfer to [facility].       Interval history / Subjective:     I saw the patient this morning on rounds. No complaints the moment of the encounter       Assessment & Plan:          UTI (urinary tract infection) CAUTI  History of removal of penile prosthesis  Chronic urinary retention with chronic indwelling urinary catheter  Catheter has been changed  Urine culture with E. coli sensitive to ceftriaxone  Blood cultures NGTD  Continuing ceftriaxone         2. Generalized weakness/ground-level fall       History of stroke withl right-sided weakness  History of stroke as well as current infectious process  PT/OT  Radiographs without fracture  Patient has had old stroke with right-sided hemiparesis. Has been so far today seen by Occupational Therapy with total score of 20/100 Barthel index, recommending SNF. Physical therapy yet to see       4. Atrial fibrillation (A-fib)  Heart rate currently controlled  Continue diltiazem  Continue apixaban            5.  Normocytic normochromic anemia  Hemoglobin yesterday 11.1 however it appears his baseline is in the 9 range  10.0 today  , folate-8.4 Fe-36  Likely anemia of chronic disease      6. Anxiety  Medication list reviewed. On  not currently on gabapentin. Has not filled temazepam in 1 months. Continuing melatonin and mirtazapine         7. Essential hypertension  Blood pressure currently controlled  Continuing diltiazem  Continuing metoprolol       8. Hyperlipidemia  Continuing atorvastatin       9. Obesity  -BMI 35.36 kg/M2  Counseled on Healthy dietary choices       10.   Diabetes  -listed in chart records  -last HgbA1c = 5.3% on 11/3/22   A1c 5.4  Sugar currently controlled, will consider adding sliding scale insulin if becomes elevated         11. Depression. Able  Continuing mirtazapine  Continue bupropion     12. Rash  -involving groin/ perineum/ proximal thighs  -order Nystatin powder, apply to rash BID          Code status: Full  Prophylaxis: Apixaban  Care Plan discussed with: Patient, nurse  Anticipated Disposition: Discharge in a day or two, SNF  Inpatient  Cardiac monitoring: na     Hospital Problems  Date Reviewed: 10/12/2022            Codes Class Noted POA    Weakness ICD-10-CM: R53.1  ICD-9-CM: 780.79  4/15/2023 Unknown         Social Determinants of Health     Tobacco Use: Medium Risk    Smoking Tobacco Use: Former    Smokeless Tobacco Use: Never    Passive Exposure: Not on file   Alcohol Use: Not At Risk    Frequency of Alcohol Consumption: Never    Average Number of Drinks: Patient does not drink    Frequency of Binge Drinking: Never   Financial Resource Strain: Low Risk     Difficulty of Paying Living Expenses: Not very hard   Food Insecurity: No Food Insecurity    Worried About Running Out of Food in the Last Year: Never true    Ran Out of Food in the Last Year: Never true   Transportation Needs: No Transportation Needs    Lack of Transportation (Medical): No    Lack of Transportation (Non-Medical):  No   Physical Activity: Inactive    Days of Exercise per Week: 0 days    Minutes of Exercise per Session: 0 min   Stress: Stress Concern Present    Feeling of Stress : Rather much   Social Connections: Socially Isolated    Frequency of Communication with Friends and Family: More than three times a week    Frequency of Social Gatherings with Friends and Family: More than three times a week    Attends Zoroastrianism Services: Never    Active Member of Clubs or Organizations: No    Attends Club or Organization Meetings: Never    Marital Status:    Intimate Partner Violence: Not At Risk    Fear of Current or Ex-Partner: No    Emotionally Abused: No    Physically Abused: No    Sexually Abused: No   Depression: Not at risk    PHQ-2 Score: 0   Housing Stability: Low Risk     Unable to Pay for Housing in the Last Year: No    Number of Places Lived in the Last Year: 2    Unstable Housing in the Last Year: No       Review of Systems:   A comprehensive review of systems was negative except for that written in the HPI. Vital Signs:    Last 24hrs VS reviewed since prior progress note. Most recent are:  Visit Vitals  /65 (BP 1 Location: Left upper arm, BP Patient Position: At rest)   Pulse 61   Temp 97.6 °F (36.4 °C)   Resp 18   Ht 5' 11\" (1.803 m)   Wt 109.8 kg (242 lb 1 oz)   SpO2 96%   BMI 33.76 kg/m²         Intake/Output Summary (Last 24 hours) at 4/18/2023 0813  Last data filed at 4/18/2023 0964  Gross per 24 hour   Intake --   Output 2175 ml   Net -2175 ml          Physical Examination:     I had a face to face encounter with this patient and independently examined them on 4/18/2023 as outlined below:          General : no acute distress,   HEENT:NCAT moist mucus membrane,  Neck: supple, no JVD, no meningeal signs  Chest: Clear to auscultation bilaterally   CVS: S1 S2 heard, Capillary refill less than 2 seconds  Abd: soft/ non tender, non distended, BS physiological,   Ext: no clubbing, no cyanosis, no edema, brisk 2+ DP pulses  Neuro/Psych: pleasant mood and affect, MOY EOMI  Skin: warm     Data Review:    Review and/or order of clinical lab test  Review and/or order of tests in the radiology section of CPT      I have personally and independently reviewed all pertinent labs, diagnostic studies, imaging, and have provided independent interpretation of the same.      Labs:     Recent Labs     04/17/23  0128 04/15/23  1456   WBC 8.4 9.2   HGB 10.0* 11.1*   HCT 31.7* 35.3*    251       Recent Labs     04/17/23  0128 04/15/23  1456    136   K 3.7 4.7    101   CO2 25 28   BUN 16 19   CREA 0.73 0.96   GLU 86 96   CA 8.5 9.1       No results for input(s): ALT, AP, TBIL, TBILI, TP, ALB, GLOB, GGT, AML, LPSE in the last 72 hours. No lab exists for component: SGOT, GPT, AMYP, HLPSE  No results for input(s): INR, PTP, APTT, INREXT, INREXT in the last 72 hours. Recent Labs     04/17/23  0128   TIBC 199*   PSAT 18*        Lab Results   Component Value Date/Time    Folate 8.4 04/17/2023 01:28 AM      No results for input(s): PH, PCO2, PO2 in the last 72 hours. No results for input(s): CPK, CKNDX, TROIQ in the last 72 hours. No lab exists for component: CPKMB  Lab Results   Component Value Date/Time    Cholesterol, total 146 09/15/2022 02:40 AM    HDL Cholesterol 53 09/15/2022 02:40 AM    LDL, calculated 65 09/15/2022 02:40 AM    Triglyceride 140 09/15/2022 02:40 AM    CHOL/HDL Ratio 2.8 09/15/2022 02:40 AM     Lab Results   Component Value Date/Time    Glucose (POC) 124 (H) 12/20/2022 11:11 AM    Glucose (POC) 97 12/20/2022 06:49 AM    Glucose (POC) 128 (H) 12/19/2022 08:51 PM    Glucose (POC) 170 (H) 12/19/2022 04:21 PM    Glucose (POC) 149 (H) 12/19/2022 11:09 AM     Lab Results   Component Value Date/Time    Color YELLOW/STRAW 04/15/2023 02:36 PM    Appearance CLEAR 04/15/2023 02:36 PM    Specific gravity 1.010 04/15/2023 02:36 PM    Specific gravity 1.013 10/18/2022 01:01 AM    pH (UA) 7.0 04/15/2023 02:36 PM    Protein Negative 04/15/2023 02:36 PM    Glucose Negative 04/15/2023 02:36 PM    Ketone Negative 04/15/2023 02:36 PM    Bilirubin Negative 04/15/2023 02:36 PM    Urobilinogen 1.0 04/15/2023 02:36 PM    Nitrites Negative 04/15/2023 02:36 PM    Leukocyte Esterase LARGE (A) 04/15/2023 02:36 PM    Epithelial cells FEW 04/15/2023 02:36 PM    Bacteria 2+ (A) 04/15/2023 02:36 PM    WBC >100 (H) 04/15/2023 02:36 PM    RBC 5-10 04/15/2023 02:36 PM       Notes reviewed from all clinical/nonclinical/nursing services involved in patient's clinical care. Care coordination discussions were held with appropriate clinical/nonclinical/ nursing providers based on care coordination needs.          Patients current active Medications were reviewed, considered, added and adjusted based on the clinical condition today. Home Medications were reconciled to the best of my ability given all available resources at the time of admission. Route is PO if not otherwise noted.       Admission Status:[id number]:::1}      Medications Reviewed:     Current Facility-Administered Medications   Medication Dose Route Frequency    nystatin (MYCOSTATIN) 100,000 unit/gram powder   Topical BID    sodium chloride (NS) flush 5-40 mL  5-40 mL IntraVENous Q8H    sodium chloride (NS) flush 5-40 mL  5-40 mL IntraVENous PRN    acetaminophen (TYLENOL) tablet 650 mg  650 mg Oral Q6H PRN    Or    acetaminophen (TYLENOL) suppository 650 mg  650 mg Rectal Q6H PRN    polyethylene glycol (MIRALAX) packet 17 g  17 g Oral DAILY PRN    ondansetron (ZOFRAN ODT) tablet 4 mg  4 mg Oral Q8H PRN    Or    ondansetron (ZOFRAN) injection 4 mg  4 mg IntraVENous Q6H PRN    cefTRIAXone (ROCEPHIN) 2 g in 0.9% sodium chloride 20 mL IV syringe  2 g IntraVENous Q24H    allopurinoL (ZYLOPRIM) tablet 100 mg  100 mg Oral DAILY    apixaban (ELIQUIS) tablet 5 mg  5 mg Oral BID    atorvastatin (LIPITOR) tablet 80 mg  80 mg Oral QHS    balsam peru-castor oiL (VENELEX) ointment   Topical BID    buPROPion SR (WELLBUTRIN SR) tablet 100 mg  100 mg Oral BID    dilTIAZem ER (CARDIZEM CD) capsule 120 mg  120 mg Oral Q24H    ferrous sulfate tablet 325 mg  325 mg Oral BID    famotidine (PEPCID) tablet 20 mg  20 mg Oral Q12H    [Held by provider] gabapentin (NEURONTIN) capsule 300 mg  300 mg Oral QHS    finasteride (PROSCAR) tablet 5 mg  5 mg Oral DAILY    magnesium oxide (MAG-OX) tablet 400 mg  400 mg Oral QHS    melatonin tablet 3 mg  3 mg Oral QHS PRN    metoprolol succinate (TOPROL-XL) XL tablet 75 mg  75 mg Oral DAILY    mirtazapine (REMERON) tablet 7.5 mg  7.5 mg Oral QHS    tamsulosin (FLOMAX) capsule 0.4 mg  0.4 mg Oral DAILY ______________________________________________________________________  EXPECTED LENGTH OF STAY: 2d 19h  ACTUAL LENGTH OF STAY:          [address], NP

## 2023-04-18 NOTE — PROGRESS NOTES
LOREN:  1. RUR-15%  2. SNF referrals pending- sent to [facility], and [facility]. 3. BLS transport. 4. Patient will need insurance auth. Cm met with patient and his friend at bedside, they reviewed SNF list and provided 3 choices. Auth will need to be started. Cm faxed [address] [facility] paperwork to [phone]. Cm to follow.     Eduarda Chambers [facility]

## 2023-04-18 NOTE — PROGRESS NOTES
Problem: Mobility Impaired (Adult and Pediatric)  Goal: *Acute Goals and Plan of Care (Insert Text)  Description: FUNCTIONAL STATUS PRIOR TO ADMISSION: Pt was independent without assistive device - living alone until CVA 9/2022. Pt initially went to [facility], also with hospital admissions over past year with SNF stays. Pt states he was able to amb with RW and CGA from sister/[id number] - short distance home only - no outside surfaces. Pt reports has not been OOB x 1 month secondary to social situation - sister no longer able to provide support/assistance. HOME SUPPORT PRIOR TO ADMISSION: As above, pt will not be able to go back to sister's home. Pt's close friend present - currently working with pt to get Medical POA - also working with [email or web address] regarding resources for additional rehabilitation. Physical Therapy Goals  Initiated 4/17/2023  1. Patient will move from supine to sit and sit to supine  in bed with supervision/set-up within 7 day(s). 2.  Patient will transfer from bed to chair and chair to bed - lateral scoot to drop arm chair - with supervision/set-up using the least restrictive device within 7 day(s). 3.  Patient will perform sit to stand with minimal assistance/contact guard assist within 7 day(s) - from elevated bed height. 4.  Patient will ambulate with minimal assistance/contact guard assist for > 50 feet with the least restrictive device within 7 day(s). Outcome: Progressing Towards Goal    PHYSICAL THERAPY TREATMENT  Patient: Erasmo Russo (75 y.o. male)  Date: 4/18/2023  Diagnosis: Weakness [R53.1] <principal problem not specified>      Precautions: Fall, Bed/Chair Alarm   Chart, physical therapy assessment, plan of care and goals were reviewed. ASSESSMENT  Patient continues with skilled PT services and is progressing towards goals. Patient is highly motivated to regain his functional independence.  Patient demonstrated improvement in his ability to perform bed mobility tasks and sit <> stand transfers (still requires an increased surface height, however, to successfully complete). Patient participated in ambulation trials (30 ft, twice) with rolling walker, minimal assist, and verbal cuing for correction of near-tandem MICHELLE. Noted delayed forward advancement of R LE (CVA with R weakness 9/2022). Continue to recommend rehab through [address] [facility] (preferably IPR vs SNF). Current Level of Function Impacting Discharge (mobility/balance): Min/Contact guard assist supine <> sit getting up from left side of bed; sit <> stand from high surfaces min assist x 1; Short distance ambulation Min/Contact guard assist + RW    Other factors to consider for discharge: Intrinsically motivated, Increased fall risk, Functional decline from baseline, A x 1 for mobility          PLAN :  Patient continues to benefit from skilled intervention to address the above impairments. Continue treatment per established plan of care. to address goals. Recommendation for discharge: (in order for the patient to meet his/her long term goals)  Therapy 3 hours per day 5-7 days per week vs. SNF    This discharge recommendation:  Has been made in collaboration with the attending provider and/or case management    IF patient discharges home will need the following DME: to be determined (TBD)       SUBJECTIVE:   Patient stated I'd like to walk again later if possible.     OBJECTIVE DATA SUMMARY:   Critical Behavior:  Neurologic State: Alert  Orientation Level: Oriented X4  Cognition: Follows commands  Safety/Judgement: Awareness of environment  Functional Mobility Training:  Bed Mobility:     Supine to Sit: Contact guard assistance;Minimum assistance; Additional time     Scooting: Contact guard assistance;Minimum assistance; Additional time        Transfers:  Sit to Stand: Assist x1;Minimum assistance (from elevated surface)  Stand to Sit: Assist x1;Minimum assistance (Assist needed for controlled descent to chair; + Safety cues for adequate alignment)                             Balance:  Sitting: Impaired; Without support  Sitting - Static: Good (unsupported)  Sitting - Dynamic: Fair (occasional) (limited dynamic / reaching out of base of support)  Standing: Impaired; With support  Standing - Static: Good;Constant support (+ RW)  Standing - Dynamic : Fair;Constant support (+ RW)  Ambulation/Gait Training:  Distance (ft): 30 Feet (ft) (twice)  Assistive Device: Gait belt;Walker, rolling (Pt able to maintain R hand  on walker 95% of the time)  Ambulation - Level of Assistance: Assist x1;Contact guard assistance;Minimal assistance; Adaptive equipment; Additional time (+ RW)        Gait Abnormalities: Decreased step clearance;Shuffling gait        Base of Support: Center of gravity altered;Narrowed (Cues to correct near-tandem base of support)  Stance: Right decreased  Speed/Geovanna: Pace decreased (<100 feet/min); Shuffled (Delayed R foot advancement)  Step Length: Left shortened;Right shortened  Swing Pattern: Right asymmetrical       Pain Rating:  Patient declined pain    Activity Tolerance:   Good    After treatment patient left in no apparent distress:   Sitting in chair, Call bell within reach, and Caregiver / family present    COMMUNICATION/COLLABORATION:   The patients plan of care was discussed with: Registered nurse.      Radu Jerez, PT, DPT   Time Calculation: 32 mins

## 2023-04-19 ENCOUNTER — PATIENT OUTREACH (OUTPATIENT)
Dept: CASE MANAGEMENT | Age: 73
End: 2023-04-19

## 2023-04-19 PROCEDURE — 74011250637 HC RX REV CODE- 250/637: Performed by: FAMILY MEDICINE

## 2023-04-19 PROCEDURE — 97535 SELF CARE MNGMENT TRAINING: CPT

## 2023-04-19 PROCEDURE — 65270000032 HC RM SEMIPRIVATE

## 2023-04-19 PROCEDURE — 74011000250 HC RX REV CODE- 250: Performed by: FAMILY MEDICINE

## 2023-04-19 PROCEDURE — 74011250636 HC RX REV CODE- 250/636: Performed by: FAMILY MEDICINE

## 2023-04-19 PROCEDURE — 74011000250 HC RX REV CODE- 250: Performed by: NURSE PRACTITIONER

## 2023-04-19 RX ADMIN — Medication: at 18:17

## 2023-04-19 RX ADMIN — FAMOTIDINE 20 MG: 20 TABLET ORAL at 09:42

## 2023-04-19 RX ADMIN — SODIUM CHLORIDE, PRESERVATIVE FREE 10 ML: 5 INJECTION INTRAVENOUS at 21:16

## 2023-04-19 RX ADMIN — FINASTERIDE 5 MG: 5 TABLET, FILM COATED ORAL at 09:43

## 2023-04-19 RX ADMIN — FERROUS SULFATE TAB 325 MG (65 MG ELEMENTAL FE) 325 MG: 325 (65 FE) TAB at 18:16

## 2023-04-19 RX ADMIN — BUPROPION HYDROCHLORIDE 100 MG: 100 TABLET, FILM COATED, EXTENDED RELEASE ORAL at 09:44

## 2023-04-19 RX ADMIN — FAMOTIDINE 20 MG: 20 TABLET ORAL at 21:18

## 2023-04-19 RX ADMIN — ATORVASTATIN CALCIUM 80 MG: 40 TABLET, FILM COATED ORAL at 21:18

## 2023-04-19 RX ADMIN — SODIUM CHLORIDE 2 G: 9 INJECTION INTRAMUSCULAR; INTRAVENOUS; SUBCUTANEOUS at 21:18

## 2023-04-19 RX ADMIN — Medication 400 MG: at 21:18

## 2023-04-19 RX ADMIN — Medication: at 09:44

## 2023-04-19 RX ADMIN — APIXABAN 5 MG: 5 TABLET, FILM COATED ORAL at 09:44

## 2023-04-19 RX ADMIN — TAMSULOSIN HYDROCHLORIDE 0.4 MG: 0.4 CAPSULE ORAL at 09:42

## 2023-04-19 RX ADMIN — APIXABAN 5 MG: 5 TABLET, FILM COATED ORAL at 18:16

## 2023-04-19 RX ADMIN — BUPROPION HYDROCHLORIDE 100 MG: 100 TABLET, FILM COATED, EXTENDED RELEASE ORAL at 18:16

## 2023-04-19 RX ADMIN — FERROUS SULFATE TAB 325 MG (65 MG ELEMENTAL FE) 325 MG: 325 (65 FE) TAB at 09:43

## 2023-04-19 RX ADMIN — Medication 3 MG: at 21:18

## 2023-04-19 RX ADMIN — ALLOPURINOL 100 MG: 100 TABLET ORAL at 09:43

## 2023-04-19 RX ADMIN — SODIUM CHLORIDE, PRESERVATIVE FREE 10 ML: 5 INJECTION INTRAVENOUS at 07:20

## 2023-04-19 RX ADMIN — MIRTAZAPINE 7.5 MG: 15 TABLET, FILM COATED ORAL at 21:18

## 2023-04-19 RX ADMIN — SODIUM CHLORIDE, PRESERVATIVE FREE 10 ML: 5 INJECTION INTRAVENOUS at 18:17

## 2023-04-19 NOTE — PROGRESS NOTES
Problem: Self Care Deficits Care Plan (Adult)  Goal: *Therapy Goal (Edit Goal, Insert Text)  Description:  FUNCTIONAL STATUS PRIOR TO ADMISSION: The patient was functional at the wheelchair level and required minimal assistance for stand pivot transfers to the chair. He had been living with his sister and TERESE, however unable to care for him at current level. Given prior CVA, presents with R sided weakness at baseline - progressing. He went back to his home in [place] and attempted to get out of the taxi, fell, and brought to ED. Has significant hx of recurrent hospitalizations, as well as SNF placements. HOME SUPPORT: The patient lived with his sister and TERESE and required maximal assistance for ADLs, min assistance SPT. Occupational Therapy Goals  Initiated 4/17/2023  1. Patient will perform bathing seated level with supervision/set-up within 7 day(s). 2.  Patient will perform grooming standing level with supervision/set-up within 7 day(s). 3.  Patient will perform lower body dressing with supervision/set-up within 7 day(s). 4.  Patient will perform toilet transfers with supervision/set-up within 7 day(s). 5.  Patient will perform all aspects of toileting with supervision/set-up within 7 day(s). 6.  Patient will participate in upper extremity therapeutic exercise/activities with supervision/set-up for 15 minutes within 7 day(s). 7.  Patient will utilize energy conservation techniques during functional activities with verbal cues within 7 day(s). Outcome: Progressing Towards Goal   OCCUPATIONAL THERAPY TREATMENT  Patient: Remington Rodriguez (68 y.o. male)  Date: 4/19/2023  Diagnosis: Weakness [R53.1] <principal problem not specified>      Precautions: Fall  Chart, occupational therapy assessment, plan of care, and goals were reviewed. ASSESSMENT  Patient continues with skilled OT services and is progressing towards goals.   Patient continues to be impacted by increased tone in RUE/RLE,  decreased functional use in RUE, R shoulder pain, RLE weakness, impaired standing tolerance/balance. Patient continues to be highly motivated toward his recovery and is eager to engage in therapy services. With appropriate ongoing therapy additional gains are anticipated. Current Level of Function Impacting Discharge (ADLs): mod assist for UB dressing, Max assist for LB dressing    Other factors to consider for discharge:          PLAN :  Patient continues to benefit from skilled intervention to address the above impairments. Continue treatment per established plan of care to address goals. Recommend with staff: in chair for meals     Recommend next OT session: bathroom mobility- place BSC over toilet first, bathing/dressing tasks     Recommendation for discharge: (in order for the patient to meet his/her long term goals)  Therapy up to 5 days/week in rehab setting    This discharge recommendation:  Has been made in collaboration with the attending provider and/or case management    IF patient discharges home will need the following DME: TBD       SUBJECTIVE:   Patient pleasant and cooperative     OBJECTIVE DATA SUMMARY:   Cognitive/Behavioral Status:  Neurologic State: Alert     Cognition: Follows commands             Functional Mobility and Transfers for ADLs:  Bed Mobility:  Supine to Sit: Supervision;Assist x1;Additional time; Adaptive equipment    Transfers:  Sit to Stand: Minimum assistance;Assist x1;Additional time; Adaptive equipment  Functional Transfers  Adaptive Equipment: Walker (comment)   Instructed in transfer techniques with good results, ongoing instruction needed for improved carry over     Balance:  Sitting - Static: Good (unsupported)  Sitting - Dynamic: Good (unsupported)  Standing: Impaired  Standing - Static: Constant support;Good  Standing - Dynamic : Fair;Constant support    ADL Intervention:            Lower Body Dressing Assistance  Slip on Shoes with Back: Maximum assistance  Position Performed: Seated edge of bed;Bending forward method     Educated on strategies to improve ADL at distal LE- patient identified long handled shoe horn as device which may be of assistance. Patient's reach down LE is limited to area of ankles          Therapeutic Exercises:   Patient instructed in self PROM of RUE shoulder on tabletop. Educated on joint protection strategies and pain mgmt techniques. good carry over instruction noted. Pain:  No lasting pain     Activity Tolerance:   Good and requires rest breaks    After treatment patient left in no apparent distress:   Sitting in chair and Call bell within reach    COMMUNICATION/COLLABORATION:   The patients plan of care was discussed with: Registered nurse.      Houston Russo OT  Time Calculation: 26 mins

## 2023-04-19 NOTE — PROGRESS NOTES
Goals Addressed                   This Visit's Progress     Attends follow-up appointments as directed. 3/3/23 - Spoke at length with patient and patient's sister. Advised that the patient needs follow-up with neurology and PCP (saw urology last week). Phone numbers provided for patient's sister to schedule appointments. Sister states that she may schedule virtual appointments due to patient's mobility status and due to distance. Patient now lives in [facility]. Advised patient to discuss home health services with PCP - will also forward message to PCP. Patient also has an appointment on Monday to establish with [facility]. [facility] providing wheelchair transportation. Discussed medications. Patient's sister reports that they had a hard time getting all of his prescriptions after he discharged from [address] [place] [address], but they now have everything on the discharge med list. Patient's sister reports that the patient is taking all medications as prescribed per discharge med list. ACM will follow-up next week regarding appointments being scheduled. CAROLYN    3/8/23 - Spoke with patient and patient's sister. Patient attended appointment on Monday at the [place]. Reported that they are getting him set up with rehab services and he may be going to a facility for rehab. They were unable to give details about the facility he will be going to. Encouraged patient and sister to keep ACM updated so that this ACM can assist as needed. Neurology appointment was scheduled in June (reports that is the earliest they could get). Patient's sister will be calling this evening or tomorrow AM to set up an appointment with Dr. Genette Lanes. Declines additional needs at this time. ACM to follow-up in 1 week for update on therapy services. CAROLYN    3/14/23 - Patient reported that he has not received any updates on [facility] services. Requested ACM reach out to , Kristy Florian, at the [place].  ACM spoke with rKisty Florian who reported that he was approved for skilled care 3/15/23-9/15/23 and the agency is [facility] in [place]. Called [facility] who reported that they have not received any orders yet. Dashawn Desai updated and she will reach out to the individual in charge of sending the orders. Dashawn Desai with reach out to the patient with updates. Patient also scheduled appointment with Dr. Faheem Chavez on 4/4. ACM advised that Dr. Faheem Chavez can order home health if needed. Patient will attend appointment. CAROLYN    3/21/23 - Patient receiving [facility] nursing and OT through [facility], however, they do not have a physical therapist on staff. Discussed with patient's sister that outpatient PT may be the best option if he has transportation. Call placed to , Dashawn Desai, at the [place] to discuss options. Will also update Dr. Faheem Chavez. Patient has appointment with Dr. Faheem Chavez on 4/4. Recommended patient's sister accompany him to the appointment. CAROLYN    4/3/23 - Patient rescheduled appointment with Dr. Faheem Chavez to 5/18 due to transportation. PT has been coming out to see the patient. Per patients sister, she was advised that the patient would also be getting a personal care aide 3 days a week for 4 hours. They have not heard from anyone. Contact information given for , Dashawn Desai, at [place]. Patient's sister will reach out for information. CAROLYN    4/5/23 - Received call from patient's sister stating that the [place] is no longer going to be providing an aide and the are not sure what to do now. Patient's sister feels that he needs placement in a LTC facility or needs a 24/7 caregiver. She and her  are not able to provide the level of care that the patient needs. Sending  referral. Morelia Pacheco    4/19/23 - Noted that patient was admitted to [facility] on 4/15. Reviewed inpatient notes. Plan to discharge to a SNF when ready. ACM will resume services after discharge.  CAROLYN

## 2023-04-19 NOTE — PROGRESS NOTES
LOREN:  1. RUR-15%  2. SNF referrals, [facility] accepted and [facility] pending. Additional referral sent to [facility] and rehab. 3. S transport. Cm spoke with patient friend, Ahmet Ornelas [phone]. He would like another referral sent to [facility] and rehab per patient request. This task was completed through [address] [place] [address],[facility]. CM to follow.       Wisam Samuels, [facility]

## 2023-04-19 NOTE — PROGRESS NOTES
[facility]  Hospitalist Group                                                                                          Hospitalist Progress Note  Amol Cota NP  Answering service: [phone] OR [phone] from in house phone        Date of Service:  2023  NAME:  Makayla Glass  :  1950  MRN:  [id number]       Admission Summary:   Per H&P, Makayla Glass is a 71 y.o. 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 He was hospitalized 10/17/2022 to 10/23/2022 with diagnosis of Enterococcus/Pseudomonas UTI, urinary retention requiring indwelling Vazquez catheter, COVID-19 virus infection. Patient was hospitalized 2020 to 2022 with diagnosis of eroding penile implant, possible infection, requiring removal of penile prosthesis 2022. Patient notes he has been hospitalized since those admissions to other facilities. Records not available for review. He notes that he lives alone, has difficulty walking even uses a roller walker, has worsening right-sided weakness compared to his baseline, and is unable to care for himself. Symptoms noted are severe, profound, constant, without specific alleviating factors. There is no reported fall, head or neck trauma. He went to [facility] ED for 15 2023.   Work-up included CT head without IV contrast which showed chronic left cerebral infarct but no acute intracranial abnormalities. UA showed large leukocyte esterases, > 100 WBC, 5-10 RBC, 2+ bacteria, small blood. ED requested transfer to [facility].       Interval history / Subjective:     I saw the patient this morning on rounds. No complaints moment of the encounter       Assessment & Plan:          UTI (urinary tract infection) CAUTI  History of removal of penile prosthesis  Chronic urinary retention with chronic indwelling urinary catheter  Catheter has been changed  Urine culture with E. coli sensitive to ceftriaxone  Blood cultures NGTD  Continuing ceftriaxone         2. Generalized weakness/ground-level fall       History of stroke withl right-sided weakness  History of stroke as well as current infectious process  PT/OT  Radiographs without fracture  Patient has had old stroke with right-sided hemiparesis. Has been so far today seen by Occupational Therapy with total score of 20/100 Barthel index, recommending SNF. Physical therapy also SNF       4. Atrial fibrillation (A-fib)  Heart rate currently controlled  Continue diltiazem  Continue apixaban            5.  Normocytic normochromic anemia  Hemoglobin yesterday 11.1 however it appears his baseline is in the 9 range  10.0 today  , folate-8.4 Fe-36  Likely anemia of chronic disease      6. Anxiety  Medication list reviewed. On  not currently on gabapentin. Has not filled temazepam in 1 months. Continuing melatonin and mirtazapine         7. Essential hypertension  Blood pressure currently controlled  Continuing diltiazem  Continuing metoprolol       8. Hyperlipidemia  Continuing atorvastatin       9. Obesity  -BMI 35.36 kg/M2  Counseled on Healthy dietary choices       10.   Diabetes  -listed in chart records  -last HgbA1c = 5.3% on 11/3/22   A1c 5.4  Sugar currently controlled, will consider adding sliding scale insulin if becomes elevated         11. Depression. Able  Continuing mirtazapine  Continue bupropion     12. Rash  -involving groin/ perineum/ proximal thighs  -order Nystatin powder, apply to rash BID          Code status: Full  Prophylaxis: Apixaban  Care Plan discussed with: Patient, nurse, CM  Anticipated Disposition:  SNF hopefully today versus tomorrow  Inpatient  Cardiac monitoring: na     Hospital Problems  Date Reviewed: 10/12/2022            Codes Class Noted POA    Weakness ICD-10-CM: R53.1  ICD-9-CM: 780.79  4/15/2023 Unknown         Social Determinants of Health     Tobacco Use: Medium Risk    Smoking Tobacco Use: Former    Smokeless Tobacco Use: Never    Passive Exposure: Not on file   Alcohol Use: Not At Risk    Frequency of Alcohol Consumption: Never    Average Number of Drinks: Patient does not drink    Frequency of Binge Drinking: Never   Financial Resource Strain: Low Risk     Difficulty of Paying Living Expenses: Not very hard   Food Insecurity: No Food Insecurity    Worried About Running Out of Food in the Last Year: Never true    Ran Out of Food in the Last Year: Never true   Transportation Needs: No Transportation Needs    Lack of Transportation (Medical): No    Lack of Transportation (Non-Medical):  No   Physical Activity: Inactive    Days of Exercise per Week: 0 days    Minutes of Exercise per Session: 0 min   Stress: Stress Concern Present    Feeling of Stress : Rather much   Social Connections: Socially Isolated    Frequency of Communication with Friends and Family: More than three times a week    Frequency of Social Gatherings with Friends and Family: More than three times a week    Attends Synagogue Services: Never    Active Member of Clubs or Organizations: No    Attends Club or Organization Meetings: Never    Marital Status:    Intimate Partner Violence: Not At Risk    Fear of Current or Ex-Partner: No    Emotionally Abused: No    Physically Abused: No    Sexually Abused: No   Depression: Not at risk    PHQ-2 Score: 0   Housing Stability: Low Risk     Unable to Pay for Housing in the Last Year: No    Number of Places Lived in the Last Year: 2    Unstable Housing in the Last Year: No       Review of Systems:   A comprehensive review of systems was negative except for that written in the HPI. Vital Signs:    Last 24hrs VS reviewed since prior progress note. Most recent are:  Visit Vitals  /68 (BP 1 Location: Left upper arm, BP Patient Position: At rest)   Pulse 62   Temp 97.5 °F (36.4 °C)   Resp 18   Ht 5' 11\" (1.803 m)   Wt 109.8 kg (242 lb 1 oz)   SpO2 96%   BMI 33.76 kg/m²         Intake/Output Summary (Last 24 hours) at 4/19/2023 0839  Last data filed at 4/19/2023 0636  Gross per 24 hour   Intake --   Output 1750 ml   Net -1750 ml          Physical Examination:     I had a face to face encounter with this patient and independently examined them on 4/19/2023 as outlined below:          General : no acute distress,   HEENT:NCAT moist mucus membrane,  Neck: supple, no JVD, no meningeal signs  Chest: Clear to auscultation bilaterally   CVS: S1 S2 heard, Capillary refill less than 2 seconds  Abd: soft/ non tender, non distended, BS physiological,   Ext: no clubbing, no cyanosis, no edema, brisk 2+ DP pulses  Neuro/Psych: pleasant mood and affect, MOY EOMI  Skin: warm     Data Review:    Review and/or order of clinical lab test  Review and/or order of tests in the radiology section of CPT      I have personally and independently reviewed all pertinent labs, diagnostic studies, imaging, and have provided independent interpretation of the same. Labs:     Recent Labs     04/17/23  0128   WBC 8.4   HGB 10.0*   HCT 31.7*          Recent Labs     04/17/23  0128      K 3.7      CO2 25   BUN 16   CREA 0.73   GLU 86   CA 8.5       No results for input(s): ALT, AP, TBIL, TBILI, TP, ALB, GLOB, GGT, AML, LPSE in the last 72 hours.     No lab exists for component: SGOT, GPT, AMYP, HLPSE  No results for input(s): INR, PTP, APTT, INREXT, INREXT in the last 72 hours. Recent Labs     04/17/23  0128   TIBC 199*   PSAT 18*        Lab Results   Component Value Date/Time    Folate 8.4 04/17/2023 01:28 AM      No results for input(s): PH, PCO2, PO2 in the last 72 hours. No results for input(s): CPK, CKNDX, TROIQ in the last 72 hours. No lab exists for component: CPKMB  Lab Results   Component Value Date/Time    Cholesterol, total 146 09/15/2022 02:40 AM    HDL Cholesterol 53 09/15/2022 02:40 AM    LDL, calculated 65 09/15/2022 02:40 AM    Triglyceride 140 09/15/2022 02:40 AM    CHOL/HDL Ratio 2.8 09/15/2022 02:40 AM     Lab Results   Component Value Date/Time    Glucose (POC) 124 (H) 12/20/2022 11:11 AM    Glucose (POC) 97 12/20/2022 06:49 AM    Glucose (POC) 128 (H) 12/19/2022 08:51 PM    Glucose (POC) 170 (H) 12/19/2022 04:21 PM    Glucose (POC) 149 (H) 12/19/2022 11:09 AM     Lab Results   Component Value Date/Time    Color YELLOW/STRAW 04/15/2023 02:36 PM    Appearance CLEAR 04/15/2023 02:36 PM    Specific gravity 1.010 04/15/2023 02:36 PM    Specific gravity 1.013 10/18/2022 01:01 AM    pH (UA) 7.0 04/15/2023 02:36 PM    Protein Negative 04/15/2023 02:36 PM    Glucose Negative 04/15/2023 02:36 PM    Ketone Negative 04/15/2023 02:36 PM    Bilirubin Negative 04/15/2023 02:36 PM    Urobilinogen 1.0 04/15/2023 02:36 PM    Nitrites Negative 04/15/2023 02:36 PM    Leukocyte Esterase LARGE (A) 04/15/2023 02:36 PM    Epithelial cells FEW 04/15/2023 02:36 PM    Bacteria 2+ (A) 04/15/2023 02:36 PM    WBC >100 (H) 04/15/2023 02:36 PM    RBC 5-10 04/15/2023 02:36 PM       Notes reviewed from all clinical/nonclinical/nursing services involved in patient's clinical care. Care coordination discussions were held with appropriate clinical/nonclinical/ nursing providers based on care coordination needs.          Patients current active Medications were reviewed, considered, added and adjusted based on the clinical condition today. Home Medications were reconciled to the best of my ability given all available resources at the time of admission. Route is PO if not otherwise noted.       Admission Status:[id number]:::1}      Medications Reviewed:     Current Facility-Administered Medications   Medication Dose Route Frequency    nystatin (MYCOSTATIN) 100,000 unit/gram powder   Topical BID    sodium chloride (NS) flush 5-40 mL  5-40 mL IntraVENous Q8H    sodium chloride (NS) flush 5-40 mL  5-40 mL IntraVENous PRN    acetaminophen (TYLENOL) tablet 650 mg  650 mg Oral Q6H PRN    Or    acetaminophen (TYLENOL) suppository 650 mg  650 mg Rectal Q6H PRN    polyethylene glycol (MIRALAX) packet 17 g  17 g Oral DAILY PRN    ondansetron (ZOFRAN ODT) tablet 4 mg  4 mg Oral Q8H PRN    Or    ondansetron (ZOFRAN) injection 4 mg  4 mg IntraVENous Q6H PRN    cefTRIAXone (ROCEPHIN) 2 g in 0.9% sodium chloride 20 mL IV syringe  2 g IntraVENous Q24H    allopurinoL (ZYLOPRIM) tablet 100 mg  100 mg Oral DAILY    apixaban (ELIQUIS) tablet 5 mg  5 mg Oral BID    atorvastatin (LIPITOR) tablet 80 mg  80 mg Oral QHS    balsam peru-castor oiL (VENELEX) ointment   Topical BID    buPROPion SR (WELLBUTRIN SR) tablet 100 mg  100 mg Oral BID    dilTIAZem ER (CARDIZEM CD) capsule 120 mg  120 mg Oral Q24H    ferrous sulfate tablet 325 mg  325 mg Oral BID    famotidine (PEPCID) tablet 20 mg  20 mg Oral Q12H    [Held by provider] gabapentin (NEURONTIN) capsule 300 mg  300 mg Oral QHS    finasteride (PROSCAR) tablet 5 mg  5 mg Oral DAILY    magnesium oxide (MAG-OX) tablet 400 mg  400 mg Oral QHS    melatonin tablet 3 mg  3 mg Oral QHS PRN    metoprolol succinate (TOPROL-XL) XL tablet 75 mg  75 mg Oral DAILY    mirtazapine (REMERON) tablet 7.5 mg  7.5 mg Oral QHS    tamsulosin (FLOMAX) capsule 0.4 mg  0.4 mg Oral DAILY     ______________________________________________________________________  EXPECTED LENGTH OF STAY: 3d 4h  ACTUAL LENGTH OF STAY: 1 Stormy Chamberlain, NP

## 2023-04-20 LAB
BACTERIA SPEC CULT: NORMAL
SERVICE CMNT-IMP: NORMAL

## 2023-04-20 PROCEDURE — 97116 GAIT TRAINING THERAPY: CPT

## 2023-04-20 PROCEDURE — 97530 THERAPEUTIC ACTIVITIES: CPT | Performed by: OCCUPATIONAL THERAPIST

## 2023-04-20 PROCEDURE — 74011250636 HC RX REV CODE- 250/636: Performed by: FAMILY MEDICINE

## 2023-04-20 PROCEDURE — 97530 THERAPEUTIC ACTIVITIES: CPT

## 2023-04-20 PROCEDURE — 74011250637 HC RX REV CODE- 250/637: Performed by: FAMILY MEDICINE

## 2023-04-20 PROCEDURE — 74011000250 HC RX REV CODE- 250: Performed by: NURSE PRACTITIONER

## 2023-04-20 PROCEDURE — 97535 SELF CARE MNGMENT TRAINING: CPT | Performed by: OCCUPATIONAL THERAPIST

## 2023-04-20 PROCEDURE — 74011000250 HC RX REV CODE- 250: Performed by: FAMILY MEDICINE

## 2023-04-20 PROCEDURE — 65270000032 HC RM SEMIPRIVATE

## 2023-04-20 RX ADMIN — Medication 3 MG: at 21:30

## 2023-04-20 RX ADMIN — FERROUS SULFATE TAB 325 MG (65 MG ELEMENTAL FE) 325 MG: 325 (65 FE) TAB at 17:25

## 2023-04-20 RX ADMIN — Medication: at 17:25

## 2023-04-20 RX ADMIN — Medication: at 08:29

## 2023-04-20 RX ADMIN — ALLOPURINOL 100 MG: 100 TABLET ORAL at 08:28

## 2023-04-20 RX ADMIN — NYSTATIN: 100000 POWDER TOPICAL at 08:29

## 2023-04-20 RX ADMIN — BUPROPION HYDROCHLORIDE 100 MG: 100 TABLET, FILM COATED, EXTENDED RELEASE ORAL at 17:24

## 2023-04-20 RX ADMIN — FERROUS SULFATE TAB 325 MG (65 MG ELEMENTAL FE) 325 MG: 325 (65 FE) TAB at 08:27

## 2023-04-20 RX ADMIN — FAMOTIDINE 20 MG: 20 TABLET ORAL at 21:30

## 2023-04-20 RX ADMIN — FAMOTIDINE 20 MG: 20 TABLET ORAL at 08:28

## 2023-04-20 RX ADMIN — MIRTAZAPINE 7.5 MG: 15 TABLET, FILM COATED ORAL at 21:29

## 2023-04-20 RX ADMIN — SODIUM CHLORIDE, PRESERVATIVE FREE 10 ML: 5 INJECTION INTRAVENOUS at 21:33

## 2023-04-20 RX ADMIN — ATORVASTATIN CALCIUM 80 MG: 40 TABLET, FILM COATED ORAL at 21:29

## 2023-04-20 RX ADMIN — BUPROPION HYDROCHLORIDE 100 MG: 100 TABLET, FILM COATED, EXTENDED RELEASE ORAL at 08:28

## 2023-04-20 RX ADMIN — SODIUM CHLORIDE, PRESERVATIVE FREE 10 ML: 5 INJECTION INTRAVENOUS at 03:00

## 2023-04-20 RX ADMIN — TAMSULOSIN HYDROCHLORIDE 0.4 MG: 0.4 CAPSULE ORAL at 08:28

## 2023-04-20 RX ADMIN — FINASTERIDE 5 MG: 5 TABLET, FILM COATED ORAL at 08:27

## 2023-04-20 RX ADMIN — Medication 400 MG: at 21:30

## 2023-04-20 RX ADMIN — APIXABAN 5 MG: 5 TABLET, FILM COATED ORAL at 17:24

## 2023-04-20 RX ADMIN — APIXABAN 5 MG: 5 TABLET, FILM COATED ORAL at 08:27

## 2023-04-20 RX ADMIN — NYSTATIN: 100000 POWDER TOPICAL at 17:25

## 2023-04-20 RX ADMIN — SODIUM CHLORIDE 2 G: 9 INJECTION INTRAMUSCULAR; INTRAVENOUS; SUBCUTANEOUS at 21:30

## 2023-04-20 NOTE — PROGRESS NOTES
[address] [facility] Adult  Hospitalist Group                                                                                          Hospitalist Progress Note  Donal Sol MD  Answering service: [phone] OR 36 from in house phone        Date of Service:  2023  NAME:  Cary Curry  :  1950  MRN:  [id number]       Admission Summary:   Per H&P, Cary Curry is a 71 y.o. 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 with chief complaints of generalized mostly right-sided weakness with inability to care for self. 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 He was hospitalized 10/17/2022 to 10/23/2022 with diagnosis of Enterococcus/Pseudomonas UTI, urinary retention requiring indwelling Vazquez catheter, COVID-19 virus infection. Patient was hospitalized 2020 to 2022 with diagnosis of eroding penile implant, possible infection, requiring removal of penile prosthesis 2022. Patient notes he has been hospitalized since those admissions to other facilities. Records not available for review. He notes that he lives alone, has difficulty walking even uses a roller walker, has worsening right-sided weakness compared to his baseline, and is unable to care for himself. Symptoms noted are severe, profound, constant, without specific alleviating factors. There is no reported fall, head or neck trauma. He went to [facility] ED for 15 2023.   Work-up included CT head without IV contrast which showed chronic left cerebral infarct but no acute intracranial abnormalities. UA showed large leukocyte esterases, > 100 WBC, 5-10 RBC, 2+ bacteria, small blood. ED requested transfer to [facility].       Interval history / Subjective: Follow up CAUTI  No new issues  Comfortably laying in bed   No fever  Assessment & Plan:     UTI (urinary tract infection) CAUTI  History of removal of penile prosthesis  Chronic urinary retention with chronic indwelling urinary catheter  Catheter has been changed  Urine culture with E. coli sensitive to ceftriaxone (4/14--4/21)  Blood cultures NGTD  Continuing ceftriaxone    Generalized weakness/ground-level fall  History of stroke withl right-sided weakness  History of stroke as well as current infectious process  PT/OT  Radiographs without fracture  Patient has had old stroke with right-sided hemiparesis. Has been so far today seen by Occupational Therapy with total score of 20/100 Barthel index, recommending SNF.   Physical therapy also SNF       Paroxysmal Atrial fibrillation (A-fib): on Cardizem/Eliquis  Normocytic normochromic anemia: stable  Anxiety: Continuing melatonin and mirtazapine  Essential hypertension: Continue diltiazem/Metoprolol  Hyperlipidemia: Continuing atorvastatin  Obesity: Counseled on Healthy dietary choices  DM type 2: on SSI  Depression.:Continuing mirtazapine/bupropion  Rash  -involving groin/ perineum/ proximal thighs  -order Nystatin powder, apply to rash BID       Code status: Full  Prophylaxis: Apixaban    Plan: Medically stable, awaiting SNF  Care Plan discussed with: Patient, nurse, CM  Anticipated Disposition:  SNF hopefully today versus tomorrow  Inpatient  Cardiac monitoring: na     Hospital Problems  Date Reviewed: 10/12/2022            Codes Class Noted POA    Weakness ICD-10-CM: R53.1  ICD-9-CM: 780.79  4/15/2023 Unknown         Social Determinants of Health     Tobacco Use: Medium Risk Smoking Tobacco Use: Former    Smokeless Tobacco Use: Never    Passive Exposure: Not on file   Alcohol Use: Not At Risk    Frequency of Alcohol Consumption: Never    Average Number of Drinks: Patient does not drink    Frequency of Binge Drinking: Never   Financial Resource Strain: Low Risk     Difficulty of Paying Living Expenses: Not very hard   Food Insecurity: No Food Insecurity    Worried About Running Out of Food in the Last Year: Never true    Ran Out of Food in the Last Year: Never true   Transportation Needs: No Transportation Needs    Lack of Transportation (Medical): No    Lack of Transportation (Non-Medical): No   Physical Activity: Inactive    Days of Exercise per Week: 0 days    Minutes of Exercise per Session: 0 min   Stress: Stress Concern Present    Feeling of Stress : Rather much   Social Connections: Socially Isolated    Frequency of Communication with Friends and Family: More than three times a week    Frequency of Social Gatherings with Friends and Family: More than three times a week    Attends Hinduism Services: Never    Active Member of Clubs or Organizations: No    Attends Club or Organization Meetings: Never    Marital Status:    Intimate Partner Violence: Not At Risk    Fear of Current or Ex-Partner: No    Emotionally Abused: No    Physically Abused: No    Sexually Abused: No   Depression: Not at risk    PHQ-2 Score: 0   Housing Stability: Low Risk     Unable to Pay for Housing in the Last Year: No    Number of Places Lived in the Last Year: 2    Unstable Housing in the Last Year: No       Review of Systems:   A comprehensive review of systems was negative except for that written in the HPI. Vital Signs:    Last 24hrs VS reviewed since prior progress note.  Most recent are:  Visit Vitals  /79 (BP 1 Location: Left arm, BP Patient Position: At rest)   Pulse 90   Temp 97.6 °F (36.4 °C)   Resp 18   Ht 5' 11\" (1.803 m)   Wt 109.8 kg (242 lb 1 oz)   SpO2 96%   BMI 33.76 kg/m²         Intake/Output Summary (Last 24 hours) at 4/20/2023 1048  Last data filed at 4/20/2023 0500  Gross per 24 hour   Intake --   Output 2650 ml   Net -2650 ml          Physical Examination:     I had a face to face encounter with this patient and independently examined them on 4/20/2023 as outlined below:          General : no acute distress,   HEENT:NCAT moist mucus membrane,  Neck: supple, no JVD, no meningeal signs  Chest: Clear to auscultation bilaterally   CVS: S1 S2 heard, Capillary refill less than 2 seconds  Abd: soft/ non tender, non distended, BS physiological,   Ext: no clubbing, no cyanosis, no edema, brisk 2+ DP pulses  Neuro/Psych: pleasant mood and affect, MYO EOMI  Skin: warm     Data Review:    Review and/or order of clinical lab test  Review and/or order of tests in the radiology section of CPT      I have personally and independently reviewed all pertinent labs, diagnostic studies, imaging, and have provided independent interpretation of the same. Labs:     No results for input(s): WBC, HGB, HCT, PLT, HGBEXT, HCTEXT, PLTEXT, HGBEXT, HCTEXT, PLTEXT in the last 72 hours. No results for input(s): NA, K, CL, CO2, BUN, CREA, GLU, CA, MG, PHOS, URICA in the last 72 hours. No results for input(s): ALT, AP, TBIL, TBILI, TP, ALB, GLOB, GGT, AML, LPSE in the last 72 hours. No lab exists for component: SGOT, GPT, AMYP, HLPSE  No results for input(s): INR, PTP, APTT, INREXT, INREXT in the last 72 hours. No results for input(s): FE, TIBC, PSAT, FERR in the last 72 hours. Lab Results   Component Value Date/Time    Folate 8.4 04/17/2023 01:28 AM      No results for input(s): PH, PCO2, PO2 in the last 72 hours. No results for input(s): CPK, CKNDX, TROIQ in the last 72 hours.     No lab exists for component: CPKMB  Lab Results   Component Value Date/Time    Cholesterol, total 146 09/15/2022 02:40 AM    HDL Cholesterol 53 09/15/2022 02:40 AM    LDL, calculated 65 09/15/2022 02:40 AM Triglyceride 140 09/15/2022 02:40 AM    CHOL/HDL Ratio 2.8 09/15/2022 02:40 AM     Lab Results   Component Value Date/Time    Glucose (POC) 124 (H) 12/20/2022 11:11 AM    Glucose (POC) 97 12/20/2022 06:49 AM    Glucose (POC) 128 (H) 12/19/2022 08:51 PM    Glucose (POC) 170 (H) 12/19/2022 04:21 PM    Glucose (POC) 149 (H) 12/19/2022 11:09 AM     Lab Results   Component Value Date/Time    Color YELLOW/STRAW 04/15/2023 02:36 PM    Appearance CLEAR 04/15/2023 02:36 PM    Specific gravity 1.010 04/15/2023 02:36 PM    Specific gravity 1.013 10/18/2022 01:01 AM    pH (UA) 7.0 04/15/2023 02:36 PM    Protein Negative 04/15/2023 02:36 PM    Glucose Negative 04/15/2023 02:36 PM    Ketone Negative 04/15/2023 02:36 PM    Bilirubin Negative 04/15/2023 02:36 PM    Urobilinogen 1.0 04/15/2023 02:36 PM    Nitrites Negative 04/15/2023 02:36 PM    Leukocyte Esterase LARGE (A) 04/15/2023 02:36 PM    Epithelial cells FEW 04/15/2023 02:36 PM    Bacteria 2+ (A) 04/15/2023 02:36 PM    WBC >100 (H) 04/15/2023 02:36 PM    RBC 5-10 04/15/2023 02:36 PM       Notes reviewed from all clinical/nonclinical/nursing services involved in patient's clinical care. Care coordination discussions were held with appropriate clinical/nonclinical/ nursing providers based on care coordination needs. Patients current active Medications were reviewed, considered, added and adjusted based on the clinical condition today. Home Medications were reconciled to the best of my ability given all available resources at the time of admission. Route is PO if not otherwise noted.       Admission Status:[id number]:::1}      Medications Reviewed:     Current Facility-Administered Medications   Medication Dose Route Frequency    nystatin (MYCOSTATIN) 100,000 unit/gram powder   Topical BID    sodium chloride (NS) flush 5-40 mL  5-40 mL IntraVENous Q8H    sodium chloride (NS) flush 5-40 mL  5-40 mL IntraVENous PRN    acetaminophen (TYLENOL) tablet 650 mg  650 mg Oral Q6H PRN    Or    acetaminophen (TYLENOL) suppository 650 mg  650 mg Rectal Q6H PRN    polyethylene glycol (MIRALAX) packet 17 g  17 g Oral DAILY PRN    ondansetron (ZOFRAN ODT) tablet 4 mg  4 mg Oral Q8H PRN    Or    ondansetron (ZOFRAN) injection 4 mg  4 mg IntraVENous Q6H PRN    cefTRIAXone (ROCEPHIN) 2 g in 0.9% sodium chloride 20 mL IV syringe  2 g IntraVENous Q24H    allopurinoL (ZYLOPRIM) tablet 100 mg  100 mg Oral DAILY    apixaban (ELIQUIS) tablet 5 mg  5 mg Oral BID    atorvastatin (LIPITOR) tablet 80 mg  80 mg Oral QHS    balsam peru-castor oiL (VENELEX) ointment   Topical BID    buPROPion SR (WELLBUTRIN SR) tablet 100 mg  100 mg Oral BID    dilTIAZem ER (CARDIZEM CD) capsule 120 mg  120 mg Oral Q24H    ferrous sulfate tablet 325 mg  325 mg Oral BID    famotidine (PEPCID) tablet 20 mg  20 mg Oral Q12H    [Held by provider] gabapentin (NEURONTIN) capsule 300 mg  300 mg Oral QHS    finasteride (PROSCAR) tablet 5 mg  5 mg Oral DAILY    magnesium oxide (MAG-OX) tablet 400 mg  400 mg Oral QHS    melatonin tablet 3 mg  3 mg Oral QHS PRN    metoprolol succinate (TOPROL-XL) XL tablet 75 mg  75 mg Oral DAILY    mirtazapine (REMERON) tablet 7.5 mg  7.5 mg Oral QHS    tamsulosin (FLOMAX) capsule 0.4 mg  0.4 mg Oral DAILY     ______________________________________________________________________  EXPECTED LENGTH OF STAY: 3d 4h  ACTUAL LENGTH OF STAY:          Tressa Zavala MD

## 2023-04-20 NOTE — PROGRESS NOTES
Problem: Self Care Deficits Care Plan (Adult)  Goal: *Therapy Goal (Edit Goal, Insert Text)  Description:  FUNCTIONAL STATUS PRIOR TO ADMISSION: The patient was functional at the wheelchair level and required minimal assistance for stand pivot transfers to the chair. He had been living with his sister and TERESE, however unable to care for him at current level. Given prior CVA, presents with R sided weakness at baseline - progressing. He went back to his home in [place] and attempted to get out of the taxi, fell, and brought to ED. Has significant hx of recurrent hospitalizations, as well as SNF placements. HOME SUPPORT: The patient lived with his sister and TERESE and required maximal assistance for ADLs, min assistance SPT. Occupational Therapy Goals  Initiated 4/17/2023  1. Patient will perform bathing seated level with supervision/set-up within 7 day(s). 2.  Patient will perform grooming standing level with supervision/set-up within 7 day(s). 3.  Patient will perform lower body dressing with supervision/set-up within 7 day(s). 4.  Patient will perform toilet transfers with supervision/set-up within 7 day(s). 5.  Patient will perform all aspects of toileting with supervision/set-up within 7 day(s). 6.  Patient will participate in upper extremity therapeutic exercise/activities with supervision/set-up for 15 minutes within 7 day(s). 7.  Patient will utilize energy conservation techniques during functional activities with verbal cues within 7 day(s). Outcome: Progressing Towards Goal     OCCUPATIONAL THERAPY TREATMENT  Patient: Angelica Patton (42 y.o. male)  Date: 4/20/2023  Diagnosis: Weakness [R53.1] <principal problem not specified>      Precautions: Fall  Chart, occupational therapy assessment, plan of care, and goals were reviewed. ASSESSMENT  Patient continues with skilled OT services and is progressing towards goals.   Pt remains limited by R side residual hemiparesis, decreased strength, endurance, mobility, balance, coordination and safety. He required increased time and physical assist for ADLs and functional mobility. Recommend rehab at discharge. Current Level of Function Impacting Discharge (ADLs): min A UE ADLs, max A LE ADLs, min A bed mobility with modified bed and mod-min A OOB mobility using RW    Other factors to consider for discharge: see above         PLAN :  Patient continues to benefit from skilled intervention to address the above impairments. Continue treatment per established plan of care to address goals. Recommend with staff: up with mod A; heavy duty [facility] for toileting    Recommend next OT session: LE ADLs, BSC transfers and toileting    Recommendation for discharge: (in order for the patient to meet his/her long term goals)  Therapy up to 5 days/week in rehab    This discharge recommendation:  Has been made in collaboration with the attending provider and/or case management    IF patient discharges home will need the following DME: TBD       SUBJECTIVE:   Patient stated I was better yesterday.     OBJECTIVE DATA SUMMARY:   Cognitive/Behavioral Status:  Neurologic State: Alert  Orientation Level: Oriented X4  Cognition: Appropriate for age attention/concentration; Follows commands  Perception: Cues to maintain midline in standing; Tactile;Verbal;Visual;Cues to attend to right side of body  Perseveration: No perseveration noted  Safety/Judgement: Awareness of environment; Fall prevention    Functional Mobility and Transfers for ADLs:  Bed Mobility:  Supine to Sit: Minimum assistance; Additional time;Assist x1;Bed Modified (HON fully upright)  Scooting: Stand-by assistance    Transfers:  Sit to Stand: Minimum assistance; Additional time;Assist x1 (A to stabilize feet and RW)  Functional Transfers  Toilet Transfer : Moderate assistance; Additional time;Assist x1 (when balance lost)  Cues: Physical assistance; Tactile cues provided;Verbal cues provided;Visual cues provided  Adaptive Equipment: Bedside commode;Walker (comment)  Bed to Chair: Minimum assistance; Additional time;Assist x1    Balance:  Sitting: Intact  Standing: Impaired; With support (RW)  Standing - Static: Fair;Constant support  Standing - Dynamic : Fair;Constant support    ADL Intervention:  Patient instructed and indicated understanding energy conservation techniques to increase independence and safety during ADLs. Lower Body Dressing Assistance  Slip on Shoes with Back: Maximum assistance (total for R and min A for L heel)  Position Performed: Bending forward method;Seated edge of bed  Cues: Don;Physical assistance; Tactile cues provided;Verbal cues provided;Visual cues provided    Toileting  Toileting Assistance: Maximum assistance  Bladder Hygiene:  (vazquez)  Bowel Hygiene: Maximum assistance (for cleanliness - pt with large BM. 1 in bed and 1 on BSC)  Clothing Management: Maximum assistance  Cues: Physical assistance for pants down;Physical assistance for pants up; Tactile cues provided;Verbal cues provided;Visual cues provided  Adaptive Equipment: Walker [facility])    Cognitive Retraining  Safety/Judgement: Awareness of environment; Fall prevention      Pain:  0/10    Activity Tolerance:   Fair and requires rest breaks    After treatment patient left in no apparent distress:   Sitting in chair, Call bell within reach, and Bed / chair alarm activated    COMMUNICATION/COLLABORATION:   The patients plan of care was discussed with: Physical therapist, Registered nurse, and Rehabilitation technician.      Osvaldo Magdaleno OT  Time Calculation: 40 mins

## 2023-04-20 NOTE — PROGRESS NOTES
Problem: Mobility Impaired (Adult and Pediatric)  Goal: *Acute Goals and Plan of Care (Insert Text)  Description: FUNCTIONAL STATUS PRIOR TO ADMISSION: Pt was independent without assistive device - living alone until CVA 9/2022. Pt initially went to [facility], also with hospital admissions over past year with SNF stays. Pt states he was able to amb with RW and CGA from sister/[id number] - short distance home only - no outside surfaces. Pt reports has not been OOB x 1 month secondary to social situation - sister no longer able to provide support/assistance. HOME SUPPORT PRIOR TO ADMISSION: As above, pt will not be able to go back to sister's home. Pt's close friend present - currently working with pt to get Medical POA - also working with [email or web address] regarding resources for additional rehabilitation. Physical Therapy Goals  Initiated 4/17/2023  1. Patient will move from supine to sit and sit to supine  in bed with supervision/set-up within 7 day(s). 2.  Patient will transfer from bed to chair and chair to bed - lateral scoot to drop arm chair - with supervision/set-up using the least restrictive device within 7 day(s). 3.  Patient will perform sit to stand with minimal assistance/contact guard assist within 7 day(s) - from elevated bed height. 4.  Patient will ambulate with minimal assistance/contact guard assist for > 50 feet with the least restrictive device within 7 day(s). Outcome: Progressing Towards Goal   PHYSICAL THERAPY TREATMENT  Patient: Aj Hoang (71 y.o. male)  Date: 4/20/2023  Diagnosis: Weakness [R53.1] <principal problem not specified>      Precautions: Fall  Chart, physical therapy assessment, plan of care and goals were reviewed. ASSESSMENT  Patient continues with skilled PT services and is progressing towards goals. Barriers to indep with functional mobility include R hemiparesis, impaired balance, gait dysfunction, increased risk for fall.     Pt required increased time and min A to mobilize. Tolerated amb into hallway and back with RW, slow gait speed, shuffled pattern, stooped posture. Participated in AAROM R UE; instructed to practice AROM wrist ext and hand opening/ closing with UE supported. Pt remains appropriate for IPR v SNF rehab at d/c. Current Level of Function Impacting Discharge (mobility/balance): min A transfers and amb with RW, increased time    Other factors to consider for discharge: limited social support         PLAN :  Patient continues to benefit from skilled intervention to address the above impairments. Continue treatment per established plan of care. to address goals. Recommendation for discharge: (in order for the patient to meet his/her long term goals)  IPR v SNF    This discharge recommendation:  Has been made in collaboration with the attending provider and/or case management    IF patient discharges home will need the following DME: to be determined (TBD)       SUBJECTIVE:   Patient stated Can you help me with my arm?     OBJECTIVE DATA SUMMARY:   Critical Behavior:  Neurologic State: Alert  Orientation Level: Oriented X4  Cognition: Appropriate for age attention/concentration, Follows commands  Safety/Judgement: Awareness of environment, Fall prevention  Functional Mobility Training:  Bed Mobility:     Supine to Sit: Minimum assistance; Moderate assistance; Additional time;Bed Modified (supine to sit toward R)  Sit to Supine: Minimum assistance  Scooting: Stand-by assistance        Transfers:  Sit to Stand: Minimum assistance (slightly elevated bed height, assist for lift/ balance/ stabilization of walker and blocking feet)  Stand to Sit: Contact guard assistance        Bed to Chair: Minimum assistance; Additional time;Assist x1                    Balance:  Sitting: Intact  Standing: Impaired; With support (RW)  Standing - Static: Fair;Constant support  Standing - Dynamic : Fair;Constant support  Ambulation/Gait Training:  Distance (ft): 35 Feet (ft)  Assistive Device: Gait belt;Walker, rolling  Ambulation - Level of Assistance: Minimal assistance        Gait Abnormalities: Decreased step clearance;Shuffling gait; Step to gait              Speed/Geovanna: Slow  Step Length: Left shortened;Right shortened           Pain Rating:  No c/o pain    Activity Tolerance:   Good    After treatment patient left in no apparent distress:   Supine in bed, Call bell within reach, Bed / chair alarm activated, and Side rails x 3    COMMUNICATION/COLLABORATION:   The patients plan of care was discussed with: Registered nurse.      Dae Blackburn, PT   Time Calculation: 28 mins

## 2023-04-20 NOTE — PROGRESS NOTES
LOREN:  1. RUR-14%  2. Auth started for [facility] SN[facility]. 3. BLS transport. Cm spoke with Aminta at [facility], she will start auth. Patient and friend ,Analia Sheltons updated and aware this is one of their SNF choices.       Steven Lopes, [facility]

## 2023-04-21 VITALS
SYSTOLIC BLOOD PRESSURE: 122 MMHG | BODY MASS INDEX: 31.73 KG/M2 | TEMPERATURE: 97.6 F | DIASTOLIC BLOOD PRESSURE: 82 MMHG | HEIGHT: 71 IN | OXYGEN SATURATION: 98 % | RESPIRATION RATE: 16 BRPM | HEART RATE: 74 BPM | WEIGHT: 226.63 LBS

## 2023-04-21 LAB
ANION GAP SERPL CALC-SCNC: 3 MMOL/L (ref 5–15)
ATRIAL RATE: 227 BPM
BASOPHILS # BLD: 0 K/UL (ref 0–0.1)
BASOPHILS NFR BLD: 1 % (ref 0–1)
BUN SERPL-MCNC: 12 MG/DL (ref 6–20)
BUN/CREAT SERPL: 21 (ref 12–20)
CALCIUM SERPL-MCNC: 9 MG/DL (ref 8.5–10.1)
CALCULATED R AXIS, ECG10: 47 DEGREES
CALCULATED T AXIS, ECG11: -3 DEGREES
CHLORIDE SERPL-SCNC: 106 MMOL/L (ref 97–108)
CO2 SERPL-SCNC: 26 MMOL/L (ref 21–32)
CREAT SERPL-MCNC: 0.58 MG/DL (ref 0.7–1.3)
DIAGNOSIS, 93000: NORMAL
DIFFERENTIAL METHOD BLD: ABNORMAL
EOSINOPHIL # BLD: 0.3 K/UL (ref 0–0.4)
EOSINOPHIL NFR BLD: 4 % (ref 0–7)
ERYTHROCYTE [DISTWIDTH] IN BLOOD BY AUTOMATED COUNT: 15.3 % (ref 11.5–14.5)
GLUCOSE SERPL-MCNC: 95 MG/DL (ref 65–100)
HCT VFR BLD AUTO: 34 % (ref 36.6–50.3)
HGB BLD-MCNC: 10.6 G/DL (ref 12.1–17)
IMM GRANULOCYTES # BLD AUTO: 0 K/UL (ref 0–0.04)
IMM GRANULOCYTES NFR BLD AUTO: 0 % (ref 0–0.5)
LYMPHOCYTES # BLD: 2.1 K/UL (ref 0.8–3.5)
LYMPHOCYTES NFR BLD: 28 % (ref 12–49)
MCH RBC QN AUTO: 28.2 PG (ref 26–34)
MCHC RBC AUTO-ENTMCNC: 31.2 G/DL (ref 30–36.5)
MCV RBC AUTO: 90.4 FL (ref 80–99)
MONOCYTES # BLD: 0.8 K/UL (ref 0–1)
MONOCYTES NFR BLD: 11 % (ref 5–13)
NEUTS SEG # BLD: 4.2 K/UL (ref 1.8–8)
NEUTS SEG NFR BLD: 56 % (ref 32–75)
NRBC # BLD: 0 K/UL (ref 0–0.01)
NRBC BLD-RTO: 0 PER 100 WBC
PLATELET # BLD AUTO: 210 K/UL (ref 150–400)
PMV BLD AUTO: 10.7 FL (ref 8.9–12.9)
POTASSIUM SERPL-SCNC: 3.5 MMOL/L (ref 3.5–5.1)
Q-T INTERVAL, ECG07: 420 MS
QRS DURATION, ECG06: 100 MS
QTC CALCULATION (BEZET), ECG08: 405 MS
RBC # BLD AUTO: 3.76 M/UL (ref 4.1–5.7)
SODIUM SERPL-SCNC: 135 MMOL/L (ref 136–145)
VENTRICULAR RATE, ECG03: 56 BPM
WBC # BLD AUTO: 7.4 K/UL (ref 4.1–11.1)

## 2023-04-21 PROCEDURE — 97116 GAIT TRAINING THERAPY: CPT

## 2023-04-21 PROCEDURE — 74011000250 HC RX REV CODE- 250: Performed by: NURSE PRACTITIONER

## 2023-04-21 PROCEDURE — 74011000250 HC RX REV CODE- 250: Performed by: FAMILY MEDICINE

## 2023-04-21 PROCEDURE — 74011250637 HC RX REV CODE- 250/637: Performed by: FAMILY MEDICINE

## 2023-04-21 PROCEDURE — 97110 THERAPEUTIC EXERCISES: CPT

## 2023-04-21 PROCEDURE — 74011250636 HC RX REV CODE- 250/636: Performed by: FAMILY MEDICINE

## 2023-04-21 PROCEDURE — 36415 COLL VENOUS BLD VENIPUNCTURE: CPT

## 2023-04-21 PROCEDURE — 80048 BASIC METABOLIC PNL TOTAL CA: CPT

## 2023-04-21 PROCEDURE — 85025 COMPLETE CBC W/AUTO DIFF WBC: CPT

## 2023-04-21 RX ORDER — BUPROPION HYDROCHLORIDE 100 MG/1
100 TABLET, EXTENDED RELEASE ORAL 2 TIMES DAILY
Qty: 60 TABLET | Refills: 0 | Status: SHIPPED | OUTPATIENT
Start: 2023-04-21

## 2023-04-21 RX ORDER — MIRTAZAPINE 7.5 MG/1
7.5 TABLET, FILM COATED ORAL
Qty: 30 TABLET | Refills: 0 | Status: SHIPPED | OUTPATIENT
Start: 2023-04-21

## 2023-04-21 RX ORDER — TEMAZEPAM 7.5 MG/1
7.5 CAPSULE ORAL
Qty: 30 CAPSULE | Refills: 0 | Status: SHIPPED | OUTPATIENT
Start: 2023-04-21

## 2023-04-21 RX ORDER — TRAMADOL HYDROCHLORIDE 50 MG/1
50 TABLET ORAL
Qty: 5 TABLET | Refills: 0 | Status: SHIPPED | OUTPATIENT
Start: 2023-04-21 | End: 2023-04-24

## 2023-04-21 RX ADMIN — SODIUM CHLORIDE, PRESERVATIVE FREE 10 ML: 5 INJECTION INTRAVENOUS at 07:12

## 2023-04-21 RX ADMIN — NYSTATIN: 100000 POWDER TOPICAL at 15:25

## 2023-04-21 RX ADMIN — METOPROLOL SUCCINATE 75 MG: 25 TABLET, FILM COATED, EXTENDED RELEASE ORAL at 11:48

## 2023-04-21 RX ADMIN — FAMOTIDINE 20 MG: 20 TABLET ORAL at 11:48

## 2023-04-21 RX ADMIN — BUPROPION HYDROCHLORIDE 100 MG: 100 TABLET, FILM COATED, EXTENDED RELEASE ORAL at 11:48

## 2023-04-21 RX ADMIN — SODIUM CHLORIDE 2 G: 9 INJECTION INTRAMUSCULAR; INTRAVENOUS; SUBCUTANEOUS at 14:08

## 2023-04-21 RX ADMIN — FERROUS SULFATE TAB 325 MG (65 MG ELEMENTAL FE) 325 MG: 325 (65 FE) TAB at 11:48

## 2023-04-21 RX ADMIN — SODIUM CHLORIDE, PRESERVATIVE FREE 10 ML: 5 INJECTION INTRAVENOUS at 14:14

## 2023-04-21 RX ADMIN — TAMSULOSIN HYDROCHLORIDE 0.4 MG: 0.4 CAPSULE ORAL at 11:48

## 2023-04-21 RX ADMIN — FINASTERIDE 5 MG: 5 TABLET, FILM COATED ORAL at 11:48

## 2023-04-21 RX ADMIN — Medication: at 11:49

## 2023-04-21 RX ADMIN — ALLOPURINOL 100 MG: 100 TABLET ORAL at 11:48

## 2023-04-21 RX ADMIN — APIXABAN 5 MG: 5 TABLET, FILM COATED ORAL at 11:48

## 2023-04-21 NOTE — PROGRESS NOTES
LOREN:  1. RUR-16%  2. [place] acepted, auth approved. 3. W/c transport setup with [facility] for 1500. RN can call report to [phone] Room 208A. Medicare pt has received, reviewed, and signed 2nd IM letter informing them of their right to appeal the discharge. Signed copy has been placed on pt bedside chart.       [facility]

## 2023-04-21 NOTE — PROGRESS NOTES
[facility] Adult  Hospitalist Group                                                                                          Hospitalist Progress Note  Michael Marks MD  Answering service: [phone] OR [phone] from in house phone        Date of Service:  2023  NAME:  Modesta Moreno  :  1950  MRN:  [id number]       Admission Summary:   Per H&P, Modesta Moreno is a 71 y.o. 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 He was hospitalized 10/17/2022 to 10/23/2022 with diagnosis of Enterococcus/Pseudomonas UTI, urinary retention requiring indwelling Vazquez catheter, COVID-19 virus infection. Patient was hospitalized 2020 to 2022 with diagnosis of eroding penile implant, possible infection, requiring removal of penile prosthesis 2022. Patient notes he has been hospitalized since those admissions to other facilities. Records not available for review. He notes that he lives alone, has difficulty walking even uses a roller walker, has worsening right-sided weakness compared to his baseline, and is unable to care for himself. Symptoms noted are severe, profound, constant, without specific alleviating factors. There is no reported fall, head or neck trauma. He went to [facility] ED for 15 2023.   Work-up included CT head without IV contrast which showed chronic left cerebral infarct but no acute intracranial abnormalities. UA showed large leukocyte esterases, > 100 WBC, 5-10 RBC, 2+ bacteria, small blood. ED requested transfer to [facility].       Interval history / Subjective: Follow up CAUTI  Comfortably laying in bed  No issues     Assessment & Plan:     UTI (urinary tract infection) CAUTI--resolved  History of removal of penile prosthesis  Chronic urinary retention with chronic indwelling urinary catheter  Catheter has been changed  Urine culture with E. 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 Has been so far today seen by Occupational Therapy with total score of 20/100 Barthel index, recommending SNF.   Physical therapy also SNF       Paroxysmal Atrial fibrillation (A-fib): on Cardizem/Eliquis  Normocytic normochromic anemia: stable  Anxiety: Continuing melatonin and mirtazapine  Essential hypertension: Continue diltiazem/Metoprolol  Hyperlipidemia: Continuing atorvastatin  Obesity: Counseled on Healthy dietary choices  DM type 2: on SSI  Depression.:Continuing mirtazapine/bupropion  Rash  -involving groin/ perineum/ proximal thighs  -order Nystatin powder, apply to rash BID       Code status: Full  Prophylaxis: Apixaban    Plan: Discharge today   Care Plan discussed with: Patient, nurse, CM  Anticipated Disposition:  SNF today     Hospital Problems  Date Reviewed: 10/12/2022            Codes Class Noted POA    Weakness ICD-10-CM: R53.1  ICD-9-CM: 780.79  4/15/2023 Unknown         Social Determinants of Health     Tobacco Use: Medium Risk    Smoking Tobacco Use: Former    Smokeless Tobacco Use: Never    Passive Exposure: Not on file Alcohol Use: Not At Risk    Frequency of Alcohol Consumption: Never    Average Number of Drinks: Patient does not drink    Frequency of Binge Drinking: Never   Financial Resource Strain: Low Risk     Difficulty of Paying Living Expenses: Not very hard   Food Insecurity: No Food Insecurity    Worried About Running Out of Food in the Last Year: Never true    Ran Out of Food in the Last Year: Never true   Transportation Needs: No Transportation Needs    Lack of Transportation (Medical): No    Lack of Transportation (Non-Medical): No   Physical Activity: Inactive    Days of Exercise per Week: 0 days    Minutes of Exercise per Session: 0 min   Stress: Stress Concern Present    Feeling of Stress : Rather much   Social Connections: Socially Isolated    Frequency of Communication with Friends and Family: More than three times a week    Frequency of Social Gatherings with Friends and Family: More than three times a week    Attends Alevism Services: Never    Active Member of Clubs or Organizations: No    Attends Club or Organization Meetings: Never    Marital Status:    Intimate Partner Violence: Not At Risk    Fear of Current or Ex-Partner: No    Emotionally Abused: No    Physically Abused: No    Sexually Abused: No   Depression: Not at risk    PHQ-2 Score: 0   Housing Stability: Low Risk     Unable to Pay for Housing in the Last Year: No    Number of Places Lived in the Last Year: 2    Unstable Housing in the Last Year: No       Review of Systems:   A comprehensive review of systems was negative except for that written in the HPI. Vital Signs:    Last 24hrs VS reviewed since prior progress note.  Most recent are:  Visit Vitals  /70 (BP 1 Location: Left upper arm, BP Patient Position: At rest)   Pulse 80   Temp 97.7 °F (36.5 °C)   Resp 18   Ht 5' 11\" (1.803 m)   Wt 102.8 kg (226 lb 10.1 oz)   SpO2 96%   BMI 31.61 kg/m²         Intake/Output Summary (Last 24 hours) at 4/21/2023 1226  Last data filed at 4/21/2023 5542  Gross per 24 hour   Intake --   Output 1800 ml   Net -1800 ml          Physical Examination:     I had a face to face encounter with this patient and independently examined them on 4/21/2023 as outlined below:          General : no acute distress,   HEENT:NCAT moist mucus membrane,  Neck: supple, no JVD, no meningeal signs  Chest: Clear to auscultation bilaterally   CVS: S1 S2 heard, Capillary refill less than 2 seconds  Abd: soft/ non tender, non distended, BS physiological,   Ext: no clubbing, no cyanosis, no edema, brisk 2+ DP pulses  Neuro/Psych: pleasant mood and affect, MOY EOMI  Skin: warm     Data Review:    Review and/or order of clinical lab test  Review and/or order of tests in the radiology section of CPT      I have personally and independently reviewed all pertinent labs, diagnostic studies, imaging, and have provided independent interpretation of the same. Labs:     Recent Labs     04/21/23  0516   WBC 7.4   HGB 10.6*   HCT 34.0*          Recent Labs     04/21/23  0516   *   K 3.5      CO2 26   BUN 12   CREA 0.58*   GLU 95   CA 9.0       No results for input(s): ALT, AP, TBIL, TBILI, TP, ALB, GLOB, GGT, AML, LPSE in the last 72 hours. No lab exists for component: SGOT, GPT, AMYP, HLPSE  No results for input(s): INR, PTP, APTT, INREXT, INREXT in the last 72 hours. No results for input(s): FE, TIBC, PSAT, FERR in the last 72 hours. Lab Results   Component Value Date/Time    Folate 8.4 04/17/2023 01:28 AM      No results for input(s): PH, PCO2, PO2 in the last 72 hours. No results for input(s): CPK, CKNDX, TROIQ in the last 72 hours.     No lab exists for component: CPKMB  Lab Results   Component Value Date/Time    Cholesterol, total 146 09/15/2022 02:40 AM    HDL Cholesterol 53 09/15/2022 02:40 AM    LDL, calculated 65 09/15/2022 02:40 AM    Triglyceride 140 09/15/2022 02:40 AM    CHOL/HDL Ratio 2.8 09/15/2022 02:40 AM     Lab Results   Component Value Date/Time    Glucose (POC) 124 (H) 12/20/2022 11:11 AM    Glucose (POC) 97 12/20/2022 06:49 AM    Glucose (POC) 128 (H) 12/19/2022 08:51 PM    Glucose (POC) 170 (H) 12/19/2022 04:21 PM    Glucose (POC) 149 (H) 12/19/2022 11:09 AM     Lab Results   Component Value Date/Time    Color YELLOW/STRAW 04/15/2023 02:36 PM    Appearance CLEAR 04/15/2023 02:36 PM    Specific gravity 1.010 04/15/2023 02:36 PM    Specific gravity 1.013 10/18/2022 01:01 AM    pH (UA) 7.0 04/15/2023 02:36 PM    Protein Negative 04/15/2023 02:36 PM    Glucose Negative 04/15/2023 02:36 PM    Ketone Negative 04/15/2023 02:36 PM    Bilirubin Negative 04/15/2023 02:36 PM    Urobilinogen 1.0 04/15/2023 02:36 PM    Nitrites Negative 04/15/2023 02:36 PM    Leukocyte Esterase LARGE (A) 04/15/2023 02:36 PM    Epithelial cells FEW 04/15/2023 02:36 PM    Bacteria 2+ (A) 04/15/2023 02:36 PM    WBC >100 (H) 04/15/2023 02:36 PM    RBC 5-10 04/15/2023 02:36 PM       Notes reviewed from all clinical/nonclinical/nursing services involved in patient's clinical care. Care coordination discussions were held with appropriate clinical/nonclinical/ nursing providers based on care coordination needs. Patients current active Medications were reviewed, considered, added and adjusted based on the clinical condition today. Home Medications were reconciled to the best of my ability given all available resources at the time of admission. Route is PO if not otherwise noted.       Admission Status:[id number]:::1}      Medications Reviewed:     Current Facility-Administered Medications   Medication Dose Route Frequency    nystatin (MYCOSTATIN) 100,000 unit/gram powder   Topical BID    sodium chloride (NS) flush 5-40 mL  5-40 mL IntraVENous Q8H    sodium chloride (NS) flush 5-40 mL  5-40 mL IntraVENous PRN    acetaminophen (TYLENOL) tablet 650 mg  650 mg Oral Q6H PRN    Or    acetaminophen (TYLENOL) suppository 650 mg  650 mg Rectal Q6H PRN    polyethylene glycol (MIRALAX) packet 17 g  17 g Oral DAILY PRN    ondansetron (ZOFRAN ODT) tablet 4 mg  4 mg Oral Q8H PRN    Or    ondansetron (ZOFRAN) injection 4 mg  4 mg IntraVENous Q6H PRN    cefTRIAXone (ROCEPHIN) 2 g in 0.9% sodium chloride 20 mL IV syringe  2 g IntraVENous Q24H    allopurinoL (ZYLOPRIM) tablet 100 mg  100 mg Oral DAILY    apixaban (ELIQUIS) tablet 5 mg  5 mg Oral BID    atorvastatin (LIPITOR) tablet 80 mg  80 mg Oral QHS    balsam peru-castor oiL (VENELEX) ointment   Topical BID    buPROPion SR (WELLBUTRIN SR) tablet 100 mg  100 mg Oral BID    dilTIAZem ER (CARDIZEM CD) capsule 120 mg  120 mg Oral Q24H    ferrous sulfate tablet 325 mg  325 mg Oral BID    famotidine (PEPCID) tablet 20 mg  20 mg Oral Q12H    [Held by provider] gabapentin (NEURONTIN) capsule 300 mg  300 mg Oral QHS    finasteride (PROSCAR) tablet 5 mg  5 mg Oral DAILY    magnesium oxide (MAG-OX) tablet 400 mg  400 mg Oral QHS    melatonin tablet 3 mg  3 mg Oral QHS PRN    metoprolol succinate (TOPROL-XL) XL tablet 75 mg  75 mg Oral DAILY    mirtazapine (REMERON) tablet 7.5 mg  7.5 mg Oral QHS    tamsulosin (FLOMAX) capsule 0.4 mg  0.4 mg Oral DAILY     ______________________________________________________________________  EXPECTED LENGTH OF STAY: 3d 4h  ACTUAL LENGTH OF STAY:          6                 Chuy Pina MD

## 2023-04-21 NOTE — PROGRESS NOTES
TRANSFER - OUT REPORT:    Attempted to provide report for Erasmo Russo  being transferred to [facility] for routine progression of care       Report consisted of patients Situation, Background, Assessment and   Recommendations(SBAR). Information from the following report(s) SBAR, Kardex, and MAR was reviewed with the receiving nurse. Lines:   Peripheral IV 04/15/23 Left Antecubital (Active)   Site Assessment Clean, dry, & intact 04/21/23 0845   Phlebitis Assessment 0 04/21/23 0845   Infiltration Assessment 0 04/21/23 0845   Dressing Status Clean, dry, & intact 04/21/23 0845   Dressing Type Transparent;Tape 04/21/23 0845   Hub Color/Line Status Flushed;Capped 04/21/23 0845   Action Taken Open ports on tubing capped 04/21/23 0845   Alcohol Cap Used Yes 04/21/23 0845        Opportunity for questions and clarification was provided. Patient transported with [facility] at .         [phone]:  Phoned facility, phone rang several times with no answer. [phone]: left voicemail attempting to give report  1800: left second voicemail attempting to give report    Provided this RN's name, return phone number and patient's last name and new room number at receiving facility.

## 2023-04-21 NOTE — PROGRESS NOTES
Problem: Self Care Deficits Care Plan (Adult)  Goal: *Therapy Goal (Edit Goal, Insert Text)  Description:  FUNCTIONAL STATUS PRIOR TO ADMISSION: The patient was functional at the wheelchair level and required minimal assistance for stand pivot transfers to the chair. He had been living with his sister and TERESE, however unable to care for him at current level. Given prior CVA, presents with R sided weakness at baseline - progressing. He went back to his home in [place] and attempted to get out of the taxi, fell, and brought to ED. Has significant hx of recurrent hospitalizations, as well as SNF placements. HOME SUPPORT: The patient lived with his sister and TERESE and required maximal assistance for ADLs, min assistance SPT. Occupational Therapy Goals  Initiated 4/17/2023  1. Patient will perform bathing seated level with supervision/set-up within 7 day(s). 2.  Patient will perform grooming standing level with supervision/set-up within 7 day(s). 3.  Patient will perform lower body dressing with supervision/set-up within 7 day(s). 4.  Patient will perform toilet transfers with supervision/set-up within 7 day(s). 5.  Patient will perform all aspects of toileting with supervision/set-up within 7 day(s). 6.  Patient will participate in upper extremity therapeutic exercise/activities with supervision/set-up for 15 minutes within 7 day(s). 7.  Patient will utilize energy conservation techniques during functional activities with verbal cues within 7 day(s). Outcome: Progressing Towards Goal    OCCUPATIONAL THERAPY TREATMENT  Patient: Judson Ceballos (44 y.o. male)  Date: 4/21/2023  Diagnosis: Weakness [R53.1] <principal problem not specified>      Precautions: Fall  Chart, occupational therapy assessment, plan of care, and goals were reviewed. ASSESSMENT  Patient continues with skilled OT services and is progressing towards goals.   Pt continues to present with decreased R UE AROM from CVA,  elbow pain with stretching due to muscular tightness, impaired balance and need for assist with all ADLs. Pt educated on stretching, positioning and massage for R UE. Noted 1 finger width subluxation in R shoulder. He is needing A for all ADLs, with up to max A for toileting. Continue to recommend rehab at discharge. Current Level of Function Impacting Discharge (ADLs): min to mod A to stand, painful R UE, hemiparesis    Other factors to consider for discharge: difficult social situation          PLAN :  Patient continues to benefit from skilled intervention to address the above impairments. Continue treatment per established plan of care to address goals. Recommend with staff: OOB to chair, BSC     Recommend next OT session: ADLs    Recommendation for discharge: (in order for the patient to meet his/her long term goals)  Therapy up to 5 days/week in in a rehab setting     This discharge recommendation:  Has not yet been discussed the attending provider and/or case management    IF patient discharges home will need the following DME: TBD       SUBJECTIVE:   Patient stated oh that hurts a bit.     OBJECTIVE DATA SUMMARY:   Cognitive/Behavioral Status:                      Functional Mobility and Transfers for ADLs:  Bed Mobility:   Received OOB after working with PT     Transfers:  Sit to Stand: Minimum assistance; Moderate assistance (slightly elevated bed height to RW, assist for lift/ balance/ blocking feet/ stabilizing RW)          Balance:  Sitting: Intact  Standing: Impaired; With support (RW)  Standing - Static: Fair;Constant support (narrow MICHELLE)  Standing - Dynamic : Fair;Constant support    ADL Intervention:                 Type of Bath: Chlorhexidine (CHG)                             Therapeutic Exercises:   Pt received in chair and reported need to stretch R UE. Pt with decreased composite grasp and reports of pain with PROM elbow flexion/extension.  Pain improved with placement of stretching biceps in gravity eliminated position. Pt with 1 finger width subluxation. Issued green and pink resistance sponge and reviewed exercises. Will provide handouts for resistant sponge exercises as well as active 6 pack. Provided R elbow stretch and massage to bicep muscle and tendon with 10 second hold x 5. Pt with improved edema R hand. Pain:  Pain with passive stretching R elbow    Activity Tolerance:   Good    After treatment patient left in no apparent distress:   Sitting in chair and Call bell within reach    COMMUNICATION/COLLABORATION:   The patients plan of care was discussed with: Physical therapist and Registered nurse.      Amor Navarro OT  Time Calculation: 15 mins

## 2023-04-21 NOTE — DISCHARGE INSTRUCTIONS
Discharge SNF/Rehab Instructions/LTAC       PATIENT ID: Ramos Martinez  MRN: [id number]   YOB: 1950    DATE OF ADMISSION: [unfilled]    DATE OF DISCHARGE: 4/21/2023    PRIMARY CARE PROVIDER: @PCP@       ATTENDING PHYSICIAN: [unfilled]  DISCHARGING PROVIDER: Neris Espitia MD     To contact this individual call [phone] and ask the  to page. If unavailable ask to be transferred the Adult Hospitalist Department. CONSULTATIONS: [unfilled]    PROCEDURES/SURGERIES: * No surgery found *    [address] COURSE:   UTI (urinary tract infection) CAUTI--resolved  History of removal of penile prosthesis  Chronic urinary retention with chronic indwelling urinary catheter  Catheter has been changed  Urine culture with E. 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 Has been so far today seen by Occupational Therapy with total score of 20/100 Barthel index, recommending SNF.   Physical therapy also SNF       Paroxysmal Atrial fibrillation (A-fib): on Cardizem/Eliquis  Normocytic normochromic anemia: stable  Anxiety: Continuing melatonin and mirtazapine  Essential hypertension: Continue diltiazem/Metoprolol  Hyperlipidemia: Continuing atorvastatin  Obesity: Counseled on Healthy dietary choices  DM type 2: on SSI  Depression.:Continuing mirtazapine/bupropion  Rash  -involving groin/ perineum/ proximal thighs  -order Nystatin powder, apply to rash BID      PENDING TEST RESULTS:   At the time of discharge the following test results are still pending: none    FOLLOW UP APPOINTMENTS:    PCP    ADDITIONAL CARE RECOMMENDATIONS: as above    DIET: Cardiac Diet    ACTIVITY: Activity as tolerated    DISCHARGE MEDICATIONS:   See Medication Reconciliation Form      NOTIFY YOUR PHYSICIAN FOR ANY OF THE FOLLOWING:   Fever over 101 degrees for 24 hours. Chest pain, shortness of breath, fever, chills, nausea, vomiting, diarrhea, change in mentation, falling, weakness, bleeding. Severe pain or pain not relieved by medications. Or, any other signs or symptoms that you may have questions about.     DISPOSITION:    Home With:   OT  PT  HH  RN      x SNF/Inpatient Rehab/LTAC    Independent/assisted living    Hospice    Other:       PATIENT CONDITION AT DISCHARGE:     Functional status    Poor    x Deconditioned     Independent      Cognition    x Lucid     Forgetful     Dementia      Catheters/lines (plus indication)    [place]     PICC     PEG    x None      Code status    x Full code     DNR      PHYSICAL EXAMINATION AT DISCHARGE:  Please see progress note      CHRONIC MEDICAL DIAGNOSES:  [unfilled]      CD Checked:   Yes x     PROBLEM LIST Updated:  Yes x         Signed:   Emily Velasco MD  4/21/2023  12:31 PM

## 2023-04-21 NOTE — DISCHARGE SUMMARY
Discharge Summary       PATIENT ID: Danni Starks  MRN: [id number]   YOB: 1950    DATE OF ADMISSION: 4/15/2023  1:57 PM    DATE OF DISCHARGE: 4/21/2023   PRIMARY CARE PROVIDER: Liv Vyas MD     ATTENDING PHYSICIAN: Dr Chuy Pina  DISCHARGING PROVIDER: Chuy Pina MD      CONSULTATIONS: None    PROCEDURES/SURGERIES: * No surgery found *    ADMISSION SUMMARY AND HOSPITAL COURSE:   UTI (urinary tract infection) CAUTI--resolved  History of removal of penile prosthesis  Chronic urinary retention with chronic indwelling urinary catheter  Catheter has been changed  Urine culture with E. 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 Has been so far today seen by Occupational Therapy with total score of 20/100 Barthel index, recommending SNF. Physical therapy also SNF       Paroxysmal Atrial fibrillation (A-fib): on Cardizem/Eliquis  Normocytic normochromic anemia: stable  Anxiety: Continuing melatonin and mirtazapine  Essential hypertension: Continue diltiazem/Metoprolol  Hyperlipidemia: Continuing atorvastatin  Obesity: Counseled on Healthy dietary choices  DM type 2: on SSI  Depression.:Continuing mirtazapine/bupropion  Rash  -involving groin/ perineum/ proximal thighs  -order Nystatin powder, apply to rash BID      PENDING TEST RESULTS:   At the time of discharge the following test results are still pending: none    FOLLOW UP APPOINTMENTS:    PCP    ADDITIONAL CARE RECOMMENDATIONS: as above    DIET: Cardiac Diet    ACTIVITY: Activity as tolerated    DISCHARGE MEDICATIONS:   See Medication Reconciliation Form      NOTIFY YOUR PHYSICIAN FOR ANY OF THE FOLLOWING:   Fever over 101 degrees for 24 hours.    Chest pain, shortness of breath, fever, chills, nausea, vomiting, diarrhea, change in mentation, falling, weakness, bleeding. Severe pain or pain not relieved by medications. Or, any other signs or symptoms that you may have questions about. DISPOSITION:    Home With:   OT  PT  HH  RN      x SNF/Inpatient Rehab/LTAC    Independent/assisted living    Hospice    Other:       PATIENT CONDITION AT DISCHARGE:     Functional status    Poor    x Deconditioned     Independent      Cognition    x Lucid     Forgetful     Dementia      Catheters/lines (plus indication)    [place]     PICC     PEG    x None      Code status    x Full code     DNR      PHYSICAL EXAMINATION AT DISCHARGE:  Please see progress note      NOTIFY YOUR PHYSICIAN FOR ANY OF THE FOLLOWING:   Fever over 101 degrees for 24 hours. Chest pain, shortness of breath, fever, chills, nausea, vomiting, diarrhea, change in mentation, falling, weakness, bleeding. Severe pain or pain not relieved by medications, as well as any other signs or symptoms that you may have questions about. FOLLOW UP APPOINTMENTS:    Follow-up Information       Follow up With Specialties Details Why Contact Info    Elgin Marsh MD Internal Medicine Physician Follow up in 1 week(s)  [address]  [phone]               DISCHARGE MEDICATIONS:  Current Discharge Medication List        CONTINUE these medications which have CHANGED    Details   mirtazapine (REMERON) 7.5 mg tablet Take 1 Tablet by mouth nightly. Qty: 30 Tablet, Refills: 0  Start date: 4/21/2023      buPROPion SR (WELLBUTRIN SR) 100 mg SR tablet Take 1 Tablet by mouth two (2) times a day. Qty: 60 Tablet, Refills: 0  Start date: 4/21/2023      traMADoL (ULTRAM) 50 mg tablet Take 1 Tablet by mouth every six to eight (6-8) hours as needed for Pain for up to 3 days. Max Daily Amount: 200 mg.   Qty: 5 Tablet, Refills: 0  Start date: 4/21/2023, End date: 4/24/2023    Associated Diagnoses: Hemiparesis of right dominant side as late effect of cerebral infarction (HCC)      temazepam (RESTORIL) 7.5 mg capsule Take 1 Capsule by mouth nightly. Max Daily Amount: 7.5 mg.  Qty: 30 Capsule, Refills: 0  Start date: 4/21/2023    Associated Diagnoses: Other insomnia           CONTINUE these medications which have NOT CHANGED    Details   allopurinoL (ZYLOPRIM) 100 mg tablet Take 1 Tablet by mouth daily. 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 Qty: 60 Tablet, Refills: 0      atorvastatin (LIPITOR) 80 mg tablet Take 1 Tablet by mouth nightly. Qty: 30 Tablet, Refills: 0      dilTIAZem ER (CARDIZEM SR) 60 mg capsule Take 1 Capsule by mouth every eight (8) hours. Qty: 60 Capsule, Refills: 0      famotidine (PEPCID) 20 mg tablet Take 1 Tablet by mouth every twelve (12) hours. Qty: 60 Tablet, Refills: 0      finasteride (PROSCAR) 5 mg tablet Take 1 Tablet by mouth daily. Qty: 30 Tablet, Refills: 0      melatonin 3 mg tablet Take 1 Tablet by mouth nightly as needed for Insomnia. Qty: 30 Tablet, Refills: 0      metoprolol succinate (TOPROL-XL) 25 mg XL tablet Take 3 Tablets by mouth daily. Qty: 30 Tablet, Refills: 0      ondansetron (ZOFRAN ODT) 4 mg disintegrating tablet Take 1 Tablet by mouth every eight (8) hours as needed for Nausea or Vomiting. Qty: 30 Tablet, Refills: 0      tamsulosin (FLOMAX) 0.4 mg capsule Take 1 Capsule by mouth daily. Qty: 30 Capsule, Refills: 0      ferrous sulfate 325 mg (65 mg iron) tablet Take 325 mg by mouth two (2) times a day. magnesium oxide (MAG-OX) 400 mg tablet Take 400 mg by mouth nightly. balsam peru-castor oiL (VENELEX) ointment Apply  to affected area two (2) times a day.  APPLY TO all bony prominences, abrasions and ecchymosis to right side, posterior head Nursing, document site in comments  Qty: 5 g, Refills: 0           STOP taking these medications       gabapentin (NEURONTIN) 300 mg capsule Comments:   Reason for Stopping:         meropenem 1 g IVPB Comments:   Reason for Stopping:             CHRONIC MEDICAL DIAGNOSES:  Problem List as of 4/21/2023 Date Reviewed: 10/12/2022            Codes Class Noted - Resolved    Weakness ICD-10-CM: R53.1  ICD-9-CM: 780.79  4/15/2023 - Present        Hematuria ICD-10-CM: R31.9  ICD-9-CM: 599.70  12/8/2022 - Present        Penile implant failure ([facility] ICD-10-CM: T83.410A  ICD-9-CM: 996.76  12/8/2022 - Present        COVID ICD-10-CM: U07.1  ICD-9-CM: 079.89  10/18/2022 - Present        Ischemic cerebrovascular accident (CVA) ([facility] ICD-10-CM: I63.9  ICD-9-CM: 434.91  9/14/2022 - Present        Diabetes mellitus type 2, diet-controlled ([facility] ICD-10-CM: E11.9  ICD-9-CM: 250.00  5/2/2018 - Present        Benign non-nodular prostatic hyperplasia with lower urinary tract symptoms ICD-10-CM: N40.1  ICD-9-CM: 600.91  4/25/2017 - Present    Overview Signed 4/25/2017  9:10 AM by Carmen Machado MD     Retention had catheter 3 months             Urinary retention due to benign prostatic hyperplasia ICD-10-CM: N40.1, R33.8  ICD-9-CM: 600.91, 788.20  10/25/2016 - Present        Diabetes mellitus type 2, controlled ([facility] ICD-10-CM: E11.9  ICD-9-CM: 250.00  10/15/2015 - Present        Restless legs ICD-10-CM: G25.81  ICD-9-CM: 333.94  4/15/2015 - Present        VALENTE (obstructive sleep apnea) ICD-10-CM: N77.70  ICD-9-CM: 327.23  6/19/2012 - Present    Overview Signed 6/19/2012 10:57 AM by Natividad Bingham MD     Bi-Level: 19/15 cmH2O. Dyslipidemia, goal LDL below 100 ICD-10-CM: E78.5  ICD-9-CM: 272.4  6/14/2011 - Present        A-fib ([facility] ICD-10-CM: I48.91  ICD-9-CM: 427.31  2/7/2011 - Present    Overview Addendum 2/9/2011  3:42 PM by Annie Campbell NP     Assymptomatic, noted on ekg 2/11; found when had sleep study. Saw cardiologist 2/8/11.              Diabetes ([facility] ICD-10-CM: E11.9  ICD-9-CM: 250.00  1/28/2011 - Present    Overview Addendum 2/9/2011  3:37 PM by Jerardo Max NP     Recent diagnosis; just started diabetes education  HgA1C 6.5%             Morbid obesity ([facility] ICD-10-CM: E66.01  ICD-9-CM: 278.01  5/17/2010 - Present    Overview Signed 2/9/2011  3:35 PM by Jerardo Max NP     OBESE 20 + YEARS; HIGH WEIGHT 362 LBS PAST MONTH; LOW WEIGHT 190 LBS 30 YEARS AGO             VALENTE (obstructive sleep apnea) ICD-10-CM: G47.33  ICD-9-CM: 327.23  4/24/2010 - Present        Erectile dysfunction due to arterial insufficiency ICD-10-CM: N52.01  ICD-9-CM: 607.84  4/24/2010 - Present        HTN (hypertension) ICD-10-CM: I10  ICD-9-CM: 401.9  4/24/2010 - Present    Overview Signed 4/24/2010  9:31 AM by Solitario Iverson     borderline             Depression ICD-10-CM: Leonor. MARY  ICD-9-CM: 088  4/24/2010 - Present           Greater than 39 minutes were spent with the patient on counseling and coordination of care    Signed:   Ulises Valle MD  4/21/2023  12:32 PM    .

## 2023-04-21 NOTE — PROGRESS NOTES
Problem: Mobility Impaired (Adult and Pediatric)  Goal: *Acute Goals and Plan of Care (Insert Text)  Description: FUNCTIONAL STATUS PRIOR TO ADMISSION: Pt was independent without assistive device - living alone until CVA 9/2022. Pt initially went to [facility], also with hospital admissions over past year with SNF stays. Pt states he was able to amb with RW and CGA from sister/[id number] - short distance home only - no outside surfaces. Pt reports has not been OOB x 1 month secondary to social situation - sister no longer able to provide support/assistance. HOME SUPPORT PRIOR TO ADMISSION: As above, pt will not be able to go back to sister's home. Pt's close friend present - currently working with pt to get Medical POA - also working with [employer] regarding resources for additional rehabilitation. Physical Therapy Goals  Initiated 4/17/2023  1. Patient will move from supine to sit and sit to supine  in bed with supervision/set-up within 7 day(s). 2.  Patient will transfer from bed to chair and chair to bed - lateral scoot to drop arm chair - with supervision/set-up using the least restrictive device within 7 day(s). 3.  Patient will perform sit to stand with minimal assistance/contact guard assist within 7 day(s) - from elevated bed height. 4.  Patient will ambulate with minimal assistance/contact guard assist for > 50 feet with the least restrictive device within 7 day(s).       4/21/2023 1119 by Santana Mccoy, PT  Outcome: Progressing Towards Goal  4/21/2023 1109 by Santana Mccoy PT  Outcome: Progressing Towards Goal   PHYSICAL THERAPY TREATMENT  Patient: Zuleyka Chou (3 y.o. male)  Date: 4/21/2023  Diagnosis: Weakness [R53.1] <principal problem not specified>      Precautions: Fall  Chart, physical therapy assessment, plan of care and goals were reviewed. ASSESSMENT  Patient continues with skilled PT services and is progressing towards goals.  Barriers to indep with functional mobility include R hemiparesis, impaired balance, gait dysfunction, increased fall risk. Tolerated gait training with RW min A and up to chair. Noted narrow MICHELLE, stooped posture, decreased step length bilat. Demo good effort with all tasks. Remains appropriate for rehab at d/c. Current Level of Function Impacting Discharge (mobility/balance): min A transfers and amb with RW    Other factors to consider for discharge: limited assist available in home environment         PLAN :  Patient continues to benefit from skilled intervention to address the above impairments. Continue treatment per established plan of care. to address goals. Recommendation for discharge: (in order for the patient to meet his/her long term goals)  Therapy up to 5 days/week in SNF setting    This discharge recommendation:  Has been made in collaboration with the attending provider and/or case management    IF patient discharges home will need the following DME: to be determined (TBD)       SUBJECTIVE:   Patient stated Do you know if I'm leaving for rehab today?     OBJECTIVE DATA SUMMARY:   Critical Behavior:  Neurologic State: Alert  Orientation Level: Oriented X4  Cognition: Appropriate decision making  Safety/Judgement: Awareness of environment, Fall prevention  Functional Mobility Training:  Bed Mobility:     Not observed               Transfers:  Sit to Stand: Minimum assistance; Moderate assistance (slightly elevated bed height to RW, assist for lift/ balance/ blocking feet/ stabilizing RW)  Stand to Sit: Contact guard assistance                             Balance:  Sitting: Intact  Standing: Impaired; With support (RW)  Standing - Static: Fair;Constant support (narrow MICHELLE)  Standing - Dynamic : Fair;Constant support  Ambulation/Gait Training:  Distance (ft): 30 Feet (ft)  Assistive Device: Gait belt;Walker, rolling  Ambulation - Level of Assistance: Minimal assistance; Additional time (assist for balance and walker advancement/ mgmt) Gait Abnormalities: Decreased step clearance;Shuffling gait        Base of Support: Narrowed     Speed/Geovanna: Slow;Shuffled  Step Length: Left shortened;Right shortened  Swing Pattern: Right asymmetrical        Pain Rating:  No c/o pain    Activity Tolerance:   Good, SpO2 stable on RA, and observed SOB with activity    After treatment patient left in no apparent distress:   Sitting in chair, Call bell within reach, and Bed / chair alarm activated    COMMUNICATION/COLLABORATION:   The patients plan of care was discussed with: Registered nurse.      Olivia Juarez, PT   Time Calculation: 24 mins

## 2023-04-24 ENCOUNTER — PATIENT OUTREACH (OUTPATIENT)
Dept: CASE MANAGEMENT | Age: 73
End: 2023-04-24

## 2023-04-24 NOTE — PROGRESS NOTES
Ambulatory Care   Social Work Note    Patient has graduated from the Complex Case Management  program on 04/24/23. At this time all Social Work goals have been completed and the patient/family has the ability to self-manage. No further Social Work follow-up scheduled. Patient/family has Social Work contact information for further questions, concerns, or needs. Goals Addressed                      This Visit's Progress      COMPLETED: Connect with Simon Parra (pt-stated)   On track      04/24/23  Patient discharged to [facility] and [address] [place] [address] on Friday, 4/21/2023. Plan:  No further social work follow up at this time. EPC     04/17/23  Received ADT Inpatient Hospital Notification. Patient presented to the Emergency Department at [facility] on 4/15/2023. Plan: Ambulatory Social Work to monitor patient status and resume care upon discharge. EPC      04/13/23   Patient called this . He explained that his sister has indicated that he cannot stay in her home, due to health challenges that she and her  are facing, as a result of providing his care. Patient asked for resources for personal care aid services, in order to be able to move back into his home in [address] [place] [address], next week. Explained that insurance does not cover personal care aid services. Explained that the cost can average $35/hour, and some agencies require a minimum 4 hours of services. This  provided the following contact information, and the patient plans to reach out, to learn more about these agencies, and availability. [address], X#[phone]  [employer], O#[phone]  [facility], L#[phone]  [facility], I#[phone]    Spoke with the patient's sister, Shan Emanuel, to confirm that he cannot stay in her home, even if he hires care there. Shan Emanuel stated \"I don't want people in this house. This is plain [place]!  I told him he has a week to get out and make arrangements. I can't do it anymore! I called his son and told him, and he said he would check into things. \"      Discussed safety concerns with the patient. Encouraged the patient to consider a facility, for safety reasons, over personal care aid services in his home. He states that he cannot afford a facility. Patient stated that he feels like he only needs a quick visit per day, to help him change his adult diaper. He maintains that he is unable to stand, pivot, or ambulate independently. He maintains that he is unable to cook, clean, or drive. This  informed the patient that she will likely reach out to Adult Protective Services if he returns to his home alone, as a safety precaution, and resource to him ([facility], Phone: [phone]). Patient verbalized understanding. Plan:  Ongoing psychosocial support and resource referral, as desired by the patient and family. This  will follow up with the patient next week. EPC     04/10/23  Connected with the patient via phone. He shared that he does not have any friends, family, or neighbors in [place], to ask about being his caregiver. He has not made any calls or arrangements, and does not plan to do so. Patient stated \"I think I'll just move back to my home in [place], in about two weeks. \"  Patient has no plans to hire caregivers, as he states that he does not have funds to pay for services in the home, or to move into an assisted living facility. Advised that moving back to his home, alone, would not be a safe or appropriate option, as he is unable to stand, pivot, or ambulate without assistance. Patient agreed that this is not a safe or realistic plan. Patient admits that he is estranged from his son and daughter. He does currently have an AMD, but would be willing to talk with the [address] [place] [address] team about naming his sister, Jose Juan Dodge as MPOA.   Patient agreed to allow this  to speak with his sister. Called the patient's sister, Araceli Herron. Araceli Herron explained that she is providing all of his care needs, with her 's assistance. Both of them have health consequences of physically caring for him, and Araceli Herron states that her brother is currently living with them, without contributing any funds to food or household expenses, although he can afford to do so. She states that she is not able or willing to continue to care for him. Explained that if she or her brother decide to hire personal care aid assistance in either her home or his home in [place], [place], this  would be happy to assist in finding an agency in those areas. Otherwise advised that they call [employer], E#[phone], explaining that this agency assists with placements in facilities vs. assistance in finding care in the home setting. Araceli Herron explained that she and her  have medical check ups in the next week or so, to determine their current physical status. Araceli Herron' son is coming from [place] to visit and assist with determining a plan. Araceli Herron shared that she plans to get her brother back to the hospital for a catheter change soon, as he is overdue. Updated colleague Elena Abebe RN/ACM, via [employer]. Plan:  Ongoing psychosocial support and resource referral, as desired by the patient and family. This  will follow up with the patient and his sister in two weeks. EPC     04/05/23  Connected with the patient via phone today. He also provided permission for this  to speak with his sister regarding his health and care needs. Patient's sister, Araceli Herron, joined the phone call, and explained that she and her  are struggling to meet his care needs. Patient is unable to stand, pivot, or ambulate without assistance. He needs help with bathing, dressing, and toileting. He uses a catheter, and diapers.   He uses a walker to stand, and can then pivot, with assistance, to a wheelchair. Shan Emanuel and her  are suffering from physical challenges, as a result of lifting/assisting him. Patient is interested in either placement in a facility vs. [address] [place] [address] in either [address] or [facility]. Provided information for [employer], R#[phone], explaining that this agency assists with placements in facilities vs. assistance in finding care in the home setting. Advised patient of the average cost of an assisted living facility ($6,000/month), nursing facility ($10,000), or care in the home setting ($35-$40/hour). Patient has significant savings; he is not eligible for assistance through the [facility] or Medicaid, as a result of his savings and assets. Patient plans to start by calling friends in the [place] [address] area (where his home is located) to ask if anyone would be interested in being his caregiver, for a lesser fee than $35/hour. Patient states that he will determine his budget. Plan:  Ongoing psychosocial support and resource referral, as desired by the patient and family. This  will follow up with the patient and his sister next week, to check on his status, and progress with the resources provided today.      EPC                 Patient's upcoming visits:    Future Appointments   Date Time Provider Doc Hernandez   6/27/2023  1:00 PM Rene Villalba MD [facility]KATHERYN, [facility]   #[phone]

## 2023-04-24 NOTE — PROGRESS NOTES
Transition of care outreach postponed for 7 days due to patient's discharge to [facility].  Spoke with Jerardo Moscoso confirmed patient arrived to facility on 4/21/2023

## 2023-04-25 ENCOUNTER — PATIENT OUTREACH (OUTPATIENT)
Dept: CASE MANAGEMENT | Age: 73
End: 2023-04-25

## 2023-04-25 NOTE — PROGRESS NOTES
Ambulatory Care Management Note    Date/Time:  4/25/2023 3:33 PM    Patient Current Location: [place]    Patient resolved from the Complex Case Management  program on 4/25/2023. Patient discharged to SNF. Will resume ACM services after discharge. Goals Addressed                   This Visit's Progress     COMPLETED: Attends follow-up appointments as directed. 3/3/23 - Spoke at length with patient and patient's sister. Advised that the patient needs follow-up with neurology and PCP (saw urology last week). Phone numbers provided for patient's sister to schedule appointments. Sister states that she may schedule virtual appointments due to patient's mobility status and due to distance. Patient now lives in [address] [place] [address]. Advised patient to discuss home health services with PCP - will also forward message to PCP. Patient also has an appointment on Monday to establish with [facility] providing wheelchair transportation. Discussed medications. Patient's sister reports that they had a hard time getting all of his prescriptions after he discharged from [address] [place] [address], but they now have everything on the discharge med list. Patient's sister reports that the patient is taking all medications as prescribed per discharge med list. ACM will follow-up next week regarding appointments being scheduled. CAROLYN    3/8/23 - Spoke with patient and patient's sister. Patient attended appointment on Monday at the [address] [place] [address]. Reported that they are getting him set up with rehab services and he may be going to a facility for rehab. They were unable to give details about the facility he will be going to. Encouraged patient and sister to keep ACM updated so that this ACM can assist as needed. Neurology appointment was scheduled in June (reports that is the earliest they could get). Patient's sister will be calling this evening or tomorrow AM to set up an appointment with Dr. Tejas Riojas. Declines additional needs at this time.  ACM to follow-up in 1 week for update on therapy services. KEB    3/14/23 - Patient reported that he has not received any updates on [facility] services. Requested ACM reach out to , Rosana Gonzalez, at the [facility]. AC spoke with Rosanaaniyah Gonzalez who reported that he was approved for skilled care 3/15/23-9/15/23 and the agency is [facility] in [place]. Called [facility] who reported that they have not received any orders yet. Rosana Gonzalez updated and she will reach out to the individual in charge of sending the orders. Rosana Gonzalez with reach out to the patient with updates. Patient also scheduled appointment with Dr. Tejas Riojas on 4/4. [facility] advised that Dr. Tejas Riojas can order home health if needed. Patient will attend appointment. KEMILY    3/21/23 - Patient receiving [facility] nursing and OT through [facility], however, they do not have a physical therapist on staff. Discussed with patient's sister that outpatient PT may be the best option if he has transportation. Call placed to , Rosana Gonzalez, at the [facility] to discuss options. Will also update Dr. Tejas Riojas. Patient has appointment with Dr. Tejas Riojas on 4/4. Recommended patient's sister accompany him to the appointment. KE    4/3/23 - Patient rescheduled appointment with Dr. Tejas Riojas to 5/18 due to transportation. PT has been coming out to see the patient. Per patients sister, she was advised that the patient would also be getting a personal care aide 3 days a week for 4 hours. They have not heard from anyone. Contact information given for , Rosana Gonzalez, at [place]. Patient's sister will reach out for information. KEB    4/5/23 - Received call from patient's sister stating that the [facility] is no longer going to be providing an aide and the are not sure what to do now. Patient's sister feels that he needs placement in a LTC facility or needs a 24/7 caregiver. She and her  are not able to provide the level of care that the patient needs.  Sending  referral. Tasneem Yap    4/19/23 - Noted that patient was admitted to [facility] on 4/15. Reviewed inpatient notes. Plan to discharge to a SNF when ready. ACM will resume services after discharge. CAROLYN    4/25/23 - Will resume services after SNF discharge. KEMILY              Patient has Ambulatory Care Manager's contact information for any further questions, concerns, or needs.   Patients upcoming visits:    Future Appointments   Date Time Provider Doc Hernandez   6/27/2023  1:00 PM Pauline Villalba MD [facility]

## 2023-05-03 ENCOUNTER — PATIENT OUTREACH (OUTPATIENT)
Dept: CASE MANAGEMENT | Age: 73
End: 2023-05-03

## 2023-05-05 ENCOUNTER — PATIENT OUTREACH (OUTPATIENT)
Dept: CASE MANAGEMENT | Age: 73
End: 2023-05-05

## 2023-05-08 ENCOUNTER — TELEPHONE (OUTPATIENT)
Age: 73
End: 2023-05-08

## 2023-05-08 NOTE — TELEPHONE ENCOUNTER
Osman Lopez from [facility] called to get confirmation that Dr Velasquez Cortez will follow home health orders for patient.

## 2023-05-09 NOTE — TELEPHONE ENCOUNTER
Marbella Paredes calling again from [facility] to get confirmation that Dr Nannette Sorenson will follow home health orders for the patient.

## 2023-05-18 ENCOUNTER — TELEPHONE (OUTPATIENT)
Age: 73
End: 2023-05-18

## 2023-06-05 ENCOUNTER — TELEPHONE (OUTPATIENT)
Age: 73
End: 2023-06-05

## 2023-06-05 NOTE — TELEPHONE ENCOUNTER
Pt's EC Cori Adrian called with update:    Pt had a stroke back in Sept., since then he has been in and out of various rehab centers and was also living with his sister for awhile. Now he has been in [facility] for past month. He is paralyzed on right side and right side and is doing PT at least once every day. Also has appt scheduled for later this month.

## 2023-06-08 ENCOUNTER — OFFICE VISIT (OUTPATIENT)
Age: 73
End: 2023-06-08
Payer: MEDICARE

## 2023-06-08 VITALS
HEART RATE: 92 BPM | HEIGHT: 71 IN | BODY MASS INDEX: 30.8 KG/M2 | OXYGEN SATURATION: 97 % | WEIGHT: 220 LBS | SYSTOLIC BLOOD PRESSURE: 110 MMHG | DIASTOLIC BLOOD PRESSURE: 68 MMHG

## 2023-06-08 DIAGNOSIS — Z86.73 REMOTE HISTORY OF STROKE: Primary | ICD-10-CM

## 2023-06-08 DIAGNOSIS — I48.91 ATRIAL FIBRILLATION, UNSPECIFIED TYPE (HCC): ICD-10-CM

## 2023-06-08 PROCEDURE — 99204 OFFICE O/P NEW MOD 45 MIN: CPT | Performed by: PSYCHIATRY & NEUROLOGY

## 2023-06-08 PROCEDURE — 1123F ACP DISCUSS/DSCN MKR DOCD: CPT | Performed by: PSYCHIATRY & NEUROLOGY

## 2023-06-08 PROCEDURE — 3074F SYST BP LT 130 MM HG: CPT | Performed by: PSYCHIATRY & NEUROLOGY

## 2023-06-08 PROCEDURE — 3078F DIAST BP <80 MM HG: CPT | Performed by: PSYCHIATRY & NEUROLOGY

## 2023-06-08 RX ORDER — BALSAM PERU/CASTOR OIL
OINTMENT (GRAM) TOPICAL 2 TIMES DAILY
COMMUNITY

## 2023-06-08 ASSESSMENT — PATIENT HEALTH QUESTIONNAIRE - PHQ9
SUM OF ALL RESPONSES TO PHQ QUESTIONS 1-9: 0
1. LITTLE INTEREST OR PLEASURE IN DOING THINGS: 0
SUM OF ALL RESPONSES TO PHQ QUESTIONS 1-9: 0
2. FEELING DOWN, DEPRESSED OR HOPELESS: 0
SUM OF ALL RESPONSES TO PHQ QUESTIONS 1-9: 0
SUM OF ALL RESPONSES TO PHQ9 QUESTIONS 1 & 2: 0
SUM OF ALL RESPONSES TO PHQ QUESTIONS 1-9: 0

## 2023-06-08 NOTE — PROGRESS NOTES
Medications   Medication Sig Dispense Refill    Balsam [place] Oil OINT Apply topically 2 times daily Apply  to Bony Prominences BID      allopurinol (ZYLOPRIM) 100 MG tablet Take by mouth daily      apixaban (ELIQUIS) 5 MG TABS tablet Take by mouth 2 times daily      atorvastatin (LIPITOR) 80 MG tablet Take 1 tablet by mouth nightly      buPROPion (WELLBUTRIN SR) 100 MG extended release tablet Take by mouth 2 times daily      famotidine (PEPCID) 20 MG tablet Take by mouth every 12 hours      ferrous sulfate (IRON 325) 325 (65 Fe) MG tablet Take by mouth 2 times daily      finasteride (PROSCAR) 5 MG tablet Take by mouth daily      magnesium oxide (MAG-OX) 400 MG tablet Take 1 tablet by mouth nightly      metoprolol succinate (TOPROL XL) 25 MG extended release tablet Take 2 tablets by mouth daily      mirtazapine (REMERON) 7.5 MG tablet Take 1 tablet by mouth nightly      tamsulosin (FLOMAX) 0.4 MG capsule Take by mouth daily      temazepam (RESTORIL) 7.5 MG capsule Take by mouth nightly. traMADol (ULTRAM) 50 MG tablet Take 1 tablet by mouth every 6 hours as needed. No current facility-administered medications for this visit. Exam:  /68   Pulse 92   Ht 5' 11\" (1.803 m)   Wt 220 lb (99.8 kg)   SpO2 97%   BMI 30.68 kg/m²   Gen: Awake, oriented x 3. Follows commands appropriately  RRR, CTAB, Abd soft non-distended   Ext non-edematous  NEUROLOGICAL EXAM:  Awake, alert, speech fluent. No dysarthria/aphasia. CN: PERRL, EOMI without nystagmus, VFF to confrontation, facial sensation and strength are normal and symmetric, hearing is intact to finger rub bilaterally, palate and tongue movements are intact and symmetric. Motor: RUE 3-/5, RLE 4/5, LUE/LLE 5/5  Reflexes: 2/4 and symmetric, plantar stimulation is flexor. Coordination: No ataxia FTN/HTS  Sensation: LT intact throughout.   Gait: Unable to assess, reliant on wheelchair due to R-    LABS  Orders Only on 04/16/2023   Component Date

## 2023-09-06 LAB — HBA1C MFR BLD HPLC: 5 %

## 2023-09-11 ENCOUNTER — PATIENT MESSAGE (OUTPATIENT)
Dept: OTHER | Facility: CLINIC | Age: 73
End: 2023-09-11

## 2023-10-02 ENCOUNTER — TELEPHONE (OUTPATIENT)
Age: 73
End: 2023-10-02

## 2023-10-02 NOTE — TELEPHONE ENCOUNTER
Pt called and would like medications reviewed feels that some of his medications can be eliminated. States that he has been in the hospital a year and they keep increasing dosages.

## 2023-10-03 NOTE — TELEPHONE ENCOUNTER
Placed call to patient to advise per Dr. Tisha Mcdowell that an appointment either virtual or in person is required. Patient stated that he currently resides at [facility] and his meds are passed to him twice a day. He stated that he has not seen a facility physician as of yet. I advised that one of our psr will contact him to schedule a virtual visit with Dr. Tisha Mcdowell to discuss his medications.

## 2023-10-23 ENCOUNTER — HOSPITAL ENCOUNTER (EMERGENCY)
Facility: HOSPITAL | Age: 73
Discharge: NURSING, REHAB OR LONG-TERM CARE | End: 2023-10-23
Attending: EMERGENCY MEDICINE
Payer: MEDICARE

## 2023-10-23 VITALS
TEMPERATURE: 98.3 F | RESPIRATION RATE: 18 BRPM | OXYGEN SATURATION: 97 % | DIASTOLIC BLOOD PRESSURE: 81 MMHG | HEART RATE: 75 BPM | SYSTOLIC BLOOD PRESSURE: 144 MMHG

## 2023-10-23 DIAGNOSIS — T83.011A MALFUNCTION OF FOLEY CATHETER, INITIAL ENCOUNTER (HCC): ICD-10-CM

## 2023-10-23 DIAGNOSIS — K08.89 PAIN, DENTAL: Primary | ICD-10-CM

## 2023-10-23 LAB
ALBUMIN SERPL-MCNC: 3.4 G/DL (ref 3.5–5)
ALBUMIN/GLOB SERPL: 0.9 (ref 1.1–2.2)
ALP SERPL-CCNC: 166 U/L (ref 45–117)
ALT SERPL-CCNC: 24 U/L (ref 12–78)
ANION GAP SERPL CALC-SCNC: 4 MMOL/L (ref 5–15)
AST SERPL-CCNC: 17 U/L (ref 15–37)
BASOPHILS # BLD: 0 K/UL (ref 0–0.1)
BASOPHILS NFR BLD: 1 % (ref 0–1)
BILIRUB SERPL-MCNC: 0.4 MG/DL (ref 0.2–1)
BUN SERPL-MCNC: 18 MG/DL (ref 6–20)
BUN/CREAT SERPL: 22 (ref 12–20)
CALCIUM SERPL-MCNC: 9 MG/DL (ref 8.5–10.1)
CHLORIDE SERPL-SCNC: 106 MMOL/L (ref 97–108)
CO2 SERPL-SCNC: 27 MMOL/L (ref 21–32)
COMMENT:: NORMAL
CREAT SERPL-MCNC: 0.81 MG/DL (ref 0.7–1.3)
DIFFERENTIAL METHOD BLD: ABNORMAL
EOSINOPHIL # BLD: 0.4 K/UL (ref 0–0.4)
EOSINOPHIL NFR BLD: 5 % (ref 0–7)
ERYTHROCYTE [DISTWIDTH] IN BLOOD BY AUTOMATED COUNT: 13.4 % (ref 11.5–14.5)
GLOBULIN SER CALC-MCNC: 4 G/DL (ref 2–4)
GLUCOSE SERPL-MCNC: 101 MG/DL (ref 65–100)
HCT VFR BLD AUTO: 34 % (ref 36.6–50.3)
HGB BLD-MCNC: 10.8 G/DL (ref 12.1–17)
IMM GRANULOCYTES # BLD AUTO: 0 K/UL (ref 0–0.04)
IMM GRANULOCYTES NFR BLD AUTO: 0 % (ref 0–0.5)
LYMPHOCYTES # BLD: 1.7 K/UL (ref 0.8–3.5)
LYMPHOCYTES NFR BLD: 22 % (ref 12–49)
MCH RBC QN AUTO: 29.9 PG (ref 26–34)
MCHC RBC AUTO-ENTMCNC: 31.8 G/DL (ref 30–36.5)
MCV RBC AUTO: 94.2 FL (ref 80–99)
MONOCYTES # BLD: 0.8 K/UL (ref 0–1)
MONOCYTES NFR BLD: 10 % (ref 5–13)
NEUTS SEG # BLD: 4.9 K/UL (ref 1.8–8)
NEUTS SEG NFR BLD: 62 % (ref 32–75)
NRBC # BLD: 0 K/UL (ref 0–0.01)
NRBC BLD-RTO: 0 PER 100 WBC
PLATELET # BLD AUTO: 205 K/UL (ref 150–400)
PMV BLD AUTO: 11.6 FL (ref 8.9–12.9)
POTASSIUM SERPL-SCNC: 4 MMOL/L (ref 3.5–5.1)
PROT SERPL-MCNC: 7.4 G/DL (ref 6.4–8.2)
RBC # BLD AUTO: 3.61 M/UL (ref 4.1–5.7)
SODIUM SERPL-SCNC: 137 MMOL/L (ref 136–145)
SPECIMEN HOLD: NORMAL
WBC # BLD AUTO: 7.9 K/UL (ref 4.1–11.1)

## 2023-10-23 PROCEDURE — 85025 COMPLETE CBC W/AUTO DIFF WBC: CPT

## 2023-10-23 PROCEDURE — 36415 COLL VENOUS BLD VENIPUNCTURE: CPT

## 2023-10-23 PROCEDURE — 80053 COMPREHEN METABOLIC PANEL: CPT

## 2023-10-23 PROCEDURE — 99283 EMERGENCY DEPT VISIT LOW MDM: CPT

## 2023-10-23 RX ORDER — CLINDAMYCIN HYDROCHLORIDE 150 MG/1
450 CAPSULE ORAL 3 TIMES DAILY
Qty: 63 CAPSULE | Refills: 0 | Status: SHIPPED | OUTPATIENT
Start: 2023-10-23 | End: 2023-10-30

## 2023-10-23 ASSESSMENT — PAIN - FUNCTIONAL ASSESSMENT: PAIN_FUNCTIONAL_ASSESSMENT: 0-10

## 2023-10-23 ASSESSMENT — PAIN DESCRIPTION - ORIENTATION: ORIENTATION: LEFT

## 2023-10-23 ASSESSMENT — PAIN SCALES - GENERAL: PAINLEVEL_OUTOF10: 3

## 2023-10-23 ASSESSMENT — PAIN DESCRIPTION - DESCRIPTORS: DESCRIPTORS: ACHING

## 2023-10-23 ASSESSMENT — PAIN DESCRIPTION - LOCATION: LOCATION: TEETH

## 2023-10-23 NOTE — ED NOTES
Pt states the gan feels better and is not leaking now and he feels good to leave and go back to his facility.   Dr Tahir Werner aware     [facility] [place], MARYANNE  10/23/23 1564

## 2023-10-23 NOTE — ED PROVIDER NOTES
inflated with resolution of leaking. Labs are unremarkable. We will treat for apical abscess with clindamycin. Discussed my clinical impression(s), any labs and/or radiology results with the patient. I answered any questions and addressed any concerns. Discussed the importance of following up with their primary care physician and/or specialist(s). Discussed signs or symptoms that would warrant return back to the ER for further evaluation. The patient is agreeable with discharge. Amount and/or Complexity of Data Reviewed  Labs: ordered. Risk  Prescription drug management. EKG: All EKG's are interpreted by the Emergency Department Physician who either signs or Co-signs this chart in the absence of a cardiologist.         [facility] time was 0 minutes, excluding separately reportable procedures. There was a high probability of clinically significant/life threatening deterioration in the patient's condition which required my urgent intervention. CONSULTS:  None    PROCEDURES:  Unless otherwise noted below, none     Procedures    FINAL IMPRESSION      1. Pain, dental    2.  Malfunction of [place] catheter, initial encounter [facility])          DISPOSITION/PLAN   DISPOSITION Decision To Discharge 10/23/2023 05:53:44 PM    PATIENT REFERRED TO:  Carolyn Hastings MD  [address] [place] [address]  [phone]    Schedule an appointment as soon as possible for a visit       [facility] [place] [facility] [address] [facility]  [phone]    As needed, If symptoms worsen      DISCHARGE MEDICATIONS:  Discharge Medication List as of 10/23/2023  7:15 PM        START taking these medications    Details   clindamycin (CLEOCIN) 150 MG capsule Take 3 capsules by mouth 3 times daily for 7 days, Disp-63 capsule, R-0Normal           Controlled Substances Monitoring:          No data to display                (Please note that portions of this note were completed

## 2023-10-23 NOTE — ED TRIAGE NOTES
Patient arrives to ED via EMS from [facility] for generalized weakness, tooth pain, leaking gan catheter, and trouble sleeping for \"weeks\".

## 2023-10-23 NOTE — ED NOTES
Pt c/o his gan is leaking x 1week. Balloon checked and only 8 ml was in bulb of balloon. 10 ml inflated into bulb. Pt Tolerated well.        Shani Gonzales RN  10/23/23 1611       Shani Gonzales RN  10/23/23 1759

## 2023-12-13 NOTE — PROGRESS NOTES
[facility]  Hospitalist Group                                                                                          Hospitalist Progress Note  Joselin Moreno MD  Answering service: [phone] from in house phone        Date of Service:  2022  NAME:  Tru Nieto  :  1950  MRN:  [id number]      Admission Summary:     Patient presents with acute CVA, CT head and follow-up MRI shows large acute left MCA territory infarct. Patient with some expressive aphasia and right-sided weakness. Neuro following. Initially was admitted to ICU and transferred out of ICU 9/15/2022. Interval history / Subjective:     Patient has no acute complaint. Noted A. fib with RVR this AM.     Assessment & Plan:     Acute CVA  -CT head shows large acute left MCA territory infarct, CTA of the head and neck shows severe stenosis, near occlusive thrombus of the distal left MCA M1  -MRI of the brain which shows moderate to large acute left MCA territory infarct with findings associated with petechial hemorrhage  -Echo with bubble study shows EF of 50 to 55%, bubble study is not adequate to rule out shunt  -Neurology following, recommending holding antiplatelet and anticoagulation for 2 weeks due to large CVA with hemorrhagic conversion  -Patient continuing on statin  -Speech therapy following for aphasia and dysphagia, s/p FEES  and started on p.o.     Hypernatremia  -On hypotonic saline  -Monitor sodium level     Paroxysmal atrial fibrillation with RVR  -On IV metoprolol and Cardizem, cardiology adjusting doses  -Currently holding anticoagulation due to large CVA with hemorrhagic conversion  -Patient was previously taking Xarelto compliantly and therefore may indicate failure of Xarelto, plan to change to Eliquis when patient is cleared to take oral anticoagulation    DOMINICK  -Multiple etiologies including volume depletion, ATN from contrast  -Renal ultrasound shows mild left hydronephrosis  -S/p Operative Note :  Krupa Sebastian MD    Patient/:   Rafia Baker / 1969    Procedure Date:   23    Pre-op Diagnosis:  Encounter for screening colonoscopy [Z12.11]    Post-Operative Diagnosis:  Encounter for screening colonoscopy     Procedure:   Flexible colonoscopy to the cecum and terminal ileum    Surgeon:   Krupa Sebastian MD    Assistant:   None    Anesthesia:    MAC (monitored anesthetic care)    Estimated Blood Loss:   Minimal    Specimens:   none    Complications:   None    Indications:  Screening colonoscopy    Findings:  JENNIFER without palpable abnormalities  Normal appearing terminal ileum mucosa  Normal appearing mucosa of colon and rectum    Description of procedure:  The patient was brought to the endoscopy suite and placed in the left lateral decubitus position.  Continuous propofol anesthesia was administered.  A surgical timeout was completed.  A digital rectal exam was performed, revealing no palpable abnormalities.  An adult colonoscope was then inserted through the anus and passed under direct visualization to the level of the cecum.  The cecum was identified via the ileocecal valve as well as the appendiceal orifice.  The terminal ileum was intubated and the mucosa appeared normal.  The scope was then slowly withdrawn, examining all circumferential walls of the ascending, transverse, descending, and sigmoid colon, as well as the rectum. There were no abnormalities noted. The scope was then withdrawn and the colon desufflated.  The patient had an excellent bowel prep.  The colonoscopy was performed without difficulty.  The patient was transferred to the recovery area in stable condition.     Recommendations:  Repeat screening colonoscopy in 10 years.    Krupa Sebastian M.D.  General, Robotic, and Endoscopic Surgery  [facility]    [address] [place] [address] [address]  [place], [place], [address]  P: [phone]  F: [phone]      Sexton catheter placement, plan to repeat ultrasound in 1 to 2 days  -Nephrology evaluating the patient     Rhabdomyolysis  -This is likely due to prolonged immobilization after his CVA  -Continue IV fluid, CK trending down     Elevated troponin  -Likely demand ischemia     Prerenal azotemia  -Continue IV fluid and monitor     Dyslipidemia  -Continue statin, LDL at goal      Code status: Full  Prophylaxis: SCDs  Care Plan discussed with: Patient  Anticipated Disposition: 24 to 48 hours    CRITICAL CARE ATTESTATION:  I had a face to face encounter with the patient, reviewed and interpreted patient data including clinical events, labs, images, vital signs, I/O's, and examined patient. I have discussed the case and the plan and management of the patient's care with the consulting services, the bedside nurses and necessary ancillary providers. NOTE OF PERSONAL INVOLVEMENT IN CARE   This patient has a high probability of imminent, clinically significant deterioration, which requires the highest level of preparedness to intervene urgently. I participated in the decision-making and personally managed or directed the management of the following life and organ supporting interventions that required my frequent assessment to treat or prevent imminent deterioration. I personally spent 45 minutes of critical care time. This is time spent at this critically ill patient's bedside actively involved in patient care as well as the coordination of care and discussions with the patient's family. This does not include any procedural time which has been billed separately. Hospital Problems  Date Reviewed: 7/28/2022            Codes Class Noted POA    Ischemic cerebrovascular accident (CVA) [facility]) ICD-10-CM: I63.9  ICD-9-CM: 434.91  9/14/2022 Unknown             Review of Systems:   A comprehensive review of systems was negative except for that written in the HPI.        Vital Signs:    Last 24hrs VS reviewed since prior progress note. Most recent are:  Visit Vitals  /62   Pulse (!) 103   Temp 97.8 °F (36.6 °C)   Resp 25   Ht 6' 3\" (1.905 m)   Wt 125.8 kg (277 lb 5.4 oz)   SpO2 94%   BMI 34.66 kg/m²         Intake/Output Summary (Last 24 hours) at 9/21/2022 1512  Last data filed at 9/21/2022 1200  Gross per 24 hour   Intake --   Output 2550 ml   Net -2550 ml          Physical Examination:     I had a face to face encounter with this patient and independently examined them on 9/21/2022 as outlined below:          Constitutional:  No acute distress, cooperative, pleasant    ENT:  Oral mucosa moist, oropharynx benign. Resp:  CTA bilaterally. No wheezing/rhonchi/rales. No accessory muscle use. CV:  Regular rhythm, normal rate, no murmurs, gallops, rubs    GI:  Soft, non distended, non tender. normoactive bowel sounds, no hepatosplenomegaly     Musculoskeletal:  No edema, warm, 2+ pulses throughout    Neurologic:  Moves all extremities. Awake and alert            Data Review:    Review and/or order of clinical lab test      Labs:     Recent Labs     09/21/22  0516 09/20/22  0346   WBC 12.3* 16.4*   HGB 13.5 14.8   HCT 43.8 46.4   * 212     Recent Labs     09/21/22  0516 09/20/22  0346 09/19/22  1639 09/19/22  0101   * 154* 153*  --    K 4.4 4.6 4.3  --    * 125* 125*  --    CO2 23 20* 20*  --    BUN 89* 75* 61*  --    CREA 3.39* 3.73* 2.80*  --    * 166* 170*  --    CA 8.7 8.8 8.8  --    MG  --  2.8*  --  2.4   PHOS 5.1*  --   --   --      No results for input(s): ALT, AP, TBIL, TBILI, TP, ALB, GLOB, GGT, AML, LPSE in the last 72 hours. No lab exists for component: SGOT, GPT, AMYP, HLPSE    No results for input(s): INR, PTP, APTT, INREXT, INREXT in the last 72 hours. No results for input(s): FE, TIBC, PSAT, FERR in the last 72 hours. No results found for: FOL, RBCF   No results for input(s): PH, PCO2, PO2 in the last 72 hours.   No results for input(s): CPK, CKNDX, TROIQ in the last 72 hours.    No lab exists for component: CPKMB    Lab Results   Component Value Date/Time    Cholesterol, total 146 09/15/2022 02:40 AM    HDL Cholesterol 53 09/15/2022 02:40 AM    LDL, calculated 65 09/15/2022 02:40 AM    Triglyceride 140 09/15/2022 02:40 AM    CHOL/HDL Ratio 2.8 09/15/2022 02:40 AM     Lab Results   Component Value Date/Time    Glucose (POC) 107 09/21/2022 11:53 AM    Glucose (POC) 124 (H) 09/21/2022 05:59 AM    Glucose (POC) 152 (H) 09/21/2022 12:34 AM    Glucose (POC) 129 (H) 09/20/2022 05:22 PM    Glucose (POC) 141 (H) 09/20/2022 11:51 AM     Lab Results   Component Value Date/Time    Color YELLOW/STRAW 09/21/2022 10:33 AM    Appearance CLOUDY (A) 09/21/2022 10:33 AM    Specific gravity 1.014 09/21/2022 10:33 AM    pH (UA) 5.0 09/21/2022 10:33 AM    Protein TRACE (A) 09/21/2022 10:33 AM    Glucose Negative 09/21/2022 10:33 AM    Ketone Negative 09/21/2022 10:33 AM    Bilirubin Negative 09/21/2022 10:33 AM    Urobilinogen 0.2 09/21/2022 10:33 AM    Nitrites Negative 09/21/2022 10:33 AM    Leukocyte Esterase MODERATE (A) 09/21/2022 10:33 AM    Epithelial cells FEW 09/21/2022 10:33 AM    Bacteria 2+ (A) 09/21/2022 10:33 AM    WBC 10-20 09/21/2022 10:33 AM    RBC  09/21/2022 10:33 AM         Medications Reviewed:     Current Facility-Administered Medications   Medication Dose Route Frequency    0.45% sodium chloride infusion  100 mL/hr IntraVENous CONTINUOUS    cefTRIAXone (ROCEPHIN) 1 g in 0.9% sodium chloride 10 mL IV syringe  1 g IntraVENous Q24H    metoprolol (LOPRESSOR) injection 7.5 mg  7.5 mg IntraVENous Q6H    dilTIAZem (CARDIZEM) 125 mg in dextrose 5% 125 mL infusion  0-15 mg/hr IntraVENous TITRATE    famotidine (PEPCID) tablet 20 mg  20 mg Oral DAILY    HYDROmorphone (DILAUDID) injection 0.2 mg  0.2 mg IntraVENous Q3H PRN    labetaloL (NORMODYNE;TRANDATE) injection 5 mg  5 mg IntraVENous Q6H PRN    balsam peru-castor oiL (VENELEX) ointment   Topical BID    glucose chewable tablet 16 g 4 Tablet Oral PRN    glucagon (GLUCAGEN) injection 1 mg  1 mg IntraMUSCular PRN    dextrose 10 % infusion 0-250 mL  0-250 mL IntraVENous PRN    insulin lispro (HUMALOG) injection   SubCUTAneous Q6H    acetaminophen (TYLENOL) tablet 650 mg  650 mg Oral Q4H PRN    Or    acetaminophen (TYLENOL) solution 650 mg  650 mg Per NG tube Q4H PRN    Or    acetaminophen (TYLENOL) suppository 650 mg  650 mg Rectal Q4H PRN    atorvastatin (LIPITOR) tablet 80 mg  80 mg Oral QHS    docusate sodium (COLACE) capsule 100 mg  100 mg Oral BID     ______________________________________________________________________  EXPECTED LENGTH OF STAY: 4d 9h  ACTUAL LENGTH OF STAY:          7                 Carlos Coffman MD

## 2024-04-11 ENCOUNTER — OFFICE VISIT (OUTPATIENT)
Age: 74
End: 2024-04-11
Payer: MEDICARE

## 2024-04-11 VITALS
WEIGHT: 278 LBS | TEMPERATURE: 98.7 F | SYSTOLIC BLOOD PRESSURE: 106 MMHG | RESPIRATION RATE: 18 BRPM | DIASTOLIC BLOOD PRESSURE: 66 MMHG | HEART RATE: 71 BPM | OXYGEN SATURATION: 94 % | BODY MASS INDEX: 38.77 KG/M2

## 2024-04-11 DIAGNOSIS — N48.83 ACQUIRED BURIED PENIS: Primary | ICD-10-CM

## 2024-04-11 DIAGNOSIS — I48.91 ATRIAL FIBRILLATION, UNSPECIFIED TYPE (HCC): ICD-10-CM

## 2024-04-11 DIAGNOSIS — E11.42 TYPE 2 DIABETES MELLITUS WITH DIABETIC POLYNEUROPATHY, WITHOUT LONG-TERM CURRENT USE OF INSULIN (HCC): ICD-10-CM

## 2024-04-11 DIAGNOSIS — R60.0 LOWER EXTREMITY EDEMA: ICD-10-CM

## 2024-04-11 PROCEDURE — 3074F SYST BP LT 130 MM HG: CPT | Performed by: INTERNAL MEDICINE

## 2024-04-11 PROCEDURE — 1036F TOBACCO NON-USER: CPT | Performed by: INTERNAL MEDICINE

## 2024-04-11 PROCEDURE — 99214 OFFICE O/P EST MOD 30 MIN: CPT | Performed by: INTERNAL MEDICINE

## 2024-04-11 PROCEDURE — 3046F HEMOGLOBIN A1C LEVEL >9.0%: CPT | Performed by: INTERNAL MEDICINE

## 2024-04-11 PROCEDURE — G8417 CALC BMI ABV UP PARAM F/U: HCPCS | Performed by: INTERNAL MEDICINE

## 2024-04-11 PROCEDURE — 3078F DIAST BP <80 MM HG: CPT | Performed by: INTERNAL MEDICINE

## 2024-04-11 PROCEDURE — 3017F COLORECTAL CA SCREEN DOC REV: CPT | Performed by: INTERNAL MEDICINE

## 2024-04-11 PROCEDURE — G8427 DOCREV CUR MEDS BY ELIG CLIN: HCPCS | Performed by: INTERNAL MEDICINE

## 2024-04-11 PROCEDURE — 1123F ACP DISCUSS/DSCN MKR DOCD: CPT | Performed by: INTERNAL MEDICINE

## 2024-04-11 PROCEDURE — 2022F DILAT RTA XM EVC RTNOPTHY: CPT | Performed by: INTERNAL MEDICINE

## 2024-04-11 RX ORDER — FUROSEMIDE 40 MG/1
40 TABLET ORAL DAILY
COMMUNITY

## 2024-04-11 RX ORDER — BUMETANIDE 2 MG/1
2 TABLET ORAL DAILY
COMMUNITY

## 2024-04-11 RX ORDER — PHENOL 1.4 %
10 AEROSOL, SPRAY (ML) MUCOUS MEMBRANE PRN
COMMUNITY

## 2024-04-11 SDOH — ECONOMIC STABILITY: INCOME INSECURITY: HOW HARD IS IT FOR YOU TO PAY FOR THE VERY BASICS LIKE FOOD, HOUSING, MEDICAL CARE, AND HEATING?: NOT HARD AT ALL

## 2024-04-11 SDOH — ECONOMIC STABILITY: FOOD INSECURITY: WITHIN THE PAST 12 MONTHS, YOU WORRIED THAT YOUR FOOD WOULD RUN OUT BEFORE YOU GOT MONEY TO BUY MORE.: NEVER TRUE

## 2024-04-11 SDOH — ECONOMIC STABILITY: HOUSING INSECURITY
IN THE LAST 12 MONTHS, WAS THERE A TIME WHEN YOU DID NOT HAVE A STEADY PLACE TO SLEEP OR SLEPT IN A SHELTER (INCLUDING NOW)?: NO

## 2024-04-11 SDOH — ECONOMIC STABILITY: FOOD INSECURITY: WITHIN THE PAST 12 MONTHS, THE FOOD YOU BOUGHT JUST DIDN'T LAST AND YOU DIDN'T HAVE MONEY TO GET MORE.: NEVER TRUE

## 2024-04-11 ASSESSMENT — PATIENT HEALTH QUESTIONNAIRE - PHQ9
2. FEELING DOWN, DEPRESSED OR HOPELESS: NOT AT ALL
SUM OF ALL RESPONSES TO PHQ QUESTIONS 1-9: 0
9. THOUGHTS THAT YOU WOULD BE BETTER OFF DEAD, OR OF HURTING YOURSELF: NOT AT ALL
8. MOVING OR SPEAKING SO SLOWLY THAT OTHER PEOPLE COULD HAVE NOTICED. OR THE OPPOSITE, BEING SO FIGETY OR RESTLESS THAT YOU HAVE BEEN MOVING AROUND A LOT MORE THAN USUAL: NOT AT ALL
SUM OF ALL RESPONSES TO PHQ QUESTIONS 1-9: 0
5. POOR APPETITE OR OVEREATING: NOT AT ALL
10. IF YOU CHECKED OFF ANY PROBLEMS, HOW DIFFICULT HAVE THESE PROBLEMS MADE IT FOR YOU TO DO YOUR WORK, TAKE CARE OF THINGS AT HOME, OR GET ALONG WITH OTHER PEOPLE: NOT DIFFICULT AT ALL
4. FEELING TIRED OR HAVING LITTLE ENERGY: NOT AT ALL
3. TROUBLE FALLING OR STAYING ASLEEP: NOT AT ALL
SUM OF ALL RESPONSES TO PHQ QUESTIONS 1-9: 0
SUM OF ALL RESPONSES TO PHQ QUESTIONS 1-9: 0
SUM OF ALL RESPONSES TO PHQ9 QUESTIONS 1 & 2: 0
1. LITTLE INTEREST OR PLEASURE IN DOING THINGS: NOT AT ALL
6. FEELING BAD ABOUT YOURSELF - OR THAT YOU ARE A FAILURE OR HAVE LET YOURSELF OR YOUR FAMILY DOWN: NOT AT ALL
7. TROUBLE CONCENTRATING ON THINGS, SUCH AS READING THE NEWSPAPER OR WATCHING TELEVISION: NOT AT ALL

## 2024-04-11 ASSESSMENT — ENCOUNTER SYMPTOMS
COUGH: 0
SHORTNESS OF BREATH: 0

## 2024-04-11 NOTE — PROGRESS NOTES
Fito Barros is a 73 y.o. male  Chief Complaint   Patient presents with   • Leg Swelling     Leg Swelling, Penis is stuck back in it's valve per patient.        Vitals:    04/11/24 1451   BP: 106/66   Pulse: 71   Resp: 18   Temp: 98.7 °F (37.1 °C)   SpO2: 94%          HPI  Mr. Barros is a 73 y.o. who has chronic Lower extremity edema and stasis dermatitis presents with complaints of  bilateral LE edema. He was started on lasix to control swelling and he wants to know if that is ok. He gets this diuretics at his facility and they had blood works and they didn't have any concern. He has a history of penile implant per record and he reports that penis is too small and has to use cup to urinate and the diuretics made it difficult for him.            Past Medical History:   Diagnosis Date   • Arrhythmia     atrial fib   • Depression 4/24/2010   • Diabetes ([facility])     diet control for pre-diabetes   • Dyslipidemia, goal LDL below 100 6/14/2011   • Gout    • HTN (hypertension) 4/24/2010   • Impotence 4/24/2010   • Morbid obesity ([facility]) 5/17/2010   • SAMEER (obstructive sleep apnea) 4/24/2010    CPAP   • Restless legs 4/15/2015            ROS  Review of Systems   Constitutional:  Negative for fever.   Respiratory:  Negative for cough and shortness of breath.    Cardiovascular:  Negative for chest pain and palpitations.   Neurological:  Negative for headaches.   Psychiatric/Behavioral:  Negative for dysphoric mood.            EXAM  Physical Exam  Vitals and nursing note reviewed.   HENT:      Head: Normocephalic and atraumatic.   Cardiovascular:      Rate and Rhythm: Normal rate and regular rhythm.      Pulses: Normal pulses.      Heart sounds: Normal heart sounds. No murmur heard.  Pulmonary:      Effort: Pulmonary effort is normal. No respiratory distress.      Breath sounds: Normal breath sounds.   Abdominal:      Palpations: There is no mass.      Tenderness: There is no right CVA tenderness, left CVA tenderness or rebound.

## 2024-04-11 NOTE — PROGRESS NOTES
I have reviewed all needed documentation in preparation for visit. Verified patient by name and date of birth  Chief Complaint   Patient presents with    Leg Swelling     Leg Swelling, Penis is stuck back in it's valve per patient.        There were no vitals filed for this visit.    Health Maintenance Due   Topic Date Due    Respiratory Syncytial Virus (RSV) Pregnant or age 60 yrs+ (1 - 1-dose 60+ series) Never done    Diabetic retinal exam  06/19/2014    Pneumococcal 65+ years Vaccine (3 of 3 - PPSV23 or PCV20) 01/13/2019    Diabetic foot exam  06/14/2022    Colorectal Cancer Screen  06/27/2022    Diabetic Alb to Cr ratio (uACR) test  08/23/2022    COVID-19 Vaccine (7 - 2023-24 season) 09/01/2023    Lipids  09/15/2023    A1C test (Diabetic or Prediabetic)  10/16/2023    Annual Wellness Visit (Medicare)  11/27/2023     \"Have you been to the ER, urgent care clinic since your last visit?  Hospitalized since your last visit?\"    NO    “Have you seen or consulted any other health care providers outside of [facility] since your last visit?”    NO      “Have you had a colorectal cancer screening such as a colonoscopy/FIT/Cologuard?    NO    Date of last Colonoscopy: 6/27/2017  No cologuard on file  No FIT/FOBT on file   No flexible sigmoidoscopy on file         Click Here for Release of Records Request     CHERELLE Hernandez

## 2024-05-24 ENCOUNTER — APPOINTMENT (OUTPATIENT)
Facility: HOSPITAL | Age: 74
DRG: 637 | End: 2024-05-24
Payer: MEDICARE

## 2024-05-24 ENCOUNTER — HOSPITAL ENCOUNTER (INPATIENT)
Facility: HOSPITAL | Age: 74
LOS: 5 days | Discharge: NURSING, REHAB OR LONG-TERM CARE | DRG: 637 | End: 2024-05-29
Attending: STUDENT IN AN ORGANIZED HEALTH CARE EDUCATION/TRAINING PROGRAM | Admitting: INTERNAL MEDICINE
Payer: MEDICARE

## 2024-05-24 DIAGNOSIS — R79.89 ELEVATED TROPONIN: ICD-10-CM

## 2024-05-24 DIAGNOSIS — R60.0 PERIPHERAL EDEMA: Primary | ICD-10-CM

## 2024-05-24 DIAGNOSIS — R06.02 SHORTNESS OF BREATH: ICD-10-CM

## 2024-05-24 DIAGNOSIS — L03.119 CELLULITIS OF LOWER EXTREMITY, UNSPECIFIED LATERALITY: ICD-10-CM

## 2024-05-24 DIAGNOSIS — I50.9 NEW ONSET OF CONGESTIVE HEART FAILURE (HCC): ICD-10-CM

## 2024-05-24 LAB
ALBUMIN SERPL-MCNC: 3.5 G/DL (ref 3.5–5)
ALBUMIN/GLOB SERPL: 0.7 (ref 1.1–2.2)
ALP SERPL-CCNC: 168 U/L (ref 45–117)
ALT SERPL-CCNC: 26 U/L (ref 12–78)
ANION GAP SERPL CALC-SCNC: 2 MMOL/L (ref 5–15)
APPEARANCE UR: CLEAR
AST SERPL-CCNC: 20 U/L (ref 15–37)
BACTERIA URNS QL MICRO: NEGATIVE /HPF
BASOPHILS # BLD: 0 K/UL (ref 0–0.1)
BASOPHILS NFR BLD: 0 % (ref 0–1)
BILIRUB SERPL-MCNC: 0.7 MG/DL (ref 0.2–1)
BILIRUB UR QL: NEGATIVE
BUN SERPL-MCNC: 25 MG/DL (ref 6–20)
BUN/CREAT SERPL: 26 (ref 12–20)
CALCIUM SERPL-MCNC: 9.1 MG/DL (ref 8.5–10.1)
CHLORIDE SERPL-SCNC: 104 MMOL/L (ref 97–108)
CO2 SERPL-SCNC: 29 MMOL/L (ref 21–32)
COLOR UR: ABNORMAL
COMMENT:: NORMAL
CREAT SERPL-MCNC: 0.98 MG/DL (ref 0.7–1.3)
DIFFERENTIAL METHOD BLD: ABNORMAL
EKG DIAGNOSIS: NORMAL
EKG Q-T INTERVAL: 346 MS
EKG QRS DURATION: 98 MS
EKG QTC CALCULATION (BAZETT): 423 MS
EKG R AXIS: 72 DEGREES
EKG T AXIS: 18 DEGREES
EKG VENTRICULAR RATE: 90 BPM
EOSINOPHIL # BLD: 0.4 K/UL (ref 0–0.4)
EOSINOPHIL NFR BLD: 5 % (ref 0–7)
EPITH CASTS URNS QL MICRO: ABNORMAL /LPF
ERYTHROCYTE [DISTWIDTH] IN BLOOD BY AUTOMATED COUNT: 14.2 % (ref 11.5–14.5)
GLOBULIN SER CALC-MCNC: 4.8 G/DL (ref 2–4)
GLUCOSE SERPL-MCNC: 86 MG/DL (ref 65–100)
GLUCOSE UR STRIP.AUTO-MCNC: NEGATIVE MG/DL
HCT VFR BLD AUTO: 35.1 % (ref 36.6–50.3)
HGB BLD-MCNC: 11.4 G/DL (ref 12.1–17)
HGB UR QL STRIP: NEGATIVE
HYALINE CASTS URNS QL MICRO: ABNORMAL /LPF (ref 0–5)
IMM GRANULOCYTES # BLD AUTO: 0.1 K/UL (ref 0–0.04)
IMM GRANULOCYTES NFR BLD AUTO: 1 % (ref 0–0.5)
KETONES UR QL STRIP.AUTO: NEGATIVE MG/DL
LACTATE SERPL-SCNC: 1.2 MMOL/L (ref 0.4–2)
LACTATE SERPL-SCNC: 1.4 MMOL/L (ref 0.4–2)
LEUKOCYTE ESTERASE UR QL STRIP.AUTO: ABNORMAL
LYMPHOCYTES # BLD: 1.4 K/UL (ref 0.8–3.5)
LYMPHOCYTES NFR BLD: 17 % (ref 12–49)
MCH RBC QN AUTO: 29.9 PG (ref 26–34)
MCHC RBC AUTO-ENTMCNC: 32.5 G/DL (ref 30–36.5)
MCV RBC AUTO: 92.1 FL (ref 80–99)
MONOCYTES # BLD: 0.9 K/UL (ref 0–1)
MONOCYTES NFR BLD: 10 % (ref 5–13)
NEUTS SEG # BLD: 5.5 K/UL (ref 1.8–8)
NEUTS SEG NFR BLD: 67 % (ref 32–75)
NITRITE UR QL STRIP.AUTO: NEGATIVE
NRBC # BLD: 0 K/UL (ref 0–0.01)
NRBC BLD-RTO: 0 PER 100 WBC
NT PRO BNP: 726 PG/ML
PH UR STRIP: 5.5 (ref 5–8)
PLATELET # BLD AUTO: 210 K/UL (ref 150–400)
PMV BLD AUTO: 10.6 FL (ref 8.9–12.9)
POTASSIUM SERPL-SCNC: 4.1 MMOL/L (ref 3.5–5.1)
PROCALCITONIN SERPL-MCNC: <0.05 NG/ML
PROT SERPL-MCNC: 8.3 G/DL (ref 6.4–8.2)
PROT UR STRIP-MCNC: NEGATIVE MG/DL
RBC # BLD AUTO: 3.81 M/UL (ref 4.1–5.7)
RBC #/AREA URNS HPF: ABNORMAL /HPF (ref 0–5)
SODIUM SERPL-SCNC: 135 MMOL/L (ref 136–145)
SP GR UR REFRACTOMETRY: 1.01 (ref 1–1.03)
SPECIMEN HOLD: NORMAL
TROPONIN I SERPL HS-MCNC: 142 NG/L (ref 0–76)
TROPONIN I SERPL HS-MCNC: 145 NG/L (ref 0–76)
URINE CULTURE IF INDICATED: ABNORMAL
UROBILINOGEN UR QL STRIP.AUTO: 0.2 EU/DL (ref 0.2–1)
WBC # BLD AUTO: 8.4 K/UL (ref 4.1–11.1)
WBC URNS QL MICRO: ABNORMAL /HPF (ref 0–4)

## 2024-05-24 PROCEDURE — 96374 THER/PROPH/DIAG INJ IV PUSH: CPT

## 2024-05-24 PROCEDURE — 84145 PROCALCITONIN (PCT): CPT

## 2024-05-24 PROCEDURE — 6360000002 HC RX W HCPCS: Performed by: INTERNAL MEDICINE

## 2024-05-24 PROCEDURE — 2580000003 HC RX 258: Performed by: INTERNAL MEDICINE

## 2024-05-24 PROCEDURE — 73030 X-RAY EXAM OF SHOULDER: CPT

## 2024-05-24 PROCEDURE — 99222 1ST HOSP IP/OBS MODERATE 55: CPT | Performed by: INTERNAL MEDICINE

## 2024-05-24 PROCEDURE — 2060000000 HC ICU INTERMEDIATE R&B

## 2024-05-24 PROCEDURE — 6370000000 HC RX 637 (ALT 250 FOR IP): Performed by: NURSE PRACTITIONER

## 2024-05-24 PROCEDURE — 81001 URINALYSIS AUTO W/SCOPE: CPT

## 2024-05-24 PROCEDURE — 83605 ASSAY OF LACTIC ACID: CPT

## 2024-05-24 PROCEDURE — 85025 COMPLETE CBC W/AUTO DIFF WBC: CPT

## 2024-05-24 PROCEDURE — 6370000000 HC RX 637 (ALT 250 FOR IP): Performed by: INTERNAL MEDICINE

## 2024-05-24 PROCEDURE — 6360000002 HC RX W HCPCS: Performed by: STUDENT IN AN ORGANIZED HEALTH CARE EDUCATION/TRAINING PROGRAM

## 2024-05-24 PROCEDURE — 2580000003 HC RX 258: Performed by: STUDENT IN AN ORGANIZED HEALTH CARE EDUCATION/TRAINING PROGRAM

## 2024-05-24 PROCEDURE — 84484 ASSAY OF TROPONIN QUANT: CPT

## 2024-05-24 PROCEDURE — 96375 TX/PRO/DX INJ NEW DRUG ADDON: CPT

## 2024-05-24 PROCEDURE — 36415 COLL VENOUS BLD VENIPUNCTURE: CPT

## 2024-05-24 PROCEDURE — 83880 ASSAY OF NATRIURETIC PEPTIDE: CPT

## 2024-05-24 PROCEDURE — 87040 BLOOD CULTURE FOR BACTERIA: CPT

## 2024-05-24 PROCEDURE — 99285 EMERGENCY DEPT VISIT HI MDM: CPT

## 2024-05-24 PROCEDURE — 87086 URINE CULTURE/COLONY COUNT: CPT

## 2024-05-24 PROCEDURE — 71045 X-RAY EXAM CHEST 1 VIEW: CPT

## 2024-05-24 PROCEDURE — 80053 COMPREHEN METABOLIC PANEL: CPT

## 2024-05-24 PROCEDURE — 6370000000 HC RX 637 (ALT 250 FOR IP): Performed by: STUDENT IN AN ORGANIZED HEALTH CARE EDUCATION/TRAINING PROGRAM

## 2024-05-24 RX ORDER — MAGNESIUM SULFATE IN WATER 40 MG/ML
2000 INJECTION, SOLUTION INTRAVENOUS PRN
Status: DISCONTINUED | OUTPATIENT
Start: 2024-05-24 | End: 2024-05-29 | Stop reason: HOSPADM

## 2024-05-24 RX ORDER — POTASSIUM CHLORIDE 750 MG/1
40 TABLET, FILM COATED, EXTENDED RELEASE ORAL PRN
Status: DISCONTINUED | OUTPATIENT
Start: 2024-05-24 | End: 2024-05-29 | Stop reason: HOSPADM

## 2024-05-24 RX ORDER — LANOLIN ALCOHOL/MO/W.PET/CERES
3 CREAM (GRAM) TOPICAL NIGHTLY PRN
Status: DISCONTINUED | OUTPATIENT
Start: 2024-05-24 | End: 2024-05-29 | Stop reason: HOSPADM

## 2024-05-24 RX ORDER — BUPROPION HYDROCHLORIDE 150 MG/1
150 TABLET ORAL DAILY
Status: DISCONTINUED | OUTPATIENT
Start: 2024-05-24 | End: 2024-05-29 | Stop reason: HOSPADM

## 2024-05-24 RX ORDER — BUMETANIDE 0.25 MG/ML
1 INJECTION INTRAMUSCULAR; INTRAVENOUS 2 TIMES DAILY
Status: DISCONTINUED | OUTPATIENT
Start: 2024-05-24 | End: 2024-05-25

## 2024-05-24 RX ORDER — ACETAMINOPHEN 325 MG/1
650 TABLET ORAL EVERY 6 HOURS PRN
Status: DISCONTINUED | OUTPATIENT
Start: 2024-05-24 | End: 2024-05-29 | Stop reason: HOSPADM

## 2024-05-24 RX ORDER — MORPHINE SULFATE 2 MG/ML
2 INJECTION, SOLUTION INTRAMUSCULAR; INTRAVENOUS EVERY 4 HOURS PRN
Status: DISCONTINUED | OUTPATIENT
Start: 2024-05-24 | End: 2024-05-29 | Stop reason: HOSPADM

## 2024-05-24 RX ORDER — ONDANSETRON 2 MG/ML
4 INJECTION INTRAMUSCULAR; INTRAVENOUS EVERY 6 HOURS PRN
Status: DISCONTINUED | OUTPATIENT
Start: 2024-05-24 | End: 2024-05-29 | Stop reason: HOSPADM

## 2024-05-24 RX ORDER — FINASTERIDE 5 MG/1
5 TABLET, FILM COATED ORAL DAILY
Status: DISCONTINUED | OUTPATIENT
Start: 2024-05-24 | End: 2024-05-29 | Stop reason: HOSPADM

## 2024-05-24 RX ORDER — SODIUM CHLORIDE 0.9 % (FLUSH) 0.9 %
5-40 SYRINGE (ML) INJECTION PRN
Status: DISCONTINUED | OUTPATIENT
Start: 2024-05-24 | End: 2024-05-29 | Stop reason: HOSPADM

## 2024-05-24 RX ORDER — POLYETHYLENE GLYCOL 3350 17 G/17G
17 POWDER, FOR SOLUTION ORAL DAILY PRN
Status: DISCONTINUED | OUTPATIENT
Start: 2024-05-24 | End: 2024-05-29 | Stop reason: HOSPADM

## 2024-05-24 RX ORDER — ACETAMINOPHEN 650 MG/1
650 SUPPOSITORY RECTAL EVERY 6 HOURS PRN
Status: DISCONTINUED | OUTPATIENT
Start: 2024-05-24 | End: 2024-05-29 | Stop reason: HOSPADM

## 2024-05-24 RX ORDER — SODIUM CHLORIDE 0.9 % (FLUSH) 0.9 %
5-40 SYRINGE (ML) INJECTION EVERY 12 HOURS SCHEDULED
Status: DISCONTINUED | OUTPATIENT
Start: 2024-05-24 | End: 2024-05-29 | Stop reason: HOSPADM

## 2024-05-24 RX ORDER — MIRTAZAPINE 15 MG/1
7.5 TABLET, FILM COATED ORAL NIGHTLY
Status: DISCONTINUED | OUTPATIENT
Start: 2024-05-24 | End: 2024-05-24

## 2024-05-24 RX ORDER — MIRTAZAPINE 15 MG/1
15 TABLET, FILM COATED ORAL NIGHTLY
Status: DISCONTINUED | OUTPATIENT
Start: 2024-05-24 | End: 2024-05-29 | Stop reason: HOSPADM

## 2024-05-24 RX ORDER — OXYCODONE HYDROCHLORIDE 5 MG/1
5 TABLET ORAL EVERY 4 HOURS PRN
Status: DISCONTINUED | OUTPATIENT
Start: 2024-05-24 | End: 2024-05-29 | Stop reason: HOSPADM

## 2024-05-24 RX ORDER — SODIUM CHLORIDE 9 MG/ML
INJECTION, SOLUTION INTRAVENOUS PRN
Status: DISCONTINUED | OUTPATIENT
Start: 2024-05-24 | End: 2024-05-29 | Stop reason: HOSPADM

## 2024-05-24 RX ORDER — ONDANSETRON 4 MG/1
4 TABLET, ORALLY DISINTEGRATING ORAL EVERY 8 HOURS PRN
Status: DISCONTINUED | OUTPATIENT
Start: 2024-05-24 | End: 2024-05-29 | Stop reason: HOSPADM

## 2024-05-24 RX ORDER — METOPROLOL SUCCINATE 50 MG/1
50 TABLET, EXTENDED RELEASE ORAL DAILY
Status: DISCONTINUED | OUTPATIENT
Start: 2024-05-24 | End: 2024-05-29 | Stop reason: HOSPADM

## 2024-05-24 RX ORDER — ASPIRIN 81 MG/1
324 TABLET, CHEWABLE ORAL
Status: COMPLETED | OUTPATIENT
Start: 2024-05-24 | End: 2024-05-24

## 2024-05-24 RX ORDER — ALLOPURINOL 100 MG/1
100 TABLET ORAL DAILY
Status: DISCONTINUED | OUTPATIENT
Start: 2024-05-24 | End: 2024-05-29 | Stop reason: HOSPADM

## 2024-05-24 RX ORDER — ATORVASTATIN CALCIUM 40 MG/1
80 TABLET, FILM COATED ORAL NIGHTLY
Status: DISCONTINUED | OUTPATIENT
Start: 2024-05-24 | End: 2024-05-29 | Stop reason: HOSPADM

## 2024-05-24 RX ORDER — BUMETANIDE 0.25 MG/ML
1 INJECTION INTRAMUSCULAR; INTRAVENOUS ONCE
Status: COMPLETED | OUTPATIENT
Start: 2024-05-24 | End: 2024-05-24

## 2024-05-24 RX ORDER — BUPROPION HYDROCHLORIDE 100 MG/1
100 TABLET, EXTENDED RELEASE ORAL 2 TIMES DAILY
Status: DISCONTINUED | OUTPATIENT
Start: 2024-05-24 | End: 2024-05-24

## 2024-05-24 RX ORDER — POTASSIUM CHLORIDE 7.45 MG/ML
10 INJECTION INTRAVENOUS PRN
Status: DISCONTINUED | OUTPATIENT
Start: 2024-05-24 | End: 2024-05-29 | Stop reason: HOSPADM

## 2024-05-24 RX ADMIN — BUPROPION HYDROCHLORIDE 150 MG: 150 TABLET, EXTENDED RELEASE ORAL at 16:46

## 2024-05-24 RX ADMIN — ASPIRIN 81 MG CHEWABLE TABLET 324 MG: 81 TABLET CHEWABLE at 13:04

## 2024-05-24 RX ADMIN — PIPERACILLIN AND TAZOBACTAM 4500 MG: 4; .5 INJECTION, POWDER, FOR SOLUTION INTRAVENOUS at 16:36

## 2024-05-24 RX ADMIN — SODIUM CHLORIDE: 9 INJECTION, SOLUTION INTRAVENOUS at 16:35

## 2024-05-24 RX ADMIN — FINASTERIDE 5 MG: 5 TABLET, FILM COATED ORAL at 16:47

## 2024-05-24 RX ADMIN — SODIUM CHLORIDE, PRESERVATIVE FREE 10 ML: 5 INJECTION INTRAVENOUS at 20:49

## 2024-05-24 RX ADMIN — BUMETANIDE 1 MG: 0.25 INJECTION INTRAMUSCULAR; INTRAVENOUS at 20:41

## 2024-05-24 RX ADMIN — ACETAMINOPHEN 650 MG: 325 TABLET ORAL at 20:39

## 2024-05-24 RX ADMIN — BUMETANIDE 1 MG: 0.25 INJECTION INTRAMUSCULAR; INTRAVENOUS at 12:03

## 2024-05-24 RX ADMIN — WATER 2000 MG: 1 INJECTION INTRAMUSCULAR; INTRAVENOUS; SUBCUTANEOUS at 11:36

## 2024-05-24 RX ADMIN — METOPROLOL SUCCINATE 50 MG: 50 TABLET, EXTENDED RELEASE ORAL at 16:47

## 2024-05-24 RX ADMIN — MIRTAZAPINE 15 MG: 15 TABLET, FILM COATED ORAL at 20:41

## 2024-05-24 RX ADMIN — Medication 3 MG: at 20:39

## 2024-05-24 RX ADMIN — Medication 2500 MG: at 16:35

## 2024-05-24 RX ADMIN — ALLOPURINOL 100 MG: 100 TABLET ORAL at 16:46

## 2024-05-24 RX ADMIN — APIXABAN 5 MG: 5 TABLET, FILM COATED ORAL at 20:39

## 2024-05-24 RX ADMIN — ATORVASTATIN CALCIUM 80 MG: 40 TABLET, FILM COATED ORAL at 20:40

## 2024-05-24 ASSESSMENT — PAIN DESCRIPTION - ORIENTATION
ORIENTATION: RIGHT
ORIENTATION: RIGHT

## 2024-05-24 ASSESSMENT — PAIN DESCRIPTION - DESCRIPTORS
DESCRIPTORS: DISCOMFORT
DESCRIPTORS: ITCHING;BURNING

## 2024-05-24 ASSESSMENT — PAIN DESCRIPTION - LOCATION
LOCATION: SHOULDER
LOCATION: SHOULDER

## 2024-05-24 ASSESSMENT — PAIN SCALES - GENERAL
PAINLEVEL_OUTOF10: 8
PAINLEVEL_OUTOF10: 2

## 2024-05-24 ASSESSMENT — PAIN - FUNCTIONAL ASSESSMENT
PAIN_FUNCTIONAL_ASSESSMENT: 0-10
PAIN_FUNCTIONAL_ASSESSMENT: PREVENTS OR INTERFERES SOME ACTIVE ACTIVITIES AND ADLS

## 2024-05-24 ASSESSMENT — PAIN DESCRIPTION - FREQUENCY: FREQUENCY: INTERMITTENT

## 2024-05-24 ASSESSMENT — LIFESTYLE VARIABLES: HOW OFTEN DO YOU HAVE A DRINK CONTAINING ALCOHOL: NEVER

## 2024-05-24 ASSESSMENT — PAIN DESCRIPTION - PAIN TYPE: TYPE: ACUTE PAIN;CHRONIC PAIN

## 2024-05-24 ASSESSMENT — PAIN DESCRIPTION - ONSET: ONSET: ON-GOING

## 2024-05-24 NOTE — PROGRESS NOTES
Pharmacist Note - Vancomycin Dosing    Consult provided for this 73 y.o. male for indication of bilateral lower extremity venous stasis with superimposed cellulitis.  Antibiotic regimen(s): Vancomycin + Zosyn  Patient on vancomycin PTA? NO     Recent Labs     24  1055   WBC 8.4   CREATININE 0.98   BUN 25*     Frequency of BMP: x1, will order daily x3  Height: 180.3 cm  Weight: 142.3 kg  Est CrCl: 98 ml/min; UO: -- ml/kg/hr  Temp (24hrs), Av.9 °F (36.6 °C), Min:97.9 °F (36.6 °C), Max:97.9 °F (36.6 °C)    Cultures:   Blood : NG pending    MRSA Swab ordered (if applicable)? YES - ordered    The plan below is expected to result in a target range of AUC/KRIS 400-500    Therapy will be initiated with a loading dose of 2500 mg IV x 1 to be followed by a maintenance dose of 1500 mg IV every 24 hours.  Pharmacy to follow patient daily and order levels / make dose adjustments as appropriate.    *Vancomycin has been dosed used Bayesian kinetics software to target an AUC/KRIS of 400-600, which provides adequate exposure for an assumed infection due to MRSA with an KRIS of 1 or less while reducing the risk of nephrotoxicity as seen with traditional trough based dosing goals.     =============================================================  Renal Dosing/Monitoring  Medication: Zosyn   Current regimen:  3375mg every 6 hr  Recent Labs     24  1055   CREATININE 0.98   BUN 25*     Estimated CrCl:  >20 ml/min  Plan: Change to Zosyn 4.5 gram q8h for CrCL>20 and BMI>40

## 2024-05-24 NOTE — PROGRESS NOTES
1430: Verbal shift change report given to Jeovanny RN (oncoming nurse) by ED RN (offgoing nurse). Report included the following information Nurse Handoff Report, Index, Intake/Output, MAR, and Recent Results.     1500: pt arrived to floor    1520: WOCN RN at bedside this RN assisted in dressing and wrapping legs. Multiple open areas and weeping edema noted.    1800: pt sitting on edge of bed for dinner    1930: Bedside and Verbal shift change report given to Emmett RN (oncoming nurse) by Jeovanny RN (offgoing nurse). Report included the following information Nurse Handoff Report, Index, Intake/Output, MAR, and Recent Results.

## 2024-05-24 NOTE — ED PROVIDER NOTES
[facility] EMERGENCY DEP  EMERGENCY DEPARTMENT ENCOUNTER      Pt Name: Fito Barros  MRN: [id number]  Birthdate 1950  Date of evaluation: 5/24/2024  Provider: Delta Laughlin DO    CHIEF COMPLAINT       Chief Complaint   Patient presents with    Leg Swelling         HISTORY OF PRESENT ILLNESS   (Location/Symptom, Timing/Onset, Context/Setting, Quality, Duration, Modifying Factors, Severity)  Note limiting factors.   Patient is a 73-year-old male presenting today with peripheral edema.  Denies any history of CHF but states the past 2 months has had significant peripheral edema and over the past month is developed shortness of breath.  He does have a history of A-fib and is on anticoagulation.  He has pain in bilateral lower extremities.  Reports taking a diuretic for lower extremity edema but none today his home health nurse came to do a dressing change of his legs and noticed that they were markedly worse so sent him in for further evaluation.  Also complaining of some pain in the right shoulder but no chest pain.  This pain has been present for about 1 month.            Review of External Medical Records:     Nursing Notes were reviewed.    REVIEW OF SYSTEMS    (2-9 systems for level 4, 10 or more for level 5)     Review of Systems   Respiratory:  Positive for shortness of breath.    Cardiovascular:  Positive for leg swelling.   Musculoskeletal:  Positive for arthralgias.       Except as noted above the remainder of the review of systems was reviewed and negative.       PAST MEDICAL HISTORY     Past Medical History:   Diagnosis Date    Arrhythmia     atrial fib    Depression 4/24/2010    Diabetes (HCC)     diet control for pre-diabetes    Dyslipidemia, goal LDL below 100 6/14/2011    Gout     HTN (hypertension) 4/24/2010    Impotence 4/24/2010    Morbid obesity (HCC) 5/17/2010    SAMEER (obstructive sleep apnea) 4/24/2010    CPAP    Restless legs 4/15/2015         SURGICAL HISTORY       Past Surgical History:  1.7 standard drinks of alcohol    Drug use: No     Social Determinants of Health     Financial Resource Strain: Low Risk  (4/11/2024)    Overall Financial Resource Strain (CARDIA)     Difficulty of Paying Living Expenses: Not hard at all   Food Insecurity: No Food Insecurity (4/11/2024)    Hunger Vital Sign     Worried About Running Out of Food in the Last Year: Never true     Ran Out of Food in the Last Year: Never true   Transportation Needs: Unknown (4/11/2024)    PRAPARE - Transportation     Lack of Transportation (Non-Medical): No   Physical Activity: Inactive (4/5/2023)    Exercise Vital Sign     Days of Exercise per Week: 0 days     Minutes of Exercise per Session: 0 min   Stress: Stress Concern Present (4/5/2023)    [employer] - Occupational Stress Questionnaire     Feeling of Stress : Rather much   Social Connections: Socially Isolated (4/5/2023)    Social Connection and Isolation Panel [NHANES]     Frequency of Communication with Friends and Family: More than three times a week     Frequency of Social Gatherings with Friends and Family: More than three times a week     Attends Hindu Services: Never     Active Member of Clubs or Organizations: No     Attends Club or Organization Meetings: Never     Marital Status:    Intimate Partner Violence: Not At Risk (2/16/2023)    Humiliation, Afraid, Rape, and Kick questionnaire     Fear of Current or Ex-Partner: No     Emotionally Abused: No     Physically Abused: No     Sexually Abused: No   Housing Stability: Unknown (4/11/2024)    Housing Stability Vital Sign     Unstable Housing in the Last Year: No           PHYSICAL EXAM    (up to 7 for level 4, 8 or more for level 5)     ED Triage Vitals [05/24/24 1051]   BP Temp Temp Source Pulse Respirations SpO2 Height Weight - Scale   (!) 160/103 97.9 °F (36.6 °C) Oral (!) 105 18 100 % 1.803 m (5' 11\") (!) 142.3 kg (313 lb 11.4 oz)       Body mass index is 43.75 kg/m².    Physical      Hematocrit 35.1 (*)     Immature Granulocytes % 1 (*)     Immature Granulocytes Absolute 0.1 (*)     All other components within normal limits   COMPREHENSIVE METABOLIC PANEL - Abnormal; Notable for the following components:    Sodium 135 (*)     Anion Gap 2 (*)     BUN 25 (*)     BUN/Creatinine Ratio 26 (*)     Alk Phosphatase 168 (*)     Total Protein 8.3 (*)     Globulin 4.8 (*)     Albumin/Globulin Ratio 0.7 (*)     All other components within normal limits   TROPONIN - Abnormal; Notable for the following components:    Troponin, High Sensitivity 142 (*)     All other components within normal limits   BRAIN NATRIURETIC PEPTIDE - Abnormal; Notable for the following components:    NT Pro- (*)     All other components within normal limits   CULTURE, BLOOD 2   CULTURE, BLOOD 1   EXTRA TUBES HOLD   LACTIC ACID   PROCALCITONIN   LACTIC ACID       All other labs were within normal range or not returned as of this dictation.          COURSE/REASSESSMENT     ED Course as of 05/24/24 1223   Fri May 24, 2024   1055 EKG time 10:52 AM  A-fib rate of 90 narrow QRS, normal QTc, nonspecific ST-T wave changes without ST elevations or depressions. [CC]      ED Course User Index  [CC] Delta Laughlin, DO           CONSULTS:  IP CONSULT TO HOSPITALIST    PROCEDURES:  Unless otherwise noted below, none     Procedures      DIFFERENTIAL DIAGNOSIS/MDM:   Medical Decision Making  73-year-old male presents today with extensive peripheral edema of the lower extremities.  Has bilateral wounds as well with associated cellulitis.  Afebrile without leukocytosis.  Doubt sepsis.  Skin focused antibiotics (Ancef) given for treatment of cellulitis.  Lactic acid normal.  No indication for fluids as he is actually hypertensive and markedly hypervolemic.  Troponin mildly elevated at 142 without active chest pain or EKG changes.  Aspirin was given.  BNP elevated at 726.  Chest x-ray without acute process such as pulmonary edema,

## 2024-05-24 NOTE — H&P
History and Physical    Date of Service:  5/24/2024  Primary Care Provider: Bari Oro MD  Source of information: The patient and review of EMR    Chief Complaint: Leg Swelling      History of Presenting Illness:   Fito Barros is a 73 y.o. male with past medical history significant for hypertension, dyslipidemia, paroxysmal atrial fibrillation on Eliquis for anticoagulation presented at the emergency room with increased bilateral lower extremity swelling.  Patient has bilateral lower extremity venous stasis with wounds and has been receiving wound care by his home health nurse.  Patient was sent to the emergency room because the swelling and wounds are getting worse.  Patient also reported shortness of breath which has been going on for about 2 months and progressively getting worse.  The shortness of breath is worse with mild exertion and also when the patient is lying down.  He denies cough, rigors and chills.  Chest x-ray done on arrival at the emergency room showed unchanged mild cardiomegaly.  He was referred to the hospitalist service for admission.     REVIEW OF SYSTEMS:   REVIEW OF SYSTEMS:  HEAD, EYES, EARS, NOSE AND THROAT:  No headache.  No dizziness.  No blurring of vision.  No photophobia.  RESPIRATORY SYSTEM: This is positive for shortness of breath.  No cough.  No hemoptysis.  CARDIOVASCULAR SYSTEM:  Positive for chest pain.  No orthopnea.  No palpitations.  GASTROINTESTINAL SYSTEM:  No nausea or vomiting.  No diarrhea.  No constipation.  GENITOURINARY SYSTEM:  No dysuria, no urgency, and no frequency.     All other systems are reviewed and they are negative.       Past Medical History:   Diagnosis Date    Arrhythmia     atrial fib    Depression 4/24/2010    Diabetes (HCC)     diet control for pre-diabetes    Dyslipidemia, goal LDL below 100 6/14/2011    Gout     HTN (hypertension) 4/24/2010    Impotence 4/24/2010    Morbid obesity (HCC) 5/17/2010    SAMEER (obstructive sleep apnea)  4/24/2010    CPAP    Restless legs 4/15/2015      Past Surgical History:   Procedure Laterality Date    APPENDECTOMY      COLONOSCOPY N/A 6/27/2017    COLONOSCOPY performed by Jean Whyte MD at [facility] ENDOSCOPY    COLONOSCOPY  2007    ORTHOPEDIC SURGERY  2010    left THR    ORTHOPEDIC SURGERY  2000    bilat TKR     UROLOGICAL SURGERY  03/2018    urolift    UROLOGICAL SURGERY  03/2019    prosthesis     Prior to Admission medications    Medication Sig Start Date End Date Taking? Authorizing Provider   Melatonin 10 MG TABS Take 10 mg by mouth as needed    Khadijah Mays MD   bumetanide (BUMEX) 2 MG tablet Take 1 tablet by mouth daily    Khadijah Mays MD   furosemide (LASIX) 40 MG tablet Take 1 tablet by mouth daily    Provider, Historical, MD   Balsam [place]-Castor Oil OINT Apply topically 2 times daily Apply  to Bony Prominences BID    Khadijah Mays MD   allopurinol (ZYLOPRIM) 100 MG tablet Take by mouth daily 2/8/23   Automatic Reconciliation, Ar   apixaban (ELIQUIS) 5 MG TABS tablet Take by mouth 2 times daily 2/8/23   Automatic Reconciliation, Ar   atorvastatin (LIPITOR) 80 MG tablet Take 1 tablet by mouth nightly 2/8/23   Automatic Reconciliation, Ar   buPROPion (WELLBUTRIN SR) 100 MG extended release tablet Take by mouth 2 times daily 2/13/23   Automatic Reconciliation, Ar   finasteride (PROSCAR) 5 MG tablet Take by mouth daily 2/8/23   Automatic Reconciliation, Ar   metoprolol succinate (TOPROL XL) 25 MG extended release tablet Take 2 tablets by mouth daily 10/30/22   Automatic Reconciliation, Ar   mirtazapine (REMERON) 7.5 MG tablet Take 1 tablet by mouth nightly 10/29/22   Automatic Reconciliation, Ar   tamsulosin (FLOMAX) 0.4 MG capsule Take by mouth daily  Patient not taking: Reported on 4/11/2024 9/27/22   Automatic Reconciliation, Ar     No Known Allergies   Family History   Problem Relation Age of Onset    Cancer Maternal Aunt     Cancer Father         leukemia    Cancer Paternal

## 2024-05-24 NOTE — CONSULTS
[facility] CARDIOLOGY  Cardiology Care Note                  [x]Initial visit     []Established visit     Patient Name: Fito Barros - :1950 - MRN:[id number]  Primary Cardiologist: Jesus Manuel Willett MD  Consulting Cardiologist: Yuan Prado MD     Reason for initial visit: CHF, elevated troponin    HPI:   Mr. Barros is a 73 y.o. male with PMH of HTN, HLD, chronic Afib, SAMEER, chronic BLE edema,, stasis dermatitis,  pre-diabetes,  s/p TKR, left MCA CVA with hemorrhagic conversion, SAMEER, s/p penile implant.  He presents from an ?half-way with chief c/o worsening BLE edema BLE wounds.  He also reported  SOB with activity as well as orthopnea. Also c/o right shoulder pain, worse with movement.   States BLE edema and and SOB has worsened over past 2 months.  Reports at least 20 -30 lb weight gain over past 2 months.  Denies any history of chest pain. Has not seen cardiology since 2022.  He is uncertain of all of his medications but does indicate he takes blood thinner.  Cardiology is consulted for concern for possible CHF as well as elevated troponins (142, 145). EKG showed afib with no ischemic findings.  CXR showed unchanged cardiomegaly but no pulmonary edema or pleural effusion.    In ER, his BP was 160/103 on arrival, .  BP and HR have normalized now.    Last echo 9/15/22: EF 50-55% RV function mildly reduced.   SUBJECTIVE:  SOB with activity.  C/o upper airway wheeze (noted with forced exhalation)       Assessment and Plan      BLE edema:  Uncertain etiology.  Appears to have venous stasis. May be due to venous insufficiency, possible lymphedema.  Last echo 2022 with NL LV function but RV mildly reduced.  Proceed with echocardiogram to evaluate LV and RV function.  Agree with IV diuresis. Bumex 1 mg  IV BID. (Home dose 2 mg daily)    SOB/DURAN:  CXR showed no pulmonary edema, , low for age. Obtain echo.    Elevated troponin:   seen. There is interval evolution of previous left cerebral  infarct. There is no apparent mass on unenhanced imaging. There is no bleed,  shift, obstructive hydrocephalus or significant extra-axial fluid collection.  Bone windows are unremarkable.    Impression  No acute intracranial finding. Chronic left cerebral infarct.    Xray Result (most recent):  XR SHOULDER RIGHT (MIN 2 VIEWS) 05/24/2024    Narrative  EXAM: XR SHOULDER RIGHT (MIN 2 VIEWS)    INDICATION: severe pain.    COMPARISON: Right shoulder radiograph 10/19/2022.    FINDINGS: Three views of the right shoulder. No acute fracture or dislocation.  Degenerative changes. Soft tissues grossly unremarkable.    Impression  No acute abnormality.        Recent Labs     05/24/24  1055   *   K 4.1      CO2 29   BUN 25*   ALT 26     Recent Labs     05/24/24  1055   WBC 8.4   HGB 11.4*   HCT 35.1*        Creatinine (MG/DL)   Date Value   05/24/2024 0.98   10/23/2023 0.81   04/21/2023 0.58 (L)   04/17/2023 0.73   04/15/2023 0.96       Current meds:    Current Facility-Administered Medications:     allopurinol (ZYLOPRIM) tablet 100 mg, 100 mg, Oral, Daily, Catie Mendieta MD    apixaban (ELIQUIS) tablet 5 mg, 5 mg, Oral, BID, Catie Mendieta MD    atorvastatin (LIPITOR) tablet 80 mg, 80 mg, Oral, Nightly, Catie Mendieta MD    bumetanide (BUMEX) injection 1 mg, 1 mg, IntraVENous, BID, Catie Mendieta MD    buPROPion (WELLBUTRIN SR) extended release tablet 100 mg, 100 mg, Oral, BID, Catie Mendieta MD    finasteride (PROSCAR) tablet 5 mg, 5 mg, Oral, Daily, Catie Mendieta MD    metoprolol succinate (TOPROL XL) extended release tablet 50 mg, 50 mg, Oral, Daily, Catie Mendieta MD    mirtazapine (REMERON) tablet 7.5 mg, 7.5 mg, Oral, Nightly, Catie Mendieta MD    sodium chloride flush 0.9 % injection 5-40 mL, 5-40 mL, IntraVENous, 2 times per day, Catie Mendieta MD    sodium chloride flush 0.9 % injection 5-40 mL, 5-40 mL,

## 2024-05-24 NOTE — ED NOTES
TRANSFER - OUT REPORT:    Verbal report given to JOE Up on Fito Barros  being transferred to [facility], room 470 for routine progression of patient care       Report consisted of patient's Situation, Background, Assessment and   Recommendations(SBAR).     Information from the following report(s) Nurse Handoff Report, ED Encounter Summary, ED SBAR, Intake/Output, MAR, and Recent Results was reviewed with the receiving nurse.    [place] Fall Assessment:    Presents to emergency department  because of falls (Syncope, seizure, or loss of consciousness): No  Age > 70: Yes  Altered Mental Status, Intoxication with alcohol or substance confusion (Disorientation, impaired judgment, poor safety awaremess, or inability to follow instructions): No  Impaired Mobility: Ambulates or transfers with assistive devices or assistance; Unable to ambulate or transer.: Yes  Nursing Judgement: Yes          Lines:   Peripheral IV 05/24/24 Left;Posterior Forearm (Active)   Site Assessment Clean, dry & intact 05/24/24 1055   Line Status Blood return noted 05/24/24 1055        Opportunity for questions and clarification was provided.      Patient transported with:  Registered Nurse

## 2024-05-24 NOTE — ED TRIAGE NOTES
Pt comes to ED from [facility] by EMS. His home health RN came to do wound care, noticed increased swelling, and his MD request he come to ED. Intermittent compliance with bumex. HX A-fib.

## 2024-05-24 NOTE — WOUND CARE
WOCN Note:     New consult for BLE     Fito Barros is a 73 y.o. y/o male who presented for Shortness of breath [R06.02]  Peripheral edema [R60.0]  Elevated troponin [R79.89]  Cellulitis of lower extremity, unspecified laterality [L03.119]  New onset of congestive heart failure (HCC) [I50.9]  Admitted on 2024    Past Medical History:   Diagnosis Date    Arrhythmia     atrial fib    Depression 2010    Diabetes (HCC)     diet control for pre-diabetes    Dyslipidemia, goal LDL below 100 2011    Gout     HTN (hypertension) 2010    Impotence 2010    Morbid obesity (HCC) 2010    SAMEER (obstructive sleep apnea) 2010    CPAP    Restless legs 4/15/2015       Lab Results   Component Value Date/Time    WBC 8.4 2024 10:55 AM    LABA1C 5.4 2023 09:45 AM    POCGLU 124 (H) 2022 11:11 AM    POCGLU 97 2022 06:49 AM    HGB 11.4 (L) 2024 10:55 AM    HCT 35.1 (L) 2024 10:55 AM     2024 10:55 AM        Tobacco Use      Smoking status: Former        Packs/day: 0.00        Types: Cigarettes        Quit date: 1990        Years since quittin.0        Passive exposure: Past      Smokeless tobacco: Never     ADULT DIET; Regular; Low Fat/Low Chol/High Fiber/DEONTE     Assessment:   Seen today in room 470/01   Alert and appropriately communicative. Denies pain.   Mobile and repositions independently  Continent. And stands at bedside to use urinal  Surface: gel mattress with JACKIE pump   Bilateral heels intact and without erythema.      1. POA Venous Stasis Wounds to BLE  BLE with erythema and pitting edema      Left lateral lower leg with dark purple area and partial thickness wounds depicted above  Moderate - large serous exudate   Periwound with diffuse erythema      Right lateral lower leg with 12.5 x 7 cm open area with hypergranulation  Red wound bed with patches of adherent tan, small -

## 2024-05-25 ENCOUNTER — APPOINTMENT (OUTPATIENT)
Facility: HOSPITAL | Age: 74
DRG: 637 | End: 2024-05-25
Attending: INTERNAL MEDICINE
Payer: MEDICARE

## 2024-05-25 LAB
ALBUMIN SERPL-MCNC: 3.4 G/DL (ref 3.5–5)
ALBUMIN/GLOB SERPL: 0.7 (ref 1.1–2.2)
ALP SERPL-CCNC: 156 U/L (ref 45–117)
ALT SERPL-CCNC: 22 U/L (ref 12–78)
ANION GAP SERPL CALC-SCNC: 6 MMOL/L (ref 5–15)
AST SERPL-CCNC: ABNORMAL U/L (ref 15–37)
BACTERIA SPEC CULT: NORMAL
BASOPHILS # BLD: 0 K/UL (ref 0–0.1)
BASOPHILS NFR BLD: 1 % (ref 0–1)
BILIRUB SERPL-MCNC: 1.4 MG/DL (ref 0.2–1)
BUN SERPL-MCNC: 25 MG/DL (ref 6–20)
BUN/CREAT SERPL: 24 (ref 12–20)
CALCIUM SERPL-MCNC: 9 MG/DL (ref 8.5–10.1)
CHLORIDE SERPL-SCNC: 101 MMOL/L (ref 97–108)
CHOLEST SERPL-MCNC: 90 MG/DL
CO2 SERPL-SCNC: 27 MMOL/L (ref 21–32)
CREAT SERPL-MCNC: 1.03 MG/DL (ref 0.7–1.3)
DIFFERENTIAL METHOD BLD: ABNORMAL
EOSINOPHIL # BLD: 0.4 K/UL (ref 0–0.4)
EOSINOPHIL NFR BLD: 5 % (ref 0–7)
ERYTHROCYTE [DISTWIDTH] IN BLOOD BY AUTOMATED COUNT: 14.3 % (ref 11.5–14.5)
EST. AVERAGE GLUCOSE BLD GHB EST-MCNC: 111 MG/DL
GLOBULIN SER CALC-MCNC: 4.7 G/DL (ref 2–4)
GLUCOSE SERPL-MCNC: 97 MG/DL (ref 65–100)
HBA1C MFR BLD: 5.5 % (ref 4–5.6)
HCT VFR BLD AUTO: 33.5 % (ref 36.6–50.3)
HDLC SERPL-MCNC: 51 MG/DL
HDLC SERPL: 1.8 (ref 0–5)
HGB BLD-MCNC: 10.9 G/DL (ref 12.1–17)
IMM GRANULOCYTES # BLD AUTO: 0 K/UL (ref 0–0.04)
IMM GRANULOCYTES NFR BLD AUTO: 0 % (ref 0–0.5)
LDLC SERPL CALC-MCNC: 21.6 MG/DL (ref 0–100)
LYMPHOCYTES # BLD: 1.1 K/UL (ref 0.8–3.5)
LYMPHOCYTES NFR BLD: 12 % (ref 12–49)
MAGNESIUM SERPL-MCNC: NORMAL MG/DL (ref 1.6–2.4)
MCH RBC QN AUTO: 29.7 PG (ref 26–34)
MCHC RBC AUTO-ENTMCNC: 32.5 G/DL (ref 30–36.5)
MCV RBC AUTO: 91.3 FL (ref 80–99)
MONOCYTES # BLD: 0.8 K/UL (ref 0–1)
MONOCYTES NFR BLD: 9 % (ref 5–13)
NEUTS SEG # BLD: 6.5 K/UL (ref 1.8–8)
NEUTS SEG NFR BLD: 73 % (ref 32–75)
NRBC # BLD: 0 K/UL (ref 0–0.01)
NRBC BLD-RTO: 0 PER 100 WBC
PHOSPHATE SERPL-MCNC: 4.3 MG/DL (ref 2.6–4.7)
PLATELET # BLD AUTO: 216 K/UL (ref 150–400)
PMV BLD AUTO: 10.8 FL (ref 8.9–12.9)
POTASSIUM SERPL-SCNC: ABNORMAL MMOL/L (ref 3.5–5.1)
PROT SERPL-MCNC: 8.1 G/DL (ref 6.4–8.2)
RBC # BLD AUTO: 3.67 M/UL (ref 4.1–5.7)
SERVICE CMNT-IMP: NORMAL
SERVICE CMNT-IMP: NORMAL
SODIUM SERPL-SCNC: 134 MMOL/L (ref 136–145)
TRIGL SERPL-MCNC: 87 MG/DL
TROPONIN I SERPL HS-MCNC: 133 NG/L (ref 0–76)
TSH SERPL DL<=0.05 MIU/L-ACNC: 2.01 UIU/ML (ref 0.36–3.74)
VLDLC SERPL CALC-MCNC: 17.4 MG/DL
WBC # BLD AUTO: 8.8 K/UL (ref 4.1–11.1)

## 2024-05-25 PROCEDURE — 93306 TTE W/DOPPLER COMPLETE: CPT

## 2024-05-25 PROCEDURE — 6360000002 HC RX W HCPCS: Performed by: SPECIALIST

## 2024-05-25 PROCEDURE — 99233 SBSQ HOSP IP/OBS HIGH 50: CPT | Performed by: SPECIALIST

## 2024-05-25 PROCEDURE — 6370000000 HC RX 637 (ALT 250 FOR IP): Performed by: SPECIALIST

## 2024-05-25 PROCEDURE — 84484 ASSAY OF TROPONIN QUANT: CPT

## 2024-05-25 PROCEDURE — 2060000000 HC ICU INTERMEDIATE R&B

## 2024-05-25 PROCEDURE — 6360000002 HC RX W HCPCS: Performed by: INTERNAL MEDICINE

## 2024-05-25 PROCEDURE — 6370000000 HC RX 637 (ALT 250 FOR IP): Performed by: INTERNAL MEDICINE

## 2024-05-25 PROCEDURE — 84443 ASSAY THYROID STIM HORMONE: CPT

## 2024-05-25 PROCEDURE — 84100 ASSAY OF PHOSPHORUS: CPT

## 2024-05-25 PROCEDURE — 85025 COMPLETE CBC W/AUTO DIFF WBC: CPT

## 2024-05-25 PROCEDURE — 2580000003 HC RX 258: Performed by: INTERNAL MEDICINE

## 2024-05-25 PROCEDURE — 6370000000 HC RX 637 (ALT 250 FOR IP): Performed by: NURSE PRACTITIONER

## 2024-05-25 PROCEDURE — 83036 HEMOGLOBIN GLYCOSYLATED A1C: CPT

## 2024-05-25 PROCEDURE — 80053 COMPREHEN METABOLIC PANEL: CPT

## 2024-05-25 PROCEDURE — 80061 LIPID PANEL: CPT

## 2024-05-25 PROCEDURE — 83735 ASSAY OF MAGNESIUM: CPT

## 2024-05-25 PROCEDURE — 36415 COLL VENOUS BLD VENIPUNCTURE: CPT

## 2024-05-25 RX ORDER — SPIRONOLACTONE 25 MG/1
25 TABLET ORAL DAILY
Status: DISCONTINUED | OUTPATIENT
Start: 2024-05-25 | End: 2024-05-26

## 2024-05-25 RX ORDER — BUMETANIDE 0.25 MG/ML
1.5 INJECTION INTRAMUSCULAR; INTRAVENOUS 3 TIMES DAILY
Status: DISCONTINUED | OUTPATIENT
Start: 2024-05-25 | End: 2024-05-28

## 2024-05-25 RX ADMIN — ATORVASTATIN CALCIUM 80 MG: 40 TABLET, FILM COATED ORAL at 21:07

## 2024-05-25 RX ADMIN — MORPHINE SULFATE 2 MG: 2 INJECTION, SOLUTION INTRAMUSCULAR; INTRAVENOUS at 21:19

## 2024-05-25 RX ADMIN — PIPERACILLIN AND TAZOBACTAM 4500 MG: 4; .5 INJECTION, POWDER, LYOPHILIZED, FOR SOLUTION INTRAVENOUS at 05:59

## 2024-05-25 RX ADMIN — SODIUM CHLORIDE 1500 MG: 9 INJECTION, SOLUTION INTRAVENOUS at 20:00

## 2024-05-25 RX ADMIN — ALLOPURINOL 100 MG: 100 TABLET ORAL at 08:36

## 2024-05-25 RX ADMIN — BUPROPION HYDROCHLORIDE 150 MG: 150 TABLET, EXTENDED RELEASE ORAL at 08:36

## 2024-05-25 RX ADMIN — MIRTAZAPINE 15 MG: 15 TABLET, FILM COATED ORAL at 21:07

## 2024-05-25 RX ADMIN — APIXABAN 5 MG: 5 TABLET, FILM COATED ORAL at 21:07

## 2024-05-25 RX ADMIN — FINASTERIDE 5 MG: 5 TABLET, FILM COATED ORAL at 08:36

## 2024-05-25 RX ADMIN — BUMETANIDE 1.5 MG: 0.25 INJECTION INTRAMUSCULAR; INTRAVENOUS at 21:06

## 2024-05-25 RX ADMIN — BUMETANIDE 1.5 MG: 0.25 INJECTION INTRAMUSCULAR; INTRAVENOUS at 14:22

## 2024-05-25 RX ADMIN — BUMETANIDE 1 MG: 0.25 INJECTION INTRAMUSCULAR; INTRAVENOUS at 08:36

## 2024-05-25 RX ADMIN — Medication 3 MG: at 21:18

## 2024-05-25 RX ADMIN — SODIUM CHLORIDE, PRESERVATIVE FREE 10 ML: 5 INJECTION INTRAVENOUS at 21:14

## 2024-05-25 RX ADMIN — SPIRONOLACTONE 25 MG: 25 TABLET ORAL at 14:23

## 2024-05-25 RX ADMIN — SODIUM CHLORIDE, PRESERVATIVE FREE 10 ML: 5 INJECTION INTRAVENOUS at 08:37

## 2024-05-25 RX ADMIN — PIPERACILLIN AND TAZOBACTAM 4500 MG: 4; .5 INJECTION, POWDER, LYOPHILIZED, FOR SOLUTION INTRAVENOUS at 14:31

## 2024-05-25 RX ADMIN — METOPROLOL SUCCINATE 50 MG: 50 TABLET, EXTENDED RELEASE ORAL at 08:36

## 2024-05-25 RX ADMIN — APIXABAN 5 MG: 5 TABLET, FILM COATED ORAL at 08:36

## 2024-05-25 RX ADMIN — MORPHINE SULFATE 2 MG: 2 INJECTION, SOLUTION INTRAMUSCULAR; INTRAVENOUS at 16:46

## 2024-05-25 ASSESSMENT — PAIN DESCRIPTION - LOCATION
LOCATION: SHOULDER
LOCATION: LEG

## 2024-05-25 ASSESSMENT — PAIN SCALES - GENERAL
PAINLEVEL_OUTOF10: 9
PAINLEVEL_OUTOF10: 8

## 2024-05-25 ASSESSMENT — PAIN - FUNCTIONAL ASSESSMENT: PAIN_FUNCTIONAL_ASSESSMENT: ACTIVITIES ARE NOT PREVENTED

## 2024-05-25 ASSESSMENT — PAIN DESCRIPTION - ORIENTATION: ORIENTATION: RIGHT;LEFT

## 2024-05-25 ASSESSMENT — PAIN DESCRIPTION - DESCRIPTORS
DESCRIPTORS: ACHING
DESCRIPTORS: ACHING;STABBING

## 2024-05-25 NOTE — PROGRESS NOTES
1930: Bedside and Verbal shift change report given to MARYANNE Christine (oncoming nurse) by MARYANNE Up (offgoing nurse). Report included the following information Nurse Handoff Report, Index, Adult Overview, Intake/Output, MAR, Recent Results, and Cardiac Rhythm   .     0730: Bedside and Verbal shift change report given to MARYANNE Thompson (oncoming nurse) by MARYANNE Christine (offgoing nurse). Report included the following information Nurse Handoff Report, Index, Adult Overview, Intake/Output, MAR, Recent Results, and Cardiac Rhythm   .        Problem: Discharge Planning  Goal: Discharge to home or other facility with appropriate resources  Outcome: Progressing  Flowsheets (Taken 5/24/2024 2000)  Discharge to home or other facility with appropriate resources: Identify barriers to discharge with patient and caregiver     Problem: Chronic Conditions and Co-morbidities  Goal: Patient's chronic conditions and co-morbidity symptoms are monitored and maintained or improved  Outcome: Progressing  Flowsheets (Taken 5/24/2024 2000)  Care Plan - Patient's Chronic Conditions and Co-Morbidity Symptoms are Monitored and Maintained or Improved: Monitor and assess patient's chronic conditions and comorbid symptoms for stability, deterioration, or improvement     Problem: Pain  Goal: Verbalizes/displays adequate comfort level or baseline comfort level  Outcome: Progressing     Problem: Safety - Adult  Goal: Free from fall injury  Outcome: Progressing

## 2024-05-25 NOTE — PROGRESS NOTES
Hospitalist Progress Note  Sara Hart MD  Answering service: [phone]        Date of Service:  2024  NAME:  Fito Barros  :  1950  MRN:  [id number]      Admission Summary:   Fito Barros is a 73 y.o. male with past medical history significant for hypertension, dyslipidemia, paroxysmal atrial fibrillation on Eliquis for anticoagulation presented at the emergency room with increased bilateral lower extremity swelling.  Patient has bilateral lower extremity venous stasis with wounds and has been receiving wound care by his home health nurse.  Patient was sent to the emergency room because the swelling and wounds are getting worse.  Patient also reported shortness of breath which has been going on for about 2 months and progressively getting worse.  The shortness of breath is worse with mild exertion and also when the patient is lying down.  He denies cough, rigors and chills.  Chest x-ray done on arrival at the emergency room showed unchanged mild cardiomegaly.  He was referred to the hospitalist service for admission.       Interval history / Subjective:   Bilat leg severe swelling, ozzing, dressing changed      Assessment & Plan:      bilat leg severe edema with superimposed cellulitis   -increasing iv bumex, will adjust as long renal function tolerate   -continue iv vanco and zosyn , may narrow if culture ng   Elevate legs,   Wound dressing change daily      New onset CHF:  Possible diastolic chf, RV dysfunction    Cardiologist following  Echo pending  Continue iv bumex, dose increased today, added aldactone       Morbid obesity: Patient presented with BMI of 43.75.     Be benefit from lifestyle modification including diet and exercise when feasible.     Elevated troponin level:   -falt   Not acs  May due to uncontrolled BP   Pending echo       Essential hypertension:  Resume home meds, will adjust       5-40 mL IntraVENous PRN    0.9 % sodium chloride infusion   IntraVENous PRN    potassium chloride (KLOR-CON) extended release tablet 40 mEq  40 mEq Oral PRN    Or    potassium bicarb-citric acid (EFFER-K) effervescent tablet 40 mEq  40 mEq Oral PRN    Or    potassium chloride 10 mEq/100 mL IVPB (Peripheral Line)  10 mEq IntraVENous PRN    magnesium sulfate 2000 mg in 50 mL IVPB premix  2,000 mg IntraVENous PRN    ondansetron (ZOFRAN-ODT) disintegrating tablet 4 mg  4 mg Oral Q8H PRN    Or    ondansetron (ZOFRAN) injection 4 mg  4 mg IntraVENous Q6H PRN    polyethylene glycol (GLYCOLAX) packet 17 g  17 g Oral Daily PRN    acetaminophen (TYLENOL) tablet 650 mg  650 mg Oral Q6H PRN    Or    acetaminophen (TYLENOL) suppository 650 mg  650 mg Rectal Q6H PRN    oxyCODONE (ROXICODONE) immediate release tablet 5 mg  5 mg Oral Q4H PRN    morphine (PF) injection 2 mg  2 mg IntraVENous Q4H PRN    vancomycin (VANCOCIN) intermittent dosing (placeholder)   Other RX Placeholder    vancomycin (VANCOCIN) 1,500 mg in sodium chloride 0.9 % 250 mL IVPB ([id number])  1,500 mg IntraVENous Q24H    piperacillin-tazobactam (ZOSYN) 4,500 mg in sodium chloride 0.9 % 100 mL IVPB (mini-bag)  4,500 mg IntraVENous Q8H    mirtazapine (REMERON) tablet 15 mg  15 mg Oral Nightly    buPROPion (WELLBUTRIN XL) extended release tablet 150 mg  150 mg Oral Daily    melatonin tablet 3 mg  3 mg Oral Nightly PRN     ______________________________________________________________________  EXPECTED LENGTH OF STAY: 3  ACTUAL LENGTH OF STAY:          1                 Sara Hart MD

## 2024-05-25 NOTE — PROGRESS NOTES
3654 - Shift handoff obtained from Emmett MADDEN (off-going) at this time. Patient resting quietly on side of bed, respirations even and unlabored at this time. Call light within reach.    1630 - Patient transferred from 470 into [facility] @ this time.    Plan of Care:  Problem: Discharge Planning  Goal: Discharge to home or other facility with appropriate resources  5/25/2024 1150 by Donna Gregorio RN  Outcome: Progressing  Flowsheets (Taken 5/25/2024 0836)  Discharge to home or other facility with appropriate resources:   Identify barriers to discharge with patient and caregiver   Arrange for needed discharge resources and transportation as appropriate  5/25/2024 0532 by Emmett Cool RN  Outcome: Progressing  Flowsheets (Taken 5/24/2024 2000)  Discharge to home or other facility with appropriate resources: Identify barriers to discharge with patient and caregiver     Problem: Chronic Conditions and Co-morbidities  Goal: Patient's chronic conditions and co-morbidity symptoms are monitored and maintained or improved  5/25/2024 1150 by Donna Gregorio RN  Outcome: Progressing  Flowsheets (Taken 5/25/2024 0836)  Care Plan - Patient's Chronic Conditions and Co-Morbidity Symptoms are Monitored and Maintained or Improved:   Monitor and assess patient's chronic conditions and comorbid symptoms for stability, deterioration, or improvement   Collaborate with multidisciplinary team to address chronic and comorbid conditions and prevent exacerbation or deterioration   Update acute care plan with appropriate goals if chronic or comorbid symptoms are exacerbated and prevent overall improvement and discharge  5/25/2024 0532 by Emmett Cool RN  Outcome: Progressing  Flowsheets (Taken 5/24/2024 2000)  Care Plan - Patient's Chronic Conditions and Co-Morbidity Symptoms are Monitored and Maintained or Improved: Monitor and assess patient's chronic conditions and comorbid symptoms for stability, deterioration, or improvement     Problem:

## 2024-05-25 NOTE — PROGRESS NOTES
[facility] CARDIOLOGY  Cardiology Care Note                  []Initial visit     [x]Established visit     Patient Name: Fito FERNÁNDEZ Papo - :1950 - MRN:[id number]  Primary Cardiologist: Jesus Manuel Willett MD  Consulting Cardiologist: Yuan Prado MD  Reason for initial visit: CHF, elevated troponin           SUBJECTIVE: Patient reports he admitted for shortness of breath and edema.  He feels like with diuresis he is urinating a lot but not yet made much progress on his severe venous stasis disease.  Please see picture.  He has no chest pain.  He has no sense of arrhythmia or palpitations or tachycardia.     Assessment and Plan      BLE edema: Multifactorial etiology.  Appears to have venous stasis/chronic venous insufficiency. May be due to venous insufficiency, possible lymphedema.  Last echo 2022 with NL LV function but RV mildly reduced.  Proceed with echocardiogram to evaluate LV and RV function.    Agree with IV diuresis.  Increase Bumex dosing and frequency -orders in  Change bumex to 1.5 mg TID, add aldactone 25 mg      SOB/DURAN:  CXR showed no pulmonary edema, , low for age. Obtain echo.  If this is venous lower extremity and no edema in the lungs that would tend to make us think this is more CVI lymphedema    Elevated troponin: Flat this is not an MI numbers are 142, 145 and 133 mildly elevated with flat trend. No chest pain. EKG with no ischemic changes.  Suspect elevated troponin due to demand of  uncontrollled BP on presentation, mild tachycardia.    Chronic afib:  remains in afib. HR mildly elevated on presentation but now normalized.  Continue Toprol XL 50 mg daily.  Elevated [id number] score- continue Eliquis.   5.    Coronary artery calcifications: seen on prior CT chest in 2022. On eliquis for #4. Previously on atorvastatin. Will check Lipid panel, plan to add statin.   6.     History of HTN: BP currently  Diabetes (HCC)     diet control for pre-diabetes    Dyslipidemia, goal LDL below 100 6/14/2011    Gout     HTN (hypertension) 4/24/2010    Impotence 4/24/2010    Morbid obesity (HCC) 5/17/2010    SAMEER (obstructive sleep apnea) 4/24/2010    CPAP    Restless legs 4/15/2015     Past Surgical History:   Procedure Laterality Date    APPENDECTOMY      COLONOSCOPY N/A 6/27/2017    COLONOSCOPY performed by Jean Whyte MD at [facility] ENDOSCOPY    COLONOSCOPY  2007    ORTHOPEDIC SURGERY  2010    left THR    ORTHOPEDIC SURGERY  2000    bilat TKR     UROLOGICAL SURGERY  03/2018    urolift    UROLOGICAL SURGERY  03/2019    prosthesis     Social Hx:  reports that he quit smoking about 34 years ago. His smoking use included cigarettes. He has been exposed to tobacco smoke. He has never used smokeless tobacco. He reports current alcohol use of about 1.7 standard drinks of alcohol per week. He reports that he does not use drugs.  Family Hx: family history includes Cancer in his father, maternal aunt, maternal uncle, and paternal grandfather; Diabetes in his brother and sister.  No Known Allergies       OBJECTIVE:  Wt Readings from Last 3 Encounters:   05/25/24 (!) 137.6 kg (303 lb 5.7 oz)   04/11/24 126.1 kg (278 lb)   06/08/23 99.8 kg (220 lb)     Net IO Since Admission: -242.86 mL [05/25/24 1116]     Physical Exam:    Vitals:   Vitals:    05/25/24 0400 05/25/24 0600 05/25/24 0720 05/25/24 1000   BP:   131/77    Pulse: 87 75 82 82   Resp:   19    Temp:   97.5 °F (36.4 °C)    TempSrc:   Oral    SpO2:   99%    Weight:   (!) 137.6 kg (303 lb 5.7 oz)    Height:         Telemetry: afib, rate controlled    Gen: Well-developed, male, SOB with movement in bed.   Neck: Supple,  Resp: No accessory muscle use at rest, few faint crackles rt base.  Upper airway wheeze with forced exhalation.   Card: Irregularly irregular Rate,Rhythm, Normal S1, S2, No murmurs, rubs or gallop. No thrills.   Abd:   Soft, obese, non-tender, non-distended, BS+  Catie Mendieta MD    morphine (PF) injection 2 mg, 2 mg, IntraVENous, Q4H PRN, Catie Mendieta MD    vancomycin (VANCOCIN) intermittent dosing (placeholder), , Other, RX Placeholder, Catie Mendieta MD    vancomycin (VANCOCIN) 1,500 mg in sodium chloride 0.9 % 250 mL IVPB ([id number]), 1,500 mg, IntraVENous, Q24H, Catie Mendieta MD    piperacillin-tazobactam (ZOSYN) 4,500 mg in sodium chloride 0.9 % 100 mL IVPB (mini-bag), 4,500 mg, IntraVENous, Q8H, Catie Mendieta MD, Stopped at 05/25/24 1010    mirtazapine (REMERON) tablet 15 mg, 15 mg, Oral, Nightly, Catie Mendieta MD, 15 mg at 05/24/24 2041    buPROPion (WELLBUTRIN XL) extended release tablet 150 mg, 150 mg, Oral, Daily, Catie Mendieta MD, 150 mg at 05/25/24 0836    melatonin tablet 3 mg, 3 mg, Oral, Nightly PRN, Prema Coyne, APRN - NP, 3 mg at 05/24/24 2039    Destiney Spain MD    [facility] Cardiology  Call center: (P) [phone]  (F) [phone]

## 2024-05-25 NOTE — PROGRESS NOTES
1930: Bedside and Verbal shift change report given to MARYANNE Christine (oncoming nurse) by MARYANNE Thompson (offgoing nurse). Report included the following information Nurse Handoff Report, Index, Adult Overview, Intake/Output, MAR, Recent Results, and Cardiac Rhythm   .     0730: Bedside and Verbal shift change report given to MARAYNNE Thompson (oncoming nurse) by MARYANNE Christine (offgoing nurse). Report included the following information Nurse Handoff Report, Index, Adult Overview, Intake/Output, MAR, Recent Results, and Cardiac Rhythm   .         Problem: Discharge Planning  Goal: Discharge to home or other facility with appropriate resources  5/25/2024 2325 by Emmett Cool RN  Outcome: Progressing  Flowsheets (Taken 5/25/2024 2000)  Discharge to home or other facility with appropriate resources: Identify barriers to discharge with patient and caregiver  5/25/2024 1150 by Donna Gregorio RN  Outcome: Progressing  Flowsheets (Taken 5/25/2024 0836)  Discharge to home or other facility with appropriate resources:   Identify barriers to discharge with patient and caregiver   Arrange for needed discharge resources and transportation as appropriate     Problem: Chronic Conditions and Co-morbidities  Goal: Patient's chronic conditions and co-morbidity symptoms are monitored and maintained or improved  5/25/2024 2325 by Emmett Cool RN  Outcome: Progressing  Flowsheets (Taken 5/25/2024 2000)  Care Plan - Patient's Chronic Conditions and Co-Morbidity Symptoms are Monitored and Maintained or Improved: Monitor and assess patient's chronic conditions and comorbid symptoms for stability, deterioration, or improvement  5/25/2024 1150 by Donna Gregorio RN  Outcome: Progressing  Flowsheets (Taken 5/25/2024 0836)  Care Plan - Patient's Chronic Conditions and Co-Morbidity Symptoms are Monitored and Maintained or Improved:   Monitor and assess patient's chronic conditions and comorbid symptoms for stability, deterioration, or improvement    Collaborate with multidisciplinary team to address chronic and comorbid conditions and prevent exacerbation or deterioration   Update acute care plan with appropriate goals if chronic or comorbid symptoms are exacerbated and prevent overall improvement and discharge     Problem: Pain  Goal: Verbalizes/displays adequate comfort level or baseline comfort level  5/25/2024 2325 by Emmett Cool RN  Outcome: Progressing  5/25/2024 1150 by Donna Gregorio RN  Outcome: Progressing     Problem: Safety - Adult  Goal: Free from fall injury  5/25/2024 2325 by Emmett Cool RN  Outcome: Progressing  5/25/2024 1150 by Donna Gregorio RN  Outcome: Progressing     Problem: Skin/Tissue Integrity  Goal: Absence of new skin breakdown  Description: 1.  Monitor for areas of redness and/or skin breakdown  2.  Assess vascular access sites hourly  3.  Every 4-6 hours minimum:  Change oxygen saturation probe site  4.  Every 4-6 hours:  If on nasal continuous positive airway pressure, respiratory therapy assess nares and determine need for appliance change or resting period.  5/25/2024 2325 by Emmett Cool RN  Outcome: Progressing  5/25/2024 1150 by Donna Gregorio RN  Outcome: Progressing

## 2024-05-26 LAB
ANION GAP SERPL CALC-SCNC: 8 MMOL/L (ref 5–15)
BUN SERPL-MCNC: 27 MG/DL (ref 6–20)
BUN/CREAT SERPL: 25 (ref 12–20)
CALCIUM SERPL-MCNC: 9.2 MG/DL (ref 8.5–10.1)
CHLORIDE SERPL-SCNC: 102 MMOL/L (ref 97–108)
CO2 SERPL-SCNC: 26 MMOL/L (ref 21–32)
CREAT SERPL-MCNC: 1.1 MG/DL (ref 0.7–1.3)
ECHO AO ROOT DIAM: 3.7 CM
ECHO AO ROOT INDEX: 1.47 CM/M2
ECHO AV AREA PEAK VELOCITY: 1.9 CM2
ECHO AV AREA/BSA PEAK VELOCITY: 0.8 CM2/M2
ECHO AV PEAK GRADIENT: 12 MMHG
ECHO AV PEAK VELOCITY: 1.8 M/S
ECHO AV VELOCITY RATIO: 0.5
ECHO BSA: 2.62 M2
ECHO EST RA PRESSURE: 3 MMHG
ECHO LA DIAMETER INDEX: 2.62 CM/M2
ECHO LA DIAMETER: 6.6 CM
ECHO LA TO AORTIC ROOT RATIO: 1.78
ECHO LV FRACTIONAL SHORTENING: 42 % (ref 28–44)
ECHO LV INTERNAL DIMENSION DIASTOLE INDEX: 1.98 CM/M2
ECHO LV INTERNAL DIMENSION DIASTOLIC: 5 CM (ref 4.2–5.9)
ECHO LV INTERNAL DIMENSION SYSTOLIC INDEX: 1.15 CM/M2
ECHO LV INTERNAL DIMENSION SYSTOLIC: 2.9 CM
ECHO LV IVSD: 1.1 CM (ref 0.6–1)
ECHO LV MASS 2D: 207.1 G (ref 88–224)
ECHO LV MASS INDEX 2D: 82.2 G/M2 (ref 49–115)
ECHO LV POSTERIOR WALL DIASTOLIC: 1.1 CM (ref 0.6–1)
ECHO LV RELATIVE WALL THICKNESS RATIO: 0.44
ECHO LVOT AREA: 3.8 CM2
ECHO LVOT DIAM: 2.2 CM
ECHO LVOT PEAK GRADIENT: 3 MMHG
ECHO LVOT PEAK VELOCITY: 0.9 M/S
ECHO MV E VELOCITY: 1.13 M/S
ECHO MV EROA PISA: 0.4 CM2
ECHO MV REGURGITANT ALIASING (NYQUIST) VELOCITY: 30 CM/S
ECHO MV REGURGITANT RADIUS PISA: 0.99 CM
ECHO MV REGURGITANT VELOCITY PISA: 5.2 M/S
ECHO MV REGURGITANT VOLUME PISA: 55.43 ML
ECHO MV REGURGITANT VTIA: 156.1 CM
ECHO PV MAX VELOCITY: 0.9 M/S
ECHO PV PEAK GRADIENT: 3 MMHG
ECHO RIGHT VENTRICULAR SYSTOLIC PRESSURE (RVSP): 36 MMHG
ECHO RV FREE WALL PEAK S': 9 CM/S
ECHO RV TAPSE: 1.2 CM (ref 1.7–?)
ECHO TV REGURGITANT MAX VELOCITY: 2.89 M/S
ECHO TV REGURGITANT PEAK GRADIENT: 33 MMHG
GLUCOSE SERPL-MCNC: 105 MG/DL (ref 65–100)
POTASSIUM SERPL-SCNC: 4 MMOL/L (ref 3.5–5.1)
SODIUM SERPL-SCNC: 136 MMOL/L (ref 136–145)

## 2024-05-26 PROCEDURE — 05HY33Z INSERTION OF INFUSION DEVICE INTO UPPER VEIN, PERCUTANEOUS APPROACH: ICD-10-PCS | Performed by: INTERNAL MEDICINE

## 2024-05-26 PROCEDURE — 80048 BASIC METABOLIC PNL TOTAL CA: CPT

## 2024-05-26 PROCEDURE — 6360000002 HC RX W HCPCS: Performed by: SPECIALIST

## 2024-05-26 PROCEDURE — 99232 SBSQ HOSP IP/OBS MODERATE 35: CPT | Performed by: INTERNAL MEDICINE

## 2024-05-26 PROCEDURE — 6360000002 HC RX W HCPCS: Performed by: INTERNAL MEDICINE

## 2024-05-26 PROCEDURE — 76937 US GUIDE VASCULAR ACCESS: CPT

## 2024-05-26 PROCEDURE — 6370000000 HC RX 637 (ALT 250 FOR IP): Performed by: INTERNAL MEDICINE

## 2024-05-26 PROCEDURE — 6370000000 HC RX 637 (ALT 250 FOR IP): Performed by: HOSPITALIST

## 2024-05-26 PROCEDURE — 36415 COLL VENOUS BLD VENIPUNCTURE: CPT

## 2024-05-26 PROCEDURE — 2580000003 HC RX 258: Performed by: INTERNAL MEDICINE

## 2024-05-26 PROCEDURE — 2709999900 HC NON-CHARGEABLE SUPPLY

## 2024-05-26 PROCEDURE — 2060000000 HC ICU INTERMEDIATE R&B

## 2024-05-26 RX ORDER — SPIRONOLACTONE 25 MG/1
25 TABLET ORAL 2 TIMES DAILY
Status: DISCONTINUED | OUTPATIENT
Start: 2024-05-26 | End: 2024-05-28

## 2024-05-26 RX ORDER — DOXYCYCLINE HYCLATE 100 MG
100 TABLET ORAL EVERY 12 HOURS
Status: DISCONTINUED | OUTPATIENT
Start: 2024-05-26 | End: 2024-05-29 | Stop reason: HOSPADM

## 2024-05-26 RX ORDER — PANTOPRAZOLE SODIUM 40 MG/1
40 TABLET, DELAYED RELEASE ORAL
Status: DISCONTINUED | OUTPATIENT
Start: 2024-05-27 | End: 2024-05-29 | Stop reason: HOSPADM

## 2024-05-26 RX ADMIN — SODIUM CHLORIDE, PRESERVATIVE FREE 10 ML: 5 INJECTION INTRAVENOUS at 09:25

## 2024-05-26 RX ADMIN — MORPHINE SULFATE 2 MG: 2 INJECTION, SOLUTION INTRAMUSCULAR; INTRAVENOUS at 23:45

## 2024-05-26 RX ADMIN — BUMETANIDE 1.5 MG: 0.25 INJECTION INTRAMUSCULAR; INTRAVENOUS at 09:20

## 2024-05-26 RX ADMIN — ATORVASTATIN CALCIUM 80 MG: 40 TABLET, FILM COATED ORAL at 20:27

## 2024-05-26 RX ADMIN — PIPERACILLIN AND TAZOBACTAM 4500 MG: 4; .5 INJECTION, POWDER, LYOPHILIZED, FOR SOLUTION INTRAVENOUS at 17:11

## 2024-05-26 RX ADMIN — DOXYCYCLINE HYCLATE 100 MG: 100 TABLET, COATED ORAL at 20:27

## 2024-05-26 RX ADMIN — MIRTAZAPINE 15 MG: 15 TABLET, FILM COATED ORAL at 20:27

## 2024-05-26 RX ADMIN — OXYCODONE 5 MG: 5 TABLET ORAL at 09:21

## 2024-05-26 RX ADMIN — APIXABAN 5 MG: 5 TABLET, FILM COATED ORAL at 20:27

## 2024-05-26 RX ADMIN — BUPROPION HYDROCHLORIDE 150 MG: 150 TABLET, EXTENDED RELEASE ORAL at 09:20

## 2024-05-26 RX ADMIN — SPIRONOLACTONE 25 MG: 25 TABLET ORAL at 09:20

## 2024-05-26 RX ADMIN — DOXYCYCLINE HYCLATE 100 MG: 100 TABLET, COATED ORAL at 09:20

## 2024-05-26 RX ADMIN — SPIRONOLACTONE 25 MG: 25 TABLET ORAL at 17:08

## 2024-05-26 RX ADMIN — METOPROLOL SUCCINATE 50 MG: 50 TABLET, EXTENDED RELEASE ORAL at 09:20

## 2024-05-26 RX ADMIN — MORPHINE SULFATE 2 MG: 2 INJECTION, SOLUTION INTRAMUSCULAR; INTRAVENOUS at 17:08

## 2024-05-26 RX ADMIN — OXYCODONE 5 MG: 5 TABLET ORAL at 20:31

## 2024-05-26 RX ADMIN — SODIUM CHLORIDE, PRESERVATIVE FREE 10 ML: 5 INJECTION INTRAVENOUS at 20:32

## 2024-05-26 RX ADMIN — FINASTERIDE 5 MG: 5 TABLET, FILM COATED ORAL at 09:20

## 2024-05-26 RX ADMIN — BUMETANIDE 1.5 MG: 0.25 INJECTION INTRAMUSCULAR; INTRAVENOUS at 20:27

## 2024-05-26 RX ADMIN — APIXABAN 5 MG: 5 TABLET, FILM COATED ORAL at 09:20

## 2024-05-26 RX ADMIN — ALLOPURINOL 100 MG: 100 TABLET ORAL at 09:20

## 2024-05-26 RX ADMIN — BUMETANIDE 1.5 MG: 0.25 INJECTION INTRAMUSCULAR; INTRAVENOUS at 15:06

## 2024-05-26 RX ADMIN — PIPERACILLIN AND TAZOBACTAM 4500 MG: 4; .5 INJECTION, POWDER, LYOPHILIZED, FOR SOLUTION INTRAVENOUS at 09:28

## 2024-05-26 RX ADMIN — PIPERACILLIN AND TAZOBACTAM 4500 MG: 4; .5 INJECTION, POWDER, LYOPHILIZED, FOR SOLUTION INTRAVENOUS at 00:43

## 2024-05-26 ASSESSMENT — PAIN SCALES - GENERAL
PAINLEVEL_OUTOF10: 9
PAINLEVEL_OUTOF10: 6
PAINLEVEL_OUTOF10: 9
PAINLEVEL_OUTOF10: 9
PAINLEVEL_OUTOF10: 0

## 2024-05-26 ASSESSMENT — PAIN DESCRIPTION - ORIENTATION
ORIENTATION: RIGHT
ORIENTATION: LEFT;RIGHT
ORIENTATION: RIGHT
ORIENTATION: RIGHT

## 2024-05-26 ASSESSMENT — PAIN DESCRIPTION - LOCATION
LOCATION: SHOULDER
LOCATION: LEG

## 2024-05-26 ASSESSMENT — PAIN DESCRIPTION - DESCRIPTORS
DESCRIPTORS: ACHING

## 2024-05-26 ASSESSMENT — PAIN - FUNCTIONAL ASSESSMENT
PAIN_FUNCTIONAL_ASSESSMENT: ACTIVITIES ARE NOT PREVENTED
PAIN_FUNCTIONAL_ASSESSMENT: ACTIVITIES ARE NOT PREVENTED

## 2024-05-26 NOTE — PROGRESS NOTES
[facility] CARDIOLOGY  Cardiology Care Note                  []Initial visit     [x]Established visit     Patient Name: Fito FERNÁNDEZ Papo - :1950 - MRN:[id number]  Primary Cardiologist: Jesus Manuel Willett MD  Consulting Cardiologist: Yuan Prado MD  Reason for initial visit: CHF, elevated troponin           SUBJECTIVE:   Continues with IV diuresis and says he has been having in response to this.  No exertional chest pain.  His breathing's been stable.       Assessment and Plan      BLE edema: Multifactorial etiology.  Appears to have venous stasis/chronic venous insufficiency. May be due to venous insufficiency, possible lymphedema.  Last echo 2022 with NL LV function but RV mildly reduced.  Findings of repeat echo pending but appears that his LV systolic function does continue to be normal.  For now, continue IV diuresis.  SOB/DURAN:  CXR showed no pulmonary edema, , low for age.  Presentation is consistent with venous lower extremity and no edema in the lungs that would tend to make us think this is more CVI lymphedema  Elevated troponin: Flat this is not an MI numbers are 142, 145 and 133 mildly elevated with flat trend. No chest pain. EKG with no ischemic changes.  Suspect elevated troponin due to demand of  uncontrollled BP on presentation, mild tachycardia.    Chronic afib:  remains in afib. HR mildly elevated on presentation but now normalized.  Continue Toprol XL 50 mg daily.  Elevated [id number] score- continue Eliquis.   5.    Coronary artery calcifications: seen on prior CT chest in 2022. On eliquis for #4. Previously on atorvastatin.  Lipids at goal.  Atorvastatin is reasonable and this was reinitiated  6.     History of HTN: BP currently controlled.  Continue toprol XL  7.    Venous stasis with stasis wounds BLE edema: wound care following for wounds.  8.    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

## 2024-05-26 NOTE — PROCEDURES
PROCEDURE NOTE  Date: 5/26/2024   Name: Fito Barros  YOB: 1950    Procedures    Vascular Access Team - peripheral IV catheter insertion note     A 18 gauge,  32 mm (1.25 inch) PIV was inserted into the right lower anterior forearm using ultrasound guidance. The IV flushed easily and had brisk blood return. The patient tolerated the procedure well.   Nurse Donna notified.  PAOLA HONG RN

## 2024-05-26 NOTE — PROGRESS NOTES
Hospitalist Progress Note  Sara Hart MD  Answering service: [phone]        Date of Service:  2024  NAME:  Fito Barros  :  1950  MRN:  [id number]      Admission Summary:   Fito Barros is a 73 y.o. male with past medical history significant for hypertension, dyslipidemia, paroxysmal atrial fibrillation on Eliquis for anticoagulation presented at the emergency room with increased bilateral lower extremity swelling.  Patient has bilateral lower extremity venous stasis with wounds and has been receiving wound care by his home health nurse.  Patient was sent to the emergency room because the swelling and wounds are getting worse.  Patient also reported shortness of breath which has been going on for about 2 months and progressively getting worse.  The shortness of breath is worse with mild exertion and also when the patient is lying down.  He denies cough, rigors and chills.  Chest x-ray done on arrival at the emergency room showed unchanged mild cardiomegaly.  He was referred to the hospitalist service for admission.       Interval history / Subjective:   Bilat leg severe swelling, ozzing, dressing changed      Assessment & Plan:      bilat leg severe edema with superimposed cellulitis   -increasing iv bumex  , will adjust as long renal function tolerate   - iv vanco and zosyn , MRSA neg. Dc vanco, change to po doxy,  Continue zosyn given his previous pseudomonas culture in wound   Elevate legs,   Wound dressing change       New onset CHF:  Possible diastolic chf, RV dysfunction    Cardiologist following  Echo pending  Continue iv bumex added aldactone       Morbid obesity: Patient presented with BMI of 43.75.     Be benefit from lifestyle modification including diet and exercise when feasible.     Elevated troponin level:   -falt   Not acs  May due to uncontrolled BP   Pending echo       Essential

## 2024-05-26 NOTE — PROGRESS NOTES
0740 - Shift handoff obtained from Emmett MADDEN (off-going) at this time. Patient sitting on side of bed, respirations even and unlabored. Denies any needs at this time.    1830 - Patient resting in bed, respirations even and unlabored. States he feels better today and that his breathing is better than yesterday. Voiding frequently due to diuretics but tends to spill or miss his urinal due to his physical limitations and anatomy. Still having pain in his right shoulder off and on all shift. Patient hoping to go to rehab when he is discharged.    Plan of Care:  Problem: Discharge Planning  Goal: Discharge to home or other facility with appropriate resources  5/26/2024 1028 by Donna Gregorio RN  Outcome: Progressing  Flowsheets (Taken 5/26/2024 0920)  Discharge to home or other facility with appropriate resources:   Identify barriers to discharge with patient and caregiver   Arrange for needed discharge resources and transportation as appropriate  5/25/2024 2325 by Emmett Cool, RN  Outcome: Progressing  Flowsheets (Taken 5/25/2024 2000)  Discharge to home or other facility with appropriate resources: Identify barriers to discharge with patient and caregiver     Problem: Chronic Conditions and Co-morbidities  Goal: Patient's chronic conditions and co-morbidity symptoms are monitored and maintained or improved  5/26/2024 1028 by Donna Gregorio RN  Outcome: Progressing  Flowsheets (Taken 5/26/2024 0920)  Care Plan - Patient's Chronic Conditions and Co-Morbidity Symptoms are Monitored and Maintained or Improved:   Monitor and assess patient's chronic conditions and comorbid symptoms for stability, deterioration, or improvement   Collaborate with multidisciplinary team to address chronic and comorbid conditions and prevent exacerbation or deterioration   Update acute care plan with appropriate goals if chronic or comorbid symptoms are exacerbated and prevent overall improvement and discharge  5/25/2024 2325 by Travon  MARYANNE Christine  Outcome: Progressing  Flowsheets (Taken 5/25/2024 2000)  Care Plan - Patient's Chronic Conditions and Co-Morbidity Symptoms are Monitored and Maintained or Improved: Monitor and assess patient's chronic conditions and comorbid symptoms for stability, deterioration, or improvement     Problem: Pain  Goal: Verbalizes/displays adequate comfort level or baseline comfort level  5/26/2024 1028 by Donna Gregorio RN  Outcome: Progressing  5/25/2024 2325 by Emmett Cool RN  Outcome: Progressing     Problem: Safety - Adult  Goal: Free from fall injury  5/26/2024 1028 by Donna Gregorio RN  Outcome: Progressing  5/25/2024 2325 by Emmett Cool RN  Outcome: Progressing     Problem: Skin/Tissue Integrity  Goal: Absence of new skin breakdown  Description: 1.  Monitor for areas of redness and/or skin breakdown  2.  Assess vascular access sites hourly  3.  Every 4-6 hours minimum:  Change oxygen saturation probe site  4.  Every 4-6 hours:  If on nasal continuous positive airway pressure, respiratory therapy assess nares and determine need for appliance change or resting period.  5/26/2024 1028 by Donna Gregorio RN  Outcome: Progressing  5/25/2024 2325 by Emmett Cool RN  Outcome: Progressing

## 2024-05-27 LAB
ANION GAP SERPL CALC-SCNC: 5 MMOL/L (ref 5–15)
BUN SERPL-MCNC: 26 MG/DL (ref 6–20)
BUN/CREAT SERPL: 23 (ref 12–20)
CALCIUM SERPL-MCNC: 9 MG/DL (ref 8.5–10.1)
CHLORIDE SERPL-SCNC: 103 MMOL/L (ref 97–108)
CO2 SERPL-SCNC: 29 MMOL/L (ref 21–32)
CREAT SERPL-MCNC: 1.12 MG/DL (ref 0.7–1.3)
GLUCOSE SERPL-MCNC: 104 MG/DL (ref 65–100)
POTASSIUM SERPL-SCNC: 3.7 MMOL/L (ref 3.5–5.1)
SODIUM SERPL-SCNC: 137 MMOL/L (ref 136–145)

## 2024-05-27 PROCEDURE — 2580000003 HC RX 258: Performed by: INTERNAL MEDICINE

## 2024-05-27 PROCEDURE — 6370000000 HC RX 637 (ALT 250 FOR IP): Performed by: HOSPITALIST

## 2024-05-27 PROCEDURE — 99232 SBSQ HOSP IP/OBS MODERATE 35: CPT | Performed by: INTERNAL MEDICINE

## 2024-05-27 PROCEDURE — 6370000000 HC RX 637 (ALT 250 FOR IP): Performed by: INTERNAL MEDICINE

## 2024-05-27 PROCEDURE — 36415 COLL VENOUS BLD VENIPUNCTURE: CPT

## 2024-05-27 PROCEDURE — 6360000002 HC RX W HCPCS: Performed by: INTERNAL MEDICINE

## 2024-05-27 PROCEDURE — 2060000000 HC ICU INTERMEDIATE R&B

## 2024-05-27 PROCEDURE — 6360000002 HC RX W HCPCS: Performed by: SPECIALIST

## 2024-05-27 PROCEDURE — 80048 BASIC METABOLIC PNL TOTAL CA: CPT

## 2024-05-27 RX ADMIN — APIXABAN 5 MG: 5 TABLET, FILM COATED ORAL at 20:22

## 2024-05-27 RX ADMIN — PANTOPRAZOLE SODIUM 40 MG: 40 TABLET, DELAYED RELEASE ORAL at 06:54

## 2024-05-27 RX ADMIN — APIXABAN 5 MG: 5 TABLET, FILM COATED ORAL at 08:38

## 2024-05-27 RX ADMIN — SPIRONOLACTONE 25 MG: 25 TABLET ORAL at 17:05

## 2024-05-27 RX ADMIN — SODIUM CHLORIDE: 9 INJECTION, SOLUTION INTRAVENOUS at 08:35

## 2024-05-27 RX ADMIN — SODIUM CHLORIDE, PRESERVATIVE FREE 10 ML: 5 INJECTION INTRAVENOUS at 20:23

## 2024-05-27 RX ADMIN — MORPHINE SULFATE 2 MG: 2 INJECTION, SOLUTION INTRAMUSCULAR; INTRAVENOUS at 19:26

## 2024-05-27 RX ADMIN — FINASTERIDE 5 MG: 5 TABLET, FILM COATED ORAL at 08:39

## 2024-05-27 RX ADMIN — METOPROLOL SUCCINATE 50 MG: 50 TABLET, EXTENDED RELEASE ORAL at 08:38

## 2024-05-27 RX ADMIN — BUMETANIDE 1.5 MG: 0.25 INJECTION INTRAMUSCULAR; INTRAVENOUS at 14:30

## 2024-05-27 RX ADMIN — MIRTAZAPINE 15 MG: 15 TABLET, FILM COATED ORAL at 20:21

## 2024-05-27 RX ADMIN — BUMETANIDE 1.5 MG: 0.25 INJECTION INTRAMUSCULAR; INTRAVENOUS at 20:22

## 2024-05-27 RX ADMIN — SPIRONOLACTONE 25 MG: 25 TABLET ORAL at 08:38

## 2024-05-27 RX ADMIN — ALLOPURINOL 100 MG: 100 TABLET ORAL at 08:39

## 2024-05-27 RX ADMIN — BUMETANIDE 1.5 MG: 0.25 INJECTION INTRAMUSCULAR; INTRAVENOUS at 08:40

## 2024-05-27 RX ADMIN — DOXYCYCLINE HYCLATE 100 MG: 100 TABLET, COATED ORAL at 08:46

## 2024-05-27 RX ADMIN — MORPHINE SULFATE 2 MG: 2 INJECTION, SOLUTION INTRAMUSCULAR; INTRAVENOUS at 14:29

## 2024-05-27 RX ADMIN — PIPERACILLIN AND TAZOBACTAM 4500 MG: 4; .5 INJECTION, POWDER, LYOPHILIZED, FOR SOLUTION INTRAVENOUS at 08:37

## 2024-05-27 RX ADMIN — PIPERACILLIN AND TAZOBACTAM 4500 MG: 4; .5 INJECTION, POWDER, LYOPHILIZED, FOR SOLUTION INTRAVENOUS at 17:04

## 2024-05-27 RX ADMIN — ATORVASTATIN CALCIUM 80 MG: 40 TABLET, FILM COATED ORAL at 20:21

## 2024-05-27 RX ADMIN — PIPERACILLIN AND TAZOBACTAM 4500 MG: 4; .5 INJECTION, POWDER, LYOPHILIZED, FOR SOLUTION INTRAVENOUS at 01:30

## 2024-05-27 RX ADMIN — DOXYCYCLINE HYCLATE 100 MG: 100 TABLET, COATED ORAL at 20:21

## 2024-05-27 RX ADMIN — SODIUM CHLORIDE, PRESERVATIVE FREE 10 ML: 5 INJECTION INTRAVENOUS at 08:37

## 2024-05-27 RX ADMIN — BUPROPION HYDROCHLORIDE 150 MG: 150 TABLET, EXTENDED RELEASE ORAL at 08:38

## 2024-05-27 RX ADMIN — MORPHINE SULFATE 2 MG: 2 INJECTION, SOLUTION INTRAMUSCULAR; INTRAVENOUS at 03:30

## 2024-05-27 ASSESSMENT — PAIN SCALES - GENERAL
PAINLEVEL_OUTOF10: 0
PAINLEVEL_OUTOF10: 4
PAINLEVEL_OUTOF10: 0
PAINLEVEL_OUTOF10: 0
PAINLEVEL_OUTOF10: 8
PAINLEVEL_OUTOF10: 8
PAINLEVEL_OUTOF10: 0
PAINLEVEL_OUTOF10: 10
PAINLEVEL_OUTOF10: 8
PAINLEVEL_OUTOF10: 0

## 2024-05-27 ASSESSMENT — PAIN DESCRIPTION - DESCRIPTORS
DESCRIPTORS: ACHING
DESCRIPTORS: SHOOTING
DESCRIPTORS: SHARP;SHOOTING

## 2024-05-27 ASSESSMENT — PAIN DESCRIPTION - LOCATION
LOCATION: LEG

## 2024-05-27 ASSESSMENT — PAIN DESCRIPTION - ORIENTATION
ORIENTATION: RIGHT;LEFT
ORIENTATION: LEFT;RIGHT

## 2024-05-27 NOTE — PLAN OF CARE
0730:Bedside and Verbal shift change report given to MARYANNE Pulido (oncoming nurse) by MARYANNE Sawant (offgoing nurse). Report included the following information Nurse Handoff Report, Intake/Output, and Cardiac Rhythm afib .     1200: completed pt's dressing change. Pt tolerated well. No complaints at this time.     1930: Bedside and Verbal shift change report given to MARYANNE Sawant (oncoming nurse) by MARYANNE Pulido (offgoing nurse). Report included the following information Nurse Handoff Report, Intake/Output, and Cardiac Rhythm afib .     Problem: Discharge Planning  Goal: Discharge to home or other facility with appropriate resources  5/27/2024 1027 by Tess Hassan RN  Outcome: Progressing  5/26/2024 2225 by Savana Heart RN  Outcome: Progressing     Problem: Chronic Conditions and Co-morbidities  Goal: Patient's chronic conditions and co-morbidity symptoms are monitored and maintained or improved  5/27/2024 1027 by Tess Hassan RN  Outcome: Progressing  5/26/2024 2225 by Savana Heart RN  Outcome: Progressing     Problem: Pain  Goal: Verbalizes/displays adequate comfort level or baseline comfort level  5/27/2024 1027 by Tess Hassan RN  Outcome: Progressing  5/26/2024 2225 by Savana Heart RN  Outcome: Progressing     Problem: Safety - Adult  Goal: Free from fall injury  5/27/2024 1027 by Tess Hassan RN  Outcome: Progressing  5/26/2024 2225 by Savana Heart RN  Outcome: Progressing     Problem: Skin/Tissue Integrity  Goal: Absence of new skin breakdown  Description: 1.  Monitor for areas of redness and/or skin breakdown  2.  Assess vascular access sites hourly  3.  Every 4-6 hours minimum:  Change oxygen saturation probe site  4.  Every 4-6 hours:  If on nasal continuous positive airway pressure, respiratory therapy assess nares and determine need for appliance change or resting period.  5/27/2024 1027 by Tess Hassan RN  Outcome: Progressing  5/26/2024 2225 by Savana Heart RN  Outcome: Progressing

## 2024-05-27 NOTE — PROGRESS NOTES
Past Surgical History:   Procedure Laterality Date    APPENDECTOMY      COLONOSCOPY N/A 6/27/2017    COLONOSCOPY performed by Jean Whyte MD at [facility]    COLONOSCOPY  2007    ORTHOPEDIC SURGERY  2010    left THR    ORTHOPEDIC SURGERY  2000    bilat TKR     UROLOGICAL SURGERY  03/2018    urolift    UROLOGICAL SURGERY  03/2019    prosthesis     Social Hx:  reports that he quit smoking about 34 years ago. His smoking use included cigarettes. He has been exposed to tobacco smoke. He has never used smokeless tobacco. He reports current alcohol use of about 1.7 standard drinks of alcohol per week. He reports that he does not use drugs.  Family Hx: family history includes Cancer in his father, maternal aunt, maternal uncle, and paternal grandfather; Diabetes in his brother and sister.  No Known Allergies       OBJECTIVE:  Wt Readings from Last 3 Encounters:   05/27/24 134 kg (295 lb 6.7 oz)   04/11/24 126.1 kg (278 lb)   06/08/23 99.8 kg (220 lb)     Net IO Since Admission: -4,292.34 mL [05/27/24 0933]     Physical Exam:    Vitals:   Vitals:    05/27/24 0355 05/27/24 0600 05/27/24 0700 05/27/24 0837   BP:   (!) 139/91 118/75   Pulse: 89 (!) 109 93 83   Resp:       Temp:   97.3 °F (36.3 °C)    TempSrc:   Oral    SpO2:   96%    Weight:   134 kg (295 lb 6.7 oz)    Height:         Telemetry: afib, rate controlled    Gen: Well-developed, male, SOB with movement in bed.   Neck: Supple,  Resp: No accessory muscle use at rest, few faint crackles rt base.  Upper airway wheeze with forced exhalation.   Card: Irregularly irregular Rate,Rhythm, Normal S1, S2, No murmurs, rubs or gallop. No thrills.   Abd:   Soft, obese, non-tender, non-distended, BS+   MSK: Limited ROM RUE  Skin: Venous stasis ulcers bilaterally with weeping skin noted BLE   Neuro: Moving all four extremities, follows commands appropriately  Psych:  Oriented to person, place, alert, Nml Affect  LE:3-4+ BLE edema   See wound care note for wound pictures.  tablet 100 mg, 100 mg, Oral, Q12H, Sara Hart MD, 100 mg at 05/27/24 0846    pantoprazole (PROTONIX) tablet 40 mg, 40 mg, Oral, QAM AC, Sara Hart MD, 40 mg at 05/27/24 0654    bumetanide (BUMEX) injection 1.5 mg, 1.5 mg, IntraVENous, TID, Destiney Spain MD, 1.5 mg at 05/27/24 0840    allopurinol (ZYLOPRIM) tablet 100 mg, 100 mg, Oral, Daily, Catie Mendieta MD, 100 mg at 05/27/24 0839    apixaban (ELIQUIS) tablet 5 mg, 5 mg, Oral, BID, Catie Mendieta MD, 5 mg at 05/27/24 0838    atorvastatin (LIPITOR) tablet 80 mg, 80 mg, Oral, Nightly, Catie Mendieta MD, 80 mg at 05/26/24 2027    finasteride (PROSCAR) tablet 5 mg, 5 mg, Oral, Daily, Catie Mendieta MD, 5 mg at 05/27/24 0839    metoprolol succinate (TOPROL XL) extended release tablet 50 mg, 50 mg, Oral, Daily, Catie Mendieta MD, 50 mg at 05/27/24 0838    sodium chloride flush 0.9 % injection 5-40 mL, 5-40 mL, IntraVENous, 2 times per day, Catie Mendieta MD, 10 mL at 05/27/24 0837    sodium chloride flush 0.9 % injection 5-40 mL, 5-40 mL, IntraVENous, PRN, Catie Mendieta MD    0.9 % sodium chloride infusion, , IntraVENous, PRN, Catie Mendieta MD, Last Rate: 5 mL/hr at 05/27/24 0835, New Bag at 05/27/24 0835    potassium chloride (KLOR-CON) extended release tablet 40 mEq, 40 mEq, Oral, PRN **OR** potassium bicarb-citric acid (EFFER-K) effervescent tablet 40 mEq, 40 mEq, Oral, PRN **OR** potassium chloride 10 mEq/100 mL IVPB (Peripheral Line), 10 mEq, IntraVENous, PRN, Catie Mendieta MD    magnesium sulfate 2000 mg in 50 mL IVPB premix, 2,000 mg, IntraVENous, PRN, Catie Mendieta MD    ondansetron (ZOFRAN-ODT) disintegrating tablet 4 mg, 4 mg, Oral, Q8H PRN **OR** ondansetron (ZOFRAN) injection 4 mg, 4 mg, IntraVENous, Q6H PRN, Catie Mendieta MD    polyethylene glycol (GLYCOLAX) packet 17 g, 17 g, Oral, Daily PRN, Catie Mendieta MD    acetaminophen (TYLENOL) tablet 650 mg, 650 mg, Oral, Q6H PRN, 650 mg at 05/24/24 2039 **OR**

## 2024-05-27 NOTE — PROGRESS NOTES
Hospitalist Progress Note  Sara Hart MD  Answering service: [phone]        Date of Service:  2024  NAME:  Fito Barros  :  1950  MRN:  [id number]      Admission Summary:   Fito Barros is a 73 y.o. male with past medical history significant for hypertension, dyslipidemia, paroxysmal atrial fibrillation on Eliquis for anticoagulation presented at the emergency room with increased bilateral lower extremity swelling.  Patient has bilateral lower extremity venous stasis with wounds and has been receiving wound care by his home health nurse.  Patient was sent to the emergency room because the swelling and wounds are getting worse.  Patient also reported shortness of breath which has been going on for about 2 months and progressively getting worse.  The shortness of breath is worse with mild exertion and also when the patient is lying down.  He denies cough, rigors and chills.  Chest x-ray done on arrival at the emergency room showed unchanged mild cardiomegaly.  He was referred to the hospitalist service for admission.       Interval history / Subjective:   Leg swelling improving       Assessment & Plan:      bilat leg severe edema with superimposed cellulitis   -increasing iv bumex  , will adjust as long renal function tolerate   - iv vanco and zosyn , MRSA neg. Dc vanco, change to po doxy,  Continue zosyn given his previous pseudomonas culture in wound   Elevate legs,   Wound dressing change  daily      New onset CHF:  Possible diastolic chf, RV dysfunction    Cardiologist following  Echo EF 60%,   Continue iv bumex added aldactone   Cardiologist following       Morbid obesity: Patient presented with BMI of 43.75.     Be benefit from lifestyle modification including diet and exercise when feasible.     Elevated troponin level:   -falt   Not acs  May due to uncontrolled BP         Essential

## 2024-05-28 LAB
ANION GAP SERPL CALC-SCNC: 4 MMOL/L (ref 5–15)
BUN SERPL-MCNC: 26 MG/DL (ref 6–20)
BUN/CREAT SERPL: 20 (ref 12–20)
CALCIUM SERPL-MCNC: 9.3 MG/DL (ref 8.5–10.1)
CHLORIDE SERPL-SCNC: 103 MMOL/L (ref 97–108)
CO2 SERPL-SCNC: 30 MMOL/L (ref 21–32)
CREAT SERPL-MCNC: 1.3 MG/DL (ref 0.7–1.3)
GLUCOSE SERPL-MCNC: 97 MG/DL (ref 65–100)
POTASSIUM SERPL-SCNC: 3.5 MMOL/L (ref 3.5–5.1)
SODIUM SERPL-SCNC: 137 MMOL/L (ref 136–145)

## 2024-05-28 PROCEDURE — 6370000000 HC RX 637 (ALT 250 FOR IP): Performed by: INTERNAL MEDICINE

## 2024-05-28 PROCEDURE — 97535 SELF CARE MNGMENT TRAINING: CPT

## 2024-05-28 PROCEDURE — 6370000000 HC RX 637 (ALT 250 FOR IP): Performed by: HOSPITALIST

## 2024-05-28 PROCEDURE — 99233 SBSQ HOSP IP/OBS HIGH 50: CPT | Performed by: SPECIALIST

## 2024-05-28 PROCEDURE — 80048 BASIC METABOLIC PNL TOTAL CA: CPT

## 2024-05-28 PROCEDURE — 97116 GAIT TRAINING THERAPY: CPT | Performed by: PHYSICAL THERAPIST

## 2024-05-28 PROCEDURE — 6360000002 HC RX W HCPCS: Performed by: HOSPITALIST

## 2024-05-28 PROCEDURE — 6360000002 HC RX W HCPCS: Performed by: SPECIALIST

## 2024-05-28 PROCEDURE — 6370000000 HC RX 637 (ALT 250 FOR IP): Performed by: SPECIALIST

## 2024-05-28 PROCEDURE — 97165 OT EVAL LOW COMPLEX 30 MIN: CPT

## 2024-05-28 PROCEDURE — 36415 COLL VENOUS BLD VENIPUNCTURE: CPT

## 2024-05-28 PROCEDURE — 2580000003 HC RX 258: Performed by: INTERNAL MEDICINE

## 2024-05-28 PROCEDURE — 97161 PT EVAL LOW COMPLEX 20 MIN: CPT | Performed by: PHYSICAL THERAPIST

## 2024-05-28 PROCEDURE — 2060000000 HC ICU INTERMEDIATE R&B

## 2024-05-28 PROCEDURE — 97530 THERAPEUTIC ACTIVITIES: CPT

## 2024-05-28 PROCEDURE — 2580000003 HC RX 258: Performed by: HOSPITALIST

## 2024-05-28 PROCEDURE — 6360000002 HC RX W HCPCS: Performed by: INTERNAL MEDICINE

## 2024-05-28 RX ORDER — SPIRONOLACTONE 25 MG/1
50 TABLET ORAL 2 TIMES DAILY
Status: DISCONTINUED | OUTPATIENT
Start: 2024-05-28 | End: 2024-05-29 | Stop reason: HOSPADM

## 2024-05-28 RX ORDER — BUMETANIDE 0.25 MG/ML
2 INJECTION INTRAMUSCULAR; INTRAVENOUS 2 TIMES DAILY
Status: DISCONTINUED | OUTPATIENT
Start: 2024-05-28 | End: 2024-05-29 | Stop reason: HOSPADM

## 2024-05-28 RX ADMIN — MORPHINE SULFATE 2 MG: 2 INJECTION, SOLUTION INTRAMUSCULAR; INTRAVENOUS at 08:40

## 2024-05-28 RX ADMIN — SODIUM CHLORIDE, PRESERVATIVE FREE 10 ML: 5 INJECTION INTRAVENOUS at 08:16

## 2024-05-28 RX ADMIN — PIPERACILLIN AND TAZOBACTAM 4500 MG: 4; .5 INJECTION, POWDER, LYOPHILIZED, FOR SOLUTION INTRAVENOUS at 17:09

## 2024-05-28 RX ADMIN — SPIRONOLACTONE 50 MG: 25 TABLET ORAL at 17:13

## 2024-05-28 RX ADMIN — SODIUM CHLORIDE, PRESERVATIVE FREE 10 ML: 5 INJECTION INTRAVENOUS at 21:02

## 2024-05-28 RX ADMIN — METOPROLOL SUCCINATE 50 MG: 50 TABLET, EXTENDED RELEASE ORAL at 08:12

## 2024-05-28 RX ADMIN — ALLOPURINOL 100 MG: 100 TABLET ORAL at 08:13

## 2024-05-28 RX ADMIN — DOXYCYCLINE HYCLATE 100 MG: 100 TABLET, COATED ORAL at 09:45

## 2024-05-28 RX ADMIN — BUMETANIDE 1.5 MG: 0.25 INJECTION INTRAMUSCULAR; INTRAVENOUS at 08:13

## 2024-05-28 RX ADMIN — PIPERACILLIN AND TAZOBACTAM 4500 MG: 4; .5 INJECTION, POWDER, LYOPHILIZED, FOR SOLUTION INTRAVENOUS at 00:49

## 2024-05-28 RX ADMIN — SODIUM CHLORIDE: 9 INJECTION, SOLUTION INTRAVENOUS at 17:07

## 2024-05-28 RX ADMIN — APIXABAN 5 MG: 5 TABLET, FILM COATED ORAL at 08:39

## 2024-05-28 RX ADMIN — APIXABAN 5 MG: 5 TABLET, FILM COATED ORAL at 21:01

## 2024-05-28 RX ADMIN — BUPROPION HYDROCHLORIDE 150 MG: 150 TABLET, EXTENDED RELEASE ORAL at 08:12

## 2024-05-28 RX ADMIN — ATORVASTATIN CALCIUM 80 MG: 40 TABLET, FILM COATED ORAL at 21:01

## 2024-05-28 RX ADMIN — MORPHINE SULFATE 2 MG: 2 INJECTION, SOLUTION INTRAMUSCULAR; INTRAVENOUS at 19:13

## 2024-05-28 RX ADMIN — MORPHINE SULFATE 2 MG: 2 INJECTION, SOLUTION INTRAMUSCULAR; INTRAVENOUS at 00:37

## 2024-05-28 RX ADMIN — DOXYCYCLINE HYCLATE 100 MG: 100 TABLET, COATED ORAL at 21:01

## 2024-05-28 RX ADMIN — PIPERACILLIN AND TAZOBACTAM 4500 MG: 4; .5 INJECTION, POWDER, LYOPHILIZED, FOR SOLUTION INTRAVENOUS at 08:42

## 2024-05-28 RX ADMIN — PANTOPRAZOLE SODIUM 40 MG: 40 TABLET, DELAYED RELEASE ORAL at 07:08

## 2024-05-28 RX ADMIN — SPIRONOLACTONE 25 MG: 25 TABLET ORAL at 08:13

## 2024-05-28 RX ADMIN — FINASTERIDE 5 MG: 5 TABLET, FILM COATED ORAL at 08:12

## 2024-05-28 RX ADMIN — BUMETANIDE 2 MG: 0.25 INJECTION INTRAMUSCULAR; INTRAVENOUS at 17:10

## 2024-05-28 RX ADMIN — MIRTAZAPINE 15 MG: 15 TABLET, FILM COATED ORAL at 21:01

## 2024-05-28 ASSESSMENT — PAIN SCALES - GENERAL
PAINLEVEL_OUTOF10: 0
PAINLEVEL_OUTOF10: 8
PAINLEVEL_OUTOF10: 2
PAINLEVEL_OUTOF10: 8
PAINLEVEL_OUTOF10: 8
PAINLEVEL_OUTOF10: 0
PAINLEVEL_OUTOF10: 0
PAINLEVEL_OUTOF10: 10
PAINLEVEL_OUTOF10: 0

## 2024-05-28 ASSESSMENT — PAIN DESCRIPTION - DESCRIPTORS
DESCRIPTORS: SHARP;SHOOTING

## 2024-05-28 ASSESSMENT — PAIN DESCRIPTION - LOCATION
LOCATION: LEG
LOCATION: LEG;SHOULDER

## 2024-05-28 ASSESSMENT — PAIN DESCRIPTION - ORIENTATION
ORIENTATION: RIGHT;LEFT

## 2024-05-28 NOTE — PLAN OF CARE
Problem: Occupational Therapy - Adult  Goal: By Discharge: Performs self-care activities at highest level of function for planned discharge setting.  See evaluation for individualized goals.  Description: .FUNCTIONAL STATUS PRIOR TO ADMISSION:  Patient resides at [facility], reports he receives assist for meals in the dining joseph, lives in the apartment alone, able to complete BADLs and transfers to/from an electric w/c himself, goes to a recreation room for some exercises. Hx of CVA with RUE/RLE hemiparesis, reports he was ambulating short distance with assist and RW support, receives  RN care for BLE wounds.     Occupational Therapy Goals:  Initiated 5/28/2024  1.  Patient will perform grooming seated at the sink with Supervision within 7 day(s).  2.  Patient will perform bathing with Moderate Assist & AE PRN within 7 day(s).  3.  Patient will perform lower body dressing with Moderate Assist & AE PRN within 7 day(s).  4.  Patient will perform toilet transfers with Minimal Assist with DME PRN within 7 day(s).  5.  Patient will perform all aspects of toileting with Moderate Assist within 7 day(s).  6.  Patient will participate in upper extremity therapeutic exercise/activities with Contact Guard Assist for 5 minutes within 7 day(s).    7.  Patient will utilize energy conservation techniques during functional activities with verbal and visual cues within 7 day(s).    Outcome: Progressing     OCCUPATIONAL THERAPY EVALUATION    Patient: Fito Barros (73 y.o. male)  Date: 5/28/2024  Primary Diagnosis: Shortness of breath [R06.02]  Peripheral edema [R60.0]  Elevated troponin [R79.89]  Cellulitis of lower extremity, unspecified laterality [L03.119]  New onset of congestive heart failure (HCC) [I50.9]         Precautions: Fall Risk                  ASSESSMENT :  The patient is limited by decreased functional mobility, independence in ADLs/IADLs, ROM, strength, body mechanics, activity tolerance, endurance,  cognition (safety awareness, attention/concentration, insight, sequencing), coordination, balance, lower body/distal access s/p admission for SOB, elevated troponins, and BLE swelling, noted to be in new CHF. He resides in an [facility], utilizes a RW vs power wheelchair, Mod I for lateral transfers and BADLs however assist for IADLs, hx of CVA with residual RUE/RLE hemiparesis.     He now presents below his baseline, requiring Min A x1-2 with RW support for limited OOB mobility and Min-Max A for BADLs. He requires increased time for all functional transfers, BLE swelling limiting distal lower body access and BLE ROM, ultimately Max A for lower body dressing. He is motivated to progress, would benefit from d/c to SNF as he is a high fall and safety risk.    Functional Outcome Measure:  The patient scored 13/24 on the AM-PAC outcome measure which is indicative of higher odds of rehab needs upon d/c.         PLAN :  Recommendations and Planned Interventions:   self care training, therapeutic activities, functional mobility training, balance training, therapeutic exercise, neuromuscular re-education, endurance activities, cognitive retraining, patient education, home safety training, and family training/education    Frequency/Duration: OT Plan of Care: 5 times/week    Recommendation for discharge: (in order for the patient to meet his/her long term goals): Therapy up to 5 days/week in Skilled nursing facility    Other factors to consider for discharge: patient's current support system is unable to meet their requirements for physical assistance, poor safety awareness, impaired cognition, high risk for falls, not safe to be alone, and concern for safely navigating or managing the home environment    IF patient discharges home will need the following DME: continuing to assess with progress       SUBJECTIVE:   Patient stated, “I can't get down there right now with all of this swelling.” re: distal lower body  dressing    OBJECTIVE DATA SUMMARY:     Past Medical History:   Diagnosis Date    Arrhythmia     atrial fib    Depression 4/24/2010    Diabetes (HCC)     diet control for pre-diabetes    Dyslipidemia, goal LDL below 100 6/14/2011    Gout     HTN (hypertension) 4/24/2010    Impotence 4/24/2010    Morbid obesity (HCC) 5/17/2010    SAMEER (obstructive sleep apnea) 4/24/2010    CPAP    Restless legs 4/15/2015     Past Surgical History:   Procedure Laterality Date    APPENDECTOMY      COLONOSCOPY N/A 6/27/2017    COLONOSCOPY performed by Jean Whyte MD at [facility]    COLONOSCOPY  2007    ORTHOPEDIC SURGERY  2010    left THR    ORTHOPEDIC SURGERY  2000    bilat TKR     UROLOGICAL SURGERY  03/2018    urolift    UROLOGICAL SURGERY  03/2019    prosthesis          Expanded or extensive additional review of patient history:   Social/Functional History  Lives With: Alone  Type of Home: Apartment  Home Layout: One level  Home Access: Elevator  Bathroom Shower/Tub: Walk-in shower  Bathroom Equipment: Grab bars in shower, Built-in shower seat  Home Equipment: Walker - Rolling, Wheelchair - Electric, Hospital bed, Lift chair (Does not sleep in hospital bed, sleeps in lift chair recliner)  Has the patient had two or more falls in the past year or any fall with injury in the past year?: Yes (6 weeks ago, getting out of bathroom into w/c)  ADL Assistance: Independent  Homemaking Assistance: Needs assistance (Facility provides meals)  Meal Prep: Total  Homemaking Responsibilities: No  Ambulation Assistance:  (requires assist and RW for ambulation)  Transfer Assistance: Independent (transfers to/from a wheelchair himself)  Active : No ([facility] provides transportation)  Patient's  Info: w/c transport through [facility] or [facility]  Mode of Transportation: Other, Van (w/c transport)  Occupation: Retired      EXAMINATION OF PERFORMANCE DEFICITS:    Cognitive/Behavioral Status:  Orientation  Orientation Level: Oriented

## 2024-05-28 NOTE — PLAN OF CARE
0730: Bedside and Verbal shift change report given to MARYANNE Pulido (oncoming nurse) by MARYANNE Sawant (offgoing nurse). Report included the following information Nurse Handoff Report, Intake/Output, and Cardiac Rhythm afib .     0830: ambulated pt to chair for breakfast. Pt tolerated well. No complaints at this time.    1315: pt up to chair for lunch. No complaints at this time.     1800: pt in chair for dinner. No complaints at this time.    1930: Bedside and Verbal shift change report given to MARYANNE Sawant (oncoming nurse) by MARYANNE Pulido (offgoing nurse). Report included the following information Nurse Handoff Report, Intake/Output, and Cardiac Rhythm afib .       Problem: Discharge Planning  Goal: Discharge to home or other facility with appropriate resources  5/28/2024 0942 by Tess Hassan RN  Outcome: Progressing  5/27/2024 2228 by Savana Heart RN  Outcome: Progressing     Problem: Chronic Conditions and Co-morbidities  Goal: Patient's chronic conditions and co-morbidity symptoms are monitored and maintained or improved  5/28/2024 0942 by Tess Hassan RN  Outcome: Progressing  5/27/2024 2228 by Savana Heart RN  Outcome: Progressing     Problem: Pain  Goal: Verbalizes/displays adequate comfort level or baseline comfort level  5/28/2024 0942 by Tess Hassan RN  Outcome: Progressing  5/27/2024 2228 by Savana Heart RN  Outcome: Progressing     Problem: Safety - Adult  Goal: Free from fall injury  5/28/2024 0942 by Tess Hassan RN  Outcome: Progressing  5/27/2024 2228 by Savana Heart RN  Outcome: Progressing     Problem: Skin/Tissue Integrity  Goal: Absence of new skin breakdown  Description: 1.  Monitor for areas of redness and/or skin breakdown  2.  Assess vascular access sites hourly  3.  Every 4-6 hours minimum:  Change oxygen saturation probe site  4.  Every 4-6 hours:  If on nasal continuous positive airway pressure, respiratory therapy assess nares and determine need for appliance change or resting  period.  5/28/2024 0942 by Tess Hassan RN  Outcome: Progressing  5/27/2024 2228 by Savana Heart RN  Outcome: Progressing     Problem: ABCDS Injury Assessment  Goal: Absence of physical injury  5/28/2024 0942 by Tess Hassan RN  Outcome: Progressing  5/27/2024 2228 by Savana Heart RN  Outcome: Progressing

## 2024-05-28 NOTE — PLAN OF CARE
Elevator  Bathroom Shower/Tub: Walk-in shower  Bathroom Equipment: Grab bars in shower, Built-in shower seat  Home Equipment: Walker - Rolling, Wheelchair - Electric, Hospital bed, Lift chair (Does not sleep in hospital bed, sleeps in lift chair recliner)  Has the patient had two or more falls in the past year or any fall with injury in the past year?: Yes (6 weeks ago, getting out of bathroom into w/c)  ADL Assistance: Independent  Homemaking Assistance: Needs assistance (Facility provides meals)  Meal Prep: Total  Homemaking Responsibilities: No  Ambulation Assistance:  (requires assist and RW for ambulation)  Transfer Assistance: Independent (transfers to/from a wheelchair himself)  Active : No (group home provides transportation)  Patient's  Info: w/c transport through [facility] or [facility]  Mode of Transportation: Other, [place] (w/c transport)  Occupation: Retired    Cognitive/Behavioral Status:  Orientation  Orientation Level: Oriented X4  Cognition  Overall Cognitive Status: Exceptions  Arousal/Alertness: Appears intact  Following Commands: Appears intact  Attention Span: Attends with cues to redirect  Memory: Decreased recall of recent events  Safety Judgement: Decreased awareness of need for assistance;Decreased awareness of need for safety  Problem Solving: Assistance required to generate solutions;Assistance required to implement solutions;Assistance required to identify errors made;Assistance required to correct errors made;Decreased awareness of errors  Insights: Decreased awareness of deficits  Initiation: Does not require cues  Sequencing: Requires cues for some      Hearing:   Hearing  Hearing: Within functional limits    Vision/Perceptual:                  Strength:    Strength: Generally decreased, functional    Tone & Sensation:   Tone: Normal  Sensation: Intact    Coordination:  Coordination: Generally decreased, functional    Range Of Motion:  AROM: Generally decreased, functional  PROM:  Generally decreased, functional    Functional Mobility:  Bed Mobility:     Bed Mobility Training  Bed Mobility Training: No  Transfers:     Transfer Training  Transfer Training: Yes  Overall Level of Assistance: Minimum assistance;Assist X2;Adaptive equipment (RW)  Interventions: Manual cues;Safety awareness training;Tactile cues;Verbal cues;Visual cues  Sit to Stand: Minimum assistance;Assist X2;Assist X1;Adaptive equipment  Stand to Sit: Minimum assistance;Assist X1  Toilet Transfer: Minimum assistance;Assist X2;Adaptive equipment;Additional time (with RW, to/from [facility])  Balance:               Balance  Sitting: Impaired  Sitting - Static: Good (unsupported)  Sitting - Dynamic: Good (unsupported)  Standing: Impaired  Standing - Static: Constant support;Fair  Standing - Dynamic: Constant support;Fair  Ambulation/Gait Training:                       Gait  Gait Training: Yes  Overall Level of Assistance: Minimum assistance  Distance (ft): 15 Feet  Assistive Device: Gait belt;Walker, rolling  Base of Support: Widened  Speed/Ginna: Slow;Shuffled;Pace decreased (< 100 feet/min)  Step Length: Left shortened;Right shortened  Gait Abnormalities: Decreased step clearance;Shuffling gait                                 Pain Ratin/10   Pain Intervention(s):       Activity Tolerance:   Fair     After treatment:   Patient left in no apparent distress sitting up in chair, Call bell within reach, and Caregiver / family present    COMMUNICATION/EDUCATION:   The patient's plan of care was discussed with: physical therapist, occupational therapist, and registered nurse         Thank you for this referral.  Prema Roberts, PT  Minutes: 25

## 2024-05-28 NOTE — PROGRESS NOTES
Hospitalist Progress Note  Adair Phelps MD  Answering service: [phone]        Date of Service:  2024  NAME:  Fito Barros  :  1950  MRN:  [id number]      Admission Summary:   Fito Barros is a 73 y.o. male with past medical history significant for hypertension, dyslipidemia, paroxysmal atrial fibrillation on Eliquis for anticoagulation presented at the emergency room with increased bilateral lower extremity swelling.  Patient has bilateral lower extremity venous stasis with wounds and has been receiving wound care by his home health nurse.  Patient was sent to the emergency room because the swelling and wounds are getting worse.  Patient also reported shortness of breath which has been going on for about 2 months and progressively getting worse.  The shortness of breath is worse with mild exertion and also when the patient is lying down.  He denies cough, rigors and chills.  Chest x-ray done on arrival at the emergency room showed unchanged mild cardiomegaly.  He was referred to the hospitalist service for admission.       Interval history / Subjective:   Patient seen and examined was sitting in the chair legs elevated wrapped in compression stockings still swollen discussed with  patient need to go to rehab       Assessment & Plan:     Acute diastolic CHF new onset  Possible diastolic CHF, RV dysfunction    Cardiologist following  Echo EF 60%,   Continue iv bumex added aldactone   Cardiologist following     Chronic atrial fibrillation:  continue Eliquis for anticoagulation.   continue Toprol for rate control.    Bilat leg severe edema with superimposed cellulitis   -increasing iv bumex  , will adjust as long renal function tolerate   - IV vanco and zosyn , MRSA neg. Dc vanco, change to po doxy,  - Continue zosyn given his previous pseudomonas culture in wound   Elevate legs,   Wound  have independently reviewed and interpreted patient's lab and all other diagnostic data    Notes reviewed from all clinical/nonclinical/nursing services involved in patient's clinical care. Care coordination discussions were held with appropriate clinical/nonclinical/ nursing providers based on care coordination needs.     Labs:     No results for input(s): \"WBC\", \"HGB\", \"HCT\", \"PLT\" in the last 72 hours.    Recent Labs     05/26/24  0518 05/27/24  0309 05/28/24  0208    137 137   K 4.0 3.7 3.5    103 103   CO2 26 29 30   BUN 27* 26* 26*       No results for input(s): \"ALT\", \"TP\", \"GLOB\", \"GGT\" in the last 72 hours.    Invalid input(s): \"SGOT\", \"GPT\", \"AP\", \"TBIL\", \"TBILI\", \"ALB\", \"AML\", \"AMYP\", \"LPSE\", \"HLPSE\"    No results for input(s): \"INR\", \"APTT\" in the last 72 hours.    Invalid input(s): \"PTP\"   No results for input(s): \"TIBC\" in the last 72 hours.    Invalid input(s): \"FE\", \"PSAT\", \"FERR\"   No results found for: \"RBCF\"   No results for input(s): \"PH\", \"PCO2\", \"PO2\" in the last 72 hours.  No results for input(s): \"CPK\" in the last 72 hours.    Invalid input(s): \"CPKMB\", \"CKNDX\", \"TROIQ\"  Lab Results   Component Value Date/Time    CHOL 90 05/25/2024 02:28 AM    HDL 51 05/25/2024 02:28 AM    LDL 21.6 05/25/2024 02:28 AM    LDL 65 09/15/2022 02:40 AM     Lab Results   Component Value Date/Time    GLUCPOC 165 09/19/2022 02:49 PM     [unfilled]      Medications Reviewed:     Current Facility-Administered Medications   Medication Dose Route Frequency    spironolactone (ALDACTONE) tablet 25 mg  25 mg Oral BID    doxycycline hyclate (VIBRA-TABS) tablet 100 mg  100 mg Oral Q12H    pantoprazole (PROTONIX) tablet 40 mg  40 mg Oral QAM AC    bumetanide (BUMEX) injection 1.5 mg  1.5 mg IntraVENous TID    allopurinol (ZYLOPRIM) tablet 100 mg  100 mg Oral Daily    apixaban (ELIQUIS) tablet 5 mg  5 mg Oral BID    atorvastatin (LIPITOR) tablet 80 mg  80 mg Oral Nightly    finasteride (PROSCAR) tablet 5 mg  5 mg Oral

## 2024-05-28 NOTE — PROGRESS NOTES
Referral source:   Fito Barros at [facility] in [facility] 4 CV SERVICES UNIT.  attended rounds on the CV Services Unit as part of the Interdisciplinary team where the patient's ongoing care was discussed. I reviewed the medical record as part of this encounter.     Outcome: Interdisciplinary team are aware of  availability and were encouraged to request Spiritual Health support as needed.      The  on-call can be reached at [phone]).     Rev. Becca Henriquez MDiv, [facility]  Staff

## 2024-05-28 NOTE — CARE COORDINATION
Care Management Initial Assessment       RUR: 17% - moderate  Readmission? No  1st IM letter given? Yes - 5/28/24  1st  letter given: No    Chart reviewed and CM spoke with patient re initial assessment and discharge plan. Patient advised he resides at [facility]. Patient endorses he receives about 30 mins of therapy per day. [facility] provides meals and medication administration. Patient became wheelchair bound approximately 6 months ago due to debility. He owns two electric wheelchairs for mobility. [facility] provides w/c transportation on Monday and Wednesday for appointments. He uses an alternate w/c transport company (\"[employer]\") when [employer] is unable to provide transportation. Confirmed PCP is correct.    Patient requested therapy for evaluation and recommendations for rehab after speaking with clinical team over the weekend. CM to relay request to attending MD for therapy orders.    CM will continue to follow.       05/28/24 1012   Service Assessment   Patient Orientation Alert and Oriented   Cognition Alert   History Provided By Patient   Primary Caregiver Other (Comment)  ([facility])   Support Systems Other (Comment)  ([facility] Staff)   Patient's Healthcare Decision Maker is: Legal Next of Kin   PCP Verified by CM Yes   Last Visit to PCP Within last year   Prior Functional Level Assistance with the following:;Bathing;Dressing;Feeding;Cooking;Housework;Shopping;Mobility   Current Functional Level Assistance with the following:;Bathing;Dressing;Feeding;Cooking;Housework;Shopping;Mobility   Can patient return to prior living arrangement No   Ability to make needs known: Good   Family able to assist with home care needs: No   Would you like for me to discuss the discharge plan with any other family members/significant others, and if so, who? No   Financial Resources Medicare   Community Resources None   Social/Functional History   Lives With Other (comment)  ([facility])    Type of Home Assisted living   Home Equipment Wheelchair - Electric   ADL Assistance Needs assistance   Ambulation Assistance Needs assistance   Transfer Assistance Needs assistance   Active  No   Patient's  Info w/c transport through [facility] or [employer]   Mode of Transportation Other;Van  (w/c transport)   Occupation Retired   Discharge Planning   Type of Residence Assisted living   Living Arrangements Other (Comment)  ([facility])   Current Services Prior To Admission Home Care   Potential Assistance Needed Home Care   DME Ordered? No   Potential Assistance Purchasing Medications No   Type of Home Care Services PT   Patient expects to be discharged to: Skilled nursing facility   Services At/After Discharge   Transition of Care Consult (CM Consult) Discharge Planning   Services At/After Discharge Skilled Nursing Facility (SNF);Home Health    Resource Information Provided? No   Mode of Transport at Discharge WC Van   Confirm Follow Up Transport Wheelchair Van   Condition of Participation: Discharge Planning   The Plan for Transition of Care is related to the following treatment goals: home health vs rehab   The Patient and/or Patient Representative was provided with a Choice of Provider? Patient   The Patient and/Or Patient Representative agree with the Discharge Plan? Yes   Freedom of Choice list was provided with basic dialogue that supports the patient's individualized plan of care/goals, treatment preferences, and shares the quality data associated with the providers?  Yes     Larry Arenas, MPH  Care Manager l [facility]  Available via [employer]

## 2024-05-29 VITALS
HEIGHT: 71 IN | OXYGEN SATURATION: 94 % | WEIGHT: 282.41 LBS | DIASTOLIC BLOOD PRESSURE: 73 MMHG | RESPIRATION RATE: 16 BRPM | BODY MASS INDEX: 39.54 KG/M2 | TEMPERATURE: 98.6 F | SYSTOLIC BLOOD PRESSURE: 123 MMHG | HEART RATE: 82 BPM

## 2024-05-29 LAB
ANION GAP SERPL CALC-SCNC: 5 MMOL/L (ref 5–15)
BACTERIA SPEC CULT: NORMAL
BACTERIA SPEC CULT: NORMAL
BUN SERPL-MCNC: 26 MG/DL (ref 6–20)
BUN/CREAT SERPL: 25 (ref 12–20)
CALCIUM SERPL-MCNC: 9.6 MG/DL (ref 8.5–10.1)
CHLORIDE SERPL-SCNC: 102 MMOL/L (ref 97–108)
CO2 SERPL-SCNC: 29 MMOL/L (ref 21–32)
CREAT SERPL-MCNC: 1.04 MG/DL (ref 0.7–1.3)
GLUCOSE SERPL-MCNC: 103 MG/DL (ref 65–100)
POTASSIUM SERPL-SCNC: 3.5 MMOL/L (ref 3.5–5.1)
SERVICE CMNT-IMP: NORMAL
SERVICE CMNT-IMP: NORMAL
SODIUM SERPL-SCNC: 136 MMOL/L (ref 136–145)

## 2024-05-29 PROCEDURE — 6370000000 HC RX 637 (ALT 250 FOR IP): Performed by: INTERNAL MEDICINE

## 2024-05-29 PROCEDURE — 6360000002 HC RX W HCPCS: Performed by: SPECIALIST

## 2024-05-29 PROCEDURE — 2580000003 HC RX 258: Performed by: HOSPITALIST

## 2024-05-29 PROCEDURE — 36415 COLL VENOUS BLD VENIPUNCTURE: CPT

## 2024-05-29 PROCEDURE — 80048 BASIC METABOLIC PNL TOTAL CA: CPT

## 2024-05-29 PROCEDURE — 6360000002 HC RX W HCPCS: Performed by: HOSPITALIST

## 2024-05-29 PROCEDURE — 6360000002 HC RX W HCPCS: Performed by: INTERNAL MEDICINE

## 2024-05-29 PROCEDURE — 6370000000 HC RX 637 (ALT 250 FOR IP): Performed by: HOSPITALIST

## 2024-05-29 PROCEDURE — 99233 SBSQ HOSP IP/OBS HIGH 50: CPT | Performed by: SPECIALIST

## 2024-05-29 PROCEDURE — 6370000000 HC RX 637 (ALT 250 FOR IP): Performed by: SPECIALIST

## 2024-05-29 PROCEDURE — 2580000003 HC RX 258: Performed by: INTERNAL MEDICINE

## 2024-05-29 PROCEDURE — 97535 SELF CARE MNGMENT TRAINING: CPT

## 2024-05-29 RX ORDER — DOXYCYCLINE HYCLATE 100 MG
100 TABLET ORAL EVERY 12 HOURS
Qty: 7 TABLET | Refills: 0 | Status: SHIPPED | OUTPATIENT
Start: 2024-05-29 | End: 2024-06-02

## 2024-05-29 RX ORDER — METOPROLOL SUCCINATE 50 MG/1
50 TABLET, EXTENDED RELEASE ORAL DAILY
Qty: 30 TABLET | Refills: 3 | Status: SHIPPED | OUTPATIENT
Start: 2024-05-30

## 2024-05-29 RX ORDER — PANTOPRAZOLE SODIUM 40 MG/1
40 TABLET, DELAYED RELEASE ORAL
Qty: 30 TABLET | Refills: 3 | Status: SHIPPED | OUTPATIENT
Start: 2024-05-30

## 2024-05-29 RX ORDER — SPIRONOLACTONE 50 MG/1
50 TABLET, FILM COATED ORAL 2 TIMES DAILY
Qty: 30 TABLET | Refills: 3 | Status: SHIPPED | OUTPATIENT
Start: 2024-05-29

## 2024-05-29 RX ADMIN — SPIRONOLACTONE 50 MG: 25 TABLET ORAL at 08:10

## 2024-05-29 RX ADMIN — OXYCODONE 5 MG: 5 TABLET ORAL at 04:29

## 2024-05-29 RX ADMIN — BUPROPION HYDROCHLORIDE 150 MG: 150 TABLET, EXTENDED RELEASE ORAL at 08:09

## 2024-05-29 RX ADMIN — SODIUM CHLORIDE, PRESERVATIVE FREE 10 ML: 5 INJECTION INTRAVENOUS at 08:14

## 2024-05-29 RX ADMIN — METOPROLOL SUCCINATE 50 MG: 50 TABLET, EXTENDED RELEASE ORAL at 08:09

## 2024-05-29 RX ADMIN — MORPHINE SULFATE 2 MG: 2 INJECTION, SOLUTION INTRAMUSCULAR; INTRAVENOUS at 02:17

## 2024-05-29 RX ADMIN — BUMETANIDE 2 MG: 0.25 INJECTION INTRAMUSCULAR; INTRAVENOUS at 08:09

## 2024-05-29 RX ADMIN — DOXYCYCLINE HYCLATE 100 MG: 100 TABLET, COATED ORAL at 08:09

## 2024-05-29 RX ADMIN — APIXABAN 5 MG: 5 TABLET, FILM COATED ORAL at 08:09

## 2024-05-29 RX ADMIN — ALLOPURINOL 100 MG: 100 TABLET ORAL at 08:10

## 2024-05-29 RX ADMIN — FINASTERIDE 5 MG: 5 TABLET, FILM COATED ORAL at 08:10

## 2024-05-29 RX ADMIN — OXYCODONE 5 MG: 5 TABLET ORAL at 08:09

## 2024-05-29 RX ADMIN — PIPERACILLIN AND TAZOBACTAM 4500 MG: 4; .5 INJECTION, POWDER, LYOPHILIZED, FOR SOLUTION INTRAVENOUS at 08:11

## 2024-05-29 RX ADMIN — PANTOPRAZOLE SODIUM 40 MG: 40 TABLET, DELAYED RELEASE ORAL at 07:06

## 2024-05-29 RX ADMIN — PIPERACILLIN AND TAZOBACTAM 4500 MG: 4; .5 INJECTION, POWDER, LYOPHILIZED, FOR SOLUTION INTRAVENOUS at 00:59

## 2024-05-29 ASSESSMENT — PAIN DESCRIPTION - LOCATION
LOCATION: LEG

## 2024-05-29 ASSESSMENT — PAIN DESCRIPTION - DESCRIPTORS
DESCRIPTORS: SORE
DESCRIPTORS: SORE

## 2024-05-29 ASSESSMENT — PAIN DESCRIPTION - ORIENTATION
ORIENTATION: RIGHT;LEFT
ORIENTATION: RIGHT;LEFT

## 2024-05-29 ASSESSMENT — PAIN SCALES - GENERAL
PAINLEVEL_OUTOF10: 8
PAINLEVEL_OUTOF10: 8
PAINLEVEL_OUTOF10: 2
PAINLEVEL_OUTOF10: 5
PAINLEVEL_OUTOF10: 8
PAINLEVEL_OUTOF10: 8

## 2024-05-29 ASSESSMENT — PAIN - FUNCTIONAL ASSESSMENT: PAIN_FUNCTIONAL_ASSESSMENT: PREVENTS OR INTERFERES SOME ACTIVE ACTIVITIES AND ADLS

## 2024-05-29 NOTE — PROGRESS NOTES
Physician Progress Note      PATIENT:               LORAINE GALINDO  [facility] #:                  [id number]  :                       1950  ADMIT DATE:       2024 10:55 AM  DISCH DATE:  RESPONDING  PROVIDER #:        Adair Phelps MD          QUERY TEXT:    Patient admitted with SOB and BLE edema.  Noted documentation of new onset CHF   per attending with Cardiology consultant documenting that there is no edema   in the lungs and  is low for age.  If possible, please document in progress notes and discharge summary if you   are evaluating and /or treating any of the following:    The medical record reflects the following:  Risk Factors: CAF, HTN    Clinical Indicators:  VS on adm: 97.9, 105, 18, 160/103, 100 on r/a          CXR- Unchanged mild cardiomegaly. No significant edema     Echo- EF of 60 - 65%. Not well visualized. Left ventricle size is normal.   Normal wall thickness. Unable to assess wall motion. Normal diastolic   function.    ER- peripheral edema and SOB, no pmh of CHF. Does have Afib, on coumadin.   Normal breath sounds. assess: LE Cellulitis, elevated troponin, peripheral   edema chronic w/ exacacerbation, SOB    H&P- mild JV. New onset CHF will get Echo, elevated troponin- no c/p. HTN,   HLD, Parox afib, SAMEER, ble venous stasis w/ superimposed cellulitis     Cardiac consult- BLE edema, uncertain etio, appears to have venous   stasis, may be 2/2 venous insufficiency, possible lymphedema, SOB/ DURAN- No   pulmonary edema on CXR,  low for age. Question of CHF, however lungs   are clear.  HR mildly elevated on presentation but now normalized.  Continue Toprol XL 50   mg daily.  Elevated [id number] score- continue Eliquis.     PN- New onset CHF: Possible diastolic chf, RV dysfunction  Cardiologist following  Echo pending     Cardiac note- BLE edema: Multifactorial etiology.  Appears to have venous   stasis/chronic venous insufficiency. SOB/ DURAN- no edema  in the lungs that   would tend to make us think this is more CVI lymphedema    5/27 PN- BLE edema w/ superimposed cellulitis, new onset CHF- EF 60%, morbid   obesity, elevated troponin- flat. Not ACS.    5/28 Cardiac-BLE improving. Multifactorial. Suspect venous stasis/ chronic   venous insufficiency, Presentation is consistent with venous lower extremity   and no edema in the lungs that would tend to make us think this is more CVI   lymphedema    Treatment: Bumex IV, Aldactone, DEONTE diet, I/O, Cardiac consult    Jbphh Criteria for Acute CHF: Heart Failure exacerbation met with two   major criteria or one major and two minor met ([email or web address])  Major: acute pulmonary edema on CXR, cardiomegaly, hepatojugular reflux, JVD,   paroxysmal nocturnal dyspnea or orthopnea, rales or S3 gallop;  Minor: ankle edema, DURAN, hepatomegaly, nocturnal cough, pleural effusion on   CXR, tachycardia with HR > 120 bpm    Thank you,  Shaista Castellon RN  Clinical Documentation  [phone], or via [employer]  Options provided:  -- New onset Diastolic CHF confirmed  -- New onset Diastolic CHF ruled out  -- Other - I will add my own diagnosis  -- Disagree - Not applicable / Not valid  -- Disagree - Clinically unable to determine / Unknown  -- Refer to Clinical Documentation Reviewer    PROVIDER RESPONSE TEXT:    After study, New onset Diastolic CHF confirmed.    Query created by: Shaista Castellon on 5/28/2024 12:31 PM      QUERY TEXT:    Pt admitted with BLE cellulitis. Pt noted to have DM 2. If possible, please   document in progress notes and discharge summary the relationship, if any,   between cellulitis and DM.    The medical record reflects the following:  Risk Factors: DM2    Clinical Indicators:  5/24 WBC 8.4, A1c-5.5, glucose-86    H&P- BLE venous stasis w/ superimposed cellulitis  5/27 PN- BLE edema w/ superimposed cellulitis    Treatment: Zosyn/ Vanc, Bumex, WC consult    Thank you,  Shaista Castellon RN  Clinical

## 2024-05-29 NOTE — PROGRESS NOTES
[facility]  Cardiology Care Note                  []Initial visit     [x]Established visit     Patient Name: Fito Barros - :1950 - MRN:[id number]  Primary Cardiologist: Jesus Manuel Willett MD  [facility] Dr. Destiney Spain  Reason for initial visit: CHF, elevated troponin     Assessment/Plan/Discussion:Cardiology Attending:     Patient seen on the day of progress note and examined  reviewed  with Advance Practice Provider (ALVIN, NP,PA)       A/P/ Medical Decision Making:  Complex Multifactorial edema and SOB  Bnp olny 726  chronic venous insufficiency and lymphedema  Weight was up 30 pounds   Can follow creat up to see where to go with diuretics  Cr was 0.6-0.7 in    Then to 1 now 1.3 , bicarb also helpful is at 30 indicating can tolerate a few more days of diuresis  Will change bumex to 2 mg bid,  Continue BB  Increase aldactone to 50 mg twice daily  Drs. Months as there is now bed available and they would prefer discharge and have podiatry see the patient there.  If the patient stays inpatient, continue Bumex for 24 to 40 hours.  Suggest podiatry consult    Fito Barros is a 73 y.o. male   S: Overnight--- continued improvement in severe lower extremity edema.  Has no chest pain or chest pressure.  Toenails are pretty disfigured.    Intake/Output Summary (Last 24 hours) at 2024 1416  Last data filed at 2024 2045  Gross per 24 hour   Intake 285.68 ml   Output 1025 ml   Net -739.32 ml        O:/73   Pulse 82   Temp 98.6 °F (37 °C) (Oral)   Resp 16   Ht 1.803 m (5' 11\")   Wt 128.1 kg (282 lb 6.6 oz)   SpO2 94%   BMI 39.39 kg/m²     NC AT no JVD, Clear lungs, good air movement, RRR s rg murmur , 2-3+ lower extremity edema      L:  Lab Results   Component Value Date    CREATININE 1.04 2024      Lab Results   Component Value Date    HGB 10.9 (L) 2024    ECHO

## 2024-05-29 NOTE — DISCHARGE SUMMARY
Discharge Summary       PATIENT ID: Fito Barros  MRN: [id number]   YOB: 1950    DATE OF ADMISSION: 5/24/2024 10:55 AM    DATE OF DISCHARGE: 5/29/24   PRIMARY CARE PROVIDER: Bari Oro MD     ATTENDING PHYSICIAN: adair phelps  DISCHARGING PROVIDER: Adair Phelps MD    To contact this individual call [phone] and ask the  to page.  If unavailable ask to be transferred the Adult Hospitalist Department.    CONSULTATIONS: IP CONSULT TO HOSPITALIST  IP CONSULT TO CARDIOLOGY  IP CONSULT TO PHARMACY  IP WOUND CARE NURSE CONSULT TO EVAL  IP CONSULT TO PODIATRY    PROCEDURES/SURGERIES: * No surgery found *    ADMITTING DIAGNOSES & HOSPITAL COURSE:     Fito Barros is a 73 y.o. male with past medical history significant for hypertension, dyslipidemia, paroxysmal atrial fibrillation on Eliquis for anticoagulation presented at the emergency room with increased bilateral lower extremity swelling.  Patient has bilateral lower extremity venous stasis with wounds and has been receiving wound care by his home health nurse.  Patient was sent to the emergency room because the swelling and wounds are getting worse.  Patient also reported shortness of breath which has been going on for about 2 months and progressively getting worse.  The shortness of breath is worse with mild exertion and also when the patient is lying down.  He denies cough, rigors and chills.  Chest x-ray done on arrival at the emergency room showed unchanged mild cardiomegaly.  He was referred to the hospitalist service for admission.    Acute diastolic CHF new onset  Possible diastolic CHF, RV dysfunction    Echo EF 60%,   Continue iv bumex added aldactone   On BB       Chronic atrial fibrillation:  continue Eliquis for anticoagulation.   continue Toprol for rate control.     Bilat leg severe edema with superimposed cellulitis   -on Doxy previously treated with vanc+ Zosyn  Wound dressing change  daily      History of Left  CVA 9/2022 with occluded thrombus in Left MC1 and areas of IC stenosis. On statin. Not on ASA s pt is on eliquis .      Dyslipidemia:  continue Lipitor.     Obstructive sleep apnea:   place the patient on CPAP at bedtime.     Depression:   resume preadmission medication.     Morbid obesity: Patient presented with BMI of 43.75.    Be benefit from lifestyle modification including diet and exercise when feasible.       DISCHARGE DIAGNOSES / PLAN:       D/c to [facility] will need podiatry consult there        PENDING TEST RESULTS:   At the time of discharge the following test results are still pending:     [facility]    [facility]     ADDITIONAL CARE RECOMMENDATIONS:     DIET: cardiac diet  Oral Nutritional Supplements:     ACTIVITY: activity as tolerated    WOUND CARE:  as instructed    EQUIPMENT needed:       DISCHARGE MEDICATIONS:     Medication List        START taking these medications      doxycycline hyclate 100 MG tablet  Commonly known as: VIBRA-TABS  Take 1 tablet by mouth in the morning and 1 tablet in the evening. Do all this for 7 doses.     pantoprazole 40 MG tablet  Commonly known as: PROTONIX  Take 1 tablet by mouth every morning (before breakfast)  Start taking on: May 30, 2024     spironolactone 50 MG tablet  Commonly known as: ALDACTONE  Take 1 tablet by mouth in the morning and 1 tablet in the evening.            CHANGE how you take these medications      metoprolol succinate 50 MG extended release tablet  Commonly known as: TOPROL XL  Take 1 tablet by mouth daily  Start taking on: May 30, 2024  What changed: medication strength            CONTINUE taking these medications      allopurinol 100 MG tablet  Commonly known as: ZYLOPRIM     apixaban 5 MG Tabs tablet  Commonly known as: ELIQUIS     atorvastatin 80 MG tablet  Commonly known as: LIPITOR     Balsam [place] Oil Oint     bumetanide 2 MG tablet  Commonly known as: BUMEX     buPROPion 100 MG extended release tablet  Commonly

## 2024-05-29 NOTE — DISCHARGE INSTRUCTIONS
Discharge Instructions       PATIENT ID: Fito Barros  MRN: [id number]   YOB: 1950    DATE OF ADMISSION: 5/24/2024   DATE OF DISCHARGE: 5/29/2024    PRIMARY CARE PROVIDER: Bari Oro     ATTENDING PHYSICIAN: Adair Phelps MD   DISCHARGING PROVIDER: Adair Phelps MD    To contact this individual call [phone] and ask the  to page.   If unavailable ask to be transferred the Adult Hospitalist Department.    DISCHARGE DIAGNOSES CHF/ leg edema and cellulitis wound    CONSULTATIONS: [unfilled]    PROCEDURES/SURGERIES: * No surgery found *    PENDING TEST RESULTS:   At the time of discharge the following test results are still pending:     FOLLOW UP APPOINTMENTS:   [unfilled]     ADDITIONAL CARE RECOMMENDATIONS:     DIET: cardiac diet  Oral Nutritional Supplements:     ACTIVITY: activity as tolerated    WOUND CARE:  as instructed     EQUIPMENT needed:       DISCHARGE MEDICATIONS:   See Medication Reconciliation Form    It is important that you take the medication exactly as they are prescribed.   Keep your medication in the bottles provided by the pharmacist and keep a list of the medication names, dosages, and times to be taken in your wallet.   Do not take other medications without consulting your doctor.       NOTIFY YOUR PHYSICIAN FOR ANY OF THE FOLLOWING:   Fever over 101 degrees for 24 hours.   Chest pain, shortness of breath, fever, chills, nausea, vomiting, diarrhea, change in mentation, falling, weakness, bleeding. Severe pain or pain not relieved by medications.  Or, any other signs or symptoms that you may have questions about.      DISPOSITION:    Home With:   OT  PT  HH  RN       SNF/Inpatient Rehab/LTAC    Independent/assisted living    Hospice    Other:     CDMP Checked:   Yes x     PROBLEM LIST Updated:  Yes x       Signed:   Adair Phelps MD  5/29/2024  11:25 AM

## 2024-05-29 NOTE — CARE COORDINATION
Transition of Care Plan:    RUR: 17% - moderate  Prior Level of Functioning: wheelchair bound at [facility]  Disposition: SNF - [facility]  If SNF: Date FOC offered: 5/29/24  Date FOC received: 5/29/24  Accepting facility: [facility]  Follow up appointments: PCP  DME needed: none for rehab discharge  Transportation at discharge: w/c van  IM/IMM Medicare/ letter given: 5/28/24  Caregiver Contact: arun Aquinor - [phone]  Discharge Caregiver contacted prior to discharge? no  Care Conference needed? no  Barriers to discharge: Podiatry Consult    CM spoke with patient at bedside re discharge plan. Offered SNF placement based on clinical team recommendation for therapy and wound care. Patient is agreeable to [facility] for SNF placement.    Referral sent via [employer] to [facility] for review. No insurance authorization is needed for discharge.    Received call from Veronica at [facility]. Bed available for patient today - Room 322B. RN Report: [phone] - ask for Building [address].    1010 - Updated by Attending MD that patient needs Podiatry consult prior to discharge to [facility].     1050 - Received call from [facility]. Offered that Podiatry is available at their facility and bed remains available for patient.  CM sent PS message to attending MD re same for discharge update.    1120 - Updated by attending MD that patient is stable for discharge today and Podiatry will see him at SNF.    1130 - Requested [facility]  for 1230 BLS transportation. Transportation packet placed on hard chart.    1150 - Received confirmation from [facility]  ETA is 1300.    CM will continue to follow.    Disposition Plan:  SNF: [facility]  Transportation: BLS    Larry Arenas, MPH  Care Manager l [facility]  Available via [employer] or

## 2024-05-29 NOTE — PROGRESS NOTES
0800: Bedside and Verbal shift change report given to Anh RN (oncoming nurse) by Savana RN (offgoing nurse). Report included the following information Nurse Handoff Report and Index.     0900: Lab sent per order.    1130: This RN [facility], report given to Savana. Opportunities for questions and concerns provided.   Plan for [facility] pickup at 1230, patient aware.

## 2024-05-29 NOTE — PLAN OF CARE
Problem: Occupational Therapy - Adult  Goal: By Discharge: Performs self-care activities at highest level of function for planned discharge setting.  See evaluation for individualized goals.  Description: .FUNCTIONAL STATUS PRIOR TO ADMISSION:  Patient resides at [facility], reports he receives assist for meals in the dining joseph, lives in the apartment alone, able to complete BADLs and transfers to/from an electric w/c himself, goes to a recreation room for some exercises. Hx of CVA with RUE/RLE hemiparesis, reports he was ambulating short distance with assist and RW support, receives  RN care for BLE wounds.     Occupational Therapy Goals:  Initiated 5/28/2024  1.  Patient will perform grooming seated at the sink with Supervision within 7 day(s).  2.  Patient will perform bathing with Moderate Assist & AE PRN within 7 day(s).  3.  Patient will perform lower body dressing with Moderate Assist & AE PRN within 7 day(s).  4.  Patient will perform toilet transfers with Minimal Assist with DME PRN within 7 day(s).  5.  Patient will perform all aspects of toileting with Moderate Assist within 7 day(s).  6.  Patient will participate in upper extremity therapeutic exercise/activities with Contact Guard Assist for 5 minutes within 7 day(s).    7.  Patient will utilize energy conservation techniques during functional activities with verbal and visual cues within 7 day(s).    Outcome: Progressing     OCCUPATIONAL THERAPY TREATMENT  Patient: Fito Barros (73 y.o. male)  Date: 5/29/2024  Primary Diagnosis: Shortness of breath [R06.02]  Peripheral edema [R60.0]  Elevated troponin [R79.89]  Cellulitis of lower extremity, unspecified laterality [L03.119]  New onset of congestive heart failure (HCC) [I50.9]       Precautions: Fall Risk                Chart, occupational therapy assessment, plan of care, and goals were reviewed.    ASSESSMENT  Patient continues to benefit from skilled OT services and is progressing  Judgement: Decreased awareness of need for assistance;Decreased awareness of need for safety  Problem Solving: Assistance required to generate solutions;Assistance required to implement solutions;Assistance required to identify errors made;Assistance required to correct errors made;Decreased awareness of errors  Insights: Decreased awareness of deficits  Initiation: Does not require cues  Sequencing: Requires cues for some    Functional Mobility and Transfers for ADLs:  Bed Mobility:        Transfers:   Transfer Training  Transfer Training: Yes  Overall Level of Assistance: Moderate assistance;Assist X1;Adaptive equipment (Mod A x1 from chair, increased to Mod A x2 from low toilet with RW)  Interventions: Safety awareness training;Tactile cues;Verbal cues  Sit to Stand: Adaptive equipment;Assist X2;Assist X1;Moderate assistance  Stand to Sit: Moderate assistance;Assist X1;Assist X2;Adaptive equipment  Toilet Transfer: Assist X2;Adaptive equipment;Moderate assistance           Balance:     Balance  Sitting - Static: Good (unsupported)  Sitting - Dynamic: Good (unsupported)  Standing: Impaired  Standing - Static: Constant support;Fair  Standing - Dynamic: Fair;Constant support      ADL Intervention:         Feeding: Setup   Feeding Skilled Clinical Factors: Managed beverage seated in chair     Grooming: Setup   Grooming Skilled Clinical Factors: Completed oral hygiene seated on toilet in bathroom, unable to tolerate standing            LE Bathing: Verbal cueing;Setup  LE Bathing Skilled Clinical Factors: Completed proximal LB bathing (hips to knee) with wipes seated on toilet with verbal cues for utilization of wipes for appropriate extremities; would benefit from LE AE training for distal access            LE Dressing: Dependent/Total  LE Dressing Skilled Clinical Factors: Total A to don socks d/t limited ROM and continued BLE swelling; would benefit from LE AE training for distal access                  Pain  Rating:  Pain in legs, not rated/10   Pain Intervention(s):   rest and repositioning      Activity Tolerance:   Fair , requires rest breaks, and observed shortness of breath on exertion  Please refer to the flowsheet for vital signs taken during this treatment.    After treatment:   Patient left in no apparent distress sitting up in chair, Call bell within reach, and Bed/ chair alarm activated    COMMUNICATION/EDUCATION:   The patient's plan of care was discussed with: registered nurse    Patient Education  Education Given To: Patient  Education Provided: Role of Therapy;Plan of Care;Precautions;ADL Adaptive Strategies;Transfer Training;Energy Conservation;Fall Prevention Strategies;Mobility Training  Education Method: Demonstration;Verbal;Teach Back  Barriers to Learning: Cognition  Education Outcome: Verbalized understanding;Continued education needed;Demonstrated understanding    Thank you for this referral.  [facility]  Minutes: 30    Regarding student involvement in patient care:  A student participated in this treatment session. Per CMS Medicare statements and AOTA guidelines I certify that the following was true:  1. I was present and directly observed the entire session.  2. I made all skilled judgments and clinical decisions regarding care.  3. I am the practitioner responsible for assessment, treatment, and documentation.

## 2024-07-17 ENCOUNTER — TELEPHONE (OUTPATIENT)
Age: 74
End: 2024-07-17

## 2024-07-17 NOTE — TELEPHONE ENCOUNTER
Pt caretaker has requested a referral for in home physical therapy and for home help.     Can be faxed to Sarah with fax #  [phone]

## 2025-06-04 ENCOUNTER — TELEPHONE (OUTPATIENT)
Age: 75
End: 2025-06-04

## 2025-06-04 NOTE — TELEPHONE ENCOUNTER
----- Message from Daniel SIMON sent at 6/4/2025  8:19 AM EDT -----  Regarding: ECC Message to Provider  ECC Message to Provider    Relationship to Patient: Self     Additional Information Patient wanted his primary provider to fill out his paper works for H&P as soon as possible. He would like to get the email address of the office for him to send the paper works.   --------------------------------------------------------------------------------------------------------------------------    Call Back Information: OK to leave message on voicemail  Preferred Call Back Number: Phone [phone]

## 2025-06-04 NOTE — TELEPHONE ENCOUNTER
Verified pt identifiers x 2  Spoke with pt has been notified he needs a appt to have form completed I attempted to schedule with Dr Nogueira this upcoming Friday also offered a visit for next week as well. Pt will see if he's able to get transportation for visit will call back.

## (undated) DEVICE — 1200 GUARD II KIT W/5MM TUBE W/O VAC TUBE: Brand: GUARDIAN

## (undated) DEVICE — Z DISCONTINUED USE 2751540 TUBING IRRIG L10IN DISP PMP ENDOGATOR

## (undated) DEVICE — SYRINGE MED 20ML STD CLR PLAS LUERLOCK TIP N CTRL DISP

## (undated) DEVICE — SUPPORT SCROT L FOR 39-44IN WAIST NYL CONSTANT COMFORTABLE

## (undated) DEVICE — GAUZE,SPONGE,FLUFF,6"X6.75",STRL,5/TRAY: Brand: MEDLINE

## (undated) DEVICE — CONTAINER SPEC 20 ML LID NEUT BUFF FORMALIN 10 % POLYPR STS

## (undated) DEVICE — KENDALL RADIOLUCENT FOAM MONITORING ELECTRODE -RECTANGULAR SHAPE: Brand: KENDALL

## (undated) DEVICE — PREMIUM WET SKIN PREP TRAY: Brand: MEDLINE INDUSTRIES, INC.

## (undated) DEVICE — GOWN,AURORA,NON-REINFORCED,2XL: Brand: MEDLINE

## (undated) DEVICE — NEONATAL-ADULT SPO2 SENSOR: Brand: NELLCOR

## (undated) DEVICE — INTENDED FOR TISSUE SEPARATION, AND OTHER PROCEDURES THAT REQUIRE A SHARP SURGICAL BLADE TO PUNCTURE OR CUT.: Brand: BARD-PARKER ® CARBON RIB-BACK BLADES

## (undated) DEVICE — SYR 20ML LL STRL LF --

## (undated) DEVICE — CYSTO/BLADDER IRRIGATION SET, REGULATING CLAMP

## (undated) DEVICE — DERMABOND SKIN ADH 0.7ML -- DERMABOND ADVANCED 12/BX

## (undated) DEVICE — CATH IV AUTOGRD BC BLU 22GA 25 -- INSYTE

## (undated) DEVICE — CONNECTOR TBNG AUX H2O JET DISP FOR OLY 160/180 SER

## (undated) DEVICE — BASIC GENERAL-SFMC: Brand: MEDLINE INDUSTRIES, INC.

## (undated) DEVICE — Z CONVERTED USE 2274299 CUFF BLD PRESSURE LNG MED AD 25-35 CM ARM FLEXIPORT DISP

## (undated) DEVICE — SOLIDIFIER FLUID 3000 CC ABSORB

## (undated) DEVICE — SPONGE GZ W4XL4IN COT RADPQ HIGHLY ABSRB

## (undated) DEVICE — SET EXTN TBNG L BOR 4 W STPCOCK ST 32IN PRIMING VOL 6ML

## (undated) DEVICE — ENDO CARRY-ON PROCEDURE KIT INCLUDES ENZYMATIC SPONGE, GAUZE, BIOHAZARD LABEL, TRAY, LUBRICANT, DIRTY SCOPE LABEL, WATER LABEL, TRAY, DRAWSTRING PAD, AND DEFENDO 4-PIECE KIT.: Brand: ENDO CARRY-ON PROCEDURE KIT

## (undated) DEVICE — SET ADMIN 16ML TBNG L100IN 2 Y INJ SITE IV PIGGY BK DISP

## (undated) DEVICE — FORCEPS BX L240CM JAW DIA2.8MM L CAP W/ NDL MIC MESH TOOTH

## (undated) DEVICE — SOLUTION IRRIGATION H2O 0797305] ICU MEDICAL INC]

## (undated) DEVICE — SPONGE: SPECIALTY PEANUT XR 100/CS: Brand: MEDICAL ACTION INDUSTRIES

## (undated) DEVICE — BLADE ASSEMB CLP HAIR FINE --

## (undated) DEVICE — DRAINBAG,ANTI-REFLUX TOWER,L/F,2000ML,LL: Brand: MEDLINE

## (undated) DEVICE — NEEDLE HYPO 18GA L1.5IN PNK S STL HUB POLYPR SHLD REG BVL

## (undated) DEVICE — SUTURE PROL SZ 3-0 L36IN NONABSORBABLE BLU L26MM SH 1/2 CIR 8522H

## (undated) DEVICE — BAG BELONG PT PERS CLEAR HANDL

## (undated) DEVICE — GLOVE ORANGE PI 8 1/2   MSG9085

## (undated) DEVICE — SOLUTION IRRIG 1000ML STRL H2O USP PLAS POUR BTL

## (undated) DEVICE — KIT IV STRT W CHLORAPREP PD 1ML

## (undated) DEVICE — GLOVE ORANGE PI 7 1/2   MSG9075

## (undated) DEVICE — CANN NASAL O2 CAPNOGRAPHY AD -- FILTERLINE

## (undated) DEVICE — TOWEL SURG W17XL27IN STD BLU COT NONFENESTRATED PREWASHED

## (undated) DEVICE — JELLY,LUBE,STERILE,FLIP TOP,TUBE,4-OZ: Brand: MEDLINE

## (undated) DEVICE — SOLUTION IRRIG 1000ML 0.9% SOD CHL USP POUR PLAS BTL

## (undated) DEVICE — BAG SPEC BIOHZD LF 2MIL 6X10IN -- CONVERT TO ITEM 357326

## (undated) DEVICE — QUILTED PREMIUM COMFORT UNDERPAD,EXTRA HEAVY: Brand: WINGS

## (undated) DEVICE — AIRLIFE™ U/CONNECT-IT OXYGEN TUBING 7 FEET (2.1 M) CRUSH-RESISTANT OXYGEN TUBING, VINYL TIPPED: Brand: AIRLIFE™

## (undated) DEVICE — BW-412T DISP COMBO CLEANING BRUSH: Brand: SINGLE USE COMBINATION CLEANING BRUSH